# Patient Record
Sex: MALE | Race: WHITE | Employment: UNEMPLOYED | URBAN - NONMETROPOLITAN AREA
[De-identification: names, ages, dates, MRNs, and addresses within clinical notes are randomized per-mention and may not be internally consistent; named-entity substitution may affect disease eponyms.]

---

## 2017-03-22 ENCOUNTER — OFFICE VISIT (OUTPATIENT)
Dept: FAMILY MEDICINE CLINIC | Age: 57
End: 2017-03-22
Payer: COMMERCIAL

## 2017-03-22 ENCOUNTER — HOSPITAL ENCOUNTER (OUTPATIENT)
Dept: OTHER | Age: 57
Discharge: HOME | End: 2017-03-22
Attending: NURSE PRACTITIONER | Admitting: NURSE PRACTITIONER

## 2017-03-22 VITALS
SYSTOLIC BLOOD PRESSURE: 140 MMHG | BODY MASS INDEX: 35.1 KG/M2 | OXYGEN SATURATION: 98 % | WEIGHT: 231.6 LBS | HEIGHT: 68 IN | HEART RATE: 97 BPM | DIASTOLIC BLOOD PRESSURE: 82 MMHG | RESPIRATION RATE: 18 BRPM

## 2017-03-22 DIAGNOSIS — E11.42 DIABETIC POLYNEUROPATHY ASSOCIATED WITH TYPE 2 DIABETES MELLITUS (HCC): ICD-10-CM

## 2017-03-22 DIAGNOSIS — N18.9 CRI (CHRONIC RENAL INSUFFICIENCY), UNSPECIFIED STAGE: ICD-10-CM

## 2017-03-22 DIAGNOSIS — E78.5 HYPERLIPIDEMIA LDL GOAL <100: ICD-10-CM

## 2017-03-22 DIAGNOSIS — I10 HTN, GOAL BELOW 140/80: ICD-10-CM

## 2017-03-22 DIAGNOSIS — R53.83 FATIGUE, UNSPECIFIED TYPE: ICD-10-CM

## 2017-03-22 LAB
A/G RATIO: 1.8 (ref 0.8–2)
ALBUMIN SERPL-MCNC: 4.5 G/DL (ref 3.4–4.8)
ALP BLD-CCNC: 84 U/L (ref 25–100)
ALT SERPL-CCNC: 21 U/L (ref 4–36)
ANION GAP SERPL CALCULATED.3IONS-SCNC: 12 MMOL/L (ref 3–16)
AST SERPL-CCNC: 18 U/L (ref 8–33)
BILIRUB SERPL-MCNC: 0.5 MG/DL (ref 0.3–1.2)
BUN BLDV-MCNC: 17 MG/DL (ref 6–20)
CALCIUM SERPL-MCNC: 10.1 MG/DL (ref 8.5–10.5)
CHLORIDE BLD-SCNC: 103 MMOL/L (ref 98–107)
CHOLESTEROL, TOTAL: 281 MG/DL (ref 0–200)
CO2: 27 MMOL/L (ref 20–30)
CREAT SERPL-MCNC: 1.2 MG/DL (ref 0.4–1.2)
GFR AFRICAN AMERICAN: >59
GFR NON-AFRICAN AMERICAN: >59
GLOBULIN: 2.5 G/DL
GLUCOSE BLD-MCNC: 131 MG/DL (ref 74–106)
HBA1C MFR BLD: 9.2 %
HDLC SERPL-MCNC: 49 MG/DL (ref 40–60)
LDL CHOLESTEROL CALCULATED: 186 MG/DL
POTASSIUM SERPL-SCNC: 4.5 MMOL/L (ref 3.4–5.1)
SODIUM BLD-SCNC: 142 MMOL/L (ref 136–145)
TOTAL PROTEIN: 7 G/DL (ref 6.4–8.3)
TRIGL SERPL-MCNC: 228 MG/DL (ref 0–249)
TSH SERPL DL<=0.05 MIU/L-ACNC: 0.9 UIU/ML (ref 0.35–5.5)
VITAMIN B-12: 382 PG/ML (ref 211–911)
VLDLC SERPL CALC-MCNC: 46 MG/DL

## 2017-03-22 PROCEDURE — 99214 OFFICE O/P EST MOD 30 MIN: CPT | Performed by: NURSE PRACTITIONER

## 2017-03-22 RX ORDER — GABAPENTIN 300 MG/1
300 CAPSULE ORAL 3 TIMES DAILY
Qty: 90 CAPSULE | Refills: 3 | Status: SHIPPED | OUTPATIENT
Start: 2017-03-22 | End: 2017-06-05

## 2017-03-22 RX ORDER — FUROSEMIDE 20 MG/1
20 TABLET ORAL DAILY
COMMUNITY
End: 2017-03-22 | Stop reason: SDUPTHER

## 2017-03-22 RX ORDER — PEN NEEDLE, DIABETIC 30 GX5/16"
1 NEEDLE, DISPOSABLE MISCELLANEOUS 3 TIMES DAILY
Qty: 100 EACH | Refills: 3 | Status: SHIPPED | OUTPATIENT
Start: 2017-03-22 | End: 2017-03-29 | Stop reason: SDUPTHER

## 2017-03-22 RX ORDER — FUROSEMIDE 20 MG/1
20 TABLET ORAL DAILY
Qty: 60 TABLET | Refills: 3 | Status: SHIPPED | OUTPATIENT
Start: 2017-03-22 | End: 2017-08-15 | Stop reason: SDUPTHER

## 2017-03-22 RX ORDER — INSULIN GLARGINE 100 [IU]/ML
120 INJECTION, SOLUTION SUBCUTANEOUS 3 TIMES DAILY PRN
COMMUNITY
End: 2017-03-22 | Stop reason: CLARIF

## 2017-03-22 RX ORDER — IBUPROFEN 800 MG/1
800 TABLET ORAL EVERY 6 HOURS PRN
COMMUNITY
End: 2017-06-05

## 2017-03-22 RX ORDER — LISINOPRIL AND HYDROCHLOROTHIAZIDE 20; 12.5 MG/1; MG/1
1 TABLET ORAL DAILY
Qty: 30 TABLET | Refills: 3 | Status: SHIPPED | OUTPATIENT
Start: 2017-03-22 | End: 2017-05-10 | Stop reason: DRUGHIGH

## 2017-03-22 RX ORDER — GABAPENTIN 300 MG/1
300 CAPSULE ORAL 3 TIMES DAILY
COMMUNITY
End: 2017-03-22 | Stop reason: SDUPTHER

## 2017-03-22 RX ORDER — INSULIN GLARGINE 100 [IU]/ML
50 INJECTION, SOLUTION SUBCUTANEOUS EVERY MORNING
COMMUNITY
End: 2017-04-11 | Stop reason: SDUPTHER

## 2017-03-22 RX ORDER — FENOFIBRATE 200 MG/1
200 CAPSULE ORAL
Qty: 30 CAPSULE | Refills: 3 | Status: SHIPPED | OUTPATIENT
Start: 2017-03-22 | End: 2017-07-18 | Stop reason: SDUPTHER

## 2017-03-22 RX ORDER — LISINOPRIL AND HYDROCHLOROTHIAZIDE 20; 12.5 MG/1; MG/1
1 TABLET ORAL DAILY
COMMUNITY
End: 2017-03-22 | Stop reason: SDUPTHER

## 2017-03-22 RX ORDER — SIMVASTATIN 80 MG
80 TABLET ORAL NIGHTLY
COMMUNITY
End: 2017-03-24 | Stop reason: ALTCHOICE

## 2017-03-22 RX ORDER — ASPIRIN 81 MG/1
81 TABLET, CHEWABLE ORAL DAILY
COMMUNITY

## 2017-03-22 RX ORDER — FENOFIBRATE 200 MG/1
200 CAPSULE ORAL
COMMUNITY
End: 2017-03-22 | Stop reason: SDUPTHER

## 2017-03-24 RX ORDER — ATORVASTATIN CALCIUM 40 MG/1
40 TABLET, FILM COATED ORAL DAILY
Qty: 30 TABLET | Refills: 3 | Status: SHIPPED | OUTPATIENT
Start: 2017-03-24 | End: 2017-08-11 | Stop reason: SDUPTHER

## 2017-03-29 ENCOUNTER — OFFICE VISIT (OUTPATIENT)
Dept: FAMILY MEDICINE CLINIC | Age: 57
End: 2017-03-29
Payer: COMMERCIAL

## 2017-03-29 VITALS
BODY MASS INDEX: 35.45 KG/M2 | HEIGHT: 68 IN | SYSTOLIC BLOOD PRESSURE: 130 MMHG | WEIGHT: 233.9 LBS | RESPIRATION RATE: 18 BRPM | OXYGEN SATURATION: 98 % | DIASTOLIC BLOOD PRESSURE: 82 MMHG | HEART RATE: 87 BPM

## 2017-03-29 DIAGNOSIS — I10 HTN, GOAL BELOW 140/80: ICD-10-CM

## 2017-03-29 DIAGNOSIS — M54.6 ACUTE MIDLINE THORACIC BACK PAIN: ICD-10-CM

## 2017-03-29 DIAGNOSIS — E78.5 HYPERLIPIDEMIA LDL GOAL <100: ICD-10-CM

## 2017-03-29 PROCEDURE — 99213 OFFICE O/P EST LOW 20 MIN: CPT | Performed by: NURSE PRACTITIONER

## 2017-03-29 RX ORDER — PEN NEEDLE, DIABETIC 30 GX5/16"
1 NEEDLE, DISPOSABLE MISCELLANEOUS 3 TIMES DAILY
Qty: 300 EACH | Refills: 3 | Status: SHIPPED | OUTPATIENT
Start: 2017-03-29 | End: 2018-09-10

## 2017-03-29 ASSESSMENT — ENCOUNTER SYMPTOMS
BACK PAIN: 1
RESPIRATORY NEGATIVE: 1
GASTROINTESTINAL NEGATIVE: 1
EYES NEGATIVE: 1

## 2017-04-10 ENCOUNTER — TELEPHONE (OUTPATIENT)
Dept: FAMILY MEDICINE CLINIC | Age: 57
End: 2017-04-10

## 2017-04-16 ASSESSMENT — ENCOUNTER SYMPTOMS
RESPIRATORY NEGATIVE: 1
EYES NEGATIVE: 1
GASTROINTESTINAL NEGATIVE: 1

## 2017-05-10 ENCOUNTER — OFFICE VISIT (OUTPATIENT)
Dept: FAMILY MEDICINE CLINIC | Age: 57
End: 2017-05-10
Payer: COMMERCIAL

## 2017-05-10 VITALS
WEIGHT: 232.2 LBS | DIASTOLIC BLOOD PRESSURE: 62 MMHG | HEIGHT: 68 IN | SYSTOLIC BLOOD PRESSURE: 100 MMHG | RESPIRATION RATE: 18 BRPM | BODY MASS INDEX: 35.19 KG/M2 | OXYGEN SATURATION: 95 % | HEART RATE: 80 BPM

## 2017-05-10 DIAGNOSIS — I95.0 IDIOPATHIC HYPOTENSION: Primary | ICD-10-CM

## 2017-05-10 DIAGNOSIS — I10 HTN, GOAL BELOW 140/80: ICD-10-CM

## 2017-05-10 DIAGNOSIS — E11.42 DIABETIC POLYNEUROPATHY ASSOCIATED WITH TYPE 2 DIABETES MELLITUS (HCC): ICD-10-CM

## 2017-05-10 DIAGNOSIS — E78.5 HYPERLIPIDEMIA LDL GOAL <100: ICD-10-CM

## 2017-05-10 PROCEDURE — 96360 HYDRATION IV INFUSION INIT: CPT | Performed by: NURSE PRACTITIONER

## 2017-05-10 PROCEDURE — 96361 HYDRATE IV INFUSION ADD-ON: CPT | Performed by: NURSE PRACTITIONER

## 2017-05-10 RX ORDER — GABAPENTIN 400 MG/1
400 CAPSULE ORAL 3 TIMES DAILY
Qty: 90 CAPSULE | Refills: 3 | Status: SHIPPED | OUTPATIENT
Start: 2017-05-10 | End: 2017-08-15 | Stop reason: SDUPTHER

## 2017-05-10 RX ORDER — SODIUM CHLORIDE 9 MG/ML
INJECTION, SOLUTION INTRAVENOUS ONCE
Status: DISCONTINUED | OUTPATIENT
Start: 2017-05-10 | End: 2021-06-09

## 2017-05-10 ASSESSMENT — PATIENT HEALTH QUESTIONNAIRE - PHQ9
SUM OF ALL RESPONSES TO PHQ QUESTIONS 1-9: 0
SUM OF ALL RESPONSES TO PHQ9 QUESTIONS 1 & 2: 0
1. LITTLE INTEREST OR PLEASURE IN DOING THINGS: 0
2. FEELING DOWN, DEPRESSED OR HOPELESS: 0

## 2017-05-10 ASSESSMENT — ENCOUNTER SYMPTOMS
RESPIRATORY NEGATIVE: 1
GASTROINTESTINAL NEGATIVE: 1
EYES NEGATIVE: 1
BACK PAIN: 1

## 2017-05-18 ENCOUNTER — OFFICE VISIT (OUTPATIENT)
Dept: FAMILY MEDICINE CLINIC | Age: 57
End: 2017-05-18
Payer: COMMERCIAL

## 2017-05-18 ENCOUNTER — HOSPITAL ENCOUNTER (OUTPATIENT)
Dept: OTHER | Age: 57
Discharge: HOME | End: 2017-05-18
Attending: NURSE PRACTITIONER | Admitting: NURSE PRACTITIONER

## 2017-05-18 VITALS
OXYGEN SATURATION: 98 % | WEIGHT: 232.6 LBS | SYSTOLIC BLOOD PRESSURE: 120 MMHG | BODY MASS INDEX: 35.25 KG/M2 | HEIGHT: 68 IN | DIASTOLIC BLOOD PRESSURE: 86 MMHG | HEART RATE: 86 BPM | RESPIRATION RATE: 16 BRPM

## 2017-05-18 DIAGNOSIS — I10 HTN, GOAL BELOW 140/80: ICD-10-CM

## 2017-05-18 DIAGNOSIS — E11.42 DIABETIC POLYNEUROPATHY ASSOCIATED WITH TYPE 2 DIABETES MELLITUS (HCC): ICD-10-CM

## 2017-05-18 DIAGNOSIS — E78.5 HYPERLIPIDEMIA LDL GOAL <100: ICD-10-CM

## 2017-05-18 LAB
CREATININE URINE: 57.7 MG/DL (ref 1.5–300)
HBA1C MFR BLD: 8 %
MICROALBUMIN UR-MCNC: <1.2 MG/DL (ref 0–22)
MICROALBUMIN/CREAT UR-RTO: NORMAL MG/G (ref 0–30)

## 2017-05-18 PROCEDURE — 99214 OFFICE O/P EST MOD 30 MIN: CPT | Performed by: NURSE PRACTITIONER

## 2017-05-18 ASSESSMENT — ENCOUNTER SYMPTOMS
BACK PAIN: 1
EYES NEGATIVE: 1
RESPIRATORY NEGATIVE: 1
GASTROINTESTINAL NEGATIVE: 1

## 2017-05-19 DIAGNOSIS — Z12.11 SCREENING FOR COLON CANCER: Primary | ICD-10-CM

## 2017-05-19 LAB
CONTROL: ABNORMAL
HEMOCCULT STL QL: POSITIVE

## 2017-05-19 PROCEDURE — 82274 ASSAY TEST FOR BLOOD FECAL: CPT | Performed by: NURSE PRACTITIONER

## 2017-05-20 DIAGNOSIS — Z12.11 SCREENING FOR COLON CANCER: ICD-10-CM

## 2017-05-20 DIAGNOSIS — R19.5 POSITIVE FIT (FECAL IMMUNOCHEMICAL TEST): Primary | ICD-10-CM

## 2017-06-05 ENCOUNTER — SURG/PROC ORDERS (OUTPATIENT)
Dept: SURGERY | Age: 57
End: 2017-06-05

## 2017-06-05 ENCOUNTER — HOSPITAL ENCOUNTER (OUTPATIENT)
Dept: OTHER | Age: 57
Discharge: HOME | End: 2017-06-05
Attending: SURGERY | Admitting: SURGERY

## 2017-06-05 ENCOUNTER — OFFICE VISIT (OUTPATIENT)
Dept: SURGERY | Age: 57
End: 2017-06-05
Payer: COMMERCIAL

## 2017-06-05 VITALS
HEIGHT: 68 IN | WEIGHT: 232.4 LBS | DIASTOLIC BLOOD PRESSURE: 65 MMHG | TEMPERATURE: 98 F | HEART RATE: 97 BPM | BODY MASS INDEX: 35.22 KG/M2 | SYSTOLIC BLOOD PRESSURE: 106 MMHG

## 2017-06-05 DIAGNOSIS — R19.5 HEME POSITIVE STOOL: ICD-10-CM

## 2017-06-05 DIAGNOSIS — Z12.11 COLON CANCER SCREENING: Primary | ICD-10-CM

## 2017-06-05 PROCEDURE — 99244 OFF/OP CNSLTJ NEW/EST MOD 40: CPT | Performed by: SURGERY

## 2017-06-05 RX ORDER — SODIUM CHLORIDE 0.9 % (FLUSH) 0.9 %
10 SYRINGE (ML) INJECTION PRN
Status: CANCELLED | OUTPATIENT
Start: 2017-06-05

## 2017-06-05 RX ORDER — SODIUM CHLORIDE, SODIUM LACTATE, POTASSIUM CHLORIDE, CALCIUM CHLORIDE 600; 310; 30; 20 MG/100ML; MG/100ML; MG/100ML; MG/100ML
INJECTION, SOLUTION INTRAVENOUS CONTINUOUS
Status: CANCELLED | OUTPATIENT
Start: 2017-06-05

## 2017-06-05 RX ORDER — SODIUM CHLORIDE 0.9 % (FLUSH) 0.9 %
10 SYRINGE (ML) INJECTION EVERY 12 HOURS SCHEDULED
Status: CANCELLED | OUTPATIENT
Start: 2017-06-05

## 2017-06-05 ASSESSMENT — ENCOUNTER SYMPTOMS
EYES NEGATIVE: 1
RESPIRATORY NEGATIVE: 1
GASTROINTESTINAL NEGATIVE: 1

## 2017-06-12 ENCOUNTER — TELEPHONE (OUTPATIENT)
Dept: SURGERY | Age: 57
End: 2017-06-12

## 2017-06-15 ENCOUNTER — TELEPHONE (OUTPATIENT)
Dept: SURGERY | Age: 57
End: 2017-06-15

## 2017-07-07 ENCOUNTER — OFFICE VISIT (OUTPATIENT)
Dept: SURGERY | Age: 57
End: 2017-07-07
Payer: COMMERCIAL

## 2017-07-07 VITALS
HEIGHT: 68 IN | RESPIRATION RATE: 16 BRPM | DIASTOLIC BLOOD PRESSURE: 90 MMHG | SYSTOLIC BLOOD PRESSURE: 131 MMHG | TEMPERATURE: 98.7 F | HEART RATE: 100 BPM | BODY MASS INDEX: 35.13 KG/M2 | WEIGHT: 231.8 LBS

## 2017-07-07 DIAGNOSIS — K64.8 INTERNAL HEMORRHOIDS WITHOUT COMPLICATION: ICD-10-CM

## 2017-07-07 DIAGNOSIS — D12.6 ADENOMATOUS COLON POLYP: Primary | ICD-10-CM

## 2017-07-07 PROCEDURE — 99212 OFFICE O/P EST SF 10 MIN: CPT | Performed by: SURGERY

## 2017-07-07 RX ORDER — LISINOPRIL AND HYDROCHLOROTHIAZIDE 20; 12.5 MG/1; MG/1
TABLET ORAL
COMMUNITY
Start: 2017-06-17 | End: 2017-07-18 | Stop reason: SDUPTHER

## 2017-07-18 RX ORDER — FENOFIBRATE 200 MG/1
CAPSULE ORAL
Qty: 30 CAPSULE | Refills: 2 | Status: SHIPPED | OUTPATIENT
Start: 2017-07-18 | End: 2017-10-23 | Stop reason: SDUPTHER

## 2017-07-18 RX ORDER — LISINOPRIL AND HYDROCHLOROTHIAZIDE 20; 12.5 MG/1; MG/1
TABLET ORAL
Qty: 30 TABLET | Refills: 2 | Status: SHIPPED | OUTPATIENT
Start: 2017-07-18 | End: 2017-09-05

## 2017-08-01 RX ORDER — INSULIN GLULISINE 100 [IU]/ML
INJECTION, SOLUTION SUBCUTANEOUS
Qty: 30 PEN | Refills: 2 | Status: SHIPPED | OUTPATIENT
Start: 2017-08-01 | End: 2017-10-07 | Stop reason: SDUPTHER

## 2017-08-11 RX ORDER — ATORVASTATIN CALCIUM 40 MG/1
TABLET, FILM COATED ORAL
Qty: 30 TABLET | Refills: 2 | Status: SHIPPED | OUTPATIENT
Start: 2017-08-11 | End: 2017-11-15 | Stop reason: SDUPTHER

## 2017-08-11 RX ORDER — SITAGLIPTIN 100 MG/1
TABLET, FILM COATED ORAL
Qty: 30 TABLET | Refills: 2 | Status: SHIPPED | OUTPATIENT
Start: 2017-08-11 | End: 2017-11-15 | Stop reason: SDUPTHER

## 2017-08-15 ENCOUNTER — OFFICE VISIT (OUTPATIENT)
Dept: FAMILY MEDICINE CLINIC | Age: 57
End: 2017-08-15
Payer: COMMERCIAL

## 2017-08-15 ENCOUNTER — HOSPITAL ENCOUNTER (OUTPATIENT)
Dept: OTHER | Age: 57
Discharge: HOME | End: 2017-08-15
Attending: NURSE PRACTITIONER | Admitting: NURSE PRACTITIONER

## 2017-08-15 VITALS
SYSTOLIC BLOOD PRESSURE: 100 MMHG | RESPIRATION RATE: 16 BRPM | WEIGHT: 239.6 LBS | HEART RATE: 86 BPM | DIASTOLIC BLOOD PRESSURE: 68 MMHG | HEIGHT: 68 IN | BODY MASS INDEX: 36.31 KG/M2 | OXYGEN SATURATION: 98 %

## 2017-08-15 DIAGNOSIS — E78.5 HYPERLIPIDEMIA LDL GOAL <100: ICD-10-CM

## 2017-08-15 DIAGNOSIS — Z11.4 ENCOUNTER FOR SCREENING FOR HIV: ICD-10-CM

## 2017-08-15 DIAGNOSIS — I10 HTN, GOAL BELOW 140/80: ICD-10-CM

## 2017-08-15 DIAGNOSIS — Z11.59 NEED FOR HEPATITIS C SCREENING TEST: ICD-10-CM

## 2017-08-15 DIAGNOSIS — Z23 NEED FOR STREPTOCOCCUS PNEUMONIAE VACCINATION: ICD-10-CM

## 2017-08-15 DIAGNOSIS — Z23 NEED FOR DIPHTHERIA-TETANUS-PERTUSSIS (TDAP) VACCINE, ADULT/ADOLESCENT: ICD-10-CM

## 2017-08-15 DIAGNOSIS — E11.42 DIABETIC POLYNEUROPATHY ASSOCIATED WITH TYPE 2 DIABETES MELLITUS (HCC): ICD-10-CM

## 2017-08-15 LAB
A/G RATIO: 1.7 (ref 0.8–2)
ALBUMIN SERPL-MCNC: 4.4 G/DL (ref 3.4–4.8)
ALP BLD-CCNC: 51 U/L (ref 25–100)
ALT SERPL-CCNC: 29 U/L (ref 4–36)
ANION GAP SERPL CALCULATED.3IONS-SCNC: 13 MMOL/L (ref 3–16)
AST SERPL-CCNC: 24 U/L (ref 8–33)
BILIRUB SERPL-MCNC: 0.3 MG/DL (ref 0.3–1.2)
BUN BLDV-MCNC: 36 MG/DL (ref 6–20)
CALCIUM SERPL-MCNC: 10.3 MG/DL (ref 8.5–10.5)
CHLORIDE BLD-SCNC: 100 MMOL/L (ref 98–107)
CHOLESTEROL, TOTAL: 230 MG/DL (ref 0–200)
CO2: 28 MMOL/L (ref 20–30)
CREAT SERPL-MCNC: 2.1 MG/DL (ref 0.4–1.2)
GFR AFRICAN AMERICAN: 40
GFR NON-AFRICAN AMERICAN: 33
GLOBULIN: 2.6 G/DL
GLUCOSE BLD-MCNC: 128 MG/DL (ref 74–106)
HBA1C MFR BLD: 9.6 %
HDLC SERPL-MCNC: 39 MG/DL (ref 40–60)
HEPATITIS C ANTIBODY INTERPRETATION: NORMAL
LDL CHOLESTEROL CALCULATED: 160 MG/DL
POTASSIUM SERPL-SCNC: 4.7 MMOL/L (ref 3.4–5.1)
SODIUM BLD-SCNC: 141 MMOL/L (ref 136–145)
TOTAL PROTEIN: 7 G/DL (ref 6.4–8.3)
TRIGL SERPL-MCNC: 154 MG/DL (ref 0–249)
VLDLC SERPL CALC-MCNC: 31 MG/DL

## 2017-08-15 PROCEDURE — 90471 IMMUNIZATION ADMIN: CPT | Performed by: NURSE PRACTITIONER

## 2017-08-15 PROCEDURE — 90732 PPSV23 VACC 2 YRS+ SUBQ/IM: CPT | Performed by: NURSE PRACTITIONER

## 2017-08-15 PROCEDURE — 99214 OFFICE O/P EST MOD 30 MIN: CPT | Performed by: NURSE PRACTITIONER

## 2017-08-15 RX ORDER — GABAPENTIN 400 MG/1
400 CAPSULE ORAL 3 TIMES DAILY
Qty: 90 CAPSULE | Refills: 3 | Status: SHIPPED | OUTPATIENT
Start: 2017-08-15 | End: 2017-11-15 | Stop reason: SDUPTHER

## 2017-08-15 RX ORDER — FUROSEMIDE 20 MG/1
20 TABLET ORAL DAILY
Qty: 30 TABLET | Refills: 3 | Status: SHIPPED | OUTPATIENT
Start: 2017-08-15 | End: 2017-11-15 | Stop reason: SDUPTHER

## 2017-08-16 LAB — HIV-1 AND HIV-2 ANTIBODIES: NORMAL

## 2017-08-17 ENCOUNTER — TELEPHONE (OUTPATIENT)
Dept: FAMILY MEDICINE CLINIC | Age: 57
End: 2017-08-17

## 2017-08-18 RX ORDER — BENZONATATE 100 MG/1
100 CAPSULE ORAL 3 TIMES DAILY PRN
Qty: 30 CAPSULE | Refills: 0 | Status: SHIPPED | OUTPATIENT
Start: 2017-08-18 | End: 2017-08-25

## 2017-08-20 ASSESSMENT — ENCOUNTER SYMPTOMS
EYES NEGATIVE: 1
RESPIRATORY NEGATIVE: 1
BACK PAIN: 1
GASTROINTESTINAL NEGATIVE: 1

## 2017-09-05 ENCOUNTER — OFFICE VISIT (OUTPATIENT)
Dept: FAMILY MEDICINE CLINIC | Age: 57
End: 2017-09-05
Payer: COMMERCIAL

## 2017-09-05 VITALS
WEIGHT: 238 LBS | BODY MASS INDEX: 36.07 KG/M2 | SYSTOLIC BLOOD PRESSURE: 108 MMHG | HEART RATE: 85 BPM | HEIGHT: 68 IN | RESPIRATION RATE: 18 BRPM | OXYGEN SATURATION: 98 % | DIASTOLIC BLOOD PRESSURE: 66 MMHG

## 2017-09-05 DIAGNOSIS — Z87.898 HX OF CHEMICAL EXPOSURE: ICD-10-CM

## 2017-09-05 PROCEDURE — 99213 OFFICE O/P EST LOW 20 MIN: CPT | Performed by: NURSE PRACTITIONER

## 2017-09-05 RX ORDER — LEVOFLOXACIN 500 MG/1
500 TABLET, FILM COATED ORAL DAILY
Qty: 10 TABLET | Refills: 0 | Status: SHIPPED | OUTPATIENT
Start: 2017-09-05 | End: 2017-09-15

## 2017-09-05 RX ORDER — LISINOPRIL 5 MG/1
5 TABLET ORAL DAILY
Qty: 30 TABLET | Refills: 3 | Status: SHIPPED | OUTPATIENT
Start: 2017-09-05 | End: 2017-10-23 | Stop reason: SDUPTHER

## 2017-09-15 ASSESSMENT — ENCOUNTER SYMPTOMS
RESPIRATORY NEGATIVE: 1
GASTROINTESTINAL NEGATIVE: 1
EYES NEGATIVE: 1
BACK PAIN: 1

## 2017-10-09 RX ORDER — INSULIN GLULISINE 100 [IU]/ML
INJECTION, SOLUTION SUBCUTANEOUS
Qty: 30 PEN | Refills: 3 | Status: SHIPPED | OUTPATIENT
Start: 2017-10-09 | End: 2017-12-21

## 2017-10-19 RX ORDER — INSULIN GLARGINE 100 [IU]/ML
INJECTION, SOLUTION SUBCUTANEOUS
Qty: 5 PEN | Refills: 5 | Status: SHIPPED | OUTPATIENT
Start: 2017-10-19 | End: 2017-11-15 | Stop reason: DRUGHIGH

## 2017-10-23 NOTE — TELEPHONE ENCOUNTER
Refill request received from pharmacy.  Medication pending for approval.     Patient information below:      Hemoglobin A1C (%)   Date Value   08/15/2017 9.6 (H)   05/18/2017 8.0 (H)   03/22/2017 9.2 (H)     MICROALBUMIN, RANDOM URINE (mg/dL)   Date Value   05/18/2017 <1.20     LDL Calculated (mg/dL)   Date Value   08/15/2017 160 (H)     AST (U/L)   Date Value   08/15/2017 24     ALT (U/L)   Date Value   08/15/2017 29     BUN (mg/dL)   Date Value   08/15/2017 36 (H)      (goal A1C is < 7)   (goal LDL is <100) need 30-50% reduction from baseline     BP Readings from Last 3 Encounters:   09/05/17 108/66   08/15/17 100/68   07/07/17 (!) 131/90    (goal /80)      All Future Testing planned in [facility]:      Last Office Visit With PCP:  10/18/2017      Next Visit Date:  Future Appointments  Date Time Provider Lyndsey Lyons   11/15/2017 9:00 AM Waqar Alba APR[address]            Patient Active Problem List:     Uncontrolled type 2 diabetes mellitus with complication, with long-term current use of insulin ([address]     Hyperlipidemia LDL goal <100     HTN, goal below 140/80     Diabetic polyneuropathy associated with type 2 diabetes mellitus ([address]     CRI (chronic renal insufficiency)     Colon cancer screening     Colon polyp     Internal hemorrhoids without complication     Need for diphtheria-tetanus-pertussis (Tdap) vaccine, adult/adolescent     Need for Streptococcus pneumoniae vaccination

## 2017-10-24 RX ORDER — LISINOPRIL 5 MG/1
5 TABLET ORAL DAILY
Qty: 30 TABLET | Refills: 3 | Status: SHIPPED | OUTPATIENT
Start: 2017-10-24 | End: 2018-02-20 | Stop reason: SDUPTHER

## 2017-10-24 RX ORDER — FENOFIBRATE 200 MG/1
CAPSULE ORAL
Qty: 30 CAPSULE | Refills: 3 | Status: SHIPPED | OUTPATIENT
Start: 2017-10-24 | End: 2018-02-20 | Stop reason: SDUPTHER

## 2017-11-15 ENCOUNTER — OFFICE VISIT (OUTPATIENT)
Dept: FAMILY MEDICINE CLINIC | Age: 57
End: 2017-11-15
Payer: COMMERCIAL

## 2017-11-15 ENCOUNTER — HOSPITAL ENCOUNTER (OUTPATIENT)
Dept: OTHER | Age: 57
Discharge: HOME | End: 2017-11-15
Attending: NURSE PRACTITIONER | Admitting: NURSE PRACTITIONER

## 2017-11-15 VITALS
DIASTOLIC BLOOD PRESSURE: 68 MMHG | OXYGEN SATURATION: 98 % | SYSTOLIC BLOOD PRESSURE: 118 MMHG | BODY MASS INDEX: 34.22 KG/M2 | WEIGHT: 225.8 LBS | HEART RATE: 84 BPM | HEIGHT: 68 IN | RESPIRATION RATE: 16 BRPM

## 2017-11-15 DIAGNOSIS — I10 HTN, GOAL BELOW 140/80: ICD-10-CM

## 2017-11-15 DIAGNOSIS — E11.42 DIABETIC POLYNEUROPATHY ASSOCIATED WITH TYPE 2 DIABETES MELLITUS (HCC): ICD-10-CM

## 2017-11-15 DIAGNOSIS — E78.5 HYPERLIPIDEMIA LDL GOAL <100: ICD-10-CM

## 2017-11-15 LAB
A/G RATIO: 1.4 (ref 0.8–2)
ALBUMIN SERPL-MCNC: 4.1 G/DL (ref 3.4–4.8)
ALP BLD-CCNC: 57 U/L (ref 25–100)
ALT SERPL-CCNC: 40 U/L (ref 4–36)
ANION GAP SERPL CALCULATED.3IONS-SCNC: 12 MMOL/L (ref 3–16)
AST SERPL-CCNC: 34 U/L (ref 8–33)
BILIRUB SERPL-MCNC: 0.3 MG/DL (ref 0.3–1.2)
BUN BLDV-MCNC: 32 MG/DL (ref 6–20)
CALCIUM SERPL-MCNC: 9.9 MG/DL (ref 8.5–10.5)
CHLORIDE BLD-SCNC: 99 MMOL/L (ref 98–107)
CHOLESTEROL, TOTAL: 231 MG/DL (ref 0–200)
CO2: 26 MMOL/L (ref 20–30)
CREAT SERPL-MCNC: 2 MG/DL (ref 0.4–1.2)
GFR AFRICAN AMERICAN: 42
GFR NON-AFRICAN AMERICAN: 35
GLOBULIN: 3 G/DL
GLUCOSE BLD-MCNC: 134 MG/DL (ref 74–106)
HBA1C MFR BLD: 11.2 %
HDLC SERPL-MCNC: 34 MG/DL (ref 40–60)
LDL CHOLESTEROL CALCULATED: 161 MG/DL
POTASSIUM SERPL-SCNC: 5 MMOL/L (ref 3.4–5.1)
SODIUM BLD-SCNC: 137 MMOL/L (ref 136–145)
TOTAL PROTEIN: 7.1 G/DL (ref 6.4–8.3)
TRIGL SERPL-MCNC: 181 MG/DL (ref 0–249)
VLDLC SERPL CALC-MCNC: 36 MG/DL

## 2017-11-15 PROCEDURE — 99214 OFFICE O/P EST MOD 30 MIN: CPT | Performed by: NURSE PRACTITIONER

## 2017-11-15 RX ORDER — FUROSEMIDE 20 MG/1
20 TABLET ORAL DAILY
Qty: 30 TABLET | Refills: 3 | Status: SHIPPED | OUTPATIENT
Start: 2017-11-15 | End: 2018-02-20 | Stop reason: SDUPTHER

## 2017-11-15 RX ORDER — ATORVASTATIN CALCIUM 40 MG/1
TABLET, FILM COATED ORAL
Qty: 30 TABLET | Refills: 2 | Status: SHIPPED | OUTPATIENT
Start: 2017-11-15 | End: 2018-02-20 | Stop reason: SDUPTHER

## 2017-11-15 RX ORDER — FLUTICASONE PROPIONATE 50 MCG
1 SPRAY, SUSPENSION (ML) NASAL DAILY
Qty: 1 BOTTLE | Refills: 3 | Status: SHIPPED | OUTPATIENT
Start: 2017-11-15 | End: 2018-02-20 | Stop reason: SDUPTHER

## 2017-11-15 RX ORDER — GABAPENTIN 400 MG/1
400 CAPSULE ORAL 3 TIMES DAILY
Qty: 90 CAPSULE | Refills: 3 | Status: SHIPPED | OUTPATIENT
Start: 2017-11-15 | End: 2018-05-20

## 2017-11-15 ASSESSMENT — ENCOUNTER SYMPTOMS
EYES NEGATIVE: 1
RESPIRATORY NEGATIVE: 1
BACK PAIN: 1
GASTROINTESTINAL NEGATIVE: 1

## 2017-11-15 NOTE — PROGRESS NOTES
SUBJECTIVE:  Hero Livingston is a 65 y.o. male that presents with   Chief Complaint   Patient presents with    Diabetes     F/U    Hyperlipidemia    Hypertension   . HPI:    Diabetes   He presents for his follow-up diabetic visit. He has type 2 diabetes mellitus. No MedicAlert identification noted. His disease course has been worsening. Associated symptoms include fatigue. There are no hypoglycemic complications. Symptoms are worsening. Diabetic complications include peripheral neuropathy and retinopathy. Risk factors for coronary artery disease include male sex, hypertension, diabetes mellitus, stress and dyslipidemia. Hyperlipidemia   This is a chronic problem. The current episode started more than 1 year ago. The problem is uncontrolled. Exacerbating diseases include chronic renal disease and diabetes. Associated symptoms include myalgias. Current antihyperlipidemic treatment includes statins and fibric acid derivatives. Compliance problems: unknown. Risk factors for coronary artery disease include diabetes mellitus, dyslipidemia, family history, hypertension, male sex, obesity, stress and a sedentary lifestyle. Hypertension   This is a chronic problem. The current episode started more than 1 year ago. Associated symptoms include malaise/fatigue. Risk factors for coronary artery disease include diabetes mellitus, dyslipidemia and family history. Past treatments include ACE inhibitors, diuretics and lifestyle changes. The current treatment provides moderate improvement. There are no compliance problems. Hypertensive end-organ damage includes retinopathy. Identifiable causes of hypertension include chronic renal disease. Review of Systems   Constitutional: Positive for fatigue and malaise/fatigue. HENT: Negative. Eyes: Negative. Respiratory: Negative. Cardiovascular: Negative. Gastrointestinal: Negative. Endocrine: Negative. Genitourinary: Negative.     Musculoskeletal: Positive for back pain and myalgias. Skin: Negative. Neurological: Negative. Hematological: Negative. Psychiatric/Behavioral: Negative. All other systems reviewed and are negative. OBJECTIVE:  /68 (Site: Left Arm, Position: Sitting, Cuff Size: Large Adult)   Pulse 84   Resp 16   Ht 5' 8\" (1.727 m)   Wt 225 lb 12.8 oz (102.4 kg)   SpO2 98% Comment: room air  BMI 34.33 kg/m²    Physical Exam   Constitutional: He is oriented to person, place, and time. He appears well-developed and well-nourished. HENT:   Head: Normocephalic and atraumatic. Right Ear: External ear normal.   Left Ear: External ear normal.   Nose: Nose normal.   Mouth/Throat: Oropharynx is clear and moist.   Eyes: Conjunctivae and EOM are normal. Pupils are equal, round, and reactive to light. Neck: Normal range of motion. Neck supple. Cardiovascular: Normal rate, regular rhythm, normal heart sounds and intact distal pulses. Pulmonary/Chest: Effort normal and breath sounds normal.   Abdominal: Soft. Bowel sounds are normal.   Musculoskeletal: Normal range of motion. Neurological: He is alert and oriented to person, place, and time. He has normal reflexes. Skin: Skin is warm and dry. Psychiatric: He has a normal mood and affect. His behavior is normal. Judgment and thought content normal.   Nursing note and vitals reviewed. No results found for requested labs within last 30 days. Hemoglobin A1C (%)   Date Value   08/15/2017 9.6 (H)     MICROALBUMIN, RANDOM URINE (mg/dL)   Date Value   05/18/2017 <1.20     LDL Calculated (mg/dL)   Date Value   08/15/2017 160 (H)         No results found for: WBC, NEUTROABS, HGB, HCT, MCV, PLT    Lab Results   Component Value Date    TSH 0.90 03/22/2017         ASSESSMENT/PLAN:  1. Uncontrolled type 2 diabetes mellitus with complication, with long-term current use of insulin (HCC)  Hemoglobin A1C   2. HTN, goal below 140/80  Comprehensive Metabolic Panel   3.  Hyperlipidemia [DISCONTINUED] gabapentin (NEURONTIN) 400 MG capsule Take 1 capsule by mouth 3 times daily 90 capsule 3    [DISCONTINUED] furosemide (LASIX) 20 MG tablet Take 1 tablet by mouth daily 30 tablet 3    [DISCONTINUED] atorvastatin (LIPITOR) 40 MG tablet TAKE ONE TABLET BY MOUTH DAILY 30 tablet 2    [DISCONTINUED] JANUVIA 100 MG tablet TAKE ONE TABLET BY MOUTH DAILY 30 tablet 2     Facility-Administered Encounter Medications as of 11/15/2017   Medication Dose Route Frequency Provider Last Rate Last Dose    0.9 % sodium chloride infusion   Intravenous Once Berenda Galeazzi, APRN   Stopped at 05/10/17 1052    0.9 % sodium chloride infusion   Intravenous Once Berenda Galeazzi, APRN   Stopped at 05/10/17 1144       Return in about 3 months (around 2/15/2018), or if symptoms worsen or fail to improve. PATIENT COUNSELING    Counseling was provided today regarding the following topics: Healthy eating habits, Regular exercise, substance abuse and healthy sleep habits. Discussed use, benefit, and side effects of prescribed medications. Barriers to medication compliance addressed. All patient questions answered. Pt voiced understanding. Controlled Substances Monitoring:     Attestation: The Prescription Monitoring Report for this patient was reviewed today. Berenda Galeazzi, APRN)  Documentation: Possible medication side effects, risk of tolerance and/or dependence, and alternative treatments discussed., Obtaining appropriate analgesic effect of treatment., No signs of potential drug abuse or diversion identified., Medication contract signed today. Berenda Galeazzi, APRN)  All systems have been reviewed and full physical exam completed. There are no additional changes from previous visit besides what has been indicated.

## 2017-11-15 NOTE — PROGRESS NOTES
Have you seen any other physician or provider since your last visit no    Have you had any other diagnostic tests since your last visit? no    Have you changed or stopped any medications since your last visit? no     Goals      Blood Pressure < 140/90      HEMOGLOBIN A1C < 9.0      LDL CALC < 100

## 2017-12-19 ENCOUNTER — TELEPHONE (OUTPATIENT)
Dept: FAMILY MEDICINE CLINIC | Age: 57
End: 2017-12-19

## 2017-12-20 ENCOUNTER — CARE COORDINATION (OUTPATIENT)
Dept: CARE COORDINATION | Age: 57
End: 2017-12-20

## 2018-02-19 RX ORDER — PEN NEEDLE, DIABETIC 31 G X1/4"
NEEDLE, DISPOSABLE MISCELLANEOUS
Qty: 90 EACH | Refills: 2 | Status: SHIPPED | OUTPATIENT
Start: 2018-02-19 | End: 2018-07-09 | Stop reason: SDUPTHER

## 2018-02-20 ENCOUNTER — HOSPITAL ENCOUNTER (OUTPATIENT)
Dept: OTHER | Age: 58
Discharge: HOME | End: 2018-02-20
Attending: NURSE PRACTITIONER | Admitting: NURSE PRACTITIONER

## 2018-02-20 ENCOUNTER — OFFICE VISIT (OUTPATIENT)
Dept: FAMILY MEDICINE CLINIC | Age: 58
End: 2018-02-20
Payer: COMMERCIAL

## 2018-02-20 VITALS
SYSTOLIC BLOOD PRESSURE: 136 MMHG | HEIGHT: 68 IN | OXYGEN SATURATION: 97 % | HEART RATE: 90 BPM | WEIGHT: 245 LBS | DIASTOLIC BLOOD PRESSURE: 78 MMHG | RESPIRATION RATE: 18 BRPM | BODY MASS INDEX: 37.13 KG/M2

## 2018-02-20 DIAGNOSIS — E11.42 DIABETIC POLYNEUROPATHY ASSOCIATED WITH TYPE 2 DIABETES MELLITUS (HCC): ICD-10-CM

## 2018-02-20 DIAGNOSIS — I10 HTN, GOAL BELOW 140/80: ICD-10-CM

## 2018-02-20 DIAGNOSIS — E78.5 HYPERLIPIDEMIA LDL GOAL <100: ICD-10-CM

## 2018-02-20 DIAGNOSIS — N18.9 CHRONIC RENAL IMPAIRMENT, UNSPECIFIED CKD STAGE: ICD-10-CM

## 2018-02-20 LAB
A/G RATIO: 1.9 (ref 0.8–2)
ALBUMIN SERPL-MCNC: 4.5 G/DL (ref 3.4–4.8)
ALP BLD-CCNC: 72 U/L (ref 25–100)
ALT SERPL-CCNC: 39 U/L (ref 4–36)
ANION GAP SERPL CALCULATED.3IONS-SCNC: 13 MMOL/L (ref 3–16)
AST SERPL-CCNC: 30 U/L (ref 8–33)
BILIRUB SERPL-MCNC: 0.3 MG/DL (ref 0.3–1.2)
BUN BLDV-MCNC: 20 MG/DL (ref 6–20)
CALCIUM SERPL-MCNC: 9.9 MG/DL (ref 8.5–10.5)
CHLORIDE BLD-SCNC: 100 MMOL/L (ref 98–107)
CHOLESTEROL, TOTAL: 224 MG/DL (ref 0–200)
CO2: 28 MMOL/L (ref 20–30)
CREAT SERPL-MCNC: 1.5 MG/DL (ref 0.4–1.2)
GFR AFRICAN AMERICAN: 58
GFR NON-AFRICAN AMERICAN: 48
GLOBULIN: 2.4 G/DL
GLUCOSE BLD-MCNC: 99 MG/DL (ref 74–106)
HBA1C MFR BLD: 12.5 %
HDLC SERPL-MCNC: 35 MG/DL (ref 40–60)
LDL CHOLESTEROL CALCULATED: 153 MG/DL
POTASSIUM SERPL-SCNC: 4.3 MMOL/L (ref 3.4–5.1)
SODIUM BLD-SCNC: 141 MMOL/L (ref 136–145)
TOTAL PROTEIN: 6.9 G/DL (ref 6.4–8.3)
TRIGL SERPL-MCNC: 178 MG/DL (ref 0–249)
VLDLC SERPL CALC-MCNC: 36 MG/DL

## 2018-02-20 PROCEDURE — 99214 OFFICE O/P EST MOD 30 MIN: CPT | Performed by: NURSE PRACTITIONER

## 2018-02-20 RX ORDER — GABAPENTIN 400 MG/1
400 CAPSULE ORAL 3 TIMES DAILY
Qty: 90 CAPSULE | Refills: 3 | Status: CANCELLED | OUTPATIENT
Start: 2018-02-20 | End: 2018-03-22

## 2018-02-20 RX ORDER — GABAPENTIN 600 MG/1
600 TABLET ORAL 3 TIMES DAILY
Qty: 90 TABLET | Refills: 2 | Status: SHIPPED | OUTPATIENT
Start: 2018-02-20 | End: 2018-05-20 | Stop reason: SDUPTHER

## 2018-02-20 RX ORDER — ATORVASTATIN CALCIUM 40 MG/1
TABLET, FILM COATED ORAL
Qty: 30 TABLET | Refills: 5 | Status: SHIPPED | OUTPATIENT
Start: 2018-02-20 | End: 2018-07-05 | Stop reason: SDUPTHER

## 2018-02-20 RX ORDER — FLUTICASONE PROPIONATE 50 MCG
1 SPRAY, SUSPENSION (ML) NASAL DAILY
Qty: 1 BOTTLE | Refills: 5 | Status: SHIPPED | OUTPATIENT
Start: 2018-02-20 | End: 2018-12-10 | Stop reason: SDUPTHER

## 2018-02-20 RX ORDER — FENOFIBRATE 200 MG/1
CAPSULE ORAL
Qty: 30 CAPSULE | Refills: 5 | Status: SHIPPED | OUTPATIENT
Start: 2018-02-20 | End: 2018-09-10 | Stop reason: SDUPTHER

## 2018-02-20 RX ORDER — FUROSEMIDE 20 MG/1
20 TABLET ORAL DAILY
Qty: 30 TABLET | Refills: 5 | Status: SHIPPED | OUTPATIENT
Start: 2018-02-20 | End: 2018-09-10 | Stop reason: SDUPTHER

## 2018-02-20 RX ORDER — LISINOPRIL 5 MG/1
5 TABLET ORAL DAILY
Qty: 30 TABLET | Refills: 5 | Status: SHIPPED | OUTPATIENT
Start: 2018-02-20 | End: 2018-09-10 | Stop reason: SDUPTHER

## 2018-02-20 ASSESSMENT — ENCOUNTER SYMPTOMS
RESPIRATORY NEGATIVE: 1
EYES NEGATIVE: 1
BACK PAIN: 1
GASTROINTESTINAL NEGATIVE: 1

## 2018-02-20 NOTE — PROGRESS NOTES
SUBJECTIVE:  Elvira Drake is a 65 y.o. male that presents with   Chief Complaint   Patient presents with    Diabetes     follow up   . HPI:    Patient reports the only new issue is when he gets aggravated, he has stinging all over his body and sweats and has a bad day. He says even his hair hurts. Diabetes   He presents for his follow-up diabetic visit. He has type 2 diabetes mellitus. No MedicAlert identification noted. His disease course has been worsening. Hypoglycemia symptoms include nervousness/anxiousness. Associated symptoms include fatigue. There are no hypoglycemic complications. Symptoms are worsening. Diabetic complications include peripheral neuropathy and retinopathy. Risk factors for coronary artery disease include male sex, hypertension, diabetes mellitus, stress and dyslipidemia. Hyperlipidemia   This is a chronic problem. The current episode started more than 1 year ago. The problem is uncontrolled. Exacerbating diseases include chronic renal disease and diabetes. Associated symptoms include myalgias. Current antihyperlipidemic treatment includes statins and fibric acid derivatives. Compliance problems: unknown. Risk factors for coronary artery disease include diabetes mellitus, dyslipidemia, family history, hypertension, male sex, obesity, stress and a sedentary lifestyle. Hypertension   This is a chronic problem. The current episode started more than 1 year ago. Associated symptoms include malaise/fatigue. Risk factors for coronary artery disease include diabetes mellitus, dyslipidemia and family history. Past treatments include ACE inhibitors, diuretics and lifestyle changes. The current treatment provides moderate improvement. There are no compliance problems. Hypertensive end-organ damage includes retinopathy. Identifiable causes of hypertension include chronic renal disease. Review of Systems   Constitutional: Positive for fatigue and malaise/fatigue. HENT: Negative. No results found for: WBC, NEUTROABS, HGB, HCT, MCV, PLT    Lab Results   Component Value Date    TSH 0.90 03/22/2017         ASSESSMENT/PLAN:  1. Uncontrolled type 2 diabetes mellitus with complication, with long-term current use of insulin ([facility])  Hemoglobin A1C   2. Hyperlipidemia LDL goal <100  Lipid Panel   3. HTN, goal below 140/80  Comprehensive Metabolic Panel   4. Diabetic polyneuropathy associated with type 2 diabetes mellitus ([facility]     5. Chronic renal impairment, unspecified CKD stage          Orders Placed This Encounter   Procedures    Lipid Panel     Standing Status:   Future     Standing Expiration Date:   2/20/2019     Order Specific Question:   Is Patient Fasting?/# of Hours     Answer:   6    Comprehensive Metabolic Panel     Standing Status:   Future     Standing Expiration Date:   2/20/2019    Hemoglobin A1C     Standing Status:   Future     Standing Expiration Date:   2/20/2019        Outpatient Encounter Prescriptions as of 2/20/2018   Medication Sig Dispense Refill    atorvastatin (LIPITOR) 40 MG tablet TAKE ONE TABLET BY MOUTH DAILY 30 tablet 5    SITagliptin (JANUVIA) 100 MG tablet TAKE ONE TABLET BY MOUTH DAILY 30 tablet 5    furosemide (LASIX) 20 MG tablet Take 1 tablet by mouth daily 30 tablet 5    fluticasone (FLONASE) 50 MCG/ACT nasal spray 1 spray by Nasal route daily 1 Bottle 5    lisinopril (PRINIVIL;ZESTRIL) 5 MG tablet Take 1 tablet by mouth daily 30 tablet 5    fenofibrate micronized (LOFIBRA) 200 MG capsule TAKE ONE CAPSULE BY MOUTH EVERY MORNING BEFORE BREAKFAST 30 capsule 5    gabapentin (NEURONTIN) 600 MG tablet Take 1 tablet by mouth 3 times daily for 90 days.  90 tablet 2    insulin aspart (NOVOLOG FLEXPEN) 100 UNIT/ML injection pen Inject 25 Units into the skin 3 times daily (before meals) 10 pen 5    insulin glargine (BASAGLAR KWIKPEN) 100 UNIT/ML injection pen Inject 55 Units into the skin 2 times daily      gabapentin (NEURONTIN) 400 MG capsule Take 1

## 2018-04-23 LAB — HBA1C MFR BLD: 11.3 %

## 2018-04-23 PROCEDURE — 83036 HEMOGLOBIN GLYCOSYLATED A1C: CPT | Performed by: NURSE PRACTITIONER

## 2018-05-20 RX ORDER — GABAPENTIN 600 MG/1
TABLET ORAL
Qty: 90 TABLET | Refills: 2 | Status: SHIPPED | OUTPATIENT
Start: 2018-05-20 | End: 2018-08-29 | Stop reason: SDUPTHER

## 2018-05-20 RX ORDER — INSULIN GLARGINE 100 [IU]/ML
INJECTION, SOLUTION SUBCUTANEOUS
Qty: 30 PEN | Refills: 4 | Status: SHIPPED | OUTPATIENT
Start: 2018-05-20 | End: 2018-05-31 | Stop reason: DRUGHIGH

## 2018-05-31 ENCOUNTER — OFFICE VISIT (OUTPATIENT)
Dept: FAMILY MEDICINE CLINIC | Age: 58
End: 2018-05-31
Payer: COMMERCIAL

## 2018-05-31 ENCOUNTER — HOSPITAL ENCOUNTER (OUTPATIENT)
Dept: OTHER | Age: 58
Discharge: HOME | End: 2018-05-31
Attending: NURSE PRACTITIONER | Admitting: NURSE PRACTITIONER

## 2018-05-31 VITALS
BODY MASS INDEX: 37.89 KG/M2 | OXYGEN SATURATION: 98 % | WEIGHT: 250 LBS | DIASTOLIC BLOOD PRESSURE: 64 MMHG | RESPIRATION RATE: 18 BRPM | HEART RATE: 86 BPM | SYSTOLIC BLOOD PRESSURE: 130 MMHG | HEIGHT: 68 IN

## 2018-05-31 DIAGNOSIS — E78.5 HYPERLIPIDEMIA LDL GOAL <100: ICD-10-CM

## 2018-05-31 DIAGNOSIS — E11.42 DIABETIC POLYNEUROPATHY ASSOCIATED WITH TYPE 2 DIABETES MELLITUS (HCC): ICD-10-CM

## 2018-05-31 DIAGNOSIS — I10 HTN, GOAL BELOW 140/80: ICD-10-CM

## 2018-05-31 PROCEDURE — 99214 OFFICE O/P EST MOD 30 MIN: CPT | Performed by: NURSE PRACTITIONER

## 2018-05-31 ASSESSMENT — PATIENT HEALTH QUESTIONNAIRE - PHQ9
SUM OF ALL RESPONSES TO PHQ QUESTIONS 1-9: 1
SUM OF ALL RESPONSES TO PHQ9 QUESTIONS 1 & 2: 1
1. LITTLE INTEREST OR PLEASURE IN DOING THINGS: 1
2. FEELING DOWN, DEPRESSED OR HOPELESS: 0

## 2018-06-01 LAB
A/G RATIO: 1.7 (ref 0.8–2)
ALBUMIN SERPL-MCNC: 4.3 G/DL (ref 3.4–4.8)
ALP BLD-CCNC: 60 U/L (ref 25–100)
ALT SERPL-CCNC: 38 U/L (ref 4–36)
ANION GAP SERPL CALCULATED.3IONS-SCNC: 13 MMOL/L (ref 3–16)
AST SERPL-CCNC: 37 U/L (ref 8–33)
BILIRUB SERPL-MCNC: 0.4 MG/DL (ref 0.3–1.2)
BUN BLDV-MCNC: 24 MG/DL (ref 6–20)
CALCIUM SERPL-MCNC: 9.7 MG/DL (ref 8.5–10.5)
CHLORIDE BLD-SCNC: 102 MMOL/L (ref 98–107)
CHOLESTEROL, TOTAL: 201 MG/DL (ref 0–200)
CO2: 27 MMOL/L (ref 20–30)
CREAT SERPL-MCNC: 2.1 MG/DL (ref 0.4–1.2)
CREATININE URINE: 77.1 MG/DL (ref 1.5–300)
GFR AFRICAN AMERICAN: 40
GFR NON-AFRICAN AMERICAN: 33
GLOBULIN: 2.6 G/DL
GLUCOSE BLD-MCNC: 60 MG/DL (ref 74–106)
HBA1C MFR BLD: 9.8 %
HDLC SERPL-MCNC: 35 MG/DL (ref 40–60)
LDL CHOLESTEROL CALCULATED: 137 MG/DL
MICROALBUMIN UR-MCNC: 1.8 MG/DL (ref 0–22)
MICROALBUMIN/CREAT UR-RTO: 23.3 MG/G (ref 0–30)
POTASSIUM SERPL-SCNC: 4.3 MMOL/L (ref 3.4–5.1)
SODIUM BLD-SCNC: 142 MMOL/L (ref 136–145)
TOTAL PROTEIN: 6.9 G/DL (ref 6.4–8.3)
TRIGL SERPL-MCNC: 146 MG/DL (ref 0–249)
TSH SERPL DL<=0.05 MIU/L-ACNC: 1.67 UIU/ML (ref 0.35–5.5)
VLDLC SERPL CALC-MCNC: 29 MG/DL

## 2018-06-09 ASSESSMENT — ENCOUNTER SYMPTOMS
EYES NEGATIVE: 1
BACK PAIN: 1
RESPIRATORY NEGATIVE: 1
GASTROINTESTINAL NEGATIVE: 1

## 2018-06-15 ENCOUNTER — TELEPHONE (OUTPATIENT)
Dept: FAMILY MEDICINE CLINIC | Age: 58
End: 2018-06-15

## 2018-06-15 RX ORDER — COLCHICINE 0.6 MG/1
0.6 TABLET ORAL 2 TIMES DAILY
Qty: 30 TABLET | Refills: 0 | Status: SHIPPED | OUTPATIENT
Start: 2018-06-15 | End: 2018-06-27 | Stop reason: SDUPTHER

## 2018-06-27 RX ORDER — COLCHICINE 0.6 MG/1
TABLET ORAL
Qty: 30 TABLET | Refills: 0 | Status: SHIPPED | OUTPATIENT
Start: 2018-06-27 | End: 2018-07-11 | Stop reason: SDUPTHER

## 2018-07-05 RX ORDER — ATORVASTATIN CALCIUM 80 MG/1
TABLET, FILM COATED ORAL
Qty: 30 TABLET | Refills: 3 | Status: SHIPPED | OUTPATIENT
Start: 2018-07-05 | End: 2018-12-10 | Stop reason: SDUPTHER

## 2018-07-09 RX ORDER — PEN NEEDLE, DIABETIC 31 G X1/4"
NEEDLE, DISPOSABLE MISCELLANEOUS
Qty: 100 EACH | Refills: 5 | Status: SHIPPED | OUTPATIENT
Start: 2018-07-09 | End: 2018-09-10

## 2018-07-09 NOTE — TELEPHONE ENCOUNTER
Refill request received from pharmacy.  Medication pending for approval.     Patient information below:      Hemoglobin A1C (%)   Date Value   05/31/2018 9.8 (H)   04/23/2018 11.3   02/20/2018 12.5 (H)     Microalbumin, Random Urine (mg/dL)   Date Value   05/31/2018 1.80     LDL Calculated (mg/dL)   Date Value   05/31/2018 137 (H)     AST (U/L)   Date Value   05/31/2018 37 (H)     ALT (U/L)   Date Value   05/31/2018 38 (H)     BUN (mg/dL)   Date Value   05/31/2018 24 (H)      (goal A1C is < 7)   (goal LDL is <100) need 30-50% reduction from baseline     BP Readings from Last 3 Encounters:   05/31/18 130/64   02/20/18 136/78   11/15/17 118/68    (goal /80)      All Future Testing planned in [facility]:      Last Office Visit With PCP:  7/5/2018      Next Visit Date:  Future Appointments  Date Time Provider Lyndsey Lyons   8/29/2018 8:30 AM Choco Means APR[address]            Patient Active Problem List:     Uncontrolled type 2 diabetes mellitus with complication, with long-term current use of insulin ([address]     Hyperlipidemia LDL goal <100     HTN, goal below 140/80     Diabetic polyneuropathy associated with type 2 diabetes mellitus ([address]     CRI (chronic renal insufficiency)     Colon cancer screening     Colon polyp     Internal hemorrhoids without complication     Need for diphtheria-tetanus-pertussis (Tdap) vaccine, adult/adolescent     Need for Streptococcus pneumoniae vaccination

## 2018-07-11 NOTE — TELEPHONE ENCOUNTER
Refill request received from pharmacy.  Medication pending for approval.     Patient information below:      Hemoglobin A1C (%)   Date Value   05/31/2018 9.8 (H)   04/23/2018 11.3   02/20/2018 12.5 (H)     Microalbumin, Random Urine (mg/dL)   Date Value   05/31/2018 1.80     LDL Calculated (mg/dL)   Date Value   05/31/2018 137 (H)     AST (U/L)   Date Value   05/31/2018 37 (H)     ALT (U/L)   Date Value   05/31/2018 38 (H)     BUN (mg/dL)   Date Value   05/31/2018 24 (H)      (goal A1C is < 7)   (goal LDL is <100) need 30-50% reduction from baseline     BP Readings from Last 3 Encounters:   05/31/18 130/64   02/20/18 136/78   11/15/17 118/68    (goal /80)      All Future Testing planned in [facility]:      Last Office Visit With PCP:  7/9/2018      Next Visit Date:  Future Appointments  Date Time Provider Lyndsey Lyons   8/29/2018 8:30 AM ANDREIA Sloan [address] [place] [address]            Patient Active Problem List:     Uncontrolled type 2 diabetes mellitus with complication, with long-term current use of insulin ([address]     Hyperlipidemia LDL goal <100     HTN, goal below 140/80     Diabetic polyneuropathy associated with type 2 diabetes mellitus ([address]     CRI (chronic renal insufficiency)     Colon cancer screening     Colon polyp     Internal hemorrhoids without complication     Need for diphtheria-tetanus-pertussis (Tdap) vaccine, adult/adolescent     Need for Streptococcus pneumoniae vaccination

## 2018-07-12 RX ORDER — COLCHICINE 0.6 MG/1
TABLET ORAL
Qty: 60 TABLET | Refills: 0 | Status: SHIPPED | OUTPATIENT
Start: 2018-07-12 | End: 2020-03-11 | Stop reason: ALTCHOICE

## 2018-09-10 ENCOUNTER — OFFICE VISIT (OUTPATIENT)
Dept: FAMILY MEDICINE CLINIC | Age: 58
End: 2018-09-10
Payer: COMMERCIAL

## 2018-09-10 ENCOUNTER — HOSPITAL ENCOUNTER (OUTPATIENT)
Facility: HOSPITAL | Age: 58
Discharge: HOME | End: 2018-09-10
Payer: COMMERCIAL

## 2018-09-10 VITALS
OXYGEN SATURATION: 98 % | DIASTOLIC BLOOD PRESSURE: 78 MMHG | RESPIRATION RATE: 18 BRPM | WEIGHT: 253 LBS | SYSTOLIC BLOOD PRESSURE: 132 MMHG | BODY MASS INDEX: 38.34 KG/M2 | HEIGHT: 68 IN | HEART RATE: 89 BPM

## 2018-09-10 DIAGNOSIS — E78.5 HYPERLIPIDEMIA LDL GOAL <100: ICD-10-CM

## 2018-09-10 DIAGNOSIS — I10 HTN, GOAL BELOW 140/80: ICD-10-CM

## 2018-09-10 DIAGNOSIS — N18.9 CHRONIC RENAL IMPAIRMENT, UNSPECIFIED CKD STAGE: ICD-10-CM

## 2018-09-10 DIAGNOSIS — E11.42 DIABETIC POLYNEUROPATHY ASSOCIATED WITH TYPE 2 DIABETES MELLITUS (HCC): ICD-10-CM

## 2018-09-10 DIAGNOSIS — F43.23 SITUATIONAL MIXED ANXIETY AND DEPRESSIVE DISORDER: ICD-10-CM

## 2018-09-10 LAB
A/G RATIO: 1.6 (ref 0.8–2)
ALBUMIN SERPL-MCNC: 4.2 G/DL (ref 3.4–4.8)
ALP BLD-CCNC: 79 U/L (ref 25–100)
ALT SERPL-CCNC: 52 U/L (ref 4–36)
ANION GAP SERPL CALCULATED.3IONS-SCNC: 8 MMOL/L (ref 3–16)
AST SERPL-CCNC: 36 U/L (ref 8–33)
BILIRUB SERPL-MCNC: 0.3 MG/DL (ref 0.3–1.2)
BUN BLDV-MCNC: 27 MG/DL (ref 6–20)
CALCIUM SERPL-MCNC: 9.6 MG/DL (ref 8.5–10.5)
CHLORIDE BLD-SCNC: 103 MMOL/L (ref 98–107)
CHOLESTEROL, TOTAL: 186 MG/DL (ref 0–200)
CO2: 28 MMOL/L (ref 20–30)
CREAT SERPL-MCNC: 1.7 MG/DL (ref 0.4–1.2)
GFR AFRICAN AMERICAN: 51
GFR NON-AFRICAN AMERICAN: 42
GLOBULIN: 2.6 G/DL
GLUCOSE BLD-MCNC: 331 MG/DL (ref 74–106)
HBA1C MFR BLD: 11.5 %
HDLC SERPL-MCNC: 36 MG/DL (ref 40–60)
LDL CHOLESTEROL CALCULATED: 122 MG/DL
POTASSIUM SERPL-SCNC: 5 MMOL/L (ref 3.4–5.1)
SODIUM BLD-SCNC: 139 MMOL/L (ref 136–145)
TOTAL PROTEIN: 6.8 G/DL (ref 6.4–8.3)
TRIGL SERPL-MCNC: 139 MG/DL (ref 0–249)
VLDLC SERPL CALC-MCNC: 28 MG/DL

## 2018-09-10 PROCEDURE — 99214 OFFICE O/P EST MOD 30 MIN: CPT | Performed by: NURSE PRACTITIONER

## 2018-09-10 PROCEDURE — 83036 HEMOGLOBIN GLYCOSYLATED A1C: CPT

## 2018-09-10 PROCEDURE — 36415 COLL VENOUS BLD VENIPUNCTURE: CPT

## 2018-09-10 PROCEDURE — 80053 COMPREHEN METABOLIC PANEL: CPT

## 2018-09-10 PROCEDURE — 80061 LIPID PANEL: CPT

## 2018-09-10 RX ORDER — LISINOPRIL 5 MG/1
5 TABLET ORAL DAILY
Qty: 30 TABLET | Refills: 5 | Status: SHIPPED | OUTPATIENT
Start: 2018-09-10 | End: 2019-06-10 | Stop reason: SDUPTHER

## 2018-09-10 RX ORDER — FUROSEMIDE 20 MG/1
20 TABLET ORAL DAILY
Qty: 30 TABLET | Refills: 5 | Status: SHIPPED | OUTPATIENT
Start: 2018-09-10 | End: 2019-06-10 | Stop reason: SDUPTHER

## 2018-09-10 RX ORDER — FENOFIBRATE 200 MG/1
CAPSULE ORAL
Qty: 30 CAPSULE | Refills: 5 | Status: SHIPPED | OUTPATIENT
Start: 2018-09-10 | End: 2019-06-10 | Stop reason: SDUPTHER

## 2018-09-10 RX ORDER — CLONAZEPAM 0.5 MG/1
0.5 TABLET ORAL 2 TIMES DAILY PRN
Qty: 60 TABLET | Refills: 0 | Status: SHIPPED | OUTPATIENT
Start: 2018-09-10 | End: 2018-12-10 | Stop reason: SDUPTHER

## 2018-09-10 ASSESSMENT — ENCOUNTER SYMPTOMS
RESPIRATORY NEGATIVE: 1
BACK PAIN: 1
GASTROINTESTINAL NEGATIVE: 1
EYES NEGATIVE: 1

## 2018-09-10 NOTE — PROGRESS NOTES
dyslipidemia and family history. Past treatments include ACE inhibitors, diuretics and lifestyle changes. The current treatment provides moderate improvement. There are no compliance problems. Hypertensive end-organ damage includes retinopathy. Identifiable causes of hypertension include chronic renal disease. Review of Systems   Constitutional: Positive for fatigue and malaise/fatigue. HENT: Negative. Eyes: Negative. Respiratory: Negative. Cardiovascular: Positive for leg swelling. Gastrointestinal: Negative. Endocrine: Negative. Genitourinary: Negative. Musculoskeletal: Positive for back pain, gait problem and myalgias. Weight increase by 3 pounds. Skin: Negative. Hematological: Negative. Psychiatric/Behavioral: Positive for decreased concentration, dysphoric mood and sleep disturbance. The patient is nervous/anxious. All other systems reviewed and are negative. OBJECTIVE:  /78 (Site: Left Upper Arm, Position: Sitting, Cuff Size: Large Adult)   Pulse 89   Resp 18   Ht 5' 8\" (1.727 m)   Wt 253 lb (114.8 kg)   SpO2 98% Comment: room air  BMI 38.47 kg/m²    Physical Exam   Constitutional: He is oriented to person, place, and time. He appears well-developed and well-nourished. HENT:   Head: Normocephalic and atraumatic. Right Ear: External ear normal.   Left Ear: External ear normal.   Nose: Nose normal.   Mouth/Throat: Oropharynx is clear and moist.   Eyes: Pupils are equal, round, and reactive to light. Conjunctivae and EOM are normal.   Neck: Normal range of motion. Neck supple. Cardiovascular: Normal rate, regular rhythm, normal heart sounds and intact distal pulses. Pulmonary/Chest: Effort normal and breath sounds normal.   Abdominal: Soft. Bowel sounds are normal.   Musculoskeletal: Normal range of motion. Neurological: He is alert and oriented to person, place, and time. He has normal reflexes. Skin: Skin is warm and dry. (PRINIVIL;ZESTRIL) 5 MG tablet Take 1 tablet by mouth daily 30 tablet 5    fenofibrate micronized (LOFIBRA) 200 MG capsule TAKE ONE CAPSULE BY MOUTH EVERY MORNING BEFORE BREAKFAST 30 capsule 5    clonazePAM (KLONOPIN) 0.5 MG tablet Take 1 tablet by mouth 2 times daily as needed for Anxiety for up to 30 days. . 60 tablet 0    gabapentin (NEURONTIN) 600 MG tablet TAKE ONE TABLET BY MOUTH THREE TIMES A DAY 90 tablet 1    colchicine (COLCRYS) 0.6 MG tablet TAKE ONE TABLET BY MOUTH TWICE A DAY 60 tablet 0    atorvastatin (LIPITOR) 80 MG tablet TAKE ONE TABLET BY MOUTH DAILY. Dosage increase.  30 tablet 3    insulin aspart (NOVOLOG FLEXPEN) 100 UNIT/ML injection pen Inject 25 Units into the skin 3 times daily (before meals) 10 pen 5    fluticasone (FLONASE) 50 MCG/ACT nasal spray 1 spray by Nasal route daily 1 Bottle 5    aspirin 81 MG chewable tablet Take 81 mg by mouth daily      [DISCONTINUED] Insulin Pen Needle (PEN NEEDLES) 31G X 6 MM MISC Use Three Times Daily for Insulin Injection DX: E11.8 100 each 5    [DISCONTINUED] insulin glargine (BASAGLAR KWIKPEN) 100 UNIT/ML injection pen Inject 65 Units into the skin 2 times daily 5 pen 3    [DISCONTINUED] SITagliptin (JANUVIA) 100 MG tablet TAKE ONE TABLET BY MOUTH DAILY 30 tablet 5    [DISCONTINUED] furosemide (LASIX) 20 MG tablet Take 1 tablet by mouth daily 30 tablet 5    [DISCONTINUED] lisinopril (PRINIVIL;ZESTRIL) 5 MG tablet Take 1 tablet by mouth daily 30 tablet 5    [DISCONTINUED] fenofibrate micronized (LOFIBRA) 200 MG capsule TAKE ONE CAPSULE BY MOUTH EVERY MORNING BEFORE BREAKFAST 30 capsule 5    [DISCONTINUED] Insulin Pen Needle (PEN NEEDLES 3/16\") 31G X 5 MM MISC 1 each by Does not apply route 3 times daily Dx: E11.9 300 each 3     Facility-Administered Encounter Medications as of 9/10/2018   Medication Dose Route Frequency Provider Last Rate Last Dose    0.9 % sodium chloride infusion   Intravenous Once ANDREIA Noble   Stopped at 05/10/17 1052    0.9 % sodium chloride infusion   Intravenous Once ANDREIA Slater   Stopped at 05/10/17 1144       Return in about 3 months (around 12/10/2018), or if symptoms worsen or fail to improve. PATIENT COUNSELING    Counseling was provided today regarding the following topics: Healthy eating habits, Regular exercise, substance abuse and healthy sleep habits. Discussed use, benefit, and side effects of prescribed medications. Barriers to medication compliance addressed. All patient questions answered. Pt voiced understanding. Controlled Substances Monitoring:     RX Monitoring 9/10/2018   Attestation The Prescription Monitoring Report for this patient was reviewed today. Documentation Possible medication side effects, risk of tolerance/dependence & alternative treatments discussed. ;No signs of potential drug abuse or diversion identified.;Obtaining appropriate analgesic effect of treatment. Medication Contracts Existing medication contract.

## 2018-10-29 ENCOUNTER — OFFICE VISIT (OUTPATIENT)
Dept: FAMILY MEDICINE CLINIC | Age: 58
End: 2018-10-29
Payer: COMMERCIAL

## 2018-10-29 VITALS
HEART RATE: 63 BPM | WEIGHT: 254 LBS | DIASTOLIC BLOOD PRESSURE: 74 MMHG | HEIGHT: 68 IN | SYSTOLIC BLOOD PRESSURE: 130 MMHG | OXYGEN SATURATION: 95 % | RESPIRATION RATE: 18 BRPM | BODY MASS INDEX: 38.49 KG/M2

## 2018-10-29 PROCEDURE — 99213 OFFICE O/P EST LOW 20 MIN: CPT | Performed by: NURSE PRACTITIONER

## 2018-11-02 ASSESSMENT — ENCOUNTER SYMPTOMS
GASTROINTESTINAL NEGATIVE: 1
RESPIRATORY NEGATIVE: 1
EYES NEGATIVE: 1
BACK PAIN: 1

## 2018-11-11 DIAGNOSIS — E11.42 DIABETIC POLYNEUROPATHY ASSOCIATED WITH TYPE 2 DIABETES MELLITUS (HCC): ICD-10-CM

## 2018-11-12 RX ORDER — GABAPENTIN 600 MG/1
TABLET ORAL
Qty: 90 TABLET | Refills: 0 | Status: SHIPPED | OUTPATIENT
Start: 2018-11-12 | End: 2018-12-10 | Stop reason: SDUPTHER

## 2018-11-12 NOTE — TELEPHONE ENCOUNTER
Refill request received from pharmacy.  Medication pending for approval.     Patient information below:    Neva Shepherd up to date    Hemoglobin A1C (%)   Date Value   09/10/2018 11.5 (H)   05/31/2018 9.8 (H)   04/23/2018 11.3     Microalbumin, Random Urine (mg/dL)   Date Value   05/31/2018 1.80     LDL Calculated (mg/dL)   Date Value   09/10/2018 122     AST (U/L)   Date Value   09/10/2018 36 (H)     ALT (U/L)   Date Value   09/10/2018 52 (H)     BUN (mg/dL)   Date Value   09/10/2018 27 (H)      (goal A1C is < 7)   (goal LDL is <100) need 30-50% reduction from baseline     BP Readings from Last 3 Encounters:   10/29/18 130/74   09/10/18 132/78   05/31/18 130/64    (goal /80)      All Future Testing planned in [facility]:      Last Office Visit With PCP:  10/29/2018      Next Visit Date:  Future Appointments  Date Time Provider Lyndsey Lyons   12/10/2018 9:00 AM ANDREIA Flowers [address] [place] [address]            Patient Active Problem List:     Uncontrolled type 2 diabetes mellitus with complication, with long-term current use of insulin ([address]     Hyperlipidemia LDL goal <100     HTN, goal below 140/80     Diabetic polyneuropathy associated with type 2 diabetes mellitus ([address]     CRI (chronic renal insufficiency)     Colon polyp     Internal hemorrhoids without complication     Need for tetanus, diphtheria, and acellular pertussis (Tdap) vaccine in patient of adolescent age or older     Need for Streptococcus pneumoniae vaccination

## 2018-12-10 ENCOUNTER — OFFICE VISIT (OUTPATIENT)
Dept: FAMILY MEDICINE CLINIC | Age: 58
End: 2018-12-10
Payer: COMMERCIAL

## 2018-12-10 ENCOUNTER — HOSPITAL ENCOUNTER (OUTPATIENT)
Facility: HOSPITAL | Age: 58
Discharge: HOME | End: 2018-12-10
Payer: COMMERCIAL

## 2018-12-10 VITALS
HEART RATE: 72 BPM | HEIGHT: 68 IN | BODY MASS INDEX: 38.8 KG/M2 | RESPIRATION RATE: 18 BRPM | SYSTOLIC BLOOD PRESSURE: 124 MMHG | OXYGEN SATURATION: 98 % | DIASTOLIC BLOOD PRESSURE: 78 MMHG | WEIGHT: 256 LBS

## 2018-12-10 DIAGNOSIS — I10 HTN, GOAL BELOW 140/80: ICD-10-CM

## 2018-12-10 DIAGNOSIS — E11.42 DIABETIC POLYNEUROPATHY ASSOCIATED WITH TYPE 2 DIABETES MELLITUS (HCC): ICD-10-CM

## 2018-12-10 DIAGNOSIS — E78.5 HYPERLIPIDEMIA LDL GOAL <100: ICD-10-CM

## 2018-12-10 DIAGNOSIS — F43.23 SITUATIONAL MIXED ANXIETY AND DEPRESSIVE DISORDER: ICD-10-CM

## 2018-12-10 LAB
A/G RATIO: 1.6 (ref 0.8–2)
ALBUMIN SERPL-MCNC: 4.2 G/DL (ref 3.4–4.8)
ALP BLD-CCNC: 75 U/L (ref 25–100)
ALT SERPL-CCNC: 39 U/L (ref 4–36)
ANION GAP SERPL CALCULATED.3IONS-SCNC: 9 MMOL/L (ref 3–16)
AST SERPL-CCNC: 32 U/L (ref 8–33)
BILIRUB SERPL-MCNC: 0.4 MG/DL (ref 0.3–1.2)
BUN BLDV-MCNC: 25 MG/DL (ref 6–20)
CALCIUM SERPL-MCNC: 9.8 MG/DL (ref 8.5–10.5)
CHLORIDE BLD-SCNC: 101 MMOL/L (ref 98–107)
CHOLESTEROL, TOTAL: 203 MG/DL (ref 0–200)
CO2: 29 MMOL/L (ref 20–30)
CREAT SERPL-MCNC: 1.8 MG/DL (ref 0.4–1.2)
GFR AFRICAN AMERICAN: 47
GFR NON-AFRICAN AMERICAN: 39
GLOBULIN: 2.6 G/DL
GLUCOSE BLD-MCNC: 176 MG/DL (ref 74–106)
HBA1C MFR BLD: 9.3 %
HDLC SERPL-MCNC: 32 MG/DL (ref 40–60)
LDL CHOLESTEROL CALCULATED: 135 MG/DL
POTASSIUM SERPL-SCNC: 5.3 MMOL/L (ref 3.4–5.1)
SODIUM BLD-SCNC: 139 MMOL/L (ref 136–145)
TOTAL PROTEIN: 6.8 G/DL (ref 6.4–8.3)
TRIGL SERPL-MCNC: 178 MG/DL (ref 0–249)
VLDLC SERPL CALC-MCNC: 36 MG/DL

## 2018-12-10 PROCEDURE — 83036 HEMOGLOBIN GLYCOSYLATED A1C: CPT

## 2018-12-10 PROCEDURE — 36415 COLL VENOUS BLD VENIPUNCTURE: CPT

## 2018-12-10 PROCEDURE — 80053 COMPREHEN METABOLIC PANEL: CPT

## 2018-12-10 PROCEDURE — 80061 LIPID PANEL: CPT

## 2018-12-10 PROCEDURE — 99214 OFFICE O/P EST MOD 30 MIN: CPT | Performed by: NURSE PRACTITIONER

## 2018-12-10 RX ORDER — GABAPENTIN 600 MG/1
TABLET ORAL
Qty: 90 TABLET | Refills: 2 | Status: SHIPPED | OUTPATIENT
Start: 2018-12-10 | End: 2019-03-11 | Stop reason: SDUPTHER

## 2018-12-10 RX ORDER — FLUTICASONE PROPIONATE 50 MCG
1 SPRAY, SUSPENSION (ML) NASAL DAILY
Qty: 1 BOTTLE | Refills: 5 | Status: SHIPPED | OUTPATIENT
Start: 2018-12-10 | End: 2019-06-10 | Stop reason: SDUPTHER

## 2018-12-10 RX ORDER — PEN NEEDLE, DIABETIC 31 G X1/4"
1 NEEDLE, DISPOSABLE MISCELLANEOUS 3 TIMES DAILY
Qty: 90 EACH | Refills: 2 | Status: SHIPPED | OUTPATIENT
Start: 2018-12-10 | End: 2019-06-10 | Stop reason: SDUPTHER

## 2018-12-10 RX ORDER — BENZONATATE 100 MG/1
100-200 CAPSULE ORAL 3 TIMES DAILY PRN
Qty: 60 CAPSULE | Refills: 0 | Status: SHIPPED | OUTPATIENT
Start: 2018-12-10 | End: 2018-12-17

## 2018-12-10 RX ORDER — ONDANSETRON 8 MG/1
8 TABLET, ORALLY DISINTEGRATING ORAL EVERY 8 HOURS PRN
Qty: 30 TABLET | Refills: 0 | Status: SHIPPED | OUTPATIENT
Start: 2018-12-10 | End: 2019-06-10 | Stop reason: SDUPTHER

## 2018-12-10 RX ORDER — AZITHROMYCIN 500 MG/1
500 TABLET, FILM COATED ORAL DAILY
Qty: 5 TABLET | Refills: 0 | Status: SHIPPED | OUTPATIENT
Start: 2018-12-10 | End: 2018-12-15

## 2018-12-10 RX ORDER — CLONAZEPAM 0.5 MG/1
0.5 TABLET ORAL 2 TIMES DAILY PRN
Qty: 60 TABLET | Refills: 0 | Status: SHIPPED | OUTPATIENT
Start: 2018-12-10 | End: 2019-03-11 | Stop reason: SDUPTHER

## 2018-12-10 RX ORDER — ATORVASTATIN CALCIUM 80 MG/1
TABLET, FILM COATED ORAL
Qty: 30 TABLET | Refills: 3 | Status: SHIPPED | OUTPATIENT
Start: 2018-12-10 | End: 2019-06-10 | Stop reason: SDUPTHER

## 2018-12-10 ASSESSMENT — ENCOUNTER SYMPTOMS
EYES NEGATIVE: 1
COUGH: 1
SINUS PRESSURE: 1
BACK PAIN: 1
SINUS PAIN: 1
GASTROINTESTINAL NEGATIVE: 1

## 2018-12-14 DIAGNOSIS — E11.42 DIABETIC POLYNEUROPATHY ASSOCIATED WITH TYPE 2 DIABETES MELLITUS (HCC): ICD-10-CM

## 2018-12-14 RX ORDER — GABAPENTIN 600 MG/1
TABLET ORAL
Qty: 90 TABLET | Refills: 0 | OUTPATIENT
Start: 2018-12-14 | End: 2019-03-14

## 2019-02-05 ENCOUNTER — TELEPHONE (OUTPATIENT)
Dept: PRIMARY CARE CLINIC | Age: 59
End: 2019-02-05

## 2019-03-11 ENCOUNTER — HOSPITAL ENCOUNTER (OUTPATIENT)
Facility: HOSPITAL | Age: 59
Discharge: HOME | End: 2019-03-11
Payer: MEDICARE

## 2019-03-11 ENCOUNTER — OFFICE VISIT (OUTPATIENT)
Dept: FAMILY MEDICINE CLINIC | Age: 59
End: 2019-03-11
Payer: MEDICARE

## 2019-03-11 VITALS
RESPIRATION RATE: 18 BRPM | WEIGHT: 260.6 LBS | OXYGEN SATURATION: 98 % | DIASTOLIC BLOOD PRESSURE: 80 MMHG | HEART RATE: 88 BPM | SYSTOLIC BLOOD PRESSURE: 130 MMHG | BODY MASS INDEX: 39.5 KG/M2 | HEIGHT: 68 IN

## 2019-03-11 DIAGNOSIS — I10 HTN, GOAL BELOW 140/80: ICD-10-CM

## 2019-03-11 DIAGNOSIS — E11.42 DIABETIC POLYNEUROPATHY ASSOCIATED WITH TYPE 2 DIABETES MELLITUS (HCC): ICD-10-CM

## 2019-03-11 DIAGNOSIS — E78.5 HYPERLIPIDEMIA LDL GOAL <100: ICD-10-CM

## 2019-03-11 DIAGNOSIS — F43.23 SITUATIONAL MIXED ANXIETY AND DEPRESSIVE DISORDER: ICD-10-CM

## 2019-03-11 PROCEDURE — 2022F DILAT RTA XM EVC RTNOPTHY: CPT | Performed by: NURSE PRACTITIONER

## 2019-03-11 PROCEDURE — 1036F TOBACCO NON-USER: CPT | Performed by: NURSE PRACTITIONER

## 2019-03-11 PROCEDURE — 99214 OFFICE O/P EST MOD 30 MIN: CPT | Performed by: NURSE PRACTITIONER

## 2019-03-11 PROCEDURE — G8484 FLU IMMUNIZE NO ADMIN: HCPCS | Performed by: NURSE PRACTITIONER

## 2019-03-11 PROCEDURE — 3046F HEMOGLOBIN A1C LEVEL >9.0%: CPT | Performed by: NURSE PRACTITIONER

## 2019-03-11 PROCEDURE — 3017F COLORECTAL CA SCREEN DOC REV: CPT | Performed by: NURSE PRACTITIONER

## 2019-03-11 PROCEDURE — G8427 DOCREV CUR MEDS BY ELIG CLIN: HCPCS | Performed by: NURSE PRACTITIONER

## 2019-03-11 PROCEDURE — G8417 CALC BMI ABV UP PARAM F/U: HCPCS | Performed by: NURSE PRACTITIONER

## 2019-03-11 PROCEDURE — 36415 COLL VENOUS BLD VENIPUNCTURE: CPT

## 2019-03-11 RX ORDER — CLONAZEPAM 0.5 MG/1
0.5 TABLET ORAL 2 TIMES DAILY PRN
Qty: 60 TABLET | Refills: 0 | Status: SHIPPED | OUTPATIENT
Start: 2019-03-11 | End: 2020-07-30 | Stop reason: SDUPTHER

## 2019-03-11 RX ORDER — GABAPENTIN 600 MG/1
TABLET ORAL
Qty: 90 TABLET | Refills: 2 | Status: SHIPPED | OUTPATIENT
Start: 2019-03-11 | End: 2019-06-10 | Stop reason: SDUPTHER

## 2019-03-11 ASSESSMENT — PATIENT HEALTH QUESTIONNAIRE - PHQ9
1. LITTLE INTEREST OR PLEASURE IN DOING THINGS: 0
SUM OF ALL RESPONSES TO PHQ9 QUESTIONS 1 & 2: 0
SUM OF ALL RESPONSES TO PHQ QUESTIONS 1-9: 0
2. FEELING DOWN, DEPRESSED OR HOPELESS: 0
SUM OF ALL RESPONSES TO PHQ QUESTIONS 1-9: 0

## 2019-03-11 ASSESSMENT — ENCOUNTER SYMPTOMS
GASTROINTESTINAL NEGATIVE: 1
RESPIRATORY NEGATIVE: 1
BACK PAIN: 1
EYES NEGATIVE: 1

## 2019-04-01 ENCOUNTER — HOSPITAL ENCOUNTER (OUTPATIENT)
Facility: HOSPITAL | Age: 59
Discharge: HOME | End: 2019-04-01
Payer: MEDICARE

## 2019-04-01 ENCOUNTER — OFFICE VISIT (OUTPATIENT)
Dept: FAMILY MEDICINE CLINIC | Age: 59
End: 2019-04-01
Payer: MEDICARE

## 2019-04-01 ENCOUNTER — HOSPITAL ENCOUNTER (OUTPATIENT)
Dept: GENERAL RADIOLOGY | Facility: HOSPITAL | Age: 59
Discharge: HOME | End: 2019-04-01
Payer: MEDICARE

## 2019-04-01 VITALS
SYSTOLIC BLOOD PRESSURE: 124 MMHG | DIASTOLIC BLOOD PRESSURE: 80 MMHG | WEIGHT: 260.6 LBS | HEART RATE: 80 BPM | BODY MASS INDEX: 39.5 KG/M2 | HEIGHT: 68 IN | OXYGEN SATURATION: 96 % | RESPIRATION RATE: 18 BRPM

## 2019-04-01 DIAGNOSIS — E78.2 MIXED HYPERLIPIDEMIA: ICD-10-CM

## 2019-04-01 DIAGNOSIS — E11.42 DIABETIC POLYNEUROPATHY ASSOCIATED WITH TYPE 2 DIABETES MELLITUS (HCC): ICD-10-CM

## 2019-04-01 DIAGNOSIS — J06.9 ACUTE URI: ICD-10-CM

## 2019-04-01 DIAGNOSIS — S49.91XA RIGHT SHOULDER INJURY, INITIAL ENCOUNTER: Primary | ICD-10-CM

## 2019-04-01 DIAGNOSIS — I10 HTN, GOAL BELOW 140/80: ICD-10-CM

## 2019-04-01 DIAGNOSIS — S49.91XA RIGHT SHOULDER INJURY, INITIAL ENCOUNTER: ICD-10-CM

## 2019-04-01 PROCEDURE — 73030 X-RAY EXAM OF SHOULDER: CPT

## 2019-04-01 PROCEDURE — 99213 OFFICE O/P EST LOW 20 MIN: CPT | Performed by: NURSE PRACTITIONER

## 2019-04-01 PROCEDURE — G8417 CALC BMI ABV UP PARAM F/U: HCPCS | Performed by: NURSE PRACTITIONER

## 2019-04-01 PROCEDURE — 3017F COLORECTAL CA SCREEN DOC REV: CPT | Performed by: NURSE PRACTITIONER

## 2019-04-01 PROCEDURE — G8427 DOCREV CUR MEDS BY ELIG CLIN: HCPCS | Performed by: NURSE PRACTITIONER

## 2019-04-01 PROCEDURE — 2022F DILAT RTA XM EVC RTNOPTHY: CPT | Performed by: NURSE PRACTITIONER

## 2019-04-01 PROCEDURE — 1036F TOBACCO NON-USER: CPT | Performed by: NURSE PRACTITIONER

## 2019-04-01 PROCEDURE — 3046F HEMOGLOBIN A1C LEVEL >9.0%: CPT | Performed by: NURSE PRACTITIONER

## 2019-04-01 RX ORDER — LEVOFLOXACIN 500 MG/1
500 TABLET, FILM COATED ORAL DAILY
Qty: 10 TABLET | Refills: 0 | Status: SHIPPED | OUTPATIENT
Start: 2019-04-01 | End: 2019-04-11

## 2019-04-01 RX ORDER — HYDROCODONE BITARTRATE AND ACETAMINOPHEN 7.5; 325 MG/1; MG/1
1 TABLET ORAL EVERY 6 HOURS PRN
Qty: 28 TABLET | Refills: 0 | Status: SHIPPED | OUTPATIENT
Start: 2019-04-01 | End: 2019-04-08

## 2019-04-01 RX ORDER — BENZONATATE 100 MG/1
100-200 CAPSULE ORAL 3 TIMES DAILY PRN
Qty: 60 CAPSULE | Refills: 0 | Status: SHIPPED | OUTPATIENT
Start: 2019-04-01 | End: 2019-04-08

## 2019-04-01 RX ORDER — MELOXICAM 15 MG/1
15 TABLET ORAL DAILY
Qty: 30 TABLET | Refills: 0 | Status: SHIPPED | OUTPATIENT
Start: 2019-04-01 | End: 2019-06-10 | Stop reason: SDUPTHER

## 2019-04-01 NOTE — PROGRESS NOTES
Future    Other orders  -     benzonatate (TESSALON) 100 MG capsule; Take 1-2 capsules by mouth 3 times daily as needed for Cough        No follow-ups on file. Eden Macedo received counseling on the following healthy behaviors: nutrition, exercise and medication adherence  Reviewed prior labs and health maintenance. Continue current medications, diet and exercise. Discussed use, benefit, and side effects of prescribed medications. Barriers to medication compliance addressed. Patient given educational materials - see patient instructions. All patient questions answered. Patient voiced understanding.

## 2019-04-03 ENCOUNTER — TELEPHONE (OUTPATIENT)
Dept: FAMILY MEDICINE CLINIC | Age: 59
End: 2019-04-03

## 2019-04-03 NOTE — TELEPHONE ENCOUNTER
Patient's referral, along with all pertinent medical records faxed to the attention of [facility] on 04/03/19, they will contact the patient and our office with appointment date/time/locaiton.

## 2019-05-14 NOTE — TELEPHONE ENCOUNTER
Refill request received from pharmacy.  Medication pending for approval.     Patient information below:      Hemoglobin A1C (%)   Date Value   03/11/2019 12.2 (H)   12/10/2018 9.3 (H)   09/10/2018 11.5 (H)     Microalbumin, Random Urine (mg/dL)   Date Value   05/31/2018 1.80     LDL Calculated (mg/dL)   Date Value   03/11/2019 119 (H)     AST (IU/L)   Date Value   03/11/2019 32     ALT (IU/L)   Date Value   03/11/2019 42     BUN (mg/dL)   Date Value   03/11/2019 25 (H)      (goal A1C is < 7)   (goal LDL is <100) need 30-50% reduction from baseline     BP Readings from Last 3 Encounters:   04/01/19 124/80   03/11/19 130/80   12/10/18 124/78    (goal /80)      All Future Testing planned in [facility]:  Lab Frequency Next Occurrence   Lipid Panel Once 04/01/2019   Hemoglobin A1C Once 04/01/2019   Comprehensive Metabolic Panel Once 49/76/5729       Last Office Visit With PCP:  4/24/2019      Next Visit Date:  Future Appointments   Date Time Provider Lyndsey Lyons   6/10/2019  8:45 AM Portia Jackson, APR[address]            Patient Active Problem List:     Uncontrolled type 2 diabetes mellitus with complication, with long-term current use of insulin ([address]     Hyperlipidemia LDL goal <100     HTN, goal below 140/80     Diabetic polyneuropathy associated with type 2 diabetes mellitus ([address]     CRI (chronic renal insufficiency)     Colon polyp     Internal hemorrhoids without complication     Need for tetanus, diphtheria, and acellular pertussis (Tdap) vaccine in patient of adolescent age or older     Need for Streptococcus pneumoniae vaccination

## 2019-06-04 ENCOUNTER — TELEPHONE (OUTPATIENT)
Dept: PRIMARY CARE CLINIC | Age: 59
End: 2019-06-04

## 2019-06-04 RX ORDER — IPRATROPIUM BROMIDE 21 UG/1
2 SPRAY, METERED NASAL 3 TIMES DAILY
Qty: 1 BOTTLE | Refills: 3 | Status: SHIPPED | OUTPATIENT
Start: 2019-06-04 | End: 2020-01-02 | Stop reason: ALTCHOICE

## 2019-06-04 RX ORDER — LEVOFLOXACIN 500 MG/1
500 TABLET, FILM COATED ORAL DAILY
Qty: 10 TABLET | Refills: 0 | Status: SHIPPED | OUTPATIENT
Start: 2019-06-04 | End: 2019-06-14

## 2019-06-10 ENCOUNTER — OFFICE VISIT (OUTPATIENT)
Dept: FAMILY MEDICINE CLINIC | Age: 59
End: 2019-06-10
Payer: MEDICARE

## 2019-06-10 ENCOUNTER — HOSPITAL ENCOUNTER (OUTPATIENT)
Facility: HOSPITAL | Age: 59
Discharge: HOME | End: 2019-06-10
Payer: MEDICARE

## 2019-06-10 VITALS
HEART RATE: 81 BPM | HEIGHT: 68 IN | OXYGEN SATURATION: 96 % | SYSTOLIC BLOOD PRESSURE: 110 MMHG | RESPIRATION RATE: 18 BRPM | WEIGHT: 250.8 LBS | DIASTOLIC BLOOD PRESSURE: 70 MMHG | BODY MASS INDEX: 38.01 KG/M2

## 2019-06-10 DIAGNOSIS — E78.2 MIXED HYPERLIPIDEMIA: ICD-10-CM

## 2019-06-10 DIAGNOSIS — E11.42 DIABETIC POLYNEUROPATHY ASSOCIATED WITH TYPE 2 DIABETES MELLITUS (HCC): ICD-10-CM

## 2019-06-10 DIAGNOSIS — I10 HTN, GOAL BELOW 140/80: ICD-10-CM

## 2019-06-10 DIAGNOSIS — F43.23 SITUATIONAL MIXED ANXIETY AND DEPRESSIVE DISORDER: ICD-10-CM

## 2019-06-10 DIAGNOSIS — M10.9 GOUT OF MULTIPLE SITES, UNSPECIFIED CAUSE, UNSPECIFIED CHRONICITY: ICD-10-CM

## 2019-06-10 DIAGNOSIS — S49.91XA RIGHT SHOULDER INJURY, INITIAL ENCOUNTER: ICD-10-CM

## 2019-06-10 PROCEDURE — G8417 CALC BMI ABV UP PARAM F/U: HCPCS | Performed by: NURSE PRACTITIONER

## 2019-06-10 PROCEDURE — 36415 COLL VENOUS BLD VENIPUNCTURE: CPT

## 2019-06-10 PROCEDURE — 2022F DILAT RTA XM EVC RTNOPTHY: CPT | Performed by: NURSE PRACTITIONER

## 2019-06-10 PROCEDURE — 3046F HEMOGLOBIN A1C LEVEL >9.0%: CPT | Performed by: NURSE PRACTITIONER

## 2019-06-10 PROCEDURE — 99214 OFFICE O/P EST MOD 30 MIN: CPT | Performed by: NURSE PRACTITIONER

## 2019-06-10 PROCEDURE — G8427 DOCREV CUR MEDS BY ELIG CLIN: HCPCS | Performed by: NURSE PRACTITIONER

## 2019-06-10 PROCEDURE — 3017F COLORECTAL CA SCREEN DOC REV: CPT | Performed by: NURSE PRACTITIONER

## 2019-06-10 PROCEDURE — 1036F TOBACCO NON-USER: CPT | Performed by: NURSE PRACTITIONER

## 2019-06-10 RX ORDER — GABAPENTIN 600 MG/1
TABLET ORAL
Qty: 90 TABLET | Refills: 2 | Status: SHIPPED | OUTPATIENT
Start: 2019-06-10 | End: 2019-09-20 | Stop reason: SDUPTHER

## 2019-06-10 RX ORDER — PEN NEEDLE, DIABETIC 31 G X1/4"
1 NEEDLE, DISPOSABLE MISCELLANEOUS 3 TIMES DAILY
Qty: 90 EACH | Refills: 11 | Status: SHIPPED | OUTPATIENT
Start: 2019-06-10 | End: 2020-01-02

## 2019-06-10 RX ORDER — FLUTICASONE PROPIONATE 50 MCG
1 SPRAY, SUSPENSION (ML) NASAL DAILY
Qty: 1 BOTTLE | Refills: 11 | Status: SHIPPED | OUTPATIENT
Start: 2019-06-10 | End: 2020-03-11 | Stop reason: ALTCHOICE

## 2019-06-10 RX ORDER — MELOXICAM 15 MG/1
15 TABLET ORAL DAILY
Qty: 30 TABLET | Refills: 11 | Status: SHIPPED | OUTPATIENT
Start: 2019-06-10 | End: 2020-01-02 | Stop reason: ALTCHOICE

## 2019-06-10 RX ORDER — FUROSEMIDE 20 MG/1
20 TABLET ORAL DAILY
Qty: 30 TABLET | Refills: 11 | Status: SHIPPED | OUTPATIENT
Start: 2019-06-10 | End: 2020-03-11 | Stop reason: ALTCHOICE

## 2019-06-10 RX ORDER — FENOFIBRATE 200 MG/1
CAPSULE ORAL
Qty: 30 CAPSULE | Refills: 11 | Status: SHIPPED | OUTPATIENT
Start: 2019-06-10 | End: 2020-06-22

## 2019-06-10 RX ORDER — LISINOPRIL 5 MG/1
5 TABLET ORAL DAILY
Qty: 30 TABLET | Refills: 11 | Status: SHIPPED | OUTPATIENT
Start: 2019-06-10 | End: 2020-01-02 | Stop reason: ALTCHOICE

## 2019-06-10 RX ORDER — ONDANSETRON 8 MG/1
8 TABLET, ORALLY DISINTEGRATING ORAL EVERY 8 HOURS PRN
Qty: 30 TABLET | Refills: 11 | Status: SHIPPED | OUTPATIENT
Start: 2019-06-10 | End: 2020-01-02 | Stop reason: ALTCHOICE

## 2019-06-10 RX ORDER — ATORVASTATIN CALCIUM 80 MG/1
TABLET, FILM COATED ORAL
Qty: 30 TABLET | Refills: 11 | Status: SHIPPED | OUTPATIENT
Start: 2019-06-10 | End: 2019-10-17

## 2019-06-10 ASSESSMENT — ENCOUNTER SYMPTOMS
EYES NEGATIVE: 1
GASTROINTESTINAL NEGATIVE: 1
BACK PAIN: 1

## 2019-06-10 NOTE — PROGRESS NOTES
Positive for leg swelling. Gastrointestinal: Negative. Endocrine: Negative. Genitourinary: Negative. Musculoskeletal: Positive for back pain and gait problem. Skin: Negative. Hematological: Negative. Psychiatric/Behavioral: Positive for decreased concentration, dysphoric mood and sleep disturbance. The patient is nervous/anxious. All other systems reviewed and are negative. OBJECTIVE:  /70   Pulse 81   Resp 18   Ht 5' 8\" (1.727 m)   Wt 250 lb 12.8 oz (113.8 kg)   SpO2 96% Comment: room air  BMI 38.13 kg/m²    Physical Exam   Constitutional: He is oriented to person, place, and time. He appears well-developed and well-nourished. HENT:   Head: Normocephalic and atraumatic. Right Ear: External ear normal.   Left Ear: External ear normal.   Nose: Nose normal.   Mouth/Throat: Oropharynx is clear and moist.   Eyes: Pupils are equal, round, and reactive to light. Conjunctivae and EOM are normal.   Neck: Normal range of motion. Neck supple. Cardiovascular: Normal rate, regular rhythm, normal heart sounds and intact distal pulses. Pulmonary/Chest: Effort normal and breath sounds normal.   Abdominal: Soft. Bowel sounds are normal.   Musculoskeletal: Normal range of motion. Neurological: He is alert and oriented to person, place, and time. He has normal reflexes. Skin: Skin is warm and dry. Psychiatric: He has a normal mood and affect. His behavior is normal. Judgment and thought content normal.   Nursing note and vitals reviewed. No results found for requested labs within last 30 days. Hemoglobin A1C (%)   Date Value   06/10/2019 10.6 (H)     Microalbumin, Random Urine (ug/mL)   Date Value   06/10/2019 59.8     LDL Calculated (mg/dL)   Date Value   06/10/2019 107 (H)         No results found for: WBC, NEUTROABS, HGB, HCT, MCV, PLT    Lab Results   Component Value Date    TSH 1.67 05/31/2018         ASSESSMENT/PLAN:   Diagnosis Orders   1.  Uncontrolled type 2 diabetes mellitus with complication, with long-term current use of insulin ([facility])   DIABETES FOOT EXAM   2. Diabetic polyneuropathy associated with type 2 diabetes mellitus ([facility])  gabapentin (NEURONTIN) 600 MG tablet   3. Mixed hyperlipidemia     4. Right shoulder injury, initial encounter  meloxicam (MOBIC) 15 MG tablet   5. Gout of multiple sites, unspecified cause, unspecified chronicity     6. HTN, goal below 140/80     7. Situational mixed anxiety and depressive disorder          Orders Placed This Encounter   Procedures     DIABETES FOOT EXAM        Outpatient Encounter Medications as of 6/10/2019   Medication Sig Dispense Refill    gabapentin (NEURONTIN) 600 MG tablet TAKE ONE TABLET BY MOUTH THREE TIMES A DAY 90 tablet 2    meloxicam (MOBIC) 15 MG tablet Take 1 tablet by mouth daily 30 tablet 11    insulin detemir (LEVEMIR FLEXTOUCH) 100 UNIT/ML injection pen Inject 65 Units into the skin 2 times daily 45 pen 11    atorvastatin (LIPITOR) 80 MG tablet TAKE ONE TABLET BY MOUTH DAILY. Dosage increase.  30 tablet 11    insulin aspart (NOVOLOG FLEXPEN) 100 UNIT/ML injection pen Inject 25 Units into the skin 3 times daily (before meals) 10 pen 11    fluticasone (FLONASE) 50 MCG/ACT nasal spray 1 spray by Nasal route daily 1 Bottle 11    Insulin Pen Needle (PEN NEEDLES) 31G X 6 MM MISC 1 each by Other route 3 times daily 90 each 11    ondansetron (ZOFRAN-ODT) 8 MG TBDP disintegrating tablet Place 1 tablet under the tongue every 8 hours as needed for Nausea or Vomiting 30 tablet 11    furosemide (LASIX) 20 MG tablet Take 1 tablet by mouth daily 30 tablet 11    lisinopril (PRINIVIL;ZESTRIL) 5 MG tablet Take 1 tablet by mouth daily 30 tablet 11    fenofibrate micronized (LOFIBRA) 200 MG capsule TAKE ONE CAPSULE BY MOUTH EVERY MORNING BEFORE BREAKFAST 30 capsule 11    [] levofloxacin (LEVAQUIN) 500 MG tablet Take 1 tablet by mouth daily for 10 days 10 tablet 0    ipratropium (ATROVENT) 0.03 %

## 2019-06-21 RX ORDER — ALLOPURINOL 300 MG/1
300 TABLET ORAL DAILY
Qty: 30 TABLET | Refills: 5 | Status: SHIPPED | OUTPATIENT
Start: 2019-06-21 | End: 2020-05-14

## 2019-08-06 ENCOUNTER — TELEPHONE (OUTPATIENT)
Dept: FAMILY MEDICINE CLINIC | Age: 59
End: 2019-08-06

## 2019-08-06 NOTE — TELEPHONE ENCOUNTER
Sheree hastings/[employer] called, inquiring about patient's paperwork.  The forms have been left in your \"to Sign\"

## 2019-08-14 ENCOUNTER — TELEPHONE (OUTPATIENT)
Dept: FAMILY MEDICINE CLINIC | Age: 59
End: 2019-08-14

## 2019-09-20 DIAGNOSIS — E11.42 DIABETIC POLYNEUROPATHY ASSOCIATED WITH TYPE 2 DIABETES MELLITUS (HCC): ICD-10-CM

## 2019-09-20 RX ORDER — GABAPENTIN 600 MG/1
TABLET ORAL
Qty: 90 TABLET | Refills: 1 | Status: SHIPPED | OUTPATIENT
Start: 2019-09-20 | End: 2019-11-30 | Stop reason: SDUPTHER

## 2019-09-20 NOTE — TELEPHONE ENCOUNTER
Refill request received from pharmacy.  Medication pending for approval.       Last Office Visit With PCP:  9/13/2019    Next Visit Date:  Future Appointments   Date Time Provider Lyndsey Lyons   10/1/2019  8:30 AM ANDREIA Major [address] [place] [address] for review

## 2019-10-01 ENCOUNTER — OFFICE VISIT (OUTPATIENT)
Dept: FAMILY MEDICINE CLINIC | Age: 59
End: 2019-10-01
Payer: MEDICARE

## 2019-10-01 ENCOUNTER — HOSPITAL ENCOUNTER (OUTPATIENT)
Facility: HOSPITAL | Age: 59
Discharge: HOME | End: 2019-10-01
Payer: MEDICARE

## 2019-10-01 VITALS
BODY MASS INDEX: 39.16 KG/M2 | RESPIRATION RATE: 18 BRPM | SYSTOLIC BLOOD PRESSURE: 114 MMHG | WEIGHT: 258.4 LBS | OXYGEN SATURATION: 96 % | DIASTOLIC BLOOD PRESSURE: 64 MMHG | HEART RATE: 81 BPM | HEIGHT: 68 IN

## 2019-10-01 DIAGNOSIS — E78.5 HYPERLIPIDEMIA LDL GOAL <100: ICD-10-CM

## 2019-10-01 DIAGNOSIS — I10 HTN, GOAL BELOW 140/80: ICD-10-CM

## 2019-10-01 PROCEDURE — 3017F COLORECTAL CA SCREEN DOC REV: CPT | Performed by: NURSE PRACTITIONER

## 2019-10-01 PROCEDURE — G8484 FLU IMMUNIZE NO ADMIN: HCPCS | Performed by: NURSE PRACTITIONER

## 2019-10-01 PROCEDURE — 1036F TOBACCO NON-USER: CPT | Performed by: NURSE PRACTITIONER

## 2019-10-01 PROCEDURE — G8427 DOCREV CUR MEDS BY ELIG CLIN: HCPCS | Performed by: NURSE PRACTITIONER

## 2019-10-01 PROCEDURE — 36415 COLL VENOUS BLD VENIPUNCTURE: CPT

## 2019-10-01 PROCEDURE — 99214 OFFICE O/P EST MOD 30 MIN: CPT | Performed by: NURSE PRACTITIONER

## 2019-10-01 PROCEDURE — 3046F HEMOGLOBIN A1C LEVEL >9.0%: CPT | Performed by: NURSE PRACTITIONER

## 2019-10-01 PROCEDURE — 2022F DILAT RTA XM EVC RTNOPTHY: CPT | Performed by: NURSE PRACTITIONER

## 2019-10-01 PROCEDURE — G8417 CALC BMI ABV UP PARAM F/U: HCPCS | Performed by: NURSE PRACTITIONER

## 2019-10-01 ASSESSMENT — ENCOUNTER SYMPTOMS
GASTROINTESTINAL NEGATIVE: 1
BACK PAIN: 1
EYES NEGATIVE: 1

## 2019-10-17 ENCOUNTER — OFFICE VISIT (OUTPATIENT)
Dept: FAMILY MEDICINE CLINIC | Age: 59
End: 2019-10-17
Payer: MEDICARE

## 2019-10-17 VITALS
WEIGHT: 260.2 LBS | DIASTOLIC BLOOD PRESSURE: 80 MMHG | HEIGHT: 68 IN | SYSTOLIC BLOOD PRESSURE: 122 MMHG | OXYGEN SATURATION: 98 % | BODY MASS INDEX: 39.43 KG/M2 | RESPIRATION RATE: 18 BRPM

## 2019-10-17 DIAGNOSIS — I10 HTN, GOAL BELOW 140/80: ICD-10-CM

## 2019-10-17 DIAGNOSIS — Z20.828 EXPOSURE TO THE FLU: ICD-10-CM

## 2019-10-17 DIAGNOSIS — E78.2 MIXED HYPERLIPIDEMIA: ICD-10-CM

## 2019-10-17 LAB
INFLUENZA A ANTIGEN, POC: NEGATIVE
INFLUENZA B ANTIGEN, POC: NEGATIVE

## 2019-10-17 PROCEDURE — G8484 FLU IMMUNIZE NO ADMIN: HCPCS | Performed by: NURSE PRACTITIONER

## 2019-10-17 PROCEDURE — 87804 INFLUENZA ASSAY W/OPTIC: CPT | Performed by: NURSE PRACTITIONER

## 2019-10-17 PROCEDURE — 3046F HEMOGLOBIN A1C LEVEL >9.0%: CPT | Performed by: NURSE PRACTITIONER

## 2019-10-17 PROCEDURE — 1036F TOBACCO NON-USER: CPT | Performed by: NURSE PRACTITIONER

## 2019-10-17 PROCEDURE — 99213 OFFICE O/P EST LOW 20 MIN: CPT | Performed by: NURSE PRACTITIONER

## 2019-10-17 PROCEDURE — 2022F DILAT RTA XM EVC RTNOPTHY: CPT | Performed by: NURSE PRACTITIONER

## 2019-10-17 PROCEDURE — 3017F COLORECTAL CA SCREEN DOC REV: CPT | Performed by: NURSE PRACTITIONER

## 2019-10-17 PROCEDURE — G8417 CALC BMI ABV UP PARAM F/U: HCPCS | Performed by: NURSE PRACTITIONER

## 2019-10-17 PROCEDURE — G8427 DOCREV CUR MEDS BY ELIG CLIN: HCPCS | Performed by: NURSE PRACTITIONER

## 2019-10-17 RX ORDER — AZITHROMYCIN 500 MG/1
500 TABLET, FILM COATED ORAL DAILY
Qty: 5 TABLET | Refills: 0 | Status: SHIPPED | OUTPATIENT
Start: 2019-10-17 | End: 2019-10-22

## 2019-10-17 RX ORDER — PREDNISONE 10 MG/1
10 TABLET ORAL
Qty: 15 TABLET | Refills: 0 | Status: SHIPPED | OUTPATIENT
Start: 2019-10-17 | End: 2019-10-22

## 2019-10-17 RX ORDER — ROSUVASTATIN CALCIUM 40 MG/1
40 TABLET, COATED ORAL DAILY
Qty: 30 TABLET | Refills: 11 | Status: SHIPPED | OUTPATIENT
Start: 2019-10-17 | End: 2020-12-04

## 2019-10-17 RX ORDER — PIOGLITAZONEHYDROCHLORIDE 30 MG/1
30 TABLET ORAL DAILY
Qty: 30 TABLET | Refills: 3 | Status: SHIPPED | OUTPATIENT
Start: 2019-10-17 | End: 2020-03-12

## 2019-11-29 DIAGNOSIS — E11.42 DIABETIC POLYNEUROPATHY ASSOCIATED WITH TYPE 2 DIABETES MELLITUS (HCC): ICD-10-CM

## 2019-11-30 DIAGNOSIS — E11.42 DIABETIC POLYNEUROPATHY ASSOCIATED WITH TYPE 2 DIABETES MELLITUS (HCC): ICD-10-CM

## 2019-11-30 RX ORDER — GABAPENTIN 600 MG/1
TABLET ORAL
Qty: 90 TABLET | Refills: 2 | Status: SHIPPED | OUTPATIENT
Start: 2019-11-30 | End: 2019-12-01

## 2019-12-01 RX ORDER — GABAPENTIN 600 MG/1
TABLET ORAL
Qty: 90 TABLET | Refills: 5 | Status: SHIPPED | OUTPATIENT
Start: 2019-12-01 | End: 2020-07-17

## 2020-01-02 ENCOUNTER — TELEPHONE (OUTPATIENT)
Dept: FAMILY MEDICINE CLINIC | Age: 60
End: 2020-01-02

## 2020-01-02 ENCOUNTER — HOSPITAL ENCOUNTER (OUTPATIENT)
Facility: HOSPITAL | Age: 60
Discharge: HOME | End: 2020-01-02
Payer: MEDICARE

## 2020-01-02 ENCOUNTER — OFFICE VISIT (OUTPATIENT)
Dept: FAMILY MEDICINE CLINIC | Age: 60
End: 2020-01-02
Payer: MEDICARE

## 2020-01-02 VITALS
OXYGEN SATURATION: 97 % | HEART RATE: 68 BPM | DIASTOLIC BLOOD PRESSURE: 72 MMHG | RESPIRATION RATE: 18 BRPM | SYSTOLIC BLOOD PRESSURE: 118 MMHG

## 2020-01-02 PROCEDURE — 36415 COLL VENOUS BLD VENIPUNCTURE: CPT

## 2020-01-02 PROCEDURE — 99214 OFFICE O/P EST MOD 30 MIN: CPT | Performed by: NURSE PRACTITIONER

## 2020-01-02 PROCEDURE — G8484 FLU IMMUNIZE NO ADMIN: HCPCS | Performed by: NURSE PRACTITIONER

## 2020-01-02 PROCEDURE — 3046F HEMOGLOBIN A1C LEVEL >9.0%: CPT | Performed by: NURSE PRACTITIONER

## 2020-01-02 PROCEDURE — 1036F TOBACCO NON-USER: CPT | Performed by: NURSE PRACTITIONER

## 2020-01-02 PROCEDURE — G8417 CALC BMI ABV UP PARAM F/U: HCPCS | Performed by: NURSE PRACTITIONER

## 2020-01-02 PROCEDURE — G8427 DOCREV CUR MEDS BY ELIG CLIN: HCPCS | Performed by: NURSE PRACTITIONER

## 2020-01-02 PROCEDURE — 2022F DILAT RTA XM EVC RTNOPTHY: CPT | Performed by: NURSE PRACTITIONER

## 2020-01-02 PROCEDURE — 3017F COLORECTAL CA SCREEN DOC REV: CPT | Performed by: NURSE PRACTITIONER

## 2020-01-02 RX ORDER — CARVEDILOL 12.5 MG/1
1 TABLET ORAL 2 TIMES DAILY
COMMUNITY
Start: 2019-12-28 | End: 2020-01-20 | Stop reason: SDUPTHER

## 2020-01-02 RX ORDER — BUMETANIDE 2 MG/1
1 TABLET ORAL DAILY
COMMUNITY
Start: 2019-12-28 | End: 2020-01-20 | Stop reason: SDUPTHER

## 2020-01-02 ASSESSMENT — PATIENT HEALTH QUESTIONNAIRE - PHQ9
2. FEELING DOWN, DEPRESSED OR HOPELESS: 0
1. LITTLE INTEREST OR PLEASURE IN DOING THINGS: 0
SUM OF ALL RESPONSES TO PHQ QUESTIONS 1-9: 0
SUM OF ALL RESPONSES TO PHQ9 QUESTIONS 1 & 2: 0
SUM OF ALL RESPONSES TO PHQ QUESTIONS 1-9: 0

## 2020-01-02 NOTE — PROGRESS NOTES
treatment includes statins and fibric acid derivatives. Compliance problems: unknown. Risk factors for coronary artery disease include diabetes mellitus, dyslipidemia, family history, hypertension, male sex, obesity, stress and a sedentary lifestyle. Review of Systems   Constitutional: Positive for fatigue and malaise/fatigue increase. HENT: Negative. Eyes: Negative. Cardiovascular: Positive for leg swelling. Gastrointestinal: Negative. Endocrine: Negative. Genitourinary: Negative. Musculoskeletal: Positive for back pain and gait problem. Skin: Negative. Hematological: Negative. Psychiatric/Behavioral: Positive for decreased concentration, dysphoric mood and sleep disturbance. The patient is nervous/anxious. All other systems reviewed and are negative. OBJECTIVE:  /64   Pulse 81   Resp 18   Ht 5' 8\" (1.727 m)   Wt 258 lb 6.4 oz (117.2 kg)   SpO2 96% Comment: room air  BMI 39.29 kg/m²    Physical Exam   Constitutional: He is oriented to person, place, and time. He appears well-developed and well-nourished. HENT:   Head: Normocephalic and atraumatic. Right Ear: External ear normal.   Left Ear: External ear normal.   Nose: Nose normal.   Mouth/Throat: Oropharynx is clear and moist.   Eyes: Pupils are equal, round, and reactive to light. Conjunctivae and EOM are normal.   Neck: Normal range of motion. Neck supple. Cardiovascular: Normal rate, regular rhythm, normal heart sounds and intact distal pulses. Pulmonary/Chest: Effort normal and breath sounds normal grade 4/6 murmur aortic systolic. Abdominal: Soft. Bowel sounds are normal.   Musculoskeletal: Normal range of motion but severe weakness. Neurological: He is alert and oriented to person, place, and time. He has normal reflexes. Skin: Skin is warm and dry. Psychiatric: He has a normal mood and affect.  His behavior is normal. Judgment and thought content normal.   Nursing note and vitals reviewed. No results found for requested labs within last 30 days. Hemoglobin A1C (%)   Date Value   06/10/2019 10.6 (H)     Microalbumin, Random Urine (ug/mL)   Date Value   06/10/2019 59.8     LDL Calculated (mg/dL)   Date Value   06/10/2019 107 (H)         No results found for: WBC, NEUTROABS, HGB, HCT, MCV, PLT    Lab Results   Component Value Date    TSH 1.67 05/31/2018         ASSESSMENT/PLAN:   Diagnosis Orders   1.        2.    3.    4.      5.    6.    7. Uncontrolled type 2 diabetes mellitus with complication, with long-term current use of insulin (HCC)     Hyperlipidemia ldl goal <100    HTN, goal below 140/80    Acute on chronic right-sided congestive heart failure (HCC)    Vitamin D deficiency    Fatigue, unspecified type    Obstructive sleep apnea syndrome Hemoglobin A1C       Outpatient Encounter Medications as of 10/1/2019   Medication Sig Dispense Refill    gabapentin (NEURONTIN) 600 MG tablet TAKE ONE TABLET BY MOUTH THREE TIMES A DAY 90 tablet 1    SITagliptin (JANUVIA) 100 MG tablet TAKE ONE TABLET BY MOUTH DAILY 90 tablet 3    allopurinol (ZYLOPRIM) 300 MG tablet Take 1 tablet by mouth daily 30 tablet 5    meloxicam (MOBIC) 15 MG tablet Take 1 tablet by mouth daily 30 tablet 11    insulin detemir (LEVEMIR FLEXTOUCH) 100 UNIT/ML injection pen Inject 65 Units into the skin 2 times daily 45 pen 11    atorvastatin (LIPITOR) 80 MG tablet TAKE ONE TABLET BY MOUTH DAILY. Dosage increase.  30 tablet 11    insulin aspart (NOVOLOG FLEXPEN) 100 UNIT/ML injection pen Inject 25 Units into the skin 3 times daily (before meals) 10 pen 11    fluticasone (FLONASE) 50 MCG/ACT nasal spray 1 spray by Nasal route daily 1 Bottle 11    Insulin Pen Needle (PEN NEEDLES) 31G X 6 MM MISC 1 each by Other route 3 times daily 90 each 11    ondansetron (ZOFRAN-ODT) 8 MG TBDP disintegrating tablet Place 1 tablet under the tongue every 8 hours as needed for Nausea or Vomiting 30 tablet 11    furosemide Services Required     Requested Specialty:   Sleep Medicine     Number of Visits Requested:   1       Return in about 3 months (around 1/1/2020), or if symptoms worsen or fail to improve. PATIENT COUNSELING    Counseling was provided today regarding the following topics: Healthy eating habits, Regular exercise, substance abuse and healthy sleep habits. Discussed use, benefit, and side effects of prescribed medications. Barriers to medication compliance addressed. All patient questions answered. Pt voiced understanding.

## 2020-01-09 ENCOUNTER — TELEPHONE (OUTPATIENT)
Dept: FAMILY MEDICINE CLINIC | Age: 60
End: 2020-01-09

## 2020-01-09 NOTE — TELEPHONE ENCOUNTER
Patient notified and verbalized understanding. Patient is going to wait and talk to [facility]. Patient did not want to make a follow up a appointment as of now.

## 2020-01-20 RX ORDER — CARVEDILOL 12.5 MG/1
12.5 TABLET ORAL 2 TIMES DAILY
Qty: 60 TABLET | Refills: 3 | Status: SHIPPED | OUTPATIENT
Start: 2020-01-20 | End: 2020-05-04 | Stop reason: SDUPTHER

## 2020-01-20 RX ORDER — BUMETANIDE 2 MG/1
2 TABLET ORAL DAILY
Qty: 30 TABLET | Refills: 3 | Status: SHIPPED | OUTPATIENT
Start: 2020-01-20 | End: 2020-05-04 | Stop reason: SDUPTHER

## 2020-01-20 NOTE — TELEPHONE ENCOUNTER
Refill request received from pharmacy.  Medication pending for approval.       Last Office Visit With PCP:  1/2/2020    Next Visit Date:  Future Appointments   Date Time Provider Lyndsey Lyons   4/2/2020  9:00 AM ANDREIA [address]

## 2020-01-30 ENCOUNTER — TELEPHONE (OUTPATIENT)
Dept: FAMILY MEDICINE CLINIC | Age: 60
End: 2020-01-30

## 2020-02-03 ENCOUNTER — TELEPHONE (OUTPATIENT)
Dept: FAMILY MEDICINE CLINIC | Age: 60
End: 2020-02-03

## 2020-02-03 RX ORDER — GLUCOSAMINE HCL/CHONDROITIN SU 500-400 MG
CAPSULE ORAL
Qty: 100 STRIP | Refills: 5 | Status: SHIPPED | OUTPATIENT
Start: 2020-02-03 | End: 2020-02-25

## 2020-02-03 NOTE — TELEPHONE ENCOUNTER
They do not make strips for patients old meter. He is need a new one and strips sent to [facility] in [place].

## 2020-02-12 ENCOUNTER — TELEPHONE (OUTPATIENT)
Dept: FAMILY MEDICINE CLINIC | Age: 60
End: 2020-02-12

## 2020-02-25 ENCOUNTER — OFFICE VISIT (OUTPATIENT)
Dept: FAMILY MEDICINE CLINIC | Age: 60
End: 2020-02-25
Payer: MEDICARE

## 2020-02-25 VITALS
WEIGHT: 260 LBS | SYSTOLIC BLOOD PRESSURE: 122 MMHG | HEART RATE: 75 BPM | DIASTOLIC BLOOD PRESSURE: 70 MMHG | HEIGHT: 68 IN | OXYGEN SATURATION: 95 % | BODY MASS INDEX: 39.4 KG/M2 | RESPIRATION RATE: 18 BRPM

## 2020-02-25 LAB — HBA1C MFR BLD: 6.1 %

## 2020-02-25 PROCEDURE — G8484 FLU IMMUNIZE NO ADMIN: HCPCS | Performed by: NURSE PRACTITIONER

## 2020-02-25 PROCEDURE — 1036F TOBACCO NON-USER: CPT | Performed by: NURSE PRACTITIONER

## 2020-02-25 PROCEDURE — 3044F HG A1C LEVEL LT 7.0%: CPT | Performed by: NURSE PRACTITIONER

## 2020-02-25 PROCEDURE — G8427 DOCREV CUR MEDS BY ELIG CLIN: HCPCS | Performed by: NURSE PRACTITIONER

## 2020-02-25 PROCEDURE — G8417 CALC BMI ABV UP PARAM F/U: HCPCS | Performed by: NURSE PRACTITIONER

## 2020-02-25 PROCEDURE — 83036 HEMOGLOBIN GLYCOSYLATED A1C: CPT | Performed by: NURSE PRACTITIONER

## 2020-02-25 PROCEDURE — 99213 OFFICE O/P EST LOW 20 MIN: CPT | Performed by: NURSE PRACTITIONER

## 2020-02-25 PROCEDURE — 2022F DILAT RTA XM EVC RTNOPTHY: CPT | Performed by: NURSE PRACTITIONER

## 2020-02-25 PROCEDURE — 3017F COLORECTAL CA SCREEN DOC REV: CPT | Performed by: NURSE PRACTITIONER

## 2020-02-25 RX ORDER — TESTOSTERONE CYPIONATE 200 MG/ML
200 INJECTION INTRAMUSCULAR
Qty: 2 ML | Refills: 0 | Status: SHIPPED
Start: 2020-02-25 | End: 2020-03-11

## 2020-02-25 RX ORDER — ERGOCALCIFEROL 1.25 MG/1
50000 CAPSULE ORAL WEEKLY
Qty: 4 CAPSULE | Refills: 11 | Status: SHIPPED | OUTPATIENT
Start: 2020-02-25 | End: 2020-10-29 | Stop reason: ALTCHOICE

## 2020-02-25 NOTE — PROGRESS NOTES
Resp 18   Ht 5' 8\" (1.727 m)   Wt 258 lb 6.4 oz (117.2 kg)   SpO2 96% Comment: room air  BMI 39.29 kg/m²    Physical Exam   Constitutional: He is oriented to person, place, and time. He appears well-developed and well-nourished. HENT:   Head: Normocephalic and atraumatic. Right Ear: External ear normal.   Left Ear: External ear normal.   Nose: Nose normal.   Mouth/Throat: Oropharynx is clear and moist.   Eyes: Pupils are equal, round, and reactive to light. Conjunctivae and EOM are normal.   Neck: Normal range of motion. Neck supple. Cardiovascular: Normal rate, regular rhythm, normal heart sounds and intact distal pulses. Pulmonary/Chest: Effort normal and breath sounds normal grade 4/6 murmur aortic systolic. Abdominal: Soft. Bowel sounds are normal.   Musculoskeletal: Normal range of motion but severe weakness. Neurological: He is alert and oriented to person, place, and time. He has normal reflexes. Skin: Skin is warm and dry. Psychiatric: He has a normal mood and affect. His behavior is normal. Judgment and thought content normal.   Nursing note and vitals reviewed. No results found for requested labs within last 30 days. Hemoglobin A1C (%)   Date Value   06/10/2019 10.6 (H)     Microalbumin, Random Urine (ug/mL)   Date Value   06/10/2019 59.8     LDL Calculated (mg/dL)   Date Value   06/10/2019 107 (H)         No results found for: WBC, NEUTROABS, HGB, HCT, MCV, PLT    Lab Results   Component Value Date    TSH 1.67 05/31/2018         ASSESSMENT/PLAN:   Diagnosis Orders    1.  Type 2 diabetes mellitus with other circulatory complication, with long-term current use of insulin (HCC)  2.  hypotestosteronism     Hemoglobin A1C     Orders Placed This Encounter   Procedures    POCT glycosylated hemoglobin (Hb A1C)     Outpatient Encounter Medications as of 2/25/2020   Medication Sig Dispense Refill    vitamin D (ERGOCALCIFEROL) 1.25 MG (16746 UT) CAPS capsule Take 1 capsule by mouth once a week 4 capsule 11    testosterone cypionate (DEPOTESTOTERONE CYPIONATE) 200 MG/ML injection Inject 1 mL into the muscle every 14 days for 30 doses. 2 mL 0    bumetanide (BUMEX) 2 MG tablet Take 1 tablet by mouth daily 30 tablet 3    carvedilol (COREG) 12.5 MG tablet Take 1 tablet by mouth 2 times daily 60 tablet 3    gabapentin (NEURONTIN) 600 MG tablet TAKE ONE TABLET BY MOUTH THREE TIMES A DAY 90 tablet 5    rosuvastatin (CRESTOR) 40 MG tablet Take 1 tablet by mouth daily 30 tablet 11    pioglitazone (ACTOS) 30 MG tablet Take 1 tablet by mouth daily 30 tablet 3    SITagliptin (JANUVIA) 100 MG tablet TAKE ONE TABLET BY MOUTH DAILY 90 tablet 3    allopurinol (ZYLOPRIM) 300 MG tablet Take 1 tablet by mouth daily 30 tablet 5    insulin detemir (LEVEMIR FLEXTOUCH) 100 UNIT/ML injection pen Inject 65 Units into the skin 2 times daily 45 pen 11    insulin aspart (NOVOLOG FLEXPEN) 100 UNIT/ML injection pen Inject 25 Units into the skin 3 times daily (before meals) 10 pen 11    fluticasone (FLONASE) 50 MCG/ACT nasal spray 1 spray by Nasal route daily 1 Bottle 11    furosemide (LASIX) 20 MG tablet Take 1 tablet by mouth daily 30 tablet 11    fenofibrate micronized (LOFIBRA) 200 MG capsule TAKE ONE CAPSULE BY MOUTH EVERY MORNING BEFORE BREAKFAST 30 capsule 11    clonazePAM (KLONOPIN) 0.5 MG tablet Take 1 tablet by mouth 2 times daily as needed for Anxiety for up to 30 days. 60 tablet 0    colchicine (COLCRYS) 0.6 MG tablet TAKE ONE TABLET BY MOUTH TWICE A DAY 60 tablet 0    aspirin 81 MG chewable tablet Take 81 mg by mouth daily      [DISCONTINUED] blood glucose monitor kit and supplies Test TID times a day & as needed for symptoms of irregular blood glucose. (Patient not taking: Reported on 2/25/2020) 1 kit 0    [DISCONTINUED] blood glucose monitor strips Test TID times a day & as needed for symptoms of irregular blood glucose.  (Patient not taking: Reported on 2/25/2020) 100 strip 5 Facility-Administered Encounter Medications as of 2/25/2020   Medication Dose Route Frequency Provider Last Rate Last Dose    0.9 % sodium chloride infusion   Intravenous Once ANDREIA Sanchez   Stopped at 05/10/17 1052    0.9 % sodium chloride infusion   Intravenous Once ANDREIA Sanchez   Stopped at 05/10/17 1144     Return in 1 month and as needed. PATIENT COUNSELING    Counseling was provided today regarding the following topics: Healthy eating habits, Regular exercise, substance abuse and healthy sleep habits. Discussed use, benefit, and side effects of prescribed medications. Barriers to medication compliance addressed. All patient questions answered. Pt voiced understanding.

## 2020-02-28 ENCOUNTER — HOSPITAL ENCOUNTER (INPATIENT)
Facility: HOSPITAL | Age: 60
LOS: 6 days | Discharge: HOME HEALTH | End: 2020-03-05
Attending: EMERGENCY MEDICINE | Admitting: INTERNAL MEDICINE

## 2020-02-28 ENCOUNTER — APPOINTMENT (OUTPATIENT)
Dept: GENERAL RADIOLOGY | Facility: HOSPITAL | Age: 60
End: 2020-02-28

## 2020-02-28 DIAGNOSIS — G47.34 NOCTURNAL HYPOXIA: ICD-10-CM

## 2020-02-28 DIAGNOSIS — N18.30 CKD (CHRONIC KIDNEY DISEASE) STAGE 3, GFR 30-59 ML/MIN (HCC): ICD-10-CM

## 2020-02-28 DIAGNOSIS — Z78.9 IMPAIRED MOBILITY AND ADLS: ICD-10-CM

## 2020-02-28 DIAGNOSIS — I38 DIASTOLIC CHF DUE TO VALVULAR DISEASE (HCC): ICD-10-CM

## 2020-02-28 DIAGNOSIS — N18.9 CHRONIC KIDNEY DISEASE, UNSPECIFIED CKD STAGE: ICD-10-CM

## 2020-02-28 DIAGNOSIS — E78.5 HYPERLIPIDEMIA LDL GOAL <70: ICD-10-CM

## 2020-02-28 DIAGNOSIS — I24.8 DEMAND ISCHEMIA OF MYOCARDIUM (HCC): ICD-10-CM

## 2020-02-28 DIAGNOSIS — I35.0 AORTIC VALVE STENOSIS, ETIOLOGY OF CARDIAC VALVE DISEASE UNSPECIFIED: ICD-10-CM

## 2020-02-28 DIAGNOSIS — I50.30 DIASTOLIC CHF DUE TO VALVULAR DISEASE (HCC): ICD-10-CM

## 2020-02-28 DIAGNOSIS — I25.119 CORONARY ARTERY DISEASE INVOLVING NATIVE CORONARY ARTERY OF NATIVE HEART WITH ANGINA PECTORIS (HCC): ICD-10-CM

## 2020-02-28 DIAGNOSIS — J96.01 ACUTE RESPIRATORY FAILURE WITH HYPOXIA (HCC): ICD-10-CM

## 2020-02-28 DIAGNOSIS — I10 ESSENTIAL HYPERTENSION: ICD-10-CM

## 2020-02-28 DIAGNOSIS — J81.0 ACUTE PULMONARY EDEMA (HCC): ICD-10-CM

## 2020-02-28 DIAGNOSIS — E66.01 OBESITY, CLASS III, BMI 40-49.9 (MORBID OBESITY) (HCC): ICD-10-CM

## 2020-02-28 DIAGNOSIS — Z74.09 IMPAIRED MOBILITY AND ADLS: ICD-10-CM

## 2020-02-28 DIAGNOSIS — I50.23 ACUTE ON CHRONIC SYSTOLIC CHF (CONGESTIVE HEART FAILURE) (HCC): Primary | ICD-10-CM

## 2020-02-28 PROBLEM — I50.9 CHF EXACERBATION (HCC): Status: ACTIVE | Noted: 2020-02-28

## 2020-02-28 PROBLEM — R79.89 ELEVATED TROPONIN: Status: ACTIVE | Noted: 2020-02-28

## 2020-02-28 PROBLEM — E11.649 TYPE 2 DIABETES MELLITUS WITH HYPOGLYCEMIA (HCC): Status: ACTIVE | Noted: 2020-02-28

## 2020-02-28 PROBLEM — D64.9 NORMOCYTIC ANEMIA: Status: ACTIVE | Noted: 2020-02-28

## 2020-02-28 PROBLEM — R77.8 ELEVATED TROPONIN: Status: ACTIVE | Noted: 2020-02-28

## 2020-02-28 PROBLEM — E11.9 TYPE 2 DIABETES MELLITUS (HCC): Status: ACTIVE | Noted: 2020-02-28

## 2020-02-28 LAB
ALBUMIN SERPL-MCNC: 3.7 G/DL (ref 3.5–5.2)
ALBUMIN/GLOB SERPL: 1.1 G/DL
ALP SERPL-CCNC: 52 U/L (ref 39–117)
ALT SERPL W P-5'-P-CCNC: 16 U/L (ref 1–41)
ANION GAP SERPL CALCULATED.3IONS-SCNC: 9 MMOL/L (ref 5–15)
AST SERPL-CCNC: 28 U/L (ref 1–40)
BASOPHILS # BLD AUTO: 0.03 10*3/MM3 (ref 0–0.2)
BASOPHILS NFR BLD AUTO: 0.3 % (ref 0–1.5)
BILIRUB SERPL-MCNC: 0.4 MG/DL (ref 0.2–1.2)
BUN BLD-MCNC: 35 MG/DL (ref 6–20)
BUN/CREAT SERPL: 16.4 (ref 7–25)
CALCIUM SPEC-SCNC: 9.4 MG/DL (ref 8.6–10.5)
CHLORIDE SERPL-SCNC: 99 MMOL/L (ref 98–107)
CO2 SERPL-SCNC: 33 MMOL/L (ref 22–29)
CREAT BLD-MCNC: 2.14 MG/DL (ref 0.76–1.27)
DEPRECATED RDW RBC AUTO: 53.5 FL (ref 37–54)
EOSINOPHIL # BLD AUTO: 0.39 10*3/MM3 (ref 0–0.4)
EOSINOPHIL NFR BLD AUTO: 4.1 % (ref 0.3–6.2)
ERYTHROCYTE [DISTWIDTH] IN BLOOD BY AUTOMATED COUNT: 16 % (ref 12.3–15.4)
GFR SERPL CREATININE-BSD FRML MDRD: 32 ML/MIN/1.73
GLOBULIN UR ELPH-MCNC: 3.4 GM/DL
GLUCOSE BLD-MCNC: 59 MG/DL (ref 65–99)
GLUCOSE BLDC GLUCOMTR-MCNC: 172 MG/DL (ref 70–130)
GLUCOSE BLDC GLUCOMTR-MCNC: 50 MG/DL (ref 70–130)
GLUCOSE BLDC GLUCOMTR-MCNC: 55 MG/DL (ref 70–130)
GLUCOSE BLDC GLUCOMTR-MCNC: 63 MG/DL (ref 70–130)
HCT VFR BLD AUTO: 36.8 % (ref 37.5–51)
HGB BLD-MCNC: 10.9 G/DL (ref 13–17.7)
HOLD SPECIMEN: NORMAL
HOLD SPECIMEN: NORMAL
IMM GRANULOCYTES # BLD AUTO: 0.03 10*3/MM3 (ref 0–0.05)
IMM GRANULOCYTES NFR BLD AUTO: 0.3 % (ref 0–0.5)
LYMPHOCYTES # BLD AUTO: 1.32 10*3/MM3 (ref 0.7–3.1)
LYMPHOCYTES NFR BLD AUTO: 13.9 % (ref 19.6–45.3)
MCH RBC QN AUTO: 26.7 PG (ref 26.6–33)
MCHC RBC AUTO-ENTMCNC: 29.6 G/DL (ref 31.5–35.7)
MCV RBC AUTO: 90 FL (ref 79–97)
MONOCYTES # BLD AUTO: 0.64 10*3/MM3 (ref 0.1–0.9)
MONOCYTES NFR BLD AUTO: 6.8 % (ref 5–12)
NEUTROPHILS # BLD AUTO: 7.06 10*3/MM3 (ref 1.7–7)
NEUTROPHILS NFR BLD AUTO: 74.6 % (ref 42.7–76)
NRBC BLD AUTO-RTO: 0 /100 WBC (ref 0–0.2)
NT-PROBNP SERPL-MCNC: 1728 PG/ML (ref 5–900)
PLATELET # BLD AUTO: 251 10*3/MM3 (ref 140–450)
PMV BLD AUTO: 10.6 FL (ref 6–12)
POTASSIUM BLD-SCNC: 4.6 MMOL/L (ref 3.5–5.2)
PROT SERPL-MCNC: 7.1 G/DL (ref 6–8.5)
RBC # BLD AUTO: 4.09 10*6/MM3 (ref 4.14–5.8)
SODIUM BLD-SCNC: 141 MMOL/L (ref 136–145)
TROPONIN T SERPL-MCNC: 0.04 NG/ML (ref 0–0.03)
TROPONIN T SERPL-MCNC: 0.05 NG/ML (ref 0–0.03)
WBC NRBC COR # BLD: 9.47 10*3/MM3 (ref 3.4–10.8)
WHOLE BLOOD HOLD SPECIMEN: NORMAL
WHOLE BLOOD HOLD SPECIMEN: NORMAL

## 2020-02-28 PROCEDURE — 94799 UNLISTED PULMONARY SVC/PX: CPT

## 2020-02-28 PROCEDURE — 82962 GLUCOSE BLOOD TEST: CPT

## 2020-02-28 PROCEDURE — 93005 ELECTROCARDIOGRAM TRACING: CPT | Performed by: EMERGENCY MEDICINE

## 2020-02-28 PROCEDURE — 80053 COMPREHEN METABOLIC PANEL: CPT | Performed by: EMERGENCY MEDICINE

## 2020-02-28 PROCEDURE — 71045 X-RAY EXAM CHEST 1 VIEW: CPT

## 2020-02-28 PROCEDURE — 84484 ASSAY OF TROPONIN QUANT: CPT | Performed by: EMERGENCY MEDICINE

## 2020-02-28 PROCEDURE — 25010000002 HEPARIN (PORCINE) PER 1000 UNITS: Performed by: INTERNAL MEDICINE

## 2020-02-28 PROCEDURE — 0100U HC BIOFIRE FILMARRAY RESP PANEL 2: CPT | Performed by: INTERNAL MEDICINE

## 2020-02-28 PROCEDURE — 99223 1ST HOSP IP/OBS HIGH 75: CPT | Performed by: INTERNAL MEDICINE

## 2020-02-28 PROCEDURE — 85025 COMPLETE CBC W/AUTO DIFF WBC: CPT | Performed by: EMERGENCY MEDICINE

## 2020-02-28 PROCEDURE — 94640 AIRWAY INHALATION TREATMENT: CPT

## 2020-02-28 PROCEDURE — 93005 ELECTROCARDIOGRAM TRACING: CPT

## 2020-02-28 PROCEDURE — 83880 ASSAY OF NATRIURETIC PEPTIDE: CPT | Performed by: EMERGENCY MEDICINE

## 2020-02-28 PROCEDURE — 25010000002 FUROSEMIDE PER 20 MG: Performed by: EMERGENCY MEDICINE

## 2020-02-28 PROCEDURE — 99285 EMERGENCY DEPT VISIT HI MDM: CPT

## 2020-02-28 RX ORDER — DEXTROSE MONOHYDRATE 25 G/50ML
50 INJECTION, SOLUTION INTRAVENOUS ONCE
Status: COMPLETED | OUTPATIENT
Start: 2020-02-28 | End: 2020-02-28

## 2020-02-28 RX ORDER — ROSUVASTATIN CALCIUM 40 MG/1
40 TABLET, COATED ORAL NIGHTLY
COMMUNITY

## 2020-02-28 RX ORDER — BUMETANIDE 2 MG/1
2 TABLET ORAL DAILY
Status: ON HOLD | COMMUNITY
End: 2020-03-05 | Stop reason: SDUPTHER

## 2020-02-28 RX ORDER — ALLOPURINOL 300 MG/1
300 TABLET ORAL DAILY
COMMUNITY

## 2020-02-28 RX ORDER — ALBUTEROL SULFATE 2.5 MG/3ML
2.5 SOLUTION RESPIRATORY (INHALATION)
Status: DISCONTINUED | OUTPATIENT
Start: 2020-02-28 | End: 2020-03-02

## 2020-02-28 RX ORDER — SODIUM CHLORIDE 0.9 % (FLUSH) 0.9 %
10 SYRINGE (ML) INJECTION AS NEEDED
Status: DISCONTINUED | OUTPATIENT
Start: 2020-02-28 | End: 2020-03-05 | Stop reason: HOSPADM

## 2020-02-28 RX ORDER — ASPIRIN 81 MG/1
81 TABLET, CHEWABLE ORAL DAILY
Status: DISCONTINUED | OUTPATIENT
Start: 2020-02-29 | End: 2020-03-05 | Stop reason: HOSPADM

## 2020-02-28 RX ORDER — BUMETANIDE 2 MG/1
2 TABLET ORAL 2 TIMES DAILY
Status: DISCONTINUED | OUTPATIENT
Start: 2020-02-28 | End: 2020-02-29

## 2020-02-28 RX ORDER — LISINOPRIL 5 MG/1
5 TABLET ORAL DAILY
COMMUNITY
End: 2020-02-28

## 2020-02-28 RX ORDER — SODIUM CHLORIDE 0.9 % (FLUSH) 0.9 %
10 SYRINGE (ML) INJECTION EVERY 12 HOURS SCHEDULED
Status: DISCONTINUED | OUTPATIENT
Start: 2020-02-28 | End: 2020-03-05 | Stop reason: HOSPADM

## 2020-02-28 RX ORDER — HEPARIN SODIUM 5000 [USP'U]/ML
5000 INJECTION, SOLUTION INTRAVENOUS; SUBCUTANEOUS EVERY 8 HOURS SCHEDULED
Status: DISCONTINUED | OUTPATIENT
Start: 2020-02-28 | End: 2020-03-05 | Stop reason: HOSPADM

## 2020-02-28 RX ORDER — CARVEDILOL 12.5 MG/1
12.5 TABLET ORAL 2 TIMES DAILY WITH MEALS
COMMUNITY

## 2020-02-28 RX ORDER — ASPIRIN 81 MG/1
81 TABLET, CHEWABLE ORAL DAILY
COMMUNITY

## 2020-02-28 RX ORDER — FUROSEMIDE 10 MG/ML
40 INJECTION INTRAMUSCULAR; INTRAVENOUS ONCE
Status: COMPLETED | OUTPATIENT
Start: 2020-02-28 | End: 2020-02-28

## 2020-02-28 RX ORDER — PIOGLITAZONEHYDROCHLORIDE 30 MG/1
30 TABLET ORAL DAILY
COMMUNITY
End: 2020-03-09

## 2020-02-28 RX ORDER — FENOFIBRATE 200 MG/1
200 CAPSULE ORAL
COMMUNITY

## 2020-02-28 RX ORDER — GABAPENTIN 600 MG/1
600 TABLET ORAL 3 TIMES DAILY
COMMUNITY

## 2020-02-28 RX ORDER — GABAPENTIN 300 MG/1
300 CAPSULE ORAL NIGHTLY
Status: DISCONTINUED | OUTPATIENT
Start: 2020-02-28 | End: 2020-03-02

## 2020-02-28 RX ORDER — ROSUVASTATIN CALCIUM 20 MG/1
40 TABLET, COATED ORAL NIGHTLY
Status: DISCONTINUED | OUTPATIENT
Start: 2020-02-28 | End: 2020-03-05 | Stop reason: HOSPADM

## 2020-02-28 RX ORDER — ACETAMINOPHEN 325 MG/1
650 TABLET ORAL EVERY 4 HOURS PRN
Status: DISCONTINUED | OUTPATIENT
Start: 2020-02-28 | End: 2020-03-05 | Stop reason: HOSPADM

## 2020-02-28 RX ORDER — ALLOPURINOL 300 MG/1
300 TABLET ORAL DAILY
Status: DISCONTINUED | OUTPATIENT
Start: 2020-02-29 | End: 2020-03-05 | Stop reason: HOSPADM

## 2020-02-28 RX ORDER — CARVEDILOL 12.5 MG/1
12.5 TABLET ORAL 2 TIMES DAILY WITH MEALS
Status: DISCONTINUED | OUTPATIENT
Start: 2020-02-28 | End: 2020-03-05 | Stop reason: HOSPADM

## 2020-02-28 RX ADMIN — CARVEDILOL 12.5 MG: 12.5 TABLET, FILM COATED ORAL at 23:41

## 2020-02-28 RX ADMIN — GABAPENTIN 300 MG: 300 CAPSULE ORAL at 23:41

## 2020-02-28 RX ADMIN — ALBUTEROL SULFATE 2.5 MG: 2.5 SOLUTION RESPIRATORY (INHALATION) at 22:38

## 2020-02-28 RX ADMIN — FUROSEMIDE 40 MG: 10 INJECTION, SOLUTION INTRAMUSCULAR; INTRAVENOUS at 18:36

## 2020-02-28 RX ADMIN — ROSUVASTATIN CALCIUM 40 MG: 20 TABLET, COATED ORAL at 23:41

## 2020-02-28 RX ADMIN — DEXTROSE MONOHYDRATE 50 ML: 25 INJECTION, SOLUTION INTRAVENOUS at 21:13

## 2020-02-28 RX ADMIN — BUMETANIDE 2 MG: 2 TABLET ORAL at 23:41

## 2020-02-28 RX ADMIN — SODIUM CHLORIDE, PRESERVATIVE FREE 10 ML: 5 INJECTION INTRAVENOUS at 23:42

## 2020-02-28 RX ADMIN — HEPARIN SODIUM 5000 UNITS: 5000 INJECTION, SOLUTION INTRAVENOUS; SUBCUTANEOUS at 23:41

## 2020-02-29 ENCOUNTER — APPOINTMENT (OUTPATIENT)
Dept: CARDIOLOGY | Facility: HOSPITAL | Age: 60
End: 2020-02-29

## 2020-02-29 PROBLEM — I24.89 DEMAND ISCHEMIA OF MYOCARDIUM: Status: ACTIVE | Noted: 2020-02-29

## 2020-02-29 PROBLEM — I38 DIASTOLIC CHF DUE TO VALVULAR DISEASE: Status: ACTIVE | Noted: 2020-02-28

## 2020-02-29 PROBLEM — I50.30 DIASTOLIC CHF DUE TO VALVULAR DISEASE (HCC): Status: ACTIVE | Noted: 2020-02-28

## 2020-02-29 PROBLEM — R77.8 ELEVATED TROPONIN: Status: RESOLVED | Noted: 2020-02-28 | Resolved: 2020-02-29

## 2020-02-29 PROBLEM — R79.89 ELEVATED TROPONIN: Status: RESOLVED | Noted: 2020-02-28 | Resolved: 2020-02-29

## 2020-02-29 PROBLEM — I24.8 DEMAND ISCHEMIA OF MYOCARDIUM (HCC): Status: ACTIVE | Noted: 2020-02-29

## 2020-02-29 PROBLEM — I50.9 CHF EXACERBATION: Status: RESOLVED | Noted: 2020-02-28 | Resolved: 2020-02-29

## 2020-02-29 PROBLEM — I25.119 CORONARY ARTERY DISEASE INVOLVING NATIVE CORONARY ARTERY OF NATIVE HEART WITH ANGINA PECTORIS (HCC): Status: ACTIVE | Noted: 2020-02-29

## 2020-02-29 LAB
ANION GAP SERPL CALCULATED.3IONS-SCNC: 12 MMOL/L (ref 5–15)
B PARAPERT DNA SPEC QL NAA+PROBE: NOT DETECTED
B PERT DNA SPEC QL NAA+PROBE: NOT DETECTED
BH CV ECHO MEAS - AO MAX PG (FULL): 76.3 MMHG
BH CV ECHO MEAS - AO MAX PG: 83.2 MMHG
BH CV ECHO MEAS - AO MEAN PG (FULL): 47.5 MMHG
BH CV ECHO MEAS - AO MEAN PG: 51.5 MMHG
BH CV ECHO MEAS - AO ROOT AREA (BSA CORRECTED): 1.3
BH CV ECHO MEAS - AO ROOT AREA: 7.9 CM^2
BH CV ECHO MEAS - AO ROOT DIAM: 3.2 CM
BH CV ECHO MEAS - AO V2 MAX: 456.1 CM/SEC
BH CV ECHO MEAS - AO V2 MEAN: 346.8 CM/SEC
BH CV ECHO MEAS - AO V2 VTI: 105.7 CM
BH CV ECHO MEAS - AVA(I,A): 0.74 CM^2
BH CV ECHO MEAS - AVA(I,D): 0.74 CM^2
BH CV ECHO MEAS - AVA(V,A): 0.82 CM^2
BH CV ECHO MEAS - AVA(V,D): 0.82 CM^2
BH CV ECHO MEAS - BSA(HAYCOCK): 2.6 M^2
BH CV ECHO MEAS - BSA: 2.4 M^2
BH CV ECHO MEAS - BZI_BMI: 43.2 KILOGRAMS/M^2
BH CV ECHO MEAS - BZI_METRIC_HEIGHT: 172.7 CM
BH CV ECHO MEAS - BZI_METRIC_WEIGHT: 128.8 KG
BH CV ECHO MEAS - EDV(CUBED): 78.7 ML
BH CV ECHO MEAS - EDV(MOD-SP2): 45 ML
BH CV ECHO MEAS - EDV(MOD-SP4): 99 ML
BH CV ECHO MEAS - EDV(TEICH): 82.4 ML
BH CV ECHO MEAS - EF(CUBED): 50.9 %
BH CV ECHO MEAS - EF(MOD-BP): 51 %
BH CV ECHO MEAS - EF(MOD-SP2): 53.3 %
BH CV ECHO MEAS - EF(MOD-SP4): 47.5 %
BH CV ECHO MEAS - EF(TEICH): 43.2 %
BH CV ECHO MEAS - ESV(CUBED): 38.7 ML
BH CV ECHO MEAS - ESV(MOD-SP2): 21 ML
BH CV ECHO MEAS - ESV(MOD-SP4): 52 ML
BH CV ECHO MEAS - ESV(TEICH): 46.8 ML
BH CV ECHO MEAS - FS: 21.1 %
BH CV ECHO MEAS - IVS/LVPW: 1.2
BH CV ECHO MEAS - IVSD: 1.1 CM
BH CV ECHO MEAS - LA DIMENSION: 4.4 CM
BH CV ECHO MEAS - LA/AO: 1.4
BH CV ECHO MEAS - LAD MAJOR: 6.1 CM
BH CV ECHO MEAS - LAT PEAK E' VEL: 11 CM/SEC
BH CV ECHO MEAS - LATERAL E/E' RATIO: 13.3
BH CV ECHO MEAS - LV DIASTOLIC VOL/BSA (35-75): 41.7 ML/M^2
BH CV ECHO MEAS - LV MASS(C)D: 150.2 GRAMS
BH CV ECHO MEAS - LV MASS(C)DI: 63.3 GRAMS/M^2
BH CV ECHO MEAS - LV MAX PG: 6.9 MMHG
BH CV ECHO MEAS - LV MEAN PG: 4 MMHG
BH CV ECHO MEAS - LV SYSTOLIC VOL/BSA (12-30): 21.9 ML/M^2
BH CV ECHO MEAS - LV V1 MAX: 131.1 CM/SEC
BH CV ECHO MEAS - LV V1 MEAN: 93.6 CM/SEC
BH CV ECHO MEAS - LV V1 VTI: 27.7 CM
BH CV ECHO MEAS - LVIDD: 4.3 CM
BH CV ECHO MEAS - LVIDS: 3.4 CM
BH CV ECHO MEAS - LVLD AP2: 7.8 CM
BH CV ECHO MEAS - LVLD AP4: 8.2 CM
BH CV ECHO MEAS - LVLS AP2: 7.8 CM
BH CV ECHO MEAS - LVLS AP4: 7.6 CM
BH CV ECHO MEAS - LVOT AREA (M): 2.8 CM^2
BH CV ECHO MEAS - LVOT AREA: 2.8 CM^2
BH CV ECHO MEAS - LVOT DIAM: 1.9 CM
BH CV ECHO MEAS - LVPWD: 0.96 CM
BH CV ECHO MEAS - MED PEAK E' VEL: 7.1 CM/SEC
BH CV ECHO MEAS - MEDIAL E/E' RATIO: 20.4
BH CV ECHO MEAS - MV A MAX VEL: 134.9 CM/SEC
BH CV ECHO MEAS - MV DEC SLOPE: 790.6 CM/SEC^2
BH CV ECHO MEAS - MV DEC TIME: 0.23 SEC
BH CV ECHO MEAS - MV E MAX VEL: 148.1 CM/SEC
BH CV ECHO MEAS - MV E/A: 1.1
BH CV ECHO MEAS - MV MAX PG: 10.9 MMHG
BH CV ECHO MEAS - MV MEAN PG: 5.7 MMHG
BH CV ECHO MEAS - MV P1/2T MAX VEL: 157.5 CM/SEC
BH CV ECHO MEAS - MV P1/2T: 58.3 MSEC
BH CV ECHO MEAS - MV V2 MAX: 165.1 CM/SEC
BH CV ECHO MEAS - MV V2 MEAN: 114.8 CM/SEC
BH CV ECHO MEAS - MV V2 VTI: 41.8 CM
BH CV ECHO MEAS - MVA P1/2T LCG: 1.4 CM^2
BH CV ECHO MEAS - MVA(P1/2T): 3.8 CM^2
BH CV ECHO MEAS - MVA(VTI): 1.9 CM^2
BH CV ECHO MEAS - PA ACC SLOPE: 1335 CM/SEC^2
BH CV ECHO MEAS - PA ACC TIME: 0.09 SEC
BH CV ECHO MEAS - PA MAX PG: 6.3 MMHG
BH CV ECHO MEAS - PA PR(ACCEL): 39.4 MMHG
BH CV ECHO MEAS - PA V2 MAX: 125.6 CM/SEC
BH CV ECHO MEAS - SI(AO): 353.2 ML/M^2
BH CV ECHO MEAS - SI(CUBED): 16.9 ML/M^2
BH CV ECHO MEAS - SI(LVOT): 33.2 ML/M^2
BH CV ECHO MEAS - SI(MOD-SP2): 10.1 ML/M^2
BH CV ECHO MEAS - SI(MOD-SP4): 19.8 ML/M^2
BH CV ECHO MEAS - SI(TEICH): 15 ML/M^2
BH CV ECHO MEAS - SV(AO): 838 ML
BH CV ECHO MEAS - SV(CUBED): 40 ML
BH CV ECHO MEAS - SV(LVOT): 78.8 ML
BH CV ECHO MEAS - SV(MOD-SP2): 24 ML
BH CV ECHO MEAS - SV(MOD-SP4): 47 ML
BH CV ECHO MEAS - SV(TEICH): 35.6 ML
BH CV ECHO MEAS - TAPSE (>1.6): 2.4 CM2
BH CV ECHO MEASUREMENTS AVERAGE E/E' RATIO: 16.36
BH CV VAS BP RIGHT ARM: NORMAL MMHG
BH CV XLRA - RV BASE: 3.2 CM
BH CV XLRA - RV LENGTH: 8.8 CM
BH CV XLRA - RV MID: 3 CM
BH CV XLRA - TDI S': 14.3 CM/SEC
BUN BLD-MCNC: 36 MG/DL (ref 6–20)
BUN/CREAT SERPL: 17 (ref 7–25)
C PNEUM DNA NPH QL NAA+NON-PROBE: NOT DETECTED
CALCIUM SPEC-SCNC: 9.4 MG/DL (ref 8.6–10.5)
CHLORIDE SERPL-SCNC: 96 MMOL/L (ref 98–107)
CO2 SERPL-SCNC: 33 MMOL/L (ref 22–29)
CREAT BLD-MCNC: 2.12 MG/DL (ref 0.76–1.27)
DEPRECATED RDW RBC AUTO: 52.4 FL (ref 37–54)
ERYTHROCYTE [DISTWIDTH] IN BLOOD BY AUTOMATED COUNT: 15.9 % (ref 12.3–15.4)
FLUAV H1 2009 PAND RNA NPH QL NAA+PROBE: NOT DETECTED
FLUAV H1 HA GENE NPH QL NAA+PROBE: NOT DETECTED
FLUAV H3 RNA NPH QL NAA+PROBE: NOT DETECTED
FLUAV SUBTYP SPEC NAA+PROBE: NOT DETECTED
FLUBV RNA ISLT QL NAA+PROBE: NOT DETECTED
GFR SERPL CREATININE-BSD FRML MDRD: 32 ML/MIN/1.73
GLUCOSE BLD-MCNC: 129 MG/DL (ref 65–99)
GLUCOSE BLDC GLUCOMTR-MCNC: 134 MG/DL (ref 70–130)
GLUCOSE BLDC GLUCOMTR-MCNC: 169 MG/DL (ref 70–130)
GLUCOSE BLDC GLUCOMTR-MCNC: 185 MG/DL (ref 70–130)
HADV DNA SPEC NAA+PROBE: NOT DETECTED
HBA1C MFR BLD: 6.3 % (ref 4.8–5.6)
HCOV 229E RNA SPEC QL NAA+PROBE: NOT DETECTED
HCOV HKU1 RNA SPEC QL NAA+PROBE: NOT DETECTED
HCOV NL63 RNA SPEC QL NAA+PROBE: NOT DETECTED
HCOV OC43 RNA SPEC QL NAA+PROBE: NOT DETECTED
HCT VFR BLD AUTO: 39.9 % (ref 37.5–51)
HGB BLD-MCNC: 11.7 G/DL (ref 13–17.7)
HMPV RNA NPH QL NAA+NON-PROBE: NOT DETECTED
HPIV1 RNA SPEC QL NAA+PROBE: NOT DETECTED
HPIV2 RNA SPEC QL NAA+PROBE: NOT DETECTED
HPIV3 RNA NPH QL NAA+PROBE: NOT DETECTED
HPIV4 P GENE NPH QL NAA+PROBE: NOT DETECTED
LEFT ATRIUM VOLUME INDEX: 34.1 ML/M^2
LEFT ATRIUM VOLUME: 81 ML
LV EF 2D ECHO EST: 65 %
M PNEUMO IGG SER IA-ACNC: NOT DETECTED
MAXIMAL PREDICTED HEART RATE: 162 BPM
MCH RBC QN AUTO: 26.7 PG (ref 26.6–33)
MCHC RBC AUTO-ENTMCNC: 29.3 G/DL (ref 31.5–35.7)
MCV RBC AUTO: 90.9 FL (ref 79–97)
NT-PROBNP SERPL-MCNC: 1587 PG/ML (ref 5–900)
PLATELET # BLD AUTO: 247 10*3/MM3 (ref 140–450)
PMV BLD AUTO: 10.8 FL (ref 6–12)
POTASSIUM BLD-SCNC: 4.7 MMOL/L (ref 3.5–5.2)
RBC # BLD AUTO: 4.39 10*6/MM3 (ref 4.14–5.8)
RHINOVIRUS RNA SPEC NAA+PROBE: NOT DETECTED
RSV RNA NPH QL NAA+NON-PROBE: NOT DETECTED
SODIUM BLD-SCNC: 141 MMOL/L (ref 136–145)
STRESS TARGET HR: 138 BPM
TSH SERPL DL<=0.05 MIU/L-ACNC: 1.16 UIU/ML (ref 0.27–4.2)
WBC NRBC COR # BLD: 10.14 10*3/MM3 (ref 3.4–10.8)

## 2020-02-29 PROCEDURE — 99222 1ST HOSP IP/OBS MODERATE 55: CPT | Performed by: INTERNAL MEDICINE

## 2020-02-29 PROCEDURE — 82962 GLUCOSE BLOOD TEST: CPT

## 2020-02-29 PROCEDURE — 80048 BASIC METABOLIC PNL TOTAL CA: CPT | Performed by: INTERNAL MEDICINE

## 2020-02-29 PROCEDURE — 83880 ASSAY OF NATRIURETIC PEPTIDE: CPT | Performed by: INTERNAL MEDICINE

## 2020-02-29 PROCEDURE — 85027 COMPLETE CBC AUTOMATED: CPT | Performed by: INTERNAL MEDICINE

## 2020-02-29 PROCEDURE — 25010000002 SULFUR HEXAFLUORIDE MICROSPH 60.7-25 MG RECONSTITUTED SUSPENSION: Performed by: INTERNAL MEDICINE

## 2020-02-29 PROCEDURE — 94799 UNLISTED PULMONARY SVC/PX: CPT

## 2020-02-29 PROCEDURE — 25010000002 HEPARIN (PORCINE) PER 1000 UNITS: Performed by: INTERNAL MEDICINE

## 2020-02-29 PROCEDURE — 83036 HEMOGLOBIN GLYCOSYLATED A1C: CPT | Performed by: INTERNAL MEDICINE

## 2020-02-29 PROCEDURE — 93306 TTE W/DOPPLER COMPLETE: CPT

## 2020-02-29 PROCEDURE — 93306 TTE W/DOPPLER COMPLETE: CPT | Performed by: INTERNAL MEDICINE

## 2020-02-29 PROCEDURE — 84443 ASSAY THYROID STIM HORMONE: CPT | Performed by: INTERNAL MEDICINE

## 2020-02-29 PROCEDURE — 99233 SBSQ HOSP IP/OBS HIGH 50: CPT | Performed by: INTERNAL MEDICINE

## 2020-02-29 RX ORDER — BUMETANIDE 0.25 MG/ML
2 INJECTION INTRAMUSCULAR; INTRAVENOUS EVERY 12 HOURS
Status: DISCONTINUED | OUTPATIENT
Start: 2020-03-01 | End: 2020-03-05

## 2020-02-29 RX ADMIN — SULFUR HEXAFLUORIDE 2 ML: KIT at 16:00

## 2020-02-29 RX ADMIN — ROSUVASTATIN CALCIUM 40 MG: 20 TABLET, COATED ORAL at 22:43

## 2020-02-29 RX ADMIN — BUMETANIDE 2 MG: 2 TABLET ORAL at 09:12

## 2020-02-29 RX ADMIN — ALBUTEROL SULFATE 2.5 MG: 2.5 SOLUTION RESPIRATORY (INHALATION) at 15:00

## 2020-02-29 RX ADMIN — ALLOPURINOL 300 MG: 300 TABLET ORAL at 09:12

## 2020-02-29 RX ADMIN — ALBUTEROL SULFATE 2.5 MG: 2.5 SOLUTION RESPIRATORY (INHALATION) at 20:36

## 2020-02-29 RX ADMIN — SODIUM CHLORIDE, PRESERVATIVE FREE 10 ML: 5 INJECTION INTRAVENOUS at 09:12

## 2020-02-29 RX ADMIN — CARVEDILOL 12.5 MG: 12.5 TABLET, FILM COATED ORAL at 17:20

## 2020-02-29 RX ADMIN — HEPARIN SODIUM 5000 UNITS: 5000 INJECTION, SOLUTION INTRAVENOUS; SUBCUTANEOUS at 13:52

## 2020-02-29 RX ADMIN — ASPIRIN 81 MG 81 MG: 81 TABLET ORAL at 09:12

## 2020-02-29 RX ADMIN — HEPARIN SODIUM 5000 UNITS: 5000 INJECTION, SOLUTION INTRAVENOUS; SUBCUTANEOUS at 06:48

## 2020-02-29 RX ADMIN — GABAPENTIN 300 MG: 300 CAPSULE ORAL at 22:03

## 2020-02-29 RX ADMIN — HEPARIN SODIUM 5000 UNITS: 5000 INJECTION, SOLUTION INTRAVENOUS; SUBCUTANEOUS at 22:44

## 2020-02-29 RX ADMIN — ALBUTEROL SULFATE 2.5 MG: 2.5 SOLUTION RESPIRATORY (INHALATION) at 07:57

## 2020-02-29 RX ADMIN — SODIUM CHLORIDE, PRESERVATIVE FREE 10 ML: 5 INJECTION INTRAVENOUS at 22:04

## 2020-02-29 RX ADMIN — CARVEDILOL 12.5 MG: 12.5 TABLET, FILM COATED ORAL at 09:12

## 2020-03-01 LAB
ANION GAP SERPL CALCULATED.3IONS-SCNC: 11 MMOL/L (ref 5–15)
BUN BLD-MCNC: 40 MG/DL (ref 6–20)
BUN/CREAT SERPL: 19.7 (ref 7–25)
CALCIUM SPEC-SCNC: 9.1 MG/DL (ref 8.6–10.5)
CHLORIDE SERPL-SCNC: 96 MMOL/L (ref 98–107)
CO2 SERPL-SCNC: 33 MMOL/L (ref 22–29)
CREAT BLD-MCNC: 2.03 MG/DL (ref 0.76–1.27)
DEPRECATED RDW RBC AUTO: 51.8 FL (ref 37–54)
ERYTHROCYTE [DISTWIDTH] IN BLOOD BY AUTOMATED COUNT: 15.9 % (ref 12.3–15.4)
GFR SERPL CREATININE-BSD FRML MDRD: 34 ML/MIN/1.73
GLUCOSE BLD-MCNC: 130 MG/DL (ref 65–99)
GLUCOSE BLDC GLUCOMTR-MCNC: 128 MG/DL (ref 70–130)
GLUCOSE BLDC GLUCOMTR-MCNC: 144 MG/DL (ref 70–130)
GLUCOSE BLDC GLUCOMTR-MCNC: 152 MG/DL (ref 70–130)
GLUCOSE BLDC GLUCOMTR-MCNC: 182 MG/DL (ref 70–130)
HCT VFR BLD AUTO: 37.1 % (ref 37.5–51)
HGB BLD-MCNC: 11.2 G/DL (ref 13–17.7)
MCH RBC QN AUTO: 27.3 PG (ref 26.6–33)
MCHC RBC AUTO-ENTMCNC: 30.2 G/DL (ref 31.5–35.7)
MCV RBC AUTO: 90.5 FL (ref 79–97)
PLATELET # BLD AUTO: 249 10*3/MM3 (ref 140–450)
PMV BLD AUTO: 10.9 FL (ref 6–12)
POTASSIUM BLD-SCNC: 4.4 MMOL/L (ref 3.5–5.2)
RBC # BLD AUTO: 4.1 10*6/MM3 (ref 4.14–5.8)
SODIUM BLD-SCNC: 140 MMOL/L (ref 136–145)
WBC NRBC COR # BLD: 9.98 10*3/MM3 (ref 3.4–10.8)

## 2020-03-01 PROCEDURE — 94799 UNLISTED PULMONARY SVC/PX: CPT

## 2020-03-01 PROCEDURE — 85027 COMPLETE CBC AUTOMATED: CPT | Performed by: INTERNAL MEDICINE

## 2020-03-01 PROCEDURE — 82962 GLUCOSE BLOOD TEST: CPT

## 2020-03-01 PROCEDURE — 97166 OT EVAL MOD COMPLEX 45 MIN: CPT

## 2020-03-01 PROCEDURE — 97535 SELF CARE MNGMENT TRAINING: CPT

## 2020-03-01 PROCEDURE — 99233 SBSQ HOSP IP/OBS HIGH 50: CPT | Performed by: INTERNAL MEDICINE

## 2020-03-01 PROCEDURE — 99223 1ST HOSP IP/OBS HIGH 75: CPT | Performed by: THORACIC SURGERY (CARDIOTHORACIC VASCULAR SURGERY)

## 2020-03-01 PROCEDURE — 80048 BASIC METABOLIC PNL TOTAL CA: CPT | Performed by: INTERNAL MEDICINE

## 2020-03-01 PROCEDURE — 25010000002 HEPARIN (PORCINE) PER 1000 UNITS: Performed by: INTERNAL MEDICINE

## 2020-03-01 PROCEDURE — 97162 PT EVAL MOD COMPLEX 30 MIN: CPT

## 2020-03-01 PROCEDURE — 99232 SBSQ HOSP IP/OBS MODERATE 35: CPT | Performed by: INTERNAL MEDICINE

## 2020-03-01 RX ADMIN — HEPARIN SODIUM 5000 UNITS: 5000 INJECTION, SOLUTION INTRAVENOUS; SUBCUTANEOUS at 06:07

## 2020-03-01 RX ADMIN — BUMETANIDE 2 MG: 0.25 INJECTION INTRAMUSCULAR; INTRAVENOUS at 01:01

## 2020-03-01 RX ADMIN — GABAPENTIN 300 MG: 300 CAPSULE ORAL at 22:13

## 2020-03-01 RX ADMIN — CARVEDILOL 12.5 MG: 12.5 TABLET, FILM COATED ORAL at 08:29

## 2020-03-01 RX ADMIN — ROSUVASTATIN CALCIUM 40 MG: 20 TABLET, COATED ORAL at 22:13

## 2020-03-01 RX ADMIN — SODIUM CHLORIDE, PRESERVATIVE FREE 10 ML: 5 INJECTION INTRAVENOUS at 22:15

## 2020-03-01 RX ADMIN — ALBUTEROL SULFATE 2.5 MG: 2.5 SOLUTION RESPIRATORY (INHALATION) at 20:05

## 2020-03-01 RX ADMIN — ALBUTEROL SULFATE 2.5 MG: 2.5 SOLUTION RESPIRATORY (INHALATION) at 07:20

## 2020-03-01 RX ADMIN — CARVEDILOL 12.5 MG: 12.5 TABLET, FILM COATED ORAL at 17:54

## 2020-03-01 RX ADMIN — ALLOPURINOL 300 MG: 300 TABLET ORAL at 08:29

## 2020-03-01 RX ADMIN — HEPARIN SODIUM 5000 UNITS: 5000 INJECTION, SOLUTION INTRAVENOUS; SUBCUTANEOUS at 13:37

## 2020-03-01 RX ADMIN — HEPARIN SODIUM 5000 UNITS: 5000 INJECTION, SOLUTION INTRAVENOUS; SUBCUTANEOUS at 22:13

## 2020-03-01 RX ADMIN — SODIUM CHLORIDE, PRESERVATIVE FREE 10 ML: 5 INJECTION INTRAVENOUS at 08:29

## 2020-03-01 RX ADMIN — BUMETANIDE 2 MG: 0.25 INJECTION INTRAMUSCULAR; INTRAVENOUS at 11:19

## 2020-03-01 RX ADMIN — ALBUTEROL SULFATE 2.5 MG: 2.5 SOLUTION RESPIRATORY (INHALATION) at 15:54

## 2020-03-01 RX ADMIN — SODIUM CHLORIDE, PRESERVATIVE FREE 10 ML: 5 INJECTION INTRAVENOUS at 11:20

## 2020-03-01 RX ADMIN — ASPIRIN 81 MG 81 MG: 81 TABLET ORAL at 08:29

## 2020-03-01 NOTE — CONSULTS
Consult Note  Juan Alberto Tejeda  [id number]  1960    Referring Provider:  Reason for Consultation: Aortic stenosis    Patient Care Team:  Megan Martell APRN as PCP - General (Family Medicine)    Chief complaint: Shortness of breath, fatigue      History of present illness: Patient is a 59-year-old white male who is been having shortness of breath and fatigue for several months.  He was admitted to [facility] apparently in December and had a heart cath which apparently showed nonobstructive heart disease.  He is developed more symptoms of congestive heart failure was admitted here.  He wants his care transferred here.  He had an echocardiogram which have obtained and reviewed which reveals aortic valve area 0.746 m² with a V-max greater than 550 cm/s and a mean gradient greater than 50 mmHg across his aortic valve.  I been asked see the patient guards aortic valve replacement.    Review of Systems    The following systems were reviewed and negative;  constitution, eyes, ENT, gastrointestinal, genitourinary, integument, breast, hematologic / lymphatic, musculoskeletal, neurological, behavioral/psych and allergies / immunologic    History  Past Medical History:   Diagnosis Date   • Chronic kidney disease    • Diabetes mellitus (CMS/HCC)    • Hyperlipidemia    • Hypertension    • Peripheral neuropathy    , Past Surgical History:   Procedure Laterality Date   • CHOLECYSTECTOMY     • KIDNEY STONE SURGERY     , History reviewed. No pertinent family history., Social History     Tobacco Use   • Smoking status: Never Smoker   • Smokeless tobacco: Never Used   Substance Use Topics   • Alcohol use: Never     Frequency: Never   • Drug use: Never   , Medications Prior to Admission   Medication Sig Dispense Refill Last Dose   • allopurinol (ZYLOPRIM) 300 MG tablet Take 300 mg by mouth Daily.   2/28/2020 at 0900   • aspirin 81 MG chewable tablet Chew 81 mg Daily.   2/28/2020 at 0900   • bumetanide (BUMEX) 2 MG tablet  "Take 2 mg by mouth Daily.   2/28/2020 at 0900   • carvedilol (COREG) 12.5 MG tablet Take 12.5 mg by mouth 2 (Two) Times a Day With Meals.   2/28/2020 at 0900   • fenofibrate micronized (LOFIBRA) 200 MG capsule Take 200 mg by mouth Every Morning Before Breakfast.   2/28/2020 at 0900   • gabapentin (NEURONTIN) 600 MG tablet Take 600 mg by mouth 3 (Three) Times a Day.   2/28/2020 at 0900   • insulin aspart (novoLOG FLEXPEN) 100 UNIT/ML solution pen-injector sc pen Inject  under the skin into the appropriate area as directed 3 (Three) Times a Day With Meals. Sliding scale   2/28/2020 at 0900   • insulin detemir (LEVEMIR) 100 UNIT/ML injection Inject 62 Units under the skin into the appropriate area as directed 2 (Two) Times a Day.   2/28/2020 at 0900   • pioglitazone (ACTOS) 30 MG tablet Take 30 mg by mouth Daily.   2/28/2020 at 0900   • rosuvastatin (CRESTOR) 40 MG tablet Take 40 mg by mouth Every Night.   2/28/2020 at 0900   • SITagliptin (JANUVIA) 100 MG tablet Take 100 mg by mouth Daily.   2/28/2020 at 0900      Scheduled Meds:    albuterol 2.5 mg Nebulization TID - RT   allopurinol 300 mg Oral Daily   aspirin 81 mg Oral Daily   bumetanide 2 mg Intravenous Q12H   carvedilol 12.5 mg Oral BID With Meals   gabapentin 300 mg Oral Nightly   heparin (porcine) 5,000 Units Subcutaneous Q8H   rosuvastatin 40 mg Oral Nightly   sodium chloride 10 mL Intravenous Q12H      Continuous Infusions:      PRN Meds:  •  acetaminophen  •  sodium chloride  •  sodium chloride and Allergies:  Patient has no known allergies.    Objective     Vital Sign Min/Max for last 24 hours  Temp  Min: 98.1 °F (36.7 °C)  Max: 98.5 °F (36.9 °C)   BP  Min: 112/46  Max: 138/71   Pulse  Min: 72  Max: 81   Resp  Min: 18  Max: 20   SpO2  Min: 92 %  Max: 97 %   No data recorded   Weight  Min: 123 kg (271 lb 4.8 oz)  Max: 129 kg (284 lb)     Flowsheet Rows      First Filed Value   Admission Height  172.7 cm (68\") Documented at 02/28/2020 1614   Admission Weight "  118 kg (260 lb) Documented at 02/28/2020 1614          Physical Exam:     General Appearance:    Alert, cooperative, in no acute distress   Head:    Normocephalic, without obvious abnormality, atraumatic   Eyes:            Lids and lashes normal, conjunctivae and sclerae normal, no   icterus, no pallor, corneas clear, PERRLA   Ears:    Ears appear intact with no abnormalities noted   Throat:   No oral lesions, no thrush, oral mucosa moist   Neck:   No adenopathy, supple, trachea midline, no thyromegaly, no   carotid bruit, no JVD   Back:     No kyphosis present, no scoliosis present, no skin lesions,      erythema or scars, no tenderness to percussion or                   palpation,   range of motion normal   Lungs:    Crease in the bases, few scattered rales    Heart:   3/6 systolic ejection murmur   Chest Wall:    No abnormalities observed   Abdomen:     Normal bowel sounds, no masses, no organomegaly, soft        non-tender, non-distended, no guarding, no rebound                tenderness   Rectal:     Deferred   Extremities:  2+ edema   Pulses:   Pulses palpable and equal bilaterally   Skin:   No bleeding, bruising or rash   Lymph nodes:   No palpable adenopathy   Neurologic:   Cranial nerves 2 - 12 grossly intact, sensation intact, DTR       present and equal bilaterally             Assessment/Plan       Diastolic CHF due to valvular disease (CMS/HCC)    CKD (chronic kidney disease) stage 3, GFR 30-59 ml/min (CMS/HCC)    Hyperlipidemia LDL goal <70    Essential hypertension    Type 2 diabetes mellitus with hypoglycemia (CMS/HCC)    Normocytic anemia    Aortic stenosis    Coronary artery disease involving native coronary artery of native heart with angina pectoris (CMS/HCC)    Demand ischemia of myocardium (CMS/HCC)      The patient is a 59-year-old white male who presents at this time with severe aortic stenosis and symptoms consistent congestive heart failure.  I have obtained and reviewed his echocardiogram  and.  I concur with the interpretation of 0.74 cm² for aortic valve area with a mean gradient greater than 50 mmHg and a V-max greater than 550 cm/s.  We will obtain his cardiac catheterization films from  which apparently showed no critical coronary artery disease.  I discussed with him the surgical options of traditional aortic valve replacement with an incision versus TAVR.  He is aware of the pros and cons of each of these procedures and desires a TAVR.  We will begin the work-up of this.  He will need a to review his cardiac catheterization films.  We will get a CT of his abdomen and chest as well.  He does have chronic renal insufficiency.  Like thank you for this consultation.    I discussed the patients findings and my recommendations with patient and family      Please note that portions of this note were completed with a voice recognition program. Efforts were made to edit the dictations, but words may be mistranscribed    Irvin Jeffers MD  03/01/20  7:58 AM

## 2020-03-01 NOTE — PROGRESS NOTES
[facility] at [facility]  Cardiology Progress Note      Chief Complaint/Reason for service:    · Heart failure due to aortic stenosis         · No issues overnight  · Dr. Jeffers is seen the patient this morning.  Patient prefers TAVR.  · Records from [facility] requested but but are still not here    Past medical, surgical, social and family history reviewed in the patient's electronic medical record.           Vital Sign Min/Max for last 24 hours  Temp  Min: 98.1 °F (36.7 °C)  Max: 98.5 °F (36.9 °C)   BP  Min: 112/46  Max: 138/71   Pulse  Min: 72  Max: 81   Resp  Min: 18  Max: 20   SpO2  Min: 92 %  Max: 97 %   Flow (L/min)  Min: 2  Max: 2      Intake/Output Summary (Last 24 hours) at 3/1/2020 1035  Last data filed at 3/1/2020 0900  Gross per 24 hour   Intake 370 ml   Output 2650 ml   Net -2280 ml           Physical Exam   Constitutional: He is oriented to person, place, and time. He appears well-developed and well-nourished.   HENT:   Head: Normocephalic and atraumatic.   Cardiovascular: Normal rate.   Murmur heard.   Systolic murmur is present with a grade of 3/6.  Pulmonary/Chest: Effort normal.   Neurological: He is alert and oriented to person, place, and time.       Tele: Sinus    Results Review (reviewed the patient's recent labs in the electronic medical record):         Results from last 7 days   Lab Units 03/01/20  0536 02/29/20  0308 02/28/20  1627   SODIUM mmol/L 140 141 141   POTASSIUM mmol/L 4.4 4.7 4.6   CHLORIDE mmol/L 96* 96* 99   BUN mg/dL 40* 36* 35*   CREATININE mg/dL 2.03* 2.12* 2.14*     Results from last 7 days   Lab Units 02/28/20  1810 02/28/20  1627   TROPONIN T ng/mL 0.045* 0.054*     Results from last 7 days   Lab Units 03/01/20  0536 02/29/20  0308 02/28/20  1628   WBC 10*3/mm3 9.98 10.14 9.47   HEMOGLOBIN g/dL 11.2* 11.7* 10.9*   HEMATOCRIT % 37.1* 39.9 36.8*   PLATELETS 10*3/mm3 249 247 251       Lab Results   Component Value Date    HGBA1C 6.30 (H) 02/29/2020       Lab  Results   Component Value Date    TRIG 178 12/10/2018    HDL 32 (L) 12/10/2018     (H) 12/10/2018    AST 28 02/28/2020    ALT 16 02/28/2020                    Active Hospital Problems    Diagnosis POA   • **Diastolic CHF due to valvular disease (CMS/[facility]) [I38, I50.30] Yes     Priority: High     · Echo (2/29/2020): LVEF 65%.  Aortic valve is bicuspid and severely calcified.  Severe aortic stenosis (mean gradient 51 mmHg). Mild MR and MS     • Aortic stenosis [I35.0] Yes     Priority: High     · Echo (2/29/2020): LVEF 65%.  Aortic valve is bicuspid and severely calcified.  Severe aortic stenosis (mean gradient 51 mmHg). Mild MR and MS     • Coronary artery disease involving native coronary artery of native heart with angina pectoris (CMS/[facility]) [I25.119] Yes     Priority: Medium     · Cardiac catheterization at [facility] (12/2019): Moderate nonobstructive CAD.     • Demand ischemia of myocardium (CMS/[facility]) [I24.8] Yes     Priority: Medium     · Mildly elevated troponins in setting of acute heart failure from aortic stenosis 2/28/2020     • CKD (chronic kidney disease) stage 3, GFR 30-59 ml/min (CMS/[facility]) [N18.3] Yes   • Hyperlipidemia LDL goal <70 [E78.5] Yes   • Essential hypertension [I10] Yes   • Type 2 diabetes mellitus with hypoglycemia (CMS/[facility]) [E11.649] Yes   • Normocytic anemia [D64.9] Yes              · Susan Miranda to see in a.m. to again TAVR workup process  · Continue IV diuresis  · BMP in a.m.  · Obtain records, including catheterization films, from [facility]    Mark Canales IV, MD  3/1/2020

## 2020-03-01 NOTE — PROGRESS NOTES
[facility] Medicine Services  PROGRESS NOTE    Patient Name: Juan Alberto Tejeda  : 1960  MRN: [id number]    Date of Admission: 2020  Primary Care Physician: Megan Martell APRN    Subjective   Subjective     CC:f/u dyspnea    HPI: No acute events overnight, patient that he was restless, continues to have significant shortness of breath does endorse also a cough, denies any fevers or chills.      Review of Systems  Gen- No fevers, chills  CV- No chest pain, palpitations  Resp- No cough, dyspnea  GI- No N/V/D, abd pain    All systems reviewed and are negative except as mentioned in HPI above  Objective   Objective     Vital Signs:   Temp:  [98.1 °F (36.7 °C)-98.5 °F (36.9 °C)] 98.1 °F (36.7 °C)  Heart Rate:  [72-81] 72  Resp:  [18-20] 18  BP: (112-138)/(46-71) 112/46        Physical Exam:  Constitutional: Ill nontoxic-appearing male, in no acute distress, awake, alert  HENT: NCAT, mucous membranes moist  Respiratory: Moderately labored respirations, diffuse bilateral coarse breath sounds, mild expiratory wheezes on 2 L NC    Cardiovascular: RRR, 3/6 systolic murmur, no rubs, or gallops, palpable pedal pulses bilaterally  Gastrointestinal: Obese, positive bowel sounds, soft, nontender, nondistended  Musculoskeletal: bilateral ankle edema, 3+  Psychiatric: Appropriate affect, cooperative  Neurologic: Oriented x 3, no focal deficits  Skin: No rashes    Results Reviewed:  Results from last 7 days   Lab Units 20  03020  1628   WBC 10*3/mm3 9.98 10.14 9.47   HEMOGLOBIN g/dL 11.2* 11.7* 10.9*   HEMATOCRIT % 37.1* 39.9 36.8*   PLATELETS 10*3/mm3 249 247 251     Results from last 7 days   Lab Units 20  0536 20  0308 20  1810 20  1627   SODIUM mmol/L 140 141  --  141   POTASSIUM mmol/L 4.4 4.7  --  4.6   CHLORIDE mmol/L 96* 96*  --  99   CO2 mmol/L 33.0* 33.0*  --  33.0*   BUN mg/dL 40* 36*  --  35*   CREATININE mg/dL 2.03* 2.12*  --   2.14*   GLUCOSE mg/dL 130* 129*  --  59*   CALCIUM mg/dL 9.1 9.4  --  9.4   ALT (SGPT) U/L  --   --   --  16   AST (SGOT) U/L  --   --   --  28   TROPONIN T ng/mL  --   --  0.045* 0.054*   PROBNP pg/mL  --  1,587.0*  --  1,728.0*     Estimated Creatinine Clearance: 50.6 mL/min (A) (by C-G formula based on SCr of 2.03 mg/dL (H)).    Microbiology Results Abnormal     Procedure Component Value - Date/Time    Respiratory Panel, PCR - Swab, Nasopharynx [[id number]]  (Normal) Collected:  02/28/20 6974    Lab Status:  Final result Specimen:  Swab from Nasopharynx Updated:  02/29/20 0717     ADENOVIRUS, PCR Not Detected     Coronavirus 229E Not Detected     Coronavirus HKU1 Not Detected     Coronavirus NL63 Not Detected     Coronavirus OC43 Not Detected     Human Metapneumovirus Not Detected     Human Rhinovirus/Enterovirus Not Detected     Influenza B PCR Not Detected     Parainfluenza Virus 1 Not Detected     Parainfluenza Virus 2 Not Detected     Parainfluenza Virus 3 Not Detected     Parainfluenza Virus 4 Not Detected     Bordetella pertussis pcr Not Detected     Influenza A H1 2009 PCR Not Detected     Chlamydophila pneumoniae PCR Not Detected     Mycoplasma pneumo by PCR Not Detected     Influenza A PCR Not Detected     Influenza A H3 Not Detected     Influenza A H1 Not Detected     RSV, PCR Not Detected     Bordetella parapertussis PCR Not Detected          Imaging Results (Last 24 Hours)     ** No results found for the last 24 hours. **          Results for orders placed during the hospital encounter of 02/28/20   Adult Transthoracic Echo Complete W/ Cont if Necessary Per Protocol    Narrative · Left ventricular systolic function is normal. Estimated EF = 65%.  · The aortic valve is likely bicuspid and severely calcified. There is   severe aortic stenosis (mean gradient 51 mmHg).  · Mild mitral valve stenosis is present  · Mild mitral valve regurgitation is present          I have reviewed the medications:  Scheduled  Meds:    albuterol 2.5 mg Nebulization TID - RT   allopurinol 300 mg Oral Daily   aspirin 81 mg Oral Daily   bumetanide 2 mg Intravenous Q12H   carvedilol 12.5 mg Oral BID With Meals   gabapentin 300 mg Oral Nightly   heparin (porcine) 5,000 Units Subcutaneous Q8H   rosuvastatin 40 mg Oral Nightly   sodium chloride 10 mL Intravenous Q12H     Continuous Infusions:   PRN Meds:.•  acetaminophen  •  sodium chloride  •  sodium chloride    Assessment/Plan   Assessment & Plan     Active Hospital Problems    Diagnosis  POA   • **Diastolic CHF due to valvular disease (CMS/HCC) [I38, I50.30]  Yes   • Coronary artery disease involving native coronary artery of native heart with angina pectoris (CMS/[facility]) [I25.119]  Yes   • Demand ischemia of myocardium (CMS/[facility]) [I24.8]  Yes   • CKD (chronic kidney disease) stage 3, GFR 30-59 ml/min (CMS/[facility]) [N18.3]  Yes   • Hyperlipidemia LDL goal <70 [E78.5]  Yes   • Essential hypertension [I10]  Yes   • Type 2 diabetes mellitus with hypoglycemia (CMS/[facility]) [E11.649]  Yes   • Normocytic anemia [D64.9]  Yes   • Aortic stenosis [I35.0]  Yes      Resolved Hospital Problems    Diagnosis Date Resolved POA   • CHF exacerbation (CMS/HCC) [I50.9] 02/29/2020 Yes   • Elevated troponin [R79.89] 02/29/2020 Yes        Brief Hospital Course to date:  Juan Alberto Tejeda is a 59 y.o. male with past medical history of chronic systolic heart failure, type 2 diabetes, hypertension, hyperlipidemia, CKD stage III, aortic stenosis, he is s/p recent [facility] with noncritical CAD.  Patient presented with acute shortness of air, lower extremity edema, found to be in CHF exacerbation.  Patient also initially was hypoglycemic, this has since resolved s/p D50, he is currently getting diuresed and  CT surgery is currently evaluating for possible TAVR.     Plan  Acute dyspnea, ongoing  -Etiology likely multifactorial secondary to CHF exacerbation vs acute bronchitis vs severe AS  -Chest x-ray concerning for pulmonary edema,  respiratory panel is negative  -Cardiology following, echo with EF of 65%, PO Bumex has been changed to IV, he is diuresing well, -3.2 L thus far, continue strict I/o  -continue Coreg, duo nebs     Severe aortic stenosis  -Echo done here shows bicuspid and severely calcified aortic valve, severe aortic stenosis with aortic valve area of 0.74 cm², mean gradient of 51mmg.  -Follow-up cardiac cath films from [facility]  -CTS consulted for TAVR, preop w/u ongoing, follow-up CT abd/pelv and CT chest    Well-controlled type 2 diabetes with A1c 6.3% with significant hypoglycemia, resolved  -FSBG continue to be optimal, continue only SSI     Elevated troponin-suspect supply demand mismatch sec to dyspnea, he is s/p recent [facility] with noncritical CAD     CKD stage III-renal function seems to be at baseline, continue to monitor, avoid nephrotoxins.     Hypertension-BP currently controlled,     Obesity with BMI 43, complicates all aspects of care.     Follow-up medical records from [facility], these have been requested     DVT Prophylaxis: [facility]     Disposition: TBD    CODE STATUS:   Code Status and Medical Interventions:   Ordered at: 02/28/20 5010     Level Of Support Discussed With:    Patient     Code Status:    CPR     Medical Interventions (Level of Support Prior to Arrest):    Full       Electronically signed by Miguel Samuel MD, 03/01/20, 8:15 AM.

## 2020-03-01 NOTE — PLAN OF CARE
Problem: Patient Care Overview  Goal: Plan of Care Review  Outcome: Ongoing (interventions implemented as appropriate)  Flowsheets (Taken 3/1/2020 0301)  Progress: improving  Plan of Care Reviewed With: patient; spouse     Problem: Patient Care Overview  Goal: Individualization and Mutuality  Outcome: Ongoing (interventions implemented as appropriate)     Problem: Patient Care Overview  Goal: Discharge Needs Assessment  Outcome: Ongoing (interventions implemented as appropriate)     Problem: Pain, Chronic (Adult)  Goal: Identify Related Risk Factors and Signs and Symptoms  Outcome: Ongoing (interventions implemented as appropriate)

## 2020-03-01 NOTE — PLAN OF CARE
Problem: Patient Care Overview  Goal: Plan of Care Review  Outcome: Ongoing (interventions implemented as appropriate)  Flowsheets (Taken 3/1/2020 1113)  Outcome Summary: VSS; Pt presents with fxl decline from PLOF, deficits in ADL performance, fxl mobility, occupational endurance. Pt limited by weakness, decreased balance. Pt required CGA STS at [facility]; dependent for LBD/LBB. AE issued for LB ADLs and ADL retraining initiated. Pt may benefit from shower seat, grab bars. Will benefit from skilled OT services to address deficits, facilitate increased fxl I. Recommend IRF at discharge.

## 2020-03-01 NOTE — PROGRESS NOTES
Discharge Planning Assessment  [facility]     Patient Name: Juan Alberto Tejeda  MRN: [id number]  Today's Date: 3/1/2020    Admit Date: 2/28/2020    Discharge Needs Assessment     Row Name 03/01/20 0943       Living Environment    Lives With  significant other    Name(s) of Who Lives With Patient  Carmencita    Current Living Arrangements  home/apartment/condo    Primary Care Provided by  spouse/significant other    Provides Primary Care For  no one, unable/limited ability to care for self    Family Caregiver if Needed  significant other    Family Caregiver Names  Carmencita    Quality of Family Relationships  involved;supportive;helpful    Able to Return to Prior Arrangements  yes    Living Arrangement Comments  lives with girlfriend, Carmencita, in [facility]       Resource/Environmental Concerns    Transportation Concerns  car, none       Transition Planning    Patient/Family Anticipates Transition to  home with family;home with help/services    Patient/Family Anticipated Services at Transition  durable medical equipment;home health care    Transportation Anticipated  family or friend will provide       Discharge Needs Assessment    Readmission Within the Last 30 Days  no previous admission in last 30 days    Concerns to be Addressed  adjustment to diagnosis/illness;discharge planning    Equipment Currently Used at Home  wheelchair;walker, rolling    Anticipated Changes Related to Illness  inability to care for self    Equipment Needed After Discharge  oxygen    Outpatient/Agency/Support Group Needs  homecare agency    Discharge Facility/Level of Care Needs  home with home health        Discharge Plan     Row Name 03/01/20 0945       Plan    Plan  Home with girlfriend and home health    Patient/Family in Agreement with Plan  yes    Plan Comments  Spoke with patient and his girlfriend, Carmencita, at bedside.  They live together in [facility] [place].  She assists patient with all ADLs.  He is able to ambulate with a rolling  walker and pivot/transfer to chair/bed.  He also has a transport wheelchair.  Denies any other DME.  Has never used home oxygen.  He has also been instructed that he needs a sleep study but has not had one yet.  Patient has Rx drug coverage but states his co-pays are expensive.  He uses the [facility] pharmacy in [facility].  Patient's plan is to return home with his girlfriend and she will continue to assist him at home.  He has no preference for HH agency or DME company.  SO will transport patient home.  They are also interested in obtaining a lift chair for use at home.  CM will follow and assess for discharge needs after patient's surgery.          Destination      Coordination has not been started for this encounter.      Durable Medical Equipment      Coordination has not been started for this encounter.      Dialysis/Infusion      Coordination has not been started for this encounter.      Home Medical Care      Coordination has not been started for this encounter.      Therapy      Coordination has not been started for this encounter.      Community Resources      Coordination has not been started for this encounter.          Demographic Summary     Row Name 03/01/20 0917       General Information    Admission Type  inpatient    Arrived From  emergency department    Referral Source  physician    Reason for Consult  discharge planning    Preferred Language  English        Functional Status    No documentation.       Psychosocial    No documentation.       Abuse/Neglect    No documentation.       Legal    No documentation.       Substance Abuse    No documentation.       Patient Forms    No documentation.           Carolin Pollock RN

## 2020-03-01 NOTE — THERAPY EVALUATION
Patient Name: Juan Alberto Tejeda  : 1960    MRN: [id number]                              Today's Date: 3/1/2020       Admit Date: 2020    Visit Dx:     ICD-10-CM ICD-9-CM   1. Acute on chronic systolic CHF (congestive heart failure) (CMS/[facility]) I50.23 428.23     428.0   2. Aortic valve stenosis, etiology of cardiac valve disease unspecified I35.0 424.1   3. Acute pulmonary edema (CMS/[facility]) J81.0 518.4   4. Chronic kidney disease, unspecified CKD stage N18.9 585.9     Patient Active Problem List   Diagnosis   • CKD (chronic kidney disease) stage 3, GFR 30-59 ml/min (CMS/[facility])   • Hyperlipidemia LDL goal <70   • Essential hypertension   • Type 2 diabetes mellitus with hypoglycemia (CMS/[facility])   • Normocytic anemia   • Aortic stenosis   • Diastolic CHF due to valvular disease (CMS/[facility])   • Coronary artery disease involving native coronary artery of native heart with angina pectoris (CMS/[facility])   • Demand ischemia of myocardium (CMS/[facility])     Past Medical History:   Diagnosis Date   • Chronic kidney disease    • Diabetes mellitus (CMS/[facility])    • Hyperlipidemia    • Hypertension    • Peripheral neuropathy      Past Surgical History:   Procedure Laterality Date   • CHOLECYSTECTOMY     • KIDNEY STONE SURGERY       General Information     Row Name 20 1125          PT Evaluation Time/Intention    Document Type  evaluation  -SC     Mode of Treatment  physical therapy  -SC     Row Name 20 1125          General Information    Patient Profile Reviewed?  yes  -SC     Prior Level of Function  independent:;gait uses a walker at times  -SC     Existing Precautions/Restrictions  fall;oxygen therapy device and L/min  -SC     Row Name 20 1125          Relationship/Environment    Lives With  spouse  -SC     Row Name 20 1125          Resource/Environmental Concerns    Current Living Arrangements  home/apartment/condo  -SC     Row Name 20 1125          Home Main Entrance    Number of Stairs, Main Entrance   none ramp  -SC     Row Name 03/01/20 1125          Stairs Within Home, Primary    Number of Stairs, Within Home, Primary  none  -SC     Row Name 03/01/20 1125          Cognitive Assessment/Intervention- PT/OT    Orientation Status (Cognition)  oriented x 4  -SC     Cognitive Assessment/Intervention Comment  alert. following commands  -SC     Row Name 03/01/20 1125          Safety Issues, Functional Mobility    Impairments Affecting Function (Mobility)  endurance/activity tolerance;postural/trunk control;pain  -SC       User Key  (r) = Recorded By, (t) = Taken By, (c) = Cosigned By    Initials Name Provider Type    SC Christin, Shearon A, PT Physical Therapist        Mobility     Row Name 03/01/20 1126          Bed Mobility Assessment/Treatment    Comment (Bed Mobility)  uic  -SC     Row Name 03/01/20 1126          Transfer Assessment/Treatment    Comment (Transfers)  demonstrated safe transitions from recliner  -[facility] Name 03/01/20 1126          Sit-Stand Transfer    Sit-Stand [place] (Transfers)  conditional independence  -SC     Assistive Device (Sit-Stand Transfers)  walker, front-wheeled  -SC     Row Name 03/01/20 1126          Gait/Stairs Assessment/Training    Gait/Stairs Assessment/Training  gait/ambulation independence  -SC     [place] Level (Gait)  supervision  -SC     Assistive Device (Gait)  walker, front-wheeled  -SC     Distance in Feet (Gait)  100  -SC     Pattern (Gait)  step-through  -SC     Deviations/Abnormal Patterns (Gait)  left sided deviations;antalgic;stride length decreased  -SC     Bilateral Gait Deviations  forward flexed posture  -SC     Comment (Gait/Stairs)  demonstrated slow careful gait. Cue for improvement in upright postrue. Demonstrated good control of walker with turns. No wheezing or SOB noted  -SC       User Key  (r) = Recorded By, (t) = Taken By, (c) = Cosigned By    Initials Name Provider Type    SC Christin, Shearon A, PT Physical Therapist        Obj/Interventions      Row Name 03/01/20 1128          General ROM    GENERAL ROM COMMENTS  R UE: limited in flexion, abduction, external rotation (old injury) L UE WFL. B LE wfl  -[facility] Name 03/01/20 1128          MMT (Manual Muscle Testing)    General MMT Comments  B LE quads 4+/5, tib ant 4/5 R UE elevation -3/5  -[facility] Name 03/01/20 1138          Therapeutic Exercise    Lower Extremity (Therapeutic Exercise)  heel slides, bilateral;LAQ (long arc quad), bilateral;quad sets, bilateral  -SC     Sets/Reps (Therapeutic Exercise)  10  -SC     Comment (Therapeutic Exercise)  needed encouragement and cues for technique  -[facility] Name 03/01/20 1128          Dynamic Standing Balance    Level of [place], Reaches Outside Midline (Standing, Dynamic Balance)  contact guard assist  -SC     Assistive Device Utilized (Supported Standing, Dynamic Balance)  walker, rolling  -[facility] Name 03/01/20 1130          Sensory Assessment/Intervention    Sensory General Assessment  -- has neuropathy in both feet  -SC       User Key  (r) = Recorded By, (t) = Taken By, (c) = Cosigned By    Initials Name Provider Type    SC Lynnette Waller A, PT Physical Therapist        Goals/Plan     [facility] Name 03/01/20 1136          Bed Mobility Goal 1 (PT)    Activity/Assistive Device (Bed Mobility Goal 1, PT)  bed mobility activities, all  -SC     [place] Level/Cues Needed (Bed Mobility Goal 1, PT)  independent  -SC     Time Frame (Bed Mobility Goal 1, PT)  long term goal (LTG);10 days  -[facility] Name 03/01/20 1136          Transfer Goal 1 (PT)    Activity/Assistive Device (Transfer Goal 1, PT)  transfers, all;sit-to-stand/stand-to-sit;bed-to-chair/chair-to-bed  -SC     [place] Level/Cues Needed (Transfer Goal 1, PT)  independent  -SC     Time Frame (Transfer Goal 1, PT)  long term goal (LTG);10 days  -[facility] Name 03/01/20 1136          Gait Training Goal 1 (PT)    Activity/Assistive Device (Gait Training Goal 1, PT)  gait (walking  locomotion);walker, rolling  -SC     [place] Level (Gait Training Goal 1, PT)  conditional independence  -SC     Distance (Gait Goal 1, PT)  500  -SC     Time Frame (Gait Training Goal 1, PT)  long term goal (LTG);10 days  -SC       User Key  (r) = Recorded By, (t) = Taken By, (c) = Cosigned By    Initials Name Provider Type    SC Lynnette Waller, PT Physical Therapist        Clinical Impression     [facility] Name 03/01/20 1132          Pain Assessment    Additional Documentation  Pain Scale: FACES Pre/Post-Treatment (Group)  -[facility] Name 03/01/20 1132          Pain Scale: Numbers Pre/Post-Treatment    Pain Location - Side  Left  -SC     Pain Location  knee  -SC     Pain Intervention(s)  Repositioned  -[facility] Name 03/01/20 1132          Pain Scale: FACES Pre/Post-Treatment    Pain: FACES Scale, Pretreatment  0-->no hurt  -SC     Pain: FACES Scale, Post-Treatment  2-->hurts little bit  -SC     Row Name 03/01/20 1132          Plan of Care Review    Plan of Care Reviewed With  patient;spouse  -SC     Outcome Summary  Patient was able to ambulated in hallway 100 feet using walker. He is limited by c/o joint pain in legs. He has some loss on strength and ROM in his R shoulder which  I have recommended he have an Orthopetic consult at some point.  I recommend continued skilled PT for improving overall mobility and strength  -[facility] Name 03/01/20 1132          Physical Therapy Clinical Impression    Criteria for Skilled Interventions Met (PT Clinical Impression)  yes;treatment indicated  -SC     Rehab Potential (PT Clinical Summary)  good, to achieve stated therapy goals  -[facility] Name 03/01/20 1132          Vital Signs    Post Systolic BP Rehab  117  -SC     Post Treatment Diastolic BP  71  -SC     Posttreatment Heart Rate (beats/min)  69  -SC     Post SpO2 (%)  96  -SC     O2 Delivery Post Treatment  supplemental O2  -[facility] Name 03/01/20 1132          Positioning and Restraints    Pre-Treatment  Position  sitting in chair/recliner  -SC     Post Treatment Position  chair  -SC     In Chair  reclined;sitting;call light within reach;with nsg  -SC       User Key  (r) = Recorded By, (t) = Taken By, (c) = Cosigned By    Initials Name Provider Type    Lynnette Nguyen PT Physical Therapist        Outcome Measures     Row Name 03/01/20 1136          How much help from another person do you currently need...    Turning from your back to your side while in flat bed without using bedrails?  4  -SC     Moving from lying on back to sitting on the side of a flat bed without bedrails?  3  -SC     Moving to and from a bed to a chair (including a wheelchair)?  3  -SC     Standing up from a chair using your arms (e.g., wheelchair, bedside chair)?  3  -SC     Climbing 3-5 steps with a railing?  3  -SC     To walk in hospital room?  3  -SC     AM-PAC 6 Clicks Score (PT)  19  -SC     Row Name 03/01/20 1136          Functional Assessment    Outcome Measure Options  AM-PAC 6 Clicks Basic Mobility (PT)  -SC       User Key  (r) = Recorded By, (t) = Taken By, (c) = Cosigned By    Initials Name Provider Type    Lynnette Nguyen PT Physical Therapist          PT Recommendation and Plan  Planned Therapy Interventions (PT Eval): bed mobility training, gait training, home exercise program, patient/family education  Outcome Summary/Treatment Plan (PT)  Anticipated Discharge Disposition (PT): home with home health  Plan of Care Reviewed With: patient, spouse  Outcome Summary: Patient was able to ambulated in hallway 100 feet using walker. He is limited by c/o joint pain in legs. He has some loss on strength and ROM in his R shoulder which  I have recommended he have an Orthopetic consult at some point.  I recommend continued skilled PT for improving overall mobility and strength     Time Calculation:   PT Charges     Row Name 03/01/20 1040             Time Calculation    Start Time  1040  -SC      PT Received On  03/01/20  -SC      PT  Goal Re-Cert Due Date  03/11/20  -SC        User Key  (r) = Recorded By, (t) = Taken By, (c) = Cosigned By    Initials Name Provider Type    SC Lynnette Waller PT Physical Therapist        Therapy Charges for Today     Code Description Service Date Service Provider Modifiers Qty    99540937781 HC PT EVAL MOD COMPLEXITY 4 3/1/2020 Lynnette Waller PT GP 1          PT G-Codes  Outcome Measure Options: AM-PAC 6 Clicks Basic Mobility (PT)  AM-PAC 6 Clicks Score (PT): 19    Lynnette Waller, PT  3/1/2020

## 2020-03-01 NOTE — PLAN OF CARE
Problem: Patient Care Overview  Goal: Plan of Care Review  Outcome: Ongoing (interventions implemented as appropriate)  Flowsheets (Taken 3/1/2020 4093)  Progress: improving  Plan of Care Reviewed With: patient  Outcome Summary: VSS. Pt denies pain today, however, stated that he had bilateral foot pain overnight r/t neuropathy. Worked well with PT and OT today. Will attempt to obtain medical records tmrw, Monday. Diuresing with IV bumex, tolerating well. Pt is pleasant and compliant with care.

## 2020-03-01 NOTE — THERAPY EVALUATION
Acute Care - Occupational Therapy Initial Evaluation  [facility]     Patient Name: Juan Alberto Tejeda  : 1960  MRN: [id number]  Today's Date: 3/1/2020  Onset of Illness/Injury or Date of Surgery: 20  Date of Referral to OT: 20       Admit Date: 2020       ICD-10-CM ICD-9-CM   1. Acute on chronic systolic CHF (congestive heart failure) (CMS/HCC) I50.23 428.23     428.0   2. Aortic valve stenosis, etiology of cardiac valve disease unspecified I35.0 424.1   3. Acute pulmonary edema (CMS/[facility]) J81.0 518.4   4. Chronic kidney disease, unspecified CKD stage N18.9 585.9     Patient Active Problem List   Diagnosis   • CKD (chronic kidney disease) stage 3, GFR 30-59 ml/min (CMS/[facility])   • Hyperlipidemia LDL goal <70   • Essential hypertension   • Type 2 diabetes mellitus with hypoglycemia (CMS/[facility])   • Normocytic anemia   • Aortic stenosis   • Diastolic CHF due to valvular disease (CMS/[facility])   • Coronary artery disease involving native coronary artery of native heart with angina pectoris (CMS/[facility])   • Demand ischemia of myocardium (CMS/[facility])     Past Medical History:   Diagnosis Date   • Chronic kidney disease    • Diabetes mellitus (CMS/[facility])    • Hyperlipidemia    • Hypertension    • Peripheral neuropathy      Past Surgical History:   Procedure Laterality Date   • CHOLECYSTECTOMY     • KIDNEY STONE SURGERY            OT ASSESSMENT FLOWSHEET (last 12 hours)      Occupational Therapy Evaluation     Row Name 20 1113                   OT Evaluation Time/Intention    Subjective Information  no complaints  -TA        Document Type  evaluation  -TA        Mode of Treatment  occupational therapy  -TA        Patient Effort  good  -TA        Symptoms Noted During/After Treatment  none  -TA           General Information    Patient Profile Reviewed?  yes  -TA        Onset of Illness/Injury or Date of Surgery  20  -TA        Patient Observations  alert;cooperative;agree to therapy  -TA         Patient/Family Observations  Spouae present in room  -TA        General Observations of Patient  Pt UIC, tele, O2 per NC  -TA        Prior Level of Function  independent:;all household mobility;gait;transfer;bed mobility;ADL's  -TA        Equipment Currently Used at Home  walker, rolling;wheelchair;ramp  -TA        Pertinent History of Current Functional Problem  Pt to ED with c/o SOA, peripheral edema, abdominal swelling, cough; CXR (+) cardiomegaly and c/w pulmonary edema. Diagnosed aortic stenosis, A/C DHF  -TA        Existing Precautions/Restrictions  fall;oxygen therapy device and L/min  -TA        Equipment Issued to Patient  bathing equipment;dressing equipment  -TA        Risks Reviewed  patient:;spouse/S.O.:;LOB;dizziness;increased discomfort;change in vital signs  -TA        Benefits Reviewed  patient:;spouse/S.O.:;improve function;increase independence;increase strength;increase balance;increase knowledge  -TA        Barriers to Rehab  medically complex;previous functional deficit  -TA           Relationship/Environment    Primary Source of Support/Comfort  spouse  -TA        Lives With  spouse  -TA           Resource/Environmental Concerns    Current Living Arrangements  home/apartment/condo  -TA           Cognitive Assessment/Intervention- PT/OT    Orientation Status (Cognition)  oriented x 4  -TA           Safety Issues, Functional Mobility    Impairments Affecting Function (Mobility)  endurance/activity tolerance;pain;strength  -TA           Bed Mobility Assessment/Treatment    Comment (Bed Mobility)  Pt UIC  -TA           Functional Mobility    Functional Mobility- Comment  Defer to PT  -TA           Transfer Assessment/Treatment    Transfer Assessment/Treatment  sit-stand transfer;stand-sit transfer  -TA           Sit-Stand Transfer    Sit-Stand [place] (Transfers)  conditional independence  -TA        Assistive Device (Sit-Stand Transfers)  walker, front-wheeled  -TA           ADL  Assessment/Intervention    13629 - OT Self Care/Mgmt Minutes  8  -TA        BADL Assessment/Intervention  lower body dressing  -TA           Lower Body Dressing Assessment/Training    Lower Body Dressing [place] Level  don;doff;socks;dependent (less than 25% patient effort)  -TA        Lower Body Dressing Position  supported sitting  -TA           BADL Safety/Performance    Impairments, BADL Safety/Performance  endurance/activity tolerance;pain;shortness of breath;strength  -TA        Skilled BADL Treatment/Intervention  adaptive equipment training;BADL process/adaptation training  -TA           General ROM    GENERAL ROM COMMENTS  Right shld fl/ab 45% from old injury, Left shld fl/ab WFL; Sergio joints distally WFL  -TA           MMT (Manual Muscle Testing)    General MMT Comments  R shld fl/ab 4-/5, Left shld fl/ab 4+/5; sergio joints distally 4+/5  -TA           Motor Assessment/Interventions    Additional Documentation  Balance (Group);Balance Interventions (Group)  -TA           Balance    Balance  dynamic standing balance  -TA           Dynamic Standing Balance    Level of [place], Reaches Outside Midline (Standing, Dynamic Balance)  contact guard assist  -TA        Assistive Device Utilized (Supported Standing, Dynamic Balance)  walker, rolling  -TA           Sensory Assessment/Intervention    Sensory General Assessment  no sensation deficits identified;other (see comments) BUE intact  -TA           Positioning and Restraints    Pre-Treatment Position  sitting in chair/recliner  -TA        Post Treatment Position  chair  -TA        In Chair  reclined;call light within reach;encouraged to call for assist;with family/caregiver;waffle cushion;legs elevated  -TA           Pain Assessment    Additional Documentation  Pain Scale: Numbers Pre/Post-Treatment (Group)  -TA           Pain Scale: Numbers Pre/Post-Treatment    Pain Scale: Numbers, Pretreatment  0/10 - no pain  -TA        Pain Scale: Numbers,  Post-Treatment  0/10 - no pain  -TA        Pre/Post Treatment Pain Comment  Pt denied pain, tolerated  -TA        Pain Intervention(s)  Ambulation/increased activity;Repositioned  -TA           Coping    Observed Emotional State  calm;cooperative  -TA        Verbalized Emotional State  acceptance  -TA           Plan of Care Review    Plan of Care Reviewed With  patient;spouse  -TA        Progress  improving  -TA           Clinical Impression (OT)    Date of Referral to OT  02/29/20  -TA        OT Diagnosis  Impaired mobility and ADLs  -TA        Functional Level at Time of Evaluation (OT Eval)  fxl decline from PLOF  -TA        Patient/Family Goals Statement (OT Eval)  Get stronger  -TA        Criteria for Skilled Therapeutic Interventions Met (OT Eval)  yes;treatment indicated  -TA        Rehab Potential (OT Eval)  good, to achieve stated therapy goals  -TA        Therapy Frequency (OT Eval)  daily  -TA        Care Plan Review (OT)  evaluation/treatment results reviewed;care plan/treatment goals reviewed;risks/benefits reviewed;patient/other agree to care plan  -TA        Care Plan Review, Other Participant (OT Eval)  spouse  -TA        Anticipated Discharge Disposition (OT)  inpatient rehabilitation facility  -TA           Vital Signs    Pre Systolic BP Rehab  -- VSS; RN cleared pt for tx  -TA        Pre Patient Position  Sitting  -TA        Intra Patient Position  Standing  -TA        Post Patient Position  Sitting  -TA           Planned OT Interventions    Planned Therapy Interventions (OT Eval)  activity tolerance training;adaptive equipment training;BADL retraining;functional balance retraining;occupation/activity based interventions;ROM/therapeutic exercise;strengthening exercise;transfer/mobility retraining  -TA           OT Goals    Transfer Goal Selection (OT)  transfer, OT goal 1  -TA        Dressing Goal Selection (OT)  dressing, OT goal 1  -TA        Toileting Goal Selection (OT)  toileting, OT goal 1   -TA           Transfer Goal 1 (OT)    Activity/Assistive Device (Transfer Goal 1, OT)  sit-to-stand/stand-to-sit;bed-to-chair/chair-to-bed;toilet;walker, rolling  -TA        [place] Level/Cues Needed (Transfer Goal 1, OT)  conditional independence  -TA        Time Frame (Transfer Goal 1, OT)  by discharge  -TA        Progress/Outcome (Transfer Goal 1, OT)  goal ongoing  -TA           Dressing Goal 1 (OT)    Activity/Assistive Device (Dressing Goal 1, OT)  lower body dressing;reacher;long handled shoe horn;sock-aid  -TA        [place]/Cues Needed (Dressing Goal 1, OT)  conditional independence  -TA        Time Frame (Dressing Goal 1, OT)  by discharge  -TA        Progress/Outcome (Dressing Goal 1, OT)  goal ongoing  -TA           Toileting Goal 1 (OT)    Activity/Device (Toileting Goal 1, OT)  toileting skills, all  -TA        [place] Level/Cues Needed (Toileting Goal 1, OT)  conditional independence  -TA        Time Frame (Toileting Goal 1, OT)  by discharge  -TA        Progress/Outcome (Toileting Goal 1, OT)  goal ongoing  -TA          User Key  (r) = Recorded By, (t) = Taken By, (c) = Cosigned By    Initials Name Effective Dates    TA Fortino Olvera, OT 03/14/16 -                OT Recommendation and Plan  Outcome Summary/Treatment Plan (OT)  Anticipated Discharge Disposition (OT): inpatient rehabilitation facility  Planned Therapy Interventions (OT Eval): activity tolerance training, adaptive equipment training, BADL retraining, functional balance retraining, occupation/activity based interventions, ROM/therapeutic exercise, strengthening exercise, transfer/mobility retraining  Therapy Frequency (OT Eval): daily  Plan of Care Review  Plan of Care Reviewed With: patient, spouse  Plan of Care Reviewed With: patient, spouse  Outcome Summary: VSS; Pt presents with fxl decline from PLOF, deficits in ADL performance, fxl mobility, occupational endurance. Pt limited by weakness, decreased balance. Pt  required CGA STS at RW; dependent for LBD/LBB. AE issued for LB ADLs and ADL retraining initiated. Pt may benefit from shower seat, grab bars. Will benefit from skilled OT services to address deficits, facilitate increased fxl I. Recommend IRF at discharge.    Outcome Measures     Row Name 03/01/20 1113             How much help from another is currently needed...    Putting on and taking off regular lower body clothing?  2  -TA      Bathing (including washing, rinsing, and drying)  2  -TA      Toileting (which includes using toilet bed pan or urinal)  3  -TA      Putting on and taking off regular upper body clothing  3  -TA      Taking care of personal grooming (such as brushing teeth)  4  -TA      Eating meals  4  -TA      AM-PAC 6 Clicks Score (OT)  18  -TA         Functional Assessment    Outcome Measure Options  AM-PAC 6 Clicks Daily Activity (OT)  -TA        User Key  (r) = Recorded By, (t) = Taken By, (c) = Cosigned By    Initials Name Provider Type    Fortino Rivera OT Occupational Therapist          Time Calculation:   Time Calculation- OT     Row Name 03/01/20 1200 03/01/20 1113          Time Calculation- OT    OT Start Time  1113 ttc 8 minutes  -TA  --     Total Timed Code Minutes- OT  8 minute(s)  -TA  --     OT Received On  03/01/20  -TA  --     OT Goal Re-Cert Due Date  03/11/20  -TA  --        Timed Charges    32536 - OT Self Care/Mgmt Minutes  --  8  -TA       User Key  (r) = Recorded By, (t) = Taken By, (c) = Cosigned By    Initials Name Provider Type    Fortino Rivera OT Occupational Therapist        Therapy Charges for Today     Code Description Service Date Service Provider Modifiers Qty    12988747571 HC OT EVAL MOD COMPLEXITY 4 3/1/2020 Fortino Olvera OT GO 1    74971309785 HC OT SELF CARE/MGMT/TRAIN EA 15 MIN 3/1/2020 Fortino Olvera OT GO 1               Fortino Olvera OT  3/1/2020

## 2020-03-02 ENCOUNTER — APPOINTMENT (OUTPATIENT)
Dept: CARDIOLOGY | Facility: HOSPITAL | Age: 60
End: 2020-03-02

## 2020-03-02 LAB
ANION GAP SERPL CALCULATED.3IONS-SCNC: 12 MMOL/L (ref 5–15)
BH CV ECHO MEAS - BSA(HAYCOCK): 2.5 M^2
BH CV ECHO MEAS - BSA: 2.3 M^2
BH CV ECHO MEAS - BZI_BMI: 44.2 KILOGRAMS/M^2
BH CV ECHO MEAS - BZI_METRIC_HEIGHT: 170.2 CM
BH CV ECHO MEAS - BZI_METRIC_WEIGHT: 127.9 KG
BH CV XLRA MEAS LEFT CCA RATIO VEL: 97.8 CM/SEC
BH CV XLRA MEAS LEFT DIST CCA EDV: 14.7 CM/SEC
BH CV XLRA MEAS LEFT DIST CCA PSV: 68.4 CM/SEC
BH CV XLRA MEAS LEFT ICA RATIO VEL: 76.1 CM/SEC
BH CV XLRA MEAS LEFT ICA/CCA RATIO: 0.8
BH CV XLRA MEAS LEFT MID CCA EDV: 24.4 CM/SEC
BH CV XLRA MEAS LEFT MID CCA PSV: 98.7 CM/SEC
BH CV XLRA MEAS LEFT MID ICA EDV: 32.8 CM/SEC
BH CV XLRA MEAS LEFT MID ICA PSV: 76.8 CM/SEC
BH CV XLRA MEAS LEFT PROX CCA EDV: 24.4 CM/SEC
BH CV XLRA MEAS LEFT PROX CCA PSV: 93.4 CM/SEC
BH CV XLRA MEAS LEFT PROX ECA PSV: 116.6 CM/SEC
BH CV XLRA MEAS LEFT PROX ICA EDV: 22.3 CM/SEC
BH CV XLRA MEAS LEFT PROX ICA PSV: 67.7 CM/SEC
BH CV XLRA MEAS LEFT PROX SCLA PSV: 158 CM/SEC
BH CV XLRA MEAS LEFT VERTEBRAL A PSV: 34.2 CM/SEC
BH CV XLRA MEAS RIGHT CCA RATIO VEL: 68.1 CM/SEC
BH CV XLRA MEAS RIGHT DIST CCA EDV: 21 CM/SEC
BH CV XLRA MEAS RIGHT DIST CCA PSV: 80.3 CM/SEC
BH CV XLRA MEAS RIGHT DIST ICA EDV: 32.7 CM/SEC
BH CV XLRA MEAS RIGHT DIST ICA PSV: 89.3 CM/SEC
BH CV XLRA MEAS RIGHT ICA RATIO VEL: 88.6 CM/SEC
BH CV XLRA MEAS RIGHT ICA/CCA RATIO: 1.3
BH CV XLRA MEAS RIGHT MID CCA EDV: 14 CM/SEC
BH CV XLRA MEAS RIGHT MID CCA PSV: 69 CM/SEC
BH CV XLRA MEAS RIGHT MID ICA EDV: 33.9 CM/SEC
BH CV XLRA MEAS RIGHT MID ICA PSV: 89.3 CM/SEC
BH CV XLRA MEAS RIGHT PROX CCA EDV: 16.6 CM/SEC
BH CV XLRA MEAS RIGHT PROX CCA PSV: 71.6 CM/SEC
BH CV XLRA MEAS RIGHT PROX ECA PSV: 113.5 CM/SEC
BH CV XLRA MEAS RIGHT PROX ICA EDV: 27.7 CM/SEC
BH CV XLRA MEAS RIGHT PROX ICA PSV: 84.2 CM/SEC
BH CV XLRA MEAS RIGHT PROX SCLA PSV: 120.5 CM/SEC
BH CV XLRA MEAS RIGHT VERTEBRAL A PSV: 75.1 CM/SEC
BUN BLD-MCNC: 44 MG/DL (ref 6–20)
BUN/CREAT SERPL: 23.8 (ref 7–25)
CALCIUM SPEC-SCNC: 9.4 MG/DL (ref 8.6–10.5)
CHLORIDE SERPL-SCNC: 92 MMOL/L (ref 98–107)
CO2 SERPL-SCNC: 32 MMOL/L (ref 22–29)
CREAT BLD-MCNC: 1.85 MG/DL (ref 0.76–1.27)
GFR SERPL CREATININE-BSD FRML MDRD: 38 ML/MIN/1.73
GLUCOSE BLD-MCNC: 157 MG/DL (ref 65–99)
GLUCOSE BLDC GLUCOMTR-MCNC: 173 MG/DL (ref 70–130)
GLUCOSE BLDC GLUCOMTR-MCNC: 212 MG/DL (ref 70–130)
NT-PROBNP SERPL-MCNC: 1338 PG/ML (ref 5–900)
POTASSIUM BLD-SCNC: 4 MMOL/L (ref 3.5–5.2)
RIGHT ARM BP: NORMAL MMHG
SODIUM BLD-SCNC: 136 MMOL/L (ref 136–145)

## 2020-03-02 PROCEDURE — 25010000002 HEPARIN (PORCINE) PER 1000 UNITS: Performed by: INTERNAL MEDICINE

## 2020-03-02 PROCEDURE — 99231 SBSQ HOSP IP/OBS SF/LOW 25: CPT | Performed by: NURSE PRACTITIONER

## 2020-03-02 PROCEDURE — 97110 THERAPEUTIC EXERCISES: CPT

## 2020-03-02 PROCEDURE — 99233 SBSQ HOSP IP/OBS HIGH 50: CPT | Performed by: INTERNAL MEDICINE

## 2020-03-02 PROCEDURE — 93880 EXTRACRANIAL BILAT STUDY: CPT

## 2020-03-02 PROCEDURE — 93880 EXTRACRANIAL BILAT STUDY: CPT | Performed by: INTERNAL MEDICINE

## 2020-03-02 PROCEDURE — 99231 SBSQ HOSP IP/OBS SF/LOW 25: CPT | Performed by: THORACIC SURGERY (CARDIOTHORACIC VASCULAR SURGERY)

## 2020-03-02 PROCEDURE — 97530 THERAPEUTIC ACTIVITIES: CPT

## 2020-03-02 PROCEDURE — 94799 UNLISTED PULMONARY SVC/PX: CPT

## 2020-03-02 PROCEDURE — 99232 SBSQ HOSP IP/OBS MODERATE 35: CPT | Performed by: INTERNAL MEDICINE

## 2020-03-02 PROCEDURE — 80048 BASIC METABOLIC PNL TOTAL CA: CPT | Performed by: INTERNAL MEDICINE

## 2020-03-02 PROCEDURE — 83880 ASSAY OF NATRIURETIC PEPTIDE: CPT | Performed by: INTERNAL MEDICINE

## 2020-03-02 PROCEDURE — 82962 GLUCOSE BLOOD TEST: CPT

## 2020-03-02 RX ORDER — ALBUTEROL SULFATE 2.5 MG/3ML
2.5 SOLUTION RESPIRATORY (INHALATION) EVERY 8 HOURS PRN
Status: DISCONTINUED | OUTPATIENT
Start: 2020-03-02 | End: 2020-03-05 | Stop reason: HOSPADM

## 2020-03-02 RX ORDER — GABAPENTIN 300 MG/1
600 CAPSULE ORAL EVERY 8 HOURS SCHEDULED
Status: DISCONTINUED | OUTPATIENT
Start: 2020-03-02 | End: 2020-03-05 | Stop reason: HOSPADM

## 2020-03-02 RX ADMIN — SODIUM CHLORIDE, PRESERVATIVE FREE 10 ML: 5 INJECTION INTRAVENOUS at 21:38

## 2020-03-02 RX ADMIN — CARVEDILOL 12.5 MG: 12.5 TABLET, FILM COATED ORAL at 17:50

## 2020-03-02 RX ADMIN — ROSUVASTATIN CALCIUM 40 MG: 20 TABLET, COATED ORAL at 21:37

## 2020-03-02 RX ADMIN — GABAPENTIN 600 MG: 300 CAPSULE ORAL at 21:37

## 2020-03-02 RX ADMIN — GABAPENTIN 600 MG: 300 CAPSULE ORAL at 13:06

## 2020-03-02 RX ADMIN — BUMETANIDE 2 MG: 0.25 INJECTION INTRAMUSCULAR; INTRAVENOUS at 23:18

## 2020-03-02 RX ADMIN — ASPIRIN 81 MG 81 MG: 81 TABLET ORAL at 08:29

## 2020-03-02 RX ADMIN — CARVEDILOL 12.5 MG: 12.5 TABLET, FILM COATED ORAL at 08:29

## 2020-03-02 RX ADMIN — ACETAMINOPHEN 650 MG: 325 TABLET, FILM COATED ORAL at 08:29

## 2020-03-02 RX ADMIN — HEPARIN SODIUM 5000 UNITS: 5000 INJECTION, SOLUTION INTRAVENOUS; SUBCUTANEOUS at 21:37

## 2020-03-02 RX ADMIN — ALLOPURINOL 300 MG: 300 TABLET ORAL at 08:29

## 2020-03-02 RX ADMIN — HEPARIN SODIUM 5000 UNITS: 5000 INJECTION, SOLUTION INTRAVENOUS; SUBCUTANEOUS at 13:07

## 2020-03-02 RX ADMIN — BUMETANIDE 2 MG: 0.25 INJECTION INTRAMUSCULAR; INTRAVENOUS at 00:17

## 2020-03-02 RX ADMIN — SODIUM CHLORIDE, PRESERVATIVE FREE 10 ML: 5 INJECTION INTRAVENOUS at 08:29

## 2020-03-02 RX ADMIN — BUMETANIDE 2 MG: 0.25 INJECTION INTRAMUSCULAR; INTRAVENOUS at 11:55

## 2020-03-02 RX ADMIN — HEPARIN SODIUM 5000 UNITS: 5000 INJECTION, SOLUTION INTRAVENOUS; SUBCUTANEOUS at 05:26

## 2020-03-02 RX ADMIN — ACETAMINOPHEN 650 MG: 325 TABLET, FILM COATED ORAL at 23:18

## 2020-03-02 NOTE — PLAN OF CARE
Problem: Fluid Volume Excess (Adult)  Goal: Identify Related Risk Factors and Signs and Symptoms  Outcome: Ongoing (interventions implemented as appropriate)     Problem: Patient Care Overview  Goal: Plan of Care Review  3/2/2020 0453 by Kimberlyn Roman RN  Outcome: Ongoing (interventions implemented as appropriate)     Problem: Patient Care Overview  Goal: Individualization and Mutuality  Outcome: Ongoing (interventions implemented as appropriate)     Problem: Patient Care Overview  Goal: Interprofessional Rounds/Family Conf  Outcome: Ongoing (interventions implemented as appropriate)

## 2020-03-02 NOTE — PLAN OF CARE
Problem: Patient Care Overview  Goal: Plan of Care Review  Flowsheets (Taken 3/2/2020 7382)  Progress: no change  Plan of Care Reviewed With: patient; family  Outcome Summary: Pt A&O x4. VSS. NSR. RA & 2L PRN when sleeping. C/O exertional dyspnea. C/O BLE pain intermittently. Tylenol PRN administered. Gabapentin home dose added. Up with 2 assist. 2-3+ edema BLE; continuing to diurese. Plan for [facility] & CT angio chest/abd/pelvis 02/03 pending am renal panel. Ex-wife at bedside. Will continue to monitor.

## 2020-03-02 NOTE — PROGRESS NOTES
[facility] Medicine Services  PROGRESS NOTE    Patient Name: Juan Alberto Tejeda  : 1960  MRN: [id number]    Date of Admission: 2020  Primary Care Physician: Megan Martell APRN    Subjective   Subjective     CC:f/u SOA      HPI:No acute events overnight, patient states that he is have some LE pain, he is still SOA    Review of Systems  Gen- No fevers, chills  CV- No chest pain, palpitations  Resp- +cough, +dyspnea  GI- No N/V/D, abd pain    All systems reviewed and negative except as mentioned in HPI  Objective   Objective     Vital Signs:   Temp:  [97.8 °F (36.6 °C)-99 °F (37.2 °C)] 98 °F (36.7 °C)  Heart Rate:  [67-89] 89  Resp:  [16-20] 20  BP: (117-150)/(71-90) 144/90        Physical Exam:  Constitutional: chronically ill appearing male, uncomfortable, awake, alert  HENT: NCAT, mucous membranes moist  Respiratory: Moderately labored respirations, diffuse coarse breath sounds mild expiratory wheezes on 2 L NC   Cardiovascular: RRR, 3/6 systolic murmurs, rubs, or gallops, palpable pedal pulses bilaterally  Gastrointestinal: Positive bowel sounds, soft, nontender, nondistended  Musculoskeletal: Bilateral lower extremity edema 2+  Psychiatric: Appropriate affect, cooperative  Neurologic: Oriented x 3, no focal deficits  Skin: No rashes    Results Reviewed:  Results from last 7 days   Lab Units 20  0536 20  0308 20  1628   WBC 10*3/mm3 9.98 10.14 9.47   HEMOGLOBIN g/dL 11.2* 11.7* 10.9*   HEMATOCRIT % 37.1* 39.9 36.8*   PLATELETS 10*3/mm3 249 247 251     Results from last 7 days   Lab Units 20  0435 20  0536 20  0308 20  1810 20  1627   SODIUM mmol/L 136 140 141  --  141   POTASSIUM mmol/L 4.0 4.4 4.7  --  4.6   CHLORIDE mmol/L 92* 96* 96*  --  99   CO2 mmol/L 32.0* 33.0* 33.0*  --  33.0*   BUN mg/dL 44* 40* 36*  --  35*   CREATININE mg/dL 1.85* 2.03* 2.12*  --  2.14*   GLUCOSE mg/dL 157* 130* 129*  --  59*   CALCIUM mg/dL 9.4  9.1 9.4  --  9.4   ALT (SGPT) U/L  --   --   --   --  16   AST (SGOT) U/L  --   --   --   --  28   TROPONIN T ng/mL  --   --   --  0.045* 0.054*   PROBNP pg/mL 1,338.0*  --  1,587.0*  --  1,728.0*     Estimated Creatinine Clearance: 56.8 mL/min (A) (by C-G formula based on SCr of 1.85 mg/dL (H)).    Microbiology Results Abnormal     Procedure Component Value - Date/Time    Respiratory Panel, PCR - Swab, Nasopharynx [[id number]]  (Normal) Collected:  02/28/20 0040    Lab Status:  Final result Specimen:  Swab from Nasopharynx Updated:  02/29/20 0717     ADENOVIRUS, PCR Not Detected     Coronavirus 229E Not Detected     Coronavirus HKU1 Not Detected     Coronavirus NL63 Not Detected     Coronavirus OC43 Not Detected     Human Metapneumovirus Not Detected     Human Rhinovirus/Enterovirus Not Detected     Influenza B PCR Not Detected     Parainfluenza Virus 1 Not Detected     Parainfluenza Virus 2 Not Detected     Parainfluenza Virus 3 Not Detected     Parainfluenza Virus 4 Not Detected     Bordetella pertussis pcr Not Detected     Influenza A H1 2009 PCR Not Detected     Chlamydophila pneumoniae PCR Not Detected     Mycoplasma pneumo by PCR Not Detected     Influenza A PCR Not Detected     Influenza A H3 Not Detected     Influenza A H1 Not Detected     RSV, PCR Not Detected     Bordetella parapertussis PCR Not Detected          Imaging Results (Last 24 Hours)     ** No results found for the last 24 hours. **          Results for orders placed during the hospital encounter of 02/28/20   Adult Transthoracic Echo Complete W/ Cont if Necessary Per Protocol    Narrative · Left ventricular systolic function is normal. Estimated EF = 65%.  · The aortic valve is likely bicuspid and severely calcified. There is   severe aortic stenosis (mean gradient 51 mmHg).  · Mild mitral valve stenosis is present  · Mild mitral valve regurgitation is present          I have reviewed the medications:  Scheduled Meds:    allopurinol 300 mg Oral  Daily   aspirin 81 mg Oral Daily   bumetanide 2 mg Intravenous Q12H   carvedilol 12.5 mg Oral BID With Meals   gabapentin 300 mg Oral Nightly   heparin (porcine) 5,000 Units Subcutaneous Q8H   rosuvastatin 40 mg Oral Nightly   sodium chloride 10 mL Intravenous Q12H     Continuous Infusions:   PRN Meds:.•  acetaminophen  •  albuterol  •  sodium chloride  •  sodium chloride    Assessment/Plan   Assessment & Plan     Active Hospital Problems    Diagnosis  POA   • **Diastolic CHF due to valvular disease (CMS/[facility]) [I38, I50.30]  Yes   • Coronary artery disease involving native coronary artery of native heart with angina pectoris (CMS/[facility]) [I25.119]  Yes   • Demand ischemia of myocardium (CMS/[facility]) [I24.8]  Yes   • CKD (chronic kidney disease) stage 3, GFR 30-59 ml/min (CMS/[facility]) [N18.3]  Yes   • Hyperlipidemia LDL goal <70 [E78.5]  Yes   • Essential hypertension [I10]  Yes   • Type 2 diabetes mellitus with hypoglycemia (CMS/[facility]) [E11.649]  Yes   • Normocytic anemia [D64.9]  Yes   • Aortic stenosis [I35.0]  Yes      Resolved Hospital Problems    Diagnosis Date Resolved POA   • CHF exacerbation (CMS/[facility]) [I50.9] 02/29/2020 Yes   • Elevated troponin [R79.89] 02/29/2020 Yes        Brief Hospital Course to date:  Juan Alberto Tejeda is a 59 y.o. male with past medical history of chronic systolic heart failure, type 2 diabetes, hypertension, hyperlipidemia, CKD stage III, aortic stenosis, he is s/p recent [facility] with noncritical CAD.  Patient presented with acute shortness of air, lower extremity edema, found to be in CHF exacerbation.  Patient also initially was hypoglycemic, this has since resolved s/p D50, he is currently getting diuresed and  CT surgery is currently evaluating for possible TAVR.     Plan  Acute dyspnea, ongoing  -Etiology likely multifactorial secondary to CHF exacerbation vs acute bronchitis vs severe AS  -Chest x-ray concerning for pulmonary edema, respiratory panel is negative  -Cardiology following, echo with  EF of 65%, continue IV bumex, he is diuresing well, -6.2 L thus far, continue strict I/o  -continue Coreg, duo aleta     Severe aortic stenosis  -Echo done here shows bicuspid and severely calcified aortic valve, severe aortic stenosis with aortic valve area of 0.74 cm², mean gradient of 51mmg.  -Follow-up cardiac cath films from [facility]  -CTS consulted, TAVR, w/u ongoing, follow-up CT abd/pelv and CT chest     Well-controlled type 2 diabetes with A1c 6.3% with significant hypoglycemia, resolved  -FSBG continue to be optimal, continue only SSI     Elevated troponin-suspect supply demand mismatch sec to dyspnea, he is s/p recent C with noncritical CAD     CKD stage III-renal function seems to be at baseline, continue to monitor as we diurese, avoid nephrotoxins.     Hypertension-BP currently controlled,     Obesity with BMI 43, complicates all aspects of care.     Follow-up medical records/films from [facility], these have been requested     DVT Prophylaxis: [facility]    Disposition: TBD    CODE STATUS:   Code Status and Medical Interventions:   Ordered at: 02/28/20 9734     Level Of Support Discussed With:    Patient     Code Status:    CPR     Medical Interventions (Level of Support Prior to Arrest):    Full       Electronically signed by Miguel Samuel MD, 03/02/20, 8:55 AM.

## 2020-03-02 NOTE — THERAPY TREATMENT NOTE
Patient Name: Juan Alberto Tejeda  : 1960    MRN: [id number]                              Today's Date: 3/2/2020       Admit Date: 2020    Visit Dx:     ICD-10-CM ICD-9-CM   1. Acute on chronic systolic CHF (congestive heart failure) (CMS/[facility]) I50.23 428.23     428.0   2. Aortic valve stenosis, etiology of cardiac valve disease unspecified I35.0 424.1   3. Acute pulmonary edema (CMS/[facility]) J81.0 518.4   4. Chronic kidney disease, unspecified CKD stage N18.9 585.9     Patient Active Problem List   Diagnosis   • CKD (chronic kidney disease) stage 3, GFR 30-59 ml/min (CMS/[facility])   • Hyperlipidemia LDL goal <70   • Essential hypertension   • Type 2 diabetes mellitus with hypoglycemia (CMS/[facility])   • Normocytic anemia   • Aortic stenosis   • Diastolic CHF due to valvular disease (CMS/[facility])   • Coronary artery disease involving native coronary artery of native heart with angina pectoris (CMS/[facility])   • Demand ischemia of myocardium (CMS/[facility])     Past Medical History:   Diagnosis Date   • Chronic kidney disease    • Diabetes mellitus (CMS/[facility])    • Hyperlipidemia    • Hypertension    • Peripheral neuropathy      Past Surgical History:   Procedure Laterality Date   • CHOLECYSTECTOMY     • KIDNEY STONE SURGERY       General Information     Row Name 20 1135          PT Evaluation Time/Intention    Document Type  therapy note (daily note)  -NS     Mode of Treatment  individual therapy;physical therapy  -NS     Row Name 20 1135          General Information    Patient Profile Reviewed?  yes  -NS     Existing Precautions/Restrictions  fall;oxygen therapy device and L/min  -NS     Row Name 20 1135          Cognitive Assessment/Intervention- PT/OT    Orientation Status (Cognition)  oriented x 4  -NS     Row Name 20 1135          Safety Issues, Functional Mobility    Safety Issues Affecting Function (Mobility)  insight into deficits/self awareness;safety precaution awareness;safety precautions  follow-through/compliance;sequencing abilities  -NS     Impairments Affecting Function (Mobility)  endurance/activity tolerance;postural/trunk control;pain;balance;shortness of breath;strength;range of motion (ROM)  -NS       User Key  (r) = Recorded By, (t) = Taken By, (c) = Cosigned By    Initials Name Provider Type    Leta Ocasio, PT Physical Therapist        Mobility     Row Name 03/02/20 1135          Bed Mobility Assessment/Treatment    Bed Mobility Assessment/Treatment  supine-sit;sit-supine;scooting/bridging  -NS     Scooting/Bridging [place] (Bed Mobility)  maximum assist (25% patient effort);2 person assist;verbal cues  -NS     Supine-Sit [place] (Bed Mobility)  moderate assist (50% patient effort);1 person assist;verbal cues  -NS     Sit-Supine [place] (Bed Mobility)  maximum assist (25% patient effort);2 person assist;verbal cues  -NS     Assistive Device (Bed Mobility)  bed rails;head of bed elevated;draw sheet  -NS     Comment (Bed Mobility)  VCing for sequencing. Patient was Mod A to transfer to sitting, with assist required at BLEs and trunk. Pt was Max A x2 for sit>supine and scooting up in bed. Patient with increased effort throughout session with complaints of BLE edema.   -NS     Row Name 03/02/20 1137          Transfer Assessment/Treatment    Comment (Transfers)  Pt completed STS from EOB with Max A x2, demonstrating decreased ability to bring B feet underneath him due to swelling at B knees. Pt unable to come to full upright standing and complained of being SOA and declined further activity.   -NS     Row Name 03/02/20 1132          Sit-Stand Transfer    Sit-Stand [place] (Transfers)  maximum assist (25% patient effort);2 person assist;verbal cues  -NS     Assistive Device (Sit-Stand Transfers)  walker, front-wheeled  -NS     Row Name 03/02/20 113          Gait/Stairs Assessment/Training    [place] Level (Gait)  unable to assess  -NS     Comment (Gait/Stairs)   Unable to assess this session due to swelling, weakness, and decreased activity tolerance. Wife notes that pt has days in which his mobility fluctuates greatly.   -NS       User Key  (r) = Recorded By, (t) = Taken By, (c) = Cosigned By    Initials Name Provider Type    Leta Ocasio PT Physical Therapist        Obj/Interventions     Row Name 03/02/20 1135          Therapeutic Exercise    Lower Extremity (Therapeutic Exercise)  quad sets, bilateral  -NS     Lower Extremity Range of Motion (Therapeutic Exercise)  hip internal/external rotation, bilateral;ankle dorsiflexion/plantar flexion, bilateral  -NS     Exercise Type (Therapeutic Exercise)  AROM (active range of motion)  -NS     Position (Therapeutic Exercise)  supine  -NS     Sets/Reps (Therapeutic Exercise)  1x10  -NS     Expected Outcome (Therapeutic Exercise)  facilitate normal movement patterns;improve functional stability  -NS     Comment (Therapeutic Exercise)  Pt completed at slow pace with cues to complete throughout the day to maintain strength.  -NS     Row Name 03/02/20 1135          Static Sitting Balance    Level of [place] (Unsupported Sitting, Static Balance)  contact guard assist  -NS     Sitting Position (Unsupported Sitting, Static Balance)  sitting on edge of bed  -NS     Time Able to Maintain Position (Unsupported Sitting, Static Balance)  45 to 60 seconds  -NS     Row Name 03/02/20 1135          Static Standing Balance    Level of [place] (Supported Standing, Static Balance)  maximal assist, 25 to 49% patient effort;2 person assist  -NS     Time Able to Maintain Position (Supported Standing, Static Balance)  less than 15 seconds  -NS     Assistive Device Utilized (Supported Standing, Static Balance)  walker, rolling  -NS       User Key  (r) = Recorded By, (t) = Taken By, (c) = Cosigned By    Initials Name Provider Type    Leta Ocasio PT Physical Therapist        Goals/Plan    No documentation.       Clinical Impression      Row Name 03/02/20 1135          Pain Assessment    Additional Documentation  Pain Scale: Numbers Pre/Post-Treatment (Group)  -NS     Row Name 03/02/20 1135          Pain Scale: Numbers Pre/Post-Treatment    Pain Scale: Numbers, Pretreatment  4/10  -NS     Pain Scale: Numbers, Post-Treatment  4/10  -NS     Pain Location - Side  Left  -NS     Pain Location  hip  -NS     Pain Intervention(s)  Repositioned;Ambulation/increased activity  -NS     Row Name 03/02/20 1135          Plan of Care Review    Plan of Care Reviewed With  patient  -NS     Progress  declining  -NS     Row Name 03/02/20 1135          Vital Signs    Pre Systolic BP Rehab  -- VSS- RN cleared for PT treatment  -NS     Pre Patient Position  Supine  -NS     Intra Patient Position  Standing  -NS     Post Patient Position  Supine  -NS     Row Name 03/02/20 1135          Positioning and Restraints    Pre-Treatment Position  in bed  -NS     Post Treatment Position  bed  -NS     In Bed  notified nsg;supine;call light within reach;encouraged to call for assist;exit alarm on;patient within staff view;with family/caregiver;with nsg;heels elevated  -NS       User Key  (r) = Recorded By, (t) = Taken By, (c) = Cosigned By    Initials Name Provider Type    Leta Ocasio, PT Physical Therapist        Outcome Measures     Row Name 03/02/20 1135          How much help from another person do you currently need...    Turning from your back to your side while in flat bed without using bedrails?  3  -NS     Moving from lying on back to sitting on the side of a flat bed without bedrails?  2  -NS     Moving to and from a bed to a chair (including a wheelchair)?  2  -NS     Standing up from a chair using your arms (e.g., wheelchair, bedside chair)?  2  -NS     Climbing 3-5 steps with a railing?  2  -NS     To walk in hospital room?  2  -NS     AM-PAC 6 Clicks Score (PT)  13  -NS       User Key  (r) = Recorded By, (t) = Taken By, (c) = Cosigned By    Initials Name Provider  Type    NS Leta Ropre, PT Physical Therapist          PT Recommendation and Plan     Outcome Summary/Treatment Plan (PT)  Anticipated Discharge Disposition (PT): inpatient rehabilitation facility  Plan of Care Reviewed With: patient  Progress: declining  Outcome Summary: Patient demonstrated decreased activity tolerance and generalized weakness with complaints of BLE edema this session. He transferred supine>sit with Mod A x1, STS with Max A x2 with inability to come to full upright standing, and sit>supine with Max A x2. Pt declined transfer this date due to fatigue. Continue to progress as appropriate. Recommend IRF at discharge.     Time Calculation:   PT Charges     Row Name 03/02/20 1135             Time Calculation    Start Time  1135  -NS      PT Received On  03/02/20  -NS      PT Goal Re-Cert Due Date  03/11/20  -NS         Timed Charges    82694 - PT Therapeutic Exercise Minutes  8  -NS      26989 - PT Therapeutic Activity Minutes  15  -NS        User Key  (r) = Recorded By, (t) = Taken By, (c) = Cosigned By    Initials Name Provider Type    NS Leta Roper, HIRAM Physical Therapist        Therapy Charges for Today     Code Description Service Date Service Provider Modifiers Qty    88213655697 HC PT THER PROC EA 15 MIN 3/2/2020 Leta Roper, PT GP 1    15005002103 HC PT THERAPEUTIC ACT EA 15 MIN 3/2/2020 Leta Roper, PT GP 1          PT G-Codes  Outcome Measure Options: AM-PAC 6 Clicks Basic Mobility (PT)  AM-PAC 6 Clicks Score (PT): 13  AM-PAC 6 Clicks Score (OT): 18    Leta Roper PT  3/2/2020

## 2020-03-02 NOTE — PROGRESS NOTES
TAVR APRN Evaluation    Juan Alberto Tejeda, 1960, [id number]     03/02/20    PCP: Megan Martell APRN  Primary Cardiologist: Vasyl Canales MD    TAVR Team:  1.  Irvin Jeffers MD  2.  Alfred Prince MD  3.  Cayla Bella MD  4.  Trupti Paredes MD  5.  Clinton Molina MD  6.  Darwin Hinds MD    Chief Complaint: Edema and Shortness of Breath      Identification: This is a 59 y.o. year old male from [address] [place] [address].    History of Present Illness: Mr. Tejeda was referred for consideration of TAVR due to acute diastolic heart failure necessitating hospital admission.  He has known bicuspid aortic valve and has been evaluated @ [facility] for TAVR vs SAVR.  He presented to [facility] ER on 2/28/2020 with acute HF symptoms of edema and dyspnea.  His aortic valve indices are as follows:  BERNA 0.74 cm2, Aortic valve Vmax 4.56 m/sec, and AV mean gradient 52 mm Hg.      He continues IV diuresis and his cath film from [facility] arrived this afternoon.      Problem List:   1.  Acute diastolic heart disease due to valvular dysfunction   A.  Echo 2/29/2020:  Severe AS/ bicuspid aortic valve mean gradient 52 mm Hg, LVEF 65% B.  Demand ischemia with elevated troponin and elevated proBNP 2/29/2020   2.  Aortic stenosis    A.  Echo 2/29/2020:  Bicuspid aortic valve  3.  CAD   A.  Cath 12/2019 (films received from [facility]): moderate non-obstructive CAD.  Mid LAD                      40-50% stenosis, Cx 30% stenosis, RCA mid 50% stenosis   B.  NSTEMI Type 2 (12/23/2019 @ [facility])  4.  CKD stage 3   A.  Current GFR 34 ml/min  5.  Type2 DM   A.  Current HgA1C 6.3%   B.  With diabetic neuropathy treated with gabapentin  6.  Hyperlipidemia   A.  LDL goal <70 due to non-obstructive CAD and DM  7.  Essential hypertension  8.  Morbid obesity   A.  BMI 43.2  9.  Gout      Patient Active Problem List   Diagnosis   • CKD (chronic kidney disease) stage 3, GFR 30-59 ml/min (CMS/[facility])   • Hyperlipidemia LDL goal <70   • Essential  hypertension   • Type 2 diabetes mellitus with hypoglycemia (CMS/HCC)   • Normocytic anemia   • Aortic stenosis   • Diastolic CHF due to valvular disease (CMS/HCC)   • Coronary artery disease involving native coronary artery of native heart with angina pectoris (CMS/HCC)   • Demand ischemia of myocardium (CMS/HCC)       Past Surgical History:  Past Surgical History:   Procedure Laterality Date   • CHOLECYSTECTOMY     • KIDNEY STONE SURGERY         Allergies:  Patient has no known allergies.    Social History:  Social History     Socioeconomic History   • Marital status: Single     Spouse name: Not on file   • Number of children: Not on file   • Years of education: Not on file   • Highest education level: Not on file   Tobacco Use   • Smoking status: Never Smoker   • Smokeless tobacco: Never Used   Substance and Sexual Activity   • Alcohol use: Never     Frequency: Never   • Drug use: Never       Current Medications:    Current Facility-Administered Medications:   •  acetaminophen (TYLENOL) tablet 650 mg, 650 mg, Oral, Q4H PRN, Antonia Childress MD, 650 mg at 03/02/20 0829  •  albuterol (PROVENTIL) nebulizer solution 0.083% 2.5 mg/3mL, 2.5 mg, Nebulization, Q8H PRN, Miguel Samuel MD  •  allopurinol (ZYLOPRIM) tablet 300 mg, 300 mg, Oral, Daily, Antonia Childress MD, 300 mg at 03/02/20 0829  •  aspirin chewable tablet 81 mg, 81 mg, Oral, Daily, Antonia Childress MD, 81 mg at 03/02/20 0829  •  bumetanide (BUMEX) injection 2 mg, 2 mg, Intravenous, Q12H, Mark Canales IV, MD, 2 mg at 03/02/20 1155  •  carvedilol (COREG) tablet 12.5 mg, 12.5 mg, Oral, BID With Meals, Antonia Childress MD, 12.5 mg at 03/02/20 0829  •  gabapentin (NEURONTIN) capsule 600 mg, 600 mg, Oral, Q8H, Miguel Samuel MD, 600 mg at 03/02/20 1306  •  heparin (porcine) 5000 UNIT/ML injection 5,000 Units, 5,000 Units, Subcutaneous, Q8H, Antonia Childress MD, 5,000 Units at 03/02/20 1307  •  rosuvastatin (CRESTOR) tablet 40 mg, 40 mg, Oral, Nightly, Mini,  MD Antonia, 40 mg at 03/01/20 2213  •  sodium chloride 0.9 % flush 10 mL, 10 mL, Intravenous, PRN, Arden Burgess MD  •  sodium chloride 0.9 % flush 10 mL, 10 mL, Intravenous, Q12H, Antonia Childress MD, 10 mL at 03/02/20 0880  •  sodium chloride 0.9 % flush 10 mL, 10 mL, Intravenous, PRN, Antonia Childress MD    Review of Systems:  Review of Systems   Constitution: Positive for malaise/fatigue and weight gain.   HENT: Negative.    Eyes: Positive for visual disturbance.        Chronic vision loss bilaterally   Cardiovascular: Positive for dyspnea on exertion, leg swelling, orthopnea and paroxysmal nocturnal dyspnea. Negative for chest pain.   Respiratory: Positive for cough, shortness of breath and sleep disturbances due to breathing.    Endocrine: Negative.    Hematologic/Lymphatic: Negative.    Musculoskeletal: Positive for muscle weakness and myalgias.   Gastrointestinal: Positive for bloating.   Genitourinary: Negative.    Neurological: Positive for dizziness, numbness, paresthesias and weakness.   Psychiatric/Behavioral: Negative.    Allergic/Immunologic: Negative.         Physical Exam:  Physical Exam   Constitutional: He is oriented to person, place, and time. He appears well-developed. No distress.   Obese young adult gentleman resting in bed   HENT:   Head: Normocephalic and atraumatic.   Eyes: Pupils are equal, round, and reactive to light. Conjunctivae are normal. No scleral icterus.   Relates chronic visual deficit    Neck: Normal range of motion. Neck supple. No JVD present. No tracheal deviation present. No thyromegaly present.   Cardiovascular: Normal rate, regular rhythm and intact distal pulses. Exam reveals no gallop and no friction rub.   Murmur heard.  Harsh systolic murmur III/VI which radiates to both carotids   Pulmonary/Chest: Effort normal and breath sounds normal. No respiratory distress. He has no wheezes. He has no rales.   Diminished breath sounds in bilateral bases.  No rales    Abdominal: Soft. Bowel sounds are normal. There is no tenderness.   Musculoskeletal: Normal range of motion. He exhibits edema. He exhibits no tenderness or deformity.   Generalized +1 edema    Neurological: He is alert and oriented to person, place, and time. No cranial nerve deficit.   Skin: Skin is warm and dry.   Psychiatric: He has a normal mood and affect. His behavior is normal. Judgment and thought content normal.       Vitals:    03/02/20 0616 03/02/20 0704 03/02/20 1042 03/02/20 1119   BP:  144/90  137/92   BP Location:  Right arm  Right arm   Patient Position:  Lying  Lying   Pulse: 87 89 83 82   Resp:  20  18   Temp:  98 °F (36.7 °C)  98.5 °F (36.9 °C)   TempSrc:  Oral  Oral   SpO2: 93% 94% 93% 94%   Weight: 128 kg (282 lb 8 oz)      Height:           Diagnostic Data:  Transthoracic echo:2/28/2020    · Left ventricular systolic function is normal. Estimated EF = 65%.  · The aortic valve is likely bicuspid and severely calcified. There is severe aortic stenosis (mean gradient 51 mmHg).  · Mild mitral valve stenosis is present  · Mild mitral valve regurgitation is present     Transesophageal echo: requested    Cardiac Cath: @ [facility]    CTA Chest, Abdomen, and Pelvis: requested for AM (dependent renal status)    Carotid Doppler: requested        Functional Assessment Data:    KCCQ12 Questionnaire Score: (see scanned copy) 19/70 (severely symptomatic)      [place] Basic Activities of Daily Living (ADL) Scale    Bathing (sponge bath, tub bath, or shower).  Receives either no assistance,   or assistance with bathing only on body part.   No    Dressing Gets clothes and dresses without any assistance, except for  tying shoes.        No    Toileting Goes to toilet room, uses toilet, arranges clothes, and returns  without any assistance (may use cane or walker for support and may use  bedpan / urinal at night.      Yes    Transferring Moves into and out of bed and chair without assistance  (may use cane or  walker)      Yes    Continence Controls bowel and bladder completely without occasional  accidents        Yes    Feeding Feeds self without assistance (except for help with cutting meat  or buttering bread)       Yes        Total (Number of Yesses of 6) 4/6      [place] Instrumental Activities of Daily Living Scale (IADL)    Ability to Use Telephone   · Operates telephone on own initiative.  Looks up and dials numbers, etc.         1  Shopping  Takes care of all shopping needs indepently  1     Food Preparation  Prepares adequate meals if supplied with ingredients 1     Housekeeping  · Performs light daily tasks such as dish washing, bed making          0  Laundry  Does personal laundry completely   1     Mode of Transportation  · Travels independently on public transportation or drives own car  ·         1     Responsibility for Own Medications  · Takes responsibility if medication is prepared in advance  in separate dosage      0     Ability to Handle Finances  · Manages financial matters independently (budgets, writes checks,   pays rent, bills, goes to bank), collects and keeps track of income  ·         1       Total (Number of Instrumental Activities of 8) 6/8    Unable to perform 5 meter walk at this time due to leg pain       STS risk of mortality with open AVR:  2.97%  http://riskcalc.sts.org/stswebriskcalc/calculate       Creatinine Clearance: 72.7  https://reference.Particle.Lexity/calculator/creatinine-clearance-cockcroft-gault    Assessment/ Plan:     ICD-10-CM ICD-9-CM   1. Acute on chronic systolic CHF (congestive heart failure) (CMS/[facility]) I50.23 428.23     428.0   2. Aortic valve stenosis, bicuspid valve I35.0 424.1   3. Moderate non-obstructive CAD J81.0 518.4   4. Chronic kidney disease, stage 3 N18.9 585.9     Planning for SHANKAR and CTA TAVR protocol tomorrow.      TAVR education materials reviewed with patient/ spouse today.  This included printed brochure detailing pathophysiology of aortic  stenosis, patient specific aortic stenosis symptoms, and treatment options (medical therapy, surgical aortic valve replacement, and transcatheter aortic valve replacement).  A model of the valve and procedural animation were also used to explain TAVR.  We discussed patient's goals of care: keep living and spending time with family/ grandchildren.  We also utilized the [facility] Shared Decision Making Guide for SAVR vs TAVR.  Left patient/ spouse question worksheet to write down questions for surgeon' interventional cardiologist.       Our talk also included procedural details, procedure risks, anticipated pre-op and post-op expectations, as well as follow up visit schedule @ one month and one year.  Further discussion and final decision making will occur with Multi- Disciplinary Heart Team prior to scheduling a procedure date, but his procedure could be on 3/16 or 3/26, depending on CTA,SHANKAR findings, and renal function.      Susan Miranda, RADHA, 03/02/20, 2:59 PM

## 2020-03-02 NOTE — PROGRESS NOTES
[facility] at [facility]  Cardiology Progress Note      Chief Complaint/Reason for service:    · Severe aortic stenosis  · Diastolic heart failure due to aortic stenosis         · Patient is feeling better with IV diuretics  · TAVR team has seen patient  · TAVR CTA and SHANKAR scheduled for tomorrow    Past medical, surgical, social and family history reviewed in the patient's electronic medical record.       Vital Sign Min/Max for last 24 hours  Temp  Min: 98 °F (36.7 °C)  Max: 99 °F (37.2 °C)   BP  Min: 124/78  Max: 150/85   Pulse  Min: 76  Max: 89   Resp  Min: 16  Max: 20   SpO2  Min: 93 %  Max: 96 %   Flow (L/min)  Min: 2  Max: 3      Intake/Output Summary (Last 24 hours) at 3/2/2020 1757  Last data filed at 3/2/2020 1749  Gross per 24 hour   Intake 360 ml   Output 2400 ml   Net -2040 ml           Physical Exam   Constitutional: He is oriented to person, place, and time. He appears well-developed and well-nourished.   HENT:   Head: Atraumatic.   Neck: No thyromegaly present.   Cardiovascular: Normal rate and regular rhythm.   No murmur heard.  Pulmonary/Chest: Effort normal.   Abdominal: Soft.   Neurological: He is alert and oriented to person, place, and time.   Skin: Skin is warm and dry.   Psychiatric: He has a normal mood and affect. His behavior is normal.       Tele: Sinus    Results Review (reviewed the patient's recent labs in the electronic medical record):     Cardiac catheterization films from [facility] from 12/2019 reviewed.  Moderate nonobstructive CAD noted.    Results from last 7 days   Lab Units 03/02/20  0435 03/01/20  0536 02/29/20  0308 02/28/20  1627   SODIUM mmol/L 136 140 141 141   POTASSIUM mmol/L 4.0 4.4 4.7 4.6   CHLORIDE mmol/L 92* 96* 96* 99   BUN mg/dL 44* 40* 36* 35*   CREATININE mg/dL 1.85* 2.03* 2.12* 2.14*     Results from last 7 days   Lab Units 02/28/20  1810 02/28/20  1627   TROPONIN T ng/mL 0.045* 0.054*     Results from last 7 days   Lab Units 03/01/20  0536  02/29/20  0308 02/28/20  1628   WBC 10*3/mm3 9.98 10.14 9.47   HEMOGLOBIN g/dL 11.2* 11.7* 10.9*   HEMATOCRIT % 37.1* 39.9 36.8*   PLATELETS 10*3/mm3 249 247 251       Lab Results   Component Value Date    HGBA1C 6.30 (H) 02/29/2020       Lab Results   Component Value Date    TRIG 178 12/10/2018    HDL 32 (L) 12/10/2018     (H) 12/10/2018    AST 28 02/28/2020    ALT 16 02/28/2020                    Active Hospital Problems    Diagnosis POA   • **Diastolic CHF due to valvular disease (CMS/[facility]) [I38, I50.30] Yes     Priority: High     · Echo (2/29/2020): LVEF 65%.  Aortic valve is bicuspid and severely calcified.  Severe aortic stenosis (mean gradient 51 mmHg). Mild MR and MS     • Aortic stenosis [I35.0] Yes     Priority: High     · Echo (2/29/2020): LVEF 65%.  Aortic valve is bicuspid and severely calcified.  Severe aortic stenosis (mean gradient 51 mmHg). Mild MR and MS     • Coronary artery disease involving native coronary artery of native heart with angina pectoris (CMS/[facility]) [I25.119] Yes     Priority: Medium     · Cardiac catheterization at [facility] (12/2019): Moderate nonobstructive CAD.     • Demand ischemia of myocardium (CMS/[facility]) [I24.8] Yes     Priority: Medium     · Mildly elevated troponins in setting of acute heart failure from aortic stenosis 2/28/2020     • CKD (chronic kidney disease) stage 3, GFR 30-59 ml/min (CMS/[facility]) [N18.3] Yes   • Hyperlipidemia LDL goal <70 [E78.5] Yes     • High intensity statin therapy indicated given the presence of CAD     • Essential hypertension [I10] Yes   • Type 2 diabetes mellitus with hypoglycemia (CMS/[facility]) [E11.649] Yes   • Normocytic anemia [D64.9] Yes              · N.p.o. after midnight  · CTA for TAVR work-up tomorrow  · SHANKAR for TAVR work-up tomorrow    Mark Canales IV, MD  3/2/2020

## 2020-03-02 NOTE — PLAN OF CARE
Problem: Patient Care Overview  Goal: Plan of Care Review  Outcome: Ongoing (interventions implemented as appropriate)  Flowsheets (Taken 3/2/2020 0887)  Outcome Summary: Patient demonstrated decreased activity tolerance and generalized weakness with complaints of BLE edema this session. He transferred supine>sit with Mod A x1, STS with Max A x2 with inability to come to full upright standing, and sit>supine with Max A x2. Pt declined transfer this date due to fatigue. Continue to progress as appropriate. Recommend IRF at discharge.

## 2020-03-02 NOTE — PROGRESS NOTES
"Juan Alberto Tejeda  [id number]  1960     LOS: 3 days   Patient Care Team:  Megan Martell, APRN as PCP - General (Family Medicine)    Chief Complaint: Aortic stenosis      Subjective: Slight shortness of breath.    Objective:     Vital Sign Min/Max for last 24 hours  Temp  Min: 97.8 °F (36.6 °C)  Max: 99 °F (37.2 °C)   BP  Min: 112/46  Max: 150/85   Pulse  Min: 67  Max: 87   Resp  Min: 16  Max: 18   SpO2  Min: 93 %  Max: 96 %   No data recorded   Weight  Min: 128 kg (282 lb 8 oz)  Max: 128 kg (282 lb 8 oz)     Flowsheet Rows      First Filed Value   Admission Height  172.7 cm (68\") Documented at 02/28/2020 1614   Admission Weight  118 kg (260 lb) Documented at 02/28/2020 1614          Physical Exam: Vital signs stable T-max 99, she is slightly short of breath no diaphoresis    Wound:    Pulses:     Mediastinal and Chest Tube Drainage:       Results Review:   Results from last 7 days   Lab Units 03/01/20  0536   WBC 10*3/mm3 9.98   HEMOGLOBIN g/dL 11.2*   HEMATOCRIT % 37.1*   PLATELETS 10*3/mm3 249     Results from last 7 days   Lab Units 03/02/20  0435   SODIUM mmol/L 136   POTASSIUM mmol/L 4.0   CHLORIDE mmol/L 92*   CO2 mmol/L 32.0*   BUN mg/dL 44*   CREATININE mg/dL 1.85*   GLUCOSE mg/dL 157*   CALCIUM mg/dL 9.4             Assessment      Diastolic CHF due to valvular disease (CMS/[facility])    CKD (chronic kidney disease) stage 3, GFR 30-59 ml/min (CMS/[facility])    Hyperlipidemia LDL goal <70    Essential hypertension    Type 2 diabetes mellitus with hypoglycemia (CMS/HCC)    Normocytic anemia    Aortic stenosis    Coronary artery disease involving native coronary artery of native heart with angina pectoris (CMS/HCC)    Demand ischemia of myocardium (CMS/HCC)      Aortic stenosis.  Will start work-up for a TAVR.  We will need to get the cardiac films from [facility].  We will discussed with Susan TAVR coordinator.        Irvin Jeffers MD  03/02/20  6:57 AM      Please note that portions of this note " were completed with a voice recognition program. Efforts were made to edit the dictations, but words may be mistranscribed

## 2020-03-02 NOTE — PROGRESS NOTES
Continued Stay Note  [facility]     Patient Name: Juan Alberto Tejeda  MRN: [id number]  Today's Date: 3/2/2020    Admit Date: 2/28/2020    Discharge Plan     Row Name 03/02/20 1201       Plan    Plan  Home with ex wife, home health vs rehab placement    Provided Post Acute Provider List?  Yes    Post Acute Provider List  Home Health;Inpatient Rehab;Nursing Home    Provided Post Acute Provider Quality & Resource List?  Yes    Post Acute Provider Quality and Resource List  Home Health;Nursing Home;Inpatient Rehab    Delivered To  Patient    Method of Delivery  In person    Patient/Family in Agreement with Plan  yes    Plan Comments  I have spoken with patient this am. We discussed discharge planning. He tells me his ex wife, Carmencita Tejeda has been staying with him to help him out. He normally lives alone.    He tells me he would consider rehab placement, if needed but prefers home with home health. Agency choices to be given to him.  will continue to follow and assist with discharge planning. We will continue to see how he does once physical rehab sees him after his possible surgery.         Final Discharge Disposition Code  30 - still a patient        Discharge Codes    No documentation.       Expected Discharge Date and Time     Expected Discharge Date Expected Discharge Time    Mar 6, 2020             Rohini Nelson RN

## 2020-03-03 ENCOUNTER — APPOINTMENT (OUTPATIENT)
Dept: CARDIOLOGY | Facility: HOSPITAL | Age: 60
End: 2020-03-03

## 2020-03-03 ENCOUNTER — APPOINTMENT (OUTPATIENT)
Dept: CT IMAGING | Facility: HOSPITAL | Age: 60
End: 2020-03-03

## 2020-03-03 LAB
ALBUMIN SERPL-MCNC: 3.4 G/DL (ref 3.5–5.2)
ANION GAP SERPL CALCULATED.3IONS-SCNC: 13 MMOL/L (ref 5–15)
BUN BLD-MCNC: 52 MG/DL (ref 6–20)
BUN/CREAT SERPL: 25.5 (ref 7–25)
CALCIUM SPEC-SCNC: 9.3 MG/DL (ref 8.6–10.5)
CHLORIDE SERPL-SCNC: 92 MMOL/L (ref 98–107)
CO2 SERPL-SCNC: 32 MMOL/L (ref 22–29)
CREAT BLD-MCNC: 2.04 MG/DL (ref 0.76–1.27)
DEPRECATED RDW RBC AUTO: 50.4 FL (ref 37–54)
ERYTHROCYTE [DISTWIDTH] IN BLOOD BY AUTOMATED COUNT: 15.8 % (ref 12.3–15.4)
GFR SERPL CREATININE-BSD FRML MDRD: 34 ML/MIN/1.73
GLUCOSE BLD-MCNC: 145 MG/DL (ref 65–99)
GLUCOSE BLDC GLUCOMTR-MCNC: 162 MG/DL (ref 70–130)
GLUCOSE BLDC GLUCOMTR-MCNC: 207 MG/DL (ref 70–130)
HCT VFR BLD AUTO: 34.5 % (ref 37.5–51)
HGB BLD-MCNC: 10.7 G/DL (ref 13–17.7)
MCH RBC QN AUTO: 27.3 PG (ref 26.6–33)
MCHC RBC AUTO-ENTMCNC: 31 G/DL (ref 31.5–35.7)
MCV RBC AUTO: 88 FL (ref 79–97)
PHOSPHATE SERPL-MCNC: 3.7 MG/DL (ref 2.5–4.5)
PLATELET # BLD AUTO: 280 10*3/MM3 (ref 140–450)
PMV BLD AUTO: 10.9 FL (ref 6–12)
POTASSIUM BLD-SCNC: 4 MMOL/L (ref 3.5–5.2)
RBC # BLD AUTO: 3.92 10*6/MM3 (ref 4.14–5.8)
SODIUM BLD-SCNC: 137 MMOL/L (ref 136–145)
WBC NRBC COR # BLD: 8.1 10*3/MM3 (ref 3.4–10.8)

## 2020-03-03 PROCEDURE — 93312 ECHO TRANSESOPHAGEAL: CPT | Performed by: INTERNAL MEDICINE

## 2020-03-03 PROCEDURE — 93325 DOPPLER ECHO COLOR FLOW MAPG: CPT

## 2020-03-03 PROCEDURE — 74174 CTA ABD&PLVS W/CONTRAST: CPT

## 2020-03-03 PROCEDURE — 25010000002 FENTANYL CITRATE (PF) 100 MCG/2ML SOLUTION: Performed by: INTERNAL MEDICINE

## 2020-03-03 PROCEDURE — 93325 DOPPLER ECHO COLOR FLOW MAPG: CPT | Performed by: INTERNAL MEDICINE

## 2020-03-03 PROCEDURE — 71275 CT ANGIOGRAPHY CHEST: CPT

## 2020-03-03 PROCEDURE — 99152 MOD SED SAME PHYS/QHP 5/>YRS: CPT

## 2020-03-03 PROCEDURE — 93312 ECHO TRANSESOPHAGEAL: CPT

## 2020-03-03 PROCEDURE — 0 IOPAMIDOL PER 1 ML: Performed by: INTERNAL MEDICINE

## 2020-03-03 PROCEDURE — 93321 DOPPLER ECHO F-UP/LMTD STD: CPT

## 2020-03-03 PROCEDURE — 25010000002 HEPARIN (PORCINE) PER 1000 UNITS: Performed by: INTERNAL MEDICINE

## 2020-03-03 PROCEDURE — 94660 CPAP INITIATION&MGMT: CPT

## 2020-03-03 PROCEDURE — 99231 SBSQ HOSP IP/OBS SF/LOW 25: CPT | Performed by: NURSE PRACTITIONER

## 2020-03-03 PROCEDURE — 82962 GLUCOSE BLOOD TEST: CPT

## 2020-03-03 PROCEDURE — 85027 COMPLETE CBC AUTOMATED: CPT | Performed by: INTERNAL MEDICINE

## 2020-03-03 PROCEDURE — B24BZZ4 ULTRASONOGRAPHY OF HEART WITH AORTA, TRANSESOPHAGEAL: ICD-10-PCS | Performed by: INTERNAL MEDICINE

## 2020-03-03 PROCEDURE — 94799 UNLISTED PULMONARY SVC/PX: CPT

## 2020-03-03 PROCEDURE — 25010000002 MIDAZOLAM PER 1 MG: Performed by: INTERNAL MEDICINE

## 2020-03-03 PROCEDURE — 99233 SBSQ HOSP IP/OBS HIGH 50: CPT | Performed by: INTERNAL MEDICINE

## 2020-03-03 PROCEDURE — 93321 DOPPLER ECHO F-UP/LMTD STD: CPT | Performed by: INTERNAL MEDICINE

## 2020-03-03 PROCEDURE — 80069 RENAL FUNCTION PANEL: CPT | Performed by: INTERNAL MEDICINE

## 2020-03-03 PROCEDURE — 99152 MOD SED SAME PHYS/QHP 5/>YRS: CPT | Performed by: INTERNAL MEDICINE

## 2020-03-03 RX ORDER — SODIUM CHLORIDE 9 MG/ML
100 INJECTION, SOLUTION INTRAVENOUS CONTINUOUS
Status: ACTIVE | OUTPATIENT
Start: 2020-03-03 | End: 2020-03-03

## 2020-03-03 RX ORDER — FLUMAZENIL 0.1 MG/ML
INJECTION INTRAVENOUS
Status: DISCONTINUED
Start: 2020-03-03 | End: 2020-03-03 | Stop reason: WASHOUT

## 2020-03-03 RX ORDER — MIDAZOLAM HYDROCHLORIDE 1 MG/ML
INJECTION INTRAMUSCULAR; INTRAVENOUS
Status: DISCONTINUED
Start: 2020-03-03 | End: 2020-03-05 | Stop reason: HOSPADM

## 2020-03-03 RX ORDER — FENTANYL CITRATE 50 UG/ML
INJECTION, SOLUTION INTRAMUSCULAR; INTRAVENOUS
Status: DISCONTINUED
Start: 2020-03-03 | End: 2020-03-05 | Stop reason: HOSPADM

## 2020-03-03 RX ORDER — NALOXONE HYDROCHLORIDE 0.4 MG/ML
INJECTION, SOLUTION INTRAMUSCULAR; INTRAVENOUS; SUBCUTANEOUS
Status: DISCONTINUED
Start: 2020-03-03 | End: 2020-03-03 | Stop reason: WASHOUT

## 2020-03-03 RX ORDER — MIDAZOLAM HYDROCHLORIDE 1 MG/ML
INJECTION INTRAMUSCULAR; INTRAVENOUS
Status: COMPLETED | OUTPATIENT
Start: 2020-03-03 | End: 2020-03-03

## 2020-03-03 RX ORDER — FENTANYL CITRATE 50 UG/ML
INJECTION, SOLUTION INTRAMUSCULAR; INTRAVENOUS
Status: COMPLETED | OUTPATIENT
Start: 2020-03-03 | End: 2020-03-03

## 2020-03-03 RX ADMIN — ACETAMINOPHEN 650 MG: 325 TABLET, FILM COATED ORAL at 13:14

## 2020-03-03 RX ADMIN — GABAPENTIN 600 MG: 300 CAPSULE ORAL at 08:24

## 2020-03-03 RX ADMIN — SODIUM CHLORIDE 100 ML/HR: 9 INJECTION, SOLUTION INTRAVENOUS at 12:48

## 2020-03-03 RX ADMIN — GABAPENTIN 600 MG: 300 CAPSULE ORAL at 21:50

## 2020-03-03 RX ADMIN — ASPIRIN 81 MG 81 MG: 81 TABLET ORAL at 08:24

## 2020-03-03 RX ADMIN — CARVEDILOL 12.5 MG: 12.5 TABLET, FILM COATED ORAL at 17:39

## 2020-03-03 RX ADMIN — GABAPENTIN 600 MG: 300 CAPSULE ORAL at 13:14

## 2020-03-03 RX ADMIN — MIDAZOLAM 2 MG: 1 INJECTION INTRAMUSCULAR; INTRAVENOUS at 15:08

## 2020-03-03 RX ADMIN — BUMETANIDE 2 MG: 0.25 INJECTION INTRAMUSCULAR; INTRAVENOUS at 12:48

## 2020-03-03 RX ADMIN — MIDAZOLAM 2 MG: 1 INJECTION INTRAMUSCULAR; INTRAVENOUS at 15:16

## 2020-03-03 RX ADMIN — ALLOPURINOL 300 MG: 300 TABLET ORAL at 08:24

## 2020-03-03 RX ADMIN — HEPARIN SODIUM 5000 UNITS: 5000 INJECTION, SOLUTION INTRAVENOUS; SUBCUTANEOUS at 21:50

## 2020-03-03 RX ADMIN — CARVEDILOL 12.5 MG: 12.5 TABLET, FILM COATED ORAL at 08:24

## 2020-03-03 RX ADMIN — METHOHEXITAL SODIUM 20 MG: 500 INJECTION, POWDER, LYOPHILIZED, FOR SOLUTION INTRAMUSCULAR; INTRAVENOUS; RECTAL at 15:15

## 2020-03-03 RX ADMIN — HEPARIN SODIUM 5000 UNITS: 5000 INJECTION, SOLUTION INTRAVENOUS; SUBCUTANEOUS at 13:14

## 2020-03-03 RX ADMIN — METHOHEXITAL SODIUM 20 MG: 500 INJECTION, POWDER, LYOPHILIZED, FOR SOLUTION INTRAMUSCULAR; INTRAVENOUS; RECTAL at 15:11

## 2020-03-03 RX ADMIN — METHOHEXITAL SODIUM 10 MG: 500 INJECTION, POWDER, LYOPHILIZED, FOR SOLUTION INTRAMUSCULAR; INTRAVENOUS; RECTAL at 15:29

## 2020-03-03 RX ADMIN — HEPARIN SODIUM 5000 UNITS: 5000 INJECTION, SOLUTION INTRAVENOUS; SUBCUTANEOUS at 06:39

## 2020-03-03 RX ADMIN — METHOHEXITAL SODIUM 10 MG: 500 INJECTION, POWDER, LYOPHILIZED, FOR SOLUTION INTRAMUSCULAR; INTRAVENOUS; RECTAL at 10:29

## 2020-03-03 RX ADMIN — FENTANYL CITRATE 50 MCG: 50 INJECTION, SOLUTION INTRAMUSCULAR; INTRAVENOUS at 15:08

## 2020-03-03 RX ADMIN — ROSUVASTATIN CALCIUM 40 MG: 20 TABLET, COATED ORAL at 21:50

## 2020-03-03 RX ADMIN — SODIUM CHLORIDE, PRESERVATIVE FREE 10 ML: 5 INJECTION INTRAVENOUS at 08:25

## 2020-03-03 RX ADMIN — IOPAMIDOL 85 ML: 755 INJECTION, SOLUTION INTRAVENOUS at 12:30

## 2020-03-03 RX ADMIN — SODIUM CHLORIDE, PRESERVATIVE FREE 10 ML: 5 INJECTION INTRAVENOUS at 21:50

## 2020-03-03 NOTE — PLAN OF CARE
Problem: Fluid Volume Excess (Adult)  Goal: Identify Related Risk Factors and Signs and Symptoms  Outcome: Ongoing (interventions implemented as appropriate)  Goal: Optimal Fluid Balance  Outcome: Ongoing (interventions implemented as appropriate)     Problem: Patient Care Overview  Goal: Plan of Care Review  Outcome: Ongoing (interventions implemented as appropriate)  Flowsheets (Taken 3/3/2020 6840)  Progress: no change  Plan of Care Reviewed With: patient  Outcome Summary: patient alert and oriented, SOB with exertion, Pt. Oxygen dropping into the 60's when asleep with 3LNC, patient educated about sleep apnea and the importance of wearing the cpap/bipap. Patient agreed to wear mask overnight and compliant. VSS, NPO after midnight for SHANKAR and CTA in am, Urine output adequate for shift, continue to monitor  Goal: Individualization and Mutuality  Outcome: Ongoing (interventions implemented as appropriate)  Goal: Discharge Needs Assessment  Outcome: Ongoing (interventions implemented as appropriate)  Goal: Interprofessional Rounds/Family Conf  Outcome: Ongoing (interventions implemented as appropriate)     Problem: Fall Risk (Adult)  Goal: Absence of Fall  Outcome: Ongoing (interventions implemented as appropriate)     Problem: Skin Injury Risk (Adult)  Goal: Identify Related Risk Factors and Signs and Symptoms  Outcome: Ongoing (interventions implemented as appropriate)  Goal: Skin Health and Integrity  Outcome: Ongoing (interventions implemented as appropriate)     Problem: Pain, Chronic (Adult)  Goal: Identify Related Risk Factors and Signs and Symptoms  Outcome: Ongoing (interventions implemented as appropriate)  Goal: Acceptable Pain/Comfort Level and Functional Ability  Outcome: Ongoing (interventions implemented as appropriate)     Problem: Diabetes, Type 2 (Adult)  Goal: Signs and Symptoms of Listed Potential Problems Will be Absent, Minimized or Managed (Diabetes, Type 2)  Outcome: Ongoing (interventions  implemented as appropriate)

## 2020-03-03 NOTE — PLAN OF CARE
Problem: Patient Care Overview  Goal: Plan of Care Review  Flowsheets  Taken 3/3/2020 1849  Progress: no change  Outcome Summary: Pt A&O x4. NSR. O2 dropping to mid 40s on 2L NC. CPAP applied while napping. Hospitalist notified. CTA & SHANKAR completed for TAVR work up. C/O pain 3-5 BLE; improved from yesterday. Continuing to diurese. IS at bedside & pt using appropriately. Ex-wife at bedside. Will continue to monitor.  Taken 3/3/2020 0800  Plan of Care Reviewed With: patient;family

## 2020-03-03 NOTE — PROGRESS NOTES
"[facility]  In-Patient TAVR APRN Progress Note   LOS: 4 days   Patient Care Team:  Megan Martell APRN as PCP - General (Family Medicine)  MATT Samuel MD - Hospital Medicine  Mark Adama Canales MD - Cardiology  Chief Complaint:  Severe AS/ TAVR evaluation. Diastolic heart failure    Subjective :  C/O pain \"had a bad night\".  Apneic episodes overnight/ Bipap trial but patient tolerated poorly.        Objective     Patient Active Problem List   Diagnosis   • CKD (chronic kidney disease) stage 3, GFR 30-59 ml/min (CMS/[facility])   • Hyperlipidemia LDL goal <70   • Essential hypertension   • Type 2 diabetes mellitus with hypoglycemia (CMS/[facility])   • Normocytic anemia   • Aortic stenosis   • Diastolic CHF due to valvular disease (CMS/[facility])   • Coronary artery disease involving native coronary artery of native heart with angina pectoris (CMS/[facility])   • Demand ischemia of myocardium (CMS/[facility])         Tele: Sinus Rythym    Vitals:  Blood pressure 138/74, pulse 75, temperature 98.3 °F (36.8 °C), temperature source Oral, resp. rate 18, height 172.7 cm (67.99\"), weight 125 kg (275 lb 4.8 oz), SpO2 95 %.     Intake/Output Summary (Last 24 hours) at 3/3/2020 1024  Last data filed at 3/3/2020 0320  Gross per 24 hour   Intake 360 ml   Output 1700 ml   Net -1340 ml       Physical Exam:      General: resting in bed/ eyes closed but converses appropriately Chest: Clear auscultation bilaterally   CV: Heart sounds regular with systolic harsh murmur III/VI   Abdomen: Soft, non-tender, normal bowel sounds   Extremities: 1+ edema generalized   Neuro: Alert and oriented x 3.  Gait not observed.  No focal deficits    Results Review:     I reviewed the patient's new clinical results.    Results from last 7 days   Lab Units 03/03/20  0452   WBC 10*3/mm3 8.10   HEMOGLOBIN g/dL 10.7*   HEMATOCRIT % 34.5*   PLATELETS 10*3/mm3 280   ·   Results from last 7 days   Lab Units 03/03/20  0452  02/28/20  1627   SODIUM mmol/L 137   < > " 141   POTASSIUM mmol/L 4.0   < > 4.6   CHLORIDE mmol/L 92*   < > 99   CO2 mmol/L 32.0*   < > 33.0*   BUN mg/dL 52*   < > 35*   CREATININE mg/dL 2.04*   < > 2.14*   CALCIUM mg/dL 9.3   < > 9.4   BILIRUBIN mg/dL  --   --  0.4   ALK PHOS U/L  --   --  52   ALT (SGPT) U/L  --   --  16   AST (SGOT) U/L  --   --  28   GLUCOSE mg/dL 145*   < > 59*    < > = values in this interval not displayed.   ·   ·     Results from last 7 days   Lab Units 02/29/20  0308   TSH uIU/mL 1.160   ·   ·     Results from last 7 days   Lab Units 03/02/20  0435   PROBNP pg/mL 1,338.0*   ·     Scheduled Meds:  allopurinol 300 mg Oral Daily   aspirin 81 mg Oral Daily   bumetanide 2 mg Intravenous Q12H   carvedilol 12.5 mg Oral BID With Meals   gabapentin 600 mg Oral Q8H   heparin (porcine) 5,000 Units Subcutaneous Q8H   rosuvastatin 40 mg Oral Nightly   sodium chloride 10 mL Intravenous Q12H       ASSESSMENT/PLAN:  1. Acute on chronic systolic CHF (congestive heart failure) (CMS/HCC) .  NYHA class III- IV symptoms   2. Aortic valve stenosis, bicuspid valve.  TAVR evaluation ongoing.      3. CAD, non-obstructive.  ASA, statin, BB   4. Chronic kidney disease, unspecified CKD stage 3.  Likely Cardio-renal syndrome   5. JASWINDER.  Highly suspected.  Bipap trial overnight   6.  Type 2 DM with peripheral neuropathy.  Poor mobility.     7. Morbid obesity.  BMI 43     CTA TAVR protocol with IV hydration protocol due to renal dysfunction.  SHANKAR later today W/ Dr. Bella.  Continue to optimize DHF symptoms as inpatient.  Anticipate TAVR 3/16 provided his clinical status allows for DC home then return for the procedure.  Will continue to follow with Hospital Medicine and Cardiology.      RADHA Watson - 3/3/2020, 10:24 AM

## 2020-03-03 NOTE — PROGRESS NOTES
[facility] Medicine Services  PROGRESS NOTE    Patient Name: Juan Alberto Tejeda  : 1960  MRN: [id number]    Date of Admission: 2020  Primary Care Physician: Megan Martell APRN    Subjective   Subjective     CC:f/u SOA    HPI: Patient states that he had a rough night, did not sleep much, continues to have shortness of air, denies any chest pain      Review of Systems  Gen- No fevers, chills  CV- No chest pain, palpitations  Resp- No cough, dyspnea  GI- No N/V/D, abd pain    All systems reviewed and negative except as mentioned in HPI above  Objective   Objective     Vital Signs:   Temp:  [97.5 °F (36.4 °C)-98.5 °F (36.9 °C)] 98.3 °F (36.8 °C)  Heart Rate:  [72-84] 75  Resp:  [16-18] 18  BP: (124-142)/(66-92) 138/74        Physical Exam:  Constitutional: Chronically ill-appearing male, sleepy.  HENT: NCAT, mucous membranes moist  Respiratory: Moderate labored respirations, diffuse coarse breath sounds on 3 L NC  Cardiovascular: RRR, 3/6 systolic murmurs, rubs, or gallops, palpable pedal pulses bilaterally  Gastrointestinal: Obese positive bowel sounds, soft, nontender, nondistended  Musculoskeletal: No bilateral ankle edema  Psychiatric: Appropriate affect, cooperative  Neurologic: Oriented x 3, no focal deficits  Skin: No rashes    Results Reviewed:  Results from last 7 days   Lab Units 20  04520  0308   WBC 10*3/mm3 8.10 9.98 10.14   HEMOGLOBIN g/dL 10.7* 11.2* 11.7*   HEMATOCRIT % 34.5* 37.1* 39.9   PLATELETS 10*3/mm3 280 249 247     Results from last 7 days   Lab Units 20  0452 20  0435 20  0536 20  0308 20  1810 20  1627   SODIUM mmol/L 137 136 140 141  --  141   POTASSIUM mmol/L 4.0 4.0 4.4 4.7  --  4.6   CHLORIDE mmol/L 92* 92* 96* 96*  --  99   CO2 mmol/L 32.0* 32.0* 33.0* 33.0*  --  33.0*   BUN mg/dL 52* 44* 40* 36*  --  35*   CREATININE mg/dL 2.04* 1.85* 2.03* 2.12*  --  2.14*   GLUCOSE mg/dL 145* 157*  130* 129*  --  59*   CALCIUM mg/dL 9.3 9.4 9.1 9.4  --  9.4   ALT (SGPT) U/L  --   --   --   --   --  16   AST (SGOT) U/L  --   --   --   --   --  28   TROPONIN T ng/mL  --   --   --   --  0.045* 0.054*   PROBNP pg/mL  --  1,338.0*  --  1,587.0*  --  1,728.0*     Estimated Creatinine Clearance: 50.8 mL/min (A) (by C-G formula based on SCr of 2.04 mg/dL (H)).    Microbiology Results Abnormal     Procedure Component Value - Date/Time    Respiratory Panel, PCR - Swab, Nasopharynx [[id number]]  (Normal) Collected:  02/28/20 6349    Lab Status:  Final result Specimen:  Swab from Nasopharynx Updated:  02/29/20 0722     ADENOVIRUS, PCR Not Detected     Coronavirus 229E Not Detected     Coronavirus HKU1 Not Detected     Coronavirus NL63 Not Detected     Coronavirus OC43 Not Detected     Human Metapneumovirus Not Detected     Human Rhinovirus/Enterovirus Not Detected     Influenza B PCR Not Detected     Parainfluenza Virus 1 Not Detected     Parainfluenza Virus 2 Not Detected     Parainfluenza Virus 3 Not Detected     Parainfluenza Virus 4 Not Detected     Bordetella pertussis pcr Not Detected     Influenza A H1 2009 PCR Not Detected     Chlamydophila pneumoniae PCR Not Detected     Mycoplasma pneumo by PCR Not Detected     Influenza A PCR Not Detected     Influenza A H3 Not Detected     Influenza A H1 Not Detected     RSV, PCR Not Detected     Bordetella parapertussis PCR Not Detected          Imaging Results (Last 24 Hours)     ** No results found for the last 24 hours. **          Results for orders placed during the hospital encounter of 02/28/20   Adult Transthoracic Echo Complete W/ Cont if Necessary Per Protocol    Narrative · Left ventricular systolic function is normal. Estimated EF = 65%.  · The aortic valve is likely bicuspid and severely calcified. There is   severe aortic stenosis (mean gradient 51 mmHg).  · Mild mitral valve stenosis is present  · Mild mitral valve regurgitation is present          I have  reviewed the medications:  Scheduled Meds:    allopurinol 300 mg Oral Daily   aspirin 81 mg Oral Daily   bumetanide 2 mg Intravenous Q12H   carvedilol 12.5 mg Oral BID With Meals   gabapentin 600 mg Oral Q8H   heparin (porcine) 5,000 Units Subcutaneous Q8H   rosuvastatin 40 mg Oral Nightly   sodium chloride 10 mL Intravenous Q12H     Continuous Infusions:   PRN Meds:.•  acetaminophen  •  albuterol  •  sodium chloride  •  sodium chloride    Assessment/Plan   Assessment & Plan     Active Hospital Problems    Diagnosis  POA   • **Diastolic CHF due to valvular disease (CMS/HCC) [I38, I50.30]  Yes   • Coronary artery disease involving native coronary artery of native heart with angina pectoris (CMS/[facility]) [I25.119]  Yes   • Demand ischemia of myocardium (CMS/[facility]) [I24.8]  Yes   • CKD (chronic kidney disease) stage 3, GFR 30-59 ml/min (CMS/[facility]) [N18.3]  Yes   • Hyperlipidemia LDL goal <70 [E78.5]  Yes   • Essential hypertension [I10]  Yes   • Type 2 diabetes mellitus with hypoglycemia (CMS/[facility]) [E11.649]  Yes   • Normocytic anemia [D64.9]  Yes   • Aortic stenosis [I35.0]  Yes      Resolved Hospital Problems    Diagnosis Date Resolved POA   • CHF exacerbation (CMS/[facility]) [I50.9] 02/29/2020 Yes   • Elevated troponin [R79.89] 02/29/2020 Yes        Brief Hospital Course to date:  Juan Alberto Tejeda is a 59 y.o. male with past medical history of chronic systolic heart failure, type 2 diabetes, hypertension, hyperlipidemia, CKD stage III, aortic stenosis, he is s/p recent [facility] with noncritical CAD.  Patient presented with acute shortness of air, lower extremity edema, found to be in CHF exacerbation.  Patient also initially was hypoglycemic, this has since resolved s/p D50, he is currently getting diuresed and  CT surgery is following and currently TAVR work-up ongoing.     Plan  Acute dyspnea, ongoing  -Etiology likely multifactorial secondary to diastolic heart failure and severe AS  -Chest x-ray concerning for pulmonary  edema, respiratory panel is negative  -Cardiology following, echo with EF of 65%, continue IV bumex, he is diuresing well, -7.62 L thus far, continue strict I/o  -continue Coreg, duo nebs     Severe aortic stenosis  -Echo done here shows bicuspid and severely calcified aortic valve, severe aortic stenosis with aortic valve area of 0.74 cm², mean gradient of 51mmg.  -CTS following, TAVR, w/u ongoing, plan for CTA, SHANKAR today     Well-controlled type 2 diabetes with A1c 6.3% with significant hypoglycemia, resolved  -FSBG continue to be optimal, continue only SSI     Elevated troponin-suspect supply demand mismatch sec to dyspnea, he is s/p recent [facility] at  that showed noncritical CAD     CKD stage III-renal function seems to be at baseline, continue to monitor as we diurese, avoid nephrotoxins.     Hypertension-BP currently controlled,     Obesity with BMI 43, complicates all aspects of care.     DVT Prophylaxis: SQH     Disposition: TBD    CODE STATUS:   Code Status and Medical Interventions:   Ordered at: 02/28/20 3081     Level Of Support Discussed With:    Patient     Code Status:    CPR     Medical Interventions (Level of Support Prior to Arrest):    Full       Electronically signed by Miguel Samuel MD, 03/03/20, 9:24 AM.

## 2020-03-04 LAB
ANION GAP SERPL CALCULATED.3IONS-SCNC: 11 MMOL/L (ref 5–15)
BH CV ECHO MEAS - AO MAX PG (FULL): 79.6 MMHG
BH CV ECHO MEAS - AO MAX PG: 84 MMHG
BH CV ECHO MEAS - AO MEAN PG (FULL): 50.9 MMHG
BH CV ECHO MEAS - AO MEAN PG: 53.7 MMHG
BH CV ECHO MEAS - AO V2 MAX: 460 CM/SEC
BH CV ECHO MEAS - AO V2 MEAN: 337.4 CM/SEC
BH CV ECHO MEAS - AO V2 VTI: 96.2 CM
BH CV ECHO MEAS - AVA(I,A): 0.86 CM^2
BH CV ECHO MEAS - AVA(I,D): 0.86 CM^2
BH CV ECHO MEAS - AVA(V,A): 0.76 CM^2
BH CV ECHO MEAS - AVA(V,D): 0.76 CM^2
BH CV ECHO MEAS - BSA(HAYCOCK): 2.5 M^2
BH CV ECHO MEAS - BSA: 2.3 M^2
BH CV ECHO MEAS - BZI_BMI: 41.8 KILOGRAMS/M^2
BH CV ECHO MEAS - BZI_METRIC_HEIGHT: 172.7 CM
BH CV ECHO MEAS - BZI_METRIC_WEIGHT: 124.7 KG
BH CV ECHO MEAS - LV MAX PG: 4.4 MMHG
BH CV ECHO MEAS - LV MEAN PG: 2.8 MMHG
BH CV ECHO MEAS - LV V1 MAX: 105.3 CM/SEC
BH CV ECHO MEAS - LV V1 MEAN: 76.6 CM/SEC
BH CV ECHO MEAS - LV V1 VTI: 25.1 CM
BH CV ECHO MEAS - LVOT AREA (M): 3.5 CM^2
BH CV ECHO MEAS - LVOT AREA: 3.3 CM^2
BH CV ECHO MEAS - LVOT DIAM: 2.1 CM
BH CV ECHO MEAS - SI(LVOT): 35.4 ML/M^2
BH CV ECHO MEAS - SV(LVOT): 82.8 ML
BH CV VAS BP LEFT ARM: NORMAL MMHG
BUN BLD-MCNC: 58 MG/DL (ref 6–20)
BUN/CREAT SERPL: 27.6 (ref 7–25)
CALCIUM SPEC-SCNC: 8.6 MG/DL (ref 8.6–10.5)
CHLORIDE SERPL-SCNC: 92 MMOL/L (ref 98–107)
CO2 SERPL-SCNC: 33 MMOL/L (ref 22–29)
CREAT BLD-MCNC: 2.1 MG/DL (ref 0.76–1.27)
GFR SERPL CREATININE-BSD FRML MDRD: 33 ML/MIN/1.73
GLUCOSE BLD-MCNC: 161 MG/DL (ref 65–99)
GLUCOSE BLDC GLUCOMTR-MCNC: 182 MG/DL (ref 70–130)
GLUCOSE BLDC GLUCOMTR-MCNC: 233 MG/DL (ref 70–130)
GLUCOSE BLDC GLUCOMTR-MCNC: 233 MG/DL (ref 70–130)
GLUCOSE BLDC GLUCOMTR-MCNC: 253 MG/DL (ref 70–130)
GLUCOSE BLDC GLUCOMTR-MCNC: 270 MG/DL (ref 70–130)
LV EF 2D ECHO EST: 60 %
POTASSIUM BLD-SCNC: 4.1 MMOL/L (ref 3.5–5.2)
SODIUM BLD-SCNC: 136 MMOL/L (ref 136–145)

## 2020-03-04 PROCEDURE — 63710000001 INSULIN LISPRO (HUMAN) PER 5 UNITS: Performed by: INTERNAL MEDICINE

## 2020-03-04 PROCEDURE — 25010000002 HEPARIN (PORCINE) PER 1000 UNITS: Performed by: INTERNAL MEDICINE

## 2020-03-04 PROCEDURE — 80048 BASIC METABOLIC PNL TOTAL CA: CPT | Performed by: INTERNAL MEDICINE

## 2020-03-04 PROCEDURE — 97116 GAIT TRAINING THERAPY: CPT

## 2020-03-04 PROCEDURE — 97110 THERAPEUTIC EXERCISES: CPT

## 2020-03-04 PROCEDURE — 82962 GLUCOSE BLOOD TEST: CPT

## 2020-03-04 PROCEDURE — 99152 MOD SED SAME PHYS/QHP 5/>YRS: CPT | Performed by: INTERNAL MEDICINE

## 2020-03-04 PROCEDURE — 99232 SBSQ HOSP IP/OBS MODERATE 35: CPT | Performed by: INTERNAL MEDICINE

## 2020-03-04 PROCEDURE — 94799 UNLISTED PULMONARY SVC/PX: CPT

## 2020-03-04 PROCEDURE — 94660 CPAP INITIATION&MGMT: CPT

## 2020-03-04 PROCEDURE — 99231 SBSQ HOSP IP/OBS SF/LOW 25: CPT | Performed by: NURSE PRACTITIONER

## 2020-03-04 RX ORDER — NICOTINE POLACRILEX 4 MG
15 LOZENGE BUCCAL
Status: DISCONTINUED | OUTPATIENT
Start: 2020-03-04 | End: 2020-03-05 | Stop reason: HOSPADM

## 2020-03-04 RX ORDER — DEXTROSE MONOHYDRATE 25 G/50ML
25 INJECTION, SOLUTION INTRAVENOUS
Status: DISCONTINUED | OUTPATIENT
Start: 2020-03-04 | End: 2020-03-05 | Stop reason: HOSPADM

## 2020-03-04 RX ORDER — BUMETANIDE 2 MG/1
2 TABLET ORAL 2 TIMES DAILY
Qty: 60 TABLET | Refills: 2 | Status: CANCELLED | OUTPATIENT
Start: 2020-03-04

## 2020-03-04 RX ADMIN — CARVEDILOL 12.5 MG: 12.5 TABLET, FILM COATED ORAL at 17:07

## 2020-03-04 RX ADMIN — GABAPENTIN 600 MG: 300 CAPSULE ORAL at 08:20

## 2020-03-04 RX ADMIN — INSULIN LISPRO 4 UNITS: 100 INJECTION, SOLUTION INTRAVENOUS; SUBCUTANEOUS at 12:23

## 2020-03-04 RX ADMIN — HEPARIN SODIUM 5000 UNITS: 5000 INJECTION, SOLUTION INTRAVENOUS; SUBCUTANEOUS at 05:57

## 2020-03-04 RX ADMIN — CARVEDILOL 12.5 MG: 12.5 TABLET, FILM COATED ORAL at 08:20

## 2020-03-04 RX ADMIN — BUMETANIDE 2 MG: 0.25 INJECTION INTRAMUSCULAR; INTRAVENOUS at 23:56

## 2020-03-04 RX ADMIN — BUMETANIDE 2 MG: 0.25 INJECTION INTRAMUSCULAR; INTRAVENOUS at 12:24

## 2020-03-04 RX ADMIN — ROSUVASTATIN CALCIUM 40 MG: 20 TABLET, COATED ORAL at 20:02

## 2020-03-04 RX ADMIN — INSULIN LISPRO 4 UNITS: 100 INJECTION, SOLUTION INTRAVENOUS; SUBCUTANEOUS at 20:02

## 2020-03-04 RX ADMIN — INSULIN LISPRO 6 UNITS: 100 INJECTION, SOLUTION INTRAVENOUS; SUBCUTANEOUS at 17:07

## 2020-03-04 RX ADMIN — SODIUM CHLORIDE, PRESERVATIVE FREE 10 ML: 5 INJECTION INTRAVENOUS at 08:21

## 2020-03-04 RX ADMIN — HEPARIN SODIUM 5000 UNITS: 5000 INJECTION, SOLUTION INTRAVENOUS; SUBCUTANEOUS at 20:01

## 2020-03-04 RX ADMIN — HEPARIN SODIUM 5000 UNITS: 5000 INJECTION, SOLUTION INTRAVENOUS; SUBCUTANEOUS at 13:41

## 2020-03-04 RX ADMIN — BUMETANIDE 2 MG: 0.25 INJECTION INTRAMUSCULAR; INTRAVENOUS at 01:28

## 2020-03-04 RX ADMIN — GABAPENTIN 600 MG: 300 CAPSULE ORAL at 20:01

## 2020-03-04 RX ADMIN — ALLOPURINOL 300 MG: 300 TABLET ORAL at 08:20

## 2020-03-04 RX ADMIN — ASPIRIN 81 MG 81 MG: 81 TABLET ORAL at 08:20

## 2020-03-04 RX ADMIN — ACETAMINOPHEN 650 MG: 325 TABLET, FILM COATED ORAL at 17:07

## 2020-03-04 RX ADMIN — SODIUM CHLORIDE, PRESERVATIVE FREE 10 ML: 5 INJECTION INTRAVENOUS at 20:02

## 2020-03-04 RX ADMIN — GABAPENTIN 600 MG: 300 CAPSULE ORAL at 13:41

## 2020-03-04 NOTE — PLAN OF CARE
Problem: Fluid Volume Excess (Adult)  Goal: Identify Related Risk Factors and Signs and Symptoms  Outcome: Ongoing (interventions implemented as appropriate)  Note:   Patient has had no complaints throughout shift. Spouse at bedside this afternoon. Patient has worked with PT/OT today. Patient's O2 drops when he is sleeping. Continuing to monitor.      Problem: Fluid Volume Excess (Adult)  Goal: Optimal Fluid Balance  Outcome: Ongoing (interventions implemented as appropriate)

## 2020-03-04 NOTE — THERAPY TREATMENT NOTE
Patient Name: Juan Alberto Tejeda  : 1960    MRN: [id number]                              Today's Date: 3/4/2020       Admit Date: 2020    Visit Dx:     ICD-10-CM ICD-9-CM   1. Acute on chronic systolic CHF (congestive heart failure) (CMS/[facility]) I50.23 428.23     428.0   2. Aortic valve stenosis, etiology of cardiac valve disease unspecified I35.0 424.1   3. Acute pulmonary edema (CMS/[facility]) J81.0 518.4   4. Chronic kidney disease, unspecified CKD stage N18.9 585.9   5. Impaired mobility and ADLs Z74.09 799.89     Patient Active Problem List   Diagnosis   • CKD (chronic kidney disease) stage 3, GFR 30-59 ml/min (CMS/[facility])   • Hyperlipidemia LDL goal <70   • Essential hypertension   • Type 2 diabetes mellitus with hypoglycemia (CMS/[facility])   • Normocytic anemia   • Aortic stenosis   • Diastolic CHF due to valvular disease (CMS/[facility])   • Coronary artery disease involving native coronary artery of native heart with angina pectoris (CMS/[facility])   • Demand ischemia of myocardium (CMS/[facility])     Past Medical History:   Diagnosis Date   • Chronic kidney disease    • Diabetes mellitus (CMS/[facility])    • Hyperlipidemia    • Hypertension    • Peripheral neuropathy      Past Surgical History:   Procedure Laterality Date   • CHOLECYSTECTOMY     • KIDNEY STONE SURGERY       General Information     [facility] Name [address] [facility]          PT Evaluation Time/Intention    Document Type  therapy note (daily note)  -SC     Mode of Treatment  physical therapy  -SC     Row Name 20 1523          General Information    Patient Profile Reviewed?  yes  -SC     Existing Precautions/Restrictions  fall;oxygen therapy device and L/min  -SC     Row Name 20 1523          Cognitive Assessment/Intervention- PT/OT    Orientation Status (Cognition)  oriented x 3  -SC     Cognitive Assessment/Intervention Comment  alert, following command,   -SC     Row Name 20 1523          Safety Issues, Functional Mobility    Impairments Affecting Function  (Mobility)  endurance/activity tolerance;postural/trunk control;pain;balance;shortness of breath;strength;range of motion (ROM)  -SC       User Key  (r) = Recorded By, (t) = Taken By, (c) = Cosigned By    Initials Name Provider Type    SC Lynnette Waller PT Physical Therapist        Mobility     Row Name 03/04/20 1523          Bed Mobility Assessment/Treatment    Bed Mobility Assessment/Treatment  scooting/bridging;supine-sit  -SC     Scooting/Bridging [place] (Bed Mobility)  verbal cues;contact guard  -SC     Supine-Sit [place] (Bed Mobility)  moderate assist (50% patient effort);1 person assist;verbal cues  -SC     Assistive Device (Bed Mobility)  bed rails;draw sheet;head of bed elevated  -SC     Comment (Bed Mobility)  up to edge of bed with cues for sequencing. Able to get his legs over edge and required assist to sit up  -SC     Row Name 03/04/20 1523          Transfer Assessment/Treatment    Comment (Transfers)  STS from edge of bed . Spent time first stretching out his knees. Cues for rocking and using momentum to get up placing his nose over his toes with bed elevated. Demonstrated slow transfers into standing  -SC     Row Name 03/04/20 1523          Sit-Stand Transfer    Sit-Stand [place] (Transfers)  2 person assist;minimum assist (75% patient effort)  -SC     Assistive Device (Sit-Stand Transfers)  walker, front-wheeled  -SC     Row Name 03/04/20 1531 03/04/20 1523       Gait/Stairs Assessment/Training    Gait/Stairs Assessment/Training  --  gait/ambulation independence  -SC    [place] Level (Gait)  --  verbal cues;2 person assist;1 person to manage equipment;minimum assist (75% patient effort)  -SC    Assistive Device (Gait)  --  walker, front-wheeled  -SC    Distance in Feet (Gait)  also worked on side stepping along side edge of bed to prepare for walking x15 feet  -SC  20  -SC    Pattern (Gait)  --  step-through  -SC    Deviations/Abnormal Patterns (Gait)  --  left sided  deviations;antalgic;stride length decreased;gait speed decreased  -SC    Bilateral Gait Deviations  --  forward flexed posture  -SC    Comment (Gait/Stairs)  --  gt training focused on sustaining upright posture and controling walker. followed by chair  -SC      User Key  (r) = Recorded By, (t) = Taken By, (c) = Cosigned By    Initials Name Provider Type    SC Lynnette Waller, PT Physical Therapist        Obj/Interventions     [facility] Name 03/04/20 1528          Therapeutic Exercise    Lower Extremity (Therapeutic Exercise)  LAQ (long arc quad), bilateral;gastroc stretch, bilateral  -SC     Sets/Reps (Therapeutic Exercise)  2/10  -SC     Comment (Therapeutic Exercise)  focused on increasing knee flexion  -[facility] Name 03/04/20 1528          Dynamic Standing Balance    Level of [place], Reaches Outside Midline (Standing, Dynamic Balance)  2 person assist;minimal assist, 75% patient effort  -SC     Assistive Device Utilized (Supported Standing, Dynamic Balance)  walker, rolling  -SC     Comment, Reaches Outside Midline (Standing, Dynamic Balance)  cues for upright posture  -SC       User Key  (r) = Recorded By, (t) = Taken By, (c) = Cosigned By    Initials Name Provider Type    SC Lynnette Waller, PT Physical Therapist        Goals/Plan    No documentation.       Clinical Impression     [facility] Name 03/04/20 1529          Pain Assessment    Additional Documentation  Pain Scale: FACES Pre/Post-Treatment (Group)  -SC     Row Name 03/04/20 1529          Pain Scale: Numbers Pre/Post-Treatment    Pain Location - Side  Left  -SC     Pain Location  knee  -SC     Pain Intervention(s)  Repositioned  -SC     Row Name 03/04/20 1529          Pain Scale: FACES Pre/Post-Treatment    Pain: FACES Scale, Pretreatment  4-->hurts little more  -SC     Pain: FACES Scale, Post-Treatment  4-->hurts little more  -[facility] Name 03/04/20 1529          Plan of Care Review    Plan of Care Reviewed With  patient;spouse  -SC     Progress   improving  -SC     Outcome Summary  Today patient ws able to return to ambulating in the hallway with walker for 20 feet. She was limited by c/o painful knee.  -SC       User Key  (r) = Recorded By, (t) = Taken By, (c) = Cosigned By    Initials Name Provider Type    Lynnette Nguyen PT Physical Therapist        Outcome Measures     Row Name 03/04/20 1531          How much help from another person do you currently need...    Turning from your back to your side while in flat bed without using bedrails?  3  -SC     Moving from lying on back to sitting on the side of a flat bed without bedrails?  2  -SC     Moving to and from a bed to a chair (including a wheelchair)?  2  -SC     Standing up from a chair using your arms (e.g., wheelchair, bedside chair)?  3  -SC     Climbing 3-5 steps with a railing?  1  -SC     To walk in hospital room?  3  -SC     AM-PAC 6 Clicks Score (PT)  14  -SC     Row Name 03/04/20 1531          Functional Assessment    Outcome Measure Options  AM-PAC 6 Clicks Basic Mobility (PT)  -SC       User Key  (r) = Recorded By, (t) = Taken By, (c) = Cosigned By    Initials Name Provider Type    Lynnette Nguyen PT Physical Therapist          PT Recommendation and Plan  Planned Therapy Interventions (PT Eval): bed mobility training, gait training, home exercise program, patient/family education  Outcome Summary/Treatment Plan (PT)  Anticipated Discharge Disposition (PT): home with home health  Plan of Care Reviewed With: patient, spouse  Progress: improving  Outcome Summary: Today patient ws able to return to ambulating in the hallway with walker for 20 feet. She was limited by c/o painful knee.     Time Calculation:   PT Charges     Row Name 03/04/20 1404             Time Calculation    Start Time  1408  -SC      PT Received On  03/04/20  -SC      PT Goal Re-Cert Due Date  03/11/20  -SC         Time Calculation- PT    Total Timed Code Minutes- PT  25 minute(s)  -SC         Timed Charges    15185 -  PT Therapeutic Exercise Minutes  5  -SC      22734 - Gait Training Minutes   15  -SC      13005 - PT Therapeutic Activity Minutes  5  -SC        User Key  (r) = Recorded By, (t) = Taken By, (c) = Cosigned By    Initials Name Provider Type    Lynnette Nguyen PT Physical Therapist        Therapy Charges for Today     Code Description Service Date Service Provider Modifiers Qty    [id number]  PT THER PROC EA 15 MIN 3/4/2020 Lynnette Waller, PT GP 1    [id number] HC GAIT TRAINING EA 15 MIN 3/4/2020 Lynnette Waller PT GP 1          PT G-Codes  Outcome Measure Options: AM-PAC 6 Clicks Basic Mobility (PT)  AM-PAC 6 Clicks Score (PT): 14  AM-PAC 6 Clicks Score (OT): 12    Lynnette Waller PT  3/4/2020

## 2020-03-04 NOTE — THERAPY TREATMENT NOTE
Acute Care - Occupational Therapy Treatment Note  [facility]     Patient Name: Juan Alberto Tejeda  : 1960  MRN: [id number]  Today's Date: 3/4/2020  Onset of Illness/Injury or Date of Surgery: 20  Date of Referral to OT: 20       Admit Date: 2020       ICD-10-CM ICD-9-CM   1. Acute on chronic systolic CHF (congestive heart failure) (CMS/[facility]) I50.23 428.23     428.0   2. Aortic valve stenosis, etiology of cardiac valve disease unspecified I35.0 424.1   3. Acute pulmonary edema (CMS/[facility]) J81.0 518.4   4. Chronic kidney disease, unspecified CKD stage N18.9 585.9   5. Impaired mobility and ADLs Z74.09 799.89     Patient Active Problem List   Diagnosis   • CKD (chronic kidney disease) stage 3, GFR 30-59 ml/min (CMS/[facility])   • Hyperlipidemia LDL goal <70   • Essential hypertension   • Type 2 diabetes mellitus with hypoglycemia (CMS/[facility])   • Normocytic anemia   • Aortic stenosis   • Diastolic CHF due to valvular disease (CMS/[facility])   • Coronary artery disease involving native coronary artery of native heart with angina pectoris (CMS/[facility])   • Demand ischemia of myocardium (CMS/[facility])     Past Medical History:   Diagnosis Date   • Chronic kidney disease    • Diabetes mellitus (CMS/[facility])    • Hyperlipidemia    • Hypertension    • Peripheral neuropathy      Past Surgical History:   Procedure Laterality Date   • CHOLECYSTECTOMY     • KIDNEY STONE SURGERY         Therapy Treatment    Rehabilitation Treatment Summary     Row Name 20 0938             Treatment Time/Intention    Discipline  occupational therapist  (Pended)   -MA      Document Type  therapy note (daily note)  (Pended)   -MA      Subjective Information  complains of;pain;fatigue  (Pended)   -MA      Mode of Treatment  individual therapy;occupational therapy  (Pended)   -MA      Patient/Family Observations  no family present, pt supine in bed on entry  (Pended)   -MA      Care Plan Review  care plan/treatment goals reviewed  (Pended)   -MA       Therapy Frequency (OT Eval)  daily  (Pended)   -MA      Patient Effort  good  (Pended)   -MA      Existing Precautions/Restrictions  fall;oxygen therapy device and L/min  (Pended)   -MA      Recorded by [MA] Carolin London OT Student 03/04/20 1019      Row Name 03/04/20 0938             Vital Signs    Pre Systolic BP Rehab  128  (Pended)   -MA      Pre Treatment Diastolic BP  72  (Pended)   -MA      Post Systolic BP Rehab  130  (Pended)   -MA      Post Treatment Diastolic BP  70  (Pended)   -MA      Pretreatment Heart Rate (beats/min)  78  (Pended)   -MA      Posttreatment Heart Rate (beats/min)  80  (Pended)   -MA      Pre SpO2 (%)  94  (Pended)   -MA      O2 Delivery Pre Treatment  nasal cannula  (Pended)   -MA      Intra SpO2 (%)  96  (Pended)   -MA      O2 Delivery Intra Treatment  nasal cannula  (Pended)   -MA      Post SpO2 (%)  97  (Pended)   -MA      O2 Delivery Post Treatment  nasal cannula  (Pended)   -MA      Pre Patient Position  Supine  (Pended)   -MA      Intra Patient Position  Supine  (Pended)   -MA      Post Patient Position  Supine  (Pended)   -MA      Recorded by [MA] Carolin London OT Student 03/04/20 1019      Row Name 03/04/20 0938             Cognitive Assessment/Intervention- PT/OT    Orientation Status (Cognition)  oriented x 3  (Pended)   -MA      Recorded by [MA] Carolin London OT Student 03/04/20 1019      Row Name 03/04/20 0938             Safety Issues, Functional Mobility    Comment, Safety Issues/Impairments (Mobility)  deferred to PT; pt declined getting OOB  (Pended)   -MA      Recorded by [MA] Carolin London OT Student 03/04/20 1019      Row Name 03/04/20 0938             Bed Mobility Assessment/Treatment    Comment (Bed Mobility)  pt declined sitting EOB  (Pended)   -MA      Recorded by [MA] Carolin London OT Student 03/04/20 1019      Row Name 03/04/20 0938             Functional Mobility    Functional Mobility- Comment  deferred to PT  (Pended)   -MA      Recorded by  [MA] Carolin London OT Student 03/04/20 1019      Row Name 03/04/20 0938             Motor Skills Assessment/Interventions    Additional Documentation  Therapeutic Exercise (Group)  (Pended)   -MA      Recorded by [MA] Carolin London OT Student 03/04/20 1019      Row Name 03/04/20 0938             Therapeutic Exercise    56236 - OT Therapeutic Exercise Minutes  15  (Pended)   -MA      Recorded by [MA] Carolin London OT Student 03/04/20 1032      Row Name 03/04/20 0938             Therapeutic Exercise    Upper Extremity Range of Motion (Therapeutic Exercise)  shoulder flexion/extension, bilateral;shoulder abduction/adduction, bilateral;elbow flexion/extension, bilateral  (Pended)  forward extension (punches)  -MA      Exercise Type (Therapeutic Exercise)  AROM (active range of motion)  (Pended)   -MA      Position (Therapeutic Exercise)  supine  (Pended)   -MA      Sets/Reps (Therapeutic Exercise)  1/10  (Pended)   -MA      Comment (Therapeutic Exercise)  pt needed encouragement, VC's for technique, and rest breaks d/t SOA  (Pended)   -MA      Recorded by [MA] Carolin London OT Student 03/04/20 1021      Row Name 03/04/20 0938             Positioning and Restraints    Pre-Treatment Position  in bed  (Pended)   -MA      Post Treatment Position  bed  (Pended)   -MA      In Bed  supine;call light within reach;encouraged to call for assist;exit alarm on  (Pended)   -MA      Recorded by [MA] Carolin London OT Student 03/04/20 1021      Row Name 03/04/20 0938             Pain Assessment    Additional Documentation  Pain Scale: FACES Pre/Post-Treatment (Group)  (Pended)   -MA      Recorded by [MA] Carolin London OT Student 03/04/20 1021      Row Name 03/04/20 0938             Pain Scale: Numbers Pre/Post-Treatment    Pain Location - Side  Bilateral  (Pended)   -MA      Pain Location - Orientation  lower  (Pended)   -MA      Pain Location  extremity  (Pended)   -MA      Pain Intervention(s)   Repositioned;Ambulation/increased activity  (Pended)   -MA      Recorded by [MA] ArCarolin garcia, OT Student 03/04/20 1023      Row Name 03/04/20 0938             Pain Scale: FACES Pre/Post-Treatment    Pain: FACES Scale, Pretreatment  4-->hurts little more  (Pended)   -MA      Pain: FACES Scale, Post-Treatment  4-->hurts little more  (Pended)   -MA      Recorded by [MA] Surya Carolin OT Student 03/04/20 1023      Row Name 03/04/20 0938             Outcome Summary/Treatment Plan (OT)    Daily Summary of Progress (OT)  progress towards functional goals is fair  (Pended)   -MA      Barriers to Overall Progress (OT)  endurance, SOA, pain  (Pended)   -MA      Plan for Continued Treatment (OT)  cont OT POC  (Pended)   -MA      Anticipated Discharge Disposition (OT)  inpatient rehabilitation facility  (Pended)   -MA      Recorded by [MA] Carolin London OT Student 03/04/20 1023        User Key  (r) = Recorded By, (t) = Taken By, (c) = Cosigned By    Initials Name Effective Dates Discipline    MA Carolin London, OT Student 12/27/19 -  OT           Rehab Goal Summary     Row Name 03/04/20 0938             Occupational Therapy Goals    Activity Tolerance Goal Selection (OT)  activity tolerance, OT goal 1  (Pended)   -MA      Additional Documentation  Activity Tolerance Goal Selection (OT) (Row)  (Pended)   -MA         Transfer Goal 1 (OT)    Activity/Assistive Device (Transfer Goal 1, OT)  sit-to-stand/stand-to-sit;bed-to-chair/chair-to-bed;commode, bedside without drop arms;walker, rolling  (Pended)   -MA      [place] Level/Cues Needed (Transfer Goal 1, OT)  2 person assist;moderate assist (50-74% patient effort);tactile cues required;verbal cues required  (Pended)   -MA      Time Frame (Transfer Goal 1, OT)  10 days;long term goal (LTG)  (Pended)   -MA      Progress/Outcome (Transfer Goal 1, OT)  goal revised this date;goal ongoing  (Pended)   -MA         Dressing Goal 1 (OT)    Activity/Assistive Device  (Dressing Goal 1, OT)  lower body dressing;reacher;sock-aid;long handled shoe horn  (Pended)   -MA      [place]/Cues Needed (Dressing Goal 1, OT)  moderate assist (50-74% patient effort);1 person assist;verbal cues required;tactile cues required;set-up required  (Pended)   -MA      Time Frame (Dressing Goal 1, OT)  10 days;long term goal (LTG)  (Pended)   -MA      Progress/Outcome (Dressing Goal 1, OT)  goal revised this date;goal ongoing  (Pended)   -MA         Toileting Goal 1 (OT)    Activity/Device (Toileting Goal 1, OT)  adjust/manage clothing;perform perineal hygiene;commode, bedside without drop arms  (Pended)   -MA      [place] Level/Cues Needed (Toileting Goal 1, OT)  moderate assist (50-74% patient effort);2 person assist;verbal cues required;tactile cues required  (Pended)   -MA      Time Frame (Toileting Goal 1, OT)  10 days;long term goal (LTG)  (Pended)   -MA      Progress/Outcome (Toileting Goal 1, OT)  goal ongoing  (Pended)   -MA          Activity Tolerance Goal 1 (OT)    Activity Tolerance Goal 1 (OT)  UE TE, ADLs  (Pended)   -MA      Activity Level (Endurance Goal 1, OT)  10 min activity;O2 sat >/ equal to 88%  (Pended)  2 rest breaks   -MA      Time Frame (Activity Tolerance Goal 1, OT)  10 days;long term goal (LTG)  (Pended)   -MA      Progress/Outcome (Activity Tolerance Goal 1, OT)  goal revised this date;goal ongoing  (Pended)   -MA        User Key  (r) = Recorded By, (t) = Taken By, (c) = Cosigned By    Initials Name Provider Type Discipline    Carolin Landeros OT Student OT Student OT            OT Recommendation and Plan  Outcome Summary/Treatment Plan (OT)  Daily Summary of Progress (OT): (P) progress towards functional goals is fair  Barriers to Overall Progress (OT): (P) endurance, SOA, pain  Plan for Continued Treatment (OT): (P) cont OT POC  Anticipated Discharge Disposition (OT): (P) inpatient rehabilitation facility  Therapy Frequency (OT Eval): (P) daily  Daily Summary  of Progress (OT): (P) progress towards functional goals is fair  Plan of Care Review  Plan of Care Reviewed With: (P) patient  Plan of Care Reviewed With: (P) patient  Outcome Summary: (P) Pt presents with increased pain & decreased endurance impacting ADL independence. Pt tolerated UE TE, required rest breaks d/t SOA. Will continue to progress as able per OT POC, pt goals revised on this date. Recommend IRF at discharge.  Outcome Measures     Row Name 03/04/20 0938 03/01/20 1113          How much help from another is currently needed...    Putting on and taking off regular lower body clothing?  1  (Pended)   -MA  2  -TA     Bathing (including washing, rinsing, and drying)  2  (Pended)   -MA  2  -TA     Toileting (which includes using toilet bed pan or urinal)  2  (Pended)   -MA  3  -TA     Putting on and taking off regular upper body clothing  2  (Pended)   -MA  3  -TA     Taking care of personal grooming (such as brushing teeth)  2  (Pended)   -MA  4  -TA     Eating meals  3  (Pended)   -MA  4  -TA     AM-PAC 6 Clicks Score (OT)  12  (Pended)   -MA  18  -TA        Functional Assessment    Outcome Measure Options  AM-PAC 6 Clicks Daily Activity (OT)  (Pended)   -MA  AM-PAC 6 Clicks Daily Activity (OT)  -TA       User Key  (r) = Recorded By, (t) = Taken By, (c) = Cosigned By    Initials Name Provider Type    TA Fortino Olvera, OT Occupational Therapist    Carolin Landeros OT Student OT Student           Time Calculation:   Time Calculation- OT     Row Name 03/04/20 1032 03/04/20 0938          Time Calculation- OT    OT Start Time  0938  (Pended)   -MA  --     Total Timed Code Minutes- OT  15 minute(s)  (Pended)   -MA  --     OT Received On  03/04/20  (Pended)   -MA  --     OT Goal Re-Cert Due Date  03/11/20  (Pended)   -MA  --        Timed Charges    01742 - OT Therapeutic Exercise Minutes  --  15  (Pended)   -MA       User Key  (r) = Recorded By, (t) = Taken By, (c) = Cosigned By    Initials Name Provider  Type    MA Carolin London, OT Student OT Student        Therapy Charges for Today     Code Description Service Date Service Provider Modifiers Qty    [id number] HC OT THER PROC EA 15 MIN 3/4/2020 Carolin London OT Student GO 1               REGI Edge  3/4/2020

## 2020-03-04 NOTE — PROGRESS NOTES
Continued Stay Note  [facility]     Patient Name: Juan Alberto Tejeda  MRN: [id number]  Today's Date: 3/4/2020    Admit Date: 2/28/2020    Discharge Plan     Row Name 03/04/20 1419       Plan    Plan  Home with significant other    Provided Post Acute Provider List?  Yes    Post Acute Provider List  Nursing Home;Home Health;Inpatient Rehab    Provided Post Acute Provider Quality & Resource List?  Yes    Post Acute Provider Quality and Resource List  Home Health;Nursing Home    Delivered To  Patient    Method of Delivery  In person    Patient/Family in Agreement with Plan  yes    Plan Comments  I spoke with patient this am. If rehab needed after surgery, he prefers home with home health but would be willing to go to inpatient rehab. Facilities were discussed with [facility] referral given (Aria), [facility] referral (Nesha Falcon RN), [facility] (only if no from [facility]).      Based on vitals will need orders for outpatient sleep study to qualify for the CPAP.     Final Discharge Disposition Code  30 - still a patient        Discharge Codes    No documentation.       Expected Discharge Date and Time     Expected Discharge Date Expected Discharge Time    Mar 10, 2020             Rohini Nelson RN

## 2020-03-04 NOTE — PROGRESS NOTES
[facility] Medicine Services  PROGRESS NOTE    Patient Name: Juan Alberto Tejeda  : 1960  MRN: [id number]    Date of Admission: 2020  Primary Care Physician: Megan Martell APRN    Subjective   Subjective     CC:f/u SOA    HPI: The patient says he has been urinating frequently and feels like his swelling is improved.  Still complains of some generalized weakness    Review of Systems  Gen- No fevers, chills  CV- No chest pain, palpitations  Resp- No cough, dyspnea  GI- No N/V/D, abd pain        All systems reviewed and negative except as mentioned in HPI above  Objective   Objective     Vital Signs:   Temp:  [98.5 °F (36.9 °C)-99.9 °F (37.7 °C)] 98.5 °F (36.9 °C)  Heart Rate:  [70-92] 71  Resp:  [16-18] 16  BP: ()/(58-92) 128/72        Physical Exam:  Constitutional -no acute distress, non toxic, in bed, lying flat  HEENT-NCAT, mucous membranes moist  CV-RRR, 4/6 holosystolic murmur  Resp-grossly clear bilaterally  Abd-soft, non-tender, non-distended, normo active bowel sounds, morbidly obese  Ext-trace to 1+ lower extremity edema bilaterally  Neuro-alert and oriented, speech clear, moves all extremities   Psych-normal affect   Skin- No rash on exposed UE or LE bilaterally      Results Reviewed:  Results from last 7 days   Lab Units 20  0452 20  0536 20  0308   WBC 10*3/mm3 8.10 9.98 10.14   HEMOGLOBIN g/dL 10.7* 11.2* 11.7*   HEMATOCRIT % 34.5* 37.1* 39.9   PLATELETS 10*3/mm3 280 249 247     Results from last 7 days   Lab Units 20  0535 20  0452 20  0435  20  0308 20  1810 20  1627   SODIUM mmol/L 136 137 136   < > 141  --  141   POTASSIUM mmol/L 4.1 4.0 4.0   < > 4.7  --  4.6   CHLORIDE mmol/L 92* 92* 92*   < > 96*  --  99   CO2 mmol/L 33.0* 32.0* 32.0*   < > 33.0*  --  33.0*   BUN mg/dL 58* 52* 44*   < > 36*  --  35*   CREATININE mg/dL 2.10* 2.04* 1.85*   < > 2.12*  --  2.14*   GLUCOSE mg/dL 161* 145* 157*   < >  129*  --  59*   CALCIUM mg/dL 8.6 9.3 9.4   < > 9.4  --  9.4   ALT (SGPT) U/L  --   --   --   --   --   --  16   AST (SGOT) U/L  --   --   --   --   --   --  28   TROPONIN T ng/mL  --   --   --   --   --  0.045* 0.054*   PROBNP pg/mL  --   --  1,338.0*  --  1,587.0*  --  1,728.0*    < > = values in this interval not displayed.     Estimated Creatinine Clearance: 49.4 mL/min (A) (by C-G formula based on SCr of 2.1 mg/dL (H)).    Microbiology Results Abnormal     Procedure Component Value - Date/Time    Respiratory Panel, PCR - Swab, Nasopharynx [[id number]]  (Normal) Collected:  02/28/20 8672    Lab Status:  Final result Specimen:  Swab from Nasopharynx Updated:  02/29/20 0717     ADENOVIRUS, PCR Not Detected     Coronavirus 229E Not Detected     Coronavirus HKU1 Not Detected     Coronavirus NL63 Not Detected     Coronavirus OC43 Not Detected     Human Metapneumovirus Not Detected     Human Rhinovirus/Enterovirus Not Detected     Influenza B PCR Not Detected     Parainfluenza Virus 1 Not Detected     Parainfluenza Virus 2 Not Detected     Parainfluenza Virus 3 Not Detected     Parainfluenza Virus 4 Not Detected     Bordetella pertussis pcr Not Detected     Influenza A H1 2009 PCR Not Detected     Chlamydophila pneumoniae PCR Not Detected     Mycoplasma pneumo by PCR Not Detected     Influenza A PCR Not Detected     Influenza A H3 Not Detected     Influenza A H1 Not Detected     RSV, PCR Not Detected     Bordetella parapertussis PCR Not Detected          Imaging Results (Last 24 Hours)     Procedure Component Value Units Date/Time    CT Angio TAVR Chest Abdomen Pelvis [[id number]] Collected:  03/03/20 1338     Updated:  03/04/20 0824    Narrative:       EXAMINATION: CT ANGIOGRAM OF THE CHEST-03/03/2020:     HISTORY: Atrial arrhythmia.     TECHNIQUE: CT angiogram of the chest was performed following bolus  infusion of contrast and images displayed in the axial, sagittal and  coronal projections (3-D).      The radiation  "dose reduction device was turned on as low as reasonably  achievable for each scan per ALARA protocol.     FINDINGS:  There is dense calcification of the aortic valve. The origin  of the coronary arteries is from the typical location. The left  ventricle is not enlarged. There is no left atrial or left atrial  appendage thrombus. There are bilaterally paired pulmonary veins with no  \"stricture\" or stenosis. There is a small area of consolidative airspace  disease in the medial segment of the left lower lobe.       Impression:       There are bilaterally paired pulmonary veins with no  evidence of \"stricture\" or stenosis. The left atrium is not  significantly enlarged and there is no left atrial or left atrial  appendage thrombus. Lastly, there is a small area of consolidation with  air bronchograms in the medial segment of the left lower lobe.     D:  03/03/2020  E:  03/03/2020     This report was finalized on 3/4/2020 8:21 AM by Dr. Fortino Weldon MD.             Results for orders placed during the hospital encounter of 02/28/20   Adult Transthoracic Echo Complete W/ Cont if Necessary Per Protocol    Narrative · Left ventricular systolic function is normal. Estimated EF = 65%.  · The aortic valve is likely bicuspid and severely calcified. There is   severe aortic stenosis (mean gradient 51 mmHg).  · Mild mitral valve stenosis is present  · Mild mitral valve regurgitation is present          I have reviewed the medications:  Scheduled Meds:    allopurinol 300 mg Oral Daily   aspirin 81 mg Oral Daily   bumetanide 2 mg Intravenous Q12H   carvedilol 12.5 mg Oral BID With Meals   fentaNYL citrate (PF)      gabapentin 600 mg Oral Q8H   heparin (porcine) 5,000 Units Subcutaneous Q8H   insulin lispro 0-9 Units Subcutaneous 4x Daily With Meals & Nightly   methohexital      midazolam      rosuvastatin 40 mg Oral Nightly   sodium chloride 10 mL Intravenous Q12H     Continuous Infusions:   PRN Meds:.•  acetaminophen  •  " albuterol  •  dextrose  •  dextrose  •  glucagon (human recombinant)  •  sodium chloride  •  sodium chloride    Assessment/Plan   Assessment & Plan     Active Hospital Problems    Diagnosis  POA   • **Diastolic CHF due to valvular disease (CMS/[facility]) [I38, I50.30]  Yes   • Coronary artery disease involving native coronary artery of native heart with angina pectoris (CMS/[facility]) [I25.119]  Yes   • Demand ischemia of myocardium (CMS/[facility]) [I24.8]  Yes   • CKD (chronic kidney disease) stage 3, GFR 30-59 ml/min (CMS/[facility]) [N18.3]  Yes   • Hyperlipidemia LDL goal <70 [E78.5]  Yes   • Essential hypertension [I10]  Yes   • Type 2 diabetes mellitus with hypoglycemia (CMS/[facility]) [E11.649]  Yes   • Normocytic anemia [D64.9]  Yes   • Aortic stenosis [I35.0]  Yes      Resolved Hospital Problems    Diagnosis Date Resolved POA   • CHF exacerbation (CMS/[facility]) [I50.9] 02/29/2020 Yes   • Elevated troponin [R79.89] 02/29/2020 Yes        Brief Hospital Course to date:  Juan Alberto Tejeda is a 59 y.o. male with past medical history of chronic systolic heart failure, type 2 diabetes, hypertension, hyperlipidemia, CKD stage III, aortic stenosis, he is s/p recent [facility] with noncritical CAD.  Patient presented with acute shortness of air, lower extremity edema, found to be in CHF exacerbation.  Patient also initially was hypoglycemic, this has since resolved s/p D50, he is currently getting diuresed and  CT surgery is following and currently TAVR work-up ongoing.     Plan  Acute dyspnea, ongoing  -Etiology likely multifactorial secondary to diastolic heart failure and severe AS  -Chest x-ray concerning for pulmonary edema, respiratory panel is negative  -Duo nebs scheduled  -Continue IV diuresis, on 2 mg of Bumex twice daily  -I/O last 3 completed shifts:  In: -   Out: 1050 [Urine:1050]  I/O this shift:  In: -   Out: 900 [Urine:900]     Severe aortic stenosis  -SHANKAR yesterday as part of TAVR work-up     Well-controlled type 2 diabetes with A1c 6.3% with  significant hypoglycemia, resolved  -24-hour glucose reviewed, ranges from 207-161  -No further hypoglycemic events noted  -Added SSI today     Elevated troponin-suspect supply demand mismatch sec to dyspnea, he is s/p recent [facility] at  that showed noncritical CAD     CKD stage III-renal function seems to be at baseline, continue to monitor as we diurese, avoid nephrotoxins.     Hypertension-BP currently controlled,     Obesity with BMI 43, complicates all aspects of care.    Generalized weakness  -Continue PT/OT  -Discussed discharge planning with  today     DVT Prophylaxis: SQH     Disposition: TBD    CODE STATUS:   Code Status and Medical Interventions:   Ordered at: 02/28/20 2145     Level Of Support Discussed With:    Patient     Code Status:    CPR     Medical Interventions (Level of Support Prior to Arrest):    Full       Electronically signed by Rubin Nash MD, 03/04/20, 10:57 AM.

## 2020-03-04 NOTE — PLAN OF CARE
Problem: Patient Care Overview  Goal: Discharge Needs Assessment  Outcome: Ongoing (interventions implemented as appropriate)  Flowsheets  Taken 3/4/2020 0543 by Radha Villegas, RN  Discharge Facility/Level of Care Needs: rehabilitation facility  Equipment Needed After Discharge: oxygen;respiratory supplies  Patient/Family Anticipated Services at Transition: respiratory services;rehabilitation services  Taken 3/1/2020 0943 by Carolin Pollock RN  Concerns to be Addressed: adjustment to diagnosis/illness;discharge planning

## 2020-03-04 NOTE — NURSING NOTE
TAVR APRN    Met with patient and spouse re: TAVR plans.  Will plan for procedure 3/16/2020.  Patient will be seen in HF clinic after DC on Monday 3/9 to assess fluid status/ renal function.  Discussed care plan with TAMARA GARCIA as well.    Susan GARCIA

## 2020-03-04 NOTE — PLAN OF CARE
Problem: Patient Care Overview  Goal: Plan of Care Review  Flowsheets  Taken 3/4/2020 1536  Progress: improving  Plan of Care Reviewed With: patient  Taken 3/4/2020 1529  Outcome Summary: Today patient ws able to return to ambulating in the hallway with walker for 20 feet. She was limited by c/o painful knee.

## 2020-03-04 NOTE — PROGRESS NOTES
[facility] at [facility]  Cardiology Progress Note      Chief Complaint/Reason for service:    · Severe aortic stenosis  · Diastolic heart failure from aortic stenosis         Last evening the patient's O2 saturations decreased on O2 2 L and BiPAP was applied.  Patient underwent CTA and SHANKAR yesterday as part of TAVR work-up.  Patient being diuresed with IV Bumex twice daily.  Over 9000 mL's diuresed since admission.  The patient is lying flat in bed without complaints and breathing easy on O2 at 4 L via nasal cannula.     Past medical, surgical, social and family history reviewed in the patient's electronic medical record.          Review of Systems:   All systems reviewed were negative except pertinent positives listed in subjective       Vital Sign Min/Max for last 24 hours  Temp  Min: 98.5 °F (36.9 °C)  Max: 99.9 °F (37.7 °C)   BP  Min: 88/58  Max: 137/69   Pulse  Min: 70  Max: 92   Resp  Min: 16  Max: 18   SpO2  Min: 18 %  Max: 99 %   Flow (L/min)  Min: 1  Max: 3      Intake/Output Summary (Last 24 hours) at 3/4/2020 1022  Last data filed at 3/4/2020 0710  Gross per 24 hour   Intake --   Output 1450 ml   Net -1450 ml           Physical Exam   Constitutional: He is oriented to person, place, and time. He appears well-developed and well-nourished.   HENT:   Head: Normocephalic.   Neck: No JVD present. Carotid bruit is not present.   Cardiovascular: Normal rate, regular rhythm and normal heart sounds. Exam reveals no gallop and no friction rub.   No murmur heard.  Pulmonary/Chest: Effort normal and breath sounds normal.   Abdominal: Soft. Bowel sounds are normal. He exhibits no distension.   Neurological: He is alert and oriented to person, place, and time.   Skin: Skin is warm and dry.   Psychiatric: He has a normal mood and affect.       Tele: Normal sinus rhythm    Results Review (reviewed the patient's recent labs in the electronic medical record):           Results from last 7 days    Lab Units 03/04/20  0535 03/03/20  0452 03/02/20  0435 03/01/20  0536 02/29/20  0308 02/28/20  1627   SODIUM mmol/L 136 137 136 140 141 141   POTASSIUM mmol/L 4.1 4.0 4.0 4.4 4.7 4.6   CHLORIDE mmol/L 92* 92* 92* 96* 96* 99   BUN mg/dL 58* 52* 44* 40* 36* 35*   CREATININE mg/dL 2.10* 2.04* 1.85* 2.03* 2.12* 2.14*     Results from last 7 days   Lab Units 02/28/20  1810 02/28/20  1627   TROPONIN T ng/mL 0.045* 0.054*     Results from last 7 days   Lab Units 03/03/20  0452 03/01/20  0536 02/29/20  0308   WBC 10*3/mm3 8.10 9.98 10.14   HEMOGLOBIN g/dL 10.7* 11.2* 11.7*   HEMATOCRIT % 34.5* 37.1* 39.9   PLATELETS 10*3/mm3 280 249 247       Lab Results   Component Value Date    HGBA1C 6.30 (H) 02/29/2020       Lab Results   Component Value Date    CHLPL 203 (H) 12/10/2018    TRIG 178 12/10/2018    HDL 32 (L) 12/10/2018     (H) 12/10/2018              Active Hospital Problems    Diagnosis POA   • **Diastolic CHF due to valvular disease (CMS/[facility]) [I38, I50.30] Yes     Priority: High     · Echo (2/29/2020): LVEF 65%.  Aortic valve is bicuspid and severely calcified.  Severe aortic stenosis (mean gradient 51 mmHg). Mild MR and MS     • Demand ischemia of myocardium (CMS/[facility]) [I24.8] Yes     Priority: High     · Mildly elevated troponins in setting of acute heart failure from aortic stenosis 2/28/2020     • CKD (chronic kidney disease) stage 3, GFR 30-59 ml/min (CMS/[facility]) [N18.3] Yes     Priority: High   • Type 2 diabetes mellitus with hypoglycemia (CMS/[facility]) [E11.649] Yes     Priority: High   • Aortic stenosis [I35.0] Yes     Priority: High     · Echo (2/29/2020): LVEF 65%.  Aortic valve is bicuspid and severely calcified.  Severe aortic stenosis (mean gradient 51 mmHg). Mild MR and MS     • Coronary artery disease involving native coronary artery of native heart with angina pectoris (CMS/HCC) [I25.119] Yes     Priority: Medium     · Cardiac catheterization at [facility] (12/2019): Moderate nonobstructive CAD.     •  Hyperlipidemia LDL goal <70 [E78.5] Yes     Priority: Medium     • High intensity statin therapy indicated given the presence of CAD     • Essential hypertension [I10] Yes     Priority: Medium   • Normocytic anemia [D64.9] Yes     Priority: Medium              Diastolic heart failure due to severe aortic stenosis  · Continue to diurese with IV Bumex  · Low-sodium diet  · Daily weights and strict I's and O's    Severe aortic stenosis  · CTA and SHANKAR completed yesterday as part of TAVR work-up.  Results of SHANKAR pending  · TAVR scheduled for March 16th    Demand ischemia  · Mildly elevated troponins in the setting of acute heart failure from aortic stenosis  · Continue to diurese    Coronary artery disease  · Mild nonobstructive CAD on cath 12/2019  · Aspirin and statin therapy    Hypertension  · Currently controlled on Coreg    Hyperlipidemia  · On Crestor    Type 2 diabetes mellitus  · Sliding-scale insulin    Stage III chronic kidney disease  · Creatinine elevated but staying stable currently 2.10        · We will plan to tentatively discharge home in the a.m. but will need home oxygen and BiPAP  · Consult case management to assist with arranging home oxygen and BiPAP  · TAVR procedure scheduled for the 16th  · Home on p.o. Bumex twice daily  · We will follow-up in the heart failure clinic on Monday  · Educated patient on importance of low-sodium diet and daily weights  RADHA Castaneda  3/4/2020

## 2020-03-04 NOTE — PLAN OF CARE
Problem: Patient Care Overview  Goal: Plan of Care Review  Outcome: Ongoing (interventions implemented as appropriate)  Flowsheets (Taken 3/4/2020 1280)  Plan of Care Reviewed With: patient  Outcome Summary: Pt presents with increased pain & decreased endurance impacting ADL independence. Pt tolerated UE TE in supine, required rest breaks d/t SOA. Will continue to progress as able per OT POC, goals revised on this date. Recommend IRF at discharge.

## 2020-03-05 ENCOUNTER — TELEPHONE (OUTPATIENT)
Dept: FAMILY MEDICINE CLINIC | Age: 60
End: 2020-03-05

## 2020-03-05 VITALS
TEMPERATURE: 98.5 F | RESPIRATION RATE: 20 BRPM | SYSTOLIC BLOOD PRESSURE: 132 MMHG | OXYGEN SATURATION: 98 % | BODY MASS INDEX: 41.37 KG/M2 | HEIGHT: 68 IN | DIASTOLIC BLOOD PRESSURE: 64 MMHG | WEIGHT: 273 LBS | HEART RATE: 71 BPM

## 2020-03-05 PROBLEM — G47.34 NOCTURNAL HYPOXIA: Status: ACTIVE | Noted: 2020-03-05

## 2020-03-05 PROBLEM — J96.01 ACUTE RESPIRATORY FAILURE WITH HYPOXIA: Status: ACTIVE | Noted: 2020-03-05

## 2020-03-05 LAB
ANION GAP SERPL CALCULATED.3IONS-SCNC: 15 MMOL/L (ref 5–15)
ARTERIAL PATENCY WRIST A: ABNORMAL
ATMOSPHERIC PRESS: ABNORMAL MM[HG]
BASE EXCESS BLDA CALC-SCNC: 11.3 MMOL/L (ref 0–2)
BDY SITE: ABNORMAL
BODY TEMPERATURE: 37 C
BUN BLD-MCNC: 65 MG/DL (ref 6–20)
BUN/CREAT SERPL: 28.6 (ref 7–25)
CALCIUM SPEC-SCNC: 9.7 MG/DL (ref 8.6–10.5)
CHLORIDE SERPL-SCNC: 87 MMOL/L (ref 98–107)
CO2 BLDA-SCNC: 38.4 MMOL/L (ref 22–33)
CO2 SERPL-SCNC: 29 MMOL/L (ref 22–29)
COHGB MFR BLD: 0.9 % (ref 0–2)
CREAT BLD-MCNC: 2.27 MG/DL (ref 0.76–1.27)
GFR SERPL CREATININE-BSD FRML MDRD: 30 ML/MIN/1.73
GLUCOSE BLD-MCNC: 169 MG/DL (ref 65–99)
GLUCOSE BLDC GLUCOMTR-MCNC: 208 MG/DL (ref 70–130)
GLUCOSE BLDC GLUCOMTR-MCNC: 267 MG/DL (ref 70–130)
HCO3 BLDA-SCNC: 36.8 MMOL/L (ref 20–26)
HCT VFR BLD CALC: 31 %
HGB BLDA-MCNC: 10.1 G/DL (ref 13.5–17.5)
HOROWITZ INDEX BLD+IHG-RTO: 36 %
METHGB BLD QL: 0.9 % (ref 0–1.5)
MODALITY: ABNORMAL
NOTE: ABNORMAL
OXYHGB MFR BLDV: 95.5 % (ref 94–99)
PCO2 BLDA: 52.9 MM HG (ref 35–45)
PCO2 TEMP ADJ BLD: 52.9 MM HG (ref 35–48)
PH BLDA: 7.45 PH UNITS (ref 7.35–7.45)
PH, TEMP CORRECTED: 7.45 PH UNITS
PO2 BLDA: 87.1 MM HG (ref 83–108)
PO2 TEMP ADJ BLD: 87.1 MM HG (ref 83–108)
POTASSIUM BLD-SCNC: 4.5 MMOL/L (ref 3.5–5.2)
SODIUM BLD-SCNC: 131 MMOL/L (ref 136–145)
VENTILATOR MODE: ABNORMAL

## 2020-03-05 PROCEDURE — 99231 SBSQ HOSP IP/OBS SF/LOW 25: CPT | Performed by: NURSE PRACTITIONER

## 2020-03-05 PROCEDURE — 94799 UNLISTED PULMONARY SVC/PX: CPT

## 2020-03-05 PROCEDURE — 80048 BASIC METABOLIC PNL TOTAL CA: CPT | Performed by: INTERNAL MEDICINE

## 2020-03-05 PROCEDURE — 82962 GLUCOSE BLOOD TEST: CPT

## 2020-03-05 PROCEDURE — 99239 HOSP IP/OBS DSCHRG MGMT >30: CPT | Performed by: PHYSICIAN ASSISTANT

## 2020-03-05 PROCEDURE — 36600 WITHDRAWAL OF ARTERIAL BLOOD: CPT

## 2020-03-05 PROCEDURE — 94660 CPAP INITIATION&MGMT: CPT

## 2020-03-05 PROCEDURE — 25010000002 HEPARIN (PORCINE) PER 1000 UNITS: Performed by: INTERNAL MEDICINE

## 2020-03-05 PROCEDURE — 82805 BLOOD GASES W/O2 SATURATION: CPT

## 2020-03-05 RX ORDER — BUMETANIDE 2 MG/1
2 TABLET ORAL 2 TIMES DAILY
Status: DISCONTINUED | OUTPATIENT
Start: 2020-03-05 | End: 2020-03-05 | Stop reason: HOSPADM

## 2020-03-05 RX ORDER — POLYETHYLENE GLYCOL 3350 17 G/17G
17 POWDER, FOR SOLUTION ORAL 2 TIMES DAILY PRN
Qty: 30 EACH | Refills: 0 | Status: SHIPPED | OUTPATIENT
Start: 2020-03-05

## 2020-03-05 RX ORDER — BUMETANIDE 2 MG/1
2 TABLET ORAL 2 TIMES DAILY
Qty: 60 TABLET | Refills: 0 | Status: SHIPPED | OUTPATIENT
Start: 2020-03-05 | End: 2020-03-09 | Stop reason: DRUGHIGH

## 2020-03-05 RX ADMIN — INSULIN LISPRO 4 UNITS: 100 INJECTION, SOLUTION INTRAVENOUS; SUBCUTANEOUS at 09:16

## 2020-03-05 RX ADMIN — ALLOPURINOL 300 MG: 300 TABLET ORAL at 09:12

## 2020-03-05 RX ADMIN — ASPIRIN 81 MG 81 MG: 81 TABLET ORAL at 09:13

## 2020-03-05 RX ADMIN — HEPARIN SODIUM 5000 UNITS: 5000 INJECTION, SOLUTION INTRAVENOUS; SUBCUTANEOUS at 06:05

## 2020-03-05 RX ADMIN — BUMETANIDE 2 MG: 2 TABLET ORAL at 09:16

## 2020-03-05 RX ADMIN — GABAPENTIN 600 MG: 300 CAPSULE ORAL at 14:46

## 2020-03-05 RX ADMIN — INSULIN LISPRO 6 UNITS: 100 INJECTION, SOLUTION INTRAVENOUS; SUBCUTANEOUS at 12:44

## 2020-03-05 RX ADMIN — CARVEDILOL 12.5 MG: 12.5 TABLET, FILM COATED ORAL at 09:12

## 2020-03-05 NOTE — PROGRESS NOTES
Case Management Discharge Note      Final Note: Patient anticipating home today with his ex spouses assistance, Anna Tejeda [phone]. Arrangements made with [facility], [facility] per patient's preferance. He has opted for [facility] for his home oxygen and home Bipap arrangements. [facility] will deliver portable oxygen tank and arrange bipap in the home as well as the home oxygen concentrator.     Provided Post Acute Provider List?: Yes  Post Acute Provider List: Nursing Home, Home Health, Inpatient Rehab  Provided Post Acute Provider Quality & Resource List?: Yes  Post Acute Provider Quality and Resource List: Home Health, Nursing Home  Delivered To: Patient  Method of Delivery: In person    Destination      No service has been selected for the patient.      Durable Medical Equipment - Selection Complete      Service Provider Request Status Selected Services Address Phone Number Fax Number    [facility] [place] Selected Durable Medical Equipment [address] [facility] [address] [phone]       Rohini Nelson RN 3/5/2020 1312    Home oxygen, home bipap    Nursing tells me room air sat yesterday at rest is 86%                 Dialysis/Infusion      No service has been selected for the patient.      Home Medical Care - Selection Complete      Service Provider Request Status Selected Services Address Phone Number Fax Number    [facility] Selected Home Health Services [address] [facility] [address] [phone]      Therapy      No service has been selected for the patient.      Community Resources      No service has been selected for the patient.             Final Discharge Disposition Code: 06 - home with home health care

## 2020-03-05 NOTE — PLAN OF CARE
Problem: Patient Care Overview  Goal: Plan of Care Review  Outcome: Ongoing (interventions implemented as appropriate)  Flowsheets  Taken 3/5/2020 0312  Progress: improving  Outcome Summary: pt rested well throughout night, did not tolerate cpap all night, took it off in sleep without knowing, desats to 40's on room air at night, minimal c/o pain, no c/o n/v/d, other than o2 sats, vss, will continue to monitor.  Taken 3/5/2020 0025  Plan of Care Reviewed With: patient;spouse

## 2020-03-05 NOTE — DISCHARGE SUMMARY
[facility] Medicine Services  DISCHARGE SUMMARY    Patient Name: Juan Alberto Tejeda  : 1960  MRN: [id number]    Date of Admission: 2020  4:32 PM  Date of Discharge: 3/5/2020  Primary Care Physician: Megan Martell APRN    Consults     Date and Time Order Name Status Description    2020 1933 Inpatient Cardiothoracic Surgery Consult Completed     20207 Inpatient Cardiology Consult Completed         Hospital Course     Presenting Problem:   Acute on chronic systolic CHF (congestive heart failure) (CMS/[facility]) [I50.23]    Active Hospital Problems    Diagnosis  POA   • **Diastolic CHF due to valvular disease (CMS/[facility]) [I38, I50.30]  Yes   • Acute respiratory failure with hypoxia (CMS/[facility]) [J96.01]  Yes   • Nocturnal hypoxia [G47.34]  Yes   • Coronary artery disease involving native coronary artery of native heart with angina pectoris (CMS/[facility]) [I25.119]  Yes   • Demand ischemia of myocardium (CMS/[facility]) [I24.8]  Yes   • CKD (chronic kidney disease) stage 3, GFR 30-59 ml/min (CMS/[facility]) [N18.3]  Yes   • Hyperlipidemia LDL goal <70 [E78.5]  Yes   • Essential hypertension [I10]  Yes   • Type 2 diabetes mellitus with hypoglycemia (CMS/[facility]) [E11.649]  Yes   • Normocytic anemia [D64.9]  Yes   • Aortic stenosis [I35.0]  Yes      Resolved Hospital Problems    Diagnosis Date Resolved POA   • CHF exacerbation (CMS/[facility]) [I50.9] 2020 Yes   • Elevated troponin [R79.89] 2020 Yes      Hospital Course:  Mr. Juan Alberto Tejeda is a 59yoM with PMH significant for HTN, HLD, diastolic CHF secondary to severe aortic stenosis, CKD III (baseline creatinine 1.5-2.1), gout, insulin-dependent DMII and obesity (BMI 41). He was recently hospitalized at [facility] in 2019 for CHF exacerbation. He was found to have severe aortic stenosis at that time and underwent cardiac catheterization in preparation for surgical aortic valve replacement. LHC reportedly showed non-obstructive CAD. He was  ultimately discharged from the hospital and told he would be contacted regarding follow up, which he stated never happened.     He presented to [facility] on 2/28/20 with complaints of progressive shortness of breath, peripheral edema, abdominal swelling and cough. He had been compliant with all prescribed medications. He reported that he wished to transfer his care to [facility]. CXR in the ED showed cardiomegaly with pulmonary edema. Pro-BNP 1,728. Creatinine 2.14. Serial troponins 0.054 and 0.045, respectively. Hgb 10.9. He was hypoxic and required supplemental O2.     He was admitted to the hospital medicine service for acute respiratory failure and CHF exacerbation. Cardiology was consulted and Dr. Canales followed the patient. He was started on IV Bumex. The TAVR team was consulted to assist with workup/scheduling.     2/29 TTE showed EF 65% with severely calcified bicuspid aortic valve. Peak velocity of the flow distal to the aortic valve 456.1 cm/s. Aortic valve mean pressure gradient 51.5 mmHg. Aortic valve area 0.74 cm2.    SHANKAR and CTA also performed as part of TAVR workup. He has been scheduled for TAVR on 3/16/20. He will discharge home in stable condition on 3/5/20.     Patient was hypoglycemic on admission. Home insulin regimen has been adjusted. He reports that he has had significant issues with hypoglycemia and blood sugar control since his admission.     Follow up with PCP 1 week  Follow up with [facility] on Monday 3/9 @ 10:45am  Ambulatory referral for sleep medicine was placed in [facility]. He will discharge home on supplemental O2, pending sleep study.     Day of Discharge     HPI:   Laying in bed. Feeling a lot better and wants to go home today. No new questions or concerns regarding discharge or upcoming surgery. Long discussion regarding his diabetes medications     Review of Systems  Gen- No fevers, chills  CV- No chest pain, palpitations  Resp- (+) cough, stable  dyspnea  GI- No N/V/D, abd pain    Vital Signs:   Temp:  [98 °F (36.7 °C)-98.5 °F (36.9 °C)] 98.5 °F (36.9 °C)  Heart Rate:  [63-81] 70  Resp:  [16-20] 20  BP: (120-144)/(62-78) 132/64     Physical Exam:  Constitutional: No acute distress, awake, alert  HENT: NCAT, mucous membranes moist  Respiratory: Clear to auscultation bilaterally, respiratory effort normal   Cardiovascular: RRR, III/IV systolic ejection murmur. No rubs, or gallops. Palpable pedal pulses bilaterally  Gastrointestinal: Positive bowel sounds, soft, nontender, nondistended  Musculoskeletal: 2+ pitting bilateral ankle edema  Psychiatric: Appropriate affect, cooperative  Neurologic: Oriented x 3, strength symmetric in all extremities, Cranial Nerves grossly intact to confrontation, speech clear  Skin: No rashes    Pertinent  and/or Most Recent Results     Results from last 7 days   Lab Units 03/05/20  0439 03/04/20  0535 03/03/20  0452 03/02/20  0435 03/01/20  0536 02/29/20  0308 02/28/20  1628 02/28/20  1627   WBC 10*3/mm3  --   --  8.10  --  9.98 10.14 9.47  --    HEMOGLOBIN g/dL  --   --  10.7*  --  11.2* 11.7* 10.9*  --    HEMATOCRIT %  --   --  34.5*  --  37.1* 39.9 36.8*  --    PLATELETS 10*3/mm3  --   --  280  --  249 247 251  --    SODIUM mmol/L 131* 136 137 136 140 141  --  141   POTASSIUM mmol/L 4.5 4.1 4.0 4.0 4.4 4.7  --  4.6   CHLORIDE mmol/L 87* 92* 92* 92* 96* 96*  --  99   CO2 mmol/L 29.0 33.0* 32.0* 32.0* 33.0* 33.0*  --  33.0*   BUN mg/dL 65* 58* 52* 44* 40* 36*  --  35*   CREATININE mg/dL 2.27* 2.10* 2.04* 1.85* 2.03* 2.12*  --  2.14*   GLUCOSE mg/dL 169* 161* 145* 157* 130* 129*  --  59*   CALCIUM mg/dL 9.7 8.6 9.3 9.4 9.1 9.4  --  9.4     Results from last 7 days   Lab Units 02/28/20  1627   BILIRUBIN mg/dL 0.4   ALK PHOS U/L 52   ALT (SGPT) U/L 16   AST (SGOT) U/L 28     Results from last 7 days   Lab Units 03/02/20  0435 02/29/20  0308 02/28/20  1810 02/28/20  1627   TSH uIU/mL  --  1.160  --   --    HEMOGLOBIN A1C %  --  6.30*   --   --    PROBNP pg/mL 1,338.0* 1,587.0*  --  1,728.0*   TROPONIN T ng/mL  --   --  0.045* 0.054*     Brief Urine Lab Results     None        Microbiology Results Abnormal     Procedure Component Value - Date/Time    Respiratory Panel, PCR - Swab, Nasopharynx [[id number]]  (Normal) Collected:  02/28/20 2392    Lab Status:  Final result Specimen:  Swab from Nasopharynx Updated:  02/29/20 0717     ADENOVIRUS, PCR Not Detected     Coronavirus 229E Not Detected     Coronavirus HKU1 Not Detected     Coronavirus NL63 Not Detected     Coronavirus OC43 Not Detected     Human Metapneumovirus Not Detected     Human Rhinovirus/Enterovirus Not Detected     Influenza B PCR Not Detected     Parainfluenza Virus 1 Not Detected     Parainfluenza Virus 2 Not Detected     Parainfluenza Virus 3 Not Detected     Parainfluenza Virus 4 Not Detected     Bordetella pertussis pcr Not Detected     Influenza A H1 2009 PCR Not Detected     Chlamydophila pneumoniae PCR Not Detected     Mycoplasma pneumo by PCR Not Detected     Influenza A PCR Not Detected     Influenza A H3 Not Detected     Influenza A H1 Not Detected     RSV, PCR Not Detected     Bordetella parapertussis PCR Not Detected        Imaging Results (All)     Procedure Component Value Units Date/Time    CT Angio TAVR Chest Abdomen Pelvis [[id number]] Collected:  03/03/20 1338     Updated:  03/04/20 0824    Narrative:       EXAMINATION: CT ANGIOGRAM OF THE CHEST-03/03/2020:     HISTORY: Atrial arrhythmia.     TECHNIQUE: CT angiogram of the chest was performed following bolus  infusion of contrast and images displayed in the axial, sagittal and  coronal projections (3-D).      The radiation dose reduction device was turned on as low as reasonably  achievable for each scan per ALARA protocol.     FINDINGS:  There is dense calcification of the aortic valve. The origin  of the coronary arteries is from the typical location. The left  ventricle is not enlarged. There is no left atrial or  "left atrial  appendage thrombus. There are bilaterally paired pulmonary veins with no  \"stricture\" or stenosis. There is a small area of consolidative airspace  disease in the medial segment of the left lower lobe.       Impression:       There are bilaterally paired pulmonary veins with no  evidence of \"stricture\" or stenosis. The left atrium is not  significantly enlarged and there is no left atrial or left atrial  appendage thrombus. Lastly, there is a small area of consolidation with  air bronchograms in the medial segment of the left lower lobe.     D:  03/03/2020  E:  03/03/2020     This report was finalized on 3/4/2020 8:21 AM by Dr. Fortino Weldon MD.       XR Chest 1 View [[id number]] Collected:  02/28/20 1742     Updated:  02/28/20 1911    Narrative:          EXAMINATION: XR CHEST 1 VW - 02/28/2020     INDICATION: Shortness of air.     COMPARISON: None.     FINDINGS: Cardiac size enlarged with increased pulmonary vascularity and  scattered patchy opacifications of pulmonary edema pattern as well as  interstitial prominence without pleural effusion. No pneumothorax.           Impression:       Cardiomegaly with patchy bilateral perihilar predominant  pulmonary opacifications consistent with pulmonary edema pattern,  however no evidence of decompensation as there is no significant pleural  effusion.     DICTATED:   02/28/2020  EDITED/ls :   02/28/2020      This report was finalized on 2/28/2020 7:08 PM by Dr. Rusty Agudelo.           Results for orders placed during the hospital encounter of 02/28/20   Duplex Carotid Ultrasound CAR    Narrative · Proximal right internal carotid artery plaque without significant   stenosis.  · Right internal carotid artery stenosis of 0-49%.  · Proximal left internal carotid artery plaque without significant   stenosis.  · Left internal carotid artery stenosis of 0-49%.  · Nominal antegrade bilateral vertebral artery flow demonstrated.        Results for orders placed during the " hospital encounter of 02/28/20   Adult Transesophageal Echo (SHANKAR) W/ Cont if Necessary Per Protocol    Narrative · Estimated EF = 60%.  · Left ventricular systolic function is normal.  · Left ventricular wall thickness is consistent with mild-to-moderate   concentric hypertrophy.  · Severe aortic valve stenosis is present.  · Mild aortic valve regurgitation is present.  · Aortic valve mean pressure gradient is 53.7 mmHg.  · Trace-to-mild mitral valve regurgitation is present     Anesthesia: Cath lab/Echo lab moderate sedation    I was present with the patient for the duration of moderate sedation and   supervised staff who had no other duties and monitored the patient for the   entire procedure.    Name of independent trained observer: Stefania Quiñonez RN  Intra-service start time: 1505  Intra-service end time: 1527         Discharge Details        Discharge Medications      New Medications      Instructions Start Date   polyethylene glycol packet  Commonly known as:  MIRALAX   17 g, Oral, 2 Times Daily PRN         Changes to Medications      Instructions Start Date   bumetanide 2 MG tablet  Commonly known as:  BUMEX  What changed:  when to take this   2 mg, Oral, 2 Times Daily      insulin aspart 100 UNIT/ML solution pen-injector sc pen  Commonly known as:  novoLOG FLEXPEN  What changed:    · how to take this  · when to take this  · additional instructions   Sliding scale TID with meals. 150-200: 2 units 201-249: 4 units 250-300: 6 units 301-350: 8 units      insulin detemir 100 UNIT/ML injection  Commonly known as:  LEVEMIR  What changed:    · how much to take  · how to take this  · when to take this  · additional instructions   If blood sugar consistently above 200, start using 10 units nightly         Continue These Medications      Instructions Start Date   allopurinol 300 MG tablet  Commonly known as:  ZYLOPRIM   300 mg, Oral, Daily      aspirin 81 MG chewable tablet   81 mg, Oral, Daily      carvedilol 12.5 MG  tablet  Commonly known as:  COREG   12.5 mg, Oral, 2 Times Daily With Meals      fenofibrate micronized 200 MG capsule  Commonly known as:  LOFIBRA   200 mg, Oral, Every Morning Before Breakfast      gabapentin 600 MG tablet  Commonly known as:  NEURONTIN   600 mg, Oral, 3 Times Daily      pioglitazone 30 MG tablet  Commonly known as:  ACTOS   30 mg, Oral, Daily      rosuvastatin 40 MG tablet  Commonly known as:  CRESTOR   40 mg, Oral, Nightly      SITagliptin 100 MG tablet  Commonly known as:  JANUVIA   100 mg, Oral, Daily           No Known Allergies    Discharge Disposition:  Home or Self Care    Diet:  Hospital:  Diet Order   Procedures   • Diet Regular; Consistent Carbohydrate     Activity:  Activity Instructions     Activity as Tolerated          CODE STATUS:    Code Status and Medical Interventions:   Ordered at: 02/28/20 2149     Level Of Support Discussed With:    Patient     Code Status:    CPR     Medical Interventions (Level of Support Prior to Arrest):    Full     Future Appointments   Date Time Provider Department Center   3/9/2020 10:45 AM Susan Miranda APRN [facility]     Additional Instructions for the Follow-ups that You Need to Schedule     Discharge Follow-up with PCP   As directed       Currently Documented PCP:    Megan Martell APRN    PCP Phone Number:    [phone]     Follow Up Details:  PCP in 1 week             Time Spent on Discharge: 45 minutes    Electronically signed by Paty Winters PA-C, 03/05/20, 10:50 AM.

## 2020-03-05 NOTE — TELEPHONE ENCOUNTER
[facility] called to scheduled a HFU for patient;: admitted for CHF; d/c 03/05/20

## 2020-03-05 NOTE — PROGRESS NOTES
[facility] at [facility]  Cardiology Progress Note      Chief Complaint/Reason for service:    · Severe aortic stenosis  · Diastolic heart failure from aortic stenosis         Patient worked with PT yesterday but was limited due to knee pain.  While ambulating the patient's O2 saturations decreased to 84%.  Patient's O2 saturations drop while sleeping. No events noted overnight.    Past medical, surgical, social and family history reviewed in the patient's electronic medical record.          Review of Systems:   All systems reviewed were negative except pertinent positives listed in subjective       Vital Sign Min/Max for last 24 hours  Temp  Min: 98 °F (36.7 °C)  Max: 98.5 °F (36.9 °C)   BP  Min: 120/62  Max: 144/73   Pulse  Min: 63  Max: 81   Resp  Min: 16  Max: 20   SpO2  Min: 84 %  Max: 99 %   Flow (L/min)  Min: 1  Max: 4      Intake/Output Summary (Last 24 hours) at 3/5/2020 0836  Last data filed at 3/5/2020 0448  Gross per 24 hour   Intake --   Output 1925 ml   Net -1925 ml           Physical Exam   Constitutional: He is oriented to person, place, and time. He appears well-developed and well-nourished.   HENT:   Head: Normocephalic.   Neck: No JVD present. Carotid bruit is not present.   Cardiovascular: Normal rate, regular rhythm and normal heart sounds. Exam reveals no gallop and no friction rub.   No murmur heard.  Pulmonary/Chest: Effort normal and breath sounds normal.   Abdominal: Soft. Bowel sounds are normal. He exhibits no distension.   Neurological: He is alert and oriented to person, place, and time.   Skin: Skin is warm and dry.   Psychiatric: He has a normal mood and affect.       Tele: Normal sinus rhythm    Results Review (reviewed the patient's recent labs in the electronic medical record):         Results from last 7 days   Lab Units 03/05/20  0439 03/04/20  0535 03/03/20  0452 03/02/20  0435 03/01/20  0536 02/29/20  0308 02/28/20  1627   SODIUM mmol/L 131* 136 137 136  140 141 141   POTASSIUM mmol/L 4.5 4.1 4.0 4.0 4.4 4.7 4.6   CHLORIDE mmol/L 87* 92* 92* 92* 96* 96* 99   BUN mg/dL 65* 58* 52* 44* 40* 36* 35*   CREATININE mg/dL 2.27* 2.10* 2.04* 1.85* 2.03* 2.12* 2.14*     Results from last 7 days   Lab Units 02/28/20  1810 02/28/20  1627   TROPONIN T ng/mL 0.045* 0.054*     Results from last 7 days   Lab Units 03/03/20  0452 03/01/20  0536 02/29/20  0308   WBC 10*3/mm3 8.10 9.98 10.14   HEMOGLOBIN g/dL 10.7* 11.2* 11.7*   HEMATOCRIT % 34.5* 37.1* 39.9   PLATELETS 10*3/mm3 280 249 247       Lab Results   Component Value Date    HGBA1C 6.30 (H) 02/29/2020       Lab Results   Component Value Date    CHLPL 203 (H) 12/10/2018    TRIG 178 12/10/2018    HDL 32 (L) 12/10/2018     (H) 12/10/2018                    Active Hospital Problems    Diagnosis POA   • **Diastolic CHF due to valvular disease (CMS/[facility]) [I38, I50.30] Yes     Priority: High     · Echo (2/29/2020): LVEF 65%.  Aortic valve is bicuspid and severely calcified.  Severe aortic stenosis (mean gradient 51 mmHg). Mild MR and MS     • Demand ischemia of myocardium (CMS/[facility]) [I24.8] Yes     Priority: High     · Mildly elevated troponins in setting of acute heart failure from aortic stenosis 2/28/2020     • CKD (chronic kidney disease) stage 3, GFR 30-59 ml/min (CMS/[facility]) [N18.3] Yes     Priority: High   • Type 2 diabetes mellitus with hypoglycemia (CMS/[facility]) [E11.649] Yes     Priority: High   • Aortic stenosis [I35.0] Yes     Priority: High     · Echo (2/29/2020): LVEF 65%.  Aortic valve is bicuspid and severely calcified.  Severe aortic stenosis (mean gradient 51 mmHg). Mild MR and MS     • Coronary artery disease involving native coronary artery of native heart with angina pectoris (CMS/[facility]) [I25.119] Yes     Priority: Medium     · Cardiac catheterization at [facility] (12/2019): Moderate nonobstructive CAD.     • Hyperlipidemia LDL goal <70 [E78.5] Yes     Priority: Medium     • High intensity statin therapy indicated given  the presence of CAD     • Essential hypertension [I10] Yes     Priority: Medium   • Normocytic anemia [D64.9] Yes     Priority: Medium              Diastolic heart failure due to severe aortic stenosis  · Transition IV Bumex to p.o.  · Low-sodium diet       Severe aortic stenosis  · CTA and SHANKAR completed yesterday as part of TAVR work-up.  Results of SHANKAR pending  · TAVR scheduled for March 16th     Demand ischemia  · Mildly elevated troponins in the setting of acute heart failure from aortic stenosis  · No ischemic evaluation needed as recent cath December showed mild nonobstructive CAD     Coronary artery disease  · Mild nonobstructive CAD on cath 12/2019  · Aspirin and statin therapy     Hypertension  · Currently controlled on Coreg     Hyperlipidemia  · On Crestor     Type 2 diabetes mellitus  · Sliding-scale insulin     Stage III chronic kidney disease  · Creatinine elevated at 2.27      Transition IV Bumex to p.o. 2 mg twice daily.  Okay for discharge home with follow-up in the heart failure clinic with Susan Miranda on Monday.  Will need a repeat BMP at that time and can arrange a sleep study for likely JASWINDER.  Will need to go home on oxygen and BiPAP.  Case management to arrange home overnight BIPAP with oxygen on at 4 liters and O2 via nasal cannula at 4 liters during the day.  TAVR scheduled for March 16th.     Layla Olmos, APRN  3/5/2020

## 2020-03-05 NOTE — NURSING NOTE
Patient had no other complaints for the remainder of the shift. Oxygen delivered to patients room. Discharge education completed with emphasis on insulin changes. Education completed with patient and spouse. Awaiting patient to get dressed and transport.

## 2020-03-05 NOTE — PLAN OF CARE
Problem: Fluid Volume Excess (Adult)  Goal: Identify Related Risk Factors and Signs and Symptoms  Outcome: Ongoing (interventions implemented as appropriate)  Note:   Patient has had no complaints throughout shift. Plans to discharge this afternoon once oxygen has been delivered. Patient going home with plans for TAVR on the 16th of this month. Continuing to monitor.      Problem: Fluid Volume Excess (Adult)  Goal: Optimal Fluid Balance  Outcome: Ongoing (interventions implemented as appropriate)

## 2020-03-05 NOTE — PROGRESS NOTES
Continued Stay Note  [facility]     Patient Name: Juan Alberto Tejeda  MRN: [id number]  Today's Date: 3/5/2020    Admit Date: 2/28/2020    Discharge Plan     Row Name 03/05/20 1431       Plan    Plan Comments  Nursing tells me room air sat at rest yesterday of 86%. I will fax this to [facility]    Row Name 03/05/20 1313                              Discharge Codes    No documentation.       Expected Discharge Date and Time     Expected Discharge Date Expected Discharge Time    Mar 5, 2020             Rohini Nelson RN

## 2020-03-05 NOTE — DISCHARGE PLACEMENT REQUEST
"Juan Alberto Tejeda (59 y.o. Male)       Going home today.    Rohini MARTINEZ, RN, [facility]    Date of Birth Social Security Number Address Home Phone MRN    1960  [address] [address] [phone] [id number]    Mandaen Marital Status          [employer] Single       Admission Date Admission Type Admitting Provider Attending Provider Department, Room/Bed    2/28/20 Emergency Rubin Nash MD Sloan, Walker E, MD [facility] 3E, S331/1    Discharge Date Discharge Disposition Discharge Destination         Home or Self Care              Attending Provider:  Rubin Nash MD    Allergies:  No Known Allergies    Isolation:  None   Infection:  None   Code Status:  CPR    Ht:  172.7 cm (67.99\")   Wt:  124 kg (273 lb)    Admission Cmt:  None   Principal Problem:  Diastolic CHF due to valvular disease (CMS/[facility]) [I38,I50.30] More...                 Active Insurance as of 2/28/2020     Primary Coverage     Payor Plan Insurance Group Employer/Plan Group    HUMANA MEDICARE REPLACEMENT HUMANA MEDICARE REPLACEMENT [id number]     Payor Plan Address Payor Plan Phone Number Payor Plan Fax Number Effective Dates    [address] [address] [phone]  2/1/2019 - None Entered    Formerly [facility] [address]       Subscriber Name Subscriber Birth Date Member ID       JUAN ALBERTO TEJEDA 1960 [id number]                 Emergency Contacts      (Rel.) Home Phone Work Phone Mobile Phone    YUMIKO TEJEDA (Significant Other) -- -- [phone]    GEORGE TEJEDA (Daughter) -- -- [phone]            Insurance Information                HUMANA MEDICARE REPLACEMENT/HUMANA MEDICARE REPLACEMENT Phone: [phone]    Subscriber: Juan Alberto Tejeda Subscriber#: [id number]    Group#: [id number] Precert#: Reference Number:[id number]        [address] [address]  Phone:  [phone]  Fax:  [phone]        Patient:     Juan Alberto Tejeda MRN:  [id number]   [address] [address] :  1960  SSN:    Phone: [phone] Sex:  M      INSURANCE PAYOR PLAN GROUP # SUBSCRIBER ID   Primary:    HUMANA MEDICARE REPLACEMENT [id number]   Admitting Diagnosis: Acute on chronic systolic CHF (congestive heart failure) (CMS/HCC) [I50.23]  Order Date:  Mar 5, 2020         Case Management  Consult       (Order ID: [id number])     Diagnosis:         Priority:  Routine Expected Date:   Expiration Date:        Interval:  Once Count:    Comments: Please arrange home overnight study for Bipap with patient on his oxygen at 4 liters/nc.  Reason for Consult? arrangements for home bipap unit     Specimen Type:   Specimen Source:   Specimen Taken Date:   Specimen Taken Time:                   Verbal Order Mode: Verbal with readback   Authorizing Provider: Rubin Nash MD  Authorizing Provider's NPI: [id number]     Order Entered By: Rohini Nelson RN 3/5/2020  1:06 PM     Electronically signed by: Rubin Nash MD 3/5/2020  2:01 PM          [address] [address]  Dept. Phone:  [phone]  Dept. Fax:  [phone] Date Ordered: Mar 5, 2020         Patient:  Juan Alberto Tejeda MRN:  [id number]   [address] [facility] [address] :  1960  SSN:    Phone: [phone] Sex:  M     Weight: 124 kg (273 lb)         Ht Readings from Last 1 Encounters:   20 172.7 cm (67.99\")         Oxygen Therapy         (Order ID: [id number])    Diagnosis:  Acute on chronic systolic CHF (congestive heart failure) (CMS/HCC) (I50.23 [ICD-10-CM] 428.23,428.0 [ICD-9-CM])  Nocturnal hypoxia (G47.34 [ICD-10-CM] 327.24 [ICD-9-CM])  Obesity, Class III, BMI 40-49.9 (morbid obesity) (CMS/HCC) (E66.01 [ICD-10-CM] 278.01 [ICD-9-CM])  Acute pulmonary edema (CMS/HCC) (J81.0 [ICD-10-CM] 518.4 [ICD-9-CM])  Aortic valve stenosis, etiology of cardiac valve disease unspecified (I35.0 [ICD-10-CM] 424.1 " [ICD-9-CM])   Quantity:  1  Comments: Room air sat 86% at rest on 3/4/2020     Delivery Modality: Nasal Cannula  Liters Per Minute: 4  Duration: Continuous  Equipment:  Oxygen Concentrator &  &  Portable Gaseous Oxygen System & Portable Oxygen Contents Gaseous &  Conserving Regulator  Length of Need (99 Months = Lifetime): 99 Months = Lifetime        Authorizing Provider's Phone: [phone]   Verbal Order Mode: Verbal with readback   Authorizing Provider: Rubin Nash MD  Authorizing Provider's NPI: [id number]     Order Entered By: Rohini Nelson RN 3/5/2020  1:02 PM     Electronically signed by: Rubin Nash MD 3/5/2020  2:01 PM                      pH, Arterial 7.451High            pCO2, Arterial 52.9High            pO2, Arterial 87.1           HCO3, Arterial 36.8High            Base Excess 11.3High               O2/Vent Data                      History & Physical      Mini, MD Antonia at 20 2110          [facility] Medicine Services  HISTORY AND PHYSICAL    Patient Name: Juan Alberto Tejeda  : 1960  MRN: [id number]  Primary Care Physician: Megan Martell APRN    Subjective   Subjective     Chief Complaint:  Shortness of air     HPI:  Juan Alberto Tejeda is a 59 y.o. male with a past medical history significant for diabetes mellitus type 2, CHF, hyperlipidemia, essential hypertension CKD stage III and aortic stenosis presents to the ED with complaints of shortness of air.  At the time of my evaluation patient is hypoglycemic with glucose of 50, (orders for D50)  Therefore ex-wife is at bedside whom provides most of history.  She reports patient was hospitalized in 2019 and has been recommended for a valve replacement.  Over the past 4 days ex-wife notes worsening shortness of air, peripheral edema, abdominal swelling and cough.  She states he has been compliant with all medications. Family notes no noted fever, chills, nausea, vomiting,  diarrhea, abdominal pain, palpitations or chest pain.  While at beside patient began coughing and coughed up a large amount of bright yellow thick sputum.  CXR obtained in the ED reveals cardiomegaly with patchy bilateral perihilar predominant pulmonary opacifications consistent with pulmonary edema pattern.  Patient will be admitted to [facility] under the care of the Hospitalist for further evaluation and treatment.       -Dr. Burgess was able to obtain records from .  Patient had AMI with LHC showing 40/50% LAD and RCA stenosis.  No stents deployed. Found to have EF of 40-50% and severe AS with recs for valve replacement.      Review of Systems   Constitutional: Positive for activity change. Negative for appetite change, chills, diaphoresis, fatigue and fever.   HENT: Negative.    Eyes: Negative for photophobia and visual disturbance.   Respiratory: Positive for cough and shortness of breath.    Cardiovascular: Positive for leg swelling. Negative for chest pain and palpitations.   Gastrointestinal: Positive for abdominal distention. Negative for abdominal pain, blood in stool, constipation, diarrhea, nausea and vomiting.   Genitourinary: Negative.    Musculoskeletal: Negative.    Skin: Negative.    Neurological: Negative for dizziness, speech difficulty, weakness, light-headedness, numbness and headaches.   Psychiatric/Behavioral: Negative.         Otherwise 10-system ROS reviewed and is negative except as mentioned in the HPI.    Personal History     Past Medical History:   Diagnosis Date   • Chronic kidney disease    • Diabetes mellitus (CMS/HCC)    • Hyperlipidemia    • Hypertension    • Peripheral neuropathy        Past Surgical History:   Procedure Laterality Date   • CHOLECYSTECTOMY     • KIDNEY STONE SURGERY         Family History: family history is not on file.     Social History:  reports that he has never smoked. He has never used smokeless tobacco. He reports that he does not drink alcohol or use  drugs.    Medications:  Medications Prior to Admission   Medication Sig Dispense Refill Last Dose   • allopurinol (ZYLOPRIM) 300 MG tablet Take 300 mg by mouth Daily.      • aspirin 81 MG chewable tablet Chew 81 mg Daily.      • bumetanide (BUMEX) 2 MG tablet Take 2 mg by mouth Daily.      • carvedilol (COREG) 12.5 MG tablet Take 12.5 mg by mouth 2 (Two) Times a Day With Meals.      • fenofibrate micronized (LOFIBRA) 200 MG capsule Take 200 mg by mouth Every Morning Before Breakfast.      • gabapentin (NEURONTIN) 600 MG tablet Take 600 mg by mouth 3 (Three) Times a Day.      • insulin aspart (novoLOG FLEXPEN) 100 UNIT/ML solution pen-injector sc pen Inject  under the skin into the appropriate area as directed 3 (Three) Times a Day With Meals. Sliding scale      • insulin detemir (LEVEMIR) 100 UNIT/ML injection Inject 62 Units under the skin into the appropriate area as directed 2 (Two) Times a Day.      • pioglitazone (ACTOS) 30 MG tablet Take 30 mg by mouth Daily.      • rosuvastatin (CRESTOR) 40 MG tablet Take 40 mg by mouth Every Night.      • SITagliptin (JANUVIA) 100 MG tablet Take 100 mg by mouth Daily.          No Known Allergies    Objective   Objective     Vital Signs:   Temp:  [97.8 °F (36.6 °C)] 97.8 °F (36.6 °C)  Heart Rate:  [63-73] 73  Resp:  [18] 18  BP: (108-155)/(57-82) 140/82        Physical Exam   Constitutional: He is oriented to person, place, and time. He appears well-developed and well-nourished. No distress.   Will arouse to verbal stimuli but quickly falls back asleep.  Oriented x4, ex-wife at bedside.    HENT:   Head: Normocephalic and atraumatic.   Eyes: Pupils are equal, round, and reactive to light. Conjunctivae and EOM are normal. Right eye exhibits no discharge. Left eye exhibits no discharge. No scleral icterus.   Neck: Normal range of motion. Neck supple. No JVD present. No tracheal deviation present. No thyromegaly present.   Cardiovascular: Normal rate, regular rhythm and intact  distal pulses. Exam reveals no gallop and no friction rub.   Murmur heard.  Systolic murmur    Pulmonary/Chest: He has rales.   Using abdominal muscles   Scattered rales throughout bilateral upper and lower lung fields.    -oxygen saturation 93-94% on 2.5L NC.    Abdominal: Soft. Bowel sounds are normal. He exhibits no mass. There is no tenderness. There is no rebound and no guarding. No hernia.   Musculoskeletal: Normal range of motion. He exhibits edema. He exhibits no tenderness or deformity.   2+ pitting edema to bilateral lower extremities extending up to bilateral thighs.    Lymphadenopathy:     He has no cervical adenopathy.   Neurological: He is oriented to person, place, and time.   Sleepy but will arouse to verbal stimuli, answers questions appropriately.    Skin: Skin is warm and dry. No rash noted. He is not diaphoretic. No erythema. No pallor.   Psychiatric: He has a normal mood and affect. His behavior is normal.   Nursing note and vitals reviewed.       Results Reviewed:  I have personally reviewed current lab, radiology, and data and agree.    Results from last 7 days   Lab Units 02/28/20  1628   WBC 10*3/mm3 9.47   HEMOGLOBIN g/dL 10.9*   HEMATOCRIT % 36.8*   PLATELETS 10*3/mm3 251     Results from last 7 days   Lab Units 02/28/20  1627   SODIUM mmol/L 141   POTASSIUM mmol/L 4.6   CHLORIDE mmol/L 99   CO2 mmol/L 33.0*   BUN mg/dL 35*   CREATININE mg/dL 2.14*   GLUCOSE mg/dL 59*   CALCIUM mg/dL 9.4   ALT (SGPT) U/L 16   AST (SGOT) U/L 28     No results found for: BNP  No results found for: PHART, PCO2  Imaging Results (Last 24 Hours)     Procedure Component Value Units Date/Time    XR Chest 1 View [[id number]] Collected:  02/28/20 1742     Updated:  02/28/20 1911    Narrative:          EXAMINATION: XR CHEST 1 VW - 02/28/2020     INDICATION: Shortness of air.     COMPARISON: None.     FINDINGS: Cardiac size enlarged with increased pulmonary vascularity and  scattered patchy opacifications of pulmonary  edema pattern as well as  interstitial prominence without pleural effusion. No pneumothorax.           Impression:       Cardiomegaly with patchy bilateral perihilar predominant  pulmonary opacifications consistent with pulmonary edema pattern,  however no evidence of decompensation as there is no significant pleural  effusion.     DICTATED:   02/28/2020  EDITED/ls :   02/28/2020      This report was finalized on 2/28/2020 7:08 PM by Dr. Rusty Agudelo.                Assessment/Plan   Assessment / Plan     Active Hospital Problems    Diagnosis   • **CHF exacerbation (CMS/[facility])   • CKD (chronic kidney disease) stage 3, GFR 30-59 ml/min (CMS/[facility])   • Hyperlipidemia   • Essential hypertension   • Type 2 diabetes mellitus with hypoglycemia (CMS/[facility])   • Elevated troponin   • Normocytic anemia   • Aortic stenosis         Assessment & Plan:  59 year old male presents to the ED with worsening shortness of air over the past 3-4 days.   CXR shows patchy infiltrates.  At [facility] in December, he had a LHC showing noncritical CAD but was told he has severe aortic stenosis.      Acute dyspnea  In setting of morbid obesity, prob JASWINDER, reported severe AS, mild cardiomyopathy per [facility] cath in December 2019  - DDX:  could be CHF exacerbation vs viral bronchitis/pna or both.  - didn't improve with IV lasix in ER  - prominent rhonchorous cough.   - plan to treat for CHF but check resp PCR and influenza; follow temp curve and WBC.   - coreg, bumex  - cards consult in AM  - get  records  - daily weight, I/o  - try nebs tid - stop if unhelpful  - repeat probnp and consider repeat CXR soon.     Reported severe aortic stenosis per LHC at  12/19   - get  records  - patient wants to initiate care with [facility] cardiology    elevated troponin  -likely demand ischemia in setting of #1  -continue ASA, coreg, crestor   - recent LHC:  noncritical stenoses    CKD III  -reviewed prior records, creatinine has ranged from 1.5-2.1  -hold nephrotoxic  agents  - asked pharm to reviewe meds as they don't look renal appropriate. Adjusted here.      Diabetes mellitus type 2 but presented with hypoglycemia  -on arrival to the ED was 59, was given D50  -repeat glucose once to the floor was 50  -will give amp of D50, check blood glucose more frequently until stable   -order diet   -recent hgb A1c at [facility] 7.8  -start ldssi insulin once stable   - he takes very high doses of insulin at home and has occasional morning lows.      DVT prophylaxis:scds, heparin    CODE STATUS:  Full code   There are no questions and answers to display.       Admission Status:  I believe this patient INPATIENT status due to acute cardiac decompensation in setting of severe valve disease.  I feel patient’s risk for adverse outcomes and need for care warrant INPATIENT evaluation and I predict the patient’s care encounter to likely last beyond 2 midnights.  tsTRADHA Sharp   02/28/20   9:32 PM      Attending   Admission Attestation       I have seen and examined the patient, performing an independent face-to-face diagnostic evaluation with plan of care reviewed and developed with the advanced practice clinician (APC).      Brief Summary Statement:   Juan Alberto Tejeda is a 59 y.o. male with DM2 and neuropathy, CHF, noncritical CAD, and severe aortic stenosis.  Reportedly he was admitted at [facility] in December 2019 for MI, had heart cath showing EF 40-50 and severe aortic stenosis.  He notes lower extremity swelling x 2 months.  He denies having chest pain recently.  He denies syncope or presyncope.  He does not have home oxygen, has never smoked, and does not use nebulizers.      He comes to our ED tonight due to four days of worsening dyspnea at home.  Yesterday he developed a cough productive of clear to white sputum.  He has had no chest pain, no hemoptysis, no fevers, no ill contacts. He says his foot and leg swelling is currently no worse than usual.  In the ED he got Lasix 40 mg IV and  voided quite a bit but his breathing feels no better.      Also, he has had trouble getting followup at , is not clear on the plan for cardiac management, and would like to establish care with [facility] cardiologists.      Remainder of detailed HPI is as noted by APC and has been reviewed and/or edited by me for completeness.    Attending Physical Exam:    Gen: morbidly obese man in NAD asleep when I enter, rattling secretions in upper airways, very hard to rouse but eventually came awake with a start.   Neuro: alert and oriented, clear speech, follows commands, grossly nonfocal  HEENT:  NC/AT PERRL, OP benign  Neck:  Supple, no LAD  Heart RRR .  syst murmur 2/6.  No rub, or gallop  Lungs upper airway secretions that partially clear with coughing.  No wheeze.  Mild crackles in lower fields.  Freq cough, prod of whitet-yellow sputum.    Abd:  Soft, nontender, no rebound or guarding, pos BS  Extrem:  No c/c.  3_ edmea of feet and ankles.        Brief Assessment/Plan :  See detailed assessment and plan developed with APC which I have reviewed and/or edited for completeness.    Electronically signed by Antonia Childress MD, 02/28/20, 10:00 PM.                Electronically signed by Antonia Childress MD at 02/28/20 9162          Physician Progress Notes (most recent note)      Neela Olmos APRN at 03/05/20 0836          [facility]  Cardiology Progress Note      Chief Complaint/Reason for service:    · Severe aortic stenosis  · Diastolic heart failure from aortic stenosis    Subjective     Patient worked with PT yesterday but was limited due to knee pain.  While ambulating the patient's O2 saturations decreased to 84%.  Patient's O2 saturations drop while sleeping. No events noted overnight.    Past medical, surgical, social and family history reviewed in the patient's electronic medical record.          Review of Systems:   All systems reviewed were negative except pertinent  positives listed in subjective    Objective   Vital Sign Min/Max for last 24 hours  Temp  Min: 98 °F (36.7 °C)  Max: 98.5 °F (36.9 °C)   BP  Min: 120/62  Max: 144/73   Pulse  Min: 63  Max: 81   Resp  Min: 16  Max: 20   SpO2  Min: 84 %  Max: 99 %   Flow (L/min)  Min: 1  Max: 4      Intake/Output Summary (Last 24 hours) at 3/5/2020 0836  Last data filed at 3/5/2020 0448  Gross per 24 hour   Intake --   Output 1925 ml   Net -1925 ml           Physical Exam   Constitutional: He is oriented to person, place, and time. He appears well-developed and well-nourished.   HENT:   Head: Normocephalic.   Neck: No JVD present. Carotid bruit is not present.   Cardiovascular: Normal rate, regular rhythm and normal heart sounds. Exam reveals no gallop and no friction rub.   No murmur heard.  Pulmonary/Chest: Effort normal and breath sounds normal.   Abdominal: Soft. Bowel sounds are normal. He exhibits no distension.   Neurological: He is alert and oriented to person, place, and time.   Skin: Skin is warm and dry.   Psychiatric: He has a normal mood and affect.       Tele: Normal sinus rhythm    Results Review (reviewed the patient's recent labs in the electronic medical record):         Results from last 7 days   Lab Units 03/05/20  0439 03/04/20  0535 03/03/20  0452 03/02/20  0435 03/01/20  0536 02/29/20  0308 02/28/20  1627   SODIUM mmol/L 131* 136 137 136 140 141 141   POTASSIUM mmol/L 4.5 4.1 4.0 4.0 4.4 4.7 4.6   CHLORIDE mmol/L 87* 92* 92* 92* 96* 96* 99   BUN mg/dL 65* 58* 52* 44* 40* 36* 35*   CREATININE mg/dL 2.27* 2.10* 2.04* 1.85* 2.03* 2.12* 2.14*     Results from last 7 days   Lab Units 02/28/20  1810 02/28/20  1627   TROPONIN T ng/mL 0.045* 0.054*     Results from last 7 days   Lab Units 03/03/20  0452 03/01/20  0536 02/29/20  0308   WBC 10*3/mm3 8.10 9.98 10.14   HEMOGLOBIN g/dL 10.7* 11.2* 11.7*   HEMATOCRIT % 34.5* 37.1* 39.9   PLATELETS 10*3/mm3 280 249 247       Lab Results   Component Value Date    HGBA1C 6.30  (H) 02/29/2020       Lab Results   Component Value Date    CHLPL 203 (H) 12/10/2018    TRIG 178 12/10/2018    HDL 32 (L) 12/10/2018     (H) 12/10/2018               Assessment     Active Hospital Problems    Diagnosis POA   • **Diastolic CHF due to valvular disease (CMS/[facility]) [I38, I50.30] Yes     Priority: High     · Echo (2/29/2020): LVEF 65%.  Aortic valve is bicuspid and severely calcified.  Severe aortic stenosis (mean gradient 51 mmHg). Mild MR and MS     • Demand ischemia of myocardium (CMS/[facility]) [I24.8] Yes     Priority: High     · Mildly elevated troponins in setting of acute heart failure from aortic stenosis 2/28/2020     • CKD (chronic kidney disease) stage 3, GFR 30-59 ml/min (CMS/[facility]) [N18.3] Yes     Priority: High   • Type 2 diabetes mellitus with hypoglycemia (CMS/[facility]) [E11.649] Yes     Priority: High   • Aortic stenosis [I35.0] Yes     Priority: High     · Echo (2/29/2020): LVEF 65%.  Aortic valve is bicuspid and severely calcified.  Severe aortic stenosis (mean gradient 51 mmHg). Mild MR and MS     • Coronary artery disease involving native coronary artery of native heart with angina pectoris (CMS/[facility]) [I25.119] Yes     Priority: Medium     · Cardiac catheterization at [facility] (12/2019): Moderate nonobstructive CAD.     • Hyperlipidemia LDL goal <70 [E78.5] Yes     Priority: Medium     • High intensity statin therapy indicated given the presence of CAD     • Essential hypertension [I10] Yes     Priority: Medium   • Normocytic anemia [D64.9] Yes     Priority: Medium         Plan     Diastolic heart failure due to severe aortic stenosis  · Transition IV Bumex to p.o.  · Low-sodium diet       Severe aortic stenosis  · CTA and SHANKAR completed yesterday as part of TAVR work-up.  Results of SHANKAR pending  · TAVR scheduled for March 16th     Demand ischemia  · Mildly elevated troponins in the setting of acute heart failure from aortic stenosis  · No ischemic evaluation needed as recent cath December showed  mild nonobstructive CAD     Coronary artery disease  · Mild nonobstructive CAD on cath 12/2019  · Aspirin and statin therapy     Hypertension  · Currently controlled on Coreg     Hyperlipidemia  · On Crestor     Type 2 diabetes mellitus  · Sliding-scale insulin     Stage III chronic kidney disease  · Creatinine elevated at 2.27      Transition IV Bumex to p.o. 2 mg twice daily.  Okay for discharge home with follow-up in the heart failure clinic with Susan Miranda on Monday.  Will need a repeat BMP at that time and can arrange a sleep study for likely JASWINDER.  Will need to go home on oxygen and BiPAP.  Case management to arrange home overnight BIPAP with oxygen on at 4 liters and O2 via nasal cannula at 4 liters during the day.  TAVR scheduled for March 16th.     RADHA Castaneda  3/5/2020    Electronically signed by Neela Olmos APRN at 03/05/20 0953          Consult Notes (most recent note)      Irvin Jeffers MD at 03/01/20 0755      Consult Orders    1. Inpatient Cardiothoracic Surgery Consult [[id number]] ordered by Mark Canales IV, MD at 02/29/20 1933                Consult Note  Juan Alberto KAREN Tejeda  [id number]  1960    Referring Provider:  Reason for Consultation: Aortic stenosis    Patient Care Team:  Megan Martell APRN as PCP - General (Family Medicine)    Chief complaint: Shortness of breath, fatigue      History of present illness: Patient is a 59-year-old white male who is been having shortness of breath and fatigue for several months.  He was admitted to [facility] apparently in December and had a heart cath which apparently showed nonobstructive heart disease.  He is developed more symptoms of congestive heart failure was admitted here.  He wants his care transferred here.  He had an echocardiogram which have obtained and reviewed which reveals aortic valve area 0.746 m² with a V-max greater than 550 cm/s and a mean gradient greater than 50 mmHg across his aortic  valve.  I been asked see the patient guards aortic valve replacement.    Review of Systems    The following systems were reviewed and negative;  constitution, eyes, ENT, gastrointestinal, genitourinary, integument, breast, hematologic / lymphatic, musculoskeletal, neurological, behavioral/psych and allergies / immunologic    History  Past Medical History:   Diagnosis Date   • Chronic kidney disease    • Diabetes mellitus (CMS/HCC)    • Hyperlipidemia    • Hypertension    • Peripheral neuropathy    , Past Surgical History:   Procedure Laterality Date   • CHOLECYSTECTOMY     • KIDNEY STONE SURGERY     , History reviewed. No pertinent family history., Social History     Tobacco Use   • Smoking status: Never Smoker   • Smokeless tobacco: Never Used   Substance Use Topics   • Alcohol use: Never     Frequency: Never   • Drug use: Never   , Medications Prior to Admission   Medication Sig Dispense Refill Last Dose   • allopurinol (ZYLOPRIM) 300 MG tablet Take 300 mg by mouth Daily.   2/28/2020 at 0900   • aspirin 81 MG chewable tablet Chew 81 mg Daily.   2/28/2020 at 0900   • bumetanide (BUMEX) 2 MG tablet Take 2 mg by mouth Daily.   2/28/2020 at 0900   • carvedilol (COREG) 12.5 MG tablet Take 12.5 mg by mouth 2 (Two) Times a Day With Meals.   2/28/2020 at 0900   • fenofibrate micronized (LOFIBRA) 200 MG capsule Take 200 mg by mouth Every Morning Before Breakfast.   2/28/2020 at 0900   • gabapentin (NEURONTIN) 600 MG tablet Take 600 mg by mouth 3 (Three) Times a Day.   2/28/2020 at 0900   • insulin aspart (novoLOG FLEXPEN) 100 UNIT/ML solution pen-injector sc pen Inject  under the skin into the appropriate area as directed 3 (Three) Times a Day With Meals. Sliding scale   2/28/2020 at 0900   • insulin detemir (LEVEMIR) 100 UNIT/ML injection Inject 62 Units under the skin into the appropriate area as directed 2 (Two) Times a Day.   2/28/2020 at 0900   • pioglitazone (ACTOS) 30 MG tablet Take 30 mg by mouth Daily.   2/28/2020  "at 0900   • rosuvastatin (CRESTOR) 40 MG tablet Take 40 mg by mouth Every Night.   2/28/2020 at 0900   • SITagliptin (JANUVIA) 100 MG tablet Take 100 mg by mouth Daily.   2/28/2020 at 0900      Scheduled Meds:    albuterol 2.5 mg Nebulization TID - RT   allopurinol 300 mg Oral Daily   aspirin 81 mg Oral Daily   bumetanide 2 mg Intravenous Q12H   carvedilol 12.5 mg Oral BID With Meals   gabapentin 300 mg Oral Nightly   heparin (porcine) 5,000 Units Subcutaneous Q8H   rosuvastatin 40 mg Oral Nightly   sodium chloride 10 mL Intravenous Q12H      Continuous Infusions:      PRN Meds:  •  acetaminophen  •  sodium chloride  •  sodium chloride and Allergies:  Patient has no known allergies.    Objective     Vital Sign Min/Max for last 24 hours  Temp  Min: 98.1 °F (36.7 °C)  Max: 98.5 °F (36.9 °C)   BP  Min: 112/46  Max: 138/71   Pulse  Min: 72  Max: 81   Resp  Min: 18  Max: 20   SpO2  Min: 92 %  Max: 97 %   No data recorded   Weight  Min: 123 kg (271 lb 4.8 oz)  Max: 129 kg (284 lb)     Flowsheet Rows      First Filed Value   Admission Height  172.7 cm (68\") Documented at 02/28/2020 1614   Admission Weight  118 kg (260 lb) Documented at 02/28/2020 1614          Physical Exam:     General Appearance:    Alert, cooperative, in no acute distress   Head:    Normocephalic, without obvious abnormality, atraumatic   Eyes:            Lids and lashes normal, conjunctivae and sclerae normal, no   icterus, no pallor, corneas clear, PERRLA   Ears:    Ears appear intact with no abnormalities noted   Throat:   No oral lesions, no thrush, oral mucosa moist   Neck:   No adenopathy, supple, trachea midline, no thyromegaly, no   carotid bruit, no JVD   Back:     No kyphosis present, no scoliosis present, no skin lesions,      erythema or scars, no tenderness to percussion or                   palpation,   range of motion normal   Lungs:    Crease in the bases, few scattered rales    Heart:   3/6 systolic ejection murmur   Chest Wall:    No " abnormalities observed   Abdomen:     Normal bowel sounds, no masses, no organomegaly, soft        non-tender, non-distended, no guarding, no rebound                tenderness   Rectal:     Deferred   Extremities:  2+ edema   Pulses:   Pulses palpable and equal bilaterally   Skin:   No bleeding, bruising or rash   Lymph nodes:   No palpable adenopathy   Neurologic:   Cranial nerves 2 - 12 grossly intact, sensation intact, DTR       present and equal bilaterally             Assessment/Plan       Diastolic CHF due to valvular disease (CMS/HCC)    CKD (chronic kidney disease) stage 3, GFR 30-59 ml/min (CMS/[facility])    Hyperlipidemia LDL goal <70    Essential hypertension    Type 2 diabetes mellitus with hypoglycemia (CMS/[facility])    Normocytic anemia    Aortic stenosis    Coronary artery disease involving native coronary artery of native heart with angina pectoris (CMS/[facility])    Demand ischemia of myocardium (CMS/[facility])      The patient is a 59-year-old white male who presents at this time with severe aortic stenosis and symptoms consistent congestive heart failure.  I have obtained and reviewed his echocardiogram and.  I concur with the interpretation of 0.74 cm² for aortic valve area with a mean gradient greater than 50 mmHg and a V-max greater than 550 cm/s.  We will obtain his cardiac catheterization films from  which apparently showed no critical coronary artery disease.  I discussed with him the surgical options of traditional aortic valve replacement with an incision versus TAVR.  He is aware of the pros and cons of each of these procedures and desires a TAVR.  We will begin the work-up of this.  He will need a to review his cardiac catheterization films.  We will get a CT of his abdomen and chest as well.  He does have chronic renal insufficiency.  Like thank you for this consultation.    I discussed the patients findings and my recommendations with patient and family      Please note that portions of this note were  completed with a voice recognition program. Efforts were made to edit the dictations, but words may be mistranscribed    Irvin Jeffers MD  20  7:58 AM                Electronically signed by Irvin Jeffers MD at 20 0802          Discharge Summary      Paty Winters PA-C at 20 1005     Attestation signed by Rubin Nash MD at 20 1402      Attending Cosignjeferson     I supervised care of the patient on day of service with direct care provided by the advanced care provider (APC).    Electronically signed by Rubin Nash MD, 20, 2:02 PM.                          [facility] Medicine Services  DISCHARGE SUMMARY    Patient Name: Juan Alberto Tejeda  : 1960  MRN: [id number]    Date of Admission: 2020  4:32 PM  Date of Discharge: 3/5/2020  Primary Care Physician: Megan Martell APRN    Consults     Date and Time Order Name Status Description    2020 1933 Inpatient Cardiothoracic Surgery Consult Completed     2020 2147 Inpatient Cardiology Consult Completed         Hospital Course     Presenting Problem:   Acute on chronic systolic CHF (congestive heart failure) (CMS/HCC) [I50.23]    Active Hospital Problems    Diagnosis  POA   • **Diastolic CHF due to valvular disease (CMS/HCC) [I38, I50.30]  Yes   • Acute respiratory failure with hypoxia (CMS/HCC) [J96.01]  Yes   • Nocturnal hypoxia [G47.34]  Yes   • Coronary artery disease involving native coronary artery of native heart with angina pectoris (CMS/HCC) [I25.119]  Yes   • Demand ischemia of myocardium (CMS/HCC) [I24.8]  Yes   • CKD (chronic kidney disease) stage 3, GFR 30-59 ml/min (CMS/HCC) [N18.3]  Yes   • Hyperlipidemia LDL goal <70 [E78.5]  Yes   • Essential hypertension [I10]  Yes   • Type 2 diabetes mellitus with hypoglycemia (CMS/HCC) [E11.649]  Yes   • Normocytic anemia [D64.9]  Yes   • Aortic stenosis [I35.0]  Yes      Resolved Hospital Problems    Diagnosis Date  Resolved POA   • CHF exacerbation (CMS/[facility]) [I50.9] 02/29/2020 Yes   • Elevated troponin [R79.89] 02/29/2020 Yes      Hospital Course:  Mr. Juan Alberto Tejeda is a 59yoM with PMH significant for HTN, HLD, diastolic CHF secondary to severe aortic stenosis, CKD III (baseline creatinine 1.5-2.1), gout, insulin-dependent DMII and obesity (BMI 41). He was recently hospitalized at [facility] in December 2019 for CHF exacerbation. He was found to have severe aortic stenosis at that time and underwent cardiac catheterization in preparation for surgical aortic valve replacement. LHC reportedly showed non-obstructive CAD. He was ultimately discharged from the hospital and told he would be contacted regarding follow up, which he stated never happened.     He presented to [facility] on 2/28/20 with complaints of progressive shortness of breath, peripheral edema, abdominal swelling and cough. He had been compliant with all prescribed medications. He reported that he wished to transfer his care to [facility]. CXR in the ED showed cardiomegaly with pulmonary edema. Pro-BNP 1,728. Creatinine 2.14. Serial troponins 0.054 and 0.045, respectively. Hgb 10.9. He was hypoxic and required supplemental O2.     He was admitted to the hospital medicine service for acute respiratory failure and CHF exacerbation. Cardiology was consulted and Dr. Canales followed the patient. He was started on IV Bumex. The TAVR team was consulted to assist with workup/scheduling.     2/29 TTE showed EF 65% with severely calcified bicuspid aortic valve. Peak velocity of the flow distal to the aortic valve 456.1 cm/s. Aortic valve mean pressure gradient 51.5 mmHg. Aortic valve area 0.74 cm2.    SHANKAR and CTA also performed as part of TAVR workup. He has been scheduled for TAVR on 3/16/20. He will discharge home in stable condition on 3/5/20.     Patient was hypoglycemic on admission. Home insulin regimen has been adjusted. He reports that he has had significant  issues with hypoglycemia and blood sugar control since his admission.     Follow up with PCP 1 week  Follow up with [facility] on Monday 3/9 @ 10:45am  Ambulatory referral for sleep medicine was placed in [facility]. He will discharge home on supplemental O2, pending sleep study.     Day of Discharge     HPI:   Laying in bed. Feeling a lot better and wants to go home today. No new questions or concerns regarding discharge or upcoming surgery. Long discussion regarding his diabetes medications     Review of Systems  Gen- No fevers, chills  CV- No chest pain, palpitations  Resp- (+) cough, stable dyspnea  GI- No N/V/D, abd pain    Vital Signs:   Temp:  [98 °F (36.7 °C)-98.5 °F (36.9 °C)] 98.5 °F (36.9 °C)  Heart Rate:  [63-81] 70  Resp:  [16-20] 20  BP: (120-144)/(62-78) 132/64     Physical Exam:  Constitutional: No acute distress, awake, alert  HENT: NCAT, mucous membranes moist  Respiratory: Clear to auscultation bilaterally, respiratory effort normal   Cardiovascular: RRR, III/IV systolic ejection murmur. No rubs, or gallops. Palpable pedal pulses bilaterally  Gastrointestinal: Positive bowel sounds, soft, nontender, nondistended  Musculoskeletal: 2+ pitting bilateral ankle edema  Psychiatric: Appropriate affect, cooperative  Neurologic: Oriented x 3, strength symmetric in all extremities, Cranial Nerves grossly intact to confrontation, speech clear  Skin: No rashes    Pertinent  and/or Most Recent Results     Results from last 7 days   Lab Units 03/05/20  0439 03/04/20  0535 03/03/20  0452 03/02/20  0435 03/01/20  0536 02/29/20  0308 02/28/20  1628 02/28/20  1627   WBC 10*3/mm3  --   --  8.10  --  9.98 10.14 9.47  --    HEMOGLOBIN g/dL  --   --  10.7*  --  11.2* 11.7* 10.9*  --    HEMATOCRIT %  --   --  34.5*  --  37.1* 39.9 36.8*  --    PLATELETS 10*3/mm3  --   --  280  --  249 247 251  --    SODIUM mmol/L 131* 136 137 136 140 141  --  141   POTASSIUM mmol/L 4.5 4.1 4.0 4.0 4.4 4.7  --  4.6   CHLORIDE mmol/L  87* 92* 92* 92* 96* 96*  --  99   CO2 mmol/L 29.0 33.0* 32.0* 32.0* 33.0* 33.0*  --  33.0*   BUN mg/dL 65* 58* 52* 44* 40* 36*  --  35*   CREATININE mg/dL 2.27* 2.10* 2.04* 1.85* 2.03* 2.12*  --  2.14*   GLUCOSE mg/dL 169* 161* 145* 157* 130* 129*  --  59*   CALCIUM mg/dL 9.7 8.6 9.3 9.4 9.1 9.4  --  9.4     Results from last 7 days   Lab Units 02/28/20  1627   BILIRUBIN mg/dL 0.4   ALK PHOS U/L 52   ALT (SGPT) U/L 16   AST (SGOT) U/L 28     Results from last 7 days   Lab Units 03/02/20  0435 02/29/20  0308 02/28/20  1810 02/28/20  1627   TSH uIU/mL  --  1.160  --   --    HEMOGLOBIN A1C %  --  6.30*  --   --    PROBNP pg/mL 1,338.0* 1,587.0*  --  1,728.0*   TROPONIN T ng/mL  --   --  0.045* 0.054*     Brief Urine Lab Results     None        Microbiology Results Abnormal     Procedure Component Value - Date/Time    Respiratory Panel, PCR - Swab, Nasopharynx [[id number]]  (Normal) Collected:  02/28/20 2766    Lab Status:  Final result Specimen:  Swab from Nasopharynx Updated:  02/29/20 0717     ADENOVIRUS, PCR Not Detected     Coronavirus 229E Not Detected     Coronavirus HKU1 Not Detected     Coronavirus NL63 Not Detected     Coronavirus OC43 Not Detected     Human Metapneumovirus Not Detected     Human Rhinovirus/Enterovirus Not Detected     Influenza B PCR Not Detected     Parainfluenza Virus 1 Not Detected     Parainfluenza Virus 2 Not Detected     Parainfluenza Virus 3 Not Detected     Parainfluenza Virus 4 Not Detected     Bordetella pertussis pcr Not Detected     Influenza A H1 2009 PCR Not Detected     Chlamydophila pneumoniae PCR Not Detected     Mycoplasma pneumo by PCR Not Detected     Influenza A PCR Not Detected     Influenza A H3 Not Detected     Influenza A H1 Not Detected     RSV, PCR Not Detected     Bordetella parapertussis PCR Not Detected        Imaging Results (All)     Procedure Component Value Units Date/Time    CT Angio TAVR Chest Abdomen Pelvis [[id number]] Collected:  03/03/20 133     Updated:  " 03/04/20 0824    Narrative:       EXAMINATION: CT ANGIOGRAM OF THE CHEST-03/03/2020:     HISTORY: Atrial arrhythmia.     TECHNIQUE: CT angiogram of the chest was performed following bolus  infusion of contrast and images displayed in the axial, sagittal and  coronal projections (3-D).      The radiation dose reduction device was turned on as low as reasonably  achievable for each scan per ALARA protocol.     FINDINGS:  There is dense calcification of the aortic valve. The origin  of the coronary arteries is from the typical location. The left  ventricle is not enlarged. There is no left atrial or left atrial  appendage thrombus. There are bilaterally paired pulmonary veins with no  \"stricture\" or stenosis. There is a small area of consolidative airspace  disease in the medial segment of the left lower lobe.       Impression:       There are bilaterally paired pulmonary veins with no  evidence of \"stricture\" or stenosis. The left atrium is not  significantly enlarged and there is no left atrial or left atrial  appendage thrombus. Lastly, there is a small area of consolidation with  air bronchograms in the medial segment of the left lower lobe.     D:  03/03/2020  E:  03/03/2020     This report was finalized on 3/4/2020 8:21 AM by Dr. Fortino Weldon MD.       XR Chest 1 View [[id number]] Collected:  02/28/20 1742     Updated:  02/28/20 1911    Narrative:          EXAMINATION: XR CHEST 1 VW - 02/28/2020     INDICATION: Shortness of air.     COMPARISON: None.     FINDINGS: Cardiac size enlarged with increased pulmonary vascularity and  scattered patchy opacifications of pulmonary edema pattern as well as  interstitial prominence without pleural effusion. No pneumothorax.           Impression:       Cardiomegaly with patchy bilateral perihilar predominant  pulmonary opacifications consistent with pulmonary edema pattern,  however no evidence of decompensation as there is no significant pleural  effusion.     DICTATED:   " 02/28/2020  EDITED/ls :   02/28/2020      This report was finalized on 2/28/2020 7:08 PM by Dr. Rusty Agudelo.           Results for orders placed during the hospital encounter of 02/28/20   Duplex Carotid Ultrasound CAR    Narrative · Proximal right internal carotid artery plaque without significant   stenosis.  · Right internal carotid artery stenosis of 0-49%.  · Proximal left internal carotid artery plaque without significant   stenosis.  · Left internal carotid artery stenosis of 0-49%.  · Nominal antegrade bilateral vertebral artery flow demonstrated.        Results for orders placed during the hospital encounter of 02/28/20   Adult Transesophageal Echo (SHANKAR) W/ Cont if Necessary Per Protocol    Narrative · Estimated EF = 60%.  · Left ventricular systolic function is normal.  · Left ventricular wall thickness is consistent with mild-to-moderate   concentric hypertrophy.  · Severe aortic valve stenosis is present.  · Mild aortic valve regurgitation is present.  · Aortic valve mean pressure gradient is 53.7 mmHg.  · Trace-to-mild mitral valve regurgitation is present     Anesthesia: Cath lab/Echo lab moderate sedation    I was present with the patient for the duration of moderate sedation and   supervised staff who had no other duties and monitored the patient for the   entire procedure.    Name of independent trained observer: Stefania Quiñonez RN  Intra-service start time: 1505  Intra-service end time: 1527         Discharge Details        Discharge Medications      New Medications      Instructions Start Date   polyethylene glycol packet  Commonly known as:  MIRALAX   17 g, Oral, 2 Times Daily PRN         Changes to Medications      Instructions Start Date   bumetanide 2 MG tablet  Commonly known as:  BUMEX  What changed:  when to take this   2 mg, Oral, 2 Times Daily      insulin aspart 100 UNIT/ML solution pen-injector sc pen  Commonly known as:  novoLOG FLEXPEN  What changed:    · how to take this  · when to  take this  · additional instructions   Sliding scale TID with meals. 150-200: 2 units 201-249: 4 units 250-300: 6 units 301-350: 8 units      insulin detemir 100 UNIT/ML injection  Commonly known as:  LEVEMIR  What changed:    · how much to take  · how to take this  · when to take this  · additional instructions   If blood sugar consistently above 200, start using 10 units nightly         Continue These Medications      Instructions Start Date   allopurinol 300 MG tablet  Commonly known as:  ZYLOPRIM   300 mg, Oral, Daily      aspirin 81 MG chewable tablet   81 mg, Oral, Daily      carvedilol 12.5 MG tablet  Commonly known as:  COREG   12.5 mg, Oral, 2 Times Daily With Meals      fenofibrate micronized 200 MG capsule  Commonly known as:  LOFIBRA   200 mg, Oral, Every Morning Before Breakfast      gabapentin 600 MG tablet  Commonly known as:  NEURONTIN   600 mg, Oral, 3 Times Daily      pioglitazone 30 MG tablet  Commonly known as:  ACTOS   30 mg, Oral, Daily      rosuvastatin 40 MG tablet  Commonly known as:  CRESTOR   40 mg, Oral, Nightly      SITagliptin 100 MG tablet  Commonly known as:  JANUVIA   100 mg, Oral, Daily           No Known Allergies    Discharge Disposition:  Home or Self Care    Diet:  Hospital:  Diet Order   Procedures   • Diet Regular; Consistent Carbohydrate     Activity:  Activity Instructions     Activity as Tolerated          CODE STATUS:    Code Status and Medical Interventions:   Ordered at: 02/28/20 2147     Level Of Support Discussed With:    Patient     Code Status:    CPR     Medical Interventions (Level of Support Prior to Arrest):    Full     Future Appointments   Date Time Provider Department Center   3/9/2020 10:45 AM Susan Miranda APRN [facility]     Additional Instructions for the Follow-ups that You Need to Schedule     Discharge Follow-up with PCP   As directed       Currently Documented PCP:    Megan aMrtell APRN    PCP Phone Number:    [phone]     Follow Up  Details:  PCP in 1 week             Time Spent on Discharge: 45 minutes    Electronically signed by Paty Winters PA-C, 03/05/20, 10:50 AM.            Electronically signed by Rubin Nash MD at 03/05/20 0173

## 2020-03-06 ENCOUNTER — CARE COORDINATION (OUTPATIENT)
Dept: CARE COORDINATION | Age: 60
End: 2020-03-06

## 2020-03-06 ENCOUNTER — READMISSION MANAGEMENT (OUTPATIENT)
Dept: CALL CENTER | Facility: HOSPITAL | Age: 60
End: 2020-03-06

## 2020-03-06 NOTE — OUTREACH NOTE
Prep Survey      Responses   [facility] facility patient discharged from?  [facility]   Is LACE score < 7 ?  No   Eligibility  Readm Mgmt   Discharge diagnosis  CHF   Does the patient have one of the following disease processes/diagnoses(primary or secondary)?  CHF   Does the patient have Home health ordered?  Yes   What is the Home health agency?   [facility]   Is there a DME ordered?  Yes   What DME was ordered?  [facility]   Comments regarding appointments  see AVS   Medication alerts for this patient  multiple change   Prep survey completed?  Yes          Lesly Roper RN

## 2020-03-06 NOTE — CARE COORDINATION
[facility] Transitions Initial Follow Up Call    Call within 2 business days of discharge: Yes     Patient: Eddie Goff Patient : 1960 MRN: <[id number]>    Last Discharge [facility]       Complaint Diagnosis Description Type Department Provider    17   Admission (Discharged) from [address] [place] [address], [address] [place] [address]  3/5/20  CHF       RARS: No data recorded     Spoke with: Attempted HFU call, unsuccessful. Message left with contact information.      Discharge department/facility: [facility]    Non-face-to-face services provided:  Obtained and reviewed discharge summary and/or continuity of care documents    Follow Up  Future Appointments   Date Time Provider Lyndsey Lyons   3/11/2020  3:00 PM ANDREIA Luther [facility] Willma Bellow   3/25/2020  9:45 AM ANDREIA Luther [address]       Den Healy RN

## 2020-03-09 ENCOUNTER — OFFICE VISIT (OUTPATIENT)
Dept: CARDIOLOGY | Facility: HOSPITAL | Age: 60
End: 2020-03-09

## 2020-03-09 ENCOUNTER — READMISSION MANAGEMENT (OUTPATIENT)
Dept: CALL CENTER | Facility: HOSPITAL | Age: 60
End: 2020-03-09

## 2020-03-09 ENCOUNTER — PREP FOR SURGERY (OUTPATIENT)
Dept: OTHER | Facility: HOSPITAL | Age: 60
End: 2020-03-09

## 2020-03-09 ENCOUNTER — LAB REQUISITION (OUTPATIENT)
Dept: LAB | Facility: HOSPITAL | Age: 60
End: 2020-03-09

## 2020-03-09 VITALS
DIASTOLIC BLOOD PRESSURE: 69 MMHG | WEIGHT: 268 LBS | HEART RATE: 75 BPM | RESPIRATION RATE: 18 BRPM | TEMPERATURE: 98 F | BODY MASS INDEX: 40.62 KG/M2 | SYSTOLIC BLOOD PRESSURE: 120 MMHG | OXYGEN SATURATION: 98 % | HEIGHT: 68 IN

## 2020-03-09 DIAGNOSIS — Z79.4 TYPE 2 DIABETES MELLITUS WITH STAGE 3 CHRONIC KIDNEY DISEASE, WITH LONG-TERM CURRENT USE OF INSULIN (HCC): ICD-10-CM

## 2020-03-09 DIAGNOSIS — I50.30 DIASTOLIC CHF DUE TO VALVULAR DISEASE (HCC): Primary | ICD-10-CM

## 2020-03-09 DIAGNOSIS — I35.0 AORTIC VALVE STENOSIS, ETIOLOGY OF CARDIAC VALVE DISEASE UNSPECIFIED: Primary | ICD-10-CM

## 2020-03-09 DIAGNOSIS — I25.119 CORONARY ARTERY DISEASE INVOLVING NATIVE CORONARY ARTERY OF NATIVE HEART WITH ANGINA PECTORIS (HCC): ICD-10-CM

## 2020-03-09 DIAGNOSIS — Z00.00 ROUTINE GENERAL MEDICAL EXAMINATION AT A HEALTH CARE FACILITY: ICD-10-CM

## 2020-03-09 DIAGNOSIS — N18.30 CKD (CHRONIC KIDNEY DISEASE) STAGE 3, GFR 30-59 ML/MIN (HCC): ICD-10-CM

## 2020-03-09 DIAGNOSIS — I35.9 AORTIC VALVE DISORDER: Primary | ICD-10-CM

## 2020-03-09 DIAGNOSIS — E11.22 TYPE 2 DIABETES MELLITUS WITH STAGE 3 CHRONIC KIDNEY DISEASE, WITH LONG-TERM CURRENT USE OF INSULIN (HCC): ICD-10-CM

## 2020-03-09 DIAGNOSIS — I38 DIASTOLIC CHF DUE TO VALVULAR DISEASE (HCC): Primary | ICD-10-CM

## 2020-03-09 DIAGNOSIS — I10 ESSENTIAL HYPERTENSION: ICD-10-CM

## 2020-03-09 DIAGNOSIS — N18.30 TYPE 2 DIABETES MELLITUS WITH STAGE 3 CHRONIC KIDNEY DISEASE, WITH LONG-TERM CURRENT USE OF INSULIN (HCC): ICD-10-CM

## 2020-03-09 DIAGNOSIS — G63 POLYNEUROPATHY ASSOCIATED WITH UNDERLYING DISEASE (HCC): ICD-10-CM

## 2020-03-09 PROBLEM — E11.29 TYPE 2 DIABETES MELLITUS WITH KIDNEY COMPLICATION, WITH LONG-TERM CURRENT USE OF INSULIN (HCC): Status: ACTIVE | Noted: 2020-02-28

## 2020-03-09 PROBLEM — G62.9 PERIPHERAL NEUROPATHY: Status: ACTIVE | Noted: 2020-03-09

## 2020-03-09 LAB
ANION GAP SERPL CALCULATED.3IONS-SCNC: 13 MMOL/L (ref 5–15)
BUN BLD-MCNC: 71 MG/DL (ref 6–20)
BUN/CREAT SERPL: 26.9 (ref 7–25)
CALCIUM SPEC-SCNC: 9.7 MG/DL (ref 8.6–10.5)
CHLORIDE SERPL-SCNC: 89 MMOL/L (ref 98–107)
CO2 SERPL-SCNC: 34 MMOL/L (ref 22–29)
CREAT BLD-MCNC: 2.64 MG/DL (ref 0.76–1.27)
GFR SERPL CREATININE-BSD FRML MDRD: 25 ML/MIN/1.73
GLUCOSE BLD-MCNC: 350 MG/DL (ref 65–99)
NT-PROBNP SERPL-MCNC: 1457 PG/ML (ref 5–900)
POTASSIUM BLD-SCNC: 4.3 MMOL/L (ref 3.5–5.2)
SODIUM BLD-SCNC: 136 MMOL/L (ref 136–145)

## 2020-03-09 PROCEDURE — 80048 BASIC METABOLIC PNL TOTAL CA: CPT | Performed by: NURSE PRACTITIONER

## 2020-03-09 PROCEDURE — 83880 ASSAY OF NATRIURETIC PEPTIDE: CPT | Performed by: NURSE PRACTITIONER

## 2020-03-09 RX ORDER — CHLORHEXIDINE GLUCONATE 500 MG/1
1 CLOTH TOPICAL EVERY 12 HOURS PRN
Status: CANCELLED | OUTPATIENT
Start: 2020-03-09

## 2020-03-09 RX ORDER — NITROGLYCERIN 0.4 MG/1
0.4 TABLET SUBLINGUAL
Status: CANCELLED | OUTPATIENT
Start: 2020-03-09

## 2020-03-09 RX ORDER — CHLORHEXIDINE GLUCONATE 0.12 MG/ML
15 RINSE ORAL ONCE
Status: CANCELLED | OUTPATIENT
Start: 2020-03-09 | End: 2020-03-09

## 2020-03-09 RX ORDER — BUMETANIDE 1 MG/1
1 TABLET ORAL 2 TIMES DAILY
Qty: 60 TABLET | Refills: 0
Start: 2020-03-09

## 2020-03-09 RX ORDER — ACETAMINOPHEN 325 MG/1
650 TABLET ORAL EVERY 4 HOURS PRN
Status: CANCELLED | OUTPATIENT
Start: 2020-03-09

## 2020-03-09 RX ORDER — ASPIRIN 325 MG
325 TABLET ORAL NIGHTLY
Status: CANCELLED | OUTPATIENT
Start: 2020-03-09 | End: 2020-03-10

## 2020-03-09 NOTE — OUTREACH NOTE
CHF Week 1 Survey      Responses   [facility] patient discharged from?  [facility]   Does the patient have one of the following disease processes/diagnoses(primary or secondary)?  CHF   Is there a successful TCM telephone encounter documented?  No   CHF Week 1 attempt successful?  Yes   Call start time  1117   Call end time  1124   Discharge diagnosis  CHF   Person spoke with today (if not patient) and relationship  Crystal-daughter, Carmencita-SO, and patient    Meds reviewed with patient/caregiver?  Yes   Is the patient having any side effects they believe may be caused by any medication additions or changes?  No   Does the patient have all medications ordered at discharge?  Yes   Is the patient taking all medications as directed (includes completed medication regime)?  Yes   Comments regarding appointments  Appt with [facility] 3/9/20 at 10:45am,  pt will call to schedule appt with Pulm   Does the patient have a primary care provider?   Yes   Does the patient have an appointment with their PCP within 7 days of discharge?  Yes   Comments regarding PCP  PCP appt 3/11/20 at 3:00pm   Has the patient kept scheduled appointments due by today?  Yes   What is the Home health agency?   [facility]   Has home health visited the patient within 72 hours of discharge?  Yes   [facility] was [facility]   Has all DME been delivered?  Yes   DME comments  Pt is wearing O2 at night and PRN during the day   Psychosocial issues?  No   Did the patient receive a copy of their discharge instructions?  Yes   Nursing interventions  Reviewed instructions with patient   What is the patient's perception of their health status since discharge?  Improving   Nursing interventions  Nurse provided patient education   Is the patient weighing daily?  No   Does the patient have scales?  -- [Scales are not working ]   Daily weight interventions  Education provided on importance of daily weight   Is the patient able to teach back Heart  Failure diet management?  Yes   Is the patient able to teach back Heart Failure Zones?  Yes [Green zone]   Is the patient able to teach back signs and symptoms of worsening condition? (i.e. weight gain, shortness of air, etc.)  Yes   If the patient is a current smoker, are they able to teach back resources for cessation?  -- [Nonsmoker]   Is the patient/caregiver able to teach back the hierarchy of who to call/visit for symptoms/problems? PCP, Specialist, Home health nurse, Urgent Care, ED, 911  Yes    CHF Week 1 call completed?  Yes          Jeni Schafer RN

## 2020-03-09 NOTE — PROGRESS NOTES
"[facility]  [facility]  HF Clinic    Encounter Date:03/09/2020     Juan Alberto Tejeda  [address] [address]    1960    Megan Martell APRN    Juan Alberto Tejeda is a 59 y.o. male.      Subjective:     Chief Complaint:  Congestive Heart Failure and Establish Care       HPI :  Mr. Tejeda comes in to the [facility] today for short interval follow up after hospital stay.  He is anticipating TAVR on 3/16.  Since DC home, he and spouse state he is \"doing better\".  Edema continues to resolve.  Breathing and pain levels somewhat better.  No palpitations or syncope.    Past Medical History:   Diagnosis Date   • Chronic kidney disease    • Diabetes mellitus (CMS/HCC)    • Hyperlipidemia    • Hypertension    • Peripheral neuropathy      Past Surgical History:   Procedure Laterality Date   • CHOLECYSTECTOMY     • KIDNEY STONE SURGERY       No Known Allergies      Current Outpatient Medications:   •  allopurinol (ZYLOPRIM) 300 MG tablet, Take 300 mg by mouth Daily., Disp: , Rfl:   •  aspirin 81 MG chewable tablet, Chew 81 mg Daily., Disp: , Rfl:   •  bumetanide (BUMEX) 2 MG tablet, Take 1 tablet by mouth 2 (Two) Times a Day., Disp: 60 tablet, Rfl: 0  •  carvedilol (COREG) 12.5 MG tablet, Take 12.5 mg by mouth 2 (Two) Times a Day With Meals., Disp: , Rfl:   •  fenofibrate micronized (LOFIBRA) 200 MG capsule, Take 200 mg by mouth Every Morning Before Breakfast., Disp: , Rfl:   •  gabapentin (NEURONTIN) 600 MG tablet, Take 600 mg by mouth 3 (Three) Times a Day., Disp: , Rfl:   •  insulin aspart (novoLOG FLEXPEN) 100 UNIT/ML solution pen-injector sc pen, Sliding scale TID with meals. 150-200: 2 units 201-249: 4 units 250-300: 6 units 301-350: 8 units, Disp: , Rfl:   •  insulin detemir (LEVEMIR) 100 UNIT/ML injection, If blood sugar consistently above 200, start using 10 units nightly, Disp: , Rfl: 12  •  pioglitazone (ACTOS) 30 MG tablet, Take 30 mg by mouth Daily., Disp: , Rfl:   •  polyethylene " "glycol (MIRALAX) packet, Take 17 g by mouth 2 (Two) Times a Day As Needed (constipation)., Disp: 30 each, Rfl: 0  •  rosuvastatin (CRESTOR) 40 MG tablet, Take 40 mg by mouth Every Night., Disp: , Rfl:   •  SITagliptin (JANUVIA) 100 MG tablet, Take 100 mg by mouth Daily., Disp: , Rfl:     The following portions of the patient's history were reviewed and updated as appropriate in Epic:  Problem list, allergies, current medications, past medical and surgical history, past social and family history.     Review of Systems   Constitution: Positive for malaise/fatigue and weight loss.   HENT: Positive for nosebleeds.    Eyes: Positive for visual disturbance.   Cardiovascular: Positive for claudication, dyspnea on exertion, irregular heartbeat, leg swelling, near-syncope, orthopnea, palpitations and paroxysmal nocturnal dyspnea.   Respiratory: Positive for cough, hemoptysis, shortness of breath, snoring, sputum production and wheezing.    Endocrine: Positive for polydipsia.   Skin: Positive for itching.   Musculoskeletal: Positive for gout, joint pain, muscle cramps and muscle weakness.   Gastrointestinal: Positive for bloating and constipation.   Genitourinary: Positive for frequency.   Neurological: Positive for weakness.   Psychiatric/Behavioral: Positive for memory loss. The patient has insomnia.    Allergic/Immunologic: Negative.        Objective:     Vitals:    03/09/20 1111 03/09/20 1113 03/09/20 1114   BP: 118/69 107/60 120/69   BP Location: Right arm Left arm Left arm   Patient Position: Sitting Standing Sitting   Cuff Size: Adult Adult Adult   Pulse: 77 77 75   Resp:   18   Temp:   98 °F (36.7 °C)   TempSrc:   Temporal   SpO2: 98% 99% 98%   Weight:   122 kg (268 lb)   Height:   172.7 cm (67.99\")         Physical Exam   Constitutional: He is oriented to person, place, and time.   Morbidly obese, chronically ill appearing middle age gentleman.  NAD accompanied by spouse. Supplemental oxygen @ 2 liters   HENT: "   Head: Normocephalic and atraumatic.   Eyes: Pupils are equal, round, and reactive to light. Conjunctivae are normal.   Neck: Normal range of motion. Neck supple. No JVD present. No thyromegaly present.   Cardiovascular: Normal rate and intact distal pulses.   Murmur heard.  Harsh systolic murmur III/VI   Pulmonary/Chest: Effort normal. No respiratory distress. He has wheezes. He has no rales. He exhibits no tenderness.   Supplemental oxygen 2 liters with expiratory wheezing in bases   Musculoskeletal: He exhibits edema.   Generally diminished ROM.  Generalized edema trace to +1   Neurological: He is alert and oriented to person, place, and time.   Skin: Skin is warm and dry.   Psychiatric: He has a normal mood and affect. His behavior is normal. Judgment and thought content normal.   Vitals reviewed.      Lab and Diagnostic Review:      Basic Metabolic Panel         Component  Ref Range & Units 12:21  (3/9/20) 4d ago  (3/5/20) 4d ago  (3/5/20) 4d ago  (3/5/20) 5d ago  (3/4/20) 5d ago  (3/4/20) 5d ago  (3/4/20)   Glucose  65 - 99 mg/dL 350High   267High  R 208High  R 169High   233High  R 270High  R 253High  R   BUN  6 - 20 mg/dL 71High     65High        Creatinine  0.76 - 1.27 mg/dL 2.64High     2.27High        Sodium  136 - 145 mmol/L 136    131Low        Potassium  3.5 - 5.2 mmol/L 4.3    4.5       Chloride  98 - 107 mmol/L 89Low     87Low        CO2  22.0 - 29.0 mmol/L 34.0High     29.0       Calcium  8.6 - 10.5 mg/dL 9.7    9.7       eGFR Non African Amer  >60 mL/min/1.73 25Low     30Low                proBNP   Specimen Information: Blood        Component  Ref Range & Units 12:21 7d ago 9d ago 10d ago   proBNP  5.0 - 900.0 pg/mL 1,457.0High   1,338.0High   1,587.0High   1,728.0High                Assessment and Plan:     1. Diastolic CHF due to valvular disease (CMS/HCC)  - Severe Aortic stenosis  - TAVR scheduled for 3/16/2020  - Patient to CT Surgery after clinic visit today for PAT and Check in  instructions  - NYHA class III-IV  - Reduce Bumex to 1 mg BID due to renal dysfuction  - continue coreg    2. Coronary artery disease  - Non-obstructive Dz  - ASA, statin, BB    3. Essential hypertension  - Controlled    4. CKD (chronic kidney disease) stage 3, GFR 30-59 ml/min (CMS/HCC)  - reduce diuretics as noted above    5. Polyneuropathy associated with underlying disease (CMS/HCC)  - Neurontin    6. Type 2 diabetes mellitus with stage 3 chronic kidney disease, with long-term current use of insulin (CMS/HCC)  - Poorly controlled  - Insulin, Januvia  - Stop Actos  - Consider Endocrinology follow up after TAVR

## 2020-03-10 ENCOUNTER — TELEPHONE (OUTPATIENT)
Dept: CARDIOLOGY | Facility: HOSPITAL | Age: 60
End: 2020-03-10

## 2020-03-10 NOTE — TELEPHONE ENCOUNTER
Patient's wife called and said that his feet are swollen after taking 1/2 diuretic in the morning and evening and wants to know if he can take his regular dosage of a whole diuretic medication.    Returned call to Chana.  The scale at their home will not weigh him.  Yesterday he took Bumex 1 mg BID and today feet/ legs swollen.  Advised to try alternating 2 mg BID (starting today) with 1 mg BID until TAVR next week. She verbalized understanding.        Susan GARCIA

## 2020-03-11 ENCOUNTER — OFFICE VISIT (OUTPATIENT)
Dept: FAMILY MEDICINE CLINIC | Age: 60
End: 2020-03-11
Payer: MEDICARE

## 2020-03-11 VITALS
BODY MASS INDEX: 40.51 KG/M2 | HEIGHT: 68 IN | OXYGEN SATURATION: 96 % | HEART RATE: 69 BPM | DIASTOLIC BLOOD PRESSURE: 78 MMHG | SYSTOLIC BLOOD PRESSURE: 120 MMHG | WEIGHT: 267.3 LBS | RESPIRATION RATE: 18 BRPM

## 2020-03-11 PROCEDURE — 1036F TOBACCO NON-USER: CPT | Performed by: NURSE PRACTITIONER

## 2020-03-11 PROCEDURE — G8417 CALC BMI ABV UP PARAM F/U: HCPCS | Performed by: NURSE PRACTITIONER

## 2020-03-11 PROCEDURE — 3017F COLORECTAL CA SCREEN DOC REV: CPT | Performed by: NURSE PRACTITIONER

## 2020-03-11 PROCEDURE — G8427 DOCREV CUR MEDS BY ELIG CLIN: HCPCS | Performed by: NURSE PRACTITIONER

## 2020-03-11 PROCEDURE — 99213 OFFICE O/P EST LOW 20 MIN: CPT | Performed by: NURSE PRACTITIONER

## 2020-03-11 PROCEDURE — G8484 FLU IMMUNIZE NO ADMIN: HCPCS | Performed by: NURSE PRACTITIONER

## 2020-03-11 PROCEDURE — 2022F DILAT RTA XM EVC RTNOPTHY: CPT | Performed by: NURSE PRACTITIONER

## 2020-03-11 PROCEDURE — 3044F HG A1C LEVEL LT 7.0%: CPT | Performed by: NURSE PRACTITIONER

## 2020-03-11 NOTE — PROGRESS NOTES
Chief Complaint   Patient presents with    Edema     hospital f/u       Have you seen any other physician or provider since your last visit yes - [facility]    Have you had any other diagnostic tests since your last visit? yes    Have you changed or stopped any medications since your last visit?  Chart updated

## 2020-03-11 NOTE — PROGRESS NOTES
[facility] and he is scheduled for valve surgery on Monday with Jennifer Dey md. currently his Bumex is 2 mg he is taking half of a tablet twice daily and then alternating that with every other day with 1 whole tablet twice daily and they have put his Actos on hold until after he has surgery on Monday. They did check his neck and his leg arteries and they have prescribed oxygen he is sleeping now at nighttime he uses nocturnal O2 and will be sent for sleep study following his recovery from the valve surgery. He came home from the hospital with a different sliding scale was not as aggressive and as his sugar continued to raise once he got 368 they switched over to the sliding scale that I gave them which is as follows:   Novolog sliding scale is 150-199     8 units, 200-249     12 units, 250-299    14 units, 300-349    16 units, and 350-399     18 units,  Greater than 400 call pcp. He continues to walk with a walker. Diabetes   He presents for his follow-up diabetic visit. He has type 2 diabetes mellitus. No MedicAlert identification noted. His disease course has been worsening. Hypoglycemia symptoms include nervousness/anxiousness. Associated symptoms include fatigue. There are no hypoglycemic complications. Symptoms are worsening. Diabetic complications include peripheral neuropathy and retinopathy. Risk factors for coronary artery disease include male sex, hypertension, diabetes mellitus, stress and dyslipidemia. Review of Systems   Constitutional: Positive for fatigue and malaise/fatigue increase. HENT: Negative. Eyes: Negative. Cardiovascular: Positive for leg swelling. Gastrointestinal: Negative. Endocrine: Negative. Genitourinary: Negative. Musculoskeletal: Positive for back pain and gait problem. Skin: Negative. Hematological: Negative.     Psychiatric/Behavioral: Positive for decreased concentration, dysphoric mood and sleep disturbance. The patient is nervous/anxious. All other systems reviewed and are negative. OBJECTIVE:  /64   Pulse 81   Resp 18   Ht 5' 8\" (1.727 m)   Wt 258 lb 6.4 oz (117.2 kg)   SpO2 96% Comment: room air  BMI 39.29 kg/m²    Physical Exam   Constitutional: He is oriented to person, place, and time. He appears well-developed and well-nourished. HENT:   Head: Normocephalic and atraumatic. Right Ear: External ear normal.   Left Ear: External ear normal.   Nose: Nose normal.   Mouth/Throat: Oropharynx is clear and moist.   Eyes: Pupils are equal, round, and reactive to light. Conjunctivae and EOM are normal.   Neck: Normal range of motion. Neck supple. Cardiovascular: Normal rate, regular rhythm, normal heart sounds and intact distal pulses. Pulmonary/Chest: Effort normal and breath sounds normal grade 4/6 murmur aortic systolic. Abdominal: Soft. Bowel sounds are normal.   Musculoskeletal: Normal range of motion but severe weakness. Neurological: He is alert and oriented to person, place, and time. He has normal reflexes. Skin: Skin is warm and dry. Psychiatric: He has a normal mood and affect. His behavior is normal. Judgment and thought content normal.   Nursing note and vitals reviewed. No results found for requested labs within last 30 days. Hemoglobin A1C (%)   Date Value   06/10/2019 10.6 (H)     Microalbumin, Random Urine (ug/mL)   Date Value   06/10/2019 59.8     LDL Calculated (mg/dL)   Date Value   06/10/2019 107 (H)         No results found for: WBC, NEUTROABS, HGB, HCT, MCV, PLT    Lab Results   Component Value Date    TSH 1.67 05/31/2018         ASSESSMENT/PLAN:   Diagnosis Orders    1. Type 2 diabetes mellitus with other circulatory complication, with long-term current use of insulin (HCC)  2.  hypotestosteronism     Hemoglobin A1C     No orders of the defined types were placed in this encounter.     Outpatient Encounter Medications as of 3/11/2020

## 2020-03-12 RX ORDER — PIOGLITAZONEHYDROCHLORIDE 30 MG/1
TABLET ORAL
Qty: 30 TABLET | Refills: 2 | Status: SHIPPED
Start: 2020-03-12 | End: 2020-07-30 | Stop reason: ALTCHOICE

## 2020-03-12 NOTE — TELEPHONE ENCOUNTER
Refill request received from pharmacy.  Medication pending for approval.       Last Office Visit With PCP:  3/11/2020    Next Visit Date:  Future Appointments   Date Time Provider Lyndsey Lyons   3/25/2020  9:45 AM Gerhardt Agent, APRN [address] Up to date

## 2020-03-15 ENCOUNTER — HOSPITAL ENCOUNTER (OUTPATIENT)
Dept: GENERAL RADIOLOGY | Facility: HOSPITAL | Age: 60
Discharge: HOME | End: 2020-03-15
Admitting: PHYSICIAN ASSISTANT

## 2020-03-15 ENCOUNTER — HOSPITAL ENCOUNTER (OUTPATIENT)
Dept: PULMONOLOGY | Facility: HOSPITAL | Age: 60
Discharge: HOME | End: 2020-03-15

## 2020-03-15 ENCOUNTER — APPOINTMENT (OUTPATIENT)
Dept: PREADMISSION TESTING | Facility: HOSPITAL | Age: 60
End: 2020-03-15

## 2020-03-15 ENCOUNTER — ANESTHESIA EVENT (OUTPATIENT)
Dept: PERIOP | Facility: HOSPITAL | Age: 60
End: 2020-03-15

## 2020-03-15 VITALS — HEIGHT: 68 IN | WEIGHT: 270.5 LBS | HEART RATE: 72 BPM | BODY MASS INDEX: 41 KG/M2 | OXYGEN SATURATION: 97 %

## 2020-03-15 DIAGNOSIS — I35.9 AORTIC VALVE DISORDER: ICD-10-CM

## 2020-03-15 LAB
ABO GROUP BLD: NORMAL
ALBUMIN SERPL-MCNC: 4.3 G/DL (ref 3.5–5.2)
ALBUMIN/GLOB SERPL: 1.3 G/DL
ALP SERPL-CCNC: 65 U/L (ref 39–117)
ALT SERPL W P-5'-P-CCNC: 23 U/L (ref 1–41)
AMPHET+METHAMPHET UR QL: NEGATIVE
AMPHETAMINES UR QL: NEGATIVE
ANION GAP SERPL CALCULATED.3IONS-SCNC: 12 MMOL/L (ref 5–15)
APTT PPP: 31.2 SECONDS (ref 24–37)
AST SERPL-CCNC: 34 U/L (ref 1–40)
BARBITURATES UR QL SCN: NEGATIVE
BASOPHILS # BLD AUTO: 0.07 10*3/MM3 (ref 0–0.2)
BASOPHILS NFR BLD AUTO: 0.7 % (ref 0–1.5)
BENZODIAZ UR QL SCN: NEGATIVE
BILIRUB SERPL-MCNC: 0.5 MG/DL (ref 0.2–1.2)
BLD GP AB SCN SERPL QL: NEGATIVE
BUN BLD-MCNC: 46 MG/DL (ref 6–20)
BUN/CREAT SERPL: 19.4 (ref 7–25)
BUPRENORPHINE SERPL-MCNC: NEGATIVE NG/ML
CALCIUM SPEC-SCNC: 9.8 MG/DL (ref 8.6–10.5)
CANNABINOIDS SERPL QL: NEGATIVE
CHLORIDE SERPL-SCNC: 97 MMOL/L (ref 98–107)
CO2 SERPL-SCNC: 32 MMOL/L (ref 22–29)
COCAINE UR QL: NEGATIVE
CREAT BLD-MCNC: 2.37 MG/DL (ref 0.76–1.27)
DEPRECATED RDW RBC AUTO: 50.4 FL (ref 37–54)
EOSINOPHIL # BLD AUTO: 0.31 10*3/MM3 (ref 0–0.4)
EOSINOPHIL NFR BLD AUTO: 3.3 % (ref 0.3–6.2)
ERYTHROCYTE [DISTWIDTH] IN BLOOD BY AUTOMATED COUNT: 15.6 % (ref 12.3–15.4)
GFR SERPL CREATININE-BSD FRML MDRD: 28 ML/MIN/1.73
GLOBULIN UR ELPH-MCNC: 3.3 GM/DL
GLUCOSE BLD-MCNC: 186 MG/DL (ref 65–99)
HBA1C MFR BLD: 6.5 % (ref 4.8–5.6)
HCT VFR BLD AUTO: 38.7 % (ref 37.5–51)
HGB BLD-MCNC: 11.8 G/DL (ref 13–17.7)
IMM GRANULOCYTES # BLD AUTO: 0.07 10*3/MM3 (ref 0–0.05)
IMM GRANULOCYTES NFR BLD AUTO: 0.7 % (ref 0–0.5)
INR PPP: 1.04 (ref 0.85–1.16)
LYMPHOCYTES # BLD AUTO: 1.58 10*3/MM3 (ref 0.7–3.1)
LYMPHOCYTES NFR BLD AUTO: 16.9 % (ref 19.6–45.3)
MAGNESIUM SERPL-MCNC: 2.2 MG/DL (ref 1.6–2.6)
MCH RBC QN AUTO: 27.5 PG (ref 26.6–33)
MCHC RBC AUTO-ENTMCNC: 30.5 G/DL (ref 31.5–35.7)
MCV RBC AUTO: 90.2 FL (ref 79–97)
METHADONE UR QL SCN: NEGATIVE
MONOCYTES # BLD AUTO: 0.49 10*3/MM3 (ref 0.1–0.9)
MONOCYTES NFR BLD AUTO: 5.2 % (ref 5–12)
NEUTROPHILS # BLD AUTO: 6.84 10*3/MM3 (ref 1.7–7)
NEUTROPHILS NFR BLD AUTO: 73.2 % (ref 42.7–76)
NRBC BLD AUTO-RTO: 0 /100 WBC (ref 0–0.2)
OPIATES UR QL: NEGATIVE
OXYCODONE UR QL SCN: NEGATIVE
PA ADP PRP-ACNC: 261 PRU
PCP UR QL SCN: NEGATIVE
PLATELET # BLD AUTO: 427 10*3/MM3 (ref 140–450)
PMV BLD AUTO: 10.6 FL (ref 6–12)
POTASSIUM BLD-SCNC: 4.9 MMOL/L (ref 3.5–5.2)
PROPOXYPH UR QL: NEGATIVE
PROT SERPL-MCNC: 7.6 G/DL (ref 6–8.5)
PROTHROMBIN TIME: 13.3 SECONDS (ref 11.5–14)
RBC # BLD AUTO: 4.29 10*6/MM3 (ref 4.14–5.8)
RH BLD: POSITIVE
SODIUM BLD-SCNC: 141 MMOL/L (ref 136–145)
T&S EXPIRATION DATE: NORMAL
TRICYCLICS UR QL SCN: NEGATIVE
WBC NRBC COR # BLD: 9.36 10*3/MM3 (ref 3.4–10.8)

## 2020-03-15 PROCEDURE — 94010 BREATHING CAPACITY TEST: CPT | Performed by: INTERNAL MEDICINE

## 2020-03-15 PROCEDURE — 94010 BREATHING CAPACITY TEST: CPT

## 2020-03-15 PROCEDURE — 86850 RBC ANTIBODY SCREEN: CPT | Performed by: PHYSICIAN ASSISTANT

## 2020-03-15 PROCEDURE — 80053 COMPREHEN METABOLIC PANEL: CPT | Performed by: PHYSICIAN ASSISTANT

## 2020-03-15 PROCEDURE — 83735 ASSAY OF MAGNESIUM: CPT | Performed by: PHYSICIAN ASSISTANT

## 2020-03-15 PROCEDURE — 86900 BLOOD TYPING SEROLOGIC ABO: CPT | Performed by: PHYSICIAN ASSISTANT

## 2020-03-15 PROCEDURE — 86901 BLOOD TYPING SEROLOGIC RH(D): CPT | Performed by: PHYSICIAN ASSISTANT

## 2020-03-15 PROCEDURE — 85610 PROTHROMBIN TIME: CPT | Performed by: PHYSICIAN ASSISTANT

## 2020-03-15 PROCEDURE — 85730 THROMBOPLASTIN TIME PARTIAL: CPT | Performed by: PHYSICIAN ASSISTANT

## 2020-03-15 PROCEDURE — 80306 DRUG TEST PRSMV INSTRMNT: CPT | Performed by: PHYSICIAN ASSISTANT

## 2020-03-15 PROCEDURE — 71046 X-RAY EXAM CHEST 2 VIEWS: CPT

## 2020-03-15 PROCEDURE — 85576 BLOOD PLATELET AGGREGATION: CPT | Performed by: PHYSICIAN ASSISTANT

## 2020-03-15 PROCEDURE — 83036 HEMOGLOBIN GLYCOSYLATED A1C: CPT | Performed by: PHYSICIAN ASSISTANT

## 2020-03-15 PROCEDURE — 36415 COLL VENOUS BLD VENIPUNCTURE: CPT

## 2020-03-15 PROCEDURE — 86923 COMPATIBILITY TEST ELECTRIC: CPT

## 2020-03-15 PROCEDURE — 85025 COMPLETE CBC W/AUTO DIFF WBC: CPT | Performed by: PHYSICIAN ASSISTANT

## 2020-03-15 RX ORDER — CHLORHEXIDINE GLUCONATE 500 MG/1
1 CLOTH TOPICAL EVERY 12 HOURS PRN
Status: DISCONTINUED | OUTPATIENT
Start: 2020-03-15 | End: 2020-03-16

## 2020-03-15 RX ORDER — SODIUM CHLORIDE 0.9 % (FLUSH) 0.9 %
10 SYRINGE (ML) INJECTION AS NEEDED
Status: CANCELLED | OUTPATIENT
Start: 2020-03-15

## 2020-03-15 RX ORDER — ASPIRIN 325 MG
325 TABLET ORAL NIGHTLY
Status: DISCONTINUED | OUTPATIENT
Start: 2020-03-15 | End: 2020-03-16

## 2020-03-15 RX ORDER — FAMOTIDINE 10 MG/ML
20 INJECTION, SOLUTION INTRAVENOUS ONCE
Status: CANCELLED | OUTPATIENT
Start: 2020-03-15 | End: 2020-03-15

## 2020-03-15 RX ORDER — SODIUM CHLORIDE 0.9 % (FLUSH) 0.9 %
10 SYRINGE (ML) INJECTION EVERY 12 HOURS SCHEDULED
Status: CANCELLED | OUTPATIENT
Start: 2020-03-15

## 2020-03-15 RX ORDER — FUROSEMIDE 40 MG/1
40 TABLET ORAL 2 TIMES DAILY
COMMUNITY
End: 2020-03-17 | Stop reason: HOSPADM

## 2020-03-15 RX ORDER — SODIUM CHLORIDE, SODIUM LACTATE, POTASSIUM CHLORIDE, CALCIUM CHLORIDE 600; 310; 30; 20 MG/100ML; MG/100ML; MG/100ML; MG/100ML
9 INJECTION, SOLUTION INTRAVENOUS CONTINUOUS
Status: CANCELLED | OUTPATIENT
Start: 2020-03-15

## 2020-03-16 ENCOUNTER — READMISSION MANAGEMENT (OUTPATIENT)
Dept: CALL CENTER | Facility: HOSPITAL | Age: 60
End: 2020-03-16

## 2020-03-16 ENCOUNTER — ANCILLARY PROCEDURE (OUTPATIENT)
Dept: PERIOP | Facility: HOSPITAL | Age: 60
End: 2020-03-16

## 2020-03-16 ENCOUNTER — HOSPITAL ENCOUNTER (INPATIENT)
Facility: HOSPITAL | Age: 60
LOS: 1 days | Discharge: HOME HEALTH | End: 2020-03-17
Attending: THORACIC SURGERY (CARDIOTHORACIC VASCULAR SURGERY) | Admitting: INTERNAL MEDICINE

## 2020-03-16 ENCOUNTER — ANESTHESIA (OUTPATIENT)
Dept: PERIOP | Facility: HOSPITAL | Age: 60
End: 2020-03-16

## 2020-03-16 ENCOUNTER — APPOINTMENT (OUTPATIENT)
Dept: GENERAL RADIOLOGY | Facility: HOSPITAL | Age: 60
End: 2020-03-16

## 2020-03-16 DIAGNOSIS — I35.9 AORTIC VALVE DISORDER: ICD-10-CM

## 2020-03-16 DIAGNOSIS — I38 DIASTOLIC CHF DUE TO VALVULAR DISEASE (HCC): Primary | ICD-10-CM

## 2020-03-16 DIAGNOSIS — I50.30 DIASTOLIC CHF DUE TO VALVULAR DISEASE (HCC): Primary | ICD-10-CM

## 2020-03-16 DIAGNOSIS — I35.0 AORTIC VALVE STENOSIS, ETIOLOGY OF CARDIAC VALVE DISEASE UNSPECIFIED: ICD-10-CM

## 2020-03-16 PROBLEM — J96.01 ACUTE RESPIRATORY FAILURE WITH HYPOXIA (HCC): Status: RESOLVED | Noted: 2020-03-05 | Resolved: 2020-03-16

## 2020-03-16 LAB
ABO GROUP BLD: NORMAL
ACT BLD: 114 SECONDS (ref 82–152)
ACT BLD: 147 SECONDS (ref 82–152)
ACT BLD: 180 SECONDS (ref 82–152)
ACT BLD: 230 SECONDS (ref 82–152)
ACT BLD: 252 SECONDS (ref 82–152)
ANION GAP SERPL CALCULATED.3IONS-SCNC: 10 MMOL/L (ref 5–15)
APTT PPP: 98 SECONDS (ref 24–37)
BASE EXCESS BLDA CALC-SCNC: 4 MMOL/L (ref -5–5)
BASE EXCESS BLDA CALC-SCNC: 5 MMOL/L (ref -5–5)
BASE EXCESS BLDA CALC-SCNC: 8 MMOL/L (ref -5–5)
BUN BLD-MCNC: 43 MG/DL (ref 6–20)
BUN/CREAT SERPL: 20 (ref 7–25)
CA-I BLDA-SCNC: 1.13 MMOL/L (ref 1.2–1.32)
CA-I BLDA-SCNC: 1.16 MMOL/L (ref 1.2–1.32)
CA-I BLDA-SCNC: 1.22 MMOL/L (ref 1.2–1.32)
CALCIUM SPEC-SCNC: 9.1 MG/DL (ref 8.6–10.5)
CHLORIDE SERPL-SCNC: 102 MMOL/L (ref 98–107)
CO2 BLDA-SCNC: 30 MMOL/L (ref 24–29)
CO2 BLDA-SCNC: 32 MMOL/L (ref 24–29)
CO2 BLDA-SCNC: 32 MMOL/L (ref 24–29)
CO2 SERPL-SCNC: 28 MMOL/L (ref 22–29)
CREAT BLD-MCNC: 2.15 MG/DL (ref 0.76–1.27)
DEPRECATED RDW RBC AUTO: 48.8 FL (ref 37–54)
ERYTHROCYTE [DISTWIDTH] IN BLOOD BY AUTOMATED COUNT: 15.4 % (ref 12.3–15.4)
GFR SERPL CREATININE-BSD FRML MDRD: 32 ML/MIN/1.73
GLUCOSE BLD-MCNC: 108 MG/DL (ref 65–99)
GLUCOSE BLDC GLUCOMTR-MCNC: 108 MG/DL (ref 70–130)
GLUCOSE BLDC GLUCOMTR-MCNC: 139 MG/DL (ref 70–130)
GLUCOSE BLDC GLUCOMTR-MCNC: 214 MG/DL (ref 70–130)
HCO3 BLDA-SCNC: 28.3 MMOL/L (ref 22–26)
HCO3 BLDA-SCNC: 30.1 MMOL/L (ref 22–26)
HCO3 BLDA-SCNC: 31.1 MMOL/L (ref 22–26)
HCT VFR BLD AUTO: 33.2 % (ref 37.5–51)
HCT VFR BLDA CALC: 29 % (ref 38–51)
HCT VFR BLDA CALC: 30 % (ref 38–51)
HCT VFR BLDA CALC: 33 % (ref 38–51)
HGB BLD-MCNC: 10.2 G/DL (ref 13–17.7)
HGB BLDA-MCNC: 10.2 G/DL (ref 12–17)
HGB BLDA-MCNC: 11.2 G/DL (ref 12–17)
HGB BLDA-MCNC: 9.9 G/DL (ref 12–17)
INR PPP: 1.2 (ref 0.85–1.16)
LV EF 2D ECHO EST: 60 %
MAGNESIUM SERPL-MCNC: 2.1 MG/DL (ref 1.6–2.6)
MCH RBC QN AUTO: 27.2 PG (ref 26.6–33)
MCHC RBC AUTO-ENTMCNC: 30.7 G/DL (ref 31.5–35.7)
MCV RBC AUTO: 88.5 FL (ref 79–97)
PCO2 BLDA: 41 MM HG (ref 35–45)
PCO2 BLDA: 42.5 MM HG (ref 35–45)
PCO2 BLDA: 48.4 MM HG (ref 35–45)
PH BLDA: 7.4 PH UNITS (ref 7.35–7.6)
PH BLDA: 7.43 PH UNITS (ref 7.35–7.6)
PH BLDA: 7.49 PH UNITS (ref 7.35–7.6)
PLATELET # BLD AUTO: 336 10*3/MM3 (ref 140–450)
PMV BLD AUTO: 10.5 FL (ref 6–12)
PO2 BLDA: 221 MMHG (ref 80–105)
PO2 BLDA: 390 MMHG (ref 80–105)
PO2 BLDA: 90 MMHG (ref 80–105)
POTASSIUM BLD-SCNC: 4.1 MMOL/L (ref 3.5–5.2)
POTASSIUM BLDA-SCNC: 3.8 MMOL/L (ref 3.5–4.9)
POTASSIUM BLDA-SCNC: 4 MMOL/L (ref 3.5–4.9)
POTASSIUM BLDA-SCNC: 4 MMOL/L (ref 3.5–4.9)
PROTHROMBIN TIME: 14.9 SECONDS (ref 11.5–14)
RBC # BLD AUTO: 3.75 10*6/MM3 (ref 4.14–5.8)
RH BLD: POSITIVE
SAO2 % BLDA: 100 % (ref 95–98)
SAO2 % BLDA: 100 % (ref 95–98)
SAO2 % BLDA: 97 % (ref 95–98)
SODIUM BLD-SCNC: 140 MMOL/L (ref 136–145)
SODIUM BLDA-SCNC: 136 MMOL/L (ref 138–146)
SODIUM BLDA-SCNC: 137 MMOL/L (ref 138–146)
SODIUM BLDA-SCNC: 140 MMOL/L (ref 138–146)
WBC NRBC COR # BLD: 10.44 10*3/MM3 (ref 3.4–10.8)

## 2020-03-16 PROCEDURE — 33361 REPLACE AORTIC VALVE PERQ: CPT | Performed by: THORACIC SURGERY (CARDIOTHORACIC VASCULAR SURGERY)

## 2020-03-16 PROCEDURE — 80048 BASIC METABOLIC PNL TOTAL CA: CPT | Performed by: PHYSICIAN ASSISTANT

## 2020-03-16 PROCEDURE — C1894 INTRO/SHEATH, NON-LASER: HCPCS | Performed by: THORACIC SURGERY (CARDIOTHORACIC VASCULAR SURGERY)

## 2020-03-16 PROCEDURE — 02RF38Z REPLACEMENT OF AORTIC VALVE WITH ZOOPLASTIC TISSUE, PERCUTANEOUS APPROACH: ICD-10-PCS | Performed by: INTERNAL MEDICINE

## 2020-03-16 PROCEDURE — 93355 ECHO TRANSESOPHAGEAL (TEE): CPT

## 2020-03-16 PROCEDURE — 25010000002 PROTAMINE SULFATE PER 10 MG: Performed by: NURSE ANESTHETIST, CERTIFIED REGISTERED

## 2020-03-16 PROCEDURE — 85610 PROTHROMBIN TIME: CPT | Performed by: PHYSICIAN ASSISTANT

## 2020-03-16 PROCEDURE — C1769 GUIDE WIRE: HCPCS | Performed by: THORACIC SURGERY (CARDIOTHORACIC VASCULAR SURGERY)

## 2020-03-16 PROCEDURE — 71045 X-RAY EXAM CHEST 1 VIEW: CPT

## 2020-03-16 PROCEDURE — 25010000002 DEXAMETHASONE PER 1 MG: Performed by: NURSE ANESTHETIST, CERTIFIED REGISTERED

## 2020-03-16 PROCEDURE — 94660 CPAP INITIATION&MGMT: CPT

## 2020-03-16 PROCEDURE — 83735 ASSAY OF MAGNESIUM: CPT | Performed by: PHYSICIAN ASSISTANT

## 2020-03-16 PROCEDURE — 25010000002 MORPHINE PER 10 MG: Performed by: THORACIC SURGERY (CARDIOTHORACIC VASCULAR SURGERY)

## 2020-03-16 PROCEDURE — 25010000002 PHENYLEPHRINE PER 1 ML: Performed by: NURSE ANESTHETIST, CERTIFIED REGISTERED

## 2020-03-16 PROCEDURE — 25010000002 FENTANYL CITRATE (PF) 100 MCG/2ML SOLUTION: Performed by: NURSE ANESTHETIST, CERTIFIED REGISTERED

## 2020-03-16 PROCEDURE — 82803 BLOOD GASES ANY COMBINATION: CPT

## 2020-03-16 PROCEDURE — 82330 ASSAY OF CALCIUM: CPT

## 2020-03-16 PROCEDURE — 93005 ELECTROCARDIOGRAM TRACING: CPT | Performed by: PHYSICIAN ASSISTANT

## 2020-03-16 PROCEDURE — 86900 BLOOD TYPING SEROLOGIC ABO: CPT

## 2020-03-16 PROCEDURE — C1760 CLOSURE DEV, VASC: HCPCS | Performed by: THORACIC SURGERY (CARDIOTHORACIC VASCULAR SURGERY)

## 2020-03-16 PROCEDURE — 25010000002 SUCCINYLCHOLINE PER 20 MG: Performed by: NURSE ANESTHETIST, CERTIFIED REGISTERED

## 2020-03-16 PROCEDURE — 25010000002 MIDAZOLAM PER 1 MG: Performed by: NURSE ANESTHETIST, CERTIFIED REGISTERED

## 2020-03-16 PROCEDURE — 94799 UNLISTED PULMONARY SVC/PX: CPT

## 2020-03-16 PROCEDURE — 85730 THROMBOPLASTIN TIME PARTIAL: CPT | Performed by: PHYSICIAN ASSISTANT

## 2020-03-16 PROCEDURE — 84295 ASSAY OF SERUM SODIUM: CPT

## 2020-03-16 PROCEDURE — 85347 COAGULATION TIME ACTIVATED: CPT

## 2020-03-16 PROCEDURE — 85014 HEMATOCRIT: CPT

## 2020-03-16 PROCEDURE — 25010000002 HEPARIN (PORCINE) PER 1000 UNITS: Performed by: NURSE ANESTHETIST, CERTIFIED REGISTERED

## 2020-03-16 PROCEDURE — 25010000002 CEFUROXIME: Performed by: PHYSICIAN ASSISTANT

## 2020-03-16 PROCEDURE — 99233 SBSQ HOSP IP/OBS HIGH 50: CPT | Performed by: INTERNAL MEDICINE

## 2020-03-16 PROCEDURE — 25010000002 ONDANSETRON PER 1 MG: Performed by: NURSE ANESTHETIST, CERTIFIED REGISTERED

## 2020-03-16 PROCEDURE — 82947 ASSAY GLUCOSE BLOOD QUANT: CPT

## 2020-03-16 PROCEDURE — 63710000001 INSULIN LISPRO (HUMAN) PER 5 UNITS: Performed by: THORACIC SURGERY (CARDIOTHORACIC VASCULAR SURGERY)

## 2020-03-16 PROCEDURE — 86901 BLOOD TYPING SEROLOGIC RH(D): CPT

## 2020-03-16 PROCEDURE — C1887 CATHETER, GUIDING: HCPCS | Performed by: THORACIC SURGERY (CARDIOTHORACIC VASCULAR SURGERY)

## 2020-03-16 PROCEDURE — 85027 COMPLETE CBC AUTOMATED: CPT | Performed by: PHYSICIAN ASSISTANT

## 2020-03-16 PROCEDURE — 0 IOPAMIDOL PER 1 ML: Performed by: THORACIC SURGERY (CARDIOTHORACIC VASCULAR SURGERY)

## 2020-03-16 PROCEDURE — 25010000002 HEPARIN (PORCINE) PER 1000 UNITS: Performed by: THORACIC SURGERY (CARDIOTHORACIC VASCULAR SURGERY)

## 2020-03-16 PROCEDURE — 84132 ASSAY OF SERUM POTASSIUM: CPT

## 2020-03-16 DEVICE — VLV HEART TRNSCATH SAPIEN3 26MM: Type: IMPLANTABLE DEVICE | Site: AORTA | Status: FUNCTIONAL

## 2020-03-16 RX ORDER — ASPIRIN 81 MG/1
81 TABLET, CHEWABLE ORAL DAILY
Status: DISCONTINUED | OUTPATIENT
Start: 2020-03-16 | End: 2020-03-17 | Stop reason: HOSPADM

## 2020-03-16 RX ORDER — GABAPENTIN 300 MG/1
600 CAPSULE ORAL EVERY 8 HOURS SCHEDULED
Status: DISCONTINUED | OUTPATIENT
Start: 2020-03-16 | End: 2020-03-17 | Stop reason: HOSPADM

## 2020-03-16 RX ORDER — LABETALOL HYDROCHLORIDE 5 MG/ML
5 INJECTION, SOLUTION INTRAVENOUS
Status: DISCONTINUED | OUTPATIENT
Start: 2020-03-16 | End: 2020-03-16

## 2020-03-16 RX ORDER — MIDAZOLAM HYDROCHLORIDE 1 MG/ML
INJECTION INTRAMUSCULAR; INTRAVENOUS AS NEEDED
Status: DISCONTINUED | OUTPATIENT
Start: 2020-03-16 | End: 2020-03-16 | Stop reason: SURG

## 2020-03-16 RX ORDER — PROMETHAZINE HYDROCHLORIDE 25 MG/ML
6.25 INJECTION, SOLUTION INTRAMUSCULAR; INTRAVENOUS ONCE AS NEEDED
Status: DISCONTINUED | OUTPATIENT
Start: 2020-03-16 | End: 2020-03-16

## 2020-03-16 RX ORDER — FENTANYL CITRATE 50 UG/ML
INJECTION, SOLUTION INTRAMUSCULAR; INTRAVENOUS AS NEEDED
Status: DISCONTINUED | OUTPATIENT
Start: 2020-03-16 | End: 2020-03-16 | Stop reason: SURG

## 2020-03-16 RX ORDER — FUROSEMIDE 40 MG/1
40 TABLET ORAL
Status: DISCONTINUED | OUTPATIENT
Start: 2020-03-16 | End: 2020-03-16 | Stop reason: SDUPTHER

## 2020-03-16 RX ORDER — SODIUM CHLORIDE 9 MG/ML
INJECTION, SOLUTION INTRAVENOUS AS NEEDED
Status: DISCONTINUED | OUTPATIENT
Start: 2020-03-16 | End: 2020-03-16 | Stop reason: HOSPADM

## 2020-03-16 RX ORDER — CARVEDILOL 12.5 MG/1
12.5 TABLET ORAL 2 TIMES DAILY WITH MEALS
Status: DISCONTINUED | OUTPATIENT
Start: 2020-03-16 | End: 2020-03-17 | Stop reason: HOSPADM

## 2020-03-16 RX ORDER — ONDANSETRON 2 MG/ML
4 INJECTION INTRAMUSCULAR; INTRAVENOUS EVERY 6 HOURS PRN
Status: DISCONTINUED | OUTPATIENT
Start: 2020-03-16 | End: 2020-03-17 | Stop reason: HOSPADM

## 2020-03-16 RX ORDER — POLYETHYLENE GLYCOL 3350 17 G/17G
17 POWDER, FOR SOLUTION ORAL 2 TIMES DAILY PRN
Status: DISCONTINUED | OUTPATIENT
Start: 2020-03-16 | End: 2020-03-17 | Stop reason: HOSPADM

## 2020-03-16 RX ORDER — PROMETHAZINE HYDROCHLORIDE 25 MG/1
25 SUPPOSITORY RECTAL ONCE AS NEEDED
Status: DISCONTINUED | OUTPATIENT
Start: 2020-03-16 | End: 2020-03-16

## 2020-03-16 RX ORDER — MEPERIDINE HYDROCHLORIDE 25 MG/ML
12.5 INJECTION INTRAMUSCULAR; INTRAVENOUS; SUBCUTANEOUS
Status: DISCONTINUED | OUTPATIENT
Start: 2020-03-16 | End: 2020-03-17 | Stop reason: HOSPADM

## 2020-03-16 RX ORDER — HYDRALAZINE HYDROCHLORIDE 20 MG/ML
5 INJECTION INTRAMUSCULAR; INTRAVENOUS
Status: DISCONTINUED | OUTPATIENT
Start: 2020-03-16 | End: 2020-03-16

## 2020-03-16 RX ORDER — HYDROCODONE BITARTRATE AND ACETAMINOPHEN 5; 325 MG/1; MG/1
1 TABLET ORAL ONCE AS NEEDED
Status: DISCONTINUED | OUTPATIENT
Start: 2020-03-16 | End: 2020-03-16

## 2020-03-16 RX ORDER — BUMETANIDE 1 MG/1
1 TABLET ORAL 2 TIMES DAILY
Status: DISCONTINUED | OUTPATIENT
Start: 2020-03-17 | End: 2020-03-17 | Stop reason: HOSPADM

## 2020-03-16 RX ORDER — ONDANSETRON 2 MG/ML
INJECTION INTRAMUSCULAR; INTRAVENOUS AS NEEDED
Status: DISCONTINUED | OUTPATIENT
Start: 2020-03-16 | End: 2020-03-16 | Stop reason: SURG

## 2020-03-16 RX ORDER — SODIUM CHLORIDE 9 MG/ML
50 INJECTION, SOLUTION INTRAVENOUS CONTINUOUS
Status: ACTIVE | OUTPATIENT
Start: 2020-03-16 | End: 2020-03-16

## 2020-03-16 RX ORDER — AMOXICILLIN 250 MG
2 CAPSULE ORAL NIGHTLY
Status: DISCONTINUED | OUTPATIENT
Start: 2020-03-16 | End: 2020-03-17 | Stop reason: HOSPADM

## 2020-03-16 RX ORDER — BISACODYL 5 MG/1
10 TABLET, DELAYED RELEASE ORAL DAILY PRN
Status: DISCONTINUED | OUTPATIENT
Start: 2020-03-16 | End: 2020-03-17 | Stop reason: HOSPADM

## 2020-03-16 RX ORDER — MORPHINE SULFATE 2 MG/ML
2 INJECTION, SOLUTION INTRAMUSCULAR; INTRAVENOUS EVERY 4 HOURS PRN
Status: DISCONTINUED | OUTPATIENT
Start: 2020-03-16 | End: 2020-03-17 | Stop reason: HOSPADM

## 2020-03-16 RX ORDER — SODIUM CHLORIDE 0.9 % (FLUSH) 0.9 %
3-10 SYRINGE (ML) INJECTION AS NEEDED
Status: DISCONTINUED | OUTPATIENT
Start: 2020-03-16 | End: 2020-03-16

## 2020-03-16 RX ORDER — FAMOTIDINE 20 MG/1
20 TABLET, FILM COATED ORAL ONCE
Status: COMPLETED | OUTPATIENT
Start: 2020-03-16 | End: 2020-03-16

## 2020-03-16 RX ORDER — HYDROMORPHONE HYDROCHLORIDE 1 MG/ML
0.5 INJECTION, SOLUTION INTRAMUSCULAR; INTRAVENOUS; SUBCUTANEOUS
Status: DISCONTINUED | OUTPATIENT
Start: 2020-03-16 | End: 2020-03-16

## 2020-03-16 RX ORDER — BUMETANIDE 1 MG/1
1 TABLET ORAL 2 TIMES DAILY
Status: DISCONTINUED | OUTPATIENT
Start: 2020-03-16 | End: 2020-03-16

## 2020-03-16 RX ORDER — SODIUM CHLORIDE 9 MG/ML
1 INJECTION, SOLUTION INTRAVENOUS CONTINUOUS
Status: DISCONTINUED | OUTPATIENT
Start: 2020-03-16 | End: 2020-03-16

## 2020-03-16 RX ORDER — ROCURONIUM BROMIDE 10 MG/ML
INJECTION, SOLUTION INTRAVENOUS AS NEEDED
Status: DISCONTINUED | OUTPATIENT
Start: 2020-03-16 | End: 2020-03-16 | Stop reason: SURG

## 2020-03-16 RX ORDER — PROMETHAZINE HYDROCHLORIDE 25 MG/1
25 TABLET ORAL ONCE AS NEEDED
Status: DISCONTINUED | OUTPATIENT
Start: 2020-03-16 | End: 2020-03-16

## 2020-03-16 RX ORDER — SODIUM CHLORIDE 9 MG/ML
9 INJECTION, SOLUTION INTRAVENOUS CONTINUOUS
Status: DISCONTINUED | OUTPATIENT
Start: 2020-03-16 | End: 2020-03-16

## 2020-03-16 RX ORDER — NITROGLYCERIN 20 MG/100ML
10-50 INJECTION INTRAVENOUS CONTINUOUS PRN
Status: DISCONTINUED | OUTPATIENT
Start: 2020-03-16 | End: 2020-03-17 | Stop reason: HOSPADM

## 2020-03-16 RX ORDER — HEPARIN SODIUM 1000 [USP'U]/ML
INJECTION, SOLUTION INTRAVENOUS; SUBCUTANEOUS AS NEEDED
Status: DISCONTINUED | OUTPATIENT
Start: 2020-03-16 | End: 2020-03-16 | Stop reason: SURG

## 2020-03-16 RX ORDER — ETOMIDATE 2 MG/ML
INJECTION INTRAVENOUS AS NEEDED
Status: DISCONTINUED | OUTPATIENT
Start: 2020-03-16 | End: 2020-03-16 | Stop reason: SURG

## 2020-03-16 RX ORDER — CHLORHEXIDINE GLUCONATE 500 MG/1
1 CLOTH TOPICAL EVERY 12 HOURS PRN
Status: DISCONTINUED | OUTPATIENT
Start: 2020-03-16 | End: 2020-03-16 | Stop reason: HOSPADM

## 2020-03-16 RX ORDER — ONDANSETRON 2 MG/ML
4 INJECTION INTRAMUSCULAR; INTRAVENOUS ONCE AS NEEDED
Status: COMPLETED | OUTPATIENT
Start: 2020-03-16 | End: 2020-03-16

## 2020-03-16 RX ORDER — PHENYLEPHRINE HCL IN 0.9% NACL 0.5 MG/5ML
.5-3 SYRINGE (ML) INTRAVENOUS ONCE
Status: DISCONTINUED | OUTPATIENT
Start: 2020-03-16 | End: 2020-03-16

## 2020-03-16 RX ORDER — NITROGLYCERIN 0.4 MG/1
0.4 TABLET SUBLINGUAL
Status: DISCONTINUED | OUTPATIENT
Start: 2020-03-16 | End: 2020-03-16 | Stop reason: HOSPADM

## 2020-03-16 RX ORDER — ROSUVASTATIN CALCIUM 20 MG/1
40 TABLET, COATED ORAL NIGHTLY
Status: DISCONTINUED | OUTPATIENT
Start: 2020-03-16 | End: 2020-03-17 | Stop reason: HOSPADM

## 2020-03-16 RX ORDER — CHLORHEXIDINE GLUCONATE 0.12 MG/ML
15 RINSE ORAL ONCE
Status: COMPLETED | OUTPATIENT
Start: 2020-03-16 | End: 2020-03-16

## 2020-03-16 RX ORDER — PHENYLEPHRINE HCL IN 0.9% NACL 0.5 MG/5ML
.5-3 SYRINGE (ML) INTRAVENOUS CONTINUOUS PRN
Status: DISCONTINUED | OUTPATIENT
Start: 2020-03-16 | End: 2020-03-17 | Stop reason: HOSPADM

## 2020-03-16 RX ORDER — LIDOCAINE HYDROCHLORIDE 10 MG/ML
INJECTION, SOLUTION EPIDURAL; INFILTRATION; INTRACAUDAL; PERINEURAL AS NEEDED
Status: DISCONTINUED | OUTPATIENT
Start: 2020-03-16 | End: 2020-03-16 | Stop reason: SURG

## 2020-03-16 RX ORDER — BISACODYL 10 MG
10 SUPPOSITORY, RECTAL RECTAL DAILY PRN
Status: DISCONTINUED | OUTPATIENT
Start: 2020-03-17 | End: 2020-03-17 | Stop reason: HOSPADM

## 2020-03-16 RX ORDER — DOPAMINE HYDROCHLORIDE 160 MG/100ML
2-20 INJECTION, SOLUTION INTRAVENOUS CONTINUOUS PRN
Status: DISCONTINUED | OUTPATIENT
Start: 2020-03-16 | End: 2020-03-16

## 2020-03-16 RX ORDER — NALOXONE HCL 0.4 MG/ML
0.4 VIAL (ML) INJECTION AS NEEDED
Status: DISCONTINUED | OUTPATIENT
Start: 2020-03-16 | End: 2020-03-17 | Stop reason: HOSPADM

## 2020-03-16 RX ORDER — LIDOCAINE HYDROCHLORIDE 10 MG/ML
0.5 INJECTION, SOLUTION EPIDURAL; INFILTRATION; INTRACAUDAL; PERINEURAL ONCE AS NEEDED
Status: COMPLETED | OUTPATIENT
Start: 2020-03-16 | End: 2020-03-16

## 2020-03-16 RX ORDER — ACETAMINOPHEN 325 MG/1
650 TABLET ORAL EVERY 4 HOURS PRN
Status: DISCONTINUED | OUTPATIENT
Start: 2020-03-16 | End: 2020-03-16 | Stop reason: HOSPADM

## 2020-03-16 RX ORDER — CHLORHEXIDINE GLUCONATE 0.12 MG/ML
15 RINSE ORAL EVERY 12 HOURS SCHEDULED
Status: DISCONTINUED | OUTPATIENT
Start: 2020-03-16 | End: 2020-03-16

## 2020-03-16 RX ORDER — ESMOLOL HYDROCHLORIDE 10 MG/ML
INJECTION INTRAVENOUS AS NEEDED
Status: DISCONTINUED | OUTPATIENT
Start: 2020-03-16 | End: 2020-03-16 | Stop reason: SURG

## 2020-03-16 RX ORDER — DEXAMETHASONE SODIUM PHOSPHATE 4 MG/ML
INJECTION, SOLUTION INTRA-ARTICULAR; INTRALESIONAL; INTRAMUSCULAR; INTRAVENOUS; SOFT TISSUE AS NEEDED
Status: DISCONTINUED | OUTPATIENT
Start: 2020-03-16 | End: 2020-03-16 | Stop reason: SURG

## 2020-03-16 RX ORDER — HYDROCODONE BITARTRATE AND ACETAMINOPHEN 7.5; 325 MG/1; MG/1
1 TABLET ORAL EVERY 6 HOURS PRN
Status: DISCONTINUED | OUTPATIENT
Start: 2020-03-16 | End: 2020-03-17 | Stop reason: HOSPADM

## 2020-03-16 RX ORDER — SODIUM CHLORIDE 0.9 % (FLUSH) 0.9 %
3 SYRINGE (ML) INJECTION EVERY 12 HOURS SCHEDULED
Status: DISCONTINUED | OUTPATIENT
Start: 2020-03-16 | End: 2020-03-17 | Stop reason: HOSPADM

## 2020-03-16 RX ORDER — ALLOPURINOL 300 MG/1
300 TABLET ORAL DAILY
Status: DISCONTINUED | OUTPATIENT
Start: 2020-03-16 | End: 2020-03-17 | Stop reason: HOSPADM

## 2020-03-16 RX ORDER — SUCCINYLCHOLINE CHLORIDE 20 MG/ML
INJECTION INTRAMUSCULAR; INTRAVENOUS AS NEEDED
Status: DISCONTINUED | OUTPATIENT
Start: 2020-03-16 | End: 2020-03-16 | Stop reason: SURG

## 2020-03-16 RX ORDER — ALBUTEROL SULFATE 2.5 MG/3ML
2.5 SOLUTION RESPIRATORY (INHALATION) EVERY 4 HOURS PRN
Status: ACTIVE | OUTPATIENT
Start: 2020-03-16 | End: 2020-03-17

## 2020-03-16 RX ORDER — FENTANYL CITRATE 50 UG/ML
50 INJECTION, SOLUTION INTRAMUSCULAR; INTRAVENOUS
Status: DISCONTINUED | OUTPATIENT
Start: 2020-03-16 | End: 2020-03-16

## 2020-03-16 RX ORDER — PROTAMINE SULFATE 10 MG/ML
INJECTION, SOLUTION INTRAVENOUS AS NEEDED
Status: DISCONTINUED | OUTPATIENT
Start: 2020-03-16 | End: 2020-03-16 | Stop reason: SURG

## 2020-03-16 RX ORDER — IPRATROPIUM BROMIDE AND ALBUTEROL SULFATE 2.5; .5 MG/3ML; MG/3ML
3 SOLUTION RESPIRATORY (INHALATION) ONCE AS NEEDED
Status: DISCONTINUED | OUTPATIENT
Start: 2020-03-16 | End: 2020-03-17 | Stop reason: HOSPADM

## 2020-03-16 RX ADMIN — FENTANYL CITRATE 50 MCG: 50 INJECTION, SOLUTION INTRAMUSCULAR; INTRAVENOUS at 07:13

## 2020-03-16 RX ADMIN — HYDROCODONE BITARTRATE AND ACETAMINOPHEN 1 TABLET: 7.5; 325 TABLET ORAL at 09:34

## 2020-03-16 RX ADMIN — FAMOTIDINE 20 MG: 20 TABLET, FILM COATED ORAL at 06:33

## 2020-03-16 RX ADMIN — CEFUROXIME 1.5 G: 1.5 INJECTION, POWDER, FOR SOLUTION INTRAVENOUS at 07:13

## 2020-03-16 RX ADMIN — ASPIRIN 81 MG 81 MG: 81 TABLET ORAL at 09:34

## 2020-03-16 RX ADMIN — PROTAMINE SULFATE 50 MG: 10 INJECTION, SOLUTION INTRAVENOUS at 08:17

## 2020-03-16 RX ADMIN — MIDAZOLAM 2 MG: 1 INJECTION INTRAMUSCULAR; INTRAVENOUS at 06:45

## 2020-03-16 RX ADMIN — ROSUVASTATIN CALCIUM 40 MG: 20 TABLET, COATED ORAL at 20:59

## 2020-03-16 RX ADMIN — CARVEDILOL 12.5 MG: 12.5 TABLET, FILM COATED ORAL at 09:34

## 2020-03-16 RX ADMIN — ONDANSETRON 4 MG: 2 INJECTION INTRAMUSCULAR; INTRAVENOUS at 07:24

## 2020-03-16 RX ADMIN — INSULIN LISPRO 4 UNITS: 100 INJECTION, SOLUTION INTRAVENOUS; SUBCUTANEOUS at 17:32

## 2020-03-16 RX ADMIN — FENTANYL CITRATE 50 MCG: 0.05 INJECTION, SOLUTION INTRAMUSCULAR; INTRAVENOUS at 09:34

## 2020-03-16 RX ADMIN — ALLOPURINOL 300 MG: 300 TABLET ORAL at 09:34

## 2020-03-16 RX ADMIN — Medication 5 MG/HR: at 08:10

## 2020-03-16 RX ADMIN — SODIUM CHLORIDE, PRESERVATIVE FREE 3 ML: 5 INJECTION INTRAVENOUS at 21:00

## 2020-03-16 RX ADMIN — SODIUM CHLORIDE 10 MG/HR: 9 INJECTION, SOLUTION INTRAVENOUS at 09:12

## 2020-03-16 RX ADMIN — GABAPENTIN 600 MG: 300 CAPSULE ORAL at 13:28

## 2020-03-16 RX ADMIN — SUCCINYLCHOLINE CHLORIDE 200 MG: 20 INJECTION, SOLUTION INTRAMUSCULAR; INTRAVENOUS; PARENTERAL at 07:13

## 2020-03-16 RX ADMIN — LIDOCAINE HYDROCHLORIDE 50 MG: 10 INJECTION, SOLUTION EPIDURAL; INFILTRATION; INTRACAUDAL; PERINEURAL at 07:13

## 2020-03-16 RX ADMIN — SENNOSIDES AND DOCUSATE SODIUM 2 TABLET: 8.6; 5 TABLET ORAL at 20:59

## 2020-03-16 RX ADMIN — ROCURONIUM BROMIDE 50 MG: 10 INJECTION INTRAVENOUS at 07:22

## 2020-03-16 RX ADMIN — HEPARIN SODIUM 21000 UNITS: 1000 INJECTION, SOLUTION INTRAVENOUS; SUBCUTANEOUS at 07:54

## 2020-03-16 RX ADMIN — CARVEDILOL 12.5 MG: 12.5 TABLET, FILM COATED ORAL at 17:32

## 2020-03-16 RX ADMIN — SUGAMMADEX 250 MG: 100 INJECTION, SOLUTION INTRAVENOUS at 08:23

## 2020-03-16 RX ADMIN — INSULIN LISPRO 4 UNITS: 100 INJECTION, SOLUTION INTRAVENOUS; SUBCUTANEOUS at 20:59

## 2020-03-16 RX ADMIN — SODIUM CHLORIDE 9 ML/HR: 9 INJECTION, SOLUTION INTRAVENOUS at 06:34

## 2020-03-16 RX ADMIN — ESMOLOL HYDROCHLORIDE 10 MG: 10 INJECTION, SOLUTION INTRAVENOUS at 07:13

## 2020-03-16 RX ADMIN — PROTAMINE SULFATE 25 MG: 10 INJECTION, SOLUTION INTRAVENOUS at 08:26

## 2020-03-16 RX ADMIN — DEXAMETHASONE SODIUM PHOSPHATE 8 MG: 4 INJECTION, SOLUTION INTRAMUSCULAR; INTRAVENOUS at 07:24

## 2020-03-16 RX ADMIN — MORPHINE SULFATE 2 MG: 2 INJECTION, SOLUTION INTRAMUSCULAR; INTRAVENOUS at 10:28

## 2020-03-16 RX ADMIN — PHENYLEPHRINE HYDROCHLORIDE 0.2 MCG/KG/MIN: 10 INJECTION INTRAVENOUS at 07:12

## 2020-03-16 RX ADMIN — SODIUM CHLORIDE 50 ML/HR: 9 INJECTION, SOLUTION INTRAVENOUS at 09:11

## 2020-03-16 RX ADMIN — CHLORHEXIDINE GLUCONATE 0.12% ORAL RINSE 15 ML: 1.2 LIQUID ORAL at 06:33

## 2020-03-16 RX ADMIN — HYDROCODONE BITARTRATE AND ACETAMINOPHEN 1 TABLET: 7.5; 325 TABLET ORAL at 17:41

## 2020-03-16 RX ADMIN — EPHEDRINE SULFATE 10 MG: 50 INJECTION INTRAMUSCULAR; INTRAVENOUS; SUBCUTANEOUS at 08:01

## 2020-03-16 RX ADMIN — LIDOCAINE HYDROCHLORIDE 0.5 ML: 10 INJECTION, SOLUTION EPIDURAL; INFILTRATION; INTRACAUDAL; PERINEURAL at 06:33

## 2020-03-16 RX ADMIN — MUPIROCIN 1 APPLICATION: 20 OINTMENT TOPICAL at 06:33

## 2020-03-16 RX ADMIN — ONDANSETRON 4 MG: 2 INJECTION INTRAMUSCULAR; INTRAVENOUS at 09:33

## 2020-03-16 RX ADMIN — HEPARIN SODIUM 5000 UNITS: 1000 INJECTION, SOLUTION INTRAVENOUS; SUBCUTANEOUS at 08:04

## 2020-03-16 RX ADMIN — ROCURONIUM BROMIDE 5 MG: 10 INJECTION INTRAVENOUS at 07:13

## 2020-03-16 RX ADMIN — GABAPENTIN 600 MG: 300 CAPSULE ORAL at 21:00

## 2020-03-16 RX ADMIN — ETOMIDATE 30 MG: 2 INJECTION, SOLUTION INTRAVENOUS at 07:13

## 2020-03-16 NOTE — ANESTHESIA PROCEDURE NOTES
Airway  Urgency: elective    Date/Time: 3/16/2020 7:14 AM  Airway not difficult    General Information and Staff    Patient location during procedure: OR  CRNA: Pradip Allen III, CRNA    Indications and Patient Condition  Indications for airway management: airway protection    Preoxygenated: yes  MILS not maintained throughout  Mask difficulty assessment: 0 - not attempted    Final Airway Details  Final airway type: endotracheal airway      Successful airway: ETT  Cuffed: yes   Successful intubation technique: direct laryngoscopy, video laryngoscopy and RSI  Facilitating devices/methods: intubating stylet  Blade: Gerardo  Blade size: 3  ETT size (mm): 7.5  Cormack-Lehane Classification: grade I - full view of glottis  Placement verified by: chest auscultation and capnometry   Measured from: lips  ETT/EBT  to lips (cm): 22  Number of attempts at approach: 1  Assessment: lips, teeth, and gum same as pre-op and atraumatic intubation    Additional Comments  Negative epigastric sounds, Breath sound equal bilaterally with symmetric chest rise and fall.

## 2020-03-16 NOTE — OUTREACH NOTE
CHF Week 2 Survey      Responses   [facility] patient discharged from?  [place]   Does the patient have one of the following disease processes/diagnoses(primary or secondary)?  CHF   Week 2 attempt successful?  No   Revoke  Readmitted          Anna Nelson RN

## 2020-03-16 NOTE — ANESTHESIA PREPROCEDURE EVALUATION
Anesthesia Evaluation     Patient summary reviewed and Nursing notes reviewed   NPO Solid Status: > 8 hours  NPO Liquid Status: > 4 hours           Airway   Mallampati: IV  TM distance: >3 FB  Neck ROM: full  Difficult intubation highly probable  Dental          Pulmonary     breath sounds clear to auscultation  Cardiovascular     ECG reviewed  Rhythm: regular  Rate: normal    (+) hypertension, valvular problems/murmurs AS and AI, CAD, angina, CHF , hyperlipidemia,       Neuro/Psych  GI/Hepatic/Renal/Endo    (+) morbid obesity,  renal disease CRI, diabetes mellitus type 2 using insulin,     Musculoskeletal     Abdominal   (+) obese,    Substance History      OB/GYN          Other   arthritis,      ROS/Med Hx Other: LAD 50%, Cx 50%; denies dysphagia      Phys Exam Other: Many missing and broken teeth              Anesthesia Plan    ASA 4     general   (Carey, SHANKAR)  intravenous induction     Anesthetic plan, all risks, benefits, and alternatives have been provided, discussed and informed consent has been obtained with: patient.    Plan discussed with CRNA.

## 2020-03-16 NOTE — ANESTHESIA PROCEDURE NOTES
Procedure Performed: TAVR SHANKAR      Start Time:  3/16/2020 7:30 AM       End Time:      Preanesthesia Checklist:  Patient identified, IV assessed, risks and benefits discussed, monitors and equipment assessed, procedure being performed at surgeon's request and anesthesia consent obtained.    General Procedure Information  Diagnostic Indications for Echo:  assessment of surgical repair  Location performed:  OR  Intubated  Bite block placed  Heart visualized  Probe Insertion:  Easy  Probe Type:  Multiplane  Modalities:  2D only, color flow mapping, continuous wave Doppler and pulse wave Doppler    Echocardiographic and Doppler Measurements    Ventricles    Right Ventricle:  Cavity size dilated.  Hypertrophy not present.  Thrombus not present.  Global function normal.    Left Ventricle:  Cavity size normal.  Hypertrophy present.  Thrombus not present.  Global Function normal.  Ejection Fraction 60%.          Valves    Aortic Valve:  Annulus calcified.  Stenosis severe.  Area: 0.52 cm².  Mean Gradient: pk/mn 55/34 mmHg.  Regurgitation mild.  Leaflets calcified.  Leaflet motions restricted.      Mitral Valve:  Annulus normal.  Stenosis not present.  Regurgitation mild.  Leaflets normal.  Leaflet motions normal.      Tricuspid Valve:  Annulus normal.  Stenosis not present.  Regurgitation trace.  Leaflets normal.  Leaflet motions normal.    Pulmonic Valve:  Annulus normal.  Stenosis not present.  Regurgitation absent.          Aorta    Ascending Aorta:  Size dilated.  Diameter 2.87 cm.  Dissection not present.  Plaque thickness less than 3 mm.  Mobile plaque not present.    Aortic Arch:  Size dilated.  Diameter 3.24 cm.  Dissection not present.  Plaque thickness less than 3 mm.  Mobile plaque not present.    Descending Aorta:  Size normal.  Dissection not present.  Plaque thickness less than 3 mm.  Mobile plaque not present.          Atria    Right Atrium:  Size normal.  Spontaneous echo contrast not present.  Thrombus not  present.  Tumor not present.  Device not present.      Left Atrium:  Size normal.  Spontaneous echo contrast not present.  Thrombus not present.  Tumor not present.  Device not present.    Left atrial appendage normal.      Septa    Atrial Septum:  Intra-atrial septal morphology normal.      Ventricular Septum:  Intra-ventricular septum morphology normal.          Other Findings  Pericardium:  normal  Pleural Effusion:  none  Pulmonary Venous Flow:  blunted (decreased) systolic flow    Anesthesia Information  Performed Personally  Anesthesiologist:  Mona Ronquillo MD      Echocardiogram Comments:       Findings discussed with TAVR team;    Normal RV and LV systolic fxn.  EF 60%, mild MR, trace TR, mild AI, severe AS, PK/MN gradient 55/34mmHg.  BERNA by continuity equation 0.52cm2.      S/P TAVR 26mm valve:  RV and LV systolic fxn preserved, MR slightly increased mild-moderate;   Trivial paravalvular leak noted after valve deployment, AV area by continuity post procedure 2.1cm2; pk/mn gradient through valve 14/7mmHg.  Otherwise no changes, Aorta intact.

## 2020-03-16 NOTE — ANESTHESIA PROCEDURE NOTES
Arterial Line      Patient reassessed immediately prior to procedure    Patient location during procedure: pre-op  Start time: 3/16/2020 6:45 AM   Line placed for hemodynamic monitoring, ABGs/Labs/ISTAT and MD/Surgeon request.  Performed By   Anesthesiologist: Jarred Bazan MD  Preanesthetic Checklist  Completed: patient identified, site marked, surgical consent, pre-op evaluation, timeout performed, IV checked, risks and benefits discussed and monitors and equipment checked  Arterial Line Prep   Sterile Tech: cap, gloves, mask and sterile barriers  Prep: ChloraPrep  Patient monitoring: blood pressure monitoring, continuous pulse oximetry and EKG  Arterial Line Procedure   Laterality:right  Location:  radial artery  Catheter size: 20 G   Guidance: Doppler guided and ultrasound guided  Number of attempts: 1  Successful placement: no  Post Assessment   Dressing Type: biopatch applied, line sutured, occlusive dressing applied, secured with tape and wrist guard applied.   Complications no  Circ/Move/Sens Assessment: normal.   Patient Tolerance: patient tolerated the procedure well with no apparent complications

## 2020-03-17 VITALS
OXYGEN SATURATION: 96 % | TEMPERATURE: 97.9 F | DIASTOLIC BLOOD PRESSURE: 79 MMHG | BODY MASS INDEX: 41 KG/M2 | HEIGHT: 68 IN | WEIGHT: 270.5 LBS | RESPIRATION RATE: 18 BRPM | HEART RATE: 73 BPM | SYSTOLIC BLOOD PRESSURE: 116 MMHG

## 2020-03-17 DIAGNOSIS — Z79.4 TYPE 2 DIABETES MELLITUS WITH STAGE 3 CHRONIC KIDNEY DISEASE, WITH LONG-TERM CURRENT USE OF INSULIN (HCC): ICD-10-CM

## 2020-03-17 DIAGNOSIS — N18.30 TYPE 2 DIABETES MELLITUS WITH STAGE 3 CHRONIC KIDNEY DISEASE, WITH LONG-TERM CURRENT USE OF INSULIN (HCC): ICD-10-CM

## 2020-03-17 DIAGNOSIS — I38 DIASTOLIC CHF DUE TO VALVULAR DISEASE (HCC): Primary | ICD-10-CM

## 2020-03-17 DIAGNOSIS — N18.30 CKD (CHRONIC KIDNEY DISEASE) STAGE 3, GFR 30-59 ML/MIN (HCC): ICD-10-CM

## 2020-03-17 DIAGNOSIS — I50.30 DIASTOLIC CHF DUE TO VALVULAR DISEASE (HCC): Primary | ICD-10-CM

## 2020-03-17 DIAGNOSIS — E11.22 TYPE 2 DIABETES MELLITUS WITH STAGE 3 CHRONIC KIDNEY DISEASE, WITH LONG-TERM CURRENT USE OF INSULIN (HCC): ICD-10-CM

## 2020-03-17 PROBLEM — G47.33 OSA ON CPAP: Status: ACTIVE | Noted: 2020-03-17

## 2020-03-17 PROBLEM — I24.89 DEMAND ISCHEMIA OF MYOCARDIUM: Status: RESOLVED | Noted: 2020-02-29 | Resolved: 2020-03-17

## 2020-03-17 PROBLEM — Z99.89 OSA ON CPAP: Status: ACTIVE | Noted: 2020-03-17

## 2020-03-17 PROBLEM — I24.8 DEMAND ISCHEMIA OF MYOCARDIUM: Status: RESOLVED | Noted: 2020-02-29 | Resolved: 2020-03-17

## 2020-03-17 LAB
ANION GAP SERPL CALCULATED.3IONS-SCNC: 12 MMOL/L (ref 5–15)
BUN BLD-MCNC: 45 MG/DL (ref 6–20)
BUN/CREAT SERPL: 24.2 (ref 7–25)
CALCIUM SPEC-SCNC: 9.5 MG/DL (ref 8.6–10.5)
CHLORIDE SERPL-SCNC: 99 MMOL/L (ref 98–107)
CO2 SERPL-SCNC: 24 MMOL/L (ref 22–29)
CREAT BLD-MCNC: 1.86 MG/DL (ref 0.76–1.27)
DEPRECATED RDW RBC AUTO: 48.7 FL (ref 37–54)
ERYTHROCYTE [DISTWIDTH] IN BLOOD BY AUTOMATED COUNT: 15.5 % (ref 12.3–15.4)
GFR SERPL CREATININE-BSD FRML MDRD: 38 ML/MIN/1.73
GLUCOSE BLD-MCNC: 209 MG/DL (ref 65–99)
GLUCOSE BLDC GLUCOMTR-MCNC: 231 MG/DL (ref 70–130)
GLUCOSE BLDC GLUCOMTR-MCNC: 236 MG/DL (ref 70–130)
GLUCOSE BLDC GLUCOMTR-MCNC: 243 MG/DL (ref 70–130)
HCT VFR BLD AUTO: 33.8 % (ref 37.5–51)
HGB BLD-MCNC: 10.5 G/DL (ref 13–17.7)
MCH RBC QN AUTO: 27.4 PG (ref 26.6–33)
MCHC RBC AUTO-ENTMCNC: 31.1 G/DL (ref 31.5–35.7)
MCV RBC AUTO: 88.3 FL (ref 79–97)
PLATELET # BLD AUTO: 370 10*3/MM3 (ref 140–450)
PMV BLD AUTO: 10.4 FL (ref 6–12)
POTASSIUM BLD-SCNC: 4.8 MMOL/L (ref 3.5–5.2)
RBC # BLD AUTO: 3.83 10*6/MM3 (ref 4.14–5.8)
SODIUM BLD-SCNC: 135 MMOL/L (ref 136–145)
WBC NRBC COR # BLD: 14.52 10*3/MM3 (ref 3.4–10.8)

## 2020-03-17 PROCEDURE — 82962 GLUCOSE BLOOD TEST: CPT

## 2020-03-17 PROCEDURE — 85027 COMPLETE CBC AUTOMATED: CPT | Performed by: PHYSICIAN ASSISTANT

## 2020-03-17 PROCEDURE — 99231 SBSQ HOSP IP/OBS SF/LOW 25: CPT | Performed by: THORACIC SURGERY (CARDIOTHORACIC VASCULAR SURGERY)

## 2020-03-17 PROCEDURE — 99238 HOSP IP/OBS DSCHRG MGMT 30/<: CPT | Performed by: NURSE PRACTITIONER

## 2020-03-17 PROCEDURE — 93010 ELECTROCARDIOGRAM REPORT: CPT | Performed by: INTERNAL MEDICINE

## 2020-03-17 PROCEDURE — 80048 BASIC METABOLIC PNL TOTAL CA: CPT | Performed by: PHYSICIAN ASSISTANT

## 2020-03-17 PROCEDURE — 94799 UNLISTED PULMONARY SVC/PX: CPT

## 2020-03-17 PROCEDURE — 99232 SBSQ HOSP IP/OBS MODERATE 35: CPT | Performed by: INTERNAL MEDICINE

## 2020-03-17 PROCEDURE — 93005 ELECTROCARDIOGRAM TRACING: CPT | Performed by: PHYSICIAN ASSISTANT

## 2020-03-17 RX ADMIN — SODIUM CHLORIDE, PRESERVATIVE FREE 3 ML: 5 INJECTION INTRAVENOUS at 10:16

## 2020-03-17 RX ADMIN — INSULIN LISPRO 4 UNITS: 100 INJECTION, SOLUTION INTRAVENOUS; SUBCUTANEOUS at 10:16

## 2020-03-17 RX ADMIN — CARVEDILOL 12.5 MG: 12.5 TABLET, FILM COATED ORAL at 10:15

## 2020-03-17 RX ADMIN — GABAPENTIN 600 MG: 300 CAPSULE ORAL at 05:21

## 2020-03-17 RX ADMIN — ALLOPURINOL 300 MG: 300 TABLET ORAL at 10:15

## 2020-03-17 RX ADMIN — ASPIRIN 81 MG 81 MG: 81 TABLET ORAL at 10:14

## 2020-03-17 NOTE — PROGRESS NOTES
BALDOMERO GARCIA    Have discussed CKD with patient/ spouse numerous times during recent visits.  He requests to establish with nephrologist who comes to [facility].  See ambulatory referral.

## 2020-03-18 ENCOUNTER — READMISSION MANAGEMENT (OUTPATIENT)
Dept: CALL CENTER | Facility: HOSPITAL | Age: 60
End: 2020-03-18

## 2020-03-19 ENCOUNTER — TELEPHONE (OUTPATIENT)
Dept: CARDIAC SURGERY | Facility: CLINIC | Age: 60
End: 2020-03-19

## 2020-03-19 LAB
ABO + RH BLD: NORMAL
ABO + RH BLD: NORMAL
BH BB BLOOD EXPIRATION DATE: NORMAL
BH BB BLOOD EXPIRATION DATE: NORMAL
BH BB BLOOD TYPE BARCODE: 5100
BH BB BLOOD TYPE BARCODE: 5100
BH BB DISPENSE STATUS: NORMAL
BH BB DISPENSE STATUS: NORMAL
BH BB PRODUCT CODE: NORMAL
BH BB PRODUCT CODE: NORMAL
BH BB UNIT NUMBER: NORMAL
BH BB UNIT NUMBER: NORMAL
UNIT  ABO: NORMAL
UNIT  ABO: NORMAL
UNIT  RH: NORMAL
UNIT  RH: NORMAL

## 2020-03-19 NOTE — OUTREACH NOTE
Prep Survey      Responses   [facility] patient discharged from?  [place]   Is LACE score < 7 ?  No   Eligibility  Readm Mgmt   Discharge diagnosis  Aortic stenosis-TAVR this visit   Does the patient have one of the following disease processes/diagnoses(primary or secondary)?  Other   Comments regarding appointments  Pt is current with [facility] and an order has been sent to resume   Prep survey completed?  Yes          Lisbeth Cantu RN

## 2020-03-19 NOTE — TELEPHONE ENCOUNTER
Sherley with [facility] home health called stating that she was to resume home health services, but when she called him today he said he did not see the need to.     Eldon

## 2020-03-25 ENCOUNTER — READMISSION MANAGEMENT (OUTPATIENT)
Dept: CALL CENTER | Facility: HOSPITAL | Age: 60
End: 2020-03-25

## 2020-03-25 NOTE — OUTREACH NOTE
Medical Week 1 Survey      Responses   [facility] patient discharged from?  [place]   Does the patient have one of the following disease processes/diagnoses(primary or secondary)?  Other   Is there a successful TCM telephone encounter documented?  No   Week 1 attempt successful?  Yes   Call start time  1003   Call end time  1015   Discharge diagnosis  Aortic stenosis-TAVR this visit   Is patient permission given to speak with other caregiver?  Yes   List who call center can speak with  dtr or SO   Meds reviewed with patient/caregiver?  Yes   Is the patient having any side effects they believe may be caused by any medication additions or changes?  No   Does the patient have all medications ordered at discharge?  N/A   Is the patient taking all medications as directed (includes completed medication regime)?  Yes   Comments regarding appointments  .   Does the patient have a primary care provider?   Yes   Does the patient have an appointment with their PCP within 7 days of discharge?  Yes   Has the patient kept scheduled appointments due by today?  N/A   What is the Home health agency?   formerly [facility] Pt declined r/t being self distancing. He did not want  staff in home.    Has home health visited the patient within 72 hours of discharge?  No   What DME was ordered?  [employer] - O2   Has all DME been delivered?  Yes   DME comments  Pt is wearing O2 at night    Psychosocial issues?  No   Comments  Pt denies pain or discomfort. Monitoring glucose at home, normally runs 100-150 but currently ranging 180-250 but MD is aware and they are working with sliding scale to lower. Bilat groin inci sites healing well, no s/sx of infection. No issue with appetite, pain or voiding.    Did the patient receive a copy of their discharge instructions?  Yes   Nursing interventions  Reviewed instructions with patient   What is the patient's perception of their health status since discharge?  Improving   Is the patient/caregiver able  to teach back signs and symptoms related to disease process for when to call PCP?  Yes   Is the patient/caregiver able to teach back signs and symptoms related to disease process for when to call 911?  Yes   Is the patient/caregiver able to teach back the hierarchy of who to call/visit for symptoms/problems? PCP, Specialist, Home health nurse, Urgent Care, ED, 911  Yes   Week 1 call completed?  Yes          Lenore Ross RN

## 2020-04-01 ENCOUNTER — READMISSION MANAGEMENT (OUTPATIENT)
Dept: CALL CENTER | Facility: HOSPITAL | Age: 60
End: 2020-04-01

## 2020-04-01 NOTE — OUTREACH NOTE
Medical Week 2 Survey      Responses   [facility] patient discharged from?  [place]   Does the patient have one of the following disease processes/diagnoses(primary or secondary)?  Other   Week 2 attempt successful?  Yes   Call start time  0958   Discharge diagnosis  Aortic stenosis-TAVR this visit   Call end time  1005   Person spoke with today (if not patient) and relationship  Hag[place]-wife   Is the patient having any side effects they believe may be caused by any medication additions or changes?  Yes   Side effects comments   Edema of feet   Does the patient have all medications ordered at discharge?  N/A   Is the patient taking all medications as directed (includes completed medication regime)?  Yes   Comments  All appts cancelled by providers at this point   DME comments  Pt is wearing O2 at night    Psychosocial issues?  No   Comments  healing well but BS's up to near 400 for a couple of days, enc wife to call PCP if not coming down in day or two, VU.   Did the patient receive a copy of their discharge instructions?  Yes   Nursing interventions  Reviewed instructions with patient   What is the patient's perception of their health status since discharge?  New symptoms unrelated to diagnosis [Blood sugar issues]   Week 2 Call Completed?  Yes          Alcira Morrow RN

## 2020-04-06 ENCOUNTER — READMISSION MANAGEMENT (OUTPATIENT)
Dept: CALL CENTER | Facility: HOSPITAL | Age: 60
End: 2020-04-06

## 2020-04-06 LAB
BUN SERPL-MCNC: 71 MG/DL (ref 6–20)
BUN/CREAT SERPL: 26.9 (ref 7–25)
CALCIUM SERPL-MCNC: 9.7 MG/DL (ref 8.6–10.5)
CHLORIDE SERPL-SCNC: 89 MMOL/L (ref 98–107)
CO2 SERPL-SCNC: 34 MMOL/L (ref 22–29)
CREAT SERPL-MCNC: 2.64 MG/DL (ref 0.76–1.27)
GLUCOSE SERPL-MCNC: 350 MG/DL (ref 65–99)
NT-PROBNP SERPL-MCNC: 1457 PG/ML (ref 5–900)
POTASSIUM SERPL-SCNC: 4.3 MMOL/L (ref 3.5–5.2)
SODIUM SERPL-SCNC: 136 MMOL/L (ref 136–145)

## 2020-04-06 NOTE — OUTREACH NOTE
Medical Week 3 Survey      Responses   [facility] ([facility] patient discharged from?  [place]   Does the patient have one of the following disease processes/diagnoses(primary or secondary)?  Other   Week 3 attempt successful?  Yes   Call start time  1605   Call end time  1610   Discharge diagnosis  Aortic stenosis-TAVR this visit   Meds reviewed with patient/caregiver?  Yes   Is the patient taking all medications as directed (includes completed medication regime)?  Yes   Has the patient kept scheduled appointments due by today?  N/A   Comments  All appts cancelled by providers at this point   What is the Home health agency?   [facility] Pt declined r/t being self distancing. He did not want  staff in home.    What DME was ordered?  [facility] - O2   DME comments  Pt is wearing O2 at night    Psychosocial issues?  No   Comments  Pt reports his gluc levels are better in morning mid 150's, higher in evening mid 250. SOA is better but still intermittent. Appetite is WNL. Pt reports sleeping well. Pt does monitor his dialy wt.    What is the patient's perception of their health status since discharge?  Improving   Is the patient/caregiver able to teach back the hierarchy of who to call/visit for symptoms/problems? PCP, Specialist, Home health nurse, Urgent Care, ED, 911  Yes   Week 3 Call Completed?  Yes          Lenore Ross RN

## 2020-04-13 ENCOUNTER — READMISSION MANAGEMENT (OUTPATIENT)
Dept: CALL CENTER | Facility: HOSPITAL | Age: 60
End: 2020-04-13

## 2020-04-13 NOTE — OUTREACH NOTE
Medical Week 4 Survey      Responses   [facility] patient discharged from?  [place]   COVID-19 Test Status  Not tested   Does the patient have one of the following disease processes/diagnoses(primary or secondary)?  Other   Week 4 attempt successful?  No          Roselia Gonzalez RN

## 2020-04-20 ENCOUNTER — OFFICE VISIT (OUTPATIENT)
Dept: CARDIAC SURGERY | Facility: CLINIC | Age: 60
End: 2020-04-20

## 2020-04-20 VITALS
DIASTOLIC BLOOD PRESSURE: 80 MMHG | HEART RATE: 81 BPM | SYSTOLIC BLOOD PRESSURE: 115 MMHG | TEMPERATURE: 98.8 F | BODY MASS INDEX: 41.98 KG/M2 | OXYGEN SATURATION: 99 % | WEIGHT: 277 LBS | HEIGHT: 68 IN

## 2020-04-20 DIAGNOSIS — Z95.2 S/P TAVR (TRANSCATHETER AORTIC VALVE REPLACEMENT): ICD-10-CM

## 2020-04-20 DIAGNOSIS — I35.0 AORTIC VALVE STENOSIS, ETIOLOGY OF CARDIAC VALVE DISEASE UNSPECIFIED: Primary | ICD-10-CM

## 2020-04-20 PROCEDURE — 99212 OFFICE O/P EST SF 10 MIN: CPT | Performed by: NURSE PRACTITIONER

## 2020-04-20 PROCEDURE — 71046 X-RAY EXAM CHEST 2 VIEWS: CPT | Performed by: NURSE PRACTITIONER

## 2020-04-20 NOTE — PROGRESS NOTES
[facility] Cardiothoracic Surgery Follow-Up Note    Name:  Juan Alberto Tejeda  MRN Number:  [id number]  Date of Encounter:  04/20/2020    Referred By:  No ref. provider found  PCP:  Megan Martell APRN    Chief Complaint:    Chief Complaint   Patient presents with   • Follow-up     Hospital follow up s/p TAVR 3/16/2020.   • Aortic Stenosis       History of Present Illness:    Mr. Juan Alberto Tejeda is a pleasant 59 y.o. male with a history of hypertension, hyperlipidemia, diabetes mellitus, chronic kidney disease, congestive heart failure, and severe aortic stenosis/bicuspid valve status post TAVR 3/16/2020 per Dr. Jeffers who returns the office today for postoperative exam.  He denies incisional pain, worsening shortness of breath or difficulty with ambulation.  He does have some swelling in his bilateral extremities that improves with leg elevation.  The patient is set to follow-up with his cardiologist, Dr. Canales, 5/1/2020.  His blood pressure was noted to be normal in the office today.    Review of Systems:  Review of Systems   Constitution: Positive for malaise/fatigue. Negative for chills, fever, night sweats and weight loss.   HENT: Negative.  Negative for hearing loss, odynophagia and sore throat.    Eyes: Negative.    Cardiovascular: Positive for dyspnea on exertion and leg swelling. Negative for chest pain, orthopnea and palpitations.   Respiratory: Positive for cough. Negative for hemoptysis.    Endocrine: Negative.  Negative for cold intolerance, heat intolerance, polydipsia, polyphagia and polyuria.   Hematologic/Lymphatic: Negative.  Does not bruise/bleed easily.   Skin: Negative.  Negative for itching and rash.   Musculoskeletal: Positive for arthritis and joint pain. Negative for joint swelling and myalgias.   Gastrointestinal: Negative.  Negative for abdominal pain, constipation, diarrhea, hematemesis, hematochezia, melena, nausea and vomiting.   Genitourinary: Negative.  Negative for  dysuria, frequency and hematuria.   Neurological: Positive for loss of balance and numbness. Negative for focal weakness, headaches and seizures.   Psychiatric/Behavioral: Negative.  Negative for suicidal ideas.   Allergic/Immunologic: Negative.    All other systems reviewed and are negative.      Past Medical History:    Past Medical History:   Diagnosis Date   • Aortic valve disorder    • Arthritis    • CHF (congestive heart failure) (CMS/[facility])    • Chronic kidney disease    • Diabetes mellitus (CMS/[facility])    • Gout    • Hiatal hernia    • Hyperlipidemia    • Hypertension    • Kidney stones     history   • MI, old    • Peripheral neuropathy    • Wears glasses        Past Surgical History:    Past Surgical History:   Procedure Laterality Date   • AORTIC VALVE REPAIR/REPLACEMENT N/A 3/16/2020    Procedure: TRANSCATHETER AORTIC VALVE REPLACEMENT, SHANKAR;  Surgeon: Irvin Jeffers MD;  Location:  [facility] OR ;  Service: Cardiothoracic;  Laterality: N/A;  fluoro 10 min 30 sec  dose 1136 mGY   contrast 65 ml   • AORTIC VALVE REPAIR/REPLACEMENT N/A 3/16/2020    Procedure: Transfemoral Transcatheter Aortic Valve Replacement;  Surgeon: Cayla Bella MD;  Location:  [facility] OR ;  Service: Cardiovascular;  Laterality: N/A;   • CHOLECYSTECTOMY     • CIRCUMCISION     • COLONOSCOPY  2 years ago   • KIDNEY STONE SURGERY     • OTHER SURGICAL HISTORY      Gallstone removal surgery       Patient Active Problem List   Diagnosis   • CKD (chronic kidney disease) stage 3, GFR 30-59 ml/min (CMS/[facility])   • Hyperlipidemia LDL goal <70   • Essential hypertension   • Type 2 diabetes mellitus with kidney complication, with long-term current use of insulin (CMS/[facility])   • Normocytic anemia   • Aortic stenosis   • Diastolic CHF due to valvular disease (CMS/[facility])   • Coronary artery disease involving native coronary artery of native heart with angina pectoris (CMS/[facility])   • Nocturnal hypoxia   • Peripheral neuropathy   • JASWINDER on  CPAP     Social History     Tobacco Use   • Smoking status: Never Smoker   • Smokeless tobacco: Never Used   Substance Use Topics   • Alcohol use: Never     Frequency: Never   • Drug use: Never     Family History   Problem Relation Age of Onset   • Stroke Mother    • Arrhythmia Mother    • Hypertension Mother    • Diabetes Father    • Hypertension Father    • Heart attack Father    • Hypertension Brother    • No Known Problems Maternal Grandmother    • No Known Problems Maternal Grandfather    • No Known Problems Paternal Grandmother    • No Known Problems Paternal Grandfather        Medications:      Current Outpatient Medications:   •  allopurinol (ZYLOPRIM) 300 MG tablet, Take 300 mg by mouth Daily., Disp: , Rfl:   •  aspirin 81 MG chewable tablet, Chew 81 mg Daily., Disp: , Rfl:   •  bumetanide (BUMEX) 1 MG tablet, Take 1 tablet by mouth 2 (Two) Times a Day., Disp: 60 tablet, Rfl: 0  •  carvedilol (COREG) 12.5 MG tablet, Take 12.5 mg by mouth 2 (Two) Times a Day With Meals., Disp: , Rfl:   •  fenofibrate micronized (LOFIBRA) 200 MG capsule, Take 200 mg by mouth Every Morning Before Breakfast., Disp: , Rfl:   •  gabapentin (NEURONTIN) 600 MG tablet, Take 600 mg by mouth 3 (Three) Times a Day., Disp: , Rfl:   •  insulin aspart (novoLOG FLEXPEN) 100 UNIT/ML solution pen-injector sc pen, Sliding scale TID with meals. 150-200: 2 units 201-249: 4 units 250-300: 6 units 301-350: 8 units, Disp: , Rfl:   •  insulin detemir (LEVEMIR) 100 UNIT/ML injection, If blood sugar consistently above 200, start using 10 units nightly, Disp: , Rfl: 12  •  polyethylene glycol (MIRALAX) packet, Take 17 g by mouth 2 (Two) Times a Day As Needed (constipation)., Disp: 30 each, Rfl: 0  •  rosuvastatin (CRESTOR) 40 MG tablet, Take 40 mg by mouth Every Night., Disp: , Rfl:   •  SITagliptin (JANUVIA) 100 MG tablet, Take 100 mg by mouth Daily., Disp: , Rfl:   •  VITAMIN D, CHOLECALCIFEROL, PO, Take 1 tablet by mouth Daily., Disp: , Rfl:  "    Allergies:  No Known Allergies    Physical Exam:  Vital Signs:    Vitals:    04/20/20 1309   BP: 115/80   BP Location: Right arm   Patient Position: Sitting   Pulse: 81   Temp: 98.8 °F (37.1 °C)   SpO2: 99%   Weight: 126 kg (277 lb)   Height: 172.7 cm (68\")       Physical Exam   Gen- NAD, pleasant, cooperative  CV- Regular rate and rhythm, no murmur, gallop or rub  Pulm- Clear to auscultation bilateral without wheeze or rhonchi  GI- Soft, normoactive bowel sounds, non-tender  Ext- 1-2+ non pitting edema noted bilaterally  Incision- Groin site incision healing well with no erythema, drainage or pseudoaneurysm noted.  Neuro- CN II- XII grossly intact, tongue midline, voice normal.    Labs/Imaging:  Chest x-ray, [facility], 4/20/2020  Impression:  Stable chronic changes seen within the lung fields with no evidence of acute parenchymal disease.  I personally reviewed these images in the office today.    Assessment / Plan:  Mr. Juan Alberto Tejeda is a pleasant 59 y.o. male with a history of hypertension, hyperlipidemia, diabetes mellitus, chronic kidney disease, congestive heart failure, and severe aortic stenosis/bicuspid valve status post TAVR 3/16/2020 per Dr. Jeffers who returns the office today for postoperative exam.  The patient has had a stable postoperative course.  His groin sites are healing well and his chest x-ray obtained in the office today is satisfactory.  He is set to follow-up with his cardiologist, Dr. Canales, 5/1/2020 and will repeat a postop echocardiogram at that time.  At this time, we will plan to see the patient back on an as-needed basis.  Should he have any questions or concerns, he may contact the office.    Please note, this document was produced using voice recognition software.    RADHA Newton  [facility]  "

## 2020-05-04 RX ORDER — CARVEDILOL 12.5 MG/1
TABLET ORAL
Qty: 180 TABLET | Refills: 2 | OUTPATIENT
Start: 2020-05-04

## 2020-05-04 RX ORDER — CARVEDILOL 12.5 MG/1
12.5 TABLET ORAL 2 TIMES DAILY
Qty: 60 TABLET | Refills: 3 | Status: SHIPPED | OUTPATIENT
Start: 2020-05-04 | End: 2020-07-30 | Stop reason: SDUPTHER

## 2020-05-04 RX ORDER — BUMETANIDE 2 MG/1
2 TABLET ORAL 2 TIMES DAILY
Qty: 60 TABLET | Refills: 2 | Status: SHIPPED | OUTPATIENT
Start: 2020-05-04 | End: 2020-07-30 | Stop reason: SDUPTHER

## 2020-05-05 DIAGNOSIS — I35.0 NONRHEUMATIC AORTIC VALVE STENOSIS: ICD-10-CM

## 2020-05-05 DIAGNOSIS — I38 DIASTOLIC CHF DUE TO VALVULAR DISEASE (HCC): Primary | ICD-10-CM

## 2020-05-05 DIAGNOSIS — I50.30 DIASTOLIC CHF DUE TO VALVULAR DISEASE (HCC): Primary | ICD-10-CM

## 2020-05-05 NOTE — PROGRESS NOTES
TAVR RADHA    One month clinic follow up completed on 4/20 with CT Surgery visit.  KCCQ12 and walk test completed then.  However, patient was no show for Cardiology in [facility].  Contacted patient as well as [facility]  Scheduling in order to re-schedule patient for follow up.  Advised patient he still has cardiac issues and needs to continue following here with Dr. Canales.      Susan GARCIA

## 2020-05-14 RX ORDER — ALLOPURINOL 300 MG/1
TABLET ORAL
Qty: 90 TABLET | Refills: 1 | Status: SHIPPED | OUTPATIENT
Start: 2020-05-14 | End: 2020-12-04

## 2020-05-14 NOTE — TELEPHONE ENCOUNTER
Refill request received from pharmacy. Medication pending for approval.       Last Office Visit With PCP:  03/11/2020    Next Visit Date:  No future appointments.     JOSEPHINE TRUONG

## 2020-05-19 ENCOUNTER — HOSPITAL ENCOUNTER (OUTPATIENT)
Dept: CARDIOLOGY | Facility: HOSPITAL | Age: 60
Discharge: HOME | End: 2020-05-19
Admitting: NURSE PRACTITIONER

## 2020-05-19 VITALS — WEIGHT: 277 LBS | BODY MASS INDEX: 41.98 KG/M2 | HEIGHT: 68 IN

## 2020-05-19 DIAGNOSIS — I50.30 DIASTOLIC CHF DUE TO VALVULAR DISEASE (HCC): ICD-10-CM

## 2020-05-19 DIAGNOSIS — I35.0 NONRHEUMATIC AORTIC VALVE STENOSIS: ICD-10-CM

## 2020-05-19 DIAGNOSIS — I38 DIASTOLIC CHF DUE TO VALVULAR DISEASE (HCC): ICD-10-CM

## 2020-05-19 PROCEDURE — 93306 TTE W/DOPPLER COMPLETE: CPT | Performed by: INTERNAL MEDICINE

## 2020-05-19 PROCEDURE — 93306 TTE W/DOPPLER COMPLETE: CPT

## 2020-05-20 LAB
BH CV ECHO MEAS - AO MAX PG (FULL): 25 MMHG
BH CV ECHO MEAS - AO MAX PG: 30.7 MMHG
BH CV ECHO MEAS - AO MEAN PG (FULL): 13.3 MMHG
BH CV ECHO MEAS - AO MEAN PG: 13 MMHG
BH CV ECHO MEAS - AO ROOT AREA (BSA CORRECTED): 1.3
BH CV ECHO MEAS - AO ROOT AREA: 7.5 CM^2
BH CV ECHO MEAS - AO ROOT DIAM: 3.1 CM
BH CV ECHO MEAS - AO V2 MAX: 276.9 CM/SEC
BH CV ECHO MEAS - AO V2 MEAN: 186.1 CM/SEC
BH CV ECHO MEAS - AO V2 VTI: 53.1 CM
BH CV ECHO MEAS - AVA(I,A): 1.4 CM^2
BH CV ECHO MEAS - AVA(I,D): 1.4 CM^2
BH CV ECHO MEAS - AVA(V,A): 1.3 CM^2
BH CV ECHO MEAS - AVA(V,D): 1.3 CM^2
BH CV ECHO MEAS - BSA(HAYCOCK): 2.5 M^2
BH CV ECHO MEAS - BSA: 2.3 M^2
BH CV ECHO MEAS - BZI_BMI: 42.1 KILOGRAMS/M^2
BH CV ECHO MEAS - BZI_METRIC_HEIGHT: 172.7 CM
BH CV ECHO MEAS - BZI_METRIC_WEIGHT: 125.6 KG
BH CV ECHO MEAS - EDV(CUBED): 136.2 ML
BH CV ECHO MEAS - EDV(MOD-SP2): 88 ML
BH CV ECHO MEAS - EDV(MOD-SP4): 96 ML
BH CV ECHO MEAS - EDV(TEICH): 126.4 ML
BH CV ECHO MEAS - EF(CUBED): 69.2 %
BH CV ECHO MEAS - EF(MOD-BP): 63 %
BH CV ECHO MEAS - EF(MOD-SP2): 63.6 %
BH CV ECHO MEAS - EF(MOD-SP4): 63.5 %
BH CV ECHO MEAS - EF(TEICH): 60.5 %
BH CV ECHO MEAS - ESV(CUBED): 41.9 ML
BH CV ECHO MEAS - ESV(MOD-SP2): 32 ML
BH CV ECHO MEAS - ESV(MOD-SP4): 35 ML
BH CV ECHO MEAS - ESV(TEICH): 50 ML
BH CV ECHO MEAS - FS: 32.5 %
BH CV ECHO MEAS - IVS/LVPW: 1
BH CV ECHO MEAS - IVSD: 1.4 CM
BH CV ECHO MEAS - LA DIMENSION: 4.4 CM
BH CV ECHO MEAS - LA/AO: 1.4
BH CV ECHO MEAS - LAD MAJOR: 6.3 CM
BH CV ECHO MEAS - LAT PEAK E' VEL: 9.5 CM/SEC
BH CV ECHO MEAS - LATERAL E/E' RATIO: 9.8
BH CV ECHO MEAS - LV DIASTOLIC VOL/BSA (35-75): 40.9 ML/M^2
BH CV ECHO MEAS - LV MASS(C)D: 291.7 GRAMS
BH CV ECHO MEAS - LV MASS(C)DI: 124.3 GRAMS/M^2
BH CV ECHO MEAS - LV MAX PG: 5.6 MMHG
BH CV ECHO MEAS - LV MEAN PG: 2.8 MMHG
BH CV ECHO MEAS - LV SYSTOLIC VOL/BSA (12-30): 14.9 ML/M^2
BH CV ECHO MEAS - LV V1 MAX: 118.5 CM/SEC
BH CV ECHO MEAS - LV V1 MEAN: 77 CM/SEC
BH CV ECHO MEAS - LV V1 VTI: 23.1 CM
BH CV ECHO MEAS - LVIDD: 5.1 CM
BH CV ECHO MEAS - LVIDS: 3.5 CM
BH CV ECHO MEAS - LVLD AP2: 8.9 CM
BH CV ECHO MEAS - LVLD AP4: 8.5 CM
BH CV ECHO MEAS - LVLS AP2: 7.6 CM
BH CV ECHO MEAS - LVLS AP4: 7.9 CM
BH CV ECHO MEAS - LVOT AREA (M): 3.1 CM^2
BH CV ECHO MEAS - LVOT AREA: 3.2 CM^2
BH CV ECHO MEAS - LVOT DIAM: 2 CM
BH CV ECHO MEAS - LVPWD: 1.3 CM
BH CV ECHO MEAS - MED PEAK E' VEL: 6.7 CM/SEC
BH CV ECHO MEAS - MEDIAL E/E' RATIO: 14
BH CV ECHO MEAS - MV A MAX VEL: 114.1 CM/SEC
BH CV ECHO MEAS - MV DEC SLOPE: 320 CM/SEC^2
BH CV ECHO MEAS - MV DEC TIME: 0.4 SEC
BH CV ECHO MEAS - MV E MAX VEL: 95.6 CM/SEC
BH CV ECHO MEAS - MV E/A: 0.84
BH CV ECHO MEAS - MV MAX PG: 8.1 MMHG
BH CV ECHO MEAS - MV MEAN PG: 2.3 MMHG
BH CV ECHO MEAS - MV P1/2T MAX VEL: 98.1 CM/SEC
BH CV ECHO MEAS - MV P1/2T: 89.8 MSEC
BH CV ECHO MEAS - MV V2 MAX: 142.5 CM/SEC
BH CV ECHO MEAS - MV V2 MEAN: 69.9 CM/SEC
BH CV ECHO MEAS - MV V2 VTI: 38.6 CM
BH CV ECHO MEAS - MVA P1/2T LCG: 2.2 CM^2
BH CV ECHO MEAS - MVA(P1/2T): 2.5 CM^2
BH CV ECHO MEAS - MVA(VTI): 1.9 CM^2
BH CV ECHO MEAS - PA ACC SLOPE: 1045 CM/SEC^2
BH CV ECHO MEAS - PA ACC TIME: 0.11 SEC
BH CV ECHO MEAS - PA PR(ACCEL): 31.5 MMHG
BH CV ECHO MEAS - SI(AO): 169.4 ML/M^2
BH CV ECHO MEAS - SI(CUBED): 40.2 ML/M^2
BH CV ECHO MEAS - SI(LVOT): 31.1 ML/M^2
BH CV ECHO MEAS - SI(MOD-SP2): 23.9 ML/M^2
BH CV ECHO MEAS - SI(MOD-SP4): 26 ML/M^2
BH CV ECHO MEAS - SI(TEICH): 32.6 ML/M^2
BH CV ECHO MEAS - SV(AO): 397.7 ML
BH CV ECHO MEAS - SV(CUBED): 94.3 ML
BH CV ECHO MEAS - SV(LVOT): 73 ML
BH CV ECHO MEAS - SV(MOD-SP2): 56 ML
BH CV ECHO MEAS - SV(MOD-SP4): 61 ML
BH CV ECHO MEAS - SV(TEICH): 76.4 ML
BH CV ECHO MEAS - TAPSE (>1.6): 1.6 CM2
BH CV ECHO MEASUREMENTS AVERAGE E/E' RATIO: 11.8
BH CV VAS BP LEFT ARM: NORMAL MMHG
BH CV XLRA - RV BASE: 4.3 CM
BH CV XLRA - RV LENGTH: 9.9 CM
BH CV XLRA - RV MID: 3.6 CM
BH CV XLRA - TDI S': 11.4 CM/SEC
LEFT ATRIUM VOLUME INDEX: 43 ML/M^2
LEFT ATRIUM VOLUME: 101 ML
LV EF 2D ECHO EST: 65 %

## 2020-05-26 ENCOUNTER — LAB (OUTPATIENT)
Dept: LAB | Facility: HOSPITAL | Age: 60
End: 2020-05-26

## 2020-05-26 ENCOUNTER — OFFICE VISIT (OUTPATIENT)
Dept: CARDIOLOGY | Facility: CLINIC | Age: 60
End: 2020-05-26

## 2020-05-26 VITALS
SYSTOLIC BLOOD PRESSURE: 114 MMHG | DIASTOLIC BLOOD PRESSURE: 60 MMHG | HEART RATE: 72 BPM | WEIGHT: 275 LBS | BODY MASS INDEX: 41.68 KG/M2 | OXYGEN SATURATION: 97 % | HEIGHT: 68 IN

## 2020-05-26 DIAGNOSIS — E78.5 HYPERLIPIDEMIA LDL GOAL <70: ICD-10-CM

## 2020-05-26 DIAGNOSIS — I10 ESSENTIAL HYPERTENSION: ICD-10-CM

## 2020-05-26 DIAGNOSIS — I38 DIASTOLIC CHF DUE TO VALVULAR DISEASE (HCC): ICD-10-CM

## 2020-05-26 DIAGNOSIS — I50.30 DIASTOLIC CHF DUE TO VALVULAR DISEASE (HCC): ICD-10-CM

## 2020-05-26 DIAGNOSIS — I25.119 CORONARY ARTERY DISEASE INVOLVING NATIVE CORONARY ARTERY OF NATIVE HEART WITH ANGINA PECTORIS (HCC): ICD-10-CM

## 2020-05-26 DIAGNOSIS — I35.0 NONRHEUMATIC AORTIC VALVE STENOSIS: Primary | ICD-10-CM

## 2020-05-26 LAB
ANION GAP SERPL CALCULATED.3IONS-SCNC: 11 MMOL/L (ref 5–15)
BUN BLD-MCNC: 27 MG/DL (ref 6–20)
BUN/CREAT SERPL: 12.3 (ref 7–25)
CALCIUM SPEC-SCNC: 9.9 MG/DL (ref 8.6–10.5)
CHLORIDE SERPL-SCNC: 95 MMOL/L (ref 98–107)
CO2 SERPL-SCNC: 31 MMOL/L (ref 22–29)
CREAT BLD-MCNC: 2.2 MG/DL (ref 0.76–1.27)
GFR SERPL CREATININE-BSD FRML MDRD: 31 ML/MIN/1.73
GLUCOSE BLD-MCNC: 215 MG/DL (ref 65–99)
POTASSIUM BLD-SCNC: 4.3 MMOL/L (ref 3.5–5.2)
SODIUM BLD-SCNC: 137 MMOL/L (ref 136–145)

## 2020-05-26 PROCEDURE — 99214 OFFICE O/P EST MOD 30 MIN: CPT | Performed by: NURSE PRACTITIONER

## 2020-05-26 PROCEDURE — 80048 BASIC METABOLIC PNL TOTAL CA: CPT

## 2020-05-26 NOTE — PROGRESS NOTES
[facility]  Office Visit Note    Encounter Date:05/26/2020    Patient ID: Juan Alberto Tejeda is a 59 y.o. male who resides in [place], [place]    CC/Reason for visit:    • Coronary artery disease  • Valvular heart disease  • Heart failure         Problem List Items Addressed This Visit        Cardiovascular and Mediastinum    Aortic stenosis - Primary    Overview     · Echo (2/29/2020): LVEF 65%.  Aortic valve is bicuspid and severely calcified.  Severe aortic stenosis (mean gradient 51 mmHg). Mild MR and MS  · Status post TAVR 3/16/2020  · Echo (5/20/2020): LVEF = 65%.  Mild LVH. A 26 mm Fried Lin 3 TAVR valve is well-seated and exhibits a mean gradient of 13 mmHg. There is no paravalvular leak present.         Current Assessment & Plan     · Stable NYHA class II symptoms  · Continue Bumex 1 mg twice daily         Coronary artery disease involving native coronary artery of native heart with angina pectoris (CMS/HCC)    Overview     · Cardiac catheterization at [facility] (12/2019): Moderate nonobstructive CAD.         Current Assessment & Plan     · No signs or symptoms of angina  · Continue aspirin 81 mg daily         Diastolic CHF due to valvular disease (CMS/HCC)    Overview     · Echo (2/29/2020): LVEF 65%.  Aortic valve is bicuspid and severely calcified.  Severe aortic stenosis (mean gradient 51 mmHg). Mild MR and MS  · Echo (5/20/2020): LVEF = 65%.  Mild LVH. A 26 mm Fried Lin 3 TAVR valve is well-seated and exhibits a mean gradient of 13 mmHg. There is no paravalvular leak present.         Current Assessment & Plan     · Stable NYHA class II symptoms  · Continue Bumex 1 mg twice daily         Relevant Orders    Basic Metabolic Panel    Essential hypertension    Current Assessment & Plan     · Hypertension is controlled  · Continue Coreg 12.5 mg twice daily         Relevant Orders    Basic Metabolic Panel    Hyperlipidemia LDL goal <70    Overview     • High  intensity statin therapy indicated given the presence of CAD         Current Assessment & Plan     · Continue fenofibrate 200 mg daily  · Continue Crestor 40 mg daily             Patient is doing well from a cardiovascular standpoint.  Echo shows normal functioning TAVR valve.  We will continue his current medications.  He will follow-up 6 months or sooner if needed.  I will send him for a BMP today to recheck renal function.       · Continue current medications  · Obtain BMP today to check renal function  Return in about 6 months (around 11/26/2020), or if symptoms worsen or fail to improve.              Juan Alberto Tejeda returns today for follow-up after recent hospitalization where patient underwent a transaortic valvular repair as patient was considered to be too high risk for valvular surgery.  Since discharge he underwent a echocardiogram last week that showed a well-seated TAVR valve with a mean gradient of 13 mmHg and no perivalvular leak present.  The patient overall is feeling well.  He denies chest pain/pressure or tightness and thinks his breathing is stable.  He has been taking Bumex twice daily.  He does have known chronic kidney disease but has not had recent blood work to assess renal function.  His blood pressures stay well controlled.  He is on fenofibrate in addition to Crestor.  He denies any signs or symptoms of myalgias.    Review of Systems   Constitution: Negative for malaise/fatigue.   Eyes: Negative for vision loss in left eye and vision loss in right eye.   Cardiovascular: Negative for chest pain, dyspnea on exertion, near-syncope, orthopnea, palpitations, paroxysmal nocturnal dyspnea and syncope.   Musculoskeletal: Negative for myalgias.   Neurological: Negative for brief paralysis, excessive daytime sleepiness, focal weakness, numbness, paresthesias and weakness.   All other systems reviewed and are negative.      The patient's past medical, social, family history and ROS reviewed in the  patient's electronic medical record.    No Known Allergies      Current Outpatient Medications:   •  allopurinol (ZYLOPRIM) 300 MG tablet, Take 300 mg by mouth Daily., Disp: , Rfl:   •  aspirin 81 MG chewable tablet, Chew 81 mg Daily., Disp: , Rfl:   •  bumetanide (BUMEX) 1 MG tablet, Take 1 tablet by mouth 2 (Two) Times a Day., Disp: 60 tablet, Rfl: 0  •  carvedilol (COREG) 12.5 MG tablet, Take 12.5 mg by mouth 2 (Two) Times a Day With Meals., Disp: , Rfl:   •  fenofibrate micronized (LOFIBRA) 200 MG capsule, Take 200 mg by mouth Every Morning Before Breakfast., Disp: , Rfl:   •  gabapentin (NEURONTIN) 600 MG tablet, Take 600 mg by mouth 3 (Three) Times a Day., Disp: , Rfl:   •  insulin aspart (novoLOG FLEXPEN) 100 UNIT/ML solution pen-injector sc pen, Sliding scale TID with meals. 150-200: 2 units 201-249: 4 units 250-300: 6 units 301-350: 8 units, Disp: , Rfl:   •  insulin detemir (LEVEMIR) 100 UNIT/ML injection, If blood sugar consistently above 200, start using 10 units nightly, Disp: , Rfl: 12  •  polyethylene glycol (MIRALAX) packet, Take 17 g by mouth 2 (Two) Times a Day As Needed (constipation)., Disp: 30 each, Rfl: 0  •  rosuvastatin (CRESTOR) 40 MG tablet, Take 40 mg by mouth Every Night., Disp: , Rfl:   •  SITagliptin (JANUVIA) 100 MG tablet, Take 100 mg by mouth Daily., Disp: , Rfl:     Past Medical History:   Diagnosis Date   • Aortic valve disorder    • Arthritis    • CHF (congestive heart failure) (CMS/HCC)    • Chronic kidney disease    • Diabetes mellitus (CMS/HCC)    • Gout    • Hiatal hernia    • Hyperlipidemia    • Hypertension    • Kidney stones     history   • MI, old    • Peripheral neuropathy    • Wears glasses        Past Surgical History:   Procedure Laterality Date   • AORTIC VALVE REPAIR/REPLACEMENT N/A 3/16/2020    Procedure: TRANSCATHETER AORTIC VALVE REPLACEMENT, SHANKAR;  Surgeon: Irvin Jeffers MD;  Location: [place] [address];  Service: Cardiothoracic;  Laterality: N/A;  fluoro  "10 min 30 sec  dose 1136 mGY   contrast 65 ml   • AORTIC VALVE REPAIR/REPLACEMENT N/A 3/16/2020    Procedure: Transfemoral Transcatheter Aortic Valve Replacement;  Surgeon: Cayla Bella MD;  Location: [place] [address];  Service: Cardiovascular;  Laterality: N/A;   • CHOLECYSTECTOMY     • CIRCUMCISION     • COLONOSCOPY  2 years ago   • KIDNEY STONE SURGERY     • OTHER SURGICAL HISTORY      Gallstone removal surgery       Family History   Problem Relation Age of Onset   • Stroke Mother    • Arrhythmia Mother    • Hypertension Mother    • Diabetes Father    • Hypertension Father    • Heart attack Father    • Hypertension Brother    • No Known Problems Maternal Grandmother    • No Known Problems Maternal Grandfather    • No Known Problems Paternal Grandmother    • No Known Problems Paternal Grandfather        Social History     Tobacco Use   • Smoking status: Never Smoker   • Smokeless tobacco: Never Used   Substance Use Topics   • Alcohol use: Never     Frequency: Never           Blood pressure 114/60, pulse 72, height 172.7 cm (68\"), weight 125 kg (275 lb), SpO2 97 %.  Body mass index is 41.81 kg/m².  Vitals:    05/26/20 1450   Patient Position: Sitting       Physical Exam   Constitutional: He is oriented to person, place, and time. He appears well-developed and well-nourished.   HENT:   Head: Normocephalic and atraumatic.   Eyes: Conjunctivae are normal. No scleral icterus.   Neck: Normal range of motion. No JVD present. Carotid bruit is not present. No thyromegaly present.   Cardiovascular: Normal rate and regular rhythm. Exam reveals no gallop.   No murmur heard.  Pulmonary/Chest: Effort normal and breath sounds normal.   Abdominal: Soft. He exhibits no distension and no mass. There is no hepatosplenomegaly.   Musculoskeletal: He exhibits no edema.   Neurological: He is alert and oriented to person, place, and time.   Skin: Skin is warm and dry. No rash noted.   Psychiatric: He has a normal mood and " affect. His behavior is normal.       Data Review (reviewed with patient):     Procedures    Lab Results   Component Value Date    TRIG 178 12/10/2018    HDL 32 (L) 12/10/2018     (H) 12/10/2018    AST 34 03/15/2020    ALT 23 03/15/2020       Lab Results   Component Value Date    HGBA1C 6.50 (H) 03/15/2020         RADHA Pena, CCRN    5/26/2020

## 2020-05-27 ENCOUNTER — DOCUMENTATION (OUTPATIENT)
Dept: CARDIOLOGY | Facility: CLINIC | Age: 60
End: 2020-05-27

## 2020-05-27 NOTE — PROGRESS NOTES
Patient's BMP shows elevated BUN and creatinine of 27 and 2.20 which is this patient's baseline.  We will continue his current medications.  I have sent him a letter to share these results.      RADHA Pena, CCRN

## 2020-06-22 RX ORDER — FENOFIBRATE 200 MG/1
CAPSULE ORAL
Qty: 30 CAPSULE | Refills: 10 | Status: SHIPPED | OUTPATIENT
Start: 2020-06-22 | End: 2020-06-23 | Stop reason: SDUPTHER

## 2020-06-23 RX ORDER — PEN NEEDLE, DIABETIC 31 GX5/16"
NEEDLE, DISPOSABLE MISCELLANEOUS
Qty: 100 EACH | Refills: 7 | Status: SHIPPED | OUTPATIENT
Start: 2020-06-23

## 2020-06-23 RX ORDER — FENOFIBRATE 200 MG/1
200 CAPSULE ORAL
Qty: 30 CAPSULE | Refills: 11 | Status: SHIPPED | OUTPATIENT
Start: 2020-06-23 | End: 2021-08-30

## 2020-06-25 RX ORDER — PEN NEEDLE, DIABETIC 29 GAUGE
NEEDLE, DISPOSABLE MISCELLANEOUS
Qty: 150 EACH | Refills: 5 | Status: SHIPPED | OUTPATIENT
Start: 2020-06-25

## 2020-06-25 RX ORDER — LANCETS 30 GAUGE
EACH MISCELLANEOUS
Qty: 150 EACH | Refills: 5 | Status: SHIPPED | OUTPATIENT
Start: 2020-06-25

## 2020-06-30 ENCOUNTER — VIRTUAL VISIT (OUTPATIENT)
Dept: FAMILY MEDICINE CLINIC | Age: 60
End: 2020-06-30
Payer: MEDICARE

## 2020-06-30 PROCEDURE — G2025 DIS SITE TELE SVCS RHC/FQHC: HCPCS | Performed by: NURSE PRACTITIONER

## 2020-06-30 RX ORDER — CLOTRIMAZOLE 1 %
CREAM (GRAM) TOPICAL
Qty: 120 G | Refills: 1 | Status: SHIPPED | OUTPATIENT
Start: 2020-06-30 | End: 2020-07-07

## 2020-07-02 ASSESSMENT — ENCOUNTER SYMPTOMS
GASTROINTESTINAL NEGATIVE: 1
ROS SKIN COMMENTS: GENITAL AREA
COUGH: 1
EYES NEGATIVE: 1

## 2020-07-02 NOTE — PROGRESS NOTES
NEUTROABS 3.7 01/02/2020    HGB 12.2 01/02/2020    HCT 37.6 01/02/2020    MCV 86 01/02/2020     01/02/2020       Lab Results   Component Value Date    TSH 1.710 01/02/2020         ASSESSMENT/PLAN:   Diagnosis Orders   1. Pneumonia due to infectious organism, unspecified laterality, unspecified part of lung     2. Yeast infection of the skin           No orders of the defined types were placed in this encounter. Outpatient Encounter Medications as of 6/30/2020   Medication Sig Dispense Refill    clotrimazole (CLOTRIMAZOLE ANTI-FUNGAL) 1 % cream Apply topically 2 times daily. 120 g 1    Insulin Pen Needle ([employer] PEN NEEDLES 29G) 29G X 12MM MISC Check sugar and use insulin up to 5 times daily 150 each 5    Lancets MISC Check sugar and use insulin up to 5 times daily.  150 each 5    B-D UF III MINI PEN NEEDLES 31G X 5 MM MISC USE THREE TIMES A  each 7    fenofibrate micronized (LOFIBRA) 200 MG capsule Take 1 capsule by mouth every morning (before breakfast) 30 capsule 11    allopurinol (ZYLOPRIM) 300 MG tablet TAKE ONE TABLET BY MOUTH DAILY 90 tablet 1    carvedilol (COREG) 12.5 MG tablet Take 1 tablet by mouth 2 times daily 60 tablet 3    bumetanide (BUMEX) 2 MG tablet Take 1 tablet by mouth 2 times daily 60 tablet 2    pioglitazone (ACTOS) 30 MG tablet TAKE ONE TABLET BY MOUTH DAILY 30 tablet 2    vitamin D (ERGOCALCIFEROL) 1.25 MG (99331 UT) CAPS capsule Take 1 capsule by mouth once a week 4 capsule 11    gabapentin (NEURONTIN) 600 MG tablet TAKE ONE TABLET BY MOUTH THREE TIMES A DAY 90 tablet 5    rosuvastatin (CRESTOR) 40 MG tablet Take 1 tablet by mouth daily 30 tablet 11    SITagliptin (JANUVIA) 100 MG tablet TAKE ONE TABLET BY MOUTH DAILY 90 tablet 3    insulin detemir (LEVEMIR FLEXTOUCH) 100 UNIT/ML injection pen Inject 65 Units into the skin 2 times daily (Patient taking differently: Inject 65 Units into the skin 2 times daily 10 units if consistently over 200) 45 pen 11  insulin aspart (NOVOLOG FLEXPEN) 100 UNIT/ML injection pen Inject 25 Units into the skin 3 times daily (before meals) (Patient taking differently: Inject 25 Units into the skin 3 times daily (before meals) Indications: sliding scale ) 10 pen 11    clonazePAM (KLONOPIN) 0.5 MG tablet Take 1 tablet by mouth 2 times daily as needed for Anxiety for up to 30 days. 60 tablet 0    aspirin 81 MG chewable tablet Take 81 mg by mouth daily       Facility-Administered Encounter Medications as of 6/30/2020   Medication Dose Route Frequency Provider Last Rate Last Dose    0.9 % sodium chloride infusion   Intravenous Once Mardee Boots, APRN   Stopped at 05/10/17 1052    0.9 % sodium chloride infusion   Intravenous Once Mardee Boots, APRN   Stopped at 05/10/17 [address] [facility] SUSAN Faulkner is a 63 y.o. male evaluated via telephone on 6/30/2020. Consent:  He and/or health care decision maker is aware that that he may receive a bill for this telephone service, depending on his insurance coverage, and has provided verbal consent to proceed: Yes      Documentation:  I communicated with the patient and/or health care decision maker about pneumonia follow up and genital rash. Details of this discussion including any medical advice provided: see visit note      I affirm this is a Patient Initiated Episode with a Patient who has not had a related appointment within my department in the past 7 days or scheduled within the next 24 hours. Patient identification was verified at the start of the visit: Yes    Total Time: minutes: 11-20 minutes    Note: not billable if this call serves to triage the patient into an appointment for the relevant concern      Mardee Boots     Return in about 4 weeks (around 7/28/2020), or if symptoms worsen or fail to improve.       PATIENT COUNSELING    Counseling was provided today regardingthe following topics: Healthy eating habits, Regular exercise, substance abuse and healthy sleep habits. Discussed use, benefit, and side effectsof prescribed medications. Barriers to medication compliance addressed. All patient questions answered. Pt voiced understanding. Controlled Substance Monitoring:    Acute and Chronic Pain Monitoring:   RX Monitoring 7/2/2020   Attestation The Prescription Monitoring Report for this patient was reviewed today. Periodic Controlled Substance Monitoring Possible medication side effects, risk of tolerance/dependence & alternative treatments discussed. ;No signs of potential drug abuse or diversion identified.;Obtaining appropriate analgesic effect of treatment. Chronic Pain > 50 MEDD Obtained or confirmed \"Consent for Opioid Use\" on file.    Chronic Pain > 80 MEDD -

## 2020-07-09 NOTE — TELEPHONE ENCOUNTER
Refill request received from pharmacy.  Medication pending for approval.       Last Office Visit With PCP:  6/30/2020    Next Visit Date:  Future Appointments   Date Time Provider Lyndsey Lyons   7/30/2020  8:30 AM ANDREIA Mujica [address]

## 2020-07-10 RX ORDER — INSULIN DETEMIR 100 [IU]/ML
INJECTION, SOLUTION SUBCUTANEOUS
Qty: 5 PEN | Refills: 10 | Status: SHIPPED | OUTPATIENT
Start: 2020-07-10 | End: 2021-02-09 | Stop reason: SDUPTHER

## 2020-07-17 RX ORDER — GABAPENTIN 600 MG/1
TABLET ORAL
Qty: 90 TABLET | Refills: 5 | Status: SHIPPED | OUTPATIENT
Start: 2020-07-17 | End: 2021-03-02

## 2020-07-17 NOTE — TELEPHONE ENCOUNTER
Patient called, requested refill.      Last Office Visit Date:  6/30/2020    Next Office Visit Date:  Future Appointments   Date Time Provider Lyndsey Lyons   7/30/2020  8:30 AM Zannie Batter, APRN [facility] up to date

## 2020-07-30 ENCOUNTER — HOSPITAL ENCOUNTER (OUTPATIENT)
Facility: HOSPITAL | Age: 60
Discharge: HOME | End: 2020-07-30
Payer: MEDICARE

## 2020-07-30 ENCOUNTER — OFFICE VISIT (OUTPATIENT)
Dept: FAMILY MEDICINE CLINIC | Age: 60
End: 2020-07-30
Payer: MEDICARE

## 2020-07-30 VITALS
OXYGEN SATURATION: 96 % | HEART RATE: 65 BPM | HEIGHT: 68 IN | BODY MASS INDEX: 41.82 KG/M2 | RESPIRATION RATE: 18 BRPM | SYSTOLIC BLOOD PRESSURE: 120 MMHG | DIASTOLIC BLOOD PRESSURE: 72 MMHG | WEIGHT: 275.9 LBS

## 2020-07-30 PROCEDURE — G8417 CALC BMI ABV UP PARAM F/U: HCPCS | Performed by: NURSE PRACTITIONER

## 2020-07-30 PROCEDURE — 1036F TOBACCO NON-USER: CPT | Performed by: NURSE PRACTITIONER

## 2020-07-30 PROCEDURE — G8427 DOCREV CUR MEDS BY ELIG CLIN: HCPCS | Performed by: NURSE PRACTITIONER

## 2020-07-30 PROCEDURE — 99214 OFFICE O/P EST MOD 30 MIN: CPT | Performed by: NURSE PRACTITIONER

## 2020-07-30 PROCEDURE — 2022F DILAT RTA XM EVC RTNOPTHY: CPT | Performed by: NURSE PRACTITIONER

## 2020-07-30 PROCEDURE — 3044F HG A1C LEVEL LT 7.0%: CPT | Performed by: NURSE PRACTITIONER

## 2020-07-30 PROCEDURE — 36415 COLL VENOUS BLD VENIPUNCTURE: CPT

## 2020-07-30 PROCEDURE — 3017F COLORECTAL CA SCREEN DOC REV: CPT | Performed by: NURSE PRACTITIONER

## 2020-07-30 RX ORDER — PANTOPRAZOLE SODIUM 40 MG/1
TABLET, DELAYED RELEASE ORAL
COMMUNITY
Start: 2020-06-19

## 2020-07-30 RX ORDER — CLONAZEPAM 0.5 MG/1
0.5 TABLET ORAL 2 TIMES DAILY PRN
Qty: 60 TABLET | Refills: 0 | Status: SHIPPED | OUTPATIENT
Start: 2020-07-30 | End: 2020-10-13

## 2020-07-30 RX ORDER — CARVEDILOL 12.5 MG/1
12.5 TABLET ORAL 2 TIMES DAILY
Qty: 60 TABLET | Refills: 2 | Status: SHIPPED | OUTPATIENT
Start: 2020-07-30 | End: 2020-11-03

## 2020-07-30 RX ORDER — ESCITALOPRAM OXALATE 20 MG/1
20 TABLET ORAL DAILY
Qty: 30 TABLET | Refills: 2 | Status: SHIPPED | OUTPATIENT
Start: 2020-07-30 | End: 2020-10-27

## 2020-07-30 RX ORDER — BUMETANIDE 2 MG/1
2 TABLET ORAL 2 TIMES DAILY
Qty: 60 TABLET | Refills: 2 | Status: SHIPPED | OUTPATIENT
Start: 2020-07-30 | End: 2020-12-04

## 2020-07-30 ASSESSMENT — ENCOUNTER SYMPTOMS
EYES NEGATIVE: 1
RESPIRATORY NEGATIVE: 1
GASTROINTESTINAL NEGATIVE: 1

## 2020-07-30 NOTE — PROGRESS NOTES
Chief Complaint   Patient presents with    Diabetes     f/u       Have you seen any other physician or provider since your last visit yes [facility] Urologist    Have you had any other diagnostic tests since your last visit? no    Have you changed or stopped any medications since your last visit? no

## 2020-07-30 NOTE — PROGRESS NOTES
SUBJECTIVE:  Olivia Ji is a 63 y.o. male that presents with   Chief Complaint   Patient presents with    Diabetes     f/u   . HPI:    This is a 60-year-old white male who presents today for follow-up with diabetes hypertension hyperlipidemia taking all of his medication says that the medication is well-tolerated he is having no problems with that he did bring in the list of his blood sugars which shows that his sugar is running greater than 200 most of the time. He is using his insulin and Januvia. Tells me that he is trying to follow his diet and maintain his sugar. He also tells me that he is having problems with depression or anxiety that he is feeling very irritable is very easy for him to get aggravated and he has had several confrontations with family members because he is so irritable. He has been on Klonopin in the past for anxiety so I think I will start him back on that we will see how he does also start him on Lexapro 20 mg p.o. daily to see if that helps get rid of some of the irritability. Diabetes   He presents for his follow-up diabetic visit. He has type 2 diabetes mellitus. No MedicAlert identification noted. His disease course has been worsening. Hypoglycemia symptoms include nervousness/anxiousness. Associated symptoms include fatigue. There are no hypoglycemic complications. Symptoms are worsening. Diabetic complications include peripheral neuropathy and retinopathy. Risk factors for coronary artery disease include male sex, hypertension, diabetes mellitus, stress and dyslipidemia. Current diabetic treatment includes insulin injections and oral agent (monotherapy). He is compliant with treatment most of the time. He has not had a previous visit with a dietitian. His home blood glucose trend is fluctuating minimally. An ACE inhibitor/angiotensin II receptor blocker is being taken. Hyperlipidemia   This is a chronic problem. The current episode started more than 1 year ago.  The problem is uncontrolled. Exacerbating diseases include chronic renal disease and diabetes. Current antihyperlipidemic treatment includes statins and fibric acid derivatives. Compliance problems: unknown. Risk factors for coronary artery disease include diabetes mellitus, dyslipidemia, family history, hypertension, male sex, obesity, stress and a sedentary lifestyle. Hypertension   This is a chronic problem. The current episode started more than 1 year ago. Associated symptoms include malaise/fatigue. Risk factors for coronary artery disease include diabetes mellitus, dyslipidemia and family history. Past treatments include ACE inhibitors, diuretics and lifestyle changes. The current treatment provides moderate improvement. There are no compliance problems. Hypertensive end-organ damage includes retinopathy. Identifiable causes of hypertension include chronic renal disease. Review of Systems   Constitutional: Positive for fatigue and malaise/fatigue. HENT: Negative. Eyes: Negative. Respiratory: Negative. Cardiovascular: Negative. Gastrointestinal: Negative. Endocrine: Negative. Genitourinary: Negative. Musculoskeletal: Positive for arthralgias. Neurological: Negative. Hematological: Negative. Psychiatric/Behavioral: The patient is nervous/anxious. OBJECTIVE:  /72   Pulse 65   Resp 18   Ht 5' 8\" (1.727 m)   Wt 275 lb 14.4 oz (125.1 kg)   SpO2 96% Comment: room air  BMI 41.95 kg/m²   Physical Exam  Vitals signs and nursing note reviewed. Constitutional:       Appearance: Normal appearance. HENT:      Head: Normocephalic and atraumatic. Eyes:      Conjunctiva/sclera: Conjunctivae normal.   Neck:      Musculoskeletal: Normal range of motion and neck supple. Cardiovascular:      Rate and Rhythm: Normal rate and regular rhythm. Pulses: Normal pulses. Heart sounds: Normal heart sounds.    Pulmonary:      Effort: Pulmonary effort is normal. Breath sounds: Normal breath sounds. Musculoskeletal:         General: Tenderness present. Skin:     Capillary Refill: Capillary refill takes less than 2 seconds. Neurological:      Mental Status: He is alert and oriented to person, place, and time. Psychiatric:         Attention and Perception: Attention and perception normal.         Mood and Affect: Mood is anxious. Speech: Speech normal.         Behavior: Behavior normal.         Thought Content: Thought content normal.         Judgment: Judgment normal.       No results found for requested labs within last 30 days. Hemoglobin A1C (%)   Date Value   02/25/2020 6.1     Microalbumin, Random Urine (ug/mL)   Date Value   06/10/2019 59.8     LDL Calculated (mg/dL)   Date Value   10/01/2019 140 (H)         Lab Results   Component Value Date    WBC 5.8 01/02/2020    NEUTROABS 3.7 01/02/2020    HGB 12.2 01/02/2020    HCT 37.6 01/02/2020    MCV 86 01/02/2020     01/02/2020       Lab Results   Component Value Date    TSH 1.710 01/02/2020         ASSESSMENT/PLAN:   Diagnosis Orders   1. Type 2 diabetes mellitus with other circulatory complication, with long-term current use of insulin (HCC)   DIABETES FOOT EXAM   2. HTN, goal below 140/80     3. Hyperlipidemia LDL goal <100     4. Diabetic polyneuropathy associated with type 2 diabetes mellitus ([facility] [address]     5.  Situational mixed anxiety and depressive disorder  clonazePAM (KLONOPIN) 0.5 MG tablet           Orders Placed This Encounter   Procedures     DIABETES FOOT EXAM        Outpatient Encounter Medications as of 7/30/2020   Medication Sig Dispense Refill    escitalopram (LEXAPRO) 20 MG tablet Take 1 tablet by mouth daily 30 tablet 2    bumetanide (BUMEX) 2 MG tablet Take 1 tablet by mouth 2 times daily 60 tablet 2    carvedilol (COREG) 12.5 MG tablet Take 1 tablet by mouth 2 times daily 60 tablet 2    clonazePAM (KLONOPIN) 0.5 MG tablet Take 1 tablet by mouth 2 times daily as needed for Anxiety for up to 30 days. 60 tablet 0    gabapentin (NEURONTIN) 600 MG tablet TAKE ONE TABLET BY MOUTH THREE TIMES A DAY 90 tablet 5    LEVEMIR FLEXTOUCH 100 UNIT/ML injection pen INJECT 65 UNITS INTO THE SKIN TWO TIMES A DAY 5 pen 10    Insulin Pen Needle ([employer] PEN NEEDLES 29G) 29G X 12MM MISC Check sugar and use insulin up to 5 times daily 150 each 5    Lancets MISC Check sugar and use insulin up to 5 times daily. 150 each 5    B-D UF III MINI PEN NEEDLES 31G X 5 MM MISC USE THREE TIMES A  each 7    fenofibrate micronized (LOFIBRA) 200 MG capsule Take 1 capsule by mouth every morning (before breakfast) 30 capsule 11    allopurinol (ZYLOPRIM) 300 MG tablet TAKE ONE TABLET BY MOUTH DAILY 90 tablet 1    rosuvastatin (CRESTOR) 40 MG tablet Take 1 tablet by mouth daily 30 tablet 11    SITagliptin (JANUVIA) 100 MG tablet TAKE ONE TABLET BY MOUTH DAILY 90 tablet 3    insulin aspart (NOVOLOG FLEXPEN) 100 UNIT/ML injection pen Inject 25 Units into the skin 3 times daily (before meals) (Patient taking differently: Inject 25 Units into the skin 3 times daily (before meals) Indications: sliding scale ) 10 pen 11    aspirin 81 MG chewable tablet Take 81 mg by mouth daily      pantoprazole (PROTONIX) 40 MG tablet       [DISCONTINUED] carvedilol (COREG) 12.5 MG tablet Take 1 tablet by mouth 2 times daily 60 tablet 3    [DISCONTINUED] bumetanide (BUMEX) 2 MG tablet Take 1 tablet by mouth 2 times daily 60 tablet 2    [DISCONTINUED] pioglitazone (ACTOS) 30 MG tablet TAKE ONE TABLET BY MOUTH DAILY (Patient not taking: Reported on 7/30/2020) 30 tablet 2    vitamin D (ERGOCALCIFEROL) 1.25 MG (18111 UT) CAPS capsule Take 1 capsule by mouth once a week (Patient not taking: Reported on 7/30/2020) 4 capsule 11    [DISCONTINUED] clonazePAM (KLONOPIN) 0.5 MG tablet Take 1 tablet by mouth 2 times daily as needed for Anxiety for up to 30 days.  (Patient not taking: Reported on 7/30/2020) 60 tablet 0 Facility-Administered Encounter Medications as of 7/30/2020   Medication Dose Route Frequency Provider Last Rate Last Dose    0.9 % sodium chloride infusion   Intravenous Once Sandie Ghulam, APRN   Stopped at 05/10/17 1052    0.9 % sodium chloride infusion   Intravenous Once Sandie Ghulam, APRN   Stopped at 05/10/17 1144     Visual inspection:  Deformity/amputation: absent  Skin lesions/pre-ulcerative calluses: present - bilateral calluses   Edema: right- 1+, left- 1+    Sensory exam:  Monofilament sensation: normal  (minimum of 5 random plantar locations tested, avoiding callused areas - > 1 area with absence of sensation is + for neuropathy)    Plus at least one of the following:  Pulses: normal,   Pinprick: Intact  Proprioception: N/A  Vibration (128 Hz): Intact      Return in about 3 months (around 10/30/2020), or if symptoms worsen or fail to improve. PATIENT COUNSELING    Counseling was provided today regardingthe following topics: Healthy eating habits, Regular exercise, substance abuse and healthy sleep habits. Discussed use, benefit, and side effectsof prescribed medications. Barriers to medication compliance addressed. All patient questions answered. Pt voiced understanding.

## 2020-10-13 PROCEDURE — G2012 BRIEF CHECK IN BY MD/QHP: HCPCS | Performed by: NURSE PRACTITIONER

## 2020-10-13 RX ORDER — CLONAZEPAM 0.5 MG/1
TABLET ORAL
Qty: 60 TABLET | Refills: 0 | Status: SHIPPED | OUTPATIENT
Start: 2020-10-13 | End: 2020-12-22

## 2020-10-13 NOTE — TELEPHONE ENCOUNTER
Refill request received from pharmacy. Medication pending for approval.       Last Office Visit With PCP:  7/30/2020    Next Visit Date:  Future Appointments   Date Time Provider Lyndsey Lyons   10/29/2020  8:45 AM ANDREIA [address] Scanned for review    [facility] code : Brief communication technology-based service, e.g. virtual check-in, by a physician or other qualified health care professional who can report evaluation and management services, provided to an established patient, not originating from a related e/m service provided within the previous 7 days nor leading to an e/m service or procedure within the next 24 hours or soonest available appointment; 5-10 minutes of medical discussion. Stevie Narayan was seen today for medication refill.     Diagnoses and all orders for this visit:    Situational mixed anxiety and depressive disorder  -     clonazePAM (KLONOPIN) 0.5 MG tablet; TAKE ONE TABLET BY MOUTH TWICE A DAY AS NEEDED FOR ANXIETY

## 2020-10-27 RX ORDER — INSULIN ASPART 100 [IU]/ML
INJECTION, SOLUTION INTRAVENOUS; SUBCUTANEOUS
Qty: 10 PEN | Refills: 11 | Status: SHIPPED | OUTPATIENT
Start: 2020-10-27 | End: 2021-03-11 | Stop reason: SDUPTHER

## 2020-10-27 RX ORDER — ESCITALOPRAM OXALATE 20 MG/1
TABLET ORAL
Qty: 90 TABLET | Refills: 3 | Status: SHIPPED | OUTPATIENT
Start: 2020-10-27 | End: 2021-10-25

## 2020-10-27 NOTE — TELEPHONE ENCOUNTER
Refill request received from pharmacy.  Medication pending for approval.       Last Office Visit With PCP:  07/30/2020    Next Visit Date:  Future Appointments   Date Time Provider Lyndsey Lyons   10/29/2020  8:45 AM ANDREIA Reed

## 2020-10-28 NOTE — PROGRESS NOTES
Pulse Ox at Rest Room Air:  95   Pulse Ox w/Exertion Room Air: 88  Pulse Ox w/Exertion on 2L O2: 96  Pulse Ox at Rest on 2L O2   98      Health Maintenance Due This Visit   Colonoscopy Yes   Mammogram No   Annual Wellness Visit Yes   Microalbumin No   HgbA1C Yes   Diabetic Eye Exam Yes    House Bill One Due This Visit   JOSEPHINE Yes   UDS No   Contract No      Have you seen any other physician or provider since your last visit no    Have you had any other diagnostic tests since your last visit? no    Have you changed or stopped any medications since your last visit? no         Diabetic retinal exam completed this year?  No                       * If yes please have patient sign a records release to obtain record to update Health Maintenance                       * If no, please order referral for patient to be scheduled

## 2020-10-29 ENCOUNTER — OFFICE VISIT (OUTPATIENT)
Dept: FAMILY MEDICINE CLINIC | Age: 60
End: 2020-10-29
Payer: MEDICARE

## 2020-10-29 ENCOUNTER — HOSPITAL ENCOUNTER (OUTPATIENT)
Facility: HOSPITAL | Age: 60
Discharge: HOME | End: 2020-10-29
Payer: MEDICARE

## 2020-10-29 VITALS
WEIGHT: 267 LBS | OXYGEN SATURATION: 95 % | DIASTOLIC BLOOD PRESSURE: 78 MMHG | HEART RATE: 60 BPM | BODY MASS INDEX: 40.47 KG/M2 | RESPIRATION RATE: 18 BRPM | SYSTOLIC BLOOD PRESSURE: 130 MMHG | HEIGHT: 68 IN | TEMPERATURE: 97.7 F

## 2020-10-29 PROCEDURE — 1036F TOBACCO NON-USER: CPT | Performed by: NURSE PRACTITIONER

## 2020-10-29 PROCEDURE — 36415 COLL VENOUS BLD VENIPUNCTURE: CPT

## 2020-10-29 PROCEDURE — 3046F HEMOGLOBIN A1C LEVEL >9.0%: CPT | Performed by: NURSE PRACTITIONER

## 2020-10-29 PROCEDURE — 99214 OFFICE O/P EST MOD 30 MIN: CPT | Performed by: NURSE PRACTITIONER

## 2020-10-29 PROCEDURE — G8484 FLU IMMUNIZE NO ADMIN: HCPCS | Performed by: NURSE PRACTITIONER

## 2020-10-29 PROCEDURE — 3017F COLORECTAL CA SCREEN DOC REV: CPT | Performed by: NURSE PRACTITIONER

## 2020-10-29 PROCEDURE — G8427 DOCREV CUR MEDS BY ELIG CLIN: HCPCS | Performed by: NURSE PRACTITIONER

## 2020-10-29 PROCEDURE — 2022F DILAT RTA XM EVC RTNOPTHY: CPT | Performed by: NURSE PRACTITIONER

## 2020-10-29 PROCEDURE — G8417 CALC BMI ABV UP PARAM F/U: HCPCS | Performed by: NURSE PRACTITIONER

## 2020-10-29 RX ORDER — BLOOD SUGAR DIAGNOSTIC
STRIP MISCELLANEOUS
COMMUNITY
Start: 2020-09-03

## 2020-10-29 ASSESSMENT — ENCOUNTER SYMPTOMS
RESPIRATORY NEGATIVE: 1
EYES NEGATIVE: 1
GASTROINTESTINAL NEGATIVE: 1

## 2020-10-29 NOTE — PROGRESS NOTES
SUBJECTIVE:  René Quinn is a 62 y.o. male that presents with   Chief Complaint   Patient presents with    Diabetes     f/u    Shortness of Breath   . HPI:    This is a 62year old white male who presents today for follow up with diabetes, htn, hyperlipidemia and he is here for re cert on home O2. Reports sugar was 139 this morning and in the evenings has been around 200 mostly. He says that it has spiked on occasion but the vast majority time and stand 100s in the morning and 200 for the evening. He is taking all his medications and tolerating everything well. Tells me that when he eats that he is trying to get a little bit of extra exercise to help his body process the sugar. His blood pressure has been stable he denies problems with his blood pressure. He denies chest pain or unusual shortness of breath. He does get short of breath if he for some reason is up moving around without his oxygen. Tells me that his CHF is doing good they did get him up and walking here and without oxygen 4 minutes into the walk he had dropped to 88% at which time they applied O2 at 2L NC and his O2 sat returned to 95%. Patient was evaluated today for the diagnosis of CHF. I entered a DME order for home oxygen because the diagnosis and testing requires the patient to have supplemental oxygen. Condition will improve or be benefited by oxygen use. The patient is  able to perform good mobility in a home setting and therefore does require the use of a portable oxygen system. The need for this equipment was discussed with the patient and he understands and is in agreement. Diabetes   He presents for his follow-up diabetic visit. He has type 2 diabetes mellitus. No MedicAlert identification noted. His disease course has been worsening. Hypoglycemia symptoms include nervousness/anxiousness. Associated symptoms include fatigue. There are no hypoglycemic complications. Symptoms are worsening.  Diabetic complications include peripheral neuropathy and retinopathy. Risk factors for coronary artery disease include male sex, hypertension, diabetes mellitus, stress and dyslipidemia. Current diabetic treatment includes insulin injections and oral agent (monotherapy). He is compliant with treatment most of the time. His weight is stable. He is following a diabetic diet. Meal planning includes avoidance of concentrated sweets. He has not had a previous visit with a dietitian. His home blood glucose trend is fluctuating minimally. An ACE inhibitor/angiotensin II receptor blocker is being taken. Hyperlipidemia   This is a chronic problem. The current episode started more than 1 year ago. The problem is uncontrolled. Exacerbating diseases include chronic renal disease and diabetes. Current antihyperlipidemic treatment includes statins and fibric acid derivatives. Compliance problems: unknown. Risk factors for coronary artery disease include diabetes mellitus, dyslipidemia, family history, hypertension, male sex, obesity, stress and a sedentary lifestyle. Hypertension   This is a chronic problem. The current episode started more than 1 year ago. Associated symptoms include malaise/fatigue. Risk factors for coronary artery disease include diabetes mellitus, dyslipidemia and family history. Past treatments include ACE inhibitors, diuretics and lifestyle changes. The current treatment provides moderate improvement. There are no compliance problems. Hypertensive end-organ damage includes retinopathy. Identifiable causes of hypertension include chronic renal disease. Review of Systems   Constitutional: Positive for fatigue and malaise/fatigue. HENT: Negative. Eyes: Negative. Respiratory: Negative. Cardiovascular: Negative. Gastrointestinal: Negative. Endocrine: Negative. Genitourinary: Negative. Musculoskeletal: Positive for arthralgias. Neurological: Negative. Hematological: Negative. Psychiatric/Behavioral: The patient is nervous/anxious. All other systems reviewed and are negative. OBJECTIVE:  /78   Pulse 60   Temp 97.7 °F (36.5 °C) (Temporal)   Resp 18   Ht 5' 8\" (1.727 m)   Wt 267 lb (121.1 kg)   SpO2 95% Comment: room air  BMI 40.60 kg/m²   Physical Exam  Vitals signs and nursing note reviewed. Constitutional:       Appearance: Normal appearance. HENT:      Head: Normocephalic and atraumatic. Eyes:      Conjunctiva/sclera: Conjunctivae normal.   Neck:      Musculoskeletal: Normal range of motion and neck supple. Cardiovascular:      Rate and Rhythm: Normal rate and regular rhythm. Pulses: Normal pulses. Heart sounds: Normal heart sounds. Pulmonary:      Effort: Pulmonary effort is normal.      Breath sounds: Normal breath sounds. Musculoskeletal:         General: Tenderness present. Skin:     Capillary Refill: Capillary refill takes less than 2 seconds. Neurological:      Mental Status: He is alert and oriented to person, place, and time. Psychiatric:         Attention and Perception: Attention and perception normal.         Mood and Affect: Mood is anxious. Speech: Speech normal.         Behavior: Behavior normal.         Thought Content: Thought content normal.         Judgment: Judgment normal.       No results found for requested labs within last 30 days. Hemoglobin A1C (%)   Date Value   07/30/2020 9.8 (H)     Microalbumin, Random Urine (ug/mL)   Date Value   07/30/2020 245.6     LDL Calculated (mg/dL)   Date Value   07/30/2020 81         Lab Results   Component Value Date    WBC 5.8 01/02/2020    NEUTROABS 3.7 01/02/2020    HGB 12.2 01/02/2020    HCT 37.6 01/02/2020    MCV 86 01/02/2020     01/02/2020       Lab Results   Component Value Date    TSH 1.710 01/02/2020         ASSESSMENT/PLAN:   Diagnosis Orders   1.  Type 2 diabetes mellitus with other circulatory complication, with long-term current use of insulin ([facility]  Hemoglobin A1C   2. HTN, goal below 140/80  Comprehensive Metabolic Panel   3. Hyperlipidemia LDL goal <100  Lipid Panel   4. Congestive heart failure, unspecified HF chronicity, unspecified heart failure type ([facility]  DME Order for Home Oxygen as OP   5. Colon cancer screening  External Referral To General Surgery           Orders Placed This Encounter   Procedures    Hemoglobin A1C     Standing Status:   Future     Number of Occurrences:   1     Standing Expiration Date:   10/29/2021    Comprehensive Metabolic Panel     Standing Status:   Future     Number of Occurrences:   1     Standing Expiration Date:   10/29/2021    Lipid Panel     Standing Status:   Future     Number of Occurrences:   1     Standing Expiration Date:   10/29/2021     Order Specific Question:   Is Patient Fasting?/# of Hours     Answer:   6    External Referral To General Surgery     Referral Priority:   Routine     Referral Type:   Eval and Treat     Referral Reason:   Specialty Services Required     Requested Specialty:   General Surgery     Number of Visits Requested:   1    DME Order for Home Oxygen as OP     You must complete the order parameters below and add the medical necessity documentation for this DME in a separate note.     Stationary Oxygen Concentrator at 2 lpm via Nasal Cannula    Stationary Prescribed at:  Continuous    Portable Gaseous O2 System and contents at 2 lpm via Nasal Cannula    Non Applicable    Diagnosis: I50.9 - Chronic Congestive Heart Failure  Length of need: Lifetime        Outpatient Encounter Medications as of 10/29/2020   Medication Sig Dispense Refill    NOVOLOG FLEXPEN 100 UNIT/ML injection pen INJECT 25 UNITS INTO THE SKIN THREE TIMES A DAY BEFORE MEALS 10 pen 11    escitalopram (LEXAPRO) 20 MG tablet TAKE ONE TABLET BY MOUTH DAILY 90 tablet 3    clonazePAM (KLONOPIN) 0.5 MG tablet TAKE ONE TABLET BY MOUTH TWICE A DAY AS NEEDED FOR ANXIETY 60 tablet 0    pantoprazole (PROTONIX) 40 MG tablet  bumetanide (BUMEX) 2 MG tablet Take 1 tablet by mouth 2 times daily 60 tablet 2    carvedilol (COREG) 12.5 MG tablet Take 1 tablet by mouth 2 times daily 60 tablet 2    gabapentin (NEURONTIN) 600 MG tablet TAKE ONE TABLET BY MOUTH THREE TIMES A DAY 90 tablet 5    LEVEMIR FLEXTOUCH 100 UNIT/ML injection pen INJECT 65 UNITS INTO THE SKIN TWO TIMES A DAY 5 pen 10    fenofibrate micronized (LOFIBRA) 200 MG capsule Take 1 capsule by mouth every morning (before breakfast) 30 capsule 11    allopurinol (ZYLOPRIM) 300 MG tablet TAKE ONE TABLET BY MOUTH DAILY 90 tablet 1    rosuvastatin (CRESTOR) 40 MG tablet Take 1 tablet by mouth daily 30 tablet 11    SITagliptin (JANUVIA) 100 MG tablet TAKE ONE TABLET BY MOUTH DAILY 90 tablet 3    aspirin 81 MG chewable tablet Take 81 mg by mouth daily      ACCU-CHEK COSTA PLUS strip       Insulin Pen Needle ([employer] PEN NEEDLES 29G) 29G X 12MM MISC Check sugar and use insulin up to 5 times daily (Patient not taking: Reported on 10/29/2020) 150 each 5    Lancets MISC Check sugar and use insulin up to 5 times daily. (Patient not taking: Reported on 10/29/2020) 150 each 5    B-D UF III MINI PEN NEEDLES 31G X 5 MM MISC USE THREE TIMES A DAY (Patient not taking: Reported on 10/29/2020) 100 each 7    [DISCONTINUED] vitamin D (ERGOCALCIFEROL) 1.25 MG (84152 UT) CAPS capsule Take 1 capsule by mouth once a week (Patient not taking: Reported on 10/29/2020) 4 capsule 11     Facility-Administered Encounter Medications as of 10/29/2020   Medication Dose Route Frequency Provider Last Rate Last Dose    0.9 % sodium chloride infusion   Intravenous Once ANDREIA Valderrama   Stopped at 05/10/17 1052    0.9 % sodium chloride infusion   Intravenous Once ANDREIA Valderrama   Stopped at 05/10/17 1144       Return in about 3 months (around 1/29/2021), or if symptoms worsen or fail to improve.       PATIENT COUNSELING    Counseling was provided today regardingthe following topics: Healthy

## 2020-11-03 RX ORDER — CARVEDILOL 12.5 MG/1
TABLET ORAL
Qty: 180 TABLET | Refills: 1 | Status: SHIPPED | OUTPATIENT
Start: 2020-11-03 | End: 2021-05-27

## 2020-11-03 NOTE — TELEPHONE ENCOUNTER
Refill request received from pharmacy.  Medication pending for approval.       Last Office Visit With PCP:  10/29/2020    Next Visit Date:  Future Appointments   Date Time Provider Lyndsey Lyons   2/1/2021  9:45 AM ANDREIA [address] up to date

## 2020-11-10 ENCOUNTER — TELEPHONE (OUTPATIENT)
Dept: FAMILY MEDICINE CLINIC | Age: 60
End: 2020-11-10

## 2020-11-10 NOTE — TELEPHONE ENCOUNTER
Patient's referral, along with all pertinent medical records faxed to the attention of [place] [facility] (Dr. Juan Rodriguez) on 11/10/20, they will contact the patient and our office with appointment date/time/location.

## 2020-12-04 RX ORDER — BUMETANIDE 2 MG/1
TABLET ORAL
Qty: 180 TABLET | Refills: 1 | Status: SHIPPED | OUTPATIENT
Start: 2020-12-04 | End: 2021-06-25 | Stop reason: SDUPTHER

## 2020-12-04 RX ORDER — ALLOPURINOL 300 MG/1
TABLET ORAL
Qty: 90 TABLET | Refills: 1 | Status: SHIPPED | OUTPATIENT
Start: 2020-12-04 | End: 2021-06-01

## 2020-12-04 RX ORDER — ROSUVASTATIN CALCIUM 40 MG/1
TABLET, COATED ORAL
Qty: 90 TABLET | Refills: 1 | Status: SHIPPED | OUTPATIENT
Start: 2020-12-04 | End: 2021-06-01

## 2020-12-04 NOTE — TELEPHONE ENCOUNTER
Refill request received from pharmacy.  Medication pending for approval.       Last Office Visit With PCP:  10/29/2020    Next Visit Date:  Future Appointments   Date Time Provider Lyndsey Lyons   2/1/2021  9:45 AM ANDREIA [address] Up to date

## 2020-12-15 ENCOUNTER — TELEPHONE (OUTPATIENT)
Dept: FAMILY MEDICINE CLINIC | Age: 60
End: 2020-12-15

## 2020-12-15 NOTE — TELEPHONE ENCOUNTER
Called pt to let him know that Levemir and Novolog will no longer be on the 340B program as of January 1st.  Encouraged the pt to fill a 90DS before January 1st of both meds. Pt said it was fine for me to call Antoine Schwab on his behalf and request refills. Requested refills. Of note, the novolog only had enough refills for an 80DS. Notified pt.     Srini Kahn, AdeliaD

## 2020-12-22 RX ORDER — CLONAZEPAM 0.5 MG/1
TABLET ORAL
Qty: 60 TABLET | Refills: 0 | Status: SHIPPED | OUTPATIENT
Start: 2020-12-22 | End: 2021-02-24

## 2020-12-22 NOTE — TELEPHONE ENCOUNTER
Refill request received from pharmacy.  Medication pending for approval.       Last Office Visit With PCP:  10/29/20    Next Visit Date:  Future Appointments   Date Time Provider Lyndsey Lyons   2/1/2021  9:45 AM ANDREIA Reed 81 up to date

## 2021-02-08 ENCOUNTER — OFFICE VISIT (OUTPATIENT)
Dept: FAMILY MEDICINE CLINIC | Age: 61
End: 2021-02-08
Payer: MEDICARE

## 2021-02-08 ENCOUNTER — HOSPITAL ENCOUNTER (OUTPATIENT)
Facility: HOSPITAL | Age: 61
Discharge: HOME | End: 2021-02-08
Payer: MEDICARE

## 2021-02-08 VITALS
OXYGEN SATURATION: 97 % | BODY MASS INDEX: 40.02 KG/M2 | SYSTOLIC BLOOD PRESSURE: 118 MMHG | TEMPERATURE: 97.5 F | DIASTOLIC BLOOD PRESSURE: 80 MMHG | HEART RATE: 60 BPM | HEIGHT: 68 IN | RESPIRATION RATE: 18 BRPM | WEIGHT: 264.1 LBS

## 2021-02-08 DIAGNOSIS — E78.5 HYPERLIPIDEMIA LDL GOAL <100: ICD-10-CM

## 2021-02-08 DIAGNOSIS — E11.42 DIABETIC POLYNEUROPATHY ASSOCIATED WITH TYPE 2 DIABETES MELLITUS (HCC): ICD-10-CM

## 2021-02-08 DIAGNOSIS — I10 HTN, GOAL BELOW 140/80: ICD-10-CM

## 2021-02-08 DIAGNOSIS — Z79.4 TYPE 2 DIABETES MELLITUS WITH OTHER CIRCULATORY COMPLICATION, WITH LONG-TERM CURRENT USE OF INSULIN (HCC): ICD-10-CM

## 2021-02-08 DIAGNOSIS — Z12.11 COLON CANCER SCREENING: ICD-10-CM

## 2021-02-08 DIAGNOSIS — E11.59 TYPE 2 DIABETES MELLITUS WITH OTHER CIRCULATORY COMPLICATION, WITH LONG-TERM CURRENT USE OF INSULIN (HCC): ICD-10-CM

## 2021-02-08 DIAGNOSIS — E11.59 TYPE 2 DIABETES MELLITUS WITH OTHER CIRCULATORY COMPLICATION, WITH LONG-TERM CURRENT USE OF INSULIN (HCC): Primary | ICD-10-CM

## 2021-02-08 DIAGNOSIS — Z79.4 TYPE 2 DIABETES MELLITUS WITH OTHER CIRCULATORY COMPLICATION, WITH LONG-TERM CURRENT USE OF INSULIN (HCC): Primary | ICD-10-CM

## 2021-02-08 PROCEDURE — 36415 COLL VENOUS BLD VENIPUNCTURE: CPT

## 2021-02-08 PROCEDURE — 3017F COLORECTAL CA SCREEN DOC REV: CPT | Performed by: NURSE PRACTITIONER

## 2021-02-08 PROCEDURE — 2022F DILAT RTA XM EVC RTNOPTHY: CPT | Performed by: NURSE PRACTITIONER

## 2021-02-08 PROCEDURE — 99214 OFFICE O/P EST MOD 30 MIN: CPT | Performed by: NURSE PRACTITIONER

## 2021-02-08 PROCEDURE — 3046F HEMOGLOBIN A1C LEVEL >9.0%: CPT | Performed by: NURSE PRACTITIONER

## 2021-02-08 PROCEDURE — G8484 FLU IMMUNIZE NO ADMIN: HCPCS | Performed by: NURSE PRACTITIONER

## 2021-02-08 PROCEDURE — G8417 CALC BMI ABV UP PARAM F/U: HCPCS | Performed by: NURSE PRACTITIONER

## 2021-02-08 PROCEDURE — G8427 DOCREV CUR MEDS BY ELIG CLIN: HCPCS | Performed by: NURSE PRACTITIONER

## 2021-02-08 PROCEDURE — 1036F TOBACCO NON-USER: CPT | Performed by: NURSE PRACTITIONER

## 2021-02-08 RX ORDER — POLYETHYLENE GLYCOL 3350 17 G/17G
17 POWDER, FOR SOLUTION ORAL 2 TIMES DAILY PRN
COMMUNITY
Start: 2020-03-05

## 2021-02-08 ASSESSMENT — PATIENT HEALTH QUESTIONNAIRE - PHQ9
SUM OF ALL RESPONSES TO PHQ QUESTIONS 1-9: 0
1. LITTLE INTEREST OR PLEASURE IN DOING THINGS: 0
SUM OF ALL RESPONSES TO PHQ QUESTIONS 1-9: 0
1. LITTLE INTEREST OR PLEASURE IN DOING THINGS: 0
SUM OF ALL RESPONSES TO PHQ QUESTIONS 1-9: 0
2. FEELING DOWN, DEPRESSED OR HOPELESS: 0

## 2021-02-08 ASSESSMENT — ENCOUNTER SYMPTOMS
GASTROINTESTINAL NEGATIVE: 1
RESPIRATORY NEGATIVE: 1
EYES NEGATIVE: 1

## 2021-02-08 NOTE — PROGRESS NOTES
SUBJECTIVE:  Grupo Mena is a 62 y.o. male that presents with   Chief Complaint   Patient presents with    Diabetes     f/u   . HPI:    This is a pleasant 62year old white male who presents for follow up with diabetes, htn, hyperlipidemia. Does me that he is doing okay sugars been running little high which he says is around 150 a lot of the times that it does go up and down though. [place] has [place] is been some stress with that move but says that everything is settled down and is happy with where hes at. Tells me that his blood pressure has been good as far as he is concerned that that is what he knows it is been good. Tolerated all of the medications    Diabetes  He presents for his follow-up diabetic visit. He has type 2 diabetes mellitus. No MedicAlert identification noted. His disease course has been worsening. Hypoglycemia symptoms include nervousness/anxiousness. Associated symptoms include fatigue. There are no hypoglycemic complications. Symptoms are worsening. Diabetic complications include peripheral neuropathy and retinopathy. Risk factors for coronary artery disease include male sex, hypertension, diabetes mellitus, stress and dyslipidemia. Current diabetic treatment includes insulin injections and oral agent (monotherapy). He is compliant with treatment most of the time. His weight is stable. He is following a diabetic diet. Meal planning includes avoidance of concentrated sweets. He has not had a previous visit with a dietitian. He never participates in exercise. There is no change in his home blood glucose trend. An ACE inhibitor/angiotensin II receptor blocker is being taken. Hyperlipidemia  This is a chronic problem. The current episode started more than 1 year ago. The problem is uncontrolled. Exacerbating diseases include chronic renal disease and diabetes. Factors aggravating his hyperlipidemia include beta blockers.  Current antihyperlipidemic treatment includes statins and fibric acid derivatives. Compliance problems: unknown. Risk factors for coronary artery disease include diabetes mellitus, dyslipidemia, family history, hypertension, male sex, obesity, stress and a sedentary lifestyle. Hypertension  This is a chronic problem. The current episode started more than 1 year ago. The problem is unchanged. Associated symptoms include malaise/fatigue. There are no associated agents to hypertension. Risk factors for coronary artery disease include diabetes mellitus, dyslipidemia and family history. Past treatments include ACE inhibitors, diuretics and lifestyle changes. The current treatment provides moderate improvement. There are no compliance problems. Hypertensive end-organ damage includes retinopathy. Identifiable causes of hypertension include chronic renal disease. Review of Systems   Constitutional: Positive for fatigue and malaise/fatigue. HENT: Negative. Eyes: Negative. Respiratory: Negative. Cardiovascular: Negative. Gastrointestinal: Negative. Endocrine: Negative. Genitourinary: Negative. Musculoskeletal: Positive for arthralgias. Skin: Negative. Neurological: Negative. Hematological: Negative. Psychiatric/Behavioral: The patient is nervous/anxious. All other systems reviewed and are negative. OBJECTIVE:  /80   Pulse 60   Temp 97.5 °F (36.4 °C) (Temporal)   Resp 18   Ht 5' 8\" (1.727 m)   Wt 264 lb 1.6 oz (119.8 kg)   SpO2 97% Comment: room air  BMI 40.16 kg/m²   Physical Exam  Vitals signs and nursing note reviewed. Constitutional:       Appearance: Normal appearance. HENT:      Head: Normocephalic and atraumatic. Right Ear: Tympanic membrane, ear canal and external ear normal.      Left Ear: Tympanic membrane, ear canal and external ear normal.      Nose: Nose normal.      Mouth/Throat:      Mouth: Mucous membranes are moist.      Pharynx: Oropharynx is clear.    Eyes: Conjunctiva/sclera: Conjunctivae normal.   Neck:      Musculoskeletal: Normal range of motion and neck supple. Cardiovascular:      Rate and Rhythm: Normal rate. Pulses: Normal pulses. Heart sounds: Normal heart sounds. Pulmonary:      Effort: Pulmonary effort is normal.      Breath sounds: Normal breath sounds. Musculoskeletal: Normal range of motion. Skin:     General: Skin is warm and dry. Capillary Refill: Capillary refill takes less than 2 seconds. Neurological:      Mental Status: He is alert and oriented to person, place, and time. Psychiatric:         Mood and Affect: Mood normal.         Behavior: Behavior normal.         Thought Content: Thought content normal.         Judgment: Judgment normal.       No results found for requested labs within last 30 days. Hemoglobin A1C (%)   Date Value   10/28/2020 8.9 (H)     Microalbumin, Random Urine (ug/mL)   Date Value   07/30/2020 245.6     LDL Calculated (mg/dL)   Date Value   10/28/2020 100 (H)         Lab Results   Component Value Date    WBC 5.8 01/02/2020    NEUTROABS 3.7 01/02/2020    HGB 12.2 01/02/2020    HCT 37.6 01/02/2020    MCV 86 01/02/2020     01/02/2020       Lab Results   Component Value Date    TSH 1.710 01/02/2020         ASSESSMENT/PLAN:   Diagnosis Orders   1. Type 2 diabetes mellitus with other circulatory complication, with long-term current use of insulin (HCC)  Hemoglobin A1C   2. Hyperlipidemia LDL goal <100  Lipid Panel   3. HTN, goal below 140/80  Comprehensive Metabolic Panel   4. Diabetic polyneuropathy associated with type 2 diabetes mellitus ([facility] [address]     5.  Colon cancer screening  External Referral To General Surgery           Orders Placed This Encounter   Procedures    Hemoglobin A1C     Standing Status:   Future     Number of Occurrences:   1     Standing Expiration Date:   2/8/2022    Lipid Panel     Standing Status:   Future     Number of Occurrences:   1     Standing Expiration Date: 2/8/2022     Order Specific Question:   Is Patient Fasting?/# of Hours     Answer:   6    Comprehensive Metabolic Panel     Standing Status:   Future     Number of Occurrences:   1     Standing Expiration Date:   2/8/2022    External Referral To General Surgery     Referral Priority:   Routine     Referral Type:   Eval and Treat     Referral Reason:   Specialty Services Required     Requested Specialty:   General Surgery     Number of Visits Requested:   1        Outpatient Encounter Medications as of 2/8/2021   Medication Sig Dispense Refill    polyethylene glycol (GLYCOLAX) 17 GM/SCOOP powder Take 17 g by mouth 2 times daily as needed      clonazePAM (KLONOPIN) 0.5 MG tablet TAKE ONE TABLET BY MOUTH TWICE A DAY AS NEEDED FOR ANXIETY 60 tablet 0    SITagliptin (JANUVIA) 100 MG tablet TAKE ONE TABLET BY MOUTH DAILY 90 tablet 1    rosuvastatin (CRESTOR) 40 MG tablet TAKE ONE TABLET BY MOUTH DAILY 90 tablet 1    bumetanide (BUMEX) 2 MG tablet TAKE ONE TABLET BY MOUTH TWICE A  tablet 1    allopurinol (ZYLOPRIM) 300 MG tablet TAKE ONE TABLET BY MOUTH DAILY 90 tablet 1    carvedilol (COREG) 12.5 MG tablet TAKE ONE TABLET BY MOUTH TWICE A  tablet 1    NOVOLOG FLEXPEN 100 UNIT/ML injection pen INJECT 25 UNITS INTO THE SKIN THREE TIMES A DAY BEFORE MEALS 10 pen 11    escitalopram (LEXAPRO) 20 MG tablet TAKE ONE TABLET BY MOUTH DAILY 90 tablet 3    pantoprazole (PROTONIX) 40 MG tablet       gabapentin (NEURONTIN) 600 MG tablet TAKE ONE TABLET BY MOUTH THREE TIMES A DAY 90 tablet 5    LEVEMIR FLEXTOUCH 100 UNIT/ML injection pen INJECT 65 UNITS INTO THE SKIN TWO TIMES A DAY 5 pen 10    fenofibrate micronized (LOFIBRA) 200 MG capsule Take 1 capsule by mouth every morning (before breakfast) 30 capsule 11    aspirin 81 MG chewable tablet Take 81 mg by mouth daily      ACCU-CHEK COSTA PLUS strip       Insulin Pen Needle ([employer] PEN NEEDLES 29G) 29G X 12MM MISC Check sugar and use insulin up to 5 times daily (Patient not taking: Reported on 10/29/2020) 150 each 5    Lancets MISC Check sugar and use insulin up to 5 times daily. (Patient not taking: Reported on 10/29/2020) 150 each 5    B-D UF III MINI PEN NEEDLES 31G X 5 MM MISC USE THREE TIMES A DAY (Patient not taking: Reported on 10/29/2020) 100 each 7     Facility-Administered Encounter Medications as of 2/8/2021   Medication Dose Route Frequency Provider Last Rate Last Admin    0.9 % sodium chloride infusion   Intravenous Once ANDREIA Novak   Stopped at 05/10/17 1052    0.9 % sodium chloride infusion   Intravenous Once ANDREIA Novak   Stopped at 05/10/17 1144       Return in about 3 months (around 5/8/2021), or if symptoms worsen or fail to improve. PATIENT COUNSELING    Counseling was provided today regardingthe following topics: Healthy eating habits, Regular exercise, substance abuse and healthy sleep habits. Discussed use, benefit, and side effectsof prescribed medications. Barriers to medication compliance addressed. All patient questions answered. Pt voiced understanding.

## 2021-02-08 NOTE — PATIENT INSTRUCTIONS
· Keep a list of your medicines with you. List all of the prescription medicines, nonprescription medicines, supplements, natural remedies, and vitamins that you take. Tell your healthcare providers who treat you about all of the products you are taking. Your provider can provide you with a form to keep track of them. Just ask. · Follow the directions that come with your medicine, including information about food or alcohol. Make sure you know how and when to take your medicine. Do not take more or less than you are supposed to take. · Keep all medicines out of the reach of children. · Store medicines according to the directions on the label. · Monitor yourself. Learn to know how your body reacts to your new medicine and keep track of how it makes you feel before attempting (If your provider has allowed you to do so) to drive or go to work. · Seek emergency medical attention if you think you have used too much of this medicine. An overdose of any prescription medicine can be fatal. Overdose symptoms may include extreme drowsiness, muscle weakness, confusion, cold and clammy skin, pinpoint pupils, shallow breathing, slow heart rate, fainting, or coma. · Don't share prescription medicines with others, even when they seem to have the same symptoms. What may be good for you may be harmful to others. · If you are no longer taking a prescribed medication and you have pills left please take your pills out of their original containers. Mix crushed pills with an undesirable substance, such as cat litter or used coffee grounds. Put the mixture into a disposable container with a lid, such as an empty margarine tub, or into a sealable bag. Cover up or remove any of your personal information on the empty containers by covering it with black permanent marker or duct tape. Place the sealed container with the mixture, and the empty drug containers, in the trash.

## 2021-02-09 DIAGNOSIS — E11.59 TYPE 2 DIABETES MELLITUS WITH OTHER CIRCULATORY COMPLICATION, WITH LONG-TERM CURRENT USE OF INSULIN (HCC): Primary | ICD-10-CM

## 2021-02-09 DIAGNOSIS — Z79.4 TYPE 2 DIABETES MELLITUS WITH OTHER CIRCULATORY COMPLICATION, WITH LONG-TERM CURRENT USE OF INSULIN (HCC): Primary | ICD-10-CM

## 2021-02-09 RX ORDER — INSULIN DETEMIR 100 [IU]/ML
INJECTION, SOLUTION SUBCUTANEOUS
Qty: 15 PEN | Refills: 10 | Status: SHIPPED | OUTPATIENT
Start: 2021-02-09 | End: 2021-03-11 | Stop reason: SDUPTHER

## 2021-02-18 ENCOUNTER — TELEPHONE (OUTPATIENT)
Dept: FAMILY MEDICINE CLINIC | Age: 61
End: 2021-02-18

## 2021-02-18 NOTE — TELEPHONE ENCOUNTER
Patient's referral, along with all pertinent medical records faxed to the attention of [facility] (Dr. Aakash Dewey) on 02/18/21, they will contact the patient and our office with appointment date/time/location.

## 2021-02-24 DIAGNOSIS — F43.23 SITUATIONAL MIXED ANXIETY AND DEPRESSIVE DISORDER: ICD-10-CM

## 2021-02-24 RX ORDER — CLONAZEPAM 0.5 MG/1
TABLET ORAL
Qty: 60 TABLET | Refills: 0 | Status: SHIPPED | OUTPATIENT
Start: 2021-02-24 | End: 2021-04-23

## 2021-02-24 NOTE — TELEPHONE ENCOUNTER
Refill request received from pharmacy.  Medication pending for approval.       Last Office Visit With PCP:  2/8/2021    Next Visit Date:  Future Appointments   Date Time Provider Lyndsey Lyons   5/6/2021 10:30 AM ANDREIA Diana [address] [place] [address] for review

## 2021-03-02 DIAGNOSIS — E11.42 DIABETIC POLYNEUROPATHY ASSOCIATED WITH TYPE 2 DIABETES MELLITUS (HCC): ICD-10-CM

## 2021-03-02 RX ORDER — GABAPENTIN 600 MG/1
TABLET ORAL
Qty: 90 TABLET | Refills: 0 | Status: SHIPPED | OUTPATIENT
Start: 2021-03-02 | End: 2021-04-22

## 2021-03-02 NOTE — TELEPHONE ENCOUNTER
Refill request received from pharmacy.  Medication pending for approval.       Last Office Visit With PCP:  2/8/2021    Next Visit Date:  Future Appointments   Date Time Provider Lyndsey Lyons   5/6/2021 10:30 AM ANDREIA Arias 82 up to date

## 2021-03-11 ENCOUNTER — TELEPHONE (OUTPATIENT)
Dept: FAMILY MEDICINE CLINIC | Age: 61
End: 2021-03-11

## 2021-03-11 DIAGNOSIS — E11.59 TYPE 2 DIABETES MELLITUS WITH OTHER CIRCULATORY COMPLICATION, WITH LONG-TERM CURRENT USE OF INSULIN (HCC): Primary | ICD-10-CM

## 2021-03-11 DIAGNOSIS — Z79.4 TYPE 2 DIABETES MELLITUS WITH OTHER CIRCULATORY COMPLICATION, WITH LONG-TERM CURRENT USE OF INSULIN (HCC): Primary | ICD-10-CM

## 2021-03-11 RX ORDER — INSULIN ASPART 100 [IU]/ML
INJECTION, SOLUTION INTRAVENOUS; SUBCUTANEOUS
Qty: 75 ML | Refills: 2 | Status: SHIPPED | OUTPATIENT
Start: 2021-03-11 | End: 2021-11-07 | Stop reason: SDUPTHER

## 2021-03-11 RX ORDER — INSULIN DETEMIR 100 [IU]/ML
INJECTION, SOLUTION SUBCUTANEOUS
Qty: 135 ML | Refills: 2 | Status: SHIPPED | OUTPATIENT
Start: 2021-03-11 | End: 2021-11-07 | Stop reason: SDUPTHER

## 2021-03-11 NOTE — TELEPHONE ENCOUNTER
Called pt regarding his insulin refills--they have been sent to [address] [place] [address] per provider Anna Farr and will be available for pick-up at [facility] and [facility] [address].  If he still wants to use his insurance he can do that instead with local pharmacy.     Will try calling pt back at later time as he did not answer today.     Sandro Ferrara.

## 2021-03-23 ENCOUNTER — TELEPHONE (OUTPATIENT)
Dept: CARDIOLOGY | Facility: HOSPITAL | Age: 61
End: 2021-03-23

## 2021-03-23 NOTE — TELEPHONE ENCOUNTER
TAVR APRN    Attempted to contact patient for one year post TAVR questionnaire.  No answer.  Left message.  Patient has been NO SHOW last two Cardiology visits with Dr. Canales or Layla GARCIA.    Susan GARCIA

## 2021-04-20 ENCOUNTER — TELEPHONE (OUTPATIENT)
Dept: FAMILY MEDICINE CLINIC | Age: 61
End: 2021-04-20

## 2021-04-21 ENCOUNTER — TELEPHONE (OUTPATIENT)
Dept: PHARMACY | Facility: HOSPITAL | Age: 61
End: 2021-04-21

## 2021-04-21 NOTE — TELEPHONE ENCOUNTER
insulin before each use to make sure he gets the full dose.  Pt was also provided step-by-step diagram on how to properly inject his insulin.  Pt stated he knows how to inject insulin already via insulin pen.  -Went over storage of his insulins and how to dispose of the pen needles after each use (dispose in tight lid container and keep away from children).  Pens should be stored in the fridge until use and then can be at room temp for 28 days (novolog) or 42 days (levemir) before needng to be discarded.  Pt verbalized understanding.  -Of note, pt stated that his glucose usually runs around 150-160 in the AM before breakfast, around 180 before lunch, and around 200 when he checks it after dinner.   Pt did state that occasionally his morning level is at 90.     Pt verbalized understanding to all above educational information.      Annemarie Raya, PharmD

## 2021-04-22 DIAGNOSIS — F43.23 SITUATIONAL MIXED ANXIETY AND DEPRESSIVE DISORDER: ICD-10-CM

## 2021-04-22 DIAGNOSIS — E11.42 DIABETIC POLYNEUROPATHY ASSOCIATED WITH TYPE 2 DIABETES MELLITUS (HCC): ICD-10-CM

## 2021-04-22 NOTE — TELEPHONE ENCOUNTER
Refill request received from pharmacy.  Medication pending for approval.       Last Office Visit With PCP:  2/8/2021    Next Visit Date:  Future Appointments   Date Time Provider Lyndsey Lyons   5/6/2021 10:30 AM ANDREIA Hodge 82 up to date

## 2021-04-23 RX ORDER — CLONAZEPAM 0.5 MG/1
TABLET ORAL
Qty: 60 TABLET | Refills: 0 | Status: SHIPPED | OUTPATIENT
Start: 2021-04-23 | End: 2021-06-08 | Stop reason: SDUPTHER

## 2021-04-23 RX ORDER — GABAPENTIN 600 MG/1
TABLET ORAL
Qty: 90 TABLET | Refills: 0 | Status: SHIPPED | OUTPATIENT
Start: 2021-04-23 | End: 2021-06-08 | Stop reason: SDUPTHER

## 2021-05-27 RX ORDER — CARVEDILOL 12.5 MG/1
TABLET ORAL
Qty: 180 TABLET | Refills: 0 | Status: SHIPPED | OUTPATIENT
Start: 2021-05-27 | End: 2021-08-30

## 2021-05-27 NOTE — TELEPHONE ENCOUNTER
Refill request received from pharmacy. Medication pending for approval.       Last Office Visit With PCP:  2/8/2021    Next Visit Date:  No future appointments.     JOSEPHINE bush

## 2021-06-08 ENCOUNTER — TELEPHONE (OUTPATIENT)
Dept: FAMILY MEDICINE CLINIC | Age: 61
End: 2021-06-08

## 2021-06-08 DIAGNOSIS — F43.23 SITUATIONAL MIXED ANXIETY AND DEPRESSIVE DISORDER: ICD-10-CM

## 2021-06-08 DIAGNOSIS — E11.42 DIABETIC POLYNEUROPATHY ASSOCIATED WITH TYPE 2 DIABETES MELLITUS (HCC): ICD-10-CM

## 2021-06-08 RX ORDER — GABAPENTIN 600 MG/1
TABLET ORAL
Qty: 90 TABLET | Refills: 0 | Status: CANCELLED | OUTPATIENT
Start: 2021-06-08 | End: 2021-07-07

## 2021-06-08 RX ORDER — GABAPENTIN 600 MG/1
600 TABLET ORAL 3 TIMES DAILY
Qty: 90 TABLET | Refills: 0 | Status: SHIPPED | OUTPATIENT
Start: 2021-06-08 | End: 2021-07-28 | Stop reason: SDUPTHER

## 2021-06-08 RX ORDER — ALLOPURINOL 300 MG/1
TABLET ORAL
Qty: 90 TABLET | Refills: 0 | Status: SHIPPED | OUTPATIENT
Start: 2021-06-08 | End: 2021-12-03

## 2021-06-08 RX ORDER — CLONAZEPAM 0.5 MG/1
TABLET ORAL
Qty: 60 TABLET | Refills: 0 | Status: SHIPPED | OUTPATIENT
Start: 2021-06-08 | End: 2021-07-28 | Stop reason: SDUPTHER

## 2021-06-08 RX ORDER — CLONAZEPAM 0.5 MG/1
TABLET ORAL
Qty: 60 TABLET | Refills: 0 | Status: CANCELLED | OUTPATIENT
Start: 2021-06-08 | End: 2021-07-08

## 2021-06-09 ENCOUNTER — OFFICE VISIT (OUTPATIENT)
Dept: FAMILY MEDICINE CLINIC | Age: 61
End: 2021-06-09
Payer: MEDICARE

## 2021-06-09 ENCOUNTER — HOSPITAL ENCOUNTER (OUTPATIENT)
Facility: HOSPITAL | Age: 61
Discharge: HOME | End: 2021-06-09
Payer: MEDICARE

## 2021-06-09 VITALS
HEART RATE: 69 BPM | RESPIRATION RATE: 20 BRPM | TEMPERATURE: 97.5 F | SYSTOLIC BLOOD PRESSURE: 122 MMHG | DIASTOLIC BLOOD PRESSURE: 72 MMHG | WEIGHT: 277.9 LBS | BODY MASS INDEX: 42.12 KG/M2 | OXYGEN SATURATION: 96 % | HEIGHT: 68 IN

## 2021-06-09 DIAGNOSIS — E78.5 HYPERLIPIDEMIA LDL GOAL <100: ICD-10-CM

## 2021-06-09 DIAGNOSIS — I10 HTN, GOAL BELOW 140/80: ICD-10-CM

## 2021-06-09 DIAGNOSIS — E11.42 DIABETIC POLYNEUROPATHY ASSOCIATED WITH TYPE 2 DIABETES MELLITUS (HCC): ICD-10-CM

## 2021-06-09 DIAGNOSIS — E11.59 TYPE 2 DIABETES MELLITUS WITH OTHER CIRCULATORY COMPLICATION, WITH LONG-TERM CURRENT USE OF INSULIN (HCC): ICD-10-CM

## 2021-06-09 DIAGNOSIS — Z12.11 COLON CANCER SCREENING: ICD-10-CM

## 2021-06-09 DIAGNOSIS — M25.512 ACUTE PAIN OF LEFT SHOULDER: ICD-10-CM

## 2021-06-09 DIAGNOSIS — Z79.4 TYPE 2 DIABETES MELLITUS WITH OTHER CIRCULATORY COMPLICATION, WITH LONG-TERM CURRENT USE OF INSULIN (HCC): Primary | ICD-10-CM

## 2021-06-09 DIAGNOSIS — Z79.4 TYPE 2 DIABETES MELLITUS WITH OTHER CIRCULATORY COMPLICATION, WITH LONG-TERM CURRENT USE OF INSULIN (HCC): ICD-10-CM

## 2021-06-09 DIAGNOSIS — E11.59 TYPE 2 DIABETES MELLITUS WITH OTHER CIRCULATORY COMPLICATION, WITH LONG-TERM CURRENT USE OF INSULIN (HCC): Primary | ICD-10-CM

## 2021-06-09 DIAGNOSIS — M25.551 RIGHT HIP PAIN: ICD-10-CM

## 2021-06-09 DIAGNOSIS — M25.561 ACUTE PAIN OF RIGHT KNEE: ICD-10-CM

## 2021-06-09 PROCEDURE — 36415 COLL VENOUS BLD VENIPUNCTURE: CPT

## 2021-06-09 PROCEDURE — 99214 OFFICE O/P EST MOD 30 MIN: CPT | Performed by: NURSE PRACTITIONER

## 2021-06-09 PROCEDURE — 2022F DILAT RTA XM EVC RTNOPTHY: CPT | Performed by: NURSE PRACTITIONER

## 2021-06-09 PROCEDURE — 3046F HEMOGLOBIN A1C LEVEL >9.0%: CPT | Performed by: NURSE PRACTITIONER

## 2021-06-09 PROCEDURE — G8427 DOCREV CUR MEDS BY ELIG CLIN: HCPCS | Performed by: NURSE PRACTITIONER

## 2021-06-09 PROCEDURE — 1036F TOBACCO NON-USER: CPT | Performed by: NURSE PRACTITIONER

## 2021-06-09 PROCEDURE — G8417 CALC BMI ABV UP PARAM F/U: HCPCS | Performed by: NURSE PRACTITIONER

## 2021-06-09 PROCEDURE — 3017F COLORECTAL CA SCREEN DOC REV: CPT | Performed by: NURSE PRACTITIONER

## 2021-06-09 ASSESSMENT — ENCOUNTER SYMPTOMS
EYES NEGATIVE: 1
GASTROINTESTINAL NEGATIVE: 1
RESPIRATORY NEGATIVE: 1

## 2021-06-09 NOTE — PROGRESS NOTES
Chief Complaint   Patient presents with    Diabetes    Shoulder Pain     fell yesterday     Hip Pain     right       Have you seen any other physician or provider since your last visit no    Have you had any other diagnostic tests since your last visit? no    Have you changed or stopped any medications since your last visit? no

## 2021-06-09 NOTE — PROGRESS NOTES
SUBJECTIVE:  Leon Null is a 62 y.o. male that presents with   Chief Complaint   Patient presents with    Diabetes    Shoulder Pain     fell yesterday     Hip Pain     right   . HPI:    This is a pleasant 62year old white male who presents for follow up with diabetes, htn, hyperlipidemia. He has had some dizziness says it comes from his eyes and yesterday he overstepped and fell and hurt his left shoulder and elbow. Right hip pain and right knee pain from the fall as well. Did not lose consciousness and reports he is doing better. He tells me that he has been to the eye doctor and they checked the size and they have not found anything significantly wrong. Now he says his shoulder really hurting yesterday it sore today he does have some bruising there and some limited range of motion secondary to the tenderness but he says it has loosened up quite a bit. He also tells me that his hip is tender he has no bruising there he does have limited range of motion however. The right knee has troubles getting to his feet standing but once he is up he said that is not bad. He denies need for pain medication at this time and says that it will continue to get better if not he will come in for evaluation. Diabetes  He presents for his follow-up diabetic visit. He has type 2 diabetes mellitus. No MedicAlert identification noted. His disease course has been worsening. Hypoglycemia symptoms include nervousness/anxiousness. Associated symptoms include fatigue. There are no hypoglycemic complications. Symptoms are worsening. Diabetic complications include peripheral neuropathy and retinopathy. Risk factors for coronary artery disease include male sex, hypertension, diabetes mellitus, stress and dyslipidemia. Current diabetic treatment includes insulin injections and oral agent (monotherapy). He is compliant with treatment most of the time. His weight is stable. He is following a diabetic diet.  Meal planning includes avoidance of concentrated sweets. He has not had a previous visit with a dietitian. He never (no scheduled exercise but works in the yard) participates in exercise. There is no change () in his home blood glucose trend. An ACE inhibitor/angiotensin II receptor blocker is being taken. Eye exam is current. Hyperlipidemia  This is a chronic problem. The current episode started more than 1 year ago. The problem is uncontrolled. Exacerbating diseases include chronic renal disease. Factors aggravating his hyperlipidemia include beta blockers. Associated symptoms include myalgias. Current antihyperlipidemic treatment includes statins and fibric acid derivatives. Compliance problems: unknown. Risk factors for coronary artery disease include diabetes mellitus, dyslipidemia, family history, hypertension, male sex, obesity, stress and a sedentary lifestyle. Hypertension  This is a chronic problem. The current episode started more than 1 year ago. The problem is unchanged. Associated symptoms include malaise/fatigue. There are no associated agents to hypertension. Risk factors for coronary artery disease include diabetes mellitus, dyslipidemia and family history. Past treatments include ACE inhibitors, diuretics and lifestyle changes. The current treatment provides moderate improvement. There are no compliance problems. Hypertensive end-organ damage includes retinopathy. Identifiable causes of hypertension include chronic renal disease. Review of Systems   Constitutional: Positive for fatigue and malaise/fatigue. HENT: Negative. Eyes: Negative. Respiratory: Negative. Cardiovascular: Negative. Gastrointestinal: Negative. Endocrine: Negative. Genitourinary: Negative. Musculoskeletal: Positive for arthralgias, gait problem and myalgias. Right hip and knee pain secondary to fall yesterday  Left shoulder and elbow pain secondary to fall yesterday   Skin: Negative. Hematological: Negative. Psychiatric/Behavioral: The patient is nervous/anxious. All other systems reviewed and are negative. OBJECTIVE:  /72   Pulse 69   Temp 97.5 °F (36.4 °C) (Infrared)   Resp 20   Ht 5' 8\" (1.727 m)   Wt 277 lb 14.4 oz (126.1 kg)   SpO2 96%   BMI 42.25 kg/m²   Physical Exam  Vitals and nursing note reviewed. Constitutional:       Appearance: Normal appearance. HENT:      Head: Normocephalic and atraumatic. Right Ear: Tympanic membrane, ear canal and external ear normal.      Left Ear: Tympanic membrane, ear canal and external ear normal.      Nose: Nose normal.      Mouth/Throat:      Mouth: Mucous membranes are moist.      Pharynx: Oropharynx is clear. Eyes:      Conjunctiva/sclera: Conjunctivae normal.      Pupils: Pupils are equal, round, and reactive to light. Cardiovascular:      Rate and Rhythm: Normal rate and regular rhythm. Pulses: Normal pulses. Heart sounds: Murmur heard. Pulmonary:      Effort: Pulmonary effort is normal.      Breath sounds: Normal breath sounds. Musculoskeletal:      Left shoulder: Tenderness present. Decreased range of motion. Cervical back: Normal range of motion and neck supple. Right hip: Tenderness present. Decreased range of motion. Right knee: Decreased range of motion. Tenderness present. Skin:     General: Skin is warm and dry. Capillary Refill: Capillary refill takes less than 2 seconds. Neurological:      Mental Status: He is alert and oriented to person, place, and time. Psychiatric:         Mood and Affect: Mood normal.         Behavior: Behavior normal.         Thought Content: Thought content normal.         Judgment: Judgment normal.       No results found for requested labs within last 30 days.        Hemoglobin A1C (%)   Date Value   02/08/2021 11.3 (H)     Microalbumin, Random Urine (ug/mL)   Date Value   07/30/2020 245.6     LDL Calculated (mg/dL)   Date Value 02/08/2021 73         Lab Results   Component Value Date    WBC 5.8 01/02/2020    NEUTROABS 3.7 01/02/2020    HGB 12.2 01/02/2020    HCT 37.6 01/02/2020    MCV 86 01/02/2020     01/02/2020       Lab Results   Component Value Date    TSH 1.710 01/02/2020         ASSESSMENT/PLAN:   Diagnosis Orders   1. Type 2 diabetes mellitus with other circulatory complication, with long-term current use of insulin (HCC)  Hemoglobin A1C   2. HTN, goal below 140/80  Comprehensive Metabolic Panel   3. Hyperlipidemia LDL goal <100  Lipid Panel   4. Diabetic polyneuropathy associated with type 2 diabetes mellitus ([address]     5. Acute pain of left shoulder     6. Right hip pain     7. Acute pain of right knee     8. Colon cancer screening  External Referral To General Surgery           Orders Placed This Encounter   Procedures    Hemoglobin A1C     Standing Status:   Future     Standing Expiration Date:   6/9/2022    Comprehensive Metabolic Panel     Standing Status:   Future     Standing Expiration Date:   6/9/2022    Lipid Panel     Standing Status:   Future     Standing Expiration Date:   6/9/2022     Order Specific Question:   Is Patient Fasting?/# of Hours     Answer:   6    External Referral To General Surgery     Referral Priority:   Routine     Referral Type:   Eval and Treat     Referral Reason:   Specialty Services Required     Requested Specialty:   General Surgery     Number of Visits Requested:   1        Outpatient Encounter Medications as of 6/9/2021   Medication Sig Dispense Refill    allopurinol (ZYLOPRIM) 300 MG tablet TAKE ONE TABLET BY MOUTH DAILY 90 tablet 0    gabapentin (NEURONTIN) 600 MG tablet Take 1 tablet by mouth 3 times daily for 30 days.  90 tablet 0    clonazePAM (KLONOPIN) 0.5 MG tablet TAKE ONE TABLET BY MOUTH TWICE A DAY AS NEEDED FOR ANXIETY 60 tablet 0    SITagliptin (JANUVIA) 100 MG tablet TAKE ONE TABLET BY MOUTH DAILY 90 tablet 0    rosuvastatin (CRESTOR) 40 MG tablet TAKE ONE TABLET

## 2021-06-14 ENCOUNTER — TELEPHONE (OUTPATIENT)
Dept: FAMILY MEDICINE CLINIC | Age: 61
End: 2021-06-14

## 2021-06-14 NOTE — TELEPHONE ENCOUNTER
Pt called [facility] and left message saying he was trying to get ahold of me. Called pt and left general voicemail to call me back.     Curtis Avilez, PharmD

## 2021-06-14 NOTE — TELEPHONE ENCOUNTER
Pt called me back. He stated that his insulin cost with [facility] is high so he will meet me at the clinic on Wednesday at 3pm to  assistance applications and he will bring his financials for applications.     Kadeem Martinez, AdeliaD

## 2021-06-16 ENCOUNTER — TELEPHONE (OUTPATIENT)
Dept: FAMILY MEDICINE CLINIC | Age: 61
End: 2021-06-16

## 2021-06-16 NOTE — TELEPHONE ENCOUNTER
Pt did not show for 3pm appointment today. Called pt and he did not answer. Left general message to call me back at [phone] or [phone]. Left [employer] paperwork at  at [facility] for pt to sign.     Major Healy, AdeliaD

## 2021-06-16 NOTE — TELEPHONE ENCOUNTER
Patient's referral, along with all pertinent medical records faxed to the attention of Dr. Tete Jorge (General Surgery- ) on 06/16/21, they will contact the patient and our office with appointment date/time/location.

## 2021-06-25 RX ORDER — BUMETANIDE 2 MG/1
TABLET ORAL
Qty: 180 TABLET | Refills: 1 | Status: SHIPPED | OUTPATIENT
Start: 2021-06-25 | End: 2022-01-05 | Stop reason: SDUPTHER

## 2021-07-02 ENCOUNTER — CARE COORDINATION (OUTPATIENT)
Dept: CARE COORDINATION | Age: 61
End: 2021-07-02

## 2021-07-02 NOTE — CARE COORDINATION
Primo 45 Transitions Initial Follow Up Call    Call within 2 business days of discharge: Yes     Patient: Chadwick Michel Patient : 1960 MRN: <[id number]>    Last Discharge [facility]       Complaint Diagnosis Description Type Department Provider    17   Admission (Discharged) from [address] [place] [address] Street, MD          RARS: No data recorded     Spoke with: Beryl Hines and his wife state that he is doing well. He confirms that no medication changes were made. His blood sugar has been stable since DC with this mornings fasting at 119. We discussed his insulin and he states directions for his levemir and novolog as mixed up with levemir 3 times daily. I reviewed long and short acting insulin and how they work. We reviewed directions. I have asked that he keep record of blood sugar, insulin usage and time of day to bring on Tuesday for his  appointment.   V/O    Discharge department/facility: [facility]    Non-face-to-face services provided:  Scheduled appointment with PCP-Renzo  Obtained and reviewed discharge summary and/or continuity of care documents    Follow Up  Future Appointments   Date Time Provider Lyndsey Lyons   2021 10:45 AM Nirali Cohen [address] [place] [address] [place]   2021 10:45 AM ANDREIA Yeager [address] [place] [address]       Carina Alves RN

## 2021-07-03 NOTE — CARE COORDINATION
Spoke with Ewa Anderson RN about pt Lyndsay William. Appears he is having hypoglycemic episodes and per [facility] - sounds as though he is taking his insulin incorrectly. He has confused directions for long and short acting insulin. She states she counseled him are correct medication doses and advised him to f/u in a few days. If he gets worse, he needs to go to ER for eval and tx of his hypoglycemia.

## 2021-07-06 ENCOUNTER — OFFICE VISIT (OUTPATIENT)
Dept: FAMILY MEDICINE CLINIC | Age: 61
End: 2021-07-06
Payer: MEDICARE

## 2021-07-06 VITALS
WEIGHT: 269.2 LBS | DIASTOLIC BLOOD PRESSURE: 84 MMHG | OXYGEN SATURATION: 91 % | TEMPERATURE: 98.2 F | HEART RATE: 62 BPM | SYSTOLIC BLOOD PRESSURE: 142 MMHG | RESPIRATION RATE: 18 BRPM | HEIGHT: 68 IN | BODY MASS INDEX: 40.8 KG/M2

## 2021-07-06 DIAGNOSIS — Z79.4 TYPE 2 DIABETES MELLITUS WITH OTHER CIRCULATORY COMPLICATION, WITH LONG-TERM CURRENT USE OF INSULIN (HCC): ICD-10-CM

## 2021-07-06 DIAGNOSIS — Z09 HOSPITAL DISCHARGE FOLLOW-UP: Primary | ICD-10-CM

## 2021-07-06 DIAGNOSIS — N18.32 STAGE 3B CHRONIC KIDNEY DISEASE (HCC): ICD-10-CM

## 2021-07-06 DIAGNOSIS — G47.33 OSA (OBSTRUCTIVE SLEEP APNEA): ICD-10-CM

## 2021-07-06 DIAGNOSIS — K43.9 VENTRAL HERNIA WITHOUT OBSTRUCTION OR GANGRENE: ICD-10-CM

## 2021-07-06 DIAGNOSIS — E11.59 TYPE 2 DIABETES MELLITUS WITH OTHER CIRCULATORY COMPLICATION, WITH LONG-TERM CURRENT USE OF INSULIN (HCC): ICD-10-CM

## 2021-07-06 PROCEDURE — 99214 OFFICE O/P EST MOD 30 MIN: CPT | Performed by: PHYSICIAN ASSISTANT

## 2021-07-06 PROCEDURE — 1111F DSCHRG MED/CURRENT MED MERGE: CPT | Performed by: PHYSICIAN ASSISTANT

## 2021-07-06 SDOH — ECONOMIC STABILITY: FOOD INSECURITY: WITHIN THE PAST 12 MONTHS, THE FOOD YOU BOUGHT JUST DIDN'T LAST AND YOU DIDN'T HAVE MONEY TO GET MORE.: NEVER TRUE

## 2021-07-06 SDOH — ECONOMIC STABILITY: FOOD INSECURITY: WITHIN THE PAST 12 MONTHS, YOU WORRIED THAT YOUR FOOD WOULD RUN OUT BEFORE YOU GOT MONEY TO BUY MORE.: NEVER TRUE

## 2021-07-06 ASSESSMENT — ENCOUNTER SYMPTOMS
GASTROINTESTINAL NEGATIVE: 1
RESPIRATORY NEGATIVE: 1

## 2021-07-06 ASSESSMENT — SOCIAL DETERMINANTS OF HEALTH (SDOH): HOW HARD IS IT FOR YOU TO PAY FOR THE VERY BASICS LIKE FOOD, HOUSING, MEDICAL CARE, AND HEATING?: NOT HARD AT ALL

## 2021-07-06 NOTE — PROGRESS NOTES
SUBJECTIVE:    Patient ID: Jono Todd is a 62 y.o.male. Chief Complaint   Patient presents with    Follow-Up from [facility]    Respiratory Distress    Acute Renal Failure       HPI:    Pt here for hospital f/u. He was admitted to [facility] on 6/28 and discharged on 7/1. He was admitted for acute on chronic renal failure, acute respiratory failure and hypoglycemia. Pt states his BG dropped to 41 and that is the reason he went to the hospital. His gf monitors his BG regularly and when it became too low she called EMS. Pt gf states he did LOC and became SOB. Pt called in after this discharge, confused about how to take his insulin. He was educated on proper way to take insulin and which insulin is BID and which is TID/with meals. Pt was told at hospital not to take long acting or short acting insulin if BG was under 150. Pt has continued to take [place] and [place]. Pt has taking 5 units of novolog since being out of the hospital. Pt was restarted on Bumex in the hospital. Pt needs f/u with nephrology - Dr. Deven Kohler in [facility]? Pt states today he is feeling great. Pt denies any trouble breathing at this time. Pt was discharged from hospital with what sounds like a BIPAP. Pt states he sleeps with it every night. Pt requires a sleep study to keep BIPAP. He does have a h/o JANINE. Will order that today. Patient's medications, allergies, past medical, surgical, social and family histories were reviewed and updated as appropriate in electronic medical record. Current Outpatient Medications on File Prior to Visit   Medication Sig Dispense Refill    bumetanide (BUMEX) 2 MG tablet TAKE ONE TABLET BY MOUTH TWICE A  tablet 1    allopurinol (ZYLOPRIM) 300 MG tablet TAKE ONE TABLET BY MOUTH DAILY 90 tablet 0    gabapentin (NEURONTIN) 600 MG tablet Take 1 tablet by mouth 3 times daily for 30 days.  90 tablet 0    clonazePAM (KLONOPIN) 0.5 MG tablet TAKE ONE TABLET BY MOUTH TWICE A DAY AS NEEDED FOR ANXIETY 60 tablet 0    SITagliptin (JANUVIA) 100 MG tablet TAKE ONE TABLET BY MOUTH DAILY 90 tablet 0    rosuvastatin (CRESTOR) 40 MG tablet TAKE ONE TABLET BY MOUTH DAILY 90 tablet 0    carvedilol (COREG) 12.5 MG tablet TAKE ONE TABLET BY MOUTH TWICE A  tablet 0    insulin detemir (LEVEMIR FLEXTOUCH) 100 UNIT/ML injection pen INJECT 75 UNITS INTO THE SKIN TWO TIMES A DAY M&W 340B Patient 135 mL 2    insulin aspart (NOVOLOG FLEXPEN) 100 UNIT/ML injection pen INJECT 25 UNITS INTO THE SKIN THREE TIMES A DAY BEFORE MEALS M&W 340B Patient 75 mL 2    polyethylene glycol (GLYCOLAX) 17 GM/SCOOP powder Take 17 g by mouth 2 times daily as needed      ACCU-CHEK COSTA PLUS strip       escitalopram (LEXAPRO) 20 MG tablet TAKE ONE TABLET BY MOUTH DAILY 90 tablet 3    pantoprazole (PROTONIX) 40 MG tablet       Insulin Pen Needle ([employer] PEN NEEDLES 29G) 29G X 12MM MISC Check sugar and use insulin up to 5 times daily 150 each 5    Lancets MISC Check sugar and use insulin up to 5 times daily. 150 each 5    B-D UF III MINI PEN NEEDLES 31G X 5 MM MISC USE THREE TIMES A  each 7    fenofibrate micronized (LOFIBRA) 200 MG capsule Take 1 capsule by mouth every morning (before breakfast) 30 capsule 11    aspirin 81 MG chewable tablet Take 81 mg by mouth daily       No current facility-administered medications on file prior to visit. Review of Systems   Constitutional: Negative. HENT: Negative. Respiratory: Negative. Cardiovascular: Negative. Gastrointestinal: Negative. Skin: Negative. Neurological: Negative. Psychiatric/Behavioral: Negative.         Past Medical History:   Diagnosis Date    Diabetes ([facility] [address] [facility]     Epilepsy ([facility] [facility]     Fatigue     Gout     Hx of circumcision     Hyperlipidemia 2002    Dr. Mick Saunders Hypertension     Kidney stones     Neuropathy     Pancreatitis     Staph infection 01/31/2017     Past Surgical History:   Procedure Laterality Date    CHOLECYSTECTOMY  2013    COLONOSCOPY  06/21/2017    [place]    FOOT SURGERY Left 1977    KIDNEY STONE SURGERY       Family History   Problem Relation Age of Onset    Stroke Mother     High Blood Pressure Mother     Heart Disease Mother     Diabetes Father     High Blood Pressure Father       Social History     Tobacco Use   Smoking Status Never Smoker   Smokeless Tobacco Never Used       OBJECTIVE:   Wt Readings from Last 3 Encounters:   07/06/21 269 lb 3.2 oz (122.1 kg)   06/09/21 277 lb 14.4 oz (126.1 kg)   02/08/21 264 lb 1.6 oz (119.8 kg)     BP Readings from Last 3 Encounters:   07/06/21 (!) 142/84   06/09/21 122/72   02/08/21 118/80       BP (!) 142/84   Pulse 62   Temp 98.2 °F (36.8 °C)   Resp 18   Ht 5' 8\" (1.727 m)   Wt 269 lb 3.2 oz (122.1 kg)   SpO2 91%   BMI 40.93 kg/m²    Physical Exam  Vitals reviewed. Constitutional:       General: He is not in acute distress. Appearance: Normal appearance. He is obese. He is not ill-appearing or toxic-appearing. HENT:      Head: Normocephalic and atraumatic. Mouth/Throat:      Mouth: Mucous membranes are moist.      Pharynx: Oropharynx is clear. Eyes:      Conjunctiva/sclera: Conjunctivae normal.      Pupils: Pupils are equal, round, and reactive to light. Cardiovascular:      Rate and Rhythm: Normal rate and regular rhythm. Heart sounds: Murmur heard. No gallop. Pulmonary:      Effort: Pulmonary effort is normal.      Breath sounds: Normal breath sounds. No wheezing, rhonchi or rales. Abdominal:      Palpations: Abdomen is soft. Musculoskeletal:      Right lower leg: Edema present. Left lower leg: Edema present. Skin:     General: Skin is warm and dry. Neurological:      Mental Status: He is alert and oriented to person, place, and time. Psychiatric:         Mood and Affect: Mood normal.         Behavior: Behavior normal.         Thought Content:  Thought content normal.         Judgment: Judgment normal.         Lab Results Component Value Date     06/09/2021    K 4.6 06/09/2021     06/09/2021    CO2 30 06/09/2021    GLUCOSE 71 06/09/2021    BUN 39 06/09/2021    CREATININE 2.83 06/09/2021    CALCIUM 9.3 06/09/2021    PROT 6.8 06/09/2021    LABALBU 4.0 06/09/2021    BILITOT 0.5 06/09/2021    ALT 25 06/09/2021    AST 20 06/09/2021     Hemoglobin A1C (%)   Date Value   06/09/2021 8.2 (H)     Microalbumin, Random Urine (ug/mL)   Date Value   07/30/2020 245.6     LDL Calculated (mg/dL)   Date Value   06/09/2021 66       Lab Results   Component Value Date    WBC 5.8 01/02/2020    NEUTROABS 3.7 01/02/2020    HGB 12.2 01/02/2020    HCT 37.6 01/02/2020    MCV 86 01/02/2020     01/02/2020     Lab Results   Component Value Date    TSH 1.710 01/02/2020       ASSESSMENT/PLAN:     1. Hospital discharge follow-up    2. Type 2 diabetes mellitus with other circulatory complication, with long-term current use of insulin (HCC)  - give SS for short acting insulin  - dc levemir    2U - 201-150  4U - 251-300  6U - 301 - 350  8U - 351-400  >400 to ER  - rtc 1 week for re-eval of BG    3. Stage 3b chronic kidney disease [facility])  - External Referral To Nephrology    4. JANINE (obstructive sleep apnea)  - Baseline Diagnostic Sleep Study; Future    5. Ventral hernia without obstruction or gangrene  - Cruz Foote MD, General Surgery, [place]      No orders of the defined types were placed in this encounter. Electronically signed by Nirali Deleon on 7/6/2021 at 11:11 AM    [facility] or Hospital Follow Up      Stephanie Landon   YOB: 1960    Date of Office Visit:  7/6/2021  Date of Hospital Admission: 6/21/17  Date of Hospital Discharge: 6/21/17  Risk of hospital readmission (high >=14%.  Medium >=10%) :No data recorded    Care management risk score Rising risk (score 2-5) and Complex Care (Scores >=6): 6     Non face to face  following discharge, date last encounter closed (first attempt may have been earlier): 7/2/2021 10:16 AM    Call initiated 2 business days of discharge: Yes    Patient Active Problem List   Diagnosis    Uncontrolled type 2 diabetes mellitus with complication, with long-term current use of insulin ([address]    Hyperlipidemia LDL goal <100    HTN, goal below 140/80    Diabetic polyneuropathy associated with type 2 diabetes mellitus ([address]    CRI (chronic renal insufficiency)    Colon polyp    Internal hemorrhoids without complication    Need for tetanus, diphtheria, and acellular pertussis (Tdap) vaccine in patient of adolescent age or older    Need for Streptococcus pneumoniae vaccination       Allergies   Allergen Reactions    Influenza Vaccines Hives       Medications listed as [facility] at the [facility] Medication Instructions VAUGHN:    Printed on:07/06/21 1257   Medication Information                      ACCU-CHEK COSTA PLUS strip               allopurinol (ZYLOPRIM) 300 MG tablet  TAKE ONE TABLET BY MOUTH DAILY             aspirin 81 MG chewable tablet  Take 81 mg by mouth daily             B-D UF III MINI PEN NEEDLES 31G X 5 MM MISC  USE THREE TIMES A DAY             bumetanide (BUMEX) 2 MG tablet  TAKE ONE TABLET BY MOUTH TWICE A DAY             carvedilol (COREG) 12.5 MG tablet  TAKE ONE TABLET BY MOUTH TWICE A DAY             clonazePAM (KLONOPIN) 0.5 MG tablet  TAKE ONE TABLET BY MOUTH TWICE A DAY AS NEEDED FOR ANXIETY             escitalopram (LEXAPRO) 20 MG tablet  TAKE ONE TABLET BY MOUTH DAILY             fenofibrate micronized (LOFIBRA) 200 MG capsule  Take 1 capsule by mouth every morning (before breakfast)             gabapentin (NEURONTIN) 600 MG tablet  Take 1 tablet by mouth 3 times daily for 30 days.              insulin aspart (NOVOLOG FLEXPEN) 100 UNIT/ML injection pen  INJECT 25 UNITS INTO THE SKIN THREE TIMES A DAY BEFORE MEALS M&W 340B Patient             insulin detemir (LEVEMIR  pantoprazole (PROTONIX) 40 MG tablet       Insulin Pen Needle ([employer] PEN NEEDLES 29G) 29G X 12MM MISC Check sugar and use insulin up to 5 times daily 150 each 5    Lancets MISC Check sugar and use insulin up to 5 times daily. 150 each 5    B-D UF III MINI PEN NEEDLES 31G X 5 MM MISC USE THREE TIMES A  each 7    fenofibrate micronized (LOFIBRA) 200 MG capsule Take 1 capsule by mouth every morning (before breakfast) 30 capsule 11    aspirin 81 MG chewable tablet Take 81 mg by mouth daily          Medications patient taking as of now reconciled against medications ordered at time of hospital discharge: Yes    Chief Complaint   Patient presents with    Follow-Up from Hospital    Respiratory Distress    Acute Renal Failure       History of Present illness - Follow up of Hospital diagnosis(es): respiratory failure, acute on chronic renal failure, hypoglycemia     Inpatient course: Discharge summary reviewed- see chart. Interval history/Current status: stable    A comprehensive review of systems was negative except for what was noted in the HPI. Vitals:    07/06/21 1044   BP: (!) 142/84   Pulse: 62   Resp: 18   Temp: 98.2 °F (36.8 °C)   SpO2: 91%   Weight: 269 lb 3.2 oz (122.1 kg)   Height: 5' 8\" (1.727 m)     Body mass index is 40.93 kg/m². Wt Readings from Last 3 Encounters:   07/06/21 269 lb 3.2 oz (122.1 kg)   06/09/21 277 lb 14.4 oz (126.1 kg)   02/08/21 264 lb 1.6 oz (119.8 kg)     BP Readings from Last 3 Encounters:   07/06/21 (!) 142/84   06/09/21 122/72   02/08/21 118/80          Assessment/Plan:  1. Hospital discharge follow-up  - [place] DISCHARGE MEDS RECONCILED W/ CURRENT OUTPATIENT MED LIST    2. Type 2 diabetes mellitus with other circulatory complication, with long-term current use of insulin (HCC)    3. Stage 3b chronic kidney disease ([facility] [address]  - External Referral To Nephrology    4. JANINE (obstructive sleep apnea)  - Baseline Diagnostic Sleep Study; Future    5.  Ventral hernia without TAKE ONE TABLET BY MOUTH DAILY 90 tablet 0    rosuvastatin (CRESTOR) 40 MG tablet TAKE ONE TABLET BY MOUTH DAILY 90 tablet 0    carvedilol (COREG) 12.5 MG tablet TAKE ONE TABLET BY MOUTH TWICE A  tablet 0    insulin detemir (LEVEMIR FLEXTOUCH) 100 UNIT/ML injection pen INJECT 75 UNITS INTO THE SKIN TWO TIMES A DAY M&W 340B Patient 135 mL 2    insulin aspart (NOVOLOG FLEXPEN) 100 UNIT/ML injection pen INJECT 25 UNITS INTO THE SKIN THREE TIMES A DAY BEFORE MEALS M&W 340B Patient 75 mL 2    polyethylene glycol (GLYCOLAX) 17 GM/SCOOP powder Take 17 g by mouth 2 times daily as needed      ACCU-CHEK [place] PLUS strip       escitalopram (LEXAPRO) 20 MG tablet TAKE ONE TABLET BY MOUTH DAILY 90 tablet 3    pantoprazole (PROTONIX) 40 MG tablet       Insulin Pen Needle ([employer]ER PEN NEEDLES 29G) 29G X 12MM MISC Check sugar and use insulin up to 5 times daily 150 each 5    Lancets MISC Check sugar and use insulin up to 5 times daily. 150 each 5    B-D UF III MINI PEN NEEDLES 31G X 5 MM MISC USE THREE TIMES A  each 7    fenofibrate micronized (LOFIBRA) 200 MG capsule Take 1 capsule by mouth every morning (before breakfast) 30 capsule 11    aspirin 81 MG chewable tablet Take 81 mg by mouth daily          Medications patient taking as of now reconciled against medications ordered at time of hospital discharge: Yes    Chief Complaint   Patient presents with    Follow-Up from Hospital    Respiratory Distress    Acute Renal Failure       History of Present illness - Follow up of Hospital diagnosis(es):     Inpatient course: Discharge summary reviewed- see chart. Interval history/Current status:     Vitals:    07/06/21 1044   BP: (!) 142/84   Pulse: 62   Resp: 18   Temp: 98.2 °F (36.8 °C)   SpO2: 91%   Weight: 269 lb 3.2 oz (122.1 kg)   Height: 5' 8\" (1.727 m)     Body mass index is 40.93 kg/m².    Wt Readings from Last 3 Encounters:   07/06/21 269 lb 3.2 oz (122.1 kg)   06/09/21 277 lb 14.4 oz (126.1 kg) 02/08/21 264 lb 1.6 oz (119.8 kg)     BP Readings from Last 3 Encounters:   07/06/21 (!) 142/84   06/09/21 122/72   02/08/21 118/80       A comprehensive review of systems was negative except for what was noted in the HPI. Assessment/Plan:  1. Hospital discharge follow-up  - [place] DISCHARGE MEDS RECONCILED W/ CURRENT OUTPATIENT MED LIST    2. Type 2 diabetes mellitus with other circulatory complication, with long-term current use of insulin (HCC)    3. Stage 3b chronic kidney disease ([facility] [address]  - External Referral To Nephrology    4. JANINE (obstructive sleep apnea)  - Baseline Diagnostic Sleep Study; Future    5.  Ventral hernia without obstruction or gangrene  - Leticia Gomez MD, General Surgery, [facility] Decision Making: moderate complexity

## 2021-07-06 NOTE — PROGRESS NOTES
Chief Complaint   Patient presents with    Follow-Up from [facility]    Respiratory Distress    Acute Renal Failure       Have you seen any other physician or provider since your last visit no    Have you had any other diagnostic tests since your last visit? no    Have you changed or stopped any medications since your last visit? No     Patient is here for a hospital follow up - 176 was a sugar check on 7/4. Patient also brought in his sugar log.

## 2021-07-13 ENCOUNTER — HOSPITAL ENCOUNTER (OUTPATIENT)
Facility: HOSPITAL | Age: 61
Discharge: HOME | End: 2021-07-13
Payer: MEDICARE

## 2021-07-13 ENCOUNTER — OFFICE VISIT (OUTPATIENT)
Dept: FAMILY MEDICINE CLINIC | Age: 61
End: 2021-07-13
Payer: MEDICARE

## 2021-07-13 VITALS
HEIGHT: 68 IN | WEIGHT: 268.8 LBS | SYSTOLIC BLOOD PRESSURE: 118 MMHG | OXYGEN SATURATION: 97 % | HEART RATE: 60 BPM | DIASTOLIC BLOOD PRESSURE: 68 MMHG | BODY MASS INDEX: 40.74 KG/M2 | RESPIRATION RATE: 18 BRPM | TEMPERATURE: 97.7 F

## 2021-07-13 DIAGNOSIS — N18.32 STAGE 3B CHRONIC KIDNEY DISEASE (HCC): ICD-10-CM

## 2021-07-13 DIAGNOSIS — G47.33 CHRONIC RESPIRATORY FAILURE DUE TO OBSTRUCTIVE SLEEP APNEA (HCC): ICD-10-CM

## 2021-07-13 DIAGNOSIS — E11.59 TYPE 2 DIABETES MELLITUS WITH OTHER CIRCULATORY COMPLICATION, WITH LONG-TERM CURRENT USE OF INSULIN (HCC): ICD-10-CM

## 2021-07-13 DIAGNOSIS — Z79.4 TYPE 2 DIABETES MELLITUS WITH OTHER CIRCULATORY COMPLICATION, WITH LONG-TERM CURRENT USE OF INSULIN (HCC): ICD-10-CM

## 2021-07-13 DIAGNOSIS — G47.33 OSA (OBSTRUCTIVE SLEEP APNEA): Primary | ICD-10-CM

## 2021-07-13 DIAGNOSIS — J96.10 CHRONIC RESPIRATORY FAILURE DUE TO OBSTRUCTIVE SLEEP APNEA (HCC): ICD-10-CM

## 2021-07-13 PROCEDURE — 2022F DILAT RTA XM EVC RTNOPTHY: CPT | Performed by: NURSE PRACTITIONER

## 2021-07-13 PROCEDURE — 99214 OFFICE O/P EST MOD 30 MIN: CPT | Performed by: NURSE PRACTITIONER

## 2021-07-13 PROCEDURE — 36415 COLL VENOUS BLD VENIPUNCTURE: CPT

## 2021-07-13 PROCEDURE — G8417 CALC BMI ABV UP PARAM F/U: HCPCS | Performed by: NURSE PRACTITIONER

## 2021-07-13 PROCEDURE — 1036F TOBACCO NON-USER: CPT | Performed by: NURSE PRACTITIONER

## 2021-07-13 PROCEDURE — G8427 DOCREV CUR MEDS BY ELIG CLIN: HCPCS | Performed by: NURSE PRACTITIONER

## 2021-07-13 PROCEDURE — 3052F HG A1C>EQUAL 8.0%<EQUAL 9.0%: CPT | Performed by: NURSE PRACTITIONER

## 2021-07-13 PROCEDURE — 3017F COLORECTAL CA SCREEN DOC REV: CPT | Performed by: NURSE PRACTITIONER

## 2021-07-13 ASSESSMENT — ENCOUNTER SYMPTOMS
EYES NEGATIVE: 1
RESPIRATORY NEGATIVE: 1
GASTROINTESTINAL NEGATIVE: 1

## 2021-07-13 NOTE — PROGRESS NOTES
Chief Complaint   Patient presents with    Diabetes     f/u       Have you seen any other physician or provider since your last visit no    Have you had any other diagnostic tests since your last visit? no    Have you changed or stopped any medications since your last visit? no     .

## 2021-07-13 NOTE — PROGRESS NOTES
SUBJECTIVE:  Evan Vernon is a 62 y.o. male that presents with   Chief Complaint   Patient presents with    Diabetes     f/u   . HPI:    This is a very pleasant 58-year-old white male who presents today for follow-up after having been in the hospital for chronic respiratory failure and followed up last week here in the office for the hospitalization and was asked to come back in today. He brings in a list of his sugar readings and they are running primarily 100-150 most of the time now and they have not been using any insulin. Him and his wife both state that he really has not changed his eating habits to that ever what happened and caused his sugar bottom but says that it was noticed in the hospital.  He now has a trilogy machine at home but he says that he needs sleep apnea test for them to be able to keep the trilogy machine. He says he sleeps very well with that he loves the machine and he is feeling much much better. I will make a referral for him to pulmonology and try make sure they just keep the machine. He also had some acute on chronic renal failure while he was there and he needs a referral to nephrology. He was seeing nephrology in [place] is not happy with that facility show he is interested in seeing Dr. Ricardo Mcmanus. His wife I think used to work with Dr. Ricardo Mcmanus so she feels pretty comfortable with him. We will make a referral for that as well. We will continue to check his sugar and use sliding scale insulin. He has my personal cell phone number so that if he has questions or concerns he can give me a call. Him and his wife both verbalized understanding of that. Also told him that I would like for him to break the Klonopin in half and take half a tablet instead of a whole tablet whenever he uses it just to decrease that central nervous system depression. Diabetes  He presents for his follow-up diabetic visit. He has type 2 diabetes mellitus. No MedicAlert identification noted. His disease course has been worsening. Associated symptoms include fatigue. There are no hypoglycemic complications. Symptoms are worsening. Diabetic complications include peripheral neuropathy. Risk factors for coronary artery disease include male sex, hypertension, diabetes mellitus, stress and dyslipidemia. Current diabetic treatment includes insulin injections and oral agent (monotherapy). He is compliant with treatment most of the time. His weight is stable. He is following a diabetic diet. Meal planning includes avoidance of concentrated sweets. He has not had a previous visit with a dietitian. He never (no scheduled exercise but works in the yard) participates in exercise. There is no change () in his home blood glucose trend. An ACE inhibitor/angiotensin II receptor blocker is being taken. Eye exam is current. Review of Systems   Constitutional: Positive for fatigue. HENT: Negative. Eyes: Negative. Respiratory: Negative. Cardiovascular: Negative. Gastrointestinal: Negative. Endocrine: Negative. Genitourinary: Negative. Musculoskeletal: Positive for arthralgias and gait problem. Skin: Negative. Hematological: Negative. Psychiatric/Behavioral: Negative. All other systems reviewed and are negative. OBJECTIVE:  /68   Pulse 60   Temp 97.7 °F (36.5 °C) (Temporal)   Resp 18   Ht 5' 8\" (1.727 m)   Wt 268 lb 12.8 oz (121.9 kg)   SpO2 97% Comment: room air  BMI 40.87 kg/m²   Physical Exam  Vitals and nursing note reviewed. Constitutional:       Appearance: Normal appearance. He is obese. HENT:      Head: Normocephalic and atraumatic. Right Ear: Tympanic membrane, ear canal and external ear normal.      Left Ear: Tympanic membrane, ear canal and external ear normal.      Nose: Nose normal.      Mouth/Throat:      Mouth: Mucous membranes are moist.      Pharynx: Oropharynx is clear.    Eyes:      Conjunctiva/sclera: Conjunctivae normal. Pupils: Pupils are equal, round, and reactive to light. Cardiovascular:      Rate and Rhythm: Normal rate and regular rhythm. Pulses: Normal pulses. Heart sounds: Murmur heard. Pulmonary:      Effort: Pulmonary effort is normal.      Breath sounds: Normal breath sounds. Musculoskeletal:      Left shoulder: Tenderness present. Decreased range of motion. Cervical back: Normal range of motion and neck supple. Right hip: Tenderness present. Decreased range of motion. Right knee: Decreased range of motion. Tenderness present. Skin:     General: Skin is warm and dry. Capillary Refill: Capillary refill takes less than 2 seconds. Neurological:      Mental Status: He is alert and oriented to person, place, and time. Psychiatric:         Mood and Affect: Mood normal.         Behavior: Behavior normal.         Thought Content:  Thought content normal.         Judgment: Judgment normal.       Results in Past 30 Days  Result Component Current Result Ref Range Previous Result Ref Range   Albumin/Globulin Ratio 1.5 (7/13/2021) 1.2 - 2.2 Not in Time Range    Albumin 4.0 (7/13/2021) 3.8 - 4.9 g/dL Not in Time Range    Alkaline Phosphatase 74 (7/13/2021) 48 - 121 IU/L Not in Time Range    ALT 27 (7/13/2021) 0 - 44 IU/L Not in Time Range    AST 30 (7/13/2021) 0 - 40 IU/L Not in Time Range    BUN 43 (H) (7/13/2021) 6 - 24 mg/dL Not in Time Range    Calcium 9.0 (7/13/2021) 8.7 - 10.2 mg/dL Not in Time Range    Chloride 100 (7/13/2021) 96 - 106 mmol/L Not in Time Range    CO2 26 (7/13/2021) 20 - 29 mmol/L Not in Time Range    CREATININE 2.70 (H) (7/13/2021) 0.76 - 1.27 mg/dL Not in Time Range    GFR  29 (L) (7/13/2021) >59 mL/min/1.73 Not in Time Range    GFR Non- 25 (L) (7/13/2021) >59 mL/min/1.73 Not in Time Range    Globulin 2.6 (7/13/2021) 1.5 - 4.5 g/dL Not in Time Range    Glucose 224 (H) (7/13/2021) 65 - 99 mg/dL Not in Time Range    Potassium 4.9 (7/13/2021) 3.5 - 5.2 mmol/L Not in Time Range    Sodium 140 (7/13/2021) 134 - 144 mmol/L Not in Time Range    Total Bilirubin 0.4 (7/13/2021) 0.0 - 1.2 mg/dL Not in Time Range    Total Protein 6.6 (7/13/2021) 6.0 - 8.5 g/dL Not in Time Range        Hemoglobin A1C (%)   Date Value   06/09/2021 8.2 (H)     Microalbumin, Random Urine (ug/mL)   Date Value   07/30/2020 245.6     LDL Calculated (mg/dL)   Date Value   06/09/2021 66         Lab Results   Component Value Date    WBC 5.8 01/02/2020    NEUTROABS 3.7 01/02/2020    HGB 12.2 01/02/2020    HCT 37.6 01/02/2020    MCV 86 01/02/2020     01/02/2020       Lab Results   Component Value Date    TSH 1.710 01/02/2020         ASSESSMENT/PLAN:   Diagnosis Orders   1. JANINE (obstructive sleep apnea)  External Referral To Pulmonology   2. Chronic respiratory failure due to obstructive sleep apnea [facility])  External Referral To Pulmonology   3. Stage 3b chronic kidney disease [facility])  External Referral To Nephrology   4.  Type 2 diabetes mellitus with other circulatory complication, with long-term current use of insulin ([facility] [address]             Orders Placed This Encounter   Procedures    External Referral To Pulmonology     Referral Priority:   Routine     Referral Type:   Eval and Treat     Referral Reason:   Specialty Services Required     Requested Specialty:   Pulmonology     Number of Visits Requested:   1    External Referral To Nephrology     Referral Priority:   Routine     Referral Type:   Eval and Treat     Referral Reason:   Specialty Services Required     Requested Specialty:   Nephrology     Number of Visits Requested:   1        Outpatient Encounter Medications as of 7/13/2021   Medication Sig Dispense Refill    bumetanide (BUMEX) 2 MG tablet TAKE ONE TABLET BY MOUTH TWICE A  tablet 1    allopurinol (ZYLOPRIM) 300 MG tablet TAKE ONE TABLET BY MOUTH DAILY 90 tablet 0    SITagliptin (JANUVIA) 100 MG tablet TAKE ONE TABLET BY MOUTH DAILY 90 tablet 0    rosuvastatin (CRESTOR) 40 MG tablet TAKE ONE TABLET BY MOUTH DAILY 90 tablet 0    carvedilol (COREG) 12.5 MG tablet TAKE ONE TABLET BY MOUTH TWICE A  tablet 0    insulin detemir (LEVEMIR FLEXTOUCH) 100 UNIT/ML injection pen INJECT 75 UNITS INTO THE SKIN TWO TIMES A DAY M&W 340B Patient 135 mL 2    insulin aspart (NOVOLOG FLEXPEN) 100 UNIT/ML injection pen INJECT 25 UNITS INTO THE SKIN THREE TIMES A DAY BEFORE MEALS M&W 340B Patient 75 mL 2    polyethylene glycol (GLYCOLAX) 17 GM/SCOOP powder Take 17 g by mouth 2 times daily as needed      escitalopram (LEXAPRO) 20 MG tablet TAKE ONE TABLET BY MOUTH DAILY 90 tablet 3    pantoprazole (PROTONIX) 40 MG tablet       fenofibrate micronized (LOFIBRA) 200 MG capsule Take 1 capsule by mouth every morning (before breakfast) 30 capsule 11    aspirin 81 MG chewable tablet Take 81 mg by mouth daily      gabapentin (NEURONTIN) 600 MG tablet Take 1 tablet by mouth 3 times daily for 30 days. 90 tablet 0    clonazePAM (KLONOPIN) 0.5 MG tablet TAKE ONE TABLET BY MOUTH TWICE A DAY AS NEEDED FOR ANXIETY 60 tablet 0    ACCU-CHEK COSTA PLUS strip  (Patient not taking: Reported on 7/13/2021)      Insulin Pen Needle ([employer] PEN NEEDLES 29G) 29G X 12MM MISC Check sugar and use insulin up to 5 times daily (Patient not taking: Reported on 7/13/2021) 150 each 5    Lancets MISC Check sugar and use insulin up to 5 times daily. (Patient not taking: Reported on 7/13/2021) 150 each 5    B-D UF III MINI PEN NEEDLES 31G X 5 MM MISC USE THREE TIMES A DAY (Patient not taking: Reported on 7/13/2021) 100 each 7     No facility-administered encounter medications on file as of 7/13/2021. Return in about 2 months (around 9/13/2021), or if symptoms worsen or fail to improve. PATIENT COUNSELING    Counseling was provided today regardingthe following topics: Healthy eating habits, Regular exercise, substance abuse and healthy sleep habits.     Discussed use, benefit, and side effectsof prescribed medications. Barriers to medication compliance addressed. All patient questions answered. Pt voiced understanding. Controlled Substance Monitoring:    Acute and Chronic Pain Monitoring:   RX Monitoring 7/15/2021   Attestation The Prescription Monitoring Report for this patient was reviewed today. Periodic Controlled Substance Monitoring Possible medication side effects, risk of tolerance/dependence & alternative treatments discussed. ;No signs of potential drug abuse or diversion identified.;Obtaining appropriate analgesic effect of treatment.    Chronic Pain > 50 MEDD -   Chronic Pain > 80 MEDD -

## 2021-07-20 ENCOUNTER — TELEPHONE (OUTPATIENT)
Dept: FAMILY MEDICINE CLINIC | Age: 61
End: 2021-07-20

## 2021-07-20 NOTE — TELEPHONE ENCOUNTER
Patient's referral, along with all pertinent medical records faxed to the attention of Dr. Cleopatra Lucio (Sleep) on 07/20/21, they will contact the patient and our office with appointment date/time/location.

## 2021-07-26 ENCOUNTER — TELEPHONE (OUTPATIENT)
Dept: FAMILY MEDICINE CLINIC | Age: 61
End: 2021-07-26

## 2021-07-26 NOTE — TELEPHONE ENCOUNTER
Patient's referral, along with all pertinent medical records faxed to the attention of [facility] on 7/26/21, they will contact the patient and our office with appointment date/time/location.

## 2021-07-28 DIAGNOSIS — F43.23 SITUATIONAL MIXED ANXIETY AND DEPRESSIVE DISORDER: ICD-10-CM

## 2021-07-28 DIAGNOSIS — E11.42 DIABETIC POLYNEUROPATHY ASSOCIATED WITH TYPE 2 DIABETES MELLITUS (HCC): ICD-10-CM

## 2021-07-28 RX ORDER — CLONAZEPAM 0.5 MG/1
TABLET ORAL
Qty: 60 TABLET | Refills: 0 | Status: SHIPPED | OUTPATIENT
Start: 2021-07-28 | End: 2021-08-30

## 2021-07-28 RX ORDER — GABAPENTIN 600 MG/1
600 TABLET ORAL 3 TIMES DAILY
Qty: 90 TABLET | Refills: 0 | Status: SHIPPED | OUTPATIENT
Start: 2021-07-28 | End: 2021-08-30

## 2021-07-29 NOTE — TELEPHONE ENCOUNTER
Patient scheduled to see Dr. Shukri Chung (Sleep) on 08/25/21 at 2:30. Patient's referral, along with all pertinent medical records faxed to the attention of scheduling on 07/26/21. Patient contacted advised of appointment date/time/location. Patient verbalized understanding of all instructions.  (Per Dr. Shukri Chung office)

## 2021-08-02 ENCOUNTER — TELEPHONE (OUTPATIENT)
Dept: SURGERY | Facility: CLINIC | Age: 61
End: 2021-08-02

## 2021-08-02 NOTE — TELEPHONE ENCOUNTER
Patient scheduled to see Dr. Rob Noriega (Sleep) on 8/25/21 at 2:30. Patient's referral, along with all pertinent medical records faxed to the attention of scheduling on 7/20/21. Patient contacted advised of appointment date/time/location. Patient verbalized understanding of all instructions.  (Per Dr. Rob Noriega office)

## 2021-08-04 ENCOUNTER — TELEPHONE (OUTPATIENT)
Dept: FAMILY MEDICINE CLINIC | Age: 61
End: 2021-08-04

## 2021-08-04 NOTE — TELEPHONE ENCOUNTER
Patient scheduled to see Dr. Bari Andrew ([address]) on 8/10/21 at 1:45. Patient's referral, along with all pertinent medical records faxed to the attention of Dr. Bari Andrew on 7/13/21. Patient contacted advised of appointment date/time/location. Patient verbalized understanding of all instructions.

## 2021-08-29 DIAGNOSIS — E11.42 DIABETIC POLYNEUROPATHY ASSOCIATED WITH TYPE 2 DIABETES MELLITUS (HCC): ICD-10-CM

## 2021-08-29 DIAGNOSIS — F43.23 SITUATIONAL MIXED ANXIETY AND DEPRESSIVE DISORDER: ICD-10-CM

## 2021-08-30 RX ORDER — CARVEDILOL 12.5 MG/1
TABLET ORAL
Qty: 180 TABLET | Refills: 0 | Status: SHIPPED | OUTPATIENT
Start: 2021-08-30 | End: 2021-12-03

## 2021-08-30 RX ORDER — ROSUVASTATIN CALCIUM 40 MG/1
TABLET, COATED ORAL
Qty: 90 TABLET | Refills: 0 | Status: SHIPPED | OUTPATIENT
Start: 2021-08-30 | End: 2021-12-03

## 2021-08-30 RX ORDER — FENOFIBRATE 200 MG/1
CAPSULE ORAL
Qty: 90 CAPSULE | Refills: 0 | Status: SHIPPED | OUTPATIENT
Start: 2021-08-30 | End: 2021-10-25

## 2021-08-30 RX ORDER — GABAPENTIN 600 MG/1
TABLET ORAL
Qty: 90 TABLET | Refills: 0 | Status: SHIPPED | OUTPATIENT
Start: 2021-08-30 | End: 2021-10-25

## 2021-08-30 RX ORDER — CLONAZEPAM 0.5 MG/1
TABLET ORAL
Qty: 60 TABLET | Refills: 0 | Status: SHIPPED | OUTPATIENT
Start: 2021-08-30 | End: 2021-12-14 | Stop reason: SDUPTHER

## 2021-08-30 NOTE — TELEPHONE ENCOUNTER
Requested Prescriptions     Pending Prescriptions Disp Refills    clonazePAM (KLONOPIN) 0.5 MG tablet [Pharmacy Med Name: clonazePAM 0.5 MG TABLET] 60 tablet      Sig: TAKE ONE TABLET BY MOUTH TWICE A DAY AS NEEDED FOR ANXIETY    SITagliptin (Sandria Nata) 100 MG tablet [Pharmacy Med Name: Sandria McDavid 100 MG TABLET] 90 tablet 0     Sig: TAKE ONE TABLET BY MOUTH DAILY    gabapentin (NEURONTIN) 600 MG tablet [Pharmacy Med Name: GABAPENTIN 600 MG TABLET] 90 tablet      Sig: TAKE ONE TABLET BY MOUTH THREE TIMES A DAY    rosuvastatin (CRESTOR) 40 MG tablet [Pharmacy Med Name: ROSUVASTATIN CALCIUM 40 MG TAB] 90 tablet 0     Sig: TAKE ONE TABLET BY MOUTH DAILY    fenofibrate micronized (LOFIBRA) 200 MG capsule [Pharmacy Med Name: FENOFIBRATE 200 MG CAPSULE] 90 capsule 0     Sig: TAKE ONE CAPSULE BY MOUTH EVERY MORNING BEFORE BREAKFAST    carvedilol (COREG) 12.5 MG tablet [Pharmacy Med Name: CARVEDILOL 12.5 MG TABLET] 180 tablet 0     Sig: TAKE ONE TABLET BY MOUTH TWICE A DAY

## 2021-09-10 ENCOUNTER — TELEPHONE (OUTPATIENT)
Dept: FAMILY MEDICINE CLINIC | Age: 61
End: 2021-09-10

## 2021-10-22 DIAGNOSIS — E11.42 DIABETIC POLYNEUROPATHY ASSOCIATED WITH TYPE 2 DIABETES MELLITUS (HCC): ICD-10-CM

## 2021-10-25 RX ORDER — FENOFIBRATE 200 MG/1
CAPSULE ORAL
Qty: 90 CAPSULE | Refills: 0 | Status: SHIPPED | OUTPATIENT
Start: 2021-10-25 | End: 2022-01-05 | Stop reason: SDUPTHER

## 2021-10-25 RX ORDER — ESCITALOPRAM OXALATE 20 MG/1
TABLET ORAL
Qty: 90 TABLET | Refills: 3 | Status: SHIPPED | OUTPATIENT
Start: 2021-10-25 | End: 2022-01-05 | Stop reason: SDUPTHER

## 2021-10-25 RX ORDER — GABAPENTIN 600 MG/1
TABLET ORAL
Qty: 90 TABLET | Refills: 0 | Status: SHIPPED | OUTPATIENT
Start: 2021-10-25 | End: 2021-12-14 | Stop reason: SDUPTHER

## 2021-10-25 NOTE — TELEPHONE ENCOUNTER
Patient called, requested refill. Next Office Visit Date:  No future appointments.     JOSEPHINE please review via [address]

## 2021-11-07 ENCOUNTER — TELEPHONE (OUTPATIENT)
Dept: FAMILY MEDICINE CLINIC | Age: 61
End: 2021-11-07

## 2021-11-07 DIAGNOSIS — E11.59 TYPE 2 DIABETES MELLITUS WITH OTHER CIRCULATORY COMPLICATION, WITH LONG-TERM CURRENT USE OF INSULIN (HCC): ICD-10-CM

## 2021-11-07 DIAGNOSIS — Z79.4 TYPE 2 DIABETES MELLITUS WITH OTHER CIRCULATORY COMPLICATION, WITH LONG-TERM CURRENT USE OF INSULIN (HCC): ICD-10-CM

## 2021-11-07 RX ORDER — INSULIN ASPART 100 [IU]/ML
INJECTION, SOLUTION INTRAVENOUS; SUBCUTANEOUS
Qty: 45 ML | Refills: 0 | Status: SHIPPED | OUTPATIENT
Start: 2021-11-07 | End: 2022-01-05 | Stop reason: SDUPTHER

## 2021-11-07 RX ORDER — INSULIN DETEMIR 100 [IU]/ML
INJECTION, SOLUTION SUBCUTANEOUS
Qty: 60 ML | Refills: 0 | Status: SHIPPED | OUTPATIENT
Start: 2021-11-07 | End: 2022-01-05 | Stop reason: SDUPTHER

## 2021-11-07 NOTE — TELEPHONE ENCOUNTER
Sending in pt's Levemir and Novolog on [address] while he is in the process of applying for  assistance due to cost of insulin being too expensive with his insurance.     Nando Chris, AdeliaD

## 2021-11-07 NOTE — TELEPHONE ENCOUNTER
Called and spoke with the pt over telephone. He state that he has been without his Januvia for 1-2 months as it is over $400 at pharmacy and he cannot afford that copay. Due to being out of Januvia, patient stated that his glucose has been running between 200-500 before meals when he checks it. Patient stated that he has increased the amount of insulin he takes at home and is now taking Novolog 15 units TID before meals and Levemir 35 units BID. Patient states that he also needs cost assistance with his Levemir and Novolog as they are expensive. Patient would like assistance applying for  assistance programs. Will send in pt's Januvia, Levemir, and Novolog on [facility] while he is in the process of applying for  assistance programs for all three medications. Patient is going to come to the clinic on Tuesday to sign applications and bring financial information for applications. Instructed pt to watch glucose closely once he restarts the Januvia this week. Pt verbalized understanding and stated that he checks it three times daily before meals right now. Will notify provider Radha BOSWELL for further guidance on how pt should adjust insulin once he restarts his Januvia.     Brittany Garcia, PharmD

## 2021-11-22 ENCOUNTER — TELEPHONE (OUTPATIENT)
Dept: FAMILY MEDICINE CLINIC | Age: 61
End: 2021-11-22

## 2021-12-01 ENCOUNTER — TELEPHONE (OUTPATIENT)
Dept: FAMILY MEDICINE CLINIC | Age: 61
End: 2021-12-01

## 2021-12-01 NOTE — TELEPHONE ENCOUNTER
----- Message from Brendon Perez sent at 12/1/2021 12:13 PM EST -----  Subject: Message to Provider    QUESTIONS  Information for Provider? Patient has questions about there meds that   there taking. Please give him a call back. ---------------------------------------------------------------------------  --------------  [employer] INFO  What is the best way for the office to contact you? OK to leave message on   voicemail  Preferred Call Back Phone Number?  [phone]  ---------------------------------------------------------------------------  --------------  SCRIPT ANSWERS  undefined

## 2021-12-03 DIAGNOSIS — F43.23 SITUATIONAL MIXED ANXIETY AND DEPRESSIVE DISORDER: ICD-10-CM

## 2021-12-03 DIAGNOSIS — E11.42 DIABETIC POLYNEUROPATHY ASSOCIATED WITH TYPE 2 DIABETES MELLITUS (HCC): ICD-10-CM

## 2021-12-03 RX ORDER — GABAPENTIN 600 MG/1
TABLET ORAL
Qty: 90 TABLET | Refills: 0 | OUTPATIENT
Start: 2021-12-03 | End: 2022-01-02

## 2021-12-03 RX ORDER — ALLOPURINOL 300 MG/1
TABLET ORAL
Qty: 90 TABLET | Refills: 0 | Status: SHIPPED | OUTPATIENT
Start: 2021-12-03 | End: 2022-01-05 | Stop reason: SDUPTHER

## 2021-12-03 RX ORDER — CLONAZEPAM 0.5 MG/1
TABLET ORAL
Qty: 60 TABLET | Refills: 0 | OUTPATIENT
Start: 2021-12-03 | End: 2022-01-03

## 2021-12-03 RX ORDER — CARVEDILOL 12.5 MG/1
TABLET ORAL
Qty: 180 TABLET | Refills: 0 | Status: SHIPPED | OUTPATIENT
Start: 2021-12-03 | End: 2022-01-05 | Stop reason: SDUPTHER

## 2021-12-03 RX ORDER — ROSUVASTATIN CALCIUM 40 MG/1
TABLET, COATED ORAL
Qty: 90 TABLET | Refills: 0 | Status: SHIPPED | OUTPATIENT
Start: 2021-12-03 | End: 2022-01-05 | Stop reason: SDUPTHER

## 2021-12-14 DIAGNOSIS — F43.23 SITUATIONAL MIXED ANXIETY AND DEPRESSIVE DISORDER: ICD-10-CM

## 2021-12-14 DIAGNOSIS — E11.42 DIABETIC POLYNEUROPATHY ASSOCIATED WITH TYPE 2 DIABETES MELLITUS (HCC): ICD-10-CM

## 2021-12-14 RX ORDER — GABAPENTIN 600 MG/1
TABLET ORAL
Qty: 90 TABLET | Refills: 0 | Status: SHIPPED | OUTPATIENT
Start: 2021-12-14 | End: 2022-01-05 | Stop reason: SDUPTHER

## 2021-12-14 RX ORDER — CLONAZEPAM 0.5 MG/1
0.5 TABLET ORAL 2 TIMES DAILY PRN
Qty: 60 TABLET | Refills: 0 | Status: SHIPPED | OUTPATIENT
Start: 2021-12-14 | End: 2022-01-05 | Stop reason: SDUPTHER

## 2022-01-05 ENCOUNTER — HOSPITAL ENCOUNTER (OUTPATIENT)
Facility: HOSPITAL | Age: 62
Discharge: HOME | End: 2022-01-05
Payer: MEDICARE

## 2022-01-05 ENCOUNTER — OFFICE VISIT (OUTPATIENT)
Dept: FAMILY MEDICINE CLINIC | Age: 62
End: 2022-01-05
Payer: MEDICARE

## 2022-01-05 VITALS
DIASTOLIC BLOOD PRESSURE: 78 MMHG | OXYGEN SATURATION: 98 % | WEIGHT: 262 LBS | SYSTOLIC BLOOD PRESSURE: 132 MMHG | HEIGHT: 68 IN | BODY MASS INDEX: 39.71 KG/M2 | TEMPERATURE: 98 F | HEART RATE: 62 BPM | RESPIRATION RATE: 16 BRPM

## 2022-01-05 DIAGNOSIS — I10 HTN, GOAL BELOW 140/80: ICD-10-CM

## 2022-01-05 DIAGNOSIS — E78.5 HYPERLIPIDEMIA LDL GOAL <100: ICD-10-CM

## 2022-01-05 DIAGNOSIS — F43.23 SITUATIONAL MIXED ANXIETY AND DEPRESSIVE DISORDER: ICD-10-CM

## 2022-01-05 DIAGNOSIS — E11.59 TYPE 2 DIABETES MELLITUS WITH OTHER CIRCULATORY COMPLICATION, WITH LONG-TERM CURRENT USE OF INSULIN (HCC): Primary | ICD-10-CM

## 2022-01-05 DIAGNOSIS — Z12.11 COLON CANCER SCREENING: ICD-10-CM

## 2022-01-05 DIAGNOSIS — Z79.4 TYPE 2 DIABETES MELLITUS WITH OTHER CIRCULATORY COMPLICATION, WITH LONG-TERM CURRENT USE OF INSULIN (HCC): ICD-10-CM

## 2022-01-05 DIAGNOSIS — E11.59 TYPE 2 DIABETES MELLITUS WITH OTHER CIRCULATORY COMPLICATION, WITH LONG-TERM CURRENT USE OF INSULIN (HCC): ICD-10-CM

## 2022-01-05 DIAGNOSIS — N18.4 CHRONIC RENAL IMPAIRMENT, STAGE 4 (SEVERE) (HCC): ICD-10-CM

## 2022-01-05 DIAGNOSIS — Z79.4 TYPE 2 DIABETES MELLITUS WITH OTHER CIRCULATORY COMPLICATION, WITH LONG-TERM CURRENT USE OF INSULIN (HCC): Primary | ICD-10-CM

## 2022-01-05 DIAGNOSIS — E11.42 DIABETIC POLYNEUROPATHY ASSOCIATED WITH TYPE 2 DIABETES MELLITUS (HCC): ICD-10-CM

## 2022-01-05 PROCEDURE — 80053 COMPREHEN METABOLIC PANEL: CPT

## 2022-01-05 PROCEDURE — 80061 LIPID PANEL: CPT

## 2022-01-05 PROCEDURE — 99214 OFFICE O/P EST MOD 30 MIN: CPT | Performed by: NURSE PRACTITIONER

## 2022-01-05 PROCEDURE — G8417 CALC BMI ABV UP PARAM F/U: HCPCS | Performed by: NURSE PRACTITIONER

## 2022-01-05 PROCEDURE — G8427 DOCREV CUR MEDS BY ELIG CLIN: HCPCS | Performed by: NURSE PRACTITIONER

## 2022-01-05 PROCEDURE — 3046F HEMOGLOBIN A1C LEVEL >9.0%: CPT | Performed by: NURSE PRACTITIONER

## 2022-01-05 PROCEDURE — 1036F TOBACCO NON-USER: CPT | Performed by: NURSE PRACTITIONER

## 2022-01-05 PROCEDURE — 83036 HEMOGLOBIN GLYCOSYLATED A1C: CPT

## 2022-01-05 PROCEDURE — 3017F COLORECTAL CA SCREEN DOC REV: CPT | Performed by: NURSE PRACTITIONER

## 2022-01-05 PROCEDURE — 2022F DILAT RTA XM EVC RTNOPTHY: CPT | Performed by: NURSE PRACTITIONER

## 2022-01-05 PROCEDURE — G8484 FLU IMMUNIZE NO ADMIN: HCPCS | Performed by: NURSE PRACTITIONER

## 2022-01-05 PROCEDURE — 82043 UR ALBUMIN QUANTITATIVE: CPT

## 2022-01-05 PROCEDURE — 82570 ASSAY OF URINE CREATININE: CPT

## 2022-01-05 PROCEDURE — 36415 COLL VENOUS BLD VENIPUNCTURE: CPT

## 2022-01-05 RX ORDER — GABAPENTIN 600 MG/1
TABLET ORAL
Qty: 90 TABLET | Refills: 2 | Status: SHIPPED | OUTPATIENT
Start: 2022-01-05 | End: 2022-04-06 | Stop reason: SDUPTHER

## 2022-01-05 RX ORDER — INSULIN DETEMIR 100 [IU]/ML
INJECTION, SOLUTION SUBCUTANEOUS
Qty: 60 ML | Refills: 3 | Status: SHIPPED | OUTPATIENT
Start: 2022-01-05 | End: 2022-01-27 | Stop reason: SDUPTHER

## 2022-01-05 RX ORDER — ALLOPURINOL 300 MG/1
300 TABLET ORAL DAILY
Qty: 90 TABLET | Refills: 3 | Status: SHIPPED | OUTPATIENT
Start: 2022-01-05

## 2022-01-05 RX ORDER — ROSUVASTATIN CALCIUM 40 MG/1
TABLET, COATED ORAL
Qty: 90 TABLET | Refills: 3 | Status: SHIPPED | OUTPATIENT
Start: 2022-01-05

## 2022-01-05 RX ORDER — ESCITALOPRAM OXALATE 20 MG/1
TABLET ORAL
Qty: 90 TABLET | Refills: 3 | Status: SHIPPED | OUTPATIENT
Start: 2022-01-05

## 2022-01-05 RX ORDER — CLONAZEPAM 0.5 MG/1
0.5 TABLET ORAL 2 TIMES DAILY PRN
Qty: 60 TABLET | Refills: 0 | Status: SHIPPED | OUTPATIENT
Start: 2022-01-05 | End: 2022-02-18 | Stop reason: SDUPTHER

## 2022-01-05 RX ORDER — INSULIN ASPART 100 [IU]/ML
INJECTION, SOLUTION INTRAVENOUS; SUBCUTANEOUS
Qty: 45 ML | Refills: 3 | Status: SHIPPED | OUTPATIENT
Start: 2022-01-05 | End: 2022-03-21 | Stop reason: SDUPTHER

## 2022-01-05 RX ORDER — BUMETANIDE 2 MG/1
TABLET ORAL
Qty: 180 TABLET | Refills: 1 | Status: SHIPPED | OUTPATIENT
Start: 2022-01-05 | End: 2022-08-04

## 2022-01-05 RX ORDER — FENOFIBRATE 200 MG/1
CAPSULE ORAL
Qty: 90 CAPSULE | Refills: 3 | Status: SHIPPED | OUTPATIENT
Start: 2022-01-05

## 2022-01-05 RX ORDER — CARVEDILOL 12.5 MG/1
TABLET ORAL
Qty: 180 TABLET | Refills: 3 | Status: SHIPPED | OUTPATIENT
Start: 2022-01-05

## 2022-01-05 NOTE — PROGRESS NOTES
Chief Complaint   Patient presents with    Diabetes     reg f/u        Have you seen any other physician or provider since your last visit no    Have you had any other diagnostic tests since your last visit? no    Have you changed or stopped any medications since your last visit? no         Diabetic retinal exam completed this year?  No

## 2022-01-05 NOTE — PROGRESS NOTES
SUBJECTIVE:  Taniya Jean is a 62 y.o. male that presents with   Chief Complaint   Patient presents with    Diabetes     reg f/u    . HPI:    This is a pleasant 62year old white male who presents for follow up with diabetes, htn, hyperlipidemia. Reports he has a fall in august or September 2021 and hurt his left shoulder and cracked some ribs. He did go to the emergency room. He tells me he is taking all his medications tolerating everything well. The gabapentin does help him significantly he denies misuse or abuse of his medication. He denies chest pain shortness of breath fever chills tells me that his sugar has been running good. Diabetes  He presents for his follow-up diabetic visit. He has type 2 diabetes mellitus. No MedicAlert identification noted. His disease course has been worsening. Hypoglycemia symptoms include nervousness/anxiousness. Associated symptoms include fatigue. There are no hypoglycemic complications. Symptoms are worsening. Diabetic complications include peripheral neuropathy and retinopathy. Risk factors for coronary artery disease include male sex, hypertension, diabetes mellitus, stress and dyslipidemia. Current diabetic treatment includes insulin injections and oral agent (monotherapy). He is compliant with treatment most of the time. His weight is stable. He is following a diabetic diet. Meal planning includes avoidance of concentrated sweets. He has not had a previous visit with a dietitian. He never (no scheduled exercise but works in the yard) participates in exercise. There is no change () in his home blood glucose trend. An ACE inhibitor/angiotensin II receptor blocker is being taken. Eye exam is current. Hyperlipidemia  This is a chronic problem. The current episode started more than 1 year ago. The problem is uncontrolled. Exacerbating diseases include chronic renal disease. Factors aggravating his hyperlipidemia include beta blockers.  Associated symptoms include myalgias. Current antihyperlipidemic treatment includes statins and fibric acid derivatives. Compliance problems: unknown. Risk factors for coronary artery disease include diabetes mellitus, dyslipidemia, family history, hypertension, male sex, obesity, stress and a sedentary lifestyle. Hypertension  This is a chronic problem. The current episode started more than 1 year ago. The problem is unchanged. Associated symptoms include malaise/fatigue. There are no associated agents to hypertension. Risk factors for coronary artery disease include diabetes mellitus, dyslipidemia and family history. Past treatments include ACE inhibitors, diuretics and lifestyle changes. The current treatment provides moderate improvement. There are no compliance problems. Hypertensive end-organ damage includes retinopathy. Identifiable causes of hypertension include chronic renal disease. Review of Systems   Constitutional: Positive for fatigue and malaise/fatigue. HENT: Negative. Eyes: Negative. Respiratory: Negative. Cardiovascular: Negative. Gastrointestinal: Negative. Endocrine: Negative. Genitourinary: Negative. Musculoskeletal: Positive for arthralgias, gait problem and myalgias. Skin: Negative. Hematological: Negative. Psychiatric/Behavioral: The patient is nervous/anxious. All other systems reviewed and are negative. OBJECTIVE:  /78   Pulse 62   Temp 98 °F (36.7 °C) (Infrared)   Resp 16   Ht 5' 8\" (1.727 m)   Wt 262 lb (118.8 kg)   SpO2 98% Comment: ra  BMI 39.84 kg/m²   Physical Exam  Vitals and nursing note reviewed. Constitutional:       Appearance: Normal appearance. He is obese. HENT:      Head: Normocephalic and atraumatic.       Right Ear: Tympanic membrane, ear canal and external ear normal.      Left Ear: Tympanic membrane, ear canal and external ear normal.      Nose: Nose normal.      Mouth/Throat:      Mouth: Mucous membranes are moist. Pharynx: Oropharynx is clear. Eyes:      Conjunctiva/sclera: Conjunctivae normal.      Pupils: Pupils are equal, round, and reactive to light. Cardiovascular:      Rate and Rhythm: Normal rate and regular rhythm. Pulses: Normal pulses. Heart sounds: Murmur heard. Pulmonary:      Effort: Pulmonary effort is normal.      Breath sounds: Normal breath sounds. Musculoskeletal:      Left shoulder: Tenderness present. Decreased range of motion. Cervical back: Normal range of motion and neck supple. Right hip: Tenderness present. Decreased range of motion. Right knee: Decreased range of motion. Tenderness present. Skin:     General: Skin is warm and dry. Capillary Refill: Capillary refill takes less than 2 seconds. Neurological:      Mental Status: He is alert and oriented to person, place, and time. Psychiatric:         Mood and Affect: Mood normal.         Behavior: Behavior normal.         Thought Content: Thought content normal.         Judgment: Judgment normal.       No results found for requested labs within last 30 days. Hemoglobin A1C (%)   Date Value   06/09/2021 8.2 (H)     Microalbumin, Random Urine (ug/mL)   Date Value   07/30/2020 245.6     LDL Calculated (mg/dL)   Date Value   06/09/2021 66         Lab Results   Component Value Date    WBC 5.8 01/02/2020    NEUTROABS 3.7 01/02/2020    HGB 12.2 01/02/2020    HCT 37.6 01/02/2020    MCV 86 01/02/2020     01/02/2020       Lab Results   Component Value Date    TSH 1.710 01/02/2020         ASSESSMENT/PLAN:   Diagnosis Orders   1. Type 2 diabetes mellitus with other circulatory complication, with long-term current use of insulin ([facility])   DIABETES FOOT EXAM    MICROALBUMIN / CREATININE URINE RATIO    External Referral To Ophthalmology    insulin aspart (NOVOLOG FLEXPEN) 100 UNIT/ML injection pen    insulin detemir (LEVEMIR FLEXTOUCH) 100 UNIT/ML injection pen    Hemoglobin A1C   2.  Diabetic polyneuropathy associated with type 2 diabetes mellitus (HCC)  gabapentin (NEURONTIN) 600 MG tablet   3. HTN, goal below 140/80  Comprehensive Metabolic Panel   4. Hyperlipidemia LDL goal <100  Lipid Panel   5. Chronic renal impairment, stage 4 (severe) (HCC)     6. Colon cancer screening  External Referral To General Surgery   7. Situational mixed anxiety and depressive disorder  clonazePAM (KLONOPIN) 0.5 MG tablet           Orders Placed This Encounter   Procedures    MICROALBUMIN / CREATININE URINE RATIO     Standing Status:   Future     Number of Occurrences:   1     Standing Expiration Date:   1/5/2023    Hemoglobin A1C     Standing Status:   Future     Number of Occurrences:   1     Standing Expiration Date:   1/5/2023    Comprehensive Metabolic Panel     Standing Status:   Future     Number of Occurrences:   1     Standing Expiration Date:   1/5/2023    Lipid Panel     Standing Status:   Future     Number of Occurrences:   1     Standing Expiration Date:   1/5/2023     Order Specific Question:   Is Patient Fasting?/# of Hours     Answer:   6    External Referral To General Surgery     Referral Priority:   Routine     Referral Type:   Eval and Treat     Referral Reason:   Specialty Services Required     Requested Specialty:   General Surgery     Number of Visits Requested:   1    External Referral To Ophthalmology     Referral Priority:   Routine     Referral Type:   Eval and Treat     Referral Reason:   Specialty Services Required     Requested Specialty:   Ophthalmology     Number of Visits Requested:   1     DIABETES FOOT EXAM        Outpatient Encounter Medications as of 1/5/2022   Medication Sig Dispense Refill    gabapentin (NEURONTIN) 600 MG tablet TAKE ONE TABLET BY MOUTH THREE TIMES A DAY 90 tablet 2    clonazePAM (KLONOPIN) 0.5 MG tablet Take 1 tablet by mouth 2 times daily as needed for Anxiety for up to 30 days.  60 tablet 0    rosuvastatin (CRESTOR) 40 MG tablet TAKE ONE TABLET BY MOUTH DAILY 90 tablet 3    carvedilol (COREG) 12.5 MG tablet TAKE ONE TABLET BY MOUTH TWICE A  tablet 3    allopurinol (ZYLOPRIM) 300 MG tablet Take 1 tablet by mouth daily 90 tablet 3    SITagliptin (JANUVIA) 100 MG tablet TAKE ONE TABLET BY MOUTH DAILY 90 tablet 3    insulin aspart (NOVOLOG FLEXPEN) 100 UNIT/ML injection pen INJECT 15 UNITS INTO THE SKIN THREE TIMES A DAY BEFORE MEALS 45 mL 3    insulin detemir (LEVEMIR FLEXTOUCH) 100 UNIT/ML injection pen INJECT 35 UNITS INTO THE SKIN TWO TIMES A DAY 60 mL 3    fenofibrate micronized (LOFIBRA) 200 MG capsule TAKE ONE CAPSULE BY MOUTH EVERY MORNING BEFORE BREAKFAST 90 capsule 3    escitalopram (LEXAPRO) 20 MG tablet TAKE ONE TABLET BY MOUTH DAILY 90 tablet 3    bumetanide (BUMEX) 2 MG tablet TAKE ONE TABLET BY MOUTH TWICE A  tablet 1    polyethylene glycol (GLYCOLAX) 17 GM/SCOOP powder Take 17 g by mouth 2 times daily as needed      ACCU-CHEK COSTA PLUS strip       pantoprazole (PROTONIX) 40 MG tablet       Insulin Pen Needle ([employer] PEN NEEDLES 29G) 29G X 12MM MISC Check sugar and use insulin up to 5 times daily 150 each 5    Lancets MISC Check sugar and use insulin up to 5 times daily. 150 each 5    B-D UF III MINI PEN NEEDLES 31G X 5 MM MISC USE THREE TIMES A  each 7    aspirin 81 MG chewable tablet Take 81 mg by mouth daily      [DISCONTINUED] gabapentin (NEURONTIN) 600 MG tablet TAKE ONE TABLET BY MOUTH THREE TIMES A DAY 90 tablet 0    [DISCONTINUED] clonazePAM (KLONOPIN) 0.5 MG tablet Take 1 tablet by mouth 2 times daily as needed for Anxiety for up to 30 days.  60 tablet 0    [DISCONTINUED] rosuvastatin (CRESTOR) 40 MG tablet TAKE ONE TABLET BY MOUTH DAILY 90 tablet 0    [DISCONTINUED] carvedilol (COREG) 12.5 MG tablet TAKE ONE TABLET BY MOUTH TWICE A  tablet 0    [DISCONTINUED] allopurinol (ZYLOPRIM) 300 MG tablet TAKE ONE TABLET BY MOUTH DAILY 90 tablet 0    [DISCONTINUED] SITagliptin (JANUVIA) 100 MG tablet TAKE ONE TABLET BY MOUTH DAILY 90 tablet 0    [DISCONTINUED] insulin aspart (NOVOLOG FLEXPEN) 100 UNIT/ML injection pen INJECT 15 UNITS INTO THE SKIN THREE TIMES A DAY BEFORE MEALS 45 mL 0    [DISCONTINUED] insulin detemir (LEVEMIR FLEXTOUCH) 100 UNIT/ML injection pen INJECT 35 UNITS INTO THE SKIN TWO TIMES A DAY (Patient taking differently: INJECT 30 UNITS INTO THE SKIN TWO TIMES A DAY) 60 mL 0    [DISCONTINUED] fenofibrate micronized (LOFIBRA) 200 MG capsule TAKE ONE CAPSULE BY MOUTH EVERY MORNING BEFORE BREAKFAST 90 capsule 0    [DISCONTINUED] escitalopram (LEXAPRO) 20 MG tablet TAKE ONE TABLET BY MOUTH DAILY 90 tablet 3    [DISCONTINUED] bumetanide (BUMEX) 2 MG tablet TAKE ONE TABLET BY MOUTH TWICE A  tablet 1     No facility-administered encounter medications on file as of 1/5/2022. Return in about 3 months (around 4/5/2022), or if symptoms worsen or fail to improve. PATIENT COUNSELING    Counseling was provided today regardingthe following topics: Healthy eating habits, Regular exercise, substance abuse and healthy sleep habits. Discussed use, benefit, and side effectsof prescribed medications. Barriers to medication compliance addressed. All patient questions answered. Pt voiced understanding. Controlled Substance Monitoring:    Acute and Chronic Pain Monitoring:   RX Monitoring 1/5/2022   Attestation -   Periodic Controlled Substance Monitoring Possible medication side effects, risk of tolerance/dependence & alternative treatments discussed. ;No signs of potential drug abuse or diversion identified.;Obtaining appropriate analgesic effect of treatment.    Chronic Pain > 50 MEDD -   Chronic Pain > 80 MEDD -

## 2022-01-06 LAB
A/G RATIO: 1.4 (ref 0.8–2)
ALBUMIN SERPL-MCNC: 3.7 G/DL (ref 3.4–4.8)
ALP BLD-CCNC: 93 U/L (ref 25–100)
ALT SERPL-CCNC: 31 U/L (ref 4–36)
ANION GAP SERPL CALCULATED.3IONS-SCNC: 11 MMOL/L (ref 3–16)
AST SERPL-CCNC: 31 U/L (ref 8–33)
BILIRUB SERPL-MCNC: 0.3 MG/DL (ref 0.3–1.2)
BUN BLDV-MCNC: 26 MG/DL (ref 6–20)
CALCIUM SERPL-MCNC: 9.1 MG/DL (ref 8.5–10.5)
CHLORIDE BLD-SCNC: 101 MMOL/L (ref 98–107)
CHOLESTEROL, TOTAL: 141 MG/DL (ref 0–200)
CO2: 24 MMOL/L (ref 20–30)
CREAT SERPL-MCNC: 2 MG/DL (ref 0.4–1.2)
CREATININE URINE: 77.5 MG/DL (ref 1.5–300)
GFR AFRICAN AMERICAN: 41
GFR NON-AFRICAN AMERICAN: 34
GLOBULIN: 2.6 G/DL
GLUCOSE BLD-MCNC: 303 MG/DL (ref 74–106)
HBA1C MFR BLD: 11.7 %
HDLC SERPL-MCNC: 32 MG/DL (ref 40–60)
LDL CHOLESTEROL CALCULATED: 71 MG/DL
MICROALBUMIN UR-MCNC: 35 MG/DL (ref 0–22)
MICROALBUMIN/CREAT UR-RTO: 451.6 MG/G (ref 0–30)
POTASSIUM SERPL-SCNC: 4.5 MMOL/L (ref 3.4–5.1)
SODIUM BLD-SCNC: 136 MMOL/L (ref 136–145)
TOTAL PROTEIN: 6.3 G/DL (ref 6.4–8.3)
TRIGL SERPL-MCNC: 188 MG/DL (ref 0–249)
VLDLC SERPL CALC-MCNC: 38 MG/DL

## 2022-01-06 ASSESSMENT — ENCOUNTER SYMPTOMS
GASTROINTESTINAL NEGATIVE: 1
RESPIRATORY NEGATIVE: 1
EYES NEGATIVE: 1

## 2022-01-20 ENCOUNTER — TELEPHONE (OUTPATIENT)
Dept: SURGERY | Facility: CLINIC | Age: 62
End: 2022-01-20

## 2022-01-27 ENCOUNTER — VIRTUAL VISIT (OUTPATIENT)
Dept: FAMILY MEDICINE CLINIC | Age: 62
End: 2022-01-27
Payer: MEDICARE

## 2022-01-27 DIAGNOSIS — E11.59 TYPE 2 DIABETES MELLITUS WITH OTHER CIRCULATORY COMPLICATION, WITH LONG-TERM CURRENT USE OF INSULIN (HCC): Primary | ICD-10-CM

## 2022-01-27 DIAGNOSIS — N18.4 CHRONIC RENAL IMPAIRMENT, STAGE 4 (SEVERE) (HCC): ICD-10-CM

## 2022-01-27 DIAGNOSIS — I10 HTN, GOAL BELOW 140/80: ICD-10-CM

## 2022-01-27 DIAGNOSIS — Z79.4 TYPE 2 DIABETES MELLITUS WITH OTHER CIRCULATORY COMPLICATION, WITH LONG-TERM CURRENT USE OF INSULIN (HCC): Primary | ICD-10-CM

## 2022-01-27 PROCEDURE — G2025 DIS SITE TELE SVCS RHC/FQHC: HCPCS | Performed by: NURSE PRACTITIONER

## 2022-01-27 RX ORDER — INSULIN DETEMIR 100 [IU]/ML
INJECTION, SOLUTION SUBCUTANEOUS
Qty: 60 ML | Refills: 3 | Status: SHIPPED | OUTPATIENT
Start: 2022-01-27 | End: 2022-02-24 | Stop reason: SDUPTHER

## 2022-01-27 ASSESSMENT — ENCOUNTER SYMPTOMS
EYES NEGATIVE: 1
GASTROINTESTINAL NEGATIVE: 1
RESPIRATORY NEGATIVE: 1

## 2022-01-27 NOTE — PROGRESS NOTES
SUBJECTIVE:  Quinn Mendoza is a 62 y.o. male that presents with   Chief Complaint   Patient presents with    Diabetes    Hypertension    Hyperlipidemia    Eye Problem   . HPI:    This is a pleasant 62year old white male who presents via telephone for follow up with diabetes, htn, hyperlipidemia. Right eye is bleeding and he has been referred to eye specialist and they do say the diabetes is a lot of the problem. He is using all of his medications tolerating everything well says his sugar has been too high so I am increasing his insulin by 5 units twice daily. Have also asked him to monitor what he is eating he has been a diabetic for quite some time and knows what to eat and what not to eat. There is has a lot to do with the choices that he makes and he verbalizes understanding of that. He denies further complaint. Diabetes  He presents for his follow-up diabetic visit. He has type 2 diabetes mellitus. No MedicAlert identification noted. His disease course has been worsening. Hypoglycemia symptoms include nervousness/anxiousness. Associated symptoms include fatigue. There are no hypoglycemic complications. Symptoms are worsening. Diabetic complications include peripheral neuropathy and retinopathy. Risk factors for coronary artery disease include male sex, hypertension, diabetes mellitus, stress and dyslipidemia. Current diabetic treatment includes insulin injections and oral agent (monotherapy). He is compliant with treatment most of the time. His weight is stable. He is following a diabetic diet. Meal planning includes avoidance of concentrated sweets. He has not had a previous visit with a dietitian. He never participates in exercise. His home blood glucose trend is fluctuating minimally. An ACE inhibitor/angiotensin II receptor blocker is being taken. Eye exam is current. Hyperlipidemia  This is a chronic problem. The current episode started more than 1 year ago. The problem is uncontrolled. Exacerbating diseases include chronic renal disease. Factors aggravating his hyperlipidemia include beta blockers. Associated symptoms include myalgias. Current antihyperlipidemic treatment includes statins and fibric acid derivatives. Compliance problems: unknown. Risk factors for coronary artery disease include diabetes mellitus, dyslipidemia, family history, hypertension, male sex, obesity, stress and a sedentary lifestyle. Hypertension  This is a chronic problem. The current episode started more than 1 year ago. The problem is unchanged. Associated symptoms include malaise/fatigue. There are no associated agents to hypertension. Risk factors for coronary artery disease include diabetes mellitus, dyslipidemia and family history. Past treatments include ACE inhibitors, diuretics and lifestyle changes. The current treatment provides moderate improvement. There are no compliance problems. Hypertensive end-organ damage includes retinopathy. Identifiable causes of hypertension include chronic renal disease. Review of Systems   Constitutional: Positive for fatigue and malaise/fatigue. HENT: Negative. Eyes: Negative. Respiratory: Negative. Cardiovascular: Negative. Gastrointestinal: Negative. Endocrine: Negative. Genitourinary: Negative. Musculoskeletal: Positive for arthralgias, gait problem and myalgias. Skin: Negative. Hematological: Negative. Psychiatric/Behavioral: The patient is nervous/anxious. All other systems reviewed and are negative. OBJECTIVE:  There were no vitals taken for this visit. Physical Exam  Neurological:      Mental Status: He is alert and oriented to person, place, and time. Psychiatric:         Attention and Perception: Attention and perception normal.         Mood and Affect: Affect normal. Mood is anxious. Speech: Speech normal.         Behavior: Behavior normal. Behavior is cooperative. Thought Content:  Thought content normal.         Cognition and Memory: Cognition and memory normal.         Judgment: Judgment normal.       Results in Past 30 Days  Result Component Current Result Ref Range Previous Result Ref Range   Albumin/Globulin Ratio 1.4 (1/5/2022) 0.8 - 2.0 Not in Time Range    Albumin 3.7 (1/5/2022) 3.4 - 4.8 g/dL Not in Time Range    Alkaline Phosphatase 93 (1/5/2022) 25 - 100 U/L Not in Time Range    ALT 31 (1/5/2022) 4 - 36 U/L Not in Time Range    AST 31 (1/5/2022) 8 - 33 U/L Not in Time Range    BUN 26 (H) (1/5/2022) 6 - 20 mg/dL Not in Time Range    Calcium 9.1 (1/5/2022) 8.5 - 10.5 mg/dL Not in Time Range    Chloride 101 (1/5/2022) 98 - 107 mmol/L Not in Time Range    CO2 24 (1/5/2022) 20 - 30 mmol/L Not in Time Range    CREATININE 2.0 (H) (1/5/2022) 0.4 - 1.2 mg/dL Not in Time Range    GFR  41 (L) (1/5/2022) >59 Not in Time Range    GFR Non- 34 (L) (1/5/2022) >59 Not in Time Range    Globulin 2.6 (1/5/2022) Not Established g/dL Not in Time Range    Glucose 303 (H) (1/5/2022) 74 - 106 mg/dL Not in Time Range    Potassium 4.5 (1/5/2022) 3.4 - 5.1 mmol/L Not in Time Range    Sodium 136 (1/5/2022) 136 - 145 mmol/L Not in Time Range    Total Bilirubin 0.3 (1/5/2022) 0.3 - 1.2 mg/dL Not in Time Range    Total Protein 6.3 (L) (1/5/2022) 6.4 - 8.3 g/dL Not in Time Range        Hemoglobin A1C (%)   Date Value   01/05/2022 11.7 (H)     Microalbumin, Random Urine (mg/dL)   Date Value   01/05/2022 35.00 (H)     LDL Calculated (mg/dL)   Date Value   01/05/2022 71         Lab Results   Component Value Date    WBC 5.8 01/02/2020    NEUTROABS 3.7 01/02/2020    HGB 12.2 01/02/2020    HCT 37.6 01/02/2020    MCV 86 01/02/2020     01/02/2020       Lab Results   Component Value Date    TSH 1.710 01/02/2020         ASSESSMENT/PLAN:   Diagnosis Orders   1.  Type 2 diabetes mellitus with other circulatory complication, with long-term current use of insulin (HCC)  insulin detemir (LEVEMIR FLEXTOUCH) 100 UNIT/ML injection pen   2. HTN, goal below 140/80     3. Chronic renal impairment, stage 4 (severe) ([facility])           No orders of the defined types were placed in this encounter. Outpatient Encounter Medications as of 1/27/2022   Medication Sig Dispense Refill    insulin detemir (LEVEMIR FLEXTOUCH) 100 UNIT/ML injection pen INJECT 45 UNITS INTO THE SKIN TWO TIMES A DAY 60 mL 3    gabapentin (NEURONTIN) 600 MG tablet TAKE ONE TABLET BY MOUTH THREE TIMES A DAY 90 tablet 2    clonazePAM (KLONOPIN) 0.5 MG tablet Take 1 tablet by mouth 2 times daily as needed for Anxiety for up to 30 days. 60 tablet 0    rosuvastatin (CRESTOR) 40 MG tablet TAKE ONE TABLET BY MOUTH DAILY 90 tablet 3    carvedilol (COREG) 12.5 MG tablet TAKE ONE TABLET BY MOUTH TWICE A  tablet 3    allopurinol (ZYLOPRIM) 300 MG tablet Take 1 tablet by mouth daily 90 tablet 3    SITagliptin (JANUVIA) 100 MG tablet TAKE ONE TABLET BY MOUTH DAILY 90 tablet 3    insulin aspart (NOVOLOG FLEXPEN) 100 UNIT/ML injection pen INJECT 15 UNITS INTO THE SKIN THREE TIMES A DAY BEFORE MEALS 45 mL 3    fenofibrate micronized (LOFIBRA) 200 MG capsule TAKE ONE CAPSULE BY MOUTH EVERY MORNING BEFORE BREAKFAST 90 capsule 3    escitalopram (LEXAPRO) 20 MG tablet TAKE ONE TABLET BY MOUTH DAILY 90 tablet 3    bumetanide (BUMEX) 2 MG tablet TAKE ONE TABLET BY MOUTH TWICE A  tablet 1    [DISCONTINUED] insulin detemir (LEVEMIR FLEXTOUCH) 100 UNIT/ML injection pen INJECT 35 UNITS INTO THE SKIN TWO TIMES A DAY 60 mL 3    polyethylene glycol (GLYCOLAX) 17 GM/SCOOP powder Take 17 g by mouth 2 times daily as needed      ACCU-CHEK COSTA PLUS strip       pantoprazole (PROTONIX) 40 MG tablet       Insulin Pen Needle ([employer] PEN NEEDLES 29G) 29G X 12MM MISC Check sugar and use insulin up to 5 times daily 150 each 5    Lancets MISC Check sugar and use insulin up to 5 times daily.  150 each 5    B-D UF III MINI PEN NEEDLES 31G X 5 MM MISC USE THREE TIMES

## 2022-01-28 ENCOUNTER — PREP FOR SURGERY (OUTPATIENT)
Dept: OTHER | Facility: HOSPITAL | Age: 62
End: 2022-01-28

## 2022-01-28 DIAGNOSIS — Z12.11 COLON CANCER SCREENING: Primary | ICD-10-CM

## 2022-01-28 RX ORDER — POLYETHYLENE GLYCOL 3350 17 G/17G
POWDER, FOR SOLUTION ORAL
Qty: 238 G | Refills: 0 | Status: SHIPPED | OUTPATIENT
Start: 2022-01-28

## 2022-01-28 RX ORDER — BISACODYL 5 MG
TABLET, DELAYED RELEASE (ENTERIC COATED) ORAL
Qty: 4 TABLET | Refills: 0 | Status: SHIPPED | OUTPATIENT
Start: 2022-01-28

## 2022-01-28 NOTE — TELEPHONE ENCOUNTER
PRESCREENING FOR OPEN ACCESS SCHEDULING    Juan Alberto Tejeda, 1960  [id number]    01/28/22    If, the patient answers yes to any of the following questions the provider will be informed prior to scheduling open access for approval and documented in the chart.    [x]  Yes  [] No    1. Have you ever had a colonoscopy in the past?      When:  3-4      Where:       Polyps or other:     []  Yes  [x] No    2. Family history of colon cancer?      Relation:       Age of onset:       Do you currently have any of the following?    []  Yes  [x] No  Rectal bleeding, if so, how long?     []  Yes  [x] No  Abdominal pain, if so, how long?    []  Yes  [x] No  Constipation, if so, how long?    []  Yes  [x] No  Diarrhea, if so, how long?    []  Yes  [x] No  Weight loss, is so, how much?    [] Yes  [x] No  Small caliber stool, if so, how long?      Have you ever had any of the following conditions?    [] Yes  [x] No  Heart attack?      When?       Last cardiac workup?     Blood thinners?    [] Yes  [x] No   Lung problems, asthma or COPD?  [] Yes  [x] No  Oxygen required?       [] Yes  [x] No  Stroke?     [] Yes  [x] No  Have you ever had a reaction to anesthesia?

## 2022-01-28 NOTE — TELEPHONE ENCOUNTER
Pt scheduled at [facility] for colonoscopy on 3/10/22, instructions mailed, prep sent in.

## 2022-02-18 DIAGNOSIS — F43.23 SITUATIONAL MIXED ANXIETY AND DEPRESSIVE DISORDER: ICD-10-CM

## 2022-02-18 RX ORDER — CLONAZEPAM 0.5 MG/1
0.5 TABLET ORAL 2 TIMES DAILY PRN
Qty: 60 TABLET | Refills: 0 | Status: SHIPPED | OUTPATIENT
Start: 2022-02-18 | End: 2022-04-06 | Stop reason: SDUPTHER

## 2022-02-24 ENCOUNTER — OFFICE VISIT (OUTPATIENT)
Dept: FAMILY MEDICINE CLINIC | Age: 62
End: 2022-02-24
Payer: MEDICARE

## 2022-02-24 VITALS
WEIGHT: 257.8 LBS | OXYGEN SATURATION: 98 % | RESPIRATION RATE: 18 BRPM | HEIGHT: 68 IN | TEMPERATURE: 98 F | SYSTOLIC BLOOD PRESSURE: 130 MMHG | BODY MASS INDEX: 39.07 KG/M2 | HEART RATE: 74 BPM | DIASTOLIC BLOOD PRESSURE: 80 MMHG

## 2022-02-24 DIAGNOSIS — N18.4 CHRONIC RENAL IMPAIRMENT, STAGE 4 (SEVERE) (HCC): ICD-10-CM

## 2022-02-24 DIAGNOSIS — E78.5 HYPERLIPIDEMIA LDL GOAL <100: ICD-10-CM

## 2022-02-24 DIAGNOSIS — F43.23 SITUATIONAL MIXED ANXIETY AND DEPRESSIVE DISORDER: ICD-10-CM

## 2022-02-24 DIAGNOSIS — I10 HTN, GOAL BELOW 140/80: ICD-10-CM

## 2022-02-24 DIAGNOSIS — E11.42 DIABETIC POLYNEUROPATHY ASSOCIATED WITH TYPE 2 DIABETES MELLITUS (HCC): ICD-10-CM

## 2022-02-24 DIAGNOSIS — Z79.4 TYPE 2 DIABETES MELLITUS WITH OTHER CIRCULATORY COMPLICATION, WITH LONG-TERM CURRENT USE OF INSULIN (HCC): Primary | ICD-10-CM

## 2022-02-24 DIAGNOSIS — E11.59 TYPE 2 DIABETES MELLITUS WITH OTHER CIRCULATORY COMPLICATION, WITH LONG-TERM CURRENT USE OF INSULIN (HCC): Primary | ICD-10-CM

## 2022-02-24 PROCEDURE — G8427 DOCREV CUR MEDS BY ELIG CLIN: HCPCS | Performed by: NURSE PRACTITIONER

## 2022-02-24 PROCEDURE — 1036F TOBACCO NON-USER: CPT | Performed by: NURSE PRACTITIONER

## 2022-02-24 PROCEDURE — 99214 OFFICE O/P EST MOD 30 MIN: CPT | Performed by: NURSE PRACTITIONER

## 2022-02-24 PROCEDURE — 3046F HEMOGLOBIN A1C LEVEL >9.0%: CPT | Performed by: NURSE PRACTITIONER

## 2022-02-24 PROCEDURE — G8484 FLU IMMUNIZE NO ADMIN: HCPCS | Performed by: NURSE PRACTITIONER

## 2022-02-24 PROCEDURE — G8417 CALC BMI ABV UP PARAM F/U: HCPCS | Performed by: NURSE PRACTITIONER

## 2022-02-24 PROCEDURE — 2022F DILAT RTA XM EVC RTNOPTHY: CPT | Performed by: NURSE PRACTITIONER

## 2022-02-24 PROCEDURE — 3017F COLORECTAL CA SCREEN DOC REV: CPT | Performed by: NURSE PRACTITIONER

## 2022-02-24 RX ORDER — INSULIN DETEMIR 100 [IU]/ML
75 INJECTION, SOLUTION SUBCUTANEOUS 2 TIMES DAILY
Qty: 60 ML | Refills: 3 | Status: SHIPPED | OUTPATIENT
Start: 2022-02-24 | End: 2022-03-21 | Stop reason: SDUPTHER

## 2022-02-24 ASSESSMENT — PATIENT HEALTH QUESTIONNAIRE - PHQ9
SUM OF ALL RESPONSES TO PHQ QUESTIONS 1-9: 1
7. TROUBLE CONCENTRATING ON THINGS, SUCH AS READING THE NEWSPAPER OR WATCHING TELEVISION: 1
4. FEELING TIRED OR HAVING LITTLE ENERGY: 0
SUM OF ALL RESPONSES TO PHQ QUESTIONS 1-9: 3
8. MOVING OR SPEAKING SO SLOWLY THAT OTHER PEOPLE COULD HAVE NOTICED. OR THE OPPOSITE, BEING SO FIGETY OR RESTLESS THAT YOU HAVE BEEN MOVING AROUND A LOT MORE THAN USUAL: 0
SUM OF ALL RESPONSES TO PHQ QUESTIONS 1-9: 1
1. LITTLE INTEREST OR PLEASURE IN DOING THINGS: 1
SUM OF ALL RESPONSES TO PHQ9 QUESTIONS 1 & 2: 1
3. TROUBLE FALLING OR STAYING ASLEEP: 0
SUM OF ALL RESPONSES TO PHQ QUESTIONS 1-9: 1
9. THOUGHTS THAT YOU WOULD BE BETTER OFF DEAD, OR OF HURTING YOURSELF: 0
2. FEELING DOWN, DEPRESSED OR HOPELESS: 0
SUM OF ALL RESPONSES TO PHQ QUESTIONS 1-9: 3
SUM OF ALL RESPONSES TO PHQ9 QUESTIONS 1 & 2: 1
5. POOR APPETITE OR OVEREATING: 1
6. FEELING BAD ABOUT YOURSELF - OR THAT YOU ARE A FAILURE OR HAVE LET YOURSELF OR YOUR FAMILY DOWN: 0
2. FEELING DOWN, DEPRESSED OR HOPELESS: 1
SUM OF ALL RESPONSES TO PHQ QUESTIONS 1-9: 1
1. LITTLE INTEREST OR PLEASURE IN DOING THINGS: 0
10. IF YOU CHECKED OFF ANY PROBLEMS, HOW DIFFICULT HAVE THESE PROBLEMS MADE IT FOR YOU TO DO YOUR WORK, TAKE CARE OF THINGS AT HOME, OR GET ALONG WITH OTHER PEOPLE: 0

## 2022-02-24 ASSESSMENT — ENCOUNTER SYMPTOMS
GASTROINTESTINAL NEGATIVE: 1
EYES NEGATIVE: 1
RESPIRATORY NEGATIVE: 1

## 2022-02-24 NOTE — PROGRESS NOTES
Chief Complaint   Patient presents with    Diabetes     reg f/'u        Have you seen any other physician or provider since your last visit yes - eye doctor    Have you had any other diagnostic tests since your last visit? no    Have you changed or stopped any medications since your last visit? no         Diabetic retinal exam completed this year?  Yes         Latasha Robles in [place]

## 2022-02-24 NOTE — PROGRESS NOTES
SUBJECTIVE:  Candelaria Jeffrey is a 62 y.o. male that presents with   Chief Complaint   Patient presents with    Diabetes     reg f/'u    . HPI:    This is a pleasant 62year old white male who presents for follow up with diabetes, htn, hyperlipidemia. Colonoscopy to be done 3/10/2022. Reports he is doing everything he knows to do for his sugar but it is not helping   I have increased levimir to 75 units bid and see me back in April. Diabetes  He presents for his follow-up diabetic visit. He has type 2 diabetes mellitus. No MedicAlert identification noted. His disease course has been worsening. Hypoglycemia symptoms include nervousness/anxiousness. Associated symptoms include fatigue. There are no hypoglycemic complications. Symptoms are worsening. Diabetic complications include peripheral neuropathy and retinopathy. Risk factors for coronary artery disease include male sex, hypertension, diabetes mellitus, stress and dyslipidemia. Current diabetic treatment includes insulin injections and oral agent (monotherapy). He is compliant with treatment most of the time. His weight is stable. He is following a diabetic diet. Meal planning includes avoidance of concentrated sweets. He has not had a previous visit with a dietitian. He never (no scheduled exercise but works in the yard) participates in exercise. There is no change () in his home blood glucose trend. An ACE inhibitor/angiotensin II receptor blocker is being taken. Eye exam is current. Hyperlipidemia  This is a chronic problem. The current episode started more than 1 year ago. The problem is uncontrolled. Exacerbating diseases include chronic renal disease. Factors aggravating his hyperlipidemia include beta blockers. Associated symptoms include myalgias. Current antihyperlipidemic treatment includes statins and fibric acid derivatives. Compliance problems: unknown.   Risk factors for coronary artery disease include diabetes mellitus, dyslipidemia, family history, hypertension, male sex, obesity, stress and a sedentary lifestyle. Hypertension  This is a chronic problem. The current episode started more than 1 year ago. The problem is unchanged. Associated symptoms include malaise/fatigue. There are no associated agents to hypertension. Risk factors for coronary artery disease include diabetes mellitus, dyslipidemia and family history. Past treatments include ACE inhibitors, diuretics and lifestyle changes. The current treatment provides moderate improvement. There are no compliance problems. Hypertensive end-organ damage includes retinopathy. Identifiable causes of hypertension include chronic renal disease. Review of Systems   Constitutional: Positive for fatigue and malaise/fatigue. HENT: Negative. Eyes: Negative. Respiratory: Negative. Cardiovascular: Negative. Gastrointestinal: Negative. Endocrine: Negative. Genitourinary: Negative. Musculoskeletal: Positive for arthralgias, gait problem and myalgias. Skin: Negative. Hematological: Negative. Psychiatric/Behavioral: The patient is nervous/anxious. All other systems reviewed and are negative. OBJECTIVE:  /80   Pulse 74   Temp 98 °F (36.7 °C) (Infrared)   Resp 18   Ht 5' 8\" (1.727 m)   Wt 257 lb 12.8 oz (116.9 kg)   SpO2 98% Comment: ra  BMI 39.20 kg/m²   Physical Exam  Vitals and nursing note reviewed. Constitutional:       Appearance: Normal appearance. He is obese. HENT:      Head: Normocephalic and atraumatic. Right Ear: Tympanic membrane, ear canal and external ear normal.      Left Ear: Tympanic membrane, ear canal and external ear normal.      Nose: Nose normal.      Mouth/Throat:      Mouth: Mucous membranes are moist.      Pharynx: Oropharynx is clear. Eyes:      Conjunctiva/sclera: Conjunctivae normal.      Pupils: Pupils are equal, round, and reactive to light.    Cardiovascular:      Rate and Rhythm: Normal rate and regular rhythm. Pulses: Normal pulses. Heart sounds: Murmur heard. Pulmonary:      Effort: Pulmonary effort is normal.      Breath sounds: Normal breath sounds. Musculoskeletal:      Left shoulder: Tenderness present. Decreased range of motion. Cervical back: Normal range of motion and neck supple. Right hip: Tenderness present. Decreased range of motion. Right knee: Decreased range of motion. Tenderness present. Skin:     General: Skin is warm and dry. Capillary Refill: Capillary refill takes less than 2 seconds. Neurological:      Mental Status: He is alert and oriented to person, place, and time. Psychiatric:         Mood and Affect: Mood normal.         Behavior: Behavior normal.         Thought Content: Thought content normal.         Judgment: Judgment normal.       No results found for requested labs within last 30 days. Hemoglobin A1C (%)   Date Value   01/05/2022 11.7 (H)     Microalbumin, Random Urine (mg/dL)   Date Value   01/05/2022 35.00 (H)     LDL Calculated (mg/dL)   Date Value   01/05/2022 71         Lab Results   Component Value Date    WBC 5.8 01/02/2020    NEUTROABS 3.7 01/02/2020    HGB 12.2 01/02/2020    HCT 37.6 01/02/2020    MCV 86 01/02/2020     01/02/2020       Lab Results   Component Value Date    TSH 1.710 01/02/2020         ASSESSMENT/PLAN:   Diagnosis Orders   1. Type 2 diabetes mellitus with other circulatory complication, with long-term current use of insulin ([facility])  insulin detemir (LEVEMIR FLEXTOUCH) 100 UNIT/ML injection pen   2. Diabetic polyneuropathy associated with type 2 diabetes mellitus ([facility] [address]     3. HTN, goal below 140/80     4. Chronic renal impairment, stage 4 (severe) ([facility])     5. Hyperlipidemia LDL goal <100     6. Situational mixed anxiety and depressive disorder             No orders of the defined types were placed in this encounter.        Outpatient Encounter Medications as of 2/24/2022   Medication Sig Dispense Refill    insulin detemir (LEVEMIR FLEXTOUCH) 100 UNIT/ML injection pen Inject 75 Units into the skin 2 times daily 60 mL 3    clonazePAM (KLONOPIN) 0.5 MG tablet Take 1 tablet by mouth 2 times daily as needed for Anxiety for up to 30 days. 60 tablet 0    rosuvastatin (CRESTOR) 40 MG tablet TAKE ONE TABLET BY MOUTH DAILY 90 tablet 3    carvedilol (COREG) 12.5 MG tablet TAKE ONE TABLET BY MOUTH TWICE A  tablet 3    allopurinol (ZYLOPRIM) 300 MG tablet Take 1 tablet by mouth daily 90 tablet 3    SITagliptin (JANUVIA) 100 MG tablet TAKE ONE TABLET BY MOUTH DAILY 90 tablet 3    insulin aspart (NOVOLOG FLEXPEN) 100 UNIT/ML injection pen INJECT 15 UNITS INTO THE SKIN THREE TIMES A DAY BEFORE MEALS 45 mL 3    fenofibrate micronized (LOFIBRA) 200 MG capsule TAKE ONE CAPSULE BY MOUTH EVERY MORNING BEFORE BREAKFAST 90 capsule 3    escitalopram (LEXAPRO) 20 MG tablet TAKE ONE TABLET BY MOUTH DAILY 90 tablet 3    bumetanide (BUMEX) 2 MG tablet TAKE ONE TABLET BY MOUTH TWICE A  tablet 1    polyethylene glycol (GLYCOLAX) 17 GM/SCOOP powder Take 17 g by mouth 2 times daily as needed      ACCU-CHEK COSTA PLUS strip       pantoprazole (PROTONIX) 40 MG tablet       Insulin Pen Needle ([employer] PEN NEEDLES 29G) 29G X 12MM MISC Check sugar and use insulin up to 5 times daily 150 each 5    Lancets MISC Check sugar and use insulin up to 5 times daily. 150 each 5    B-D UF III MINI PEN NEEDLES 31G X 5 MM MISC USE THREE TIMES A  each 7    aspirin 81 MG chewable tablet Take 81 mg by mouth daily      [DISCONTINUED] insulin detemir (LEVEMIR FLEXTOUCH) 100 UNIT/ML injection pen INJECT 45 UNITS INTO THE SKIN TWO TIMES A DAY (Patient taking differently: INJECT 65 UNITS INTO THE SKIN TWO TIMES A DAY) 60 mL 3    gabapentin (NEURONTIN) 600 MG tablet TAKE ONE TABLET BY MOUTH THREE TIMES A DAY 90 tablet 2     No facility-administered encounter medications on file as of 2/24/2022.        Return in about 6 weeks (around 4/6/2022), or if symptoms worsen or fail to improve. PATIENT COUNSELING    Counseling was provided today regardingthe following topics: Healthy eating habits, Regular exercise, substance abuse and healthy sleep habits. Discussed use, benefit, and side effectsof prescribed medications. Barriers to medication compliance addressed. All patient questions answered. Pt voiced understanding. Controlled Substance Monitoring:    Acute and Chronic Pain Monitoring:   RX Monitoring 2/24/2022   Attestation -   Periodic Controlled Substance Monitoring Possible medication side effects, risk of tolerance/dependence & alternative treatments discussed. ;No signs of potential drug abuse or diversion identified.    Chronic Pain > 50 MEDD -   Chronic Pain > 80 MEDD -

## 2022-02-28 ENCOUNTER — TELEPHONE (OUTPATIENT)
Dept: FAMILY MEDICINE CLINIC | Age: 62
End: 2022-02-28

## 2022-02-28 NOTE — TELEPHONE ENCOUNTER
Called pt regarding his levemir, novolog, and [place] [employer] [place]. Pt is already working with Mary Jo at [facility] on applying for [employer]. Pt provided verbal permission for me to send applications and financial papers to [employer] at [facility]. Instructed pt to call [facility] for any samples if he runs low on his insulin until he receives his  assistance supply. Pt verbalized understanding.     Carlos Alcaraz, AdeliaD

## 2022-03-07 ENCOUNTER — TELEPHONE (OUTPATIENT)
Dept: SURGERY | Facility: CLINIC | Age: 62
End: 2022-03-07

## 2022-03-10 ENCOUNTER — OUTSIDE FACILITY SERVICE (OUTPATIENT)
Dept: SURGERY | Facility: CLINIC | Age: 62
End: 2022-03-10

## 2022-03-10 ENCOUNTER — HOSPITAL ENCOUNTER (OUTPATIENT)
Facility: HOSPITAL | Age: 62
Setting detail: OUTPATIENT SURGERY
Discharge: HOME | End: 2022-03-10
Attending: SURGERY | Admitting: SURGERY
Payer: MEDICARE

## 2022-03-10 ENCOUNTER — ANESTHESIA EVENT (OUTPATIENT)
Dept: ENDOSCOPY | Facility: HOSPITAL | Age: 62
End: 2022-03-10
Payer: MEDICARE

## 2022-03-10 ENCOUNTER — ANESTHESIA (OUTPATIENT)
Dept: ENDOSCOPY | Facility: HOSPITAL | Age: 62
End: 2022-03-10
Payer: MEDICARE

## 2022-03-10 VITALS
DIASTOLIC BLOOD PRESSURE: 79 MMHG | BODY MASS INDEX: 38.49 KG/M2 | TEMPERATURE: 98 F | HEART RATE: 60 BPM | SYSTOLIC BLOOD PRESSURE: 141 MMHG | HEIGHT: 68 IN | RESPIRATION RATE: 18 BRPM | WEIGHT: 254 LBS | OXYGEN SATURATION: 93 %

## 2022-03-10 VITALS
DIASTOLIC BLOOD PRESSURE: 98 MMHG | SYSTOLIC BLOOD PRESSURE: 120 MMHG | OXYGEN SATURATION: 96 % | RESPIRATION RATE: 22 BRPM

## 2022-03-10 LAB
GLUCOSE BLD-MCNC: 155 MG/DL (ref 74–106)
PERFORMED ON: ABNORMAL

## 2022-03-10 PROCEDURE — 2580000003 HC RX 258: Performed by: SURGERY

## 2022-03-10 PROCEDURE — 3700000001 HC ADD 15 MINUTES (ANESTHESIA): Performed by: SURGERY

## 2022-03-10 PROCEDURE — 45385 COLONOSCOPY W/LESION REMOVAL: CPT | Performed by: SURGERY

## 2022-03-10 PROCEDURE — 7100000010 HC PHASE II RECOVERY - FIRST 15 MIN: Performed by: SURGERY

## 2022-03-10 PROCEDURE — 3700000000 HC ANESTHESIA ATTENDED CARE: Performed by: SURGERY

## 2022-03-10 PROCEDURE — 2500000003 HC RX 250 WO HCPCS: Performed by: NURSE ANESTHETIST, CERTIFIED REGISTERED

## 2022-03-10 PROCEDURE — 7100000011 HC PHASE II RECOVERY - ADDTL 15 MIN: Performed by: SURGERY

## 2022-03-10 PROCEDURE — 3609010600 HC COLONOSCOPY POLYPECTOMY SNARE/COLD BIOPSY: Performed by: SURGERY

## 2022-03-10 PROCEDURE — 2709999900 HC NON-CHARGEABLE SUPPLY: Performed by: SURGERY

## 2022-03-10 PROCEDURE — 6360000002 HC RX W HCPCS: Performed by: NURSE ANESTHETIST, CERTIFIED REGISTERED

## 2022-03-10 RX ORDER — SODIUM CHLORIDE, SODIUM LACTATE, POTASSIUM CHLORIDE, CALCIUM CHLORIDE 600; 310; 30; 20 MG/100ML; MG/100ML; MG/100ML; MG/100ML
INJECTION, SOLUTION INTRAVENOUS CONTINUOUS
Status: DISCONTINUED | OUTPATIENT
Start: 2022-03-10 | End: 2022-03-10 | Stop reason: HOSPADM

## 2022-03-10 RX ORDER — PROPOFOL 10 MG/ML
INJECTION, EMULSION INTRAVENOUS PRN
Status: DISCONTINUED | OUTPATIENT
Start: 2022-03-10 | End: 2022-03-10 | Stop reason: SDUPTHER

## 2022-03-10 RX ADMIN — PROPOFOL 50 MG: 10 INJECTION, EMULSION INTRAVENOUS at 08:33

## 2022-03-10 RX ADMIN — LIDOCAINE HYDROCHLORIDE 30 MG: 10 INJECTION, SOLUTION INFILTRATION; PERINEURAL at 08:33

## 2022-03-10 RX ADMIN — SODIUM CHLORIDE, POTASSIUM CHLORIDE, SODIUM LACTATE AND CALCIUM CHLORIDE: 600; 310; 30; 20 INJECTION, SOLUTION INTRAVENOUS at 08:03

## 2022-03-10 RX ADMIN — PROPOFOL 40 MG: 10 INJECTION, EMULSION INTRAVENOUS at 08:40

## 2022-03-10 RX ADMIN — PROPOFOL 30 MG: 10 INJECTION, EMULSION INTRAVENOUS at 08:38

## 2022-03-10 RX ADMIN — PROPOFOL 30 MG: 10 INJECTION, EMULSION INTRAVENOUS at 08:44

## 2022-03-10 ASSESSMENT — PAIN SCALES - GENERAL: PAINLEVEL_OUTOF10: 0

## 2022-03-10 ASSESSMENT — ENCOUNTER SYMPTOMS: DYSPNEA ACTIVITY LEVEL: AFTER AMBULATING 1 FLIGHT OF STAIRS

## 2022-03-10 NOTE — OP NOTE
PATIENT:    Bari Wyman    DATE OF SURGERY:  03/10/22    PHYSICIAN:    Mena Santana MD, MD, FACS    REFERRING PHYSICIAN:  ANDREIA Nguyen      YOB: 1960    PREOPERATIVE DIAGNOSIS:   History of colon polyps    POSTOPERATIVE DIAGNOSIS: Descending colon polyp      Estimated blood loss: None    PROCEDURE:  Colonoscopy with polypectomy via hot snare    HISTORY:   The patient was sent to me as a consultation via ANDREIA Nguyen for evaluation and treatment of the above-mentioned symptomatology. The patient is here now today for elective colonoscopy. I did meet with the patient preoperatively who understands the full risks and benefits of the above-mentioned procedure. We will proceed with this today on an elective basis. ANESTHESIA:  The patient was monitored both preoperatively and postoperatively in the normal fashion from a cardiovascular standpoint. Oxygen was delivered at 2 liters per nasal cannula, and oxygen saturations were monitored during the procedure. The patient remained stable from a cardiovascular standpoint throughout the entire procedure. OPERATIVE PROCEDURE:  The patient was taken to the endoscopy unit and placed in the supine position and given anesthesia as mentioned above. The patient was then placed in the left lateral decubitus position and the scope was introduced into the patients anus and then into the rectum without difficulty. The scope was carefully advanced throughout the colon to the ileocecal valve. All mucosal surfaces were visualized. Upon careful withdrawal of the scope, there was noted to be a 7 mm polyp in the descending colon, removed with a hot snare and retrieved. Rest of colon unremarkable. No other findings. .      A retroflexed view was obtained of the patients rectum and this showed no significant hemorrhoids. Digital rectal examination was performed and revealed good sphincter tone and no obvious masses.      The patient was stable at this point in time and subsequently transferred back to the recovery room in stable condition.     QUALITY OF PREP: Adequate    PLAN: Repeat colonoscopy in 5 years    Electronically signed by Suma Dennis MD, FACS on 3/10/2022 at 8:53 AM

## 2022-03-10 NOTE — ANESTHESIA POSTPROCEDURE EVALUATION
Department of Anesthesiology  Postprocedure Note    Patient: Fuentes Pisano  MRN: [id number]  YOB: 1960  Date of evaluation: 3/10/2022  Time:  11:10 AM     Procedure Summary     Date: 03/10/22 Room / Location: [facility] 01 / [address]    Anesthesia Start: 0830 Anesthesia Stop: 9252    Procedure: COLORECTAL CANCER SCREENING, NOT HIGH RISK (N/A ) Diagnosis: (Z12.11)    Surgeons: Chalo Shetty MD Responsible Provider: ANDREIA Blas CRNA    Anesthesia Type: MAC ASA Status: 3          Anesthesia Type: MAC    Geovanni Phase I: Geovanni Score: 10    Geovanni Phase II: Geovanni Score: 10    Last vitals: Reviewed and per EMR flowsheets.        Anesthesia Post Evaluation    Patient location during evaluation: bedside  Patient participation: complete - patient participated  Level of consciousness: awake and alert  Airway patency: patent  Nausea & Vomiting: no nausea and no vomiting  Complications: no  Cardiovascular status: blood pressure returned to baseline  Respiratory status: acceptable  Hydration status: stable

## 2022-03-10 NOTE — PROGRESS NOTES
Pt to room via stretcher, NAD, No c/o noted. HOB elevated 30 degrees, Left side lying position. BS active in all 4 quads. Lung sounds CTA and no distress noted. Rails up x2, stretcher locked. Assessment completed. Awakens easily. Abd soft /NT/ND:+flatus, +BS, Denies N/V/abd pain. Report received from [address] [place] [address].

## 2022-03-10 NOTE — ANESTHESIA PRE PROCEDURE
Department of Anesthesiology  Preprocedure Note       Name:  Pamela Friend   Age:  62 y.o.  :  1960                                          MRN:  [id number]         Date:  3/10/2022      Surgeon: Goran Topete):  Michelle Lainez MD    Procedure: Procedure(s):  COLORECTAL CANCER SCREENING, NOT HIGH RISK    Medications prior to admission:   Prior to Admission medications    Medication Sig Start Date End Date Taking? Authorizing Provider   insulin detemir (LEVEMIR FLEXTOUCH) 100 UNIT/ML injection pen Inject 75 Units into the skin 2 times daily 22   ANDREIA Hawk   clonazePAM (KLONOPIN) 0.5 MG tablet Take 1 tablet by mouth 2 times daily as needed for Anxiety for up to 30 days.  2/18/22 3/20/22  ANDREIA Hawk   gabapentin (NEURONTIN) 600 MG tablet TAKE ONE TABLET BY MOUTH THREE TIMES A DAY 22  ANDREIA Hawk   rosuvastatin (CRESTOR) 40 MG tablet TAKE ONE TABLET BY MOUTH DAILY 22   ANDREIA Hawk   carvedilol (COREG) 12.5 MG tablet TAKE ONE TABLET BY MOUTH TWICE A DAY 22   ANDREIA Hawk   allopurinol (ZYLOPRIM) 300 MG tablet Take 1 tablet by mouth daily 22   ANDREIA Hawk   SITagliptin (JANUVIA) 100 MG tablet TAKE ONE TABLET BY MOUTH DAILY 22   ANDREIA Hawk   insulin aspart (NOVOLOG FLEXPEN) 100 UNIT/ML injection pen INJECT 15 UNITS INTO THE SKIN THREE TIMES A DAY BEFORE MEALS 22   ANDREIA Hawk   fenofibrate micronized (LOFIBRA) 200 MG capsule TAKE ONE CAPSULE BY MOUTH EVERY MORNING BEFORE BREAKFAST 22   ANDREIA Hawk   escitalopram (LEXAPRO) 20 MG tablet TAKE ONE TABLET BY MOUTH DAILY 22   ANDREIA Hawk   bumetanide (BUMEX) 2 MG tablet TAKE ONE TABLET BY MOUTH TWICE A DAY 22   ANDREIA Hawk   polyethylene glycol (GLYCOLAX) 17 GM/SCOOP powder Take 17 g by mouth 2 times daily as needed 3/5/20   Historical Provider, MD   [address] [place] [address]  9/3/20   Historical Provider, MD   pantoprazole (PROTONIX) 40 MG tablet 6/19/20   Historical Provider, MD   Insulin Pen Needle ([employer]ER PEN NEEDLES 29G) 29G X 12MM MISC Check sugar and use insulin up to 5 times daily 6/25/20   ANDREIA Sanchez   Lancets MISC Check sugar and use insulin up to 5 times daily. 6/25/20   ANDREIA Sanchez   B-D UF III MINI PEN NEEDLES 31G X 5 MM MISC USE THREE TIMES A DAY 6/23/20   ANDREIA Sanchez   aspirin 81 MG chewable tablet Take 81 mg by mouth daily    Historical Provider, MD       Current medications:    Current Facility-Administered Medications   Medication Dose Route Frequency Provider Last Rate Last Admin    lactated ringers infusion   IntraVENous Continuous Seamus Leon MD 80 mL/hr at 03/10/22 0803 New Bag at 03/10/22 0803       Allergies:     Allergies   Allergen Reactions    Influenza Vaccines Hives       Problem List:    Patient Active Problem List   Diagnosis Code    Uncontrolled type 2 diabetes mellitus with complication, with long-term current use of insulin (HCC) E11.8, E11.65, Z79.4    Hyperlipidemia LDL goal <100 E78.5    HTN, goal below 140/80 I10    Diabetic polyneuropathy associated with type 2 diabetes mellitus ([facility] [address] E11.42    CRI (chronic renal insufficiency) N18.9    Colon polyp K63.5    Internal hemorrhoids without complication I84.3    Need for tetanus, diphtheria, and acellular pertussis (Tdap) vaccine in patient of adolescent age or older Z22    Need for Streptococcus pneumoniae vaccination Z23       Past Medical History:        Diagnosis Date    Diabetes ([facility] [address]     Epilepsy ([facility] [address]     Fatigue     Gout     Hx of circumcision     Hyperlipidemia 2002    Dr. Dillon Carlos Hypertension     Kidney stones     Neuropathy     Pancreatitis     Staph infection 01/31/2017       Past Surgical History:        Procedure Laterality Date    CHOLECYSTECTOMY  2013    COLONOSCOPY  06/21/2017        FOOT SURGERY Left 1977    KIDNEY STONE SURGERY         Social History:    Social History     Tobacco Use    Smoking status: Never Smoker    Smokeless tobacco: Never Used   Substance Use Topics    Alcohol use: No                                Counseling given: Not Answered      Vital Signs (Current):   Vitals:    03/10/22 0757   BP: 138/77   Pulse: 60   Resp: 20   Temp: 98 °F (36.7 °C)   TempSrc: Oral   SpO2: 96%   Weight: 254 lb (115.2 kg)   Height: 5' 8\" (1.727 m)                                              BP Readings from Last 3 Encounters:   03/10/22 138/77   02/24/22 130/80   01/05/22 132/78       NPO Status: Time of last liquid consumption: 2230                        Time of last solid consumption: 0804                        Date of last liquid consumption: 03/09/22                        Date of last solid food consumption: 03/09/22    BMI:   Wt Readings from Last 3 Encounters:   03/10/22 254 lb (115.2 kg)   02/24/22 257 lb 12.8 oz (116.9 kg)   01/05/22 262 lb (118.8 kg)     Body mass index is 38.62 kg/m². CBC:   Lab Results   Component Value Date    WBC 5.8 01/02/2020    RBC 4.38 01/02/2020    HGB 12.2 01/02/2020    HCT 37.6 01/02/2020    MCV 86 01/02/2020    RDW 14.6 01/02/2020     01/02/2020       CMP:   Lab Results   Component Value Date     01/05/2022    K 4.5 01/05/2022     01/05/2022    CO2 24 01/05/2022    BUN 26 01/05/2022    CREATININE 2.0 01/05/2022    GFRAA 41 01/05/2022    AGRATIO 1.4 01/05/2022    LABGLOM 34 01/05/2022    GLUCOSE 303 01/05/2022    PROT 6.3 01/05/2022    CALCIUM 9.1 01/05/2022    BILITOT 0.3 01/05/2022    ALKPHOS 93 01/05/2022    AST 31 01/05/2022    ALT 31 01/05/2022       POC Tests: No results for input(s): POCGLU, POCNA, POCK, POCCL, POCBUN, POCHEMO, POCHCT in the last 72 hours.     Coags: No results found for: PROTIME, INR, APTT    HCG (If Applicable): No results found for: PREGTESTUR, PREGSERUM, HCG, HCGQUANT     ABGs: No results found for: PHART, PO2ART, GRP0YFT, HFC4JFR, BEART, T0CGLYOF     Type & Screen (If Applicable):  No results found for: LABABO, [facility]    Drug/Infectious Status (If Applicable):  No results found for: HIV, HEPCAB    COVID-19 Screening (If Applicable): No results found for: COVID19        Anesthesia Evaluation  Patient summary reviewed and Nursing notes reviewed  Airway: Mallampati: II        Dental:          Pulmonary:normal exam  breath sounds clear to auscultation  (+) sleep apnea: on CPAP,                             Cardiovascular:  Exercise tolerance: poor (<4 METS),   (+) hypertension:, valvular problems/murmurs:, ADLER: after ambulating 1 flight of stairs, hyperlipidemia        Rhythm: regular  Rate: normal                 ROS comment: Hx aortic valve surgery      Neuro/Psych:   (+) neuromuscular disease:,              ROS comment: Diabetic neuropathy  GI/Hepatic/Renal:   (+) renal disease: CRI, morbid obesity         ROS comment: BMI 38. Endo/Other:    (+) DiabetesType II DM, poorly controlled, , : arthritis:., .          Pt had PAT visit. ROS comment: Hx gout  Abdominal:   (+) obese,           Vascular: negative vascular ROS. Other Findings:             Anesthesia Plan      MAC     ASA 3       Induction: intravenous. Anesthetic plan and risks discussed with patient. Use of blood products discussed with patient whom.                    ANDREIA Moss - CRNA   3/10/2022

## 2022-03-10 NOTE — PROGRESS NOTES
Pt tolerating PO well without N/V. IV DC'ed with tip intact and pressure applied. No complications at site. DC instructions reviewed prior to procedure and given to pt. Written DC instructions with follow up given without questions. Condition stable. Belongings with pt. Pt left endoscopy with Bruno, Ex brother in law. Pt ambulatory. DC instructions in hand.

## 2022-03-15 ENCOUNTER — TELEPHONE (OUTPATIENT)
Dept: FAMILY MEDICINE CLINIC | Age: 62
End: 2022-03-15

## 2022-03-15 NOTE — TELEPHONE ENCOUNTER
Called pt regarding his levemir and novolog. Pt has been denied  assistance for both medications and thus qualifies for the [employer] since his income is below 400% FPL [facility]. Pt's Januvia application is still pending. Informed pt that the hospital abram will be paying for one month of his Levemir and Novolog at [facility] due to inability to pay copays for these scripts, and then he qualifies to get both levemir and novolog through [employer] for a year. Pt verbalized understanding.     Elvis Vargas, AdeliaD

## 2022-03-21 ENCOUNTER — TELEPHONE (OUTPATIENT)
Dept: FAMILY MEDICINE CLINIC | Age: 62
End: 2022-03-21

## 2022-03-21 DIAGNOSIS — Z79.4 TYPE 2 DIABETES MELLITUS WITH OTHER CIRCULATORY COMPLICATION, WITH LONG-TERM CURRENT USE OF INSULIN (HCC): ICD-10-CM

## 2022-03-21 DIAGNOSIS — E11.59 TYPE 2 DIABETES MELLITUS WITH OTHER CIRCULATORY COMPLICATION, WITH LONG-TERM CURRENT USE OF INSULIN (HCC): ICD-10-CM

## 2022-03-21 PROCEDURE — 3046F HEMOGLOBIN A1C LEVEL >9.0%: CPT | Performed by: NURSE PRACTITIONER

## 2022-03-21 RX ORDER — INSULIN ASPART 100 [IU]/ML
INJECTION, SOLUTION INTRAVENOUS; SUBCUTANEOUS
Qty: 45 ML | Refills: 0 | Status: SHIPPED | OUTPATIENT
Start: 2022-03-21 | End: 2022-04-06 | Stop reason: SDUPTHER

## 2022-03-21 RX ORDER — INSULIN DETEMIR 100 [IU]/ML
75 INJECTION, SOLUTION SUBCUTANEOUS 2 TIMES DAILY
Qty: 60 ML | Refills: 0 | Status: SHIPPED | OUTPATIENT
Start: 2022-03-21 | End: 2022-04-06 | Stop reason: SDUPTHER

## 2022-04-06 ENCOUNTER — HOSPITAL ENCOUNTER (OUTPATIENT)
Facility: HOSPITAL | Age: 62
Discharge: HOME | End: 2022-04-06
Payer: MEDICARE

## 2022-04-06 ENCOUNTER — OFFICE VISIT (OUTPATIENT)
Dept: FAMILY MEDICINE CLINIC | Age: 62
End: 2022-04-06
Payer: MEDICARE

## 2022-04-06 VITALS
HEART RATE: 64 BPM | TEMPERATURE: 98 F | WEIGHT: 267.8 LBS | RESPIRATION RATE: 18 BRPM | BODY MASS INDEX: 40.72 KG/M2 | DIASTOLIC BLOOD PRESSURE: 70 MMHG | SYSTOLIC BLOOD PRESSURE: 136 MMHG | OXYGEN SATURATION: 97 %

## 2022-04-06 DIAGNOSIS — F43.23 SITUATIONAL MIXED ANXIETY AND DEPRESSIVE DISORDER: ICD-10-CM

## 2022-04-06 DIAGNOSIS — E78.5 HYPERLIPIDEMIA LDL GOAL <100: ICD-10-CM

## 2022-04-06 DIAGNOSIS — I10 HTN, GOAL BELOW 140/80: ICD-10-CM

## 2022-04-06 DIAGNOSIS — Z79.4 TYPE 2 DIABETES MELLITUS WITH OTHER CIRCULATORY COMPLICATION, WITH LONG-TERM CURRENT USE OF INSULIN (HCC): ICD-10-CM

## 2022-04-06 DIAGNOSIS — H81.13 BENIGN PAROXYSMAL POSITIONAL VERTIGO DUE TO BILATERAL VESTIBULAR DISORDER: ICD-10-CM

## 2022-04-06 DIAGNOSIS — E11.42 DIABETIC POLYNEUROPATHY ASSOCIATED WITH TYPE 2 DIABETES MELLITUS (HCC): ICD-10-CM

## 2022-04-06 DIAGNOSIS — Z79.4 TYPE 2 DIABETES MELLITUS WITH OTHER CIRCULATORY COMPLICATION, WITH LONG-TERM CURRENT USE OF INSULIN (HCC): Primary | ICD-10-CM

## 2022-04-06 DIAGNOSIS — E11.59 TYPE 2 DIABETES MELLITUS WITH OTHER CIRCULATORY COMPLICATION, WITH LONG-TERM CURRENT USE OF INSULIN (HCC): ICD-10-CM

## 2022-04-06 DIAGNOSIS — E11.59 TYPE 2 DIABETES MELLITUS WITH OTHER CIRCULATORY COMPLICATION, WITH LONG-TERM CURRENT USE OF INSULIN (HCC): Primary | ICD-10-CM

## 2022-04-06 PROCEDURE — G8427 DOCREV CUR MEDS BY ELIG CLIN: HCPCS | Performed by: NURSE PRACTITIONER

## 2022-04-06 PROCEDURE — 83036 HEMOGLOBIN GLYCOSYLATED A1C: CPT

## 2022-04-06 PROCEDURE — 80053 COMPREHEN METABOLIC PANEL: CPT

## 2022-04-06 PROCEDURE — 3046F HEMOGLOBIN A1C LEVEL >9.0%: CPT | Performed by: NURSE PRACTITIONER

## 2022-04-06 PROCEDURE — 99214 OFFICE O/P EST MOD 30 MIN: CPT | Performed by: NURSE PRACTITIONER

## 2022-04-06 PROCEDURE — G8417 CALC BMI ABV UP PARAM F/U: HCPCS | Performed by: NURSE PRACTITIONER

## 2022-04-06 PROCEDURE — 36415 COLL VENOUS BLD VENIPUNCTURE: CPT

## 2022-04-06 PROCEDURE — 2022F DILAT RTA XM EVC RTNOPTHY: CPT | Performed by: NURSE PRACTITIONER

## 2022-04-06 PROCEDURE — 80061 LIPID PANEL: CPT

## 2022-04-06 PROCEDURE — 3017F COLORECTAL CA SCREEN DOC REV: CPT | Performed by: NURSE PRACTITIONER

## 2022-04-06 PROCEDURE — 1036F TOBACCO NON-USER: CPT | Performed by: NURSE PRACTITIONER

## 2022-04-06 RX ORDER — GABAPENTIN 600 MG/1
TABLET ORAL
Qty: 90 TABLET | Refills: 2 | Status: SHIPPED | OUTPATIENT
Start: 2022-04-06 | End: 2022-07-06 | Stop reason: SDUPTHER

## 2022-04-06 RX ORDER — CLONAZEPAM 0.5 MG/1
0.5 TABLET ORAL 2 TIMES DAILY PRN
Qty: 60 TABLET | Refills: 0 | Status: SHIPPED | OUTPATIENT
Start: 2022-04-06 | End: 2022-05-18 | Stop reason: SDUPTHER

## 2022-04-06 RX ORDER — INSULIN ASPART 100 [IU]/ML
INJECTION, SOLUTION INTRAVENOUS; SUBCUTANEOUS
Qty: 45 ML | Refills: 5 | Status: SHIPPED | OUTPATIENT
Start: 2022-04-06 | End: 2022-10-06 | Stop reason: SDUPTHER

## 2022-04-06 RX ORDER — INSULIN DETEMIR 100 [IU]/ML
75 INJECTION, SOLUTION SUBCUTANEOUS 2 TIMES DAILY
Qty: 60 ML | Refills: 5 | Status: SHIPPED | OUTPATIENT
Start: 2022-04-06 | End: 2022-04-12 | Stop reason: SDUPTHER

## 2022-04-06 ASSESSMENT — ENCOUNTER SYMPTOMS
GASTROINTESTINAL NEGATIVE: 1
RESPIRATORY NEGATIVE: 1
EYES NEGATIVE: 1

## 2022-04-06 NOTE — PROGRESS NOTES
SUBJECTIVE:  Emmanuel Yañez is a 62 y.o. male that presents with   Chief Complaint   Patient presents with    Diabetes     f/u   . HPI:    This is a pleasant 62year old white male who presents for follow up with diabetes, htn, hyperlipidemia. He has some issues with insomnia. Today he tells me anxiety is controlled at this time he is feeling pretty good about where he is in his life he tells me that his sugar has been a little bit high but he is working hard at getting it down. Denies chest pain shortness of breath fever chills he says that he is trying really hard to eat right and get some exercise but he does have some chronic pain that is osteoarthritis and that does get in the way especially when the weather is cold and rainy. Tells me that he takes all of his medications as prescribed only and denies misuse or abuse of his medication. And some issues as well with vertigo when he holds his head certain ways or looks in certain directions. I did talk to him about the Epley maneuver and he is going to try some of these exercises at home to see if it helps it is not a constant problem it is an intermittent issue so I am not sure if it will do any good his physical exam was normal.    Diabetes  He presents for his follow-up diabetic visit. He has type 2 diabetes mellitus. No MedicAlert identification noted. His disease course has been worsening. Hypoglycemia symptoms include nervousness/anxiousness. Associated symptoms include fatigue. There are no hypoglycemic complications. Symptoms are worsening. Diabetic complications include peripheral neuropathy and retinopathy. Risk factors for coronary artery disease include male sex, hypertension, diabetes mellitus, stress and dyslipidemia. Current diabetic treatment includes insulin injections and oral agent (monotherapy). He is compliant with treatment most of the time. His weight is stable. He is following a diabetic diet.  Meal planning includes avoidance of concentrated sweets. He has not had a previous visit with a dietitian. He never (no scheduled exercise but works in the yard) participates in exercise. There is no change () in his home blood glucose trend. An ACE inhibitor/angiotensin II receptor blocker is being taken. Eye exam is current. Hyperlipidemia  This is a chronic problem. The current episode started more than 1 year ago. The problem is uncontrolled. Exacerbating diseases include chronic renal disease. Factors aggravating his hyperlipidemia include beta blockers. Associated symptoms include myalgias. Current antihyperlipidemic treatment includes statins and fibric acid derivatives. Compliance problems: unknown. Risk factors for coronary artery disease include diabetes mellitus, dyslipidemia, family history, hypertension, male sex, obesity, stress and a sedentary lifestyle. Hypertension  This is a chronic problem. The current episode started more than 1 year ago. The problem is unchanged. Associated symptoms include malaise/fatigue. There are no associated agents to hypertension. Risk factors for coronary artery disease include diabetes mellitus, dyslipidemia and family history. Past treatments include ACE inhibitors, diuretics and lifestyle changes. The current treatment provides moderate improvement. There are no compliance problems. Hypertensive end-organ damage includes retinopathy. Identifiable causes of hypertension include chronic renal disease. Review of Systems   Constitutional: Positive for fatigue and malaise/fatigue. HENT: Negative. Eyes: Negative. Respiratory: Negative. Cardiovascular: Negative. Gastrointestinal: Negative. Endocrine: Negative. Genitourinary: Negative. Musculoskeletal: Positive for arthralgias, gait problem and myalgias. Skin: Negative. Hematological: Negative. Psychiatric/Behavioral: The patient is nervous/anxious. All other systems reviewed and are negative. OBJECTIVE:  /70   Pulse 64   Temp 98 °F (36.7 °C) (Infrared)   Resp 18   Wt 267 lb 12.8 oz (121.5 kg)   SpO2 97% Comment: RA  BMI 40.72 kg/m²   Physical Exam  Vitals and nursing note reviewed. Constitutional:       Appearance: Normal appearance. He is obese. HENT:      Head: Normocephalic and atraumatic. Right Ear: Tympanic membrane, ear canal and external ear normal.      Left Ear: Tympanic membrane, ear canal and external ear normal.      Nose: Nose normal.      Mouth/Throat:      Mouth: Mucous membranes are moist.      Pharynx: Oropharynx is clear. Eyes:      Conjunctiva/sclera: Conjunctivae normal.      Pupils: Pupils are equal, round, and reactive to light. Cardiovascular:      Rate and Rhythm: Normal rate and regular rhythm. Pulses: Normal pulses. Heart sounds: Murmur heard. Pulmonary:      Effort: Pulmonary effort is normal.      Breath sounds: Normal breath sounds. Musculoskeletal:      Left shoulder: Tenderness present. Decreased range of motion. Cervical back: Normal range of motion and neck supple. Right hip: Tenderness present. Decreased range of motion. Right knee: Decreased range of motion. Tenderness present. Skin:     General: Skin is warm and dry. Capillary Refill: Capillary refill takes less than 2 seconds. Neurological:      Mental Status: He is alert and oriented to person, place, and time. Psychiatric:         Mood and Affect: Mood normal.         Behavior: Behavior normal.         Thought Content: Thought content normal.         Judgment: Judgment normal.       No results found for requested labs within last 30 days.        Hemoglobin A1C (%)   Date Value   01/05/2022 11.7 (H)     Microalbumin, Random Urine (mg/dL)   Date Value   01/05/2022 35.00 (H)     LDL Calculated (mg/dL)   Date Value   01/05/2022 71         Lab Results   Component Value Date    WBC 5.8 01/02/2020    NEUTROABS 3.7 01/02/2020    HGB 12.2 01/02/2020 HCT 37.6 01/02/2020    MCV 86 01/02/2020     01/02/2020       Lab Results   Component Value Date    TSH 1.710 01/02/2020         ASSESSMENT/PLAN:   Diagnosis Orders   1. Type 2 diabetes mellitus with other circulatory complication, with long-term current use of insulin (HCC)  Hemoglobin A1C    insulin detemir (LEVEMIR FLEXTOUCH) 100 UNIT/ML injection pen    insulin aspart (NOVOLOG FLEXPEN) 100 UNIT/ML injection pen   2. Diabetic polyneuropathy associated with type 2 diabetes mellitus (HCC)  gabapentin (NEURONTIN) 600 MG tablet   3. HTN, goal below 140/80  Comprehensive Metabolic Panel   4. Hyperlipidemia LDL goal <100  Lipid Panel   5. Benign paroxysmal positional vertigo due to bilateral vestibular disorder     6. Situational mixed anxiety and depressive disorder  clonazePAM (KLONOPIN) 0.5 MG tablet           Orders Placed This Encounter   Procedures    Hemoglobin A1C     Standing Status:   Future     Standing Expiration Date:   4/6/2023    Comprehensive Metabolic Panel     Standing Status:   Future     Standing Expiration Date:   4/6/2023    Lipid Panel     Standing Status:   Future     Standing Expiration Date:   4/6/2023     Order Specific Question:   Is Patient Fasting?/# of Hours     Answer:   6        Outpatient Encounter Medications as of 4/6/2022   Medication Sig Dispense Refill    insulin detemir (LEVEMIR FLEXTOUCH) 100 UNIT/ML injection pen Inject 75 Units into the skin 2 times daily 60 mL 5    insulin aspart (NOVOLOG FLEXPEN) 100 UNIT/ML injection pen INJECT 15 UNITS INTO THE SKIN THREE TIMES A DAY BEFORE MEALS 45 mL 5    clonazePAM (KLONOPIN) 0.5 MG tablet Take 1 tablet by mouth 2 times daily as needed for Anxiety for up to 30 days.  60 tablet 0    gabapentin (NEURONTIN) 600 MG tablet TAKE ONE TABLET BY MOUTH THREE TIMES A DAY 90 tablet 2    rosuvastatin (CRESTOR) 40 MG tablet TAKE ONE TABLET BY MOUTH DAILY 90 tablet 3    carvedilol (COREG) 12.5 MG tablet TAKE ONE TABLET BY MOUTH TWICE A  tablet 3    allopurinol (ZYLOPRIM) 300 MG tablet Take 1 tablet by mouth daily 90 tablet 3    SITagliptin (JANUVIA) 100 MG tablet TAKE ONE TABLET BY MOUTH DAILY 90 tablet 3    fenofibrate micronized (LOFIBRA) 200 MG capsule TAKE ONE CAPSULE BY MOUTH EVERY MORNING BEFORE BREAKFAST 90 capsule 3    escitalopram (LEXAPRO) 20 MG tablet TAKE ONE TABLET BY MOUTH DAILY 90 tablet 3    bumetanide (BUMEX) 2 MG tablet TAKE ONE TABLET BY MOUTH TWICE A  tablet 1    polyethylene glycol (GLYCOLAX) 17 GM/SCOOP powder Take 17 g by mouth 2 times daily as needed      ACCU-CHEK COSTA PLUS strip       pantoprazole (PROTONIX) 40 MG tablet       Insulin Pen Needle ([employer] PEN NEEDLES 29G) 29G X 12MM MISC Check sugar and use insulin up to 5 times daily 150 each 5    Lancets MISC Check sugar and use insulin up to 5 times daily. 150 each 5    B-D UF III MINI PEN NEEDLES 31G X 5 MM MISC USE THREE TIMES A  each 7    aspirin 81 MG chewable tablet Take 81 mg by mouth daily      [DISCONTINUED] insulin detemir (LEVEMIR FLEXTOUCH) 100 UNIT/ML injection pen Inject 75 Units into the skin 2 times daily 60 mL 0    [DISCONTINUED] insulin aspart (NOVOLOG FLEXPEN) 100 UNIT/ML injection pen INJECT 15 UNITS INTO THE SKIN THREE TIMES A DAY BEFORE MEALS 45 mL 0    [DISCONTINUED] clonazePAM (KLONOPIN) 0.5 MG tablet Take 1 tablet by mouth 2 times daily as needed for Anxiety for up to 30 days. 60 tablet 0    [DISCONTINUED] gabapentin (NEURONTIN) 600 MG tablet TAKE ONE TABLET BY MOUTH THREE TIMES A DAY 90 tablet 2     No facility-administered encounter medications on file as of 4/6/2022. Return in about 3 months (around 7/6/2022), or if symptoms worsen or fail to improve. PATIENT COUNSELING    Counseling was provided today regardingthe following topics: Healthy eating habits, Regular exercise, substance abuse and healthy sleep habits. Discussed use, benefit, and side effectsof prescribed medications.   Barriers to medication compliance addressed. All patient questions answered. Pt voiced understanding. Controlled Substance Monitoring:    Acute and Chronic Pain Monitoring:   RX Monitoring 4/6/2022   Attestation -   Periodic Controlled Substance Monitoring Possible medication side effects, risk of tolerance/dependence & alternative treatments discussed. ;No signs of potential drug abuse or diversion identified.    Chronic Pain > 50 MEDD -   Chronic Pain > 80 MEDD -

## 2022-04-06 NOTE — PROGRESS NOTES
Chief Complaint   Patient presents with    Diabetes     f/u       Have you seen any other physician or provider since your last visit no    Have you had any other diagnostic tests since your last visit? Yes-colonoscopy - Beto and Genette Shoulder     Have you changed or stopped any medications since your last visit? no         Diabetic retinal exam completed this year?  Yes-Tobar Prescription

## 2022-04-07 LAB
A/G RATIO: 1.3 (ref 0.8–2)
ALBUMIN SERPL-MCNC: 3.7 G/DL (ref 3.4–4.8)
ALP BLD-CCNC: 78 U/L (ref 25–100)
ALT SERPL-CCNC: 23 U/L (ref 4–36)
ANION GAP SERPL CALCULATED.3IONS-SCNC: 11 MMOL/L (ref 3–16)
AST SERPL-CCNC: 31 U/L (ref 8–33)
BILIRUB SERPL-MCNC: 0.3 MG/DL (ref 0.3–1.2)
BUN BLDV-MCNC: 40 MG/DL (ref 6–20)
CALCIUM SERPL-MCNC: 9.6 MG/DL (ref 8.5–10.5)
CHLORIDE BLD-SCNC: 101 MMOL/L (ref 98–107)
CHOLESTEROL, TOTAL: 142 MG/DL (ref 0–200)
CO2: 29 MMOL/L (ref 20–30)
CREAT SERPL-MCNC: 2.5 MG/DL (ref 0.4–1.2)
GFR AFRICAN AMERICAN: 32
GFR NON-AFRICAN AMERICAN: 26
GLOBULIN: 2.8 G/DL
GLUCOSE BLD-MCNC: 202 MG/DL (ref 74–106)
HBA1C MFR BLD: 9 %
HDLC SERPL-MCNC: 33 MG/DL (ref 40–60)
LDL CHOLESTEROL CALCULATED: 64 MG/DL
POTASSIUM SERPL-SCNC: 4.8 MMOL/L (ref 3.4–5.1)
SODIUM BLD-SCNC: 141 MMOL/L (ref 136–145)
TOTAL PROTEIN: 6.5 G/DL (ref 6.4–8.3)
TRIGL SERPL-MCNC: 225 MG/DL (ref 0–249)
VLDLC SERPL CALC-MCNC: 45 MG/DL

## 2022-04-12 DIAGNOSIS — Z79.4 TYPE 2 DIABETES MELLITUS WITH OTHER CIRCULATORY COMPLICATION, WITH LONG-TERM CURRENT USE OF INSULIN (HCC): ICD-10-CM

## 2022-04-12 DIAGNOSIS — E11.59 TYPE 2 DIABETES MELLITUS WITH OTHER CIRCULATORY COMPLICATION, WITH LONG-TERM CURRENT USE OF INSULIN (HCC): ICD-10-CM

## 2022-04-12 RX ORDER — INSULIN DETEMIR 100 [IU]/ML
80 INJECTION, SOLUTION SUBCUTANEOUS 2 TIMES DAILY
Qty: 60 ML | Refills: 5 | Status: SHIPPED | OUTPATIENT
Start: 2022-04-12 | End: 2022-10-06 | Stop reason: SDUPTHER

## 2022-04-16 NOTE — ANESTHESIA POSTPROCEDURE EVALUATION
Patient: Juan Alberto Tejeda    Procedure Summary     Date:  03/16/20 Room / Location:   [facility] /  [facility]    Anesthesia Start:  0705 Anesthesia Stop:  0846    Procedures:       TRANSCATHETER AORTIC VALVE REPLACEMENT, SHANKAR (N/A Chest)      Transfemoral Transcatheter Aortic Valve Replacement (N/A ) Diagnosis:       Aortic valve stenosis, etiology of cardiac valve disease unspecified      (Aortic valve stenosis, etiology of cardiac valve disease unspecified [I35.0])    Surgeon:  Irvin Jeffers MD; Cayla Bella MD Provider:  Mona Ronquillo MD    Anesthesia Type:  general ASA Status:  4          Anesthesia Type: general    Vitals  Vitals Value Taken Time   BP     Temp     Pulse     Resp     SpO2 96 % 3/16/2020  8:45 AM   Vitals shown include unvalidated device data.        Post Anesthesia Care and Evaluation    Patient location during evaluation: ICU  Patient participation: complete - patient participated  Level of consciousness: awake and alert  Pain score: 0  Pain management: adequate  Airway patency: patent  Anesthetic complications: No anesthetic complications  PONV Status: none  Cardiovascular status: hemodynamically stable and acceptable  Respiratory status: nonlabored ventilation, acceptable and nasal cannula  Hydration status: acceptable    Comments: Handoff given to ICU RN at bedside       reports smoked 3 blunts yesterday

## 2022-05-18 ENCOUNTER — OFFICE VISIT (OUTPATIENT)
Dept: FAMILY MEDICINE CLINIC | Age: 62
End: 2022-05-18
Payer: MEDICARE

## 2022-05-18 VITALS
HEART RATE: 55 BPM | DIASTOLIC BLOOD PRESSURE: 84 MMHG | SYSTOLIC BLOOD PRESSURE: 120 MMHG | TEMPERATURE: 97.7 F | OXYGEN SATURATION: 98 % | HEIGHT: 68 IN | BODY MASS INDEX: 39.34 KG/M2 | WEIGHT: 259.6 LBS | RESPIRATION RATE: 16 BRPM

## 2022-05-18 DIAGNOSIS — Z00.00 ENCOUNTER FOR MEDICARE ANNUAL WELLNESS EXAM: ICD-10-CM

## 2022-05-18 DIAGNOSIS — Z00.00 INITIAL MEDICARE ANNUAL WELLNESS VISIT: Primary | ICD-10-CM

## 2022-05-18 DIAGNOSIS — F43.23 SITUATIONAL MIXED ANXIETY AND DEPRESSIVE DISORDER: ICD-10-CM

## 2022-05-18 PROCEDURE — 3017F COLORECTAL CA SCREEN DOC REV: CPT | Performed by: NURSE PRACTITIONER

## 2022-05-18 PROCEDURE — G0439 PPPS, SUBSEQ VISIT: HCPCS | Performed by: NURSE PRACTITIONER

## 2022-05-18 RX ORDER — TRIAMTERENE AND HYDROCHLOROTHIAZIDE 37.5; 25 MG/1; MG/1
1 TABLET ORAL DAILY
Qty: 90 TABLET | Refills: 1 | Status: SHIPPED | OUTPATIENT
Start: 2022-05-18

## 2022-05-18 RX ORDER — CLONAZEPAM 0.5 MG/1
0.5 TABLET ORAL 2 TIMES DAILY PRN
Qty: 60 TABLET | Refills: 0 | Status: SHIPPED | OUTPATIENT
Start: 2022-05-18 | End: 2022-07-06 | Stop reason: SDUPTHER

## 2022-05-18 ASSESSMENT — PATIENT HEALTH QUESTIONNAIRE - PHQ9
SUM OF ALL RESPONSES TO PHQ QUESTIONS 1-9: 0
3. TROUBLE FALLING OR STAYING ASLEEP: 0
SUM OF ALL RESPONSES TO PHQ QUESTIONS 1-9: 0
8. MOVING OR SPEAKING SO SLOWLY THAT OTHER PEOPLE COULD HAVE NOTICED. OR THE OPPOSITE, BEING SO FIGETY OR RESTLESS THAT YOU HAVE BEEN MOVING AROUND A LOT MORE THAN USUAL: 0
7. TROUBLE CONCENTRATING ON THINGS, SUCH AS READING THE NEWSPAPER OR WATCHING TELEVISION: 0
5. POOR APPETITE OR OVEREATING: 0
1. LITTLE INTEREST OR PLEASURE IN DOING THINGS: 0
2. FEELING DOWN, DEPRESSED OR HOPELESS: 0
6. FEELING BAD ABOUT YOURSELF - OR THAT YOU ARE A FAILURE OR HAVE LET YOURSELF OR YOUR FAMILY DOWN: 0
SUM OF ALL RESPONSES TO PHQ9 QUESTIONS 1 & 2: 0
9. THOUGHTS THAT YOU WOULD BE BETTER OFF DEAD, OR OF HURTING YOURSELF: 0
10. IF YOU CHECKED OFF ANY PROBLEMS, HOW DIFFICULT HAVE THESE PROBLEMS MADE IT FOR YOU TO DO YOUR WORK, TAKE CARE OF THINGS AT HOME, OR GET ALONG WITH OTHER PEOPLE: 0
4. FEELING TIRED OR HAVING LITTLE ENERGY: 0
SUM OF ALL RESPONSES TO PHQ QUESTIONS 1-9: 0
SUM OF ALL RESPONSES TO PHQ QUESTIONS 1-9: 0

## 2022-05-18 ASSESSMENT — ANXIETY QUESTIONNAIRES
GAD7 TOTAL SCORE: 0
1. FEELING NERVOUS, ANXIOUS, OR ON EDGE: 0
5. BEING SO RESTLESS THAT IT IS HARD TO SIT STILL: 0
4. TROUBLE RELAXING: 0
7. FEELING AFRAID AS IF SOMETHING AWFUL MIGHT HAPPEN: 0
6. BECOMING EASILY ANNOYED OR IRRITABLE: 0
3. WORRYING TOO MUCH ABOUT DIFFERENT THINGS: 0
IF YOU CHECKED OFF ANY PROBLEMS ON THIS QUESTIONNAIRE, HOW DIFFICULT HAVE THESE PROBLEMS MADE IT FOR YOU TO DO YOUR WORK, TAKE CARE OF THINGS AT HOME, OR GET ALONG WITH OTHER PEOPLE: NOT DIFFICULT AT ALL
2. NOT BEING ABLE TO STOP OR CONTROL WORRYING: 0

## 2022-05-18 ASSESSMENT — LIFESTYLE VARIABLES: HOW OFTEN DO YOU HAVE A DRINK CONTAINING ALCOHOL: NEVER

## 2022-05-18 NOTE — PATIENT INSTRUCTIONS
· Keep a list of your medicines with you. List all of the prescription medicines, nonprescription medicines, supplements, natural remedies, and vitamins that you take. Tell your healthcare providers who treat you about all of the products you are taking. Your provider can provide you with a form to keep track of them. Just ask. · Follow the directions that come with your medicine, including information about food or alcohol. Make sure you know how and when to take your medicine. Do not take more or less than you are supposed to take. · Keep all medicines out of the reach of children. · Store medicines according to the directions on the label. · Monitor yourself. Learn to know how your body reacts to your new medicine and keep track of how it makes you feel before attempting (If your provider has allowed you to do so) to drive or go to work. · Seek emergency medical attention if you think you have used too much of this medicine. An overdose of any prescription medicine can be fatal. Overdose symptoms may include extreme drowsiness, muscle weakness, confusion, cold and clammy skin, pinpoint pupils, shallow breathing, slow heart rate, fainting, or coma. · Don't share prescription medicines with others, even when they seem to have the same symptoms. What may be good for you may be harmful to others. · If you are no longer taking a prescribed medication and you have pills left please take your pills out of their original containers. Mix crushed pills with an undesirable substance, such as cat litter or used coffee grounds. Put the mixture into a disposable container with a lid, such as an empty margarine tub, or into a sealable bag. Cover up or remove any of your personal information on the empty containers by covering it with black permanent marker or duct tape. Place the sealed container with the mixture, and the empty drug containers, in the trash.    · If you use a medication that is in the form of a patch, dispose of used patches by folding them in half so that the sticky sides meet, and then flushing them down a toilet. They should not be placed in the household trash where children or pets can find them. · If you have any questions, ask your provider or pharmacist for more information. · Be sure to keep all appointments for provider visits or tests. We are committed to providing you with the best care possible. In order to help us achieve these goals please remember to bring all medications, herbal products, and over the counter supplements with you to each visit. If your provider has ordered testing for you, please be sure to follow up with our office if you have not received results within 7 days after the testing took place. *If you receive a survey after visiting one of our offices, please take time to share your experience concerning your physician office visit. These surveys are confidential and no health information about you is shared. We are eager to improve for you and we are counting on your feedback to help make that happen. Personalized Preventive Plan for Trenton Berg - 5/18/2022  Medicare offers a range of preventive health benefits. Some of the tests and screenings are paid in full while other may be subject to a deductible, co-insurance, and/or copay. Some of these benefits include a comprehensive review of your medical history including lifestyle, illnesses that may run in your family, and various assessments and screenings as appropriate. After reviewing your medical record and screening and assessments performed today your provider may have ordered immunizations, labs, imaging, and/or referrals for you. A list of these orders (if applicable) as well as your Preventive Care list are included within your After Visit Summary for your review.     Other Preventive Recommendations:    A preventive eye exam performed by an eye specialist is recommended every 1-2 years to screen for glaucoma; cataracts, macular degeneration, and other eye disorders. A preventive dental visit is recommended every 6 months. Try to get at least 150 minutes of exercise per week or 10,000 steps per day on a pedometer . Order or download the FREE \"Exercise & Physical Activity: Your Everyday Guide\" from [employer]. Call 9-272.658.5185 or search [employer] online. You need 1795-4718 mg of calcium and 2626-9332 IU of vitamin D per day. It is possible to meet your calcium requirement with diet alone, but a vitamin D supplement is usually necessary to meet this goal.  When exposed to the sun, use a sunscreen that protects against both UVA and UVB radiation with an SPF of 30 or greater. Reapply every 2 to 3 hours or after sweating, drying off with a towel, or swimming. Always wear a seat belt when traveling in a car. Always wear a helmet when riding a bicycle or motorcycle. Patient Education        Heart-Healthy Diet: Care Instructions  Your Care Instructions     A heart-healthy diet has lots of vegetables, fruits, nuts, beans, and whole grains, and is low in salt. It limits foods that are high in saturated fat, such as meats, cheeses, and fried foods. It may be hard to change your diet,but even small changes can lower your risk of heart attack and heart disease. Follow-up care is a key part of your treatment and safety. Be sure to make and go to all appointments, and call your doctor if you are having problems. It's also a good idea to know your test results and keep alist of the medicines you take. How can you care for yourself at home? Watch your portions   Use food labels to learn what the recommended servings are for the foods you eat.  Eat only the number of calories you need to stay at a healthy weight. If you need to lose weight, eat fewer calories than your body burns (through exercise and other physical activity).   Eat more fruits and vegetables   Eat a variety of fruit and vegetables every day. Dark green, deep orange, red, or yellow fruits and vegetables are especially good for you. Examples include spinach, carrots, peaches, and berries.  Keep carrots, celery, and other veggies handy for snacks. Buy fruit that is in season and store it where you can see it so that you will be tempted to eat it.  Cook dishes that have a lot of veggies in them, such as stir-fries and soups. Limit saturated fat   Read food labels, and try to avoid saturated fats. They increase your risk of heart disease.  Use olive or canola oil when you cook.  Bake, broil, grill, or steam foods instead of frying them.  Choose lean meats instead of high-fat meats such as hot dogs and sausages. Cut off all visible fat when you prepare meat.  Eat fish, skinless poultry, and meat alternatives such as soy products instead of high-fat meats. Soy products, such as tofu, may be especially good for your heart.  Choose low-fat or fat-free milk and dairy products. Eat foods high in fiber   Eat a variety of grain products every day. Include whole-grain foods that have lots of fiber and nutrients. Examples of whole-grain foods include oats, whole wheat bread, and brown rice.  Buy whole-grain breads and cereals, instead of white bread or pastries. Limit salt and sodium   Limit how much salt and sodium you eat to help lower your blood pressure.  Taste food before you salt it. Add only a little salt when you think you need it. With time, your taste buds will adjust to less salt.  Eat fewer snack items, fast foods, and other high-salt, processed foods. Check food labels for the amount of sodium in packaged foods.  Choose low-sodium versions of canned goods (such as soups, vegetables, and beans). Limit sugar   Limit drinks and foods with added sugar. These include candy, desserts, and soda pop.   Limit alcohol   Limit alcohol to no more than 2 drinks a day for men and 1 drink a day for women. Too much alcohol can cause health problems. When should you call for help? Watch closely for changes in your health, and be sure to contact your doctor if:     You would like help planning heart-healthy meals. Where can you learn more? Go to https://guerda.[email or web address] org and sign in to your [employer] account. Enter V137 in the [place] box to learn more about \"Heart-Healthy Diet: Care Instructions. \"     If you do not have an account, please click on the \"Sign Up Now\" link. Current as of: September 8, 2021               Content Version: 13.2  © 2006-2022 [employer]. Care instructions adapted under license by [facility] ([facility]). If you have questions about a medical condition or this instruction, always ask your healthcare professional. [address] any warranty or liability for your use of this information. Patient Education        Learning About Diabetes and Exercise  Can you exercise if you have diabetes? When you have diabetes, it's important to get regular exercise. It can help you manage your blood sugar level. You can still play sports, run, ride a bike,swim, and do other activities when you have diabetes. How does exercise help when you have diabetes? Getting regular exercise can help control your blood sugar. Your body turns the food you eat into glucose, a type of sugar. You need this sugar for energy. When you have diabetes, the sugar builds up in your blood. But when you exercise, your body uses sugar. This helps keep it from building up in your blood and results in lower blood sugar and better control ofdiabetes. Exercise may help you in other ways too. It can help you reach and stay at a healthy weight. It also helps improve blood pressure and cholesterol, which canreduce the risk of heart disease. Exercise can make you feel stronger and happier. It can help you relax andsleep better.  And it can give you confidence in other things you do. Exercising safely when you have diabetes  Before you start a new exercise program, talk to your doctor about how and when to exercise. Some types of exercise can be harmful if your diabetes is causing other problems, such as problems with your feet. Your doctor can tell you whattypes of exercise are good choices for you. Here are some general safety tips.  Take steps to avoid blood sugar problems. ? Check your blood sugar before and after you exercise. ? Ask your doctor what blood sugar range is safe for you when you exercise. ? If you take medicine or insulin that lowers blood sugar, check your blood sugar before you exercise. ? If your blood sugar is less than 90 mg/dL, you may need to eat a carbohydrate snack first.  ? Be careful when you exercise if your blood sugar is too high. Make sure to drink plenty of water.  Try to exercise at about the same time each day. This may help keep your blood sugar steady. If you want to exercise more,slowly increase how hard or long you exercise.  Have someone with you when you exercise. Or exercise at a gym. You may need help if your blood sugar drops too low.  Keep some quick-sugar food with you. You may get symptoms of low blood sugar during exercise or up to 24 hours later.  Use proper footwear and the right equipment.  Pay attention to your body. If you are used to exercising and notice that you cannot do as much as usual,talk to your doctor. Follow-up care is a key part of your treatment and safety. Be sure to make and go to all appointments, and call your doctor if you are having problems. It's also a good idea to know your test results and keep alist of the medicines you take. Where can you learn more? Go to https://guerda.[email or web address] org and sign in to your [employer] account. Enter K835 in the [facility] box to learn more about \"Learning About Diabetes and Exercise. \"     If you do not have an account, please click on the \"Sign Up Now\" link. Current as of: July 28, 2021               Content Version: 13.2  © 2006-2022 Healthwise, [employer]. Care instructions adapted under license by [facility] ([facility]). If you have questions about a medical condition or this instruction, always ask your healthcare professional. [address] any warranty or liability for your use of this information. Patient Education        Cawthorne Exercises for Vertigo: Care Instructions  Your Care Instructions  Simple exercises can help you regain your balance when you have vertigo. If you have Ménière's disease, benign paroxysmal positional vertigo (BPPV), or anotherinner ear problem, you may have vertigo off and on. Do these exercises first thing in the morning and before you go to bed. You might get dizzy when you first start them. If this happens, try to do them for at least 5 minutes. Do a group of exercises at a time, starting at the top of the list. It may take several weeks before you can do all the exercises withoutfeeling dizzy. Follow-up care is a key part of your treatment and safety. Be sure to make and go to all appointments, and call your doctor if you are having problems. It's also a good idea to know your test results and keep alist of the medicines you take. How can you care for yourself at home? Exercise 1  While sitting on the side of the bed and holding your head still:   Look up as far as you can.  Look down as far as you can.  Look from side to side as far as you can.  Stretch your arm straight out in front of you. Focus on your index finger. Continue to focus on your finger while you bring it to your nose. Exercise 2  While sitting on the side of the bed:   Bring your head as far back as you can.  Bring your head forward to touch your chin to your chest.   Turn your head from side to side.  Do these exercises first with your eyes open.  Then try with your eyes closed. Exercise 3  While sitting on the side of the bed:   Shrug your shoulders straight upward, then relax them.  Bend over and try to touch the ground with your fingers. Then go back to a sitting position.  Toss a small ball from one hand to the other. Throw the ball higher than your eyes so you have to look up. Exercise 4  While standing (with someone close by if you feel uncomfortable):   Repeat Exercise 1.   Repeat Exercise 2.   Pass a ball between your legs and above your head.  Sit down and then stand up. Repeat. Turn around in a [place] a different way each time you stand.  With someone close by to help you, try the above exercises with your eyes closed. Exercise 5  In a room that is cleared of obstacles:   Walk to a corner of the room, turn to your right, and walk back to the starting point. Now, repeat and turn left.  Walk up and down a slope. Now try stairs.  While holding on to someone's arm, try these exercises with your eyes closed. When should you call for help? Watch closely for changes in your health, and be sure to contact your doctor if:     You do not get better as expected. Where can you learn more? Go to https://Sk[employer] AutomotivepeWazzle Entertainmenteb.Blacklane. org and sign in to your [employer] account. Enter E332 in the [facility] box to learn more about \"Cawthorne Exercises for Vertigo: Care Instructions. \"     If you do not have an account, please click on the \"Sign Up Now\" link. Current as of: September 8, 2021               Content Version: 13.2  © 2006-2022 Healthwise, Incorporated. Care instructions adapted under license by [facility] ([facility]). If you have questions about a medical condition or this instruction, always ask your healthcare professional. [place] [address] any warranty or liability for your use of this information. Patient Education        Learning About How to Make a Home Safe  Making your home safe:  Overview  You can help protect the person in your care by making the home safe. Here aresome general tips for how to lower the chance of getting injured in the home.  Pad sharp corners on furniture and counter tops.  Keep objects that are used often within easy reach.  Install handrails around the toilet and in the shower. Use a tub mat to prevent slipping.  Use a shower chair or bath bench when the person bathes.  Provide good lighting inside and outside the home. Put night-lights in bedrooms, hallways, and bathrooms. Have light at the top and bottom of stairways.  Have a first aid kit. It is also important to be aware of safe temperatures in the home. When helping someone bathe, use the back of your hand to test the water to make sure it's not too hot. Lower the temperature setting in the hot water heater to 120°F or lower to avoid burns. And make sure other liquids (such as coffee, tea, orsoup) are not too hot. How can you protect from fire and carbon monoxide? Home safety alarms are very important. A smoke alarm can detect small amounts of smoke. This can allow time to escape from a fire. And a carbon monoxide alarm can let people know about this deadly gas before it starts to make themsick.  Put smoke alarms:  ? On each level of the home, in the hallway outside sleeping areas, and inside each bedroom. ? In the center of a ceiling, or on a wall 6 to 12 inches from the ceiling. This is where smoke goes first. Avoid places near doors, windows, or air ducts.  Put a carbon monoxide alarm in the hallway outside of the bedrooms in each sleeping area of the house. The alarm should be placed high on the wall. Make sure that the alarm can't be covered up by furniture or drapes.  Test alarms regularly by pressing the test button.  Replace non-lithium batteries in alarms twice a year.  Have a plan for getting out of the home if there is a fire. Practice by having a fire drill.  Keep a fire extinguisher in the kitchen.   What can you do to prevent falls? You can help prevent falls by keeping rooms uncluttered, with clear walkways around furniture. Keep electrical cords off the floor, and remove throw rugs toprevent tripping. If there are steps in the home, make sure they all have handrails, and always use the handrails. Don't leave items on the steps, and be sure to fix any thatare loose, broken, or uneven. How can you increase safety for people with dementia? If you are caring for someone who has dementia, you may need to make some extra changes to create a safe home. People with dementia have a loss of mental skills, such as memory, problem solving, and learning. So things that might nothave been a danger to them before can cause safety problems now. Here are some things to consider:   Don't move furniture around. The person may become confused.  Use locks on doors and cupboards. Lock up knives, scissors, medicines, cleaning supplies, and other dangerous items.  Use hidden switches or controls for the stove, thermostat, water heater, and other appliances.  If your loved one is still cooking, think about whether that is safe. It may be okay with some help, depending on your loved one's condition. But for people who have memory or thinking problems, it's best to avoid any activities that might not be safe.  If the person tends to wander or to try to leave the home, install motion-sensor lights on all doors and windows.  Have emergency numbers in a central area near a phone. Include 911 and numbers for the doctor and family members.  Get medical alert jewelry for the person so you can be contacted if he or she wanders away. If possible, provide a safe place for wandering, such as an enclosed yard or garden. Where can you learn more? Go to https://chpatriciaeb.[email or web address] org and sign in to your [employer] account.  Enter Q156 in the [facility] box to learn more about \"Learning About How to Make a Home Safe.\"     If you do not have an account, please click on the \"Sign Up Now\" link. Current as of: October 18, 2021               Content Version: 13.2  © 4460-4578 Healthwise, Incorporated. Care instructions adapted under license by [facility] ([facility]). If you have questions about a medical condition or this instruction, always ask your healthcare professional. [address] any warranty or liability for your use of this information.

## 2022-05-18 NOTE — PROGRESS NOTES
Medicare Annual Wellness Visit    Destiny Orozco is here for Medicare AWV    Assessment & Plan   Initial Medicare annual wellness visit  Encounter for Medicare annual wellness exam      Recommendations for Preventive Services Due: see orders and patient instructions/AVS.  Recommended screening schedule for the next 5-10 years is provided to the patient in written form: see Patient Instructions/AVS.     Return in 1 year (on 5/18/2023), or if symptoms worsen or fail to improve, for Medicare Annual Wellness Visit in 1 year. Subjective   The following acute and/or chronic problems were also addressed today:      Patient's complete Health Risk Assessment and screening values have been reviewed and are found in Flowsheets. The following problems were reviewed today and where indicated follow up appointments were made and/or referrals ordered.     Positive Risk Factor Screenings with Interventions:    Fall Risk:  Do you feel unsteady or are you worried about falling? : (!) yes  2 or more falls in past year?: (!) yes  Fall with injury in past year?: (!) yes  Timed Up and Go Test > 12 seconds?: no  Fall Risk Interventions:    · Home safety tips provided              General Health and ACP:  General  In general, how would you say your health is?: (!) Poor  In the past 7 days, have you experienced any of the following: New or Increased Pain, New or Increased Fatigue, Loneliness, Social Isolation, Stress or Anger?: (!) Yes  Select all that apply: (!) New or Increased Pain  Do you get the social and emotional support that you need?: Yes  Do you have a Living Will?: (!) No    Advance Directives     Power of  Living Will ACP-Advance Directive ACP-Power of     Not on File Not on File Not on File Not on File      General Health Risk Interventions:  · Pain issues: patient advised to follow-up in this office for further evaluation and treatment within 2 month(s)  · No Living Will: Patient declines ACP discussion/assistance    Health Habits/Nutrition:     Physical Activity: Insufficiently Active    Days of Exercise per Week: 7 days    Minutes of Exercise per Session: 20 min     Have you lost any weight without trying in the past 3 months?: No  Body mass index: (!) 39.47  Have you seen the dentist within the past year?: Yes    Health Habits/Nutrition Interventions:  · Inadequate physical activity:  educational materials provided to promote increased physical activity  · Nutritional issues:  educational materials for healthy, well-balanced diet provided      ADLs:  In the past 7 days, did you need help from others to perform any of the following everyday activities: Eating, dressing, grooming, bathing, toileting, or walking/balance?: (!) Yes  Select all that apply: (!) Toileting,Dressing,Bathing  In the past 7 days, did you need help from others to take care of any of the following: Laundry, housekeeping, banking/finances, shopping, telephone use, food preparation, transportation, or taking medications?: (!) Yes  Select all that apply: (!) Taking [address] Preparation,Laundry    ADL Interventions:  · Patient declines any further evaluation/treatment for this issue          Objective   Vitals:    05/18/22 0903   BP: 120/84   Pulse: 55   Resp: 16   Temp: 97.7 °F (36.5 °C)   TempSrc: Infrared   SpO2: 98%   Weight: 259 lb 9.6 oz (117.8 kg)   Height: 5' 8\" (1.727 m)      Body mass index is 39.47 kg/m². Allergies   Allergen Reactions    Influenza Vaccines Hives     Prior to Visit Medications    Medication Sig Taking?  Authorizing Provider   triamterene-hydroCHLOROthiazide (MAXZIDE-25) 37.5-25 MG per tablet Take 1 tablet by mouth daily Yes ANDREIA Alamo   insulin detemir (LEVEMIR FLEXTOUCH) 100 UNIT/ML injection pen Inject 80 Units into the skin 2 times daily Yes ANDREIA Alamo   insulin aspart (NOVOLOG FLEXPEN) 100 UNIT/ML injection pen INJECT 15 UNITS INTO THE SKIN THREE TIMES A DAY BEFORE MEALS Yes ANDREIA Herrera   gabapentin (NEURONTIN) 600 MG tablet TAKE ONE TABLET BY MOUTH THREE TIMES A DAY Yes ANDREIA Herrera   rosuvastatin (CRESTOR) 40 MG tablet TAKE ONE TABLET BY MOUTH DAILY Yes ANDREIA Herrera   carvedilol (COREG) 12.5 MG tablet TAKE ONE TABLET BY MOUTH TWICE A DAY Yes ANDREIA Herrera   allopurinol (ZYLOPRIM) 300 MG tablet Take 1 tablet by mouth daily Yes ANDREIA Herrera   SITagliptin (JANUVIA) 100 MG tablet TAKE ONE TABLET BY MOUTH DAILY Yes ANDREIA Herrera   fenofibrate micronized (LOFIBRA) 200 MG capsule TAKE ONE CAPSULE BY MOUTH EVERY MORNING BEFORE BREAKFAST Yes ANDREIA Herrera   escitalopram (LEXAPRO) 20 MG tablet TAKE ONE TABLET BY MOUTH DAILY Yes ANDREIA Herrera   bumetanide (BUMEX) 2 MG tablet TAKE ONE TABLET BY MOUTH TWICE A DAY Yes ANDREIA Herrera   polyethylene glycol (GLYCOLAX) 17 GM/SCOOP powder Take 17 g by mouth 2 times daily as needed Yes Historical Provider, MD   ACCU-CHEK COSTA PLUS strip  Yes Historical Provider, MD   pantoprazole (PROTONIX) 40 MG tablet  Yes Historical Provider, MD   Insulin Pen Needle ([employer] PEN NEEDLES 29G) 29G X 12MM MISC Check sugar and use insulin up to 5 times daily Yes ANDREIA Herrera   Lancets MISC Check sugar and use insulin up to 5 times daily. Yes ANDREIA Herrera   B-D UF III MINI PEN NEEDLES 31G X 5 MM MISC USE THREE TIMES A DAY Yes ANDREIA Herrera   aspirin 81 MG chewable tablet Take 81 mg by mouth daily Yes Historical Provider, MD   clonazePAM (KLONOPIN) 0.5 MG tablet Take 1 tablet by mouth 2 times daily as needed for Anxiety for up to 30 days.   ANDREIA Herrera (Including outside providers/suppliers regularly involved in providing care):   Patient Care Team:  ANDREIA Herrera as PCP - General (Family Nurse Practitioner)  ANDREIA Herrera as PCP - [facility] Empaneled Provider     Reviewed and updated this visit:  Tobacco  Allergies  Meds  Problems  Med Hx  Surg Hx  Soc Hx  Fam Hx

## 2022-07-06 ENCOUNTER — OFFICE VISIT (OUTPATIENT)
Dept: FAMILY MEDICINE CLINIC | Age: 62
End: 2022-07-06
Payer: MEDICARE

## 2022-07-06 ENCOUNTER — HOSPITAL ENCOUNTER (OUTPATIENT)
Facility: HOSPITAL | Age: 62
Discharge: HOME | End: 2022-07-06
Payer: MEDICARE

## 2022-07-06 VITALS
WEIGHT: 254.4 LBS | TEMPERATURE: 97.4 F | HEART RATE: 64 BPM | SYSTOLIC BLOOD PRESSURE: 132 MMHG | HEIGHT: 68 IN | RESPIRATION RATE: 18 BRPM | OXYGEN SATURATION: 97 % | BODY MASS INDEX: 38.55 KG/M2 | DIASTOLIC BLOOD PRESSURE: 66 MMHG

## 2022-07-06 DIAGNOSIS — E11.42 DIABETIC POLYNEUROPATHY ASSOCIATED WITH TYPE 2 DIABETES MELLITUS (HCC): ICD-10-CM

## 2022-07-06 DIAGNOSIS — N18.32 STAGE 3B CHRONIC KIDNEY DISEASE (HCC): ICD-10-CM

## 2022-07-06 DIAGNOSIS — G47.33 OSA (OBSTRUCTIVE SLEEP APNEA): ICD-10-CM

## 2022-07-06 DIAGNOSIS — F43.23 SITUATIONAL MIXED ANXIETY AND DEPRESSIVE DISORDER: ICD-10-CM

## 2022-07-06 DIAGNOSIS — I10 HTN, GOAL BELOW 140/80: ICD-10-CM

## 2022-07-06 DIAGNOSIS — Z79.4 TYPE 2 DIABETES MELLITUS WITH OTHER CIRCULATORY COMPLICATION, WITH LONG-TERM CURRENT USE OF INSULIN (HCC): ICD-10-CM

## 2022-07-06 DIAGNOSIS — E78.5 HYPERLIPIDEMIA LDL GOAL <100: ICD-10-CM

## 2022-07-06 DIAGNOSIS — E11.59 TYPE 2 DIABETES MELLITUS WITH OTHER CIRCULATORY COMPLICATION, WITH LONG-TERM CURRENT USE OF INSULIN (HCC): Primary | ICD-10-CM

## 2022-07-06 DIAGNOSIS — E11.59 TYPE 2 DIABETES MELLITUS WITH OTHER CIRCULATORY COMPLICATION, WITH LONG-TERM CURRENT USE OF INSULIN (HCC): ICD-10-CM

## 2022-07-06 DIAGNOSIS — Z79.4 TYPE 2 DIABETES MELLITUS WITH OTHER CIRCULATORY COMPLICATION, WITH LONG-TERM CURRENT USE OF INSULIN (HCC): Primary | ICD-10-CM

## 2022-07-06 LAB
A/G RATIO: 1.4 (ref 0.8–2)
ALBUMIN SERPL-MCNC: 4.1 G/DL (ref 3.4–4.8)
ALP BLD-CCNC: 77 U/L (ref 25–100)
ALT SERPL-CCNC: 34 U/L (ref 4–36)
ANION GAP SERPL CALCULATED.3IONS-SCNC: 9 MMOL/L (ref 3–16)
AST SERPL-CCNC: 38 U/L (ref 8–33)
BILIRUB SERPL-MCNC: 0.3 MG/DL (ref 0.3–1.2)
BUN BLDV-MCNC: 83 MG/DL (ref 6–20)
CALCIUM SERPL-MCNC: 9.8 MG/DL (ref 8.5–10.5)
CHLORIDE BLD-SCNC: 92 MMOL/L (ref 98–107)
CHOLESTEROL, TOTAL: 166 MG/DL (ref 0–200)
CO2: 34 MMOL/L (ref 20–30)
CREAT SERPL-MCNC: 3.8 MG/DL (ref 0.4–1.2)
GFR AFRICAN AMERICAN: 20
GFR NON-AFRICAN AMERICAN: 16
GLOBULIN: 3 G/DL
GLUCOSE BLD-MCNC: 310 MG/DL (ref 74–106)
HBA1C MFR BLD: 9.8 %
HDLC SERPL-MCNC: 34 MG/DL (ref 40–60)
LDL CHOLESTEROL CALCULATED: 88 MG/DL
POTASSIUM SERPL-SCNC: 4.1 MMOL/L (ref 3.4–5.1)
SODIUM BLD-SCNC: 135 MMOL/L (ref 136–145)
TOTAL PROTEIN: 7.1 G/DL (ref 6.4–8.3)
TRIGL SERPL-MCNC: 218 MG/DL (ref 0–249)
VLDLC SERPL CALC-MCNC: 44 MG/DL

## 2022-07-06 PROCEDURE — 3017F COLORECTAL CA SCREEN DOC REV: CPT | Performed by: NURSE PRACTITIONER

## 2022-07-06 PROCEDURE — 3046F HEMOGLOBIN A1C LEVEL >9.0%: CPT | Performed by: NURSE PRACTITIONER

## 2022-07-06 PROCEDURE — 1036F TOBACCO NON-USER: CPT | Performed by: NURSE PRACTITIONER

## 2022-07-06 PROCEDURE — 2022F DILAT RTA XM EVC RTNOPTHY: CPT | Performed by: NURSE PRACTITIONER

## 2022-07-06 PROCEDURE — 83036 HEMOGLOBIN GLYCOSYLATED A1C: CPT

## 2022-07-06 PROCEDURE — G8427 DOCREV CUR MEDS BY ELIG CLIN: HCPCS | Performed by: NURSE PRACTITIONER

## 2022-07-06 PROCEDURE — G8417 CALC BMI ABV UP PARAM F/U: HCPCS | Performed by: NURSE PRACTITIONER

## 2022-07-06 PROCEDURE — 80053 COMPREHEN METABOLIC PANEL: CPT

## 2022-07-06 PROCEDURE — 80061 LIPID PANEL: CPT

## 2022-07-06 PROCEDURE — 99214 OFFICE O/P EST MOD 30 MIN: CPT | Performed by: NURSE PRACTITIONER

## 2022-07-06 RX ORDER — CLONAZEPAM 0.5 MG/1
0.5 TABLET ORAL 2 TIMES DAILY PRN
Qty: 60 TABLET | Refills: 0 | Status: SHIPPED | OUTPATIENT
Start: 2022-07-06 | End: 2022-11-04

## 2022-07-06 RX ORDER — GABAPENTIN 600 MG/1
TABLET ORAL
Qty: 90 TABLET | Refills: 2 | Status: SHIPPED | OUTPATIENT
Start: 2022-07-06 | End: 2022-10-05

## 2022-07-06 RX ORDER — MECLIZINE HYDROCHLORIDE 25 MG/1
25 TABLET ORAL 3 TIMES DAILY PRN
Qty: 90 TABLET | Refills: 0 | Status: SHIPPED | OUTPATIENT
Start: 2022-07-06 | End: 2022-08-05

## 2022-07-06 ASSESSMENT — ENCOUNTER SYMPTOMS
EYES NEGATIVE: 1
GASTROINTESTINAL NEGATIVE: 1
RESPIRATORY NEGATIVE: 1

## 2022-07-06 NOTE — PROGRESS NOTES
Chief Complaint   Patient presents with    Diabetes     reg f/u     Fatigue       Have you seen any other physician or provider since your last visit no    Have you had any other diagnostic tests since your last visit? no    Have you changed or stopped any medications since your last visit? no       Diabetic retinal exam completed this year?  Yes

## 2022-07-06 NOTE — PROGRESS NOTES
SUBJECTIVE:  Lisa Adams is a 62 y.o. male that presents with   Chief Complaint   Patient presents with    Diabetes     reg f/u     Fatigue   . HPI:    This is a pleasant 62year old white male who presents for follow up with diabetes, htn, hyperlipidemia. Reports sugar has been up for the past 2 weeks was 220 this morning. Denies change in habits. Continues to wear his cpap and get good results. He tells me that he is taking all of his medications and tolerating everything well. He has been getting out mowing and working in the yard I did discuss the heat index and the risk that he faces because of medical illnesses so I have asked him not to be out in the heat of the day working in his yard. He verbalizes understanding and intent. Says his sugar was doing really good up until couple weeks ago and that it has been in the 200s for the past 2 weeks but he has not made any changes he does not know what caused it to go up. Also tells me that he has been feeling good the past couple of months. He does use gabapentin for neuropathy he denies misuse or abuse of his medications says that it does help him to continue to function does make the neuropathy more comfortable more acceptable. He also uses Klonopin for anxiety and sleep and says that that is effective for him as well he denies misuse or abuse. He has multiple pre-existing comorbidities that are very relevant specifically is renal function is significantly affected he does see specialty for that as well. Diabetes  He presents for his follow-up diabetic visit. He has type 2 diabetes mellitus. No MedicAlert identification noted. His disease course has been worsening. Hypoglycemia symptoms include nervousness/anxiousness. Associated symptoms include fatigue. There are no hypoglycemic complications. Symptoms are worsening. Diabetic complications include peripheral neuropathy and retinopathy.  Risk factors for coronary artery disease include male sex, hypertension, diabetes mellitus, stress and dyslipidemia. Current diabetic treatment includes insulin injections and oral agent (monotherapy). He is compliant with treatment most of the time. His weight is stable. He is following a diabetic diet. Meal planning includes avoidance of concentrated sweets. He has not had a previous visit with a dietitian. He never (no scheduled exercise but works in the yard) participates in exercise. There is no change () in his home blood glucose trend. An ACE inhibitor/angiotensin II receptor blocker is being taken. Eye exam is current. Hyperlipidemia  This is a chronic problem. The current episode started more than 1 year ago. The problem is uncontrolled. Exacerbating diseases include chronic renal disease. Factors aggravating his hyperlipidemia include beta blockers. Associated symptoms include myalgias. Current antihyperlipidemic treatment includes statins and fibric acid derivatives. Compliance problems: unknown. Risk factors for coronary artery disease include diabetes mellitus, dyslipidemia, family history, hypertension, male sex, obesity, stress and a sedentary lifestyle. Hypertension  This is a chronic problem. The current episode started more than 1 year ago. The problem is unchanged. Associated symptoms include malaise/fatigue. There are no associated agents to hypertension. Risk factors for coronary artery disease include diabetes mellitus, dyslipidemia and family history. Past treatments include ACE inhibitors, diuretics and lifestyle changes. The current treatment provides moderate improvement. There are no compliance problems. Hypertensive end-organ damage includes retinopathy. Identifiable causes of hypertension include chronic renal disease. Fatigue  Associated symptoms include arthralgias, fatigue and myalgias. Review of Systems   Constitutional: Positive for fatigue and malaise/fatigue. HENT: Negative. Eyes: Negative.     Respiratory: Negative. Cardiovascular: Negative. Gastrointestinal: Negative. Endocrine: Negative. Genitourinary: Negative. Musculoskeletal: Positive for arthralgias, gait problem and myalgias. Skin: Negative. Hematological: Negative. Psychiatric/Behavioral: The patient is nervous/anxious. All other systems reviewed and are negative. OBJECTIVE:  /66   Pulse 64   Temp 97.4 °F (36.3 °C) (Infrared)   Resp 18   Ht 5' 8\" (1.727 m)   Wt 254 lb 6.4 oz (115.4 kg)   SpO2 97% Comment: ra  BMI 38.68 kg/m²   Physical Exam  Vitals and nursing note reviewed. Constitutional:       Appearance: Normal appearance. He is obese. HENT:      Head: Normocephalic and atraumatic. Right Ear: Tympanic membrane, ear canal and external ear normal.      Left Ear: Tympanic membrane, ear canal and external ear normal.      Nose: Nose normal.      Mouth/Throat:      Mouth: Mucous membranes are moist.      Pharynx: Oropharynx is clear. Eyes:      Conjunctiva/sclera: Conjunctivae normal.      Pupils: Pupils are equal, round, and reactive to light. Cardiovascular:      Rate and Rhythm: Normal rate and regular rhythm. Pulses: Normal pulses. Heart sounds: Murmur heard. Pulmonary:      Effort: Pulmonary effort is normal.      Breath sounds: Normal breath sounds. Musculoskeletal:      Left shoulder: Tenderness present. Decreased range of motion. Cervical back: Normal range of motion and neck supple. Right hip: Tenderness present. Decreased range of motion. Right knee: Decreased range of motion. Tenderness present. Skin:     General: Skin is warm and dry. Capillary Refill: Capillary refill takes less than 2 seconds. Neurological:      Mental Status: He is alert and oriented to person, place, and time. Psychiatric:         Mood and Affect: Mood normal.         Behavior: Behavior normal.         Thought Content:  Thought content normal.         Judgment: Judgment normal. No results found for requested labs within last 30 days. Hemoglobin A1C (%)   Date Value   04/06/2022 9.0 (H)     Microalbumin, Random Urine (mg/dL)   Date Value   01/05/2022 35.00 (H)     LDL Calculated (mg/dL)   Date Value   04/06/2022 64         Lab Results   Component Value Date/Time    WBC 5.8 01/02/2020 09:19 AM    NEUTROABS 3.7 01/02/2020 09:19 AM    HGB 12.2 01/02/2020 09:19 AM    HCT 37.6 01/02/2020 09:19 AM    MCV 86 01/02/2020 09:19 AM     01/02/2020 09:19 AM       Lab Results   Component Value Date    TSH 1.710 01/02/2020         ASSESSMENT/PLAN:   Diagnosis Orders   1. Type 2 diabetes mellitus with other circulatory complication, with long-term current use of insulin (HCC)  Hemoglobin A1C   2. HTN, goal below 140/80  Comprehensive Metabolic Panel   3. Hyperlipidemia LDL goal <100  Lipid Panel   4. JANINE (obstructive sleep apnea)     5. Situational mixed anxiety and depressive disorder  clonazePAM (KLONOPIN) 0.5 MG tablet   6. Stage 3b chronic kidney disease ([facility] [address]     7. Diabetic polyneuropathy associated with type 2 diabetes mellitus (HCC)  gabapentin (NEURONTIN) 600 MG tablet           Orders Placed This Encounter   Procedures    Hemoglobin A1C     Standing Status:   Future     Standing Expiration Date:   7/6/2023    Lipid Panel     Standing Status:   Future     Standing Expiration Date:   7/6/2023     Order Specific Question:   Is Patient Fasting?/# of Hours     Answer:   6    Comprehensive Metabolic Panel     Standing Status:   Future     Standing Expiration Date:   7/6/2023        Outpatient Encounter Medications as of 7/6/2022   Medication Sig Dispense Refill    clonazePAM (KLONOPIN) 0.5 MG tablet Take 1 tablet by mouth 2 times daily as needed for Anxiety for up to 30 days.  60 tablet 0    gabapentin (NEURONTIN) 600 MG tablet TAKE ONE TABLET BY MOUTH THREE TIMES A DAY 90 tablet 2    meclizine (ANTIVERT) 25 MG tablet Take 1 tablet by mouth 3 times daily as needed for Dizziness 90 tablet 0    triamterene-hydroCHLOROthiazide (MAXZIDE-25) 37.5-25 MG per tablet Take 1 tablet by mouth daily 90 tablet 1    insulin detemir (LEVEMIR FLEXTOUCH) 100 UNIT/ML injection pen Inject 80 Units into the skin 2 times daily 60 mL 5    insulin aspart (NOVOLOG FLEXPEN) 100 UNIT/ML injection pen INJECT 15 UNITS INTO THE SKIN THREE TIMES A DAY BEFORE MEALS 45 mL 5    rosuvastatin (CRESTOR) 40 MG tablet TAKE ONE TABLET BY MOUTH DAILY 90 tablet 3    carvedilol (COREG) 12.5 MG tablet TAKE ONE TABLET BY MOUTH TWICE A  tablet 3    allopurinol (ZYLOPRIM) 300 MG tablet Take 1 tablet by mouth daily 90 tablet 3    SITagliptin (JANUVIA) 100 MG tablet TAKE ONE TABLET BY MOUTH DAILY 90 tablet 3    fenofibrate micronized (LOFIBRA) 200 MG capsule TAKE ONE CAPSULE BY MOUTH EVERY MORNING BEFORE BREAKFAST 90 capsule 3    escitalopram (LEXAPRO) 20 MG tablet TAKE ONE TABLET BY MOUTH DAILY 90 tablet 3    bumetanide (BUMEX) 2 MG tablet TAKE ONE TABLET BY MOUTH TWICE A  tablet 1    polyethylene glycol (GLYCOLAX) 17 GM/SCOOP powder Take 17 g by mouth 2 times daily as needed      ACCU-CHEK COSTA PLUS strip       pantoprazole (PROTONIX) 40 MG tablet       Insulin Pen Needle ([employer] PEN NEEDLES 29G) 29G X 12MM MISC Check sugar and use insulin up to 5 times daily 150 each 5    Lancets MISC Check sugar and use insulin up to 5 times daily. 150 each 5    B-D UF III MINI PEN NEEDLES 31G X 5 MM MISC USE THREE TIMES A  each 7    aspirin 81 MG chewable tablet Take 81 mg by mouth daily      [DISCONTINUED] clonazePAM (KLONOPIN) 0.5 MG tablet Take 1 tablet by mouth 2 times daily as needed for Anxiety for up to 30 days. 60 tablet 0    [DISCONTINUED] gabapentin (NEURONTIN) 600 MG tablet TAKE ONE TABLET BY MOUTH THREE TIMES A DAY 90 tablet 2     No facility-administered encounter medications on file as of 7/6/2022. No follow-ups on file.       PATIENT COUNSELING    Counseling was provided today regardingthe following topics: Healthy eating habits, Regular exercise, substance abuse and healthy sleep habits. Discussed use, benefit, and side effectsof prescribed medications. Barriers to medication compliance addressed. All patient questions answered. Pt voiced understanding. Controlled Substance Monitoring:    Acute and Chronic Pain Monitoring:   RX Monitoring 7/6/2022   Attestation -   Periodic Controlled Substance Monitoring Possible medication side effects, risk of tolerance/dependence & alternative treatments discussed. ;No signs of potential drug abuse or diversion identified.;Obtaining appropriate analgesic effect of treatment.    Chronic Pain > 50 MEDD -   Chronic Pain > 80 MEDD -

## 2022-08-02 ENCOUNTER — OFFICE VISIT (OUTPATIENT)
Dept: FAMILY MEDICINE CLINIC | Age: 62
End: 2022-08-02
Payer: MEDICARE

## 2022-08-02 DIAGNOSIS — U07.1 COVID-19: Primary | ICD-10-CM

## 2022-08-02 DIAGNOSIS — Z11.52 ENCOUNTER FOR SCREENING FOR COVID-19: ICD-10-CM

## 2022-08-02 LAB
Lab: ABNORMAL
QC PASS/FAIL: ABNORMAL
SARS-COV-2 RDRP RESP QL NAA+PROBE: POSITIVE

## 2022-08-02 PROCEDURE — G8417 CALC BMI ABV UP PARAM F/U: HCPCS | Performed by: NURSE PRACTITIONER

## 2022-08-02 PROCEDURE — 87635 SARS-COV-2 COVID-19 AMP PRB: CPT | Performed by: NURSE PRACTITIONER

## 2022-08-02 PROCEDURE — 1036F TOBACCO NON-USER: CPT | Performed by: NURSE PRACTITIONER

## 2022-08-02 PROCEDURE — 3017F COLORECTAL CA SCREEN DOC REV: CPT | Performed by: NURSE PRACTITIONER

## 2022-08-02 PROCEDURE — 99213 OFFICE O/P EST LOW 20 MIN: CPT | Performed by: NURSE PRACTITIONER

## 2022-08-02 PROCEDURE — G8427 DOCREV CUR MEDS BY ELIG CLIN: HCPCS | Performed by: NURSE PRACTITIONER

## 2022-08-02 RX ORDER — AZITHROMYCIN 250 MG/1
250 TABLET, FILM COATED ORAL SEE ADMIN INSTRUCTIONS
Qty: 6 TABLET | Refills: 0 | Status: SHIPPED | OUTPATIENT
Start: 2022-08-02 | End: 2022-08-07

## 2022-08-02 RX ORDER — DEXAMETHASONE 4 MG/1
4 TABLET ORAL 2 TIMES DAILY WITH MEALS
Qty: 20 TABLET | Refills: 0 | Status: SHIPPED | OUTPATIENT
Start: 2022-08-02 | End: 2022-08-12

## 2022-08-02 RX ORDER — ALBUTEROL SULFATE 90 UG/1
2 AEROSOL, METERED RESPIRATORY (INHALATION) 4 TIMES DAILY PRN
Qty: 18 G | Refills: 0 | Status: SHIPPED | OUTPATIENT
Start: 2022-08-02 | End: 2022-10-06 | Stop reason: SDUPTHER

## 2022-08-02 NOTE — PROGRESS NOTES
SUBJECTIVE:  Angelo Garcia is a 62 y.o. y/o male that presents with Chills (X4 days ), Sinus Problem, Cough, Congestion, and Shortness of Breath  . HPI:      Symptoms have been present for 2 day(s). Symptoms are worse since they initially started. Fever? Yes  Runny nose or congestion? Yes   Cough? Yes  Sore throat? Yes  Headache, fatigue, joint pains, muscle aches? Yes  Shortness of breath/Wheezing? Yes  Nausea/Vomiting/Diarrhea? No  Double Sickening? No  Sick contacts? Yes  Known COVID exposures of RFs? Yes  Smoker? No  Preexisting Respiratory conditions? No    Patient has tried nothing with no improvement.       Past Medical History:   Diagnosis Date    Diabetes ([facility] [address]     Epilepsy ([facility] [address]     Fatigue     Gout     Hx of circumcision     Hyperlipidemia 2002    Dr. Terri Lozano    Hypertension     Kidney stones     Neuropathy     Pancreatitis     Staph infection 01/31/2017       Social History     Socioeconomic History    Marital status: Unknown     Spouse name: Not on file    Number of children: Not on file    Years of education: Not on file    Highest education level: Not on file   Occupational History    Not on file   Tobacco Use    Smoking status: Never    Smokeless tobacco: Never   Substance and Sexual Activity    Alcohol use: No    Drug use: No    Sexual activity: Not on file   Other Topics Concern    Not on file   Social History Narrative    Not on file     Social Determinants of Health     Financial Resource Strain: Not on file   Food Insecurity: Not on file   Transportation Needs: Not on file   Physical Activity: Insufficiently Active    Days of Exercise per Week: 7 days    Minutes of Exercise per Session: 20 min   Stress: Not on file   Social Connections: Not on file   Intimate Partner Violence: Not on file   Housing Stability: Not on file       Family History   Problem Relation Age of Onset    Stroke Mother     High Blood Pressure Mother     Heart Disease Mother     Diabetes Father     High Blood Pressure Father            OBJECTIVE:  There were no vitals taken for this visit. General appearance: alert, well appearing, and in no distress, oriented to person, place, and time, acyanotic, in no respiratory distress, dehydrated, anxious, and ill-appearing. ENT exam reveals - ENT exam normal, no neck nodes or sinus tenderness, bilateral TM normal without fluid or infection, neck without nodes, throat normal without erythema or exudate, sinuses nontender, post nasal drip noted, and nasal mucosa pale and congested. CVS exam: normal rate, regular rhythm, normal S1, S2, no murmurs, rubs, clicks or gallops. Chest:clear to auscultation, no wheezes, rales or rhonchi, symmetric air entry, no tachypnea, retractions or cyanosis. Abdominal exam: soft, nontender, nondistended, no masses or organomegaly. Extremities:  No clubbing, cyanosis or edema  Skin exam - normal coloration and turgor, no rashes, no suspicious skin lesions noted. Psych -  Affect appropriate. Thought process is normal without evidence of depression or psychosis. Good insight and appropriae interaction. Cognition and memory appear to be intact. ASSESSMENT & PLAN  Angelia Smart was seen today for chills, sinus problem, cough, congestion and shortness of breath. Diagnoses and all orders for this visit:    COVID-19  -     azithromycin (ZITHROMAX) 250 MG tablet; Take 1 tablet by mouth See Admin Instructions for 5 days 500mg on day 1 followed by 250mg on days 2 - 5    Encounter for screening for COVID-19  -     POCT COVID-19 Rapid, NAAT    Other orders  -     nirmatrelvir/ritonavir (PAXLOVID) 20 x 150 MG & 10 x 100MG; Take 3 tablets (two 150 mg nirmatrelvir and one 100 mg ritonavir tablets) by mouth every 12 hours for 5 days. -     dexamethasone (DECADRON) 4 MG tablet; Take 1 tablet by mouth in the morning and 1 tablet in the evening. Take with meals. Do all this for 10 days.   -     albuterol sulfate HFA (VENTOLIN HFA) 108 (90 Base) MCG/ACT inhaler; Inhale 2 puffs into the lungs 4 times daily as needed for Wheezing      Return if symptoms worsen or fail to improve.     -Patient advised to call immediately or go to ER if any worsening of symptoms  -Patient counseled on conservative care including fluids, rest and OTC meds    Jagdish received counseling on the following healthy behaviors: medication adherence  Reviewed prior labs and health maintenance. Continue current medications, diet and exercise. Discussed use, benefit, and side effects of prescribed medications. Barriers to medication compliance addressed. Patient given educational materials - see patient instructions. All patient questions answered. Patient voiced understanding. I have reviewed this patient's history, habits, and medication list and have updated the chart where appropriate.

## 2022-08-04 RX ORDER — BUMETANIDE 2 MG/1
TABLET ORAL
Qty: 180 TABLET | Refills: 1 | Status: SHIPPED | OUTPATIENT
Start: 2022-08-04

## 2022-10-06 ENCOUNTER — HOSPITAL ENCOUNTER (OUTPATIENT)
Facility: HOSPITAL | Age: 62
Discharge: HOME | End: 2022-10-06
Payer: MEDICARE

## 2022-10-06 ENCOUNTER — OFFICE VISIT (OUTPATIENT)
Dept: FAMILY MEDICINE CLINIC | Age: 62
End: 2022-10-06
Payer: MEDICARE

## 2022-10-06 VITALS
RESPIRATION RATE: 18 BRPM | WEIGHT: 251.4 LBS | BODY MASS INDEX: 38.1 KG/M2 | SYSTOLIC BLOOD PRESSURE: 112 MMHG | TEMPERATURE: 97.4 F | HEART RATE: 65 BPM | DIASTOLIC BLOOD PRESSURE: 72 MMHG | HEIGHT: 68 IN | OXYGEN SATURATION: 98 %

## 2022-10-06 DIAGNOSIS — G47.33 OSA (OBSTRUCTIVE SLEEP APNEA): ICD-10-CM

## 2022-10-06 DIAGNOSIS — E11.42 DIABETIC POLYNEUROPATHY ASSOCIATED WITH TYPE 2 DIABETES MELLITUS (HCC): ICD-10-CM

## 2022-10-06 DIAGNOSIS — Z79.4 TYPE 2 DIABETES MELLITUS WITH OTHER CIRCULATORY COMPLICATION, WITH LONG-TERM CURRENT USE OF INSULIN (HCC): Primary | ICD-10-CM

## 2022-10-06 DIAGNOSIS — F43.23 SITUATIONAL MIXED ANXIETY AND DEPRESSIVE DISORDER: ICD-10-CM

## 2022-10-06 DIAGNOSIS — E78.5 HYPERLIPIDEMIA LDL GOAL <100: ICD-10-CM

## 2022-10-06 DIAGNOSIS — E11.59 TYPE 2 DIABETES MELLITUS WITH OTHER CIRCULATORY COMPLICATION, WITH LONG-TERM CURRENT USE OF INSULIN (HCC): Primary | ICD-10-CM

## 2022-10-06 DIAGNOSIS — N18.32 STAGE 3B CHRONIC KIDNEY DISEASE (HCC): ICD-10-CM

## 2022-10-06 DIAGNOSIS — I10 HTN, GOAL BELOW 140/80: ICD-10-CM

## 2022-10-06 DIAGNOSIS — E11.59 TYPE 2 DIABETES MELLITUS WITH OTHER CIRCULATORY COMPLICATION, WITH LONG-TERM CURRENT USE OF INSULIN (HCC): ICD-10-CM

## 2022-10-06 DIAGNOSIS — Z79.4 TYPE 2 DIABETES MELLITUS WITH OTHER CIRCULATORY COMPLICATION, WITH LONG-TERM CURRENT USE OF INSULIN (HCC): ICD-10-CM

## 2022-10-06 PROCEDURE — 3074F SYST BP LT 130 MM HG: CPT | Performed by: NURSE PRACTITIONER

## 2022-10-06 PROCEDURE — 3046F HEMOGLOBIN A1C LEVEL >9.0%: CPT | Performed by: NURSE PRACTITIONER

## 2022-10-06 PROCEDURE — 80061 LIPID PANEL: CPT

## 2022-10-06 PROCEDURE — 99214 OFFICE O/P EST MOD 30 MIN: CPT | Performed by: NURSE PRACTITIONER

## 2022-10-06 PROCEDURE — 3078F DIAST BP <80 MM HG: CPT | Performed by: NURSE PRACTITIONER

## 2022-10-06 PROCEDURE — 80053 COMPREHEN METABOLIC PANEL: CPT

## 2022-10-06 PROCEDURE — G8484 FLU IMMUNIZE NO ADMIN: HCPCS | Performed by: NURSE PRACTITIONER

## 2022-10-06 PROCEDURE — 3017F COLORECTAL CA SCREEN DOC REV: CPT | Performed by: NURSE PRACTITIONER

## 2022-10-06 PROCEDURE — 2022F DILAT RTA XM EVC RTNOPTHY: CPT | Performed by: NURSE PRACTITIONER

## 2022-10-06 PROCEDURE — G8417 CALC BMI ABV UP PARAM F/U: HCPCS | Performed by: NURSE PRACTITIONER

## 2022-10-06 PROCEDURE — 1036F TOBACCO NON-USER: CPT | Performed by: NURSE PRACTITIONER

## 2022-10-06 PROCEDURE — 83036 HEMOGLOBIN GLYCOSYLATED A1C: CPT

## 2022-10-06 PROCEDURE — G8427 DOCREV CUR MEDS BY ELIG CLIN: HCPCS | Performed by: NURSE PRACTITIONER

## 2022-10-06 RX ORDER — INSULIN ASPART 100 [IU]/ML
INJECTION, SOLUTION INTRAVENOUS; SUBCUTANEOUS
Qty: 45 ML | Refills: 5 | Status: SHIPPED | OUTPATIENT
Start: 2022-10-06

## 2022-10-06 RX ORDER — INSULIN DETEMIR 100 [IU]/ML
80 INJECTION, SOLUTION SUBCUTANEOUS 2 TIMES DAILY
Qty: 60 ML | Refills: 5 | Status: SHIPPED | OUTPATIENT
Start: 2022-10-06

## 2022-10-06 RX ORDER — ALBUTEROL SULFATE 90 UG/1
2 AEROSOL, METERED RESPIRATORY (INHALATION) 4 TIMES DAILY PRN
Qty: 18 G | Refills: 0 | Status: SHIPPED | OUTPATIENT
Start: 2022-10-06

## 2022-10-06 SDOH — ECONOMIC STABILITY: FOOD INSECURITY: WITHIN THE PAST 12 MONTHS, YOU WORRIED THAT YOUR FOOD WOULD RUN OUT BEFORE YOU GOT MONEY TO BUY MORE.: NEVER TRUE

## 2022-10-06 SDOH — ECONOMIC STABILITY: FOOD INSECURITY: WITHIN THE PAST 12 MONTHS, THE FOOD YOU BOUGHT JUST DIDN'T LAST AND YOU DIDN'T HAVE MONEY TO GET MORE.: NEVER TRUE

## 2022-10-06 ASSESSMENT — ENCOUNTER SYMPTOMS
EYES NEGATIVE: 1
RESPIRATORY NEGATIVE: 1
GASTROINTESTINAL NEGATIVE: 1

## 2022-10-06 ASSESSMENT — SOCIAL DETERMINANTS OF HEALTH (SDOH): HOW HARD IS IT FOR YOU TO PAY FOR THE VERY BASICS LIKE FOOD, HOUSING, MEDICAL CARE, AND HEATING?: SOMEWHAT HARD

## 2022-10-06 NOTE — PROGRESS NOTES
Chief Complaint   Patient presents with    Diabetes     Reg f/u        Have you seen any other physician or provider since your last visit no    Have you had any other diagnostic tests since your last visit? no    Have you changed or stopped any medications since your last visit? no

## 2022-10-06 NOTE — PROGRESS NOTES
SUBJECTIVE:  Jason Mims is a 65 y.o. male that presents with   Chief Complaint   Patient presents with    Diabetes     Reg f/u    . HPI:    This is a pleasant 65year old white male who presents for follow up with diabetes, htn, hyperlipidemia anxiety. He tells me that he is taking all of his medications tolerating everything well he denies new problems he does not have a past medical history significant For drug or alcohol abuse denies misuse or abuse of his medication at this time he does use medication for anxiety fairly regularly right now. Sugars been good vital signs of been good he denies chest pain he does not time have some pedal edema none today. Diabetes  He presents for his follow-up diabetic visit. He has type 2 diabetes mellitus. No MedicAlert identification noted. His disease course has been worsening. Hypoglycemia symptoms include nervousness/anxiousness. Associated symptoms include fatigue. There are no hypoglycemic complications. Symptoms are worsening. Diabetic complications include peripheral neuropathy and retinopathy. Risk factors for coronary artery disease include male sex, hypertension, diabetes mellitus, stress and dyslipidemia. Current diabetic treatment includes insulin injections and oral agent (monotherapy). He is compliant with treatment most of the time. His weight is stable. He is following a diabetic diet. Meal planning includes avoidance of concentrated sweets. He has not had a previous visit with a dietitian. He never (no scheduled exercise but works in the yard) participates in exercise. There is no change () in his home blood glucose trend. An ACE inhibitor/angiotensin II receptor blocker is being taken. Eye exam is current. Fatigue  Associated symptoms include arthralgias, fatigue and myalgias. Hyperlipidemia  This is a chronic problem. The current episode started more than 1 year ago. The problem is uncontrolled.  Exacerbating diseases include chronic renal disease. Factors aggravating his hyperlipidemia include beta blockers. Associated symptoms include myalgias. Current antihyperlipidemic treatment includes statins and fibric acid derivatives. Compliance problems: unknown. Risk factors for coronary artery disease include diabetes mellitus, dyslipidemia, family history, hypertension, male sex, obesity, stress and a sedentary lifestyle. Hypertension  This is a chronic problem. The current episode started more than 1 year ago. The problem is unchanged. Associated symptoms include malaise/fatigue. There are no associated agents to hypertension. Risk factors for coronary artery disease include diabetes mellitus, dyslipidemia and family history. Past treatments include ACE inhibitors, diuretics and lifestyle changes. The current treatment provides moderate improvement. There are no compliance problems. Hypertensive end-organ damage includes retinopathy. Identifiable causes of hypertension include chronic renal disease. Review of Systems   Constitutional:  Positive for fatigue and malaise/fatigue. HENT: Negative. Eyes: Negative. Respiratory: Negative. Cardiovascular: Negative. Gastrointestinal: Negative. Endocrine: Negative. Genitourinary: Negative. Musculoskeletal:  Positive for arthralgias, gait problem and myalgias. Skin: Negative. Hematological: Negative. Psychiatric/Behavioral:  The patient is nervous/anxious. All other systems reviewed and are negative. OBJECTIVE:  /72   Pulse 65   Temp 97.4 °F (36.3 °C) (Infrared)   Resp 18   Ht 5' 8\" (1.727 m)   Wt 251 lb 6.4 oz (114 kg)   SpO2 98% Comment: ra  BMI 38.23 kg/m²   Physical Exam  Vitals and nursing note reviewed. Constitutional:       Appearance: Normal appearance. He is obese. HENT:      Head: Normocephalic and atraumatic.       Right Ear: Tympanic membrane, ear canal and external ear normal.      Left Ear: Tympanic membrane, ear canal and external ear normal.      Nose: Nose normal.      Mouth/Throat:      Mouth: Mucous membranes are moist.      Pharynx: Oropharynx is clear. Eyes:      Conjunctiva/sclera: Conjunctivae normal.      Pupils: Pupils are equal, round, and reactive to light. Cardiovascular:      Rate and Rhythm: Normal rate and regular rhythm. Pulses: Normal pulses. Heart sounds: Murmur heard. Pulmonary:      Effort: Pulmonary effort is normal.      Breath sounds: Normal breath sounds. Musculoskeletal:      Left shoulder: Tenderness present. Decreased range of motion. Cervical back: Normal range of motion and neck supple. Right hip: Tenderness present. Decreased range of motion. Right knee: Decreased range of motion. Tenderness present. Skin:     General: Skin is warm and dry. Capillary Refill: Capillary refill takes less than 2 seconds. Neurological:      Mental Status: He is alert and oriented to person, place, and time. Psychiatric:         Mood and Affect: Mood normal.         Behavior: Behavior normal.         Thought Content:  Thought content normal.         Judgment: Judgment normal.     Results in Past 30 Days  Result Component Current Result Ref Range Previous Result Ref Range   Albumin/Globulin Ratio 1.6 (10/6/2022) 0.8 - 2.0 Not in Time Range    Albumin 4.1 (10/6/2022) 3.4 - 4.8 g/dL Not in Time Range    Alkaline Phosphatase 67 (10/6/2022) 25 - 100 U/L Not in Time Range    ALT 27 (10/6/2022) 4 - 36 U/L Not in Time Range    AST 34 (H) (10/6/2022) 8 - 33 U/L Not in Time Range    BUN 77 (H) (10/6/2022) 6 - 20 mg/dL Not in Time Range    Calcium 9.7 (10/6/2022) 8.5 - 10.5 mg/dL Not in Time Range    Chloride 97 (L) (10/6/2022) 98 - 107 mmol/L Not in Time Range    CO2 29 (10/6/2022) 20 - 30 mmol/L Not in Time Range    Creatinine 3.6 (H) (10/6/2022) 0.4 - 1.2 mg/dL Not in Time Range    GFR  21 (L) (10/6/2022) >59 Not in Time Range    GFR Non- 17 (L) (10/6/2022) >59 Not in Time Range    Globulin 2.5 (10/6/2022) Not Established g/dL Not in Time Range    Glucose 235 (H) (10/6/2022) 74 - 106 mg/dL Not in Time Range    Potassium 4.4 (10/6/2022) 3.4 - 5.1 mmol/L Not in Time Range    Sodium 142 (10/6/2022) 136 - 145 mmol/L Not in Time Range    Total Bilirubin <0.2 (L) (10/6/2022) 0.3 - 1.2 mg/dL Not in Time Range    Total Protein 6.6 (10/6/2022) 6.4 - 8.3 g/dL Not in Time Range        Hemoglobin A1C (%)   Date Value   10/06/2022 9.1 (H)     Microalbumin, Random Urine (mg/dL)   Date Value   01/05/2022 35.00 (H)     LDL Calculated (mg/dL)   Date Value   10/06/2022 80         Lab Results   Component Value Date/Time    WBC 5.8 01/02/2020 09:19 AM    NEUTROABS 3.7 01/02/2020 09:19 AM    HGB 12.2 01/02/2020 09:19 AM    HCT 37.6 01/02/2020 09:19 AM    MCV 86 01/02/2020 09:19 AM     01/02/2020 09:19 AM       Lab Results   Component Value Date    TSH 1.710 01/02/2020         ASSESSMENT/PLAN:   Diagnosis Orders   1. Type 2 diabetes mellitus with other circulatory complication, with long-term current use of insulin (HCC)  Hemoglobin A1C    insulin detemir (LEVEMIR FLEXTOUCH) 100 UNIT/ML injection pen    insulin aspart (NOVOLOG FLEXPEN) 100 UNIT/ML injection pen      2. HTN, goal below 140/80  Comprehensive Metabolic Panel      3. Hyperlipidemia LDL goal <100  Lipid Panel      4. JANINE (obstructive sleep apnea)        5. Diabetic polyneuropathy associated with type 2 diabetes mellitus (HCC)        6. Stage 3b chronic kidney disease ([facility] [address]        7.  Situational mixed anxiety and depressive disorder                Orders Placed This Encounter   Procedures    Hemoglobin A1C     Standing Status:   Future     Number of Occurrences:   1     Standing Expiration Date:   10/6/2023    Lipid Panel     Standing Status:   Future     Number of Occurrences:   1     Standing Expiration Date:   10/6/2023     Order Specific Question:   Is Patient Fasting?/# of Hours     Answer:   6    Comprehensive Metabolic Panel daily as needed for Wheezing 18 g 0    clonazePAM (KLONOPIN) 0.5 MG tablet Take 1 tablet by mouth 2 times daily as needed for Anxiety for up to 30 days. 60 tablet 0    gabapentin (NEURONTIN) 600 MG tablet TAKE ONE TABLET BY MOUTH THREE TIMES A DAY 90 tablet 2    [DISCONTINUED] insulin detemir (LEVEMIR FLEXTOUCH) 100 UNIT/ML injection pen Inject 80 Units into the skin 2 times daily 60 mL 5    [DISCONTINUED] insulin aspart (NOVOLOG FLEXPEN) 100 UNIT/ML injection pen INJECT 15 UNITS INTO THE SKIN THREE TIMES A DAY BEFORE MEALS 45 mL 5     No facility-administered encounter medications on file as of 10/6/2022. Return in about 3 months (around 1/6/2023), or if symptoms worsen or fail to improve. PATIENT COUNSELING    Counseling was provided today regardingthe following topics: Healthy eating habits, Regular exercise, substance abuse and healthy sleep habits. Discussed use, benefit, and side effectsof prescribed medications. Barriers to medication compliance addressed. All patient questions answered. Pt voiced understanding. Controlled Substance Monitoring:    Acute and Chronic Pain Monitoring:   RX Monitoring 10/6/2022   Attestation -   Periodic Controlled Substance Monitoring Possible medication side effects, risk of tolerance/dependence & alternative treatments discussed. ;No signs of potential drug abuse or diversion identified.    Chronic Pain > 50 MEDD -   Chronic Pain > 80 MEDD -

## 2022-10-07 LAB
A/G RATIO: 1.6 (ref 0.8–2)
ALBUMIN SERPL-MCNC: 4.1 G/DL (ref 3.4–4.8)
ALP BLD-CCNC: 67 U/L (ref 25–100)
ALT SERPL-CCNC: 27 U/L (ref 4–36)
ANION GAP SERPL CALCULATED.3IONS-SCNC: 16 MMOL/L (ref 3–16)
AST SERPL-CCNC: 34 U/L (ref 8–33)
BILIRUB SERPL-MCNC: <0.2 MG/DL (ref 0.3–1.2)
BUN BLDV-MCNC: 77 MG/DL (ref 6–20)
CALCIUM SERPL-MCNC: 9.7 MG/DL (ref 8.5–10.5)
CHLORIDE BLD-SCNC: 97 MMOL/L (ref 98–107)
CHOLESTEROL, TOTAL: 154 MG/DL (ref 0–200)
CO2: 29 MMOL/L (ref 20–30)
CREAT SERPL-MCNC: 3.6 MG/DL (ref 0.4–1.2)
GFR AFRICAN AMERICAN: 21
GFR NON-AFRICAN AMERICAN: 17
GLOBULIN: 2.5 G/DL
GLUCOSE BLD-MCNC: 235 MG/DL (ref 74–106)
HBA1C MFR BLD: 9.1 %
HDLC SERPL-MCNC: 31 MG/DL (ref 40–60)
LDL CHOLESTEROL CALCULATED: 80 MG/DL
POTASSIUM SERPL-SCNC: 4.4 MMOL/L (ref 3.4–5.1)
SODIUM BLD-SCNC: 142 MMOL/L (ref 136–145)
TOTAL PROTEIN: 6.6 G/DL (ref 6.4–8.3)
TRIGL SERPL-MCNC: 217 MG/DL (ref 0–249)
VLDLC SERPL CALC-MCNC: 43 MG/DL

## 2022-11-03 DIAGNOSIS — F43.23 SITUATIONAL MIXED ANXIETY AND DEPRESSIVE DISORDER: ICD-10-CM

## 2022-11-04 RX ORDER — MECLIZINE HYDROCHLORIDE 25 MG/1
TABLET ORAL
Qty: 90 TABLET | Refills: 0 | OUTPATIENT
Start: 2022-11-04

## 2022-11-04 RX ORDER — CLONAZEPAM 0.5 MG/1
TABLET ORAL
Qty: 60 TABLET | Refills: 0 | Status: SHIPPED | OUTPATIENT
Start: 2022-11-04 | End: 2022-12-04

## 2022-11-04 NOTE — TELEPHONE ENCOUNTER
Patient called, requested refill.        Next Office Visit Date:  Future Appointments   Date Time Provider Lyndsey Lyons   1/5/2023  9:15 AM Cony Miss, APRN [facility] Haze Carbo   5/23/2023  9:00 AM Cony , APRN [facility] Monica BURTON please review via [address]

## 2022-11-10 RX ORDER — MECLIZINE HYDROCHLORIDE 25 MG/1
TABLET ORAL
Qty: 90 TABLET | Refills: 0 | Status: SHIPPED | OUTPATIENT
Start: 2022-11-10

## 2022-12-19 DIAGNOSIS — F43.23 SITUATIONAL MIXED ANXIETY AND DEPRESSIVE DISORDER: ICD-10-CM

## 2022-12-19 DIAGNOSIS — E11.42 DIABETIC POLYNEUROPATHY ASSOCIATED WITH TYPE 2 DIABETES MELLITUS (HCC): ICD-10-CM

## 2022-12-19 RX ORDER — GABAPENTIN 600 MG/1
TABLET ORAL
Qty: 90 TABLET | Refills: 2 | Status: SHIPPED | OUTPATIENT
Start: 2022-12-19 | End: 2023-03-19

## 2022-12-19 RX ORDER — CLONAZEPAM 0.5 MG/1
TABLET ORAL
Qty: 60 TABLET | Refills: 0 | Status: SHIPPED | OUTPATIENT
Start: 2022-12-19 | End: 2023-01-18

## 2022-12-19 NOTE — TELEPHONE ENCOUNTER
Refill request received from pharmacy      Next Office Visit Date:  Future Appointments   Date Time Provider Lyndsey Serena   1/12/2023  9:45 AM ANDREIA Acosta  [facility] Zandra Lea   5/23/2023  9:00 AM ANDREIA Acosta [address] - Please review via [address]

## 2023-01-31 ENCOUNTER — HOSPITAL ENCOUNTER (OUTPATIENT)
Facility: HOSPITAL | Age: 63
Discharge: HOME | End: 2023-01-31
Payer: MEDICARE

## 2023-01-31 ENCOUNTER — OFFICE VISIT (OUTPATIENT)
Dept: FAMILY MEDICINE CLINIC | Age: 63
End: 2023-01-31
Payer: MEDICARE

## 2023-01-31 VITALS
RESPIRATION RATE: 18 BRPM | OXYGEN SATURATION: 96 % | SYSTOLIC BLOOD PRESSURE: 112 MMHG | WEIGHT: 262.8 LBS | TEMPERATURE: 97.2 F | HEART RATE: 62 BPM | BODY MASS INDEX: 39.83 KG/M2 | DIASTOLIC BLOOD PRESSURE: 64 MMHG | HEIGHT: 68 IN

## 2023-01-31 DIAGNOSIS — E11.59 TYPE 2 DIABETES MELLITUS WITH OTHER CIRCULATORY COMPLICATION, WITH LONG-TERM CURRENT USE OF INSULIN (HCC): Primary | ICD-10-CM

## 2023-01-31 DIAGNOSIS — K04.7 DENTAL ABSCESS: ICD-10-CM

## 2023-01-31 DIAGNOSIS — Z79.4 TYPE 2 DIABETES MELLITUS WITH OTHER CIRCULATORY COMPLICATION, WITH LONG-TERM CURRENT USE OF INSULIN (HCC): Primary | ICD-10-CM

## 2023-01-31 DIAGNOSIS — Z79.4 TYPE 2 DIABETES MELLITUS WITH OTHER CIRCULATORY COMPLICATION, WITH LONG-TERM CURRENT USE OF INSULIN (HCC): ICD-10-CM

## 2023-01-31 DIAGNOSIS — N18.32 STAGE 3B CHRONIC KIDNEY DISEASE (HCC): ICD-10-CM

## 2023-01-31 DIAGNOSIS — G47.33 OSA (OBSTRUCTIVE SLEEP APNEA): ICD-10-CM

## 2023-01-31 DIAGNOSIS — I10 HTN, GOAL BELOW 140/80: ICD-10-CM

## 2023-01-31 DIAGNOSIS — E78.5 HYPERLIPIDEMIA LDL GOAL <100: ICD-10-CM

## 2023-01-31 DIAGNOSIS — E11.59 TYPE 2 DIABETES MELLITUS WITH OTHER CIRCULATORY COMPLICATION, WITH LONG-TERM CURRENT USE OF INSULIN (HCC): ICD-10-CM

## 2023-01-31 LAB
A/G RATIO: 1.7 (ref 0.8–2)
ALBUMIN SERPL-MCNC: 4.1 G/DL (ref 3.4–4.8)
ALP BLD-CCNC: 96 U/L (ref 25–100)
ALT SERPL-CCNC: 47 U/L (ref 4–36)
ANION GAP SERPL CALCULATED.3IONS-SCNC: 9 MMOL/L (ref 3–16)
AST SERPL-CCNC: 44 U/L (ref 8–33)
BASOPHILS ABSOLUTE: 0 K/UL (ref 0–0.1)
BASOPHILS RELATIVE PERCENT: 0.5 %
BILIRUB SERPL-MCNC: 0.4 MG/DL (ref 0.3–1.2)
BUN BLDV-MCNC: 47 MG/DL (ref 6–20)
CALCIUM SERPL-MCNC: 10 MG/DL (ref 8.5–10.5)
CHLORIDE BLD-SCNC: 96 MMOL/L (ref 98–107)
CHOLESTEROL, TOTAL: 157 MG/DL (ref 0–200)
CO2: 33 MMOL/L (ref 20–30)
CREAT SERPL-MCNC: 2.5 MG/DL (ref 0.4–1.2)
EOSINOPHILS ABSOLUTE: 0.2 K/UL (ref 0–0.4)
EOSINOPHILS RELATIVE PERCENT: 2 %
GFR SERPL CREATININE-BSD FRML MDRD: 28 ML/MIN/{1.73_M2}
GLOBULIN: 2.4 G/DL
GLUCOSE BLD-MCNC: 284 MG/DL (ref 74–106)
HBA1C MFR BLD: 11.3 %
HCT VFR BLD CALC: 45.4 % (ref 40–54)
HDLC SERPL-MCNC: 29 MG/DL (ref 40–60)
HEMOGLOBIN: 14.3 G/DL (ref 13–18)
IMMATURE GRANULOCYTES #: 0 K/UL
IMMATURE GRANULOCYTES %: 0.2 % (ref 0–5)
LDL CHOLESTEROL CALCULATED: 85 MG/DL
LYMPHOCYTES ABSOLUTE: 1.1 K/UL (ref 1.5–4)
LYMPHOCYTES RELATIVE PERCENT: 12.5 %
MCH RBC QN AUTO: 27.6 PG (ref 27–32)
MCHC RBC AUTO-ENTMCNC: 31.5 G/DL (ref 31–35)
MCV RBC AUTO: 87.6 FL (ref 80–100)
MONOCYTES ABSOLUTE: 0.5 K/UL (ref 0.2–0.8)
MONOCYTES RELATIVE PERCENT: 6.3 %
NEUTROPHILS ABSOLUTE: 6.6 K/UL (ref 2–7.5)
NEUTROPHILS RELATIVE PERCENT: 78.5 %
PDW BLD-RTO: 14.1 % (ref 11–16)
PLATELET # BLD: 210 K/UL (ref 150–400)
PMV BLD AUTO: 11.5 FL (ref 6–10)
POTASSIUM SERPL-SCNC: 4.4 MMOL/L (ref 3.4–5.1)
RBC # BLD: 5.18 M/UL (ref 4.5–6)
SODIUM BLD-SCNC: 138 MMOL/L (ref 136–145)
TOTAL PROTEIN: 6.5 G/DL (ref 6.4–8.3)
TRIGL SERPL-MCNC: 213 MG/DL (ref 0–249)
TSH SERPL DL<=0.05 MIU/L-ACNC: 2.05 UIU/ML (ref 0.27–4.2)
VLDLC SERPL CALC-MCNC: 43 MG/DL
WBC # BLD: 8.4 K/UL (ref 4–11)

## 2023-01-31 PROCEDURE — 99214 OFFICE O/P EST MOD 30 MIN: CPT | Performed by: NURSE PRACTITIONER

## 2023-01-31 PROCEDURE — 80053 COMPREHEN METABOLIC PANEL: CPT

## 2023-01-31 PROCEDURE — 80061 LIPID PANEL: CPT

## 2023-01-31 PROCEDURE — 3074F SYST BP LT 130 MM HG: CPT | Performed by: NURSE PRACTITIONER

## 2023-01-31 PROCEDURE — 85025 COMPLETE CBC W/AUTO DIFF WBC: CPT

## 2023-01-31 PROCEDURE — 84443 ASSAY THYROID STIM HORMONE: CPT

## 2023-01-31 PROCEDURE — 83036 HEMOGLOBIN GLYCOSYLATED A1C: CPT

## 2023-01-31 PROCEDURE — 3078F DIAST BP <80 MM HG: CPT | Performed by: NURSE PRACTITIONER

## 2023-01-31 RX ORDER — AMOXICILLIN 875 MG/1
875 TABLET, COATED ORAL 2 TIMES DAILY
Qty: 20 TABLET | Refills: 0 | Status: SHIPPED | OUTPATIENT
Start: 2023-01-31 | End: 2023-02-10

## 2023-01-31 RX ORDER — HYDROCODONE BITARTRATE AND ACETAMINOPHEN 7.5; 325 MG/1; MG/1
1 TABLET ORAL EVERY 6 HOURS PRN
Qty: 28 TABLET | Refills: 0 | Status: SHIPPED | OUTPATIENT
Start: 2023-01-31 | End: 2023-02-07

## 2023-01-31 ASSESSMENT — ENCOUNTER SYMPTOMS
EYES NEGATIVE: 1
BACK PAIN: 1
RESPIRATORY NEGATIVE: 1
GASTROINTESTINAL NEGATIVE: 1

## 2023-01-31 NOTE — PROGRESS NOTES
SUBJECTIVE:  Tania Knight is a 65 y.o. male that presents with   Chief Complaint   Patient presents with    Diabetes     F/u   . HPI:    This is a pleasant 65year old white male who presents for follow up with diabetes, htn, hyperlipidemia anxiety. He is also having dental abscess and is having a lot of pain. He also had a sore throat last evening. He denies fever or chills. He is tired and feels bad. He is taking all of his meds and tolerating them well. He did call the dentist and they are calling him back.l   I will start him on pcn and give him some pain med. He is miserable today. I will also get labs to verify he is not getting septic. He does not have pmh of drug or alcohol abuse. I will get an appt for him with [facility]. He says he forgets and needs help with it. Diabetes  He presents for his follow-up diabetic visit. He has type 2 diabetes mellitus. No MedicAlert identification noted. His disease course has been worsening. Hypoglycemia symptoms include headaches and nervousness/anxiousness. Associated symptoms include fatigue and weakness. There are no hypoglycemic complications. Symptoms are worsening. Diabetic complications include peripheral neuropathy and retinopathy. Risk factors for coronary artery disease include male sex, hypertension, diabetes mellitus, stress and dyslipidemia. Current diabetic treatment includes insulin injections and oral agent (monotherapy). He is compliant with treatment most of the time. His weight is stable. He is following a diabetic diet. Meal planning includes avoidance of concentrated sweets. He has not had a previous visit with a dietitian. He never (no scheduled exercise but works in the yard) participates in exercise. There is no change () in his home blood glucose trend. An ACE inhibitor/angiotensin II receptor blocker is being taken. Eye exam is current. Hyperlipidemia  This is a chronic problem.  The current episode started more than 1 year ago. The problem is uncontrolled. Exacerbating diseases include chronic renal disease. Factors aggravating his hyperlipidemia include beta blockers. Current antihyperlipidemic treatment includes statins and fibric acid derivatives. Compliance problems: unknown. Risk factors for coronary artery disease include diabetes mellitus, dyslipidemia, family history, hypertension, male sex, obesity, stress and a sedentary lifestyle. Hypertension  This is a chronic problem. The current episode started more than 1 year ago. The problem is unchanged. Associated symptoms include headaches. There are no associated agents to hypertension. Risk factors for coronary artery disease include diabetes mellitus, dyslipidemia and family history. Past treatments include ACE inhibitors, diuretics and lifestyle changes. The current treatment provides moderate improvement. There are no compliance problems. Hypertensive end-organ damage includes retinopathy. Identifiable causes of hypertension include chronic renal disease. Review of Systems   Constitutional:  Positive for activity change and fatigue. HENT:  Positive for dental problem. Eyes: Negative. Respiratory: Negative. Cardiovascular: Negative. Gastrointestinal: Negative. Endocrine: Negative. Genitourinary: Negative. Musculoskeletal:  Positive for arthralgias, back pain and gait problem. Skin: Negative. Neurological:  Positive for weakness and headaches. Hematological: Negative. Psychiatric/Behavioral:  The patient is nervous/anxious. All other systems reviewed and are negative. OBJECTIVE:  /64   Pulse 62   Temp 97.2 °F (36.2 °C) (Infrared)   Resp 18   Ht 5' 8\" (1.727 m)   Wt 262 lb 12.8 oz (119.2 kg)   SpO2 96% Comment: RA  BMI 39.96 kg/m²   Physical Exam  Vitals and nursing note reviewed. Constitutional:       Appearance: Normal appearance. He is obese. HENT:      Head: Normocephalic and atraumatic.       Comments: Dental abscess     Right Ear: Tympanic membrane, ear canal and external ear normal.      Left Ear: Tympanic membrane, ear canal and external ear normal.      Nose: Nose normal.      Mouth/Throat:      Mouth: Mucous membranes are moist.      Pharynx: Oropharynx is clear. Eyes:      Conjunctiva/sclera: Conjunctivae normal.      Pupils: Pupils are equal, round, and reactive to light. Cardiovascular:      Rate and Rhythm: Normal rate and regular rhythm. Pulses: Normal pulses. Heart sounds: Murmur heard. Pulmonary:      Effort: Pulmonary effort is normal.      Breath sounds: Normal breath sounds. Musculoskeletal:      Left shoulder: Tenderness present. Decreased range of motion. Cervical back: Normal range of motion and neck supple. Right hip: Tenderness present. Decreased range of motion. Right knee: Decreased range of motion. Tenderness present. Skin:     General: Skin is warm and dry. Capillary Refill: Capillary refill takes less than 2 seconds. Neurological:      Mental Status: He is alert and oriented to person, place, and time. Psychiatric:         Mood and Affect: Mood normal.         Behavior: Behavior normal.         Thought Content: Thought content normal.         Judgment: Judgment normal.     No results found for requested labs within last 30 days. Hemoglobin A1C (%)   Date Value   10/06/2022 9.1 (H)     Microalbumin, Random Urine (mg/dL)   Date Value   01/05/2022 35.00 (H)     LDL Calculated (mg/dL)   Date Value   10/06/2022 80         Lab Results   Component Value Date/Time    WBC 5.8 01/02/2020 09:19 AM    NEUTROABS 3.7 01/02/2020 09:19 AM    HGB 12.2 01/02/2020 09:19 AM    HCT 37.6 01/02/2020 09:19 AM    MCV 86 01/02/2020 09:19 AM     01/02/2020 09:19 AM       Lab Results   Component Value Date    TSH 1.710 01/02/2020         ASSESSMENT/PLAN:   Diagnosis Orders   1.  Type 2 diabetes mellitus with other circulatory complication, with long-term current use of insulin (HCC)  Hemoglobin A1C      2. HTN, goal below 140/80  Comprehensive Metabolic Panel      3. Hyperlipidemia LDL goal <100  Lipid Panel    TSH      4. JANINE (obstructive sleep apnea)        5. Stage 3b chronic kidney disease [facility])  External Referral To Nephrology      6. Dental abscess  CBC with Auto Differential    HYDROcodone-acetaminophen (NORCO) 7.5-325 MG per tablet              Orders Placed This Encounter   Procedures    Hemoglobin A1C     Standing Status:   Future     Standing Expiration Date:   1/31/2024    Comprehensive Metabolic Panel     Standing Status:   Future     Standing Expiration Date:   1/31/2024    Lipid Panel     Standing Status:   Future     Standing Expiration Date:   1/31/2024     Order Specific Question:   Is Patient Fasting?/# of Hours     Answer:   6    CBC with Auto Differential     Standing Status:   Future     Standing Expiration Date:   1/31/2024    TSH     Standing Status:   Future     Standing Expiration Date:   1/31/2024    External Referral To Nephrology     Referral Priority:   Routine     Referral Type:   Eval and Treat     Referral Reason:   Specialty Services Required     Requested Specialty:   Nephrology     Number of Visits Requested:   1        Outpatient Encounter Medications as of 1/31/2023   Medication Sig Dispense Refill    amoxicillin (AMOXIL) 875 MG tablet Take 1 tablet by mouth 2 times daily for 10 days 20 tablet 0    HYDROcodone-acetaminophen (NORCO) 7.5-325 MG per tablet Take 1 tablet by mouth every 6 hours as needed for Pain for up to 7 days. Intended supply: 7 days.  Take lowest dose possible to manage pain Max Daily Amount: 4 tablets 28 tablet 0    gabapentin (NEURONTIN) 600 MG tablet TAKE ONE TABLET BY MOUTH THREE TIMES A DAY 90 tablet 2    meclizine (ANTIVERT) 25 MG tablet TAKE ONE TABLET BY MOUTH THREE TIMES A DAY AS NEEDED FOR DIZZINESS 90 tablet 0    insulin detemir (LEVEMIR FLEXTOUCH) 100 UNIT/ML injection pen Inject 80 Units into the skin 2 times daily 60 mL 5    albuterol sulfate HFA (VENTOLIN HFA) 108 (90 Base) MCG/ACT inhaler Inhale 2 puffs into the lungs 4 times daily as needed for Wheezing 18 g 0    insulin aspart (NOVOLOG FLEXPEN) 100 UNIT/ML injection pen INJECT 15 UNITS INTO THE SKIN THREE TIMES A DAY BEFORE MEALS 45 mL 5    bumetanide (BUMEX) 2 MG tablet TAKE ONE TABLET BY MOUTH TWICE A  tablet 1    triamterene-hydroCHLOROthiazide (MAXZIDE-25) 37.5-25 MG per tablet Take 1 tablet by mouth daily 90 tablet 1    rosuvastatin (CRESTOR) 40 MG tablet TAKE ONE TABLET BY MOUTH DAILY 90 tablet 3    carvedilol (COREG) 12.5 MG tablet TAKE ONE TABLET BY MOUTH TWICE A  tablet 3    allopurinol (ZYLOPRIM) 300 MG tablet Take 1 tablet by mouth daily 90 tablet 3    fenofibrate micronized (LOFIBRA) 200 MG capsule TAKE ONE CAPSULE BY MOUTH EVERY MORNING BEFORE BREAKFAST 90 capsule 3    escitalopram (LEXAPRO) 20 MG tablet TAKE ONE TABLET BY MOUTH DAILY 90 tablet 3    polyethylene glycol (GLYCOLAX) 17 GM/SCOOP powder Take 17 g by mouth 2 times daily as needed      ACCU-CHEK COSTA PLUS strip       pantoprazole (PROTONIX) 40 MG tablet       Insulin Pen Needle ([employer] PEN NEEDLES 29G) 29G X 12MM MISC Check sugar and use insulin up to 5 times daily 150 each 5    Lancets MISC Check sugar and use insulin up to 5 times daily. 150 each 5    B-D UF III MINI PEN NEEDLES 31G X 5 MM MISC USE THREE TIMES A  each 7    aspirin 81 MG chewable tablet Take 81 mg by mouth daily      clonazePAM (KLONOPIN) 0.5 MG tablet TAKE ONE TABLET BY MOUTH TWICE A DAY AS NEEDED FOR ANXIETY (Patient not taking: Reported on 1/31/2023) 60 tablet 0    SITagliptin (JANUVIA) 100 MG tablet TAKE ONE TABLET BY MOUTH DAILY (Patient not taking: Reported on 1/31/2023) 90 tablet 3     No facility-administered encounter medications on file as of 1/31/2023. Return in about 3 months (around 4/30/2023), or if symptoms worsen or fail to improve.       PATIENT COUNSELING    Counseling was provided today regardingthe following topics: Healthy eating habits, Regular exercise, substance abuse and healthy sleep habits. Discussed use, benefit, and side effectsof prescribed medications. Barriers to medication compliance addressed. All patient questions answered. Pt voiced understanding. Controlled Substance Monitoring:    Acute and Chronic Pain Monitoring:   RX Monitoring 1/31/2023   Attestation -   Periodic Controlled Substance Monitoring Possible medication side effects, risk of tolerance/dependence & alternative treatments discussed. ;No signs of potential drug abuse or diversion identified.    Chronic Pain > 50 MEDD -   Chronic Pain > 80 MEDD -

## 2023-01-31 NOTE — PROGRESS NOTES
Chief Complaint   Patient presents with    Diabetes     F/u       Have you seen any other physician or provider since your last visit no    Have you had any other diagnostic tests since your last visit? no    Have you changed or stopped any medications since your last visit? no

## 2023-02-06 RX ORDER — BUMETANIDE 2 MG/1
TABLET ORAL
Qty: 180 TABLET | Refills: 3 | Status: SHIPPED | OUTPATIENT
Start: 2023-02-06

## 2023-02-06 RX ORDER — FENOFIBRATE 200 MG/1
CAPSULE ORAL
Qty: 90 CAPSULE | Refills: 3 | Status: SHIPPED | OUTPATIENT
Start: 2023-02-06

## 2023-02-06 RX ORDER — ESCITALOPRAM OXALATE 20 MG/1
TABLET ORAL
Qty: 90 TABLET | Refills: 3 | Status: SHIPPED | OUTPATIENT
Start: 2023-02-06

## 2023-02-20 ENCOUNTER — HOSPITAL ENCOUNTER (OUTPATIENT)
Facility: HOSPITAL | Age: 63
Discharge: HOME | End: 2023-02-20
Payer: MEDICARE

## 2023-02-20 ENCOUNTER — OFFICE VISIT (OUTPATIENT)
Dept: FAMILY MEDICINE CLINIC | Age: 63
End: 2023-02-20
Payer: MEDICARE

## 2023-02-20 VITALS — TEMPERATURE: 98.1 F

## 2023-02-20 DIAGNOSIS — R10.84 GENERALIZED ABDOMINAL PAIN: ICD-10-CM

## 2023-02-20 DIAGNOSIS — R11.2 NAUSEA AND VOMITING, UNSPECIFIED VOMITING TYPE: ICD-10-CM

## 2023-02-20 DIAGNOSIS — R11.2 NAUSEA AND VOMITING, UNSPECIFIED VOMITING TYPE: Primary | ICD-10-CM

## 2023-02-20 PROCEDURE — 80053 COMPREHEN METABOLIC PANEL: CPT

## 2023-02-20 PROCEDURE — 36000 PLACE NEEDLE IN VEIN: CPT | Performed by: NURSE PRACTITIONER

## 2023-02-20 PROCEDURE — 99203 OFFICE O/P NEW LOW 30 MIN: CPT | Performed by: NURSE PRACTITIONER

## 2023-02-20 PROCEDURE — 83690 ASSAY OF LIPASE: CPT

## 2023-02-20 PROCEDURE — 36415 COLL VENOUS BLD VENIPUNCTURE: CPT

## 2023-02-20 RX ORDER — SODIUM CHLORIDE 9 MG/ML
INJECTION, SOLUTION INTRAVENOUS ONCE
Status: SHIPPED | OUTPATIENT
Start: 2023-02-20

## 2023-02-20 RX ORDER — ONDANSETRON 4 MG/1
4 TABLET, ORALLY DISINTEGRATING ORAL 3 TIMES DAILY PRN
Qty: 21 TABLET | Refills: 0 | Status: SHIPPED | OUTPATIENT
Start: 2023-02-20

## 2023-02-20 RX ORDER — PROMETHAZINE HYDROCHLORIDE 25 MG/1
25 TABLET ORAL 4 TIMES DAILY PRN
Qty: 20 TABLET | Refills: 0 | Status: SHIPPED | OUTPATIENT
Start: 2023-02-20 | End: 2023-02-27

## 2023-02-20 ASSESSMENT — ENCOUNTER SYMPTOMS
NAUSEA: 1
ABDOMINAL PAIN: 1
VOMITING: 1

## 2023-02-20 NOTE — PROGRESS NOTES
Per Miracle Reid, ANDREIA order 22 G IV inserted Left FA  w/o difficulty. IV patent/positive blood return. IV secured with tape and tegaderm. Patient tolerated procedure well. 1406 1 Liter Normal Saline IV Fluids started   1445 IV Fluids Completed     IV catheter discontinued intact. Site without signs and symptoms of complications. Dressing and pressure applied.

## 2023-02-20 NOTE — PROGRESS NOTES
Joann Rebolledo 65 y.o. presents today for   Chief Complaint   Patient presents with    Abdominal Pain    Nausea & Vomiting     Started x4 days ago     Diarrhea     \"My stools are green\"         HPI:  Joann Rebolledo presents with his wife today who says 4 days ago he began having abdominal pain somewhat generalized but worse towards the bottom. He also has nausea and vomiting that began at the same time with diarrhea and his stools are green. He does have a history of pancreatitis and admits it feels similar. He is unable to move without vomiting he has been drinking Sprite and his wife is given him Pepto and nothing is stopping his symptoms.     Family History   Problem Relation Age of Onset    Stroke Mother     High Blood Pressure Mother     Heart Disease Mother     Diabetes Father     High Blood Pressure Father         Social History     Socioeconomic History    Marital status: Unknown     Spouse name: Not on file    Number of children: Not on file    Years of education: Not on file    Highest education level: Not on file   Occupational History    Not on file   Tobacco Use    Smoking status: Never    Smokeless tobacco: Never   Substance and Sexual Activity    Alcohol use: No    Drug use: No    Sexual activity: Not on file   Other Topics Concern    Not on file   Social History Narrative    Not on file     Social Determinants of Health     Financial Resource Strain: Medium Risk    Difficulty of Paying Living Expenses: Somewhat hard   Food Insecurity: No Food Insecurity    Worried About Running Out of Food in the Last Year: Never true    Ran Out of Food in the Last Year: Never true   Transportation Needs: Not on file   Physical Activity: Insufficiently Active    Days of Exercise per Week: 7 days    Minutes of Exercise per Session: 20 min   Stress: Not on file   Social Connections: Not on file   Intimate Partner Violence: Not on file   Housing Stability: Not on file        Past Surgical History:   Procedure Laterality Date    CHOLECYSTECTOMY  2013    COLONOSCOPY  06/21/2017        COLONOSCOPY N/A 3/10/2022    COLONOSCOPY POLYPECTOMY SNARE/COLD BIOPSY performed by Ju Grimes MD at [facility] ENDOSCOPY    FOOT SURGERY Left 1977    KIDNEY STONE SURGERY          Past Medical History:   Diagnosis Date    Diabetes (HCC)     Epilepsy (HCC)     Fatigue     Gout     Hx of circumcision     Hyperlipidemia 2002    Dr. Ruiz    Hypertension     Kidney stones     Neuropathy     Pancreatitis     Staph infection 01/31/2017        Current Outpatient Medications   Medication Sig Dispense Refill    promethazine (PHENERGAN) 25 MG tablet Take 1 tablet by mouth 4 times daily as needed for Nausea 20 tablet 0    ondansetron (ZOFRAN-ODT) 4 MG disintegrating tablet Take 1 tablet by mouth 3 times daily as needed for Nausea or Vomiting 21 tablet 0    escitalopram (LEXAPRO) 20 MG tablet TAKE ONE TABLET BY MOUTH DAILY 90 tablet 3    bumetanide (BUMEX) 2 MG tablet TAKE ONE TABLET BY MOUTH TWICE A  tablet 3    fenofibrate micronized (LOFIBRA) 200 MG capsule TAKE ONE CAPSULE BY MOUTH EVERY MORNING BEFORE BREAKFAST 90 capsule 3    linagliptin (TRADJENTA) 5 MG tablet Take 1 tablet by mouth daily 90 tablet 1    gabapentin (NEURONTIN) 600 MG tablet TAKE ONE TABLET BY MOUTH THREE TIMES A DAY 90 tablet 2    meclizine (ANTIVERT) 25 MG tablet TAKE ONE TABLET BY MOUTH THREE TIMES A DAY AS NEEDED FOR DIZZINESS 90 tablet 0    insulin detemir (LEVEMIR FLEXTOUCH) 100 UNIT/ML injection pen Inject 80 Units into the skin 2 times daily 60 mL 5    albuterol sulfate HFA (VENTOLIN HFA) 108 (90 Base) MCG/ACT inhaler Inhale 2 puffs into the lungs 4 times daily as needed for Wheezing 18 g 0    insulin aspart (NOVOLOG FLEXPEN) 100 UNIT/ML injection pen INJECT 15 UNITS INTO THE SKIN THREE TIMES A DAY BEFORE MEALS 45 mL 5    triamterene-hydroCHLOROthiazide (MAXZIDE-25) 37.5-25 MG per tablet Take 1 tablet by mouth daily 90 tablet 1    rosuvastatin (CRESTOR) 40 MG  tablet TAKE ONE TABLET BY MOUTH DAILY 90 tablet 3    carvedilol (COREG) 12.5 MG tablet TAKE ONE TABLET BY MOUTH TWICE A  tablet 3    allopurinol (ZYLOPRIM) 300 MG tablet Take 1 tablet by mouth daily 90 tablet 3    SITagliptin (JANUVIA) 100 MG tablet TAKE ONE TABLET BY MOUTH DAILY 90 tablet 3    polyethylene glycol (GLYCOLAX) 17 GM/SCOOP powder Take 17 g by mouth 2 times daily as needed      ACCU-CHEK COSTA PLUS strip       pantoprazole (PROTONIX) 40 MG tablet       Insulin Pen Needle ([employer] PEN NEEDLES 29G) 29G X 12MM MISC Check sugar and use insulin up to 5 times daily 150 each 5    Lancets MISC Check sugar and use insulin up to 5 times daily. 150 each 5    aspirin 81 MG chewable tablet Take 81 mg by mouth daily      clonazePAM (KLONOPIN) 0.5 MG tablet TAKE ONE TABLET BY MOUTH TWICE A DAY AS NEEDED FOR ANXIETY (Patient not taking: Reported on 1/31/2023) 60 tablet 0    B-D UF III MINI PEN NEEDLES 31G X 5 MM MISC USE THREE TIMES A DAY (Patient not taking: Reported on 2/20/2023) 100 each 7     No current facility-administered medications for this visit. Review of Systems   Constitutional:  Positive for fatigue. Gastrointestinal:  Positive for abdominal pain, nausea and vomiting. Neurological:  Positive for weakness. All other systems reviewed and are negative. Temp 98.1 °F (36.7 °C) (Infrared)      Physical Exam  Constitutional:       Appearance: Normal appearance. He is ill-appearing. HENT:      Head: Normocephalic and atraumatic. Right Ear: Tympanic membrane, ear canal and external ear normal.      Left Ear: Tympanic membrane, ear canal and external ear normal.      Nose: Nose normal.      Mouth/Throat:      Mouth: Mucous membranes are moist.      Pharynx: Oropharynx is clear. Eyes:      Extraocular Movements: Extraocular movements intact. Conjunctiva/sclera: Conjunctivae normal.      Pupils: Pupils are equal, round, and reactive to light.    Cardiovascular:      Rate and Rhythm: Normal rate and regular rhythm. Pulses: Normal pulses. Heart sounds: Normal heart sounds. Pulmonary:      Effort: Pulmonary effort is normal.      Breath sounds: Normal breath sounds. Abdominal:      General: Bowel sounds are normal.      Palpations: Abdomen is soft. Comments: Pt as he is very nauseous and has abdominal pain in the left and right upper quadrant pain and lower generalized pain   Musculoskeletal:         General: Normal range of motion. Cervical back: Normal range of motion and neck supple. Skin:     General: Skin is warm and dry. Capillary Refill: Capillary refill takes less than 2 seconds. Neurological:      General: No focal deficit present. Mental Status: He is alert and oriented to person, place, and time. Psychiatric:         Mood and Affect: Mood normal.         Behavior: Behavior normal.        ASSESSMENT/PLAN    1. Nausea and vomiting, unspecified vomiting type  Administered 4 mg Zofran ODT to patient while in clinic. He admits he is feeling somewhat better while receiving the IV fluids. Nahed Raza labs and advised patient upon discharge to take the medication for nausea alternating between Phenergan and Zofran as needed for nausea and vomiting. Will contact him with lab results once received. If his symptoms persist or worsen he should follow-up. He is agreeable to plan of care. - Lipase; Future  - Comprehensive Metabolic Panel; Future  - promethazine (PHENERGAN) 25 MG tablet; Take 1 tablet by mouth 4 times daily as needed for Nausea  Dispense: 20 tablet; Refill: 0  - ondansetron (ZOFRAN-ODT) 4 MG disintegrating tablet; Take 1 tablet by mouth 3 times daily as needed for Nausea or Vomiting  Dispense: 21 tablet; Refill: 0  - 0.9 % sodium chloride infusion    2. Generalized abdominal pain  Administered 4 mg Zofran ODT to patient while in clinic. He admits he is feeling somewhat better while receiving the IV fluids.   Romario labs and advised patient upon discharge to take the medication for nausea alternating between Phenergan and Zofran as needed for nausea and vomiting. Will contact him with lab results once received. If his symptoms persist or worsen he should follow-up. He is agreeable to plan of care. - Lipase; Future  - Comprehensive Metabolic Panel; Future  - promethazine (PHENERGAN) 25 MG tablet; Take 1 tablet by mouth 4 times daily as needed for Nausea  Dispense: 20 tablet; Refill: 0  - ondansetron (ZOFRAN-ODT) 4 MG disintegrating tablet; Take 1 tablet by mouth 3 times daily as needed for Nausea or Vomiting  Dispense: 21 tablet;  Refill: 0             Matthew Arellano, APRN - CNP

## 2023-02-21 LAB
A/G RATIO: 1.6 (ref 0.8–2)
ALBUMIN SERPL-MCNC: 4.4 G/DL (ref 3.4–4.8)
ALP BLD-CCNC: 96 U/L (ref 25–100)
ALT SERPL-CCNC: 36 U/L (ref 4–36)
ANION GAP SERPL CALCULATED.3IONS-SCNC: 13 MMOL/L (ref 3–16)
AST SERPL-CCNC: 39 U/L (ref 8–33)
BILIRUB SERPL-MCNC: 0.4 MG/DL (ref 0.3–1.2)
BUN BLDV-MCNC: 51 MG/DL (ref 6–20)
CALCIUM SERPL-MCNC: 10 MG/DL (ref 8.5–10.5)
CHLORIDE BLD-SCNC: 97 MMOL/L (ref 98–107)
CO2: 27 MMOL/L (ref 20–30)
CREAT SERPL-MCNC: 2.5 MG/DL (ref 0.4–1.2)
GFR SERPL CREATININE-BSD FRML MDRD: 28 ML/MIN/{1.73_M2}
GLOBULIN: 2.8 G/DL
GLUCOSE BLD-MCNC: 228 MG/DL (ref 74–106)
LIPASE: 67 U/L (ref 5.6–51.3)
POTASSIUM SERPL-SCNC: 4.1 MMOL/L (ref 3.4–5.1)
SODIUM BLD-SCNC: 137 MMOL/L (ref 136–145)
TOTAL PROTEIN: 7.2 G/DL (ref 6.4–8.3)

## 2023-03-07 RX ORDER — MECLIZINE HYDROCHLORIDE 25 MG/1
TABLET ORAL
Qty: 90 TABLET | Refills: 0 | Status: SHIPPED | OUTPATIENT
Start: 2023-03-07

## 2023-03-08 DIAGNOSIS — F43.23 SITUATIONAL MIXED ANXIETY AND DEPRESSIVE DISORDER: ICD-10-CM

## 2023-03-08 RX ORDER — CLONAZEPAM 0.5 MG/1
TABLET ORAL
Qty: 60 TABLET | Refills: 0 | Status: SHIPPED | OUTPATIENT
Start: 2023-03-08 | End: 2023-04-07

## 2023-03-08 NOTE — TELEPHONE ENCOUNTER
Refill request received from pharmacy      Next Office Visit Date:  Future Appointments   Date Time Provider Lyndsey Lyons   4/27/2023 10:30 AM Sande Chant, [facility]N [facility]   5/23/2023  9:00 AM ANDREIA Tompkins [address] - Please review via [address]

## 2023-03-09 NOTE — TELEPHONE ENCOUNTER
Patient called, requested refill.       Next Office Visit Date:  Future Appointments   Date Time Provider Department Center   4/27/2023 10:30 AM ANDREIA Castillo   5/23/2023  9:00 AM ANDREIA Castillo please review via PDMP

## 2023-03-10 RX ORDER — CARVEDILOL 12.5 MG/1
TABLET ORAL
Qty: 180 TABLET | Refills: 3 | Status: SHIPPED | OUTPATIENT
Start: 2023-03-10

## 2023-03-10 RX ORDER — ALLOPURINOL 300 MG/1
TABLET ORAL
Qty: 90 TABLET | Refills: 3 | Status: SHIPPED | OUTPATIENT
Start: 2023-03-10

## 2023-03-10 RX ORDER — ROSUVASTATIN CALCIUM 40 MG/1
TABLET, COATED ORAL
Qty: 90 TABLET | Refills: 3 | Status: SHIPPED | OUTPATIENT
Start: 2023-03-10

## 2023-04-05 RX ORDER — MECLIZINE HYDROCHLORIDE 25 MG/1
TABLET ORAL
Qty: 90 TABLET | Refills: 0 | Status: SHIPPED | OUTPATIENT
Start: 2023-04-05

## 2023-05-10 DIAGNOSIS — E11.42 DIABETIC POLYNEUROPATHY ASSOCIATED WITH TYPE 2 DIABETES MELLITUS (HCC): ICD-10-CM

## 2023-05-12 ENCOUNTER — TELEPHONE (OUTPATIENT)
Dept: PHARMACY | Facility: HOSPITAL | Age: 63
End: 2023-05-12

## 2023-05-12 NOTE — TELEPHONE ENCOUNTER
Refill request received from pharmacy      Next Office Visit Date:  Future Appointments   Date Time Provider Lyndsey Lyons   5/17/2023 10:00 AM ANDREIA Chapman [address] [facility]   5/23/2023  9:00 AM ANDREIA Chapman [address] - Please review via [address]

## 2023-05-12 NOTE — TELEPHONE ENCOUNTER
Patient called to let me know he would be needing more Januvia and insulin soon. I reminded patient to call the pharmacy number on his Januvia bottle. This medication is being provided by the company after a representative from [facility] assisted the patient. I also reminded the patient that I have the forms he signed to help get his insulins, but I can't send them in without proof of income. The  requires the SSI benefit award letter. Patient has exhausted available abram funds to assist with copays.

## 2023-05-13 RX ORDER — GABAPENTIN 600 MG/1
TABLET ORAL
Qty: 90 TABLET | Refills: 2 | Status: SHIPPED | OUTPATIENT
Start: 2023-05-13 | End: 2023-08-12

## 2023-05-23 ENCOUNTER — OFFICE VISIT (OUTPATIENT)
Dept: FAMILY MEDICINE CLINIC | Age: 63
End: 2023-05-23
Payer: MEDICARE

## 2023-05-23 ENCOUNTER — TELEPHONE (OUTPATIENT)
Dept: FAMILY MEDICINE CLINIC | Age: 63
End: 2023-05-23

## 2023-05-23 VITALS
BODY MASS INDEX: 39.62 KG/M2 | TEMPERATURE: 98.4 F | HEART RATE: 64 BPM | SYSTOLIC BLOOD PRESSURE: 126 MMHG | RESPIRATION RATE: 20 BRPM | DIASTOLIC BLOOD PRESSURE: 64 MMHG | WEIGHT: 261.4 LBS | HEIGHT: 68 IN | OXYGEN SATURATION: 93 %

## 2023-05-23 DIAGNOSIS — E11.59 TYPE 2 DIABETES MELLITUS WITH OTHER CIRCULATORY COMPLICATION, WITH LONG-TERM CURRENT USE OF INSULIN (HCC): ICD-10-CM

## 2023-05-23 DIAGNOSIS — Z00.00 MEDICARE ANNUAL WELLNESS VISIT, SUBSEQUENT: Primary | ICD-10-CM

## 2023-05-23 DIAGNOSIS — Z79.4 TYPE 2 DIABETES MELLITUS WITH OTHER CIRCULATORY COMPLICATION, WITH LONG-TERM CURRENT USE OF INSULIN (HCC): ICD-10-CM

## 2023-05-23 PROCEDURE — G0439 PPPS, SUBSEQ VISIT: HCPCS | Performed by: NURSE PRACTITIONER

## 2023-05-23 PROCEDURE — 3017F COLORECTAL CA SCREEN DOC REV: CPT | Performed by: NURSE PRACTITIONER

## 2023-05-23 PROCEDURE — 3046F HEMOGLOBIN A1C LEVEL >9.0%: CPT | Performed by: NURSE PRACTITIONER

## 2023-05-23 PROCEDURE — 3078F DIAST BP <80 MM HG: CPT | Performed by: NURSE PRACTITIONER

## 2023-05-23 PROCEDURE — 3074F SYST BP LT 130 MM HG: CPT | Performed by: NURSE PRACTITIONER

## 2023-05-23 RX ORDER — INSULIN ASPART 100 [IU]/ML
INJECTION, SOLUTION INTRAVENOUS; SUBCUTANEOUS
Qty: 10 ADJUSTABLE DOSE PRE-FILLED PEN SYRINGE | Refills: 5 | Status: SHIPPED | OUTPATIENT
Start: 2023-05-23

## 2023-05-23 RX ORDER — INSULIN ASPART 100 [IU]/ML
INJECTION, SOLUTION INTRAVENOUS; SUBCUTANEOUS
Qty: 10 ADJUSTABLE DOSE PRE-FILLED PEN SYRINGE | Refills: 5 | Status: SHIPPED | OUTPATIENT
Start: 2023-05-23 | End: 2023-05-23 | Stop reason: SDUPTHER

## 2023-05-23 SDOH — ECONOMIC STABILITY: INCOME INSECURITY: HOW HARD IS IT FOR YOU TO PAY FOR THE VERY BASICS LIKE FOOD, HOUSING, MEDICAL CARE, AND HEATING?: SOMEWHAT HARD

## 2023-05-23 SDOH — ECONOMIC STABILITY: FOOD INSECURITY: WITHIN THE PAST 12 MONTHS, YOU WORRIED THAT YOUR FOOD WOULD RUN OUT BEFORE YOU GOT MONEY TO BUY MORE.: NEVER TRUE

## 2023-05-23 SDOH — ECONOMIC STABILITY: HOUSING INSECURITY
IN THE LAST 12 MONTHS, WAS THERE A TIME WHEN YOU DID NOT HAVE A STEADY PLACE TO SLEEP OR SLEPT IN A SHELTER (INCLUDING NOW)?: NO

## 2023-05-23 SDOH — ECONOMIC STABILITY: FOOD INSECURITY: WITHIN THE PAST 12 MONTHS, THE FOOD YOU BOUGHT JUST DIDN'T LAST AND YOU DIDN'T HAVE MONEY TO GET MORE.: NEVER TRUE

## 2023-05-23 ASSESSMENT — PATIENT HEALTH QUESTIONNAIRE - PHQ9
SUM OF ALL RESPONSES TO PHQ QUESTIONS 1-9: 7
SUM OF ALL RESPONSES TO PHQ9 QUESTIONS 1 & 2: 3
SUM OF ALL RESPONSES TO PHQ QUESTIONS 1-9: 3
SUM OF ALL RESPONSES TO PHQ QUESTIONS 1-9: 3
1. LITTLE INTEREST OR PLEASURE IN DOING THINGS: 2
2. FEELING DOWN, DEPRESSED OR HOPELESS: 1
4. FEELING TIRED OR HAVING LITTLE ENERGY: 2
8. MOVING OR SPEAKING SO SLOWLY THAT OTHER PEOPLE COULD HAVE NOTICED. OR THE OPPOSITE, BEING SO FIGETY OR RESTLESS THAT YOU HAVE BEEN MOVING AROUND A LOT MORE THAN USUAL: 0
3. TROUBLE FALLING OR STAYING ASLEEP: 0
2. FEELING DOWN, DEPRESSED OR HOPELESS: 1
SUM OF ALL RESPONSES TO PHQ QUESTIONS 1-9: 7
SUM OF ALL RESPONSES TO PHQ9 QUESTIONS 1 & 2: 3
10. IF YOU CHECKED OFF ANY PROBLEMS, HOW DIFFICULT HAVE THESE PROBLEMS MADE IT FOR YOU TO DO YOUR WORK, TAKE CARE OF THINGS AT HOME, OR GET ALONG WITH OTHER PEOPLE: 0
SUM OF ALL RESPONSES TO PHQ QUESTIONS 1-9: 3
9. THOUGHTS THAT YOU WOULD BE BETTER OFF DEAD, OR OF HURTING YOURSELF: 0
SUM OF ALL RESPONSES TO PHQ QUESTIONS 1-9: 3
6. FEELING BAD ABOUT YOURSELF - OR THAT YOU ARE A FAILURE OR HAVE LET YOURSELF OR YOUR FAMILY DOWN: 1
1. LITTLE INTEREST OR PLEASURE IN DOING THINGS: 2
SUM OF ALL RESPONSES TO PHQ9 QUESTIONS 1 & 2: 3
SUM OF ALL RESPONSES TO PHQ QUESTIONS 1-9: 7
1. LITTLE INTEREST OR PLEASURE IN DOING THINGS: 2
SUM OF ALL RESPONSES TO PHQ QUESTIONS 1-9: 3
5. POOR APPETITE OR OVEREATING: 1
SUM OF ALL RESPONSES TO PHQ QUESTIONS 1-9: 3
SUM OF ALL RESPONSES TO PHQ QUESTIONS 1-9: 7
7. TROUBLE CONCENTRATING ON THINGS, SUCH AS READING THE NEWSPAPER OR WATCHING TELEVISION: 0
2. FEELING DOWN, DEPRESSED OR HOPELESS: 1

## 2023-05-23 ASSESSMENT — LIFESTYLE VARIABLES
HOW OFTEN DO YOU HAVE A DRINK CONTAINING ALCOHOL: NEVER
HOW MANY STANDARD DRINKS CONTAINING ALCOHOL DO YOU HAVE ON A TYPICAL DAY: PATIENT DOES NOT DRINK

## 2023-05-23 NOTE — PROGRESS NOTES
Chief Complaint   Patient presents with    Medicare AWV       Have you seen any other physician or provider since your last visit no    Have you had any other diagnostic tests since your last visit? no    Have you changed or stopped any medications since your last visit? no       Diabetic retinal exam completed this year?  Yes

## 2023-05-23 NOTE — PATIENT INSTRUCTIONS
hard situation. Your body can have a physical, emotional, or mental response. A lot of things can cause stress. You may feel stress when you go on a job interview, take a test, or run a race. This kind of short-term stress is normal and even useful. It can help you if you need to work hard or react quickly. Stress also can last a long time. Long-term stress is caused by stressful situations or events. Examples of long-term stress include long-term health problems, ongoing problems at work, and conflicts in your family. Long-term stress can harm your health. Mindfulness is a focus only on things happening in the present moment. It's a process of purposefully paying attention to and being aware of your surroundings, your emotions, your thoughts, and how your body feels. You are aware of these things, but you aren't judging these experiences as \"good\" or \"bad. \" Mindfulness can help you learn to calm your mind and body to help you cope with illness, pain, and stress. How does mindfulness help to relieve stress? Mindfulness can help quiet your mind and relax your body. Studies show that it can help some people sleep better, feel less anxious, and bring their blood pressure down. And it's been shown to help some people live and cope better with certain health problems like heart disease, depression, chronic pain, and cancer. How do you practice mindfulness? To be mindful is to pay attention, to be present, and to be accepting. When you're mindful, you do just one thing and you pay close attention to that one thing. For example, you may sit quietly and notice your emotions or how your food tastes and smells. When you're present, you focus on the things that are happening right now. You let go of your thoughts about the past and the future. When you dwell on the past or the future, you miss moments that can heal and strengthen you.  You may miss moments like hearing a child laugh or seeing a friendly face when you

## 2023-05-23 NOTE — PROGRESS NOTES
Medicare Annual Wellness Visit    Randy Glez is here for Medicare AWV    Assessment & Plan   Medicare annual wellness visit, subsequent  Type 2 diabetes mellitus with other circulatory complication, with long-term current use of insulin (HCC)  -     insulin detemir (LEVEMIR FLEXTOUCH) 100 UNIT/ML injection pen; Inject 80 Units into the skin 2 times daily, Disp-20 Adjustable Dose Pre-filled Pen Syringe, R-5. Normal  -     insulin aspart (NOVOLOG FLEXPEN) 100 UNIT/ML injection pen; INJECT 25 UNITS INTO THE SKIN THREE TIMES A DAY BEFORE MEALSINJECT 15 UNITS INTO THE SKIN THREE TIMES A DAY BEFORE MEALS, Disp-10 Adjustable Dose Pre-filled Pen Syringe, R-5Normal      Recommendations for Preventive Services Due: see orders and patient instructions/AVS.  Recommended screening schedule for the next 5-10 years is provided to the patient in written form: see Patient Instructions/AVS.     Return in 1 year (on 5/23/2024), or if symptoms worsen or fail to improve. Subjective       Patient's complete Health Risk Assessment and screening values have been reviewed and are found in Flowsheets. The following problems were reviewed today and where indicated follow up appointments were made and/or referrals ordered.     Positive Risk Factor Screenings with Interventions:    Fall Risk:  Do you feel unsteady or are you worried about falling? : (!) yes  2 or more falls in past year?: (!) yes  Fall with injury in past year?: no     Interventions:    See AVS for additional education material     Depression:  PHQ-2 Score: 3  PHQ-9 Total Score: 7    Interpretation:   1-4 = minimal  5-9 = mild  10-14 = moderate  15-19 = moderately severe  20-27 = severe  Interventions:  See AVS for additional education material          General HRA Questions:  Select all that apply: (!) New or Increased Pain, Stress, Anger    Pain Interventions:  Patient declined any further interventions or treatment    Stress Interventions:  See AVS for additional

## 2023-07-31 ENCOUNTER — TELEPHONE (OUTPATIENT)
Dept: FAMILY MEDICINE CLINIC | Age: 63
End: 2023-07-31

## 2023-07-31 NOTE — TELEPHONE ENCOUNTER
Patient called and stated that he's been experiencing cough and congestion and is wondering if you can send him in something?

## 2023-08-01 RX ORDER — AZITHROMYCIN 500 MG/1
500 TABLET, FILM COATED ORAL DAILY
Qty: 5 TABLET | Refills: 0 | Status: SHIPPED | OUTPATIENT
Start: 2023-08-01 | End: 2023-08-06

## 2023-08-01 RX ORDER — BROMPHENIRAMINE MALEATE, PSEUDOEPHEDRINE HYDROCHLORIDE, AND DEXTROMETHORPHAN HYDROBROMIDE 2; 30; 10 MG/5ML; MG/5ML; MG/5ML
5 SYRUP ORAL 4 TIMES DAILY PRN
Qty: 200 ML | Refills: 0 | Status: SHIPPED | OUTPATIENT
Start: 2023-08-01

## 2023-08-24 ENCOUNTER — OFFICE VISIT (OUTPATIENT)
Dept: FAMILY MEDICINE CLINIC | Age: 63
End: 2023-08-24
Payer: MEDICARE

## 2023-08-24 VITALS
TEMPERATURE: 99.3 F | HEART RATE: 61 BPM | WEIGHT: 263 LBS | OXYGEN SATURATION: 94 % | DIASTOLIC BLOOD PRESSURE: 62 MMHG | SYSTOLIC BLOOD PRESSURE: 110 MMHG | BODY MASS INDEX: 39.86 KG/M2 | RESPIRATION RATE: 18 BRPM | HEIGHT: 68 IN

## 2023-08-24 DIAGNOSIS — Z79.4 TYPE 2 DIABETES MELLITUS WITH OTHER CIRCULATORY COMPLICATION, WITH LONG-TERM CURRENT USE OF INSULIN (HCC): ICD-10-CM

## 2023-08-24 DIAGNOSIS — K21.9 GASTROESOPHAGEAL REFLUX DISEASE WITHOUT ESOPHAGITIS: ICD-10-CM

## 2023-08-24 DIAGNOSIS — G47.33 OSA (OBSTRUCTIVE SLEEP APNEA): ICD-10-CM

## 2023-08-24 DIAGNOSIS — F41.9 ANXIETY AND DEPRESSION: ICD-10-CM

## 2023-08-24 DIAGNOSIS — I10 HTN, GOAL BELOW 140/80: ICD-10-CM

## 2023-08-24 DIAGNOSIS — F32.A ANXIETY AND DEPRESSION: ICD-10-CM

## 2023-08-24 DIAGNOSIS — E11.42 DIABETIC POLYNEUROPATHY ASSOCIATED WITH TYPE 2 DIABETES MELLITUS (HCC): ICD-10-CM

## 2023-08-24 DIAGNOSIS — F43.23 SITUATIONAL MIXED ANXIETY AND DEPRESSIVE DISORDER: ICD-10-CM

## 2023-08-24 DIAGNOSIS — E78.5 HYPERLIPIDEMIA LDL GOAL <100: ICD-10-CM

## 2023-08-24 DIAGNOSIS — N18.32 STAGE 3B CHRONIC KIDNEY DISEASE (HCC): Primary | ICD-10-CM

## 2023-08-24 DIAGNOSIS — E11.59 TYPE 2 DIABETES MELLITUS WITH OTHER CIRCULATORY COMPLICATION, WITH LONG-TERM CURRENT USE OF INSULIN (HCC): ICD-10-CM

## 2023-08-24 PROCEDURE — 99214 OFFICE O/P EST MOD 30 MIN: CPT | Performed by: NURSE PRACTITIONER

## 2023-08-24 PROCEDURE — 3017F COLORECTAL CA SCREEN DOC REV: CPT | Performed by: NURSE PRACTITIONER

## 2023-08-24 PROCEDURE — 3078F DIAST BP <80 MM HG: CPT | Performed by: NURSE PRACTITIONER

## 2023-08-24 PROCEDURE — 3046F HEMOGLOBIN A1C LEVEL >9.0%: CPT | Performed by: NURSE PRACTITIONER

## 2023-08-24 PROCEDURE — 1036F TOBACCO NON-USER: CPT | Performed by: NURSE PRACTITIONER

## 2023-08-24 PROCEDURE — 3074F SYST BP LT 130 MM HG: CPT | Performed by: NURSE PRACTITIONER

## 2023-08-24 PROCEDURE — 2022F DILAT RTA XM EVC RTNOPTHY: CPT | Performed by: NURSE PRACTITIONER

## 2023-08-24 PROCEDURE — G8427 DOCREV CUR MEDS BY ELIG CLIN: HCPCS | Performed by: NURSE PRACTITIONER

## 2023-08-24 PROCEDURE — G8417 CALC BMI ABV UP PARAM F/U: HCPCS | Performed by: NURSE PRACTITIONER

## 2023-08-24 RX ORDER — BUMETANIDE 2 MG/1
2 TABLET ORAL 2 TIMES DAILY
Qty: 180 TABLET | Refills: 3 | Status: SHIPPED | OUTPATIENT
Start: 2023-08-24

## 2023-08-24 RX ORDER — PANTOPRAZOLE SODIUM 40 MG/1
40 TABLET, DELAYED RELEASE ORAL
Qty: 90 TABLET | Refills: 3 | Status: SHIPPED | OUTPATIENT
Start: 2023-08-24

## 2023-08-24 RX ORDER — GABAPENTIN 600 MG/1
600 TABLET ORAL 3 TIMES DAILY
Qty: 90 TABLET | Refills: 2 | Status: SHIPPED | OUTPATIENT
Start: 2023-08-24 | End: 2023-11-22

## 2023-08-24 RX ORDER — PIOGLITAZONEHYDROCHLORIDE 30 MG/1
30 TABLET ORAL DAILY
Qty: 90 TABLET | Refills: 3 | Status: SHIPPED | OUTPATIENT
Start: 2023-08-24

## 2023-08-24 RX ORDER — ESCITALOPRAM OXALATE 20 MG/1
20 TABLET ORAL DAILY
Qty: 90 TABLET | Refills: 3 | Status: SHIPPED | OUTPATIENT
Start: 2023-08-24

## 2023-08-24 RX ORDER — FENOFIBRATE 200 MG/1
200 CAPSULE ORAL
Qty: 90 CAPSULE | Refills: 3 | Status: SHIPPED | OUTPATIENT
Start: 2023-08-24

## 2023-08-24 RX ORDER — CLONAZEPAM 0.5 MG/1
0.5 TABLET ORAL 2 TIMES DAILY PRN
Qty: 60 TABLET | Refills: 0 | Status: SHIPPED | OUTPATIENT
Start: 2023-08-24 | End: 2023-09-23

## 2023-08-24 RX ORDER — TRIAMTERENE AND HYDROCHLOROTHIAZIDE 37.5; 25 MG/1; MG/1
1 TABLET ORAL DAILY
Qty: 90 TABLET | Refills: 1 | Status: SHIPPED | OUTPATIENT
Start: 2023-08-24

## 2023-08-24 NOTE — PROGRESS NOTES
Chief Complaint   Patient presents with    Discuss Labs       Have you seen any other physician or provider since your last visit yes - ED- [address]- [facility]    Have you had any other diagnostic tests since your last visit?  yes - see discharge    Have you changed or stopped any medications since your last visit? no

## 2023-08-27 ASSESSMENT — ENCOUNTER SYMPTOMS
BACK PAIN: 1
RESPIRATORY NEGATIVE: 1
EYES NEGATIVE: 1
GASTROINTESTINAL NEGATIVE: 1

## 2023-09-26 RX ORDER — MECLIZINE HYDROCHLORIDE 25 MG/1
TABLET ORAL
Qty: 90 TABLET | OUTPATIENT
Start: 2023-09-26

## 2023-10-02 ENCOUNTER — HOSPITAL ENCOUNTER (OUTPATIENT)
Facility: HOSPITAL | Age: 63
Discharge: HOME | End: 2023-10-02
Payer: MEDICARE

## 2023-10-02 ENCOUNTER — OFFICE VISIT (OUTPATIENT)
Dept: FAMILY MEDICINE CLINIC | Age: 63
End: 2023-10-02
Payer: MEDICARE

## 2023-10-02 VITALS
BODY MASS INDEX: 39.42 KG/M2 | SYSTOLIC BLOOD PRESSURE: 132 MMHG | TEMPERATURE: 98.4 F | RESPIRATION RATE: 20 BRPM | HEIGHT: 68 IN | WEIGHT: 260.1 LBS | OXYGEN SATURATION: 94 % | DIASTOLIC BLOOD PRESSURE: 80 MMHG | HEART RATE: 73 BPM

## 2023-10-02 DIAGNOSIS — Z79.4 TYPE 2 DIABETES MELLITUS WITH OTHER CIRCULATORY COMPLICATION, WITH LONG-TERM CURRENT USE OF INSULIN (HCC): Primary | ICD-10-CM

## 2023-10-02 DIAGNOSIS — Z12.5 ENCOUNTER FOR SCREENING FOR MALIGNANT NEOPLASM OF PROSTATE: ICD-10-CM

## 2023-10-02 DIAGNOSIS — E78.5 HYPERLIPIDEMIA LDL GOAL <100: ICD-10-CM

## 2023-10-02 DIAGNOSIS — Z79.4 TYPE 2 DIABETES MELLITUS WITH OTHER CIRCULATORY COMPLICATION, WITH LONG-TERM CURRENT USE OF INSULIN (HCC): ICD-10-CM

## 2023-10-02 DIAGNOSIS — G47.33 OSA (OBSTRUCTIVE SLEEP APNEA): ICD-10-CM

## 2023-10-02 DIAGNOSIS — E11.59 TYPE 2 DIABETES MELLITUS WITH OTHER CIRCULATORY COMPLICATION, WITH LONG-TERM CURRENT USE OF INSULIN (HCC): Primary | ICD-10-CM

## 2023-10-02 DIAGNOSIS — E11.59 TYPE 2 DIABETES MELLITUS WITH OTHER CIRCULATORY COMPLICATION, WITH LONG-TERM CURRENT USE OF INSULIN (HCC): ICD-10-CM

## 2023-10-02 DIAGNOSIS — F43.23 SITUATIONAL MIXED ANXIETY AND DEPRESSIVE DISORDER: ICD-10-CM

## 2023-10-02 DIAGNOSIS — I10 HTN, GOAL BELOW 140/80: ICD-10-CM

## 2023-10-02 DIAGNOSIS — N18.32 STAGE 3B CHRONIC KIDNEY DISEASE (HCC): ICD-10-CM

## 2023-10-02 PROCEDURE — 84443 ASSAY THYROID STIM HORMONE: CPT

## 2023-10-02 PROCEDURE — 3078F DIAST BP <80 MM HG: CPT | Performed by: NURSE PRACTITIONER

## 2023-10-02 PROCEDURE — 36415 COLL VENOUS BLD VENIPUNCTURE: CPT

## 2023-10-02 PROCEDURE — 80061 LIPID PANEL: CPT

## 2023-10-02 PROCEDURE — G8417 CALC BMI ABV UP PARAM F/U: HCPCS | Performed by: NURSE PRACTITIONER

## 2023-10-02 PROCEDURE — 83036 HEMOGLOBIN GLYCOSYLATED A1C: CPT

## 2023-10-02 PROCEDURE — G8484 FLU IMMUNIZE NO ADMIN: HCPCS | Performed by: NURSE PRACTITIONER

## 2023-10-02 PROCEDURE — 99214 OFFICE O/P EST MOD 30 MIN: CPT | Performed by: NURSE PRACTITIONER

## 2023-10-02 PROCEDURE — G8427 DOCREV CUR MEDS BY ELIG CLIN: HCPCS | Performed by: NURSE PRACTITIONER

## 2023-10-02 PROCEDURE — 3046F HEMOGLOBIN A1C LEVEL >9.0%: CPT | Performed by: NURSE PRACTITIONER

## 2023-10-02 PROCEDURE — 2022F DILAT RTA XM EVC RTNOPTHY: CPT | Performed by: NURSE PRACTITIONER

## 2023-10-02 PROCEDURE — 84153 ASSAY OF PSA TOTAL: CPT

## 2023-10-02 PROCEDURE — 1036F TOBACCO NON-USER: CPT | Performed by: NURSE PRACTITIONER

## 2023-10-02 PROCEDURE — 3017F COLORECTAL CA SCREEN DOC REV: CPT | Performed by: NURSE PRACTITIONER

## 2023-10-02 PROCEDURE — 80053 COMPREHEN METABOLIC PANEL: CPT

## 2023-10-02 PROCEDURE — 3074F SYST BP LT 130 MM HG: CPT | Performed by: NURSE PRACTITIONER

## 2023-10-02 RX ORDER — INSULIN DETEMIR 100 [IU]/ML
INJECTION, SOLUTION SUBCUTANEOUS
Qty: 45 ML | Refills: 0 | OUTPATIENT
Start: 2023-10-02

## 2023-10-02 RX ORDER — INSULIN ASPART 100 [IU]/ML
INJECTION, SOLUTION INTRAVENOUS; SUBCUTANEOUS
Qty: 15 ML | Refills: 0 | OUTPATIENT
Start: 2023-10-02

## 2023-10-02 RX ORDER — CLONAZEPAM 0.5 MG/1
0.5 TABLET ORAL 2 TIMES DAILY PRN
Qty: 60 TABLET | Refills: 0 | Status: SHIPPED | OUTPATIENT
Start: 2023-10-02 | End: 2023-11-01

## 2023-10-02 ASSESSMENT — ENCOUNTER SYMPTOMS
GASTROINTESTINAL NEGATIVE: 1
EYES NEGATIVE: 1
RESPIRATORY NEGATIVE: 1
BACK PAIN: 1

## 2023-10-02 NOTE — TELEPHONE ENCOUNTER
Refill request received from pharmacy      Next Office Visit Date:  Future Appointments   Date Time Provider [address]   10/2/2023  3:30 PM ANDREIA Oquendo - Please review via [address]

## 2023-10-02 NOTE — PROGRESS NOTES
Chief Complaint   Patient presents with    Diabetes     Regular f/u       Have you seen any other physician or provider since your last visit no    Have you had any other diagnostic tests since your last visit? no    Have you changed or stopped any medications since your last visit? no

## 2023-10-02 NOTE — PROGRESS NOTES
SUBJECTIVE:  Sohpia Hastings is a 59 y.o. male that presents with   Chief Complaint   Patient presents with    Diabetes     Regular f/u   . HPI:    This is a pleasant 59year old white male who presents for follow up with diabetes, htn, hyperlipidemia anxiety. He tells me that he is taking all of his medicines tolerating everything well he does get gabapentin. he does not have a past medical history of drug or alcohol abuse does use his medications as prescribed with good effect. He says his sugar continues to be a little high lately. 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 Diabetes  He presents for his follow-up diabetic visit. He has type 2 diabetes mellitus. No MedicAlert identification noted. His disease course has been worsening. Hypoglycemia symptoms include nervousness/anxiousness. Associated symptoms include weakness. There are no hypoglycemic complications. Symptoms are worsening. Diabetic complications include peripheral neuropathy and retinopathy. Risk factors for coronary artery disease include male sex, hypertension, diabetes mellitus, stress and dyslipidemia. Current diabetic treatment includes insulin injections and oral agent (monotherapy). He is compliant with treatment most of the time. His weight is stable. He is following a diabetic diet. Meal planning includes avoidance of concentrated sweets. He has not had a previous visit with a dietitian. He never (no scheduled exercise but works in the yard) participates in exercise. There is no change () in his home blood glucose trend. An ACE inhibitor/angiotensin II receptor blocker is being taken. Eye exam is current. Hyperlipidemia  This is a chronic problem. The current episode started more than 1 year ago. The problem is uncontrolled. Exacerbating diseases include chronic renal disease. Factors aggravating his hyperlipidemia include beta blockers.  Current antihyperlipidemic treatment includes statins and

## 2023-10-03 LAB
ALBUMIN SERPL-MCNC: 4.3 G/DL (ref 3.4–4.8)
ALBUMIN/GLOB SERPL: 1.5 {RATIO} (ref 0.8–2)
ALP SERPL-CCNC: 67 U/L (ref 25–100)
ALT SERPL-CCNC: 24 U/L (ref 4–36)
ANION GAP SERPL CALCULATED.3IONS-SCNC: 10 MMOL/L (ref 3–16)
AST SERPL-CCNC: 34 U/L (ref 8–33)
BILIRUB SERPL-MCNC: 0.3 MG/DL (ref 0.3–1.2)
BUN SERPL-MCNC: 76 MG/DL (ref 6–20)
CALCIUM SERPL-MCNC: 9.7 MG/DL (ref 8.5–10.5)
CHLORIDE SERPL-SCNC: 100 MMOL/L (ref 98–107)
CHOLEST SERPL-MCNC: 157 MG/DL (ref 0–200)
CO2 SERPL-SCNC: 32 MMOL/L (ref 20–30)
CREAT SERPL-MCNC: 3.2 MG/DL (ref 0.4–1.2)
GFR SERPLBLD CREATININE-BSD FMLA CKD-EPI: 21 ML/MIN/{1.73_M2}
GLOBULIN SER CALC-MCNC: 2.8 G/DL
GLUCOSE SERPL-MCNC: 132 MG/DL (ref 74–106)
HBA1C MFR BLD: 10.1 %
HDLC SERPL-MCNC: 38 MG/DL (ref 40–60)
LDLC SERPL CALC-MCNC: 83 MG/DL
POTASSIUM SERPL-SCNC: 4.6 MMOL/L (ref 3.4–5.1)
PROT SERPL-MCNC: 7.1 G/DL (ref 6.4–8.3)
PSA SERPL DL<=0.01 NG/ML-MCNC: 1.5 NG/ML (ref 0–4)
SODIUM SERPL-SCNC: 142 MMOL/L (ref 136–145)
TRIGL SERPL-MCNC: 178 MG/DL (ref 0–249)
TSH SERPL DL<=0.005 MIU/L-ACNC: 2.71 UIU/ML (ref 0.27–4.2)
VLDLC SERPL CALC-MCNC: 36 MG/DL

## 2023-10-09 DIAGNOSIS — E11.59 TYPE 2 DIABETES MELLITUS WITH OTHER CIRCULATORY COMPLICATION, WITH LONG-TERM CURRENT USE OF INSULIN (HCC): ICD-10-CM

## 2023-10-09 DIAGNOSIS — Z79.4 TYPE 2 DIABETES MELLITUS WITH OTHER CIRCULATORY COMPLICATION, WITH LONG-TERM CURRENT USE OF INSULIN (HCC): ICD-10-CM

## 2023-10-09 RX ORDER — INSULIN ASPART 100 [IU]/ML
INJECTION, SOLUTION INTRAVENOUS; SUBCUTANEOUS
Qty: 10 ADJUSTABLE DOSE PRE-FILLED PEN SYRINGE | Refills: 5 | Status: SHIPPED | OUTPATIENT
Start: 2023-10-09

## 2023-10-09 ASSESSMENT — PATIENT HEALTH QUESTIONNAIRE - PHQ9
SUM OF ALL RESPONSES TO PHQ QUESTIONS 1-9: 0
1. LITTLE INTEREST OR PLEASURE IN DOING THINGS: 0
SUM OF ALL RESPONSES TO PHQ QUESTIONS 1-9: 0
2. FEELING DOWN, DEPRESSED OR HOPELESS: 0
SUM OF ALL RESPONSES TO PHQ QUESTIONS 1-9: 0
SUM OF ALL RESPONSES TO PHQ QUESTIONS 1-9: 0
SUM OF ALL RESPONSES TO PHQ9 QUESTIONS 1 & 2: 0

## 2023-11-21 ENCOUNTER — TELEPHONE (OUTPATIENT)
Dept: FAMILY MEDICINE CLINIC | Age: 63
End: 2023-11-21

## 2023-11-21 NOTE — TELEPHONE ENCOUNTER
Patient called about his labs results from labs that were drawn on 10/2/23. Can you take a look at them and let me know what to tell patient.

## 2023-11-21 NOTE — TELEPHONE ENCOUNTER
----- Message from Carolynn Potter sent at 11/20/2023  9:13 AM EST -----  Subject: Results Request    QUESTIONS  Results: A1C; Ordered by:   Date Performed:   ---------------------------------------------------------------------------  --------------  CALL BACK INFO    [phone]; OK to leave message on voicemail  ---------------------------------------------------------------------------  --------------

## 2023-12-07 ENCOUNTER — TELEPHONE (OUTPATIENT)
Dept: FAMILY MEDICINE CLINIC | Age: 63
End: 2023-12-07

## 2023-12-07 NOTE — TELEPHONE ENCOUNTER
Patient called and stated that after 1/1/24 they are discontinuing Levemir and he will not be able to get it. Do you want to go ahead and change it or do you want to wait until his appointment on 1/2/24?    He was also wondering about his labs that he had done in October?

## 2023-12-13 RX ORDER — INSULIN GLARGINE 100 [IU]/ML
80 INJECTION, SOLUTION SUBCUTANEOUS NIGHTLY
Qty: 18 ADJUSTABLE DOSE PRE-FILLED PEN SYRINGE | Refills: 5 | Status: SHIPPED | OUTPATIENT
Start: 2023-12-13

## 2024-01-01 ENCOUNTER — TELEPHONE (OUTPATIENT)
Dept: CARDIOLOGY | Facility: HOSPITAL | Age: 64
End: 2024-01-01
Payer: MEDICARE

## 2024-01-02 ENCOUNTER — HOSPITAL ENCOUNTER (OUTPATIENT)
Facility: HOSPITAL | Age: 64
Discharge: HOME | End: 2024-01-02
Payer: MEDICARE

## 2024-01-02 ENCOUNTER — TELEPHONE (OUTPATIENT)
Dept: FAMILY MEDICINE CLINIC | Age: 64
End: 2024-01-02

## 2024-01-02 ENCOUNTER — OFFICE VISIT (OUTPATIENT)
Dept: FAMILY MEDICINE CLINIC | Age: 64
End: 2024-01-02
Payer: MEDICARE

## 2024-01-02 VITALS
HEIGHT: 68 IN | TEMPERATURE: 97.9 F | OXYGEN SATURATION: 94 % | HEART RATE: 62 BPM | WEIGHT: 268.2 LBS | SYSTOLIC BLOOD PRESSURE: 124 MMHG | BODY MASS INDEX: 40.65 KG/M2 | DIASTOLIC BLOOD PRESSURE: 80 MMHG | RESPIRATION RATE: 18 BRPM

## 2024-01-02 DIAGNOSIS — Z79.4 TYPE 2 DIABETES MELLITUS WITH OTHER CIRCULATORY COMPLICATION, WITH LONG-TERM CURRENT USE OF INSULIN (HCC): Primary | ICD-10-CM

## 2024-01-02 DIAGNOSIS — I10 HTN, GOAL BELOW 140/80: ICD-10-CM

## 2024-01-02 DIAGNOSIS — Z79.4 TYPE 2 DIABETES MELLITUS WITH OTHER CIRCULATORY COMPLICATION, WITH LONG-TERM CURRENT USE OF INSULIN (HCC): ICD-10-CM

## 2024-01-02 DIAGNOSIS — N18.32 STAGE 3B CHRONIC KIDNEY DISEASE (HCC): ICD-10-CM

## 2024-01-02 DIAGNOSIS — E11.59 TYPE 2 DIABETES MELLITUS WITH OTHER CIRCULATORY COMPLICATION, WITH LONG-TERM CURRENT USE OF INSULIN (HCC): Primary | ICD-10-CM

## 2024-01-02 DIAGNOSIS — E11.42 DIABETIC POLYNEUROPATHY ASSOCIATED WITH TYPE 2 DIABETES MELLITUS (HCC): ICD-10-CM

## 2024-01-02 DIAGNOSIS — E11.59 TYPE 2 DIABETES MELLITUS WITH OTHER CIRCULATORY COMPLICATION, WITH LONG-TERM CURRENT USE OF INSULIN (HCC): ICD-10-CM

## 2024-01-02 DIAGNOSIS — F43.23 SITUATIONAL MIXED ANXIETY AND DEPRESSIVE DISORDER: ICD-10-CM

## 2024-01-02 DIAGNOSIS — G47.33 OSA (OBSTRUCTIVE SLEEP APNEA): ICD-10-CM

## 2024-01-02 DIAGNOSIS — E78.5 HYPERLIPIDEMIA LDL GOAL <100: ICD-10-CM

## 2024-01-02 LAB
ALBUMIN SERPL-MCNC: 4.2 G/DL (ref 3.4–4.8)
ALBUMIN/GLOB SERPL: 1.8 {RATIO} (ref 0.8–2)
ALP SERPL-CCNC: 71 U/L (ref 25–100)
ALT SERPL-CCNC: 22 U/L (ref 4–36)
ANION GAP SERPL CALCULATED.3IONS-SCNC: 12 MMOL/L (ref 3–16)
AST SERPL-CCNC: 36 U/L (ref 8–33)
BILIRUB SERPL-MCNC: 0.3 MG/DL (ref 0.3–1.2)
BUN SERPL-MCNC: 81 MG/DL (ref 6–20)
CALCIUM SERPL-MCNC: 9.5 MG/DL (ref 8.5–10.5)
CHLORIDE SERPL-SCNC: 94 MMOL/L (ref 98–107)
CHOLEST SERPL-MCNC: 166 MG/DL (ref 0–200)
CO2 SERPL-SCNC: 31 MMOL/L (ref 20–30)
CREAT SERPL-MCNC: 3.6 MG/DL (ref 0.4–1.2)
GFR SERPLBLD CREATININE-BSD FMLA CKD-EPI: 18 ML/MIN/{1.73_M2}
GLOBULIN SER CALC-MCNC: 2.4 G/DL
GLUCOSE SERPL-MCNC: 194 MG/DL (ref 74–106)
HBA1C MFR BLD: 7.8 %
HDLC SERPL-MCNC: 36 MG/DL (ref 40–60)
LDLC SERPL CALC-MCNC: 76 MG/DL
POTASSIUM SERPL-SCNC: 4.2 MMOL/L (ref 3.4–5.1)
PROT SERPL-MCNC: 6.6 G/DL (ref 6.4–8.3)
SODIUM SERPL-SCNC: 137 MMOL/L (ref 136–145)
TRIGL SERPL-MCNC: 269 MG/DL (ref 0–249)
VLDLC SERPL CALC-MCNC: 54 MG/DL

## 2024-01-02 PROCEDURE — 3079F DIAST BP 80-89 MM HG: CPT | Performed by: NURSE PRACTITIONER

## 2024-01-02 PROCEDURE — 3074F SYST BP LT 130 MM HG: CPT | Performed by: NURSE PRACTITIONER

## 2024-01-02 PROCEDURE — 80053 COMPREHEN METABOLIC PANEL: CPT

## 2024-01-02 PROCEDURE — 36415 COLL VENOUS BLD VENIPUNCTURE: CPT

## 2024-01-02 PROCEDURE — 83036 HEMOGLOBIN GLYCOSYLATED A1C: CPT

## 2024-01-02 PROCEDURE — 80061 LIPID PANEL: CPT

## 2024-01-02 PROCEDURE — 3051F HG A1C>EQUAL 7.0%<8.0%: CPT | Performed by: NURSE PRACTITIONER

## 2024-01-02 PROCEDURE — 99214 OFFICE O/P EST MOD 30 MIN: CPT | Performed by: NURSE PRACTITIONER

## 2024-01-02 RX ORDER — CLONAZEPAM 0.5 MG/1
0.5 TABLET ORAL 2 TIMES DAILY PRN
Qty: 60 TABLET | Refills: 0 | Status: SHIPPED | OUTPATIENT
Start: 2024-01-02 | End: 2024-02-01

## 2024-01-02 RX ORDER — GABAPENTIN 600 MG/1
600 TABLET ORAL 3 TIMES DAILY
Qty: 90 TABLET | Refills: 2 | Status: SHIPPED | OUTPATIENT
Start: 2024-01-02 | End: 2024-04-01

## 2024-01-02 ASSESSMENT — PATIENT HEALTH QUESTIONNAIRE - PHQ9
3. TROUBLE FALLING OR STAYING ASLEEP: 0
SUM OF ALL RESPONSES TO PHQ QUESTIONS 1-9: 0
8. MOVING OR SPEAKING SO SLOWLY THAT OTHER PEOPLE COULD HAVE NOTICED. OR THE OPPOSITE, BEING SO FIGETY OR RESTLESS THAT YOU HAVE BEEN MOVING AROUND A LOT MORE THAN USUAL: 0
SUM OF ALL RESPONSES TO PHQ QUESTIONS 1-9: 0
7. TROUBLE CONCENTRATING ON THINGS, SUCH AS READING THE NEWSPAPER OR WATCHING TELEVISION: 0
10. IF YOU CHECKED OFF ANY PROBLEMS, HOW DIFFICULT HAVE THESE PROBLEMS MADE IT FOR YOU TO DO YOUR WORK, TAKE CARE OF THINGS AT HOME, OR GET ALONG WITH OTHER PEOPLE: 0
SUM OF ALL RESPONSES TO PHQ QUESTIONS 1-9: 0
2. FEELING DOWN, DEPRESSED OR HOPELESS: 0
9. THOUGHTS THAT YOU WOULD BE BETTER OFF DEAD, OR OF HURTING YOURSELF: 0
6. FEELING BAD ABOUT YOURSELF - OR THAT YOU ARE A FAILURE OR HAVE LET YOURSELF OR YOUR FAMILY DOWN: 0
5. POOR APPETITE OR OVEREATING: 0
1. LITTLE INTEREST OR PLEASURE IN DOING THINGS: 0
SUM OF ALL RESPONSES TO PHQ QUESTIONS 1-9: 0
4. FEELING TIRED OR HAVING LITTLE ENERGY: 0
SUM OF ALL RESPONSES TO PHQ9 QUESTIONS 1 & 2: 0

## 2024-01-02 NOTE — TELEPHONE ENCOUNTER
[facility] called with a critical BUN of 81 on patient. Aisha Muller notified.   Patrick Truong stated unable to get out of chair due to SOB worsening. Started Entresto after being off Lisinopril for 36 hours.     /76   HR 92/min     appt 7/29/20    advise

## 2024-01-03 ASSESSMENT — ENCOUNTER SYMPTOMS
BACK PAIN: 1
RESPIRATORY NEGATIVE: 1
GASTROINTESTINAL NEGATIVE: 1
EYES NEGATIVE: 1

## 2024-01-03 NOTE — PROGRESS NOTES
Chief Complaint   Patient presents with    Diabetes     Regular f/u       Have you seen any other physician or provider since your last visit no    Have you had any other diagnostic tests since your last visit? no    Have you changed or stopped any medications since your last visit? no              Pre-Visit chart review completed. All lab and imaging orders reviewed for outstanding orders and none found. Referrals reviewed and none outstanding. All Health Maintenance requirements reviewed and noted for any that are due at upcoming visit. Any hospital admission documentation, ER documentation or consult notes scanned to chart since last visit have been reviewed and noted for provider to review during upcoming visit.     
Expiration Date:   1/1/2025    Lipid Panel     Standing Status:   Future     Number of Occurrences:   1     Standing Expiration Date:   1/1/2025     Order Specific Question:   Is Patient Fasting?/# of Hours     Answer:   6    Comprehensive Metabolic Panel     Standing Status:   Future     Number of Occurrences:   1     Standing Expiration Date:   1/1/2025        Outpatient Encounter Medications as of 1/2/2024   Medication Sig Dispense Refill    gabapentin (NEURONTIN) 600 MG tablet Take 1 tablet by mouth 3 times daily for 90 days. Max Daily Amount: 1,800 mg 90 tablet 2    clonazePAM (KLONOPIN) 0.5 MG tablet Take 1 tablet by mouth 2 times daily as needed for Anxiety for up to 30 days. 60 tablet 0    insulin glargine (LANTUS SOLOSTAR) 100 UNIT/ML injection pen Inject 80 Units into the skin nightly 18 Adjustable Dose Pre-filled Pen Syringe 5    insulin aspart (NOVOLOG FLEXPEN) 100 UNIT/ML injection pen INJECT 25 UNITS INTO THE SKIN THREE TIMES A DAY BEFORE MEALS 10 Adjustable Dose Pre-filled Pen Syringe 5    escitalopram (LEXAPRO) 20 MG tablet Take 1 tablet by mouth daily 90 tablet 3    bumetanide (BUMEX) 2 MG tablet Take 1 tablet by mouth 2 times daily 180 tablet 3    fenofibrate micronized (LOFIBRA) 200 MG capsule Take 1 capsule by mouth every morning (before breakfast) 90 capsule 3    triamterene-hydroCHLOROthiazide (MAXZIDE-25) 37.5-25 MG per tablet Take 1 tablet by mouth daily 90 tablet 1    pantoprazole (PROTONIX) 40 MG tablet Take 1 tablet by mouth Daily with supper 90 tablet 3    pioglitazone (ACTOS) 30 MG tablet Take 1 tablet by mouth daily 90 tablet 3    rosuvastatin (CRESTOR) 40 MG tablet TAKE ONE TABLET BY MOUTH DAILY 90 tablet 3    carvedilol (COREG) 12.5 MG tablet TAKE ONE TABLET BY MOUTH TWICE A  tablet 3    allopurinol (ZYLOPRIM) 300 MG tablet TAKE ONE TABLET BY MOUTH DAILY 90 tablet 3    linagliptin (TRADJENTA) 5 MG tablet Take 1 tablet by mouth daily 90 tablet 1    polyethylene glycol (GLYCOLAX)

## 2024-01-19 RX ORDER — MONTELUKAST SODIUM 4 MG/1
1 TABLET, CHEWABLE ORAL 2 TIMES DAILY
Qty: 60 TABLET | Refills: 3 | Status: SHIPPED | OUTPATIENT
Start: 2024-01-19

## 2024-02-06 DIAGNOSIS — F43.23 SITUATIONAL MIXED ANXIETY AND DEPRESSIVE DISORDER: ICD-10-CM

## 2024-02-06 RX ORDER — CLONAZEPAM 0.5 MG/1
0.5 TABLET ORAL 2 TIMES DAILY
Qty: 60 TABLET | Refills: 0 | Status: SHIPPED | OUTPATIENT
Start: 2024-02-06 | End: 2024-03-07

## 2024-02-06 NOTE — TELEPHONE ENCOUNTER
Refill request received from pharmacy      Next Office Visit Date:  Future Appointments   Date Time Provider Department Center   4/2/2024  8:30 AM Sheila Muller APRN [facility] - Please review via PDMP        Last Office Visit:    1/2/2024

## 2024-02-15 RX ORDER — INSULIN GLARGINE 100 [IU]/ML
80 INJECTION, SOLUTION SUBCUTANEOUS NIGHTLY
Qty: 18 ADJUSTABLE DOSE PRE-FILLED PEN SYRINGE | Refills: 11 | Status: SHIPPED | OUTPATIENT
Start: 2024-02-15

## 2024-02-19 ENCOUNTER — HOSPITAL ENCOUNTER (OUTPATIENT)
Facility: HOSPITAL | Age: 64
Discharge: HOME | End: 2024-02-19
Payer: MEDICARE

## 2024-02-19 LAB
25(OH)D3 SERPL-MCNC: 9.3 NG/ML (ref 32–100)
ALBUMIN SERPL-MCNC: 3.9 G/DL (ref 3.4–4.8)
ANION GAP SERPL CALCULATED.3IONS-SCNC: 14 MMOL/L (ref 3–16)
BACTERIA URNS QL MICRO: ABNORMAL /HPF
BASOPHILS # BLD: 0.1 K/UL (ref 0–0.1)
BASOPHILS NFR BLD: 0.6 %
BILIRUB UR QL STRIP.AUTO: NEGATIVE
BUN SERPL-MCNC: 68 MG/DL (ref 6–20)
CALCIUM SERPL-MCNC: 9.6 MG/DL (ref 8.5–10.5)
CHLORIDE SERPL-SCNC: 89 MMOL/L (ref 98–107)
CK SERPL-CCNC: 538 U/L (ref 26–174)
CLARITY UR: CLEAR
CO2 SERPL-SCNC: 33 MMOL/L (ref 20–30)
COLOR UR: YELLOW
CREAT SERPL-MCNC: 3.5 MG/DL (ref 0.4–1.2)
CREAT UR-MCNC: 49.8 MG/DL (ref 1.5–300)
EOSINOPHIL # BLD: 0.3 K/UL (ref 0–0.4)
EOSINOPHIL NFR BLD: 2.4 %
ERYTHROCYTE [DISTWIDTH] IN BLOOD BY AUTOMATED COUNT: 15.9 % (ref 11–16)
GFR SERPLBLD CREATININE-BSD FMLA CKD-EPI: 19 ML/MIN/{1.73_M2}
GLUCOSE SERPL-MCNC: 214 MG/DL (ref 74–106)
GLUCOSE UR STRIP.AUTO-MCNC: 100 MG/DL
HCT VFR BLD AUTO: 40.3 % (ref 40–54)
HGB BLD-MCNC: 12.5 G/DL (ref 13–18)
HGB UR QL STRIP.AUTO: ABNORMAL
IMM GRANULOCYTES # BLD: 0.1 K/UL
IMM GRANULOCYTES NFR BLD: 0.5 % (ref 0–5)
KETONES UR STRIP.AUTO-MCNC: NEGATIVE MG/DL
LEUKOCYTE ESTERASE UR QL STRIP.AUTO: NEGATIVE
LYMPHOCYTES # BLD: 2.5 K/UL (ref 1.5–4)
LYMPHOCYTES NFR BLD: 22.7 %
MCH RBC QN AUTO: 27.5 PG (ref 27–32)
MCHC RBC AUTO-ENTMCNC: 31 G/DL (ref 31–35)
MCV RBC AUTO: 88.6 FL (ref 80–100)
MONOCYTES # BLD: 0.5 K/UL (ref 0.2–0.8)
MONOCYTES NFR BLD: 4.2 %
NEUTROPHILS # BLD: 7.6 K/UL (ref 2–7.5)
NEUTS SEG NFR BLD: 69.6 %
NITRITE UR QL STRIP.AUTO: NEGATIVE
PH UR STRIP.AUTO: 6.5 [PH] (ref 5–8)
PHOSPHATE SERPL-MCNC: 3.3 MG/DL (ref 2.5–4.5)
PLATELET # BLD AUTO: 310 K/UL (ref 150–400)
PMV BLD AUTO: 11.3 FL (ref 6–10)
POTASSIUM SERPL-SCNC: 4.2 MMOL/L (ref 3.4–5.1)
PROT UR STRIP.AUTO-MCNC: 100 MG/DL
PTH-INTACT SERPL-MCNC: 156.6 PG/ML (ref 14–72)
RBC # BLD AUTO: 4.55 M/UL (ref 4.5–6)
RBC #/AREA URNS HPF: ABNORMAL /HPF (ref 0–4)
SODIUM SERPL-SCNC: 136 MMOL/L (ref 136–145)
SP GR UR STRIP.AUTO: 1.01 (ref 1–1.03)
UA DIPSTICK W REFLEX MICRO PNL UR: YES
URATE SERPL-MCNC: 2.8 MG/DL (ref 3.7–8.6)
URN SPEC COLLECT METH UR: ABNORMAL
UROBILINOGEN UR STRIP-ACNC: 0.2 E.U./DL
WBC # BLD AUTO: 10.9 K/UL (ref 4–11)
WBC #/AREA URNS HPF: ABNORMAL /HPF (ref 0–5)

## 2024-02-19 PROCEDURE — 84550 ASSAY OF BLOOD/URIC ACID: CPT

## 2024-02-19 PROCEDURE — 82550 ASSAY OF CK (CPK): CPT

## 2024-02-19 PROCEDURE — 85025 COMPLETE CBC W/AUTO DIFF WBC: CPT

## 2024-02-19 PROCEDURE — 80069 RENAL FUNCTION PANEL: CPT

## 2024-02-19 PROCEDURE — 36415 COLL VENOUS BLD VENIPUNCTURE: CPT

## 2024-02-19 PROCEDURE — 81001 URINALYSIS AUTO W/SCOPE: CPT

## 2024-02-19 PROCEDURE — 84156 ASSAY OF PROTEIN URINE: CPT

## 2024-02-19 PROCEDURE — 82570 ASSAY OF URINE CREATININE: CPT

## 2024-02-19 PROCEDURE — 82306 VITAMIN D 25 HYDROXY: CPT

## 2024-02-19 PROCEDURE — 83970 ASSAY OF PARATHORMONE: CPT

## 2024-02-19 RX ORDER — PENICILLIN V POTASSIUM 500 MG/1
500 TABLET ORAL 4 TIMES DAILY
Qty: 40 TABLET | Refills: 0 | Status: SHIPPED | OUTPATIENT
Start: 2024-02-19 | End: 2024-02-29

## 2024-02-20 ENCOUNTER — TELEPHONE (OUTPATIENT)
Dept: FAMILY MEDICINE CLINIC | Age: 64
End: 2024-02-20

## 2024-02-20 LAB — PROT UR-MCNC: 41 MG/DL

## 2024-02-20 NOTE — TELEPHONE ENCOUNTER
Dipak Leonard,    I talked with Ray Yesterday about his insulin doses and wanted to reach out to you.  He stated that he takes Lantus 80 units twice daily.  However, on his profile the lantus is listed at 80 units daily.  Additionally, he stated that he takes novolog 20 units tid.  On his profile the novolog is listed at 25 units tid.    Wanted to bring these differing doses to your attention since they differed from the doses on his profile.    Thank you!  Abelino Aden

## 2024-02-23 DIAGNOSIS — I10 HTN, GOAL BELOW 140/80: ICD-10-CM

## 2024-02-26 RX ORDER — TRIAMTERENE AND HYDROCHLOROTHIAZIDE 37.5; 25 MG/1; MG/1
1 TABLET ORAL DAILY
Qty: 90 TABLET | Refills: 1 | Status: SHIPPED | OUTPATIENT
Start: 2024-02-26

## 2024-03-12 ENCOUNTER — OFFICE VISIT (OUTPATIENT)
Dept: FAMILY MEDICINE CLINIC | Age: 64
End: 2024-03-12
Payer: MEDICARE

## 2024-03-12 VITALS
RESPIRATION RATE: 18 BRPM | HEART RATE: 79 BPM | BODY MASS INDEX: 43.29 KG/M2 | OXYGEN SATURATION: 93 % | SYSTOLIC BLOOD PRESSURE: 130 MMHG | WEIGHT: 285.6 LBS | HEIGHT: 68 IN | TEMPERATURE: 99 F | DIASTOLIC BLOOD PRESSURE: 72 MMHG

## 2024-03-12 DIAGNOSIS — E11.59 TYPE 2 DIABETES MELLITUS WITH OTHER CIRCULATORY COMPLICATION, WITH LONG-TERM CURRENT USE OF INSULIN (HCC): ICD-10-CM

## 2024-03-12 DIAGNOSIS — R09.81 NASAL CONGESTION: ICD-10-CM

## 2024-03-12 DIAGNOSIS — Z79.4 TYPE 2 DIABETES MELLITUS WITH OTHER CIRCULATORY COMPLICATION, WITH LONG-TERM CURRENT USE OF INSULIN (HCC): ICD-10-CM

## 2024-03-12 DIAGNOSIS — R05.1 ACUTE COUGH: Primary | ICD-10-CM

## 2024-03-12 LAB
INFLUENZA A ANTIBODY: NEGATIVE
INFLUENZA B ANTIBODY: NEGATIVE
Lab: NORMAL
QC PASS/FAIL: NORMAL
S PYO AG THROAT QL: NORMAL
SARS-COV-2 RDRP RESP QL NAA+PROBE: NEGATIVE

## 2024-03-12 PROCEDURE — 87804 INFLUENZA ASSAY W/OPTIC: CPT

## 2024-03-12 PROCEDURE — 3078F DIAST BP <80 MM HG: CPT

## 2024-03-12 PROCEDURE — 99214 OFFICE O/P EST MOD 30 MIN: CPT

## 2024-03-12 PROCEDURE — 3051F HG A1C>EQUAL 7.0%<8.0%: CPT

## 2024-03-12 PROCEDURE — 87635 SARS-COV-2 COVID-19 AMP PRB: CPT

## 2024-03-12 PROCEDURE — 3075F SYST BP GE 130 - 139MM HG: CPT

## 2024-03-12 PROCEDURE — 87880 STREP A ASSAY W/OPTIC: CPT

## 2024-03-12 RX ORDER — PEN NEEDLE, DIABETIC 29 GAUGE
NEEDLE, DISPOSABLE MISCELLANEOUS
Qty: 150 EACH | Refills: 5 | Status: SHIPPED | OUTPATIENT
Start: 2024-03-12

## 2024-03-12 RX ORDER — BENZONATATE 200 MG/1
200 CAPSULE ORAL 3 TIMES DAILY PRN
Qty: 30 CAPSULE | Refills: 0 | Status: SHIPPED | OUTPATIENT
Start: 2024-03-12 | End: 2024-03-22

## 2024-03-12 RX ORDER — FLUTICASONE PROPIONATE 50 MCG
2 SPRAY, SUSPENSION (ML) NASAL DAILY
Qty: 48 G | Refills: 1 | Status: SHIPPED | OUTPATIENT
Start: 2024-03-12

## 2024-03-12 ASSESSMENT — ENCOUNTER SYMPTOMS
GASTROINTESTINAL NEGATIVE: 1
RHINORRHEA: 1
EYES NEGATIVE: 1
ALLERGIC/IMMUNOLOGIC NEGATIVE: 1
SORE THROAT: 1
SINUS PRESSURE: 1
COUGH: 1

## 2024-03-12 NOTE — PATIENT INSTRUCTIONS
We are committed to providing you with the best care possible.   In order to help us achieve these goals please remember to bring all medications, herbal products, and over the counter supplements with you to each visit.     If your provider has ordered testing for you, please be sure to follow up with our office if you have not received results within 7 days after the testing took place.     *If you receive a survey after visiting one of our offices, please take time to share your experience concerning your physician office visit. These surveys are confidential and no health information about you is shared.  We are eager to improve for you and we are counting on your feedback to help make that happen.   Keep a list of your medicines with you. List all of the prescription medicines, nonprescription medicines, supplements, natural remedies, and vitamins that you take. Tell your healthcare providers who treat you about all of the products you are taking. Your provider can provide you with a form to keep track of them. Just ask.  Follow the directions that come with your medicine, including information about food or alcohol. Make sure you know how and when to take your medicine. Do not take more or less than you are supposed to take.  Keep all medicines out of the reach of children.  Store medicines according to the directions on the label.  Monitor yourself. Learn to know how your body reacts to your new medicine and keep track of how it makes you feel before attempting (If your provider has allowed you to do so) to drive or go to work.   Seek emergency medical attention if you think you have used too much of this medicine. An overdose of any prescription medicine can be fatal. Overdose symptoms may include extreme drowsiness, muscle weakness, confusion, cold and clammy skin, pinpoint pupils, shallow breathing, slow heart rate, fainting, or coma.  Don't share prescription medicines with others, even when they seem to  inserted over wire.

## 2024-03-12 NOTE — PROGRESS NOTES
Patient here for sick visit.  
ANDREIA Walls CNP   Stopped at 02/20/23 1445       No follow-ups on file.      PATIENT COUNSELING    Counseling was provided today regardingthe following topics: Healthy eating habits, Regular exercise, substance abuse and healthy sleep habits.    Discussed use, benefit, and side effectsof prescribed medications.  Barriers to medication compliance addressed.  All patient questions answered.  Pt voiced understanding.     Electronically signed by ANDREIA Galeana NP on 3/12/24 at 9:33 AM EDT

## 2024-03-18 RX ORDER — ALLOPURINOL 300 MG/1
TABLET ORAL
Qty: 90 TABLET | Refills: 3 | Status: SHIPPED | OUTPATIENT
Start: 2024-03-18

## 2024-03-18 RX ORDER — ROSUVASTATIN CALCIUM 40 MG/1
TABLET, COATED ORAL
Qty: 90 TABLET | Refills: 3 | Status: SHIPPED | OUTPATIENT
Start: 2024-03-18

## 2024-03-18 RX ORDER — CARVEDILOL 12.5 MG/1
TABLET ORAL
Qty: 180 TABLET | Refills: 3 | Status: SHIPPED | OUTPATIENT
Start: 2024-03-18

## 2024-03-18 NOTE — TELEPHONE ENCOUNTER
Refill request received from pharmacy      Next Office Visit Date:  Future Appointments   Date Time Provider Department Center   4/2/2024  8:30 AM Sheila Muller APRN [facility] - Please review via PDMP        Last Office Visit:    3/12/2024

## 2024-03-21 ENCOUNTER — TELEPHONE (OUTPATIENT)
Dept: PHARMACY | Facility: HOSPITAL | Age: 64
End: 2024-03-21

## 2024-03-21 NOTE — TELEPHONE ENCOUNTER
Pt called for prescription assistance with his new medication Rayaldee from Dr. Mckeon in [facility].  Per pt the Rayaldee is not covered with his insurance.  Called Dr. Mckeon's office and asked that they assist the pt with  program application.  Office sated that they would call pt and get application started.    Abelino Aden, AdeliaD

## 2024-04-02 ENCOUNTER — HOSPITAL ENCOUNTER (OUTPATIENT)
Facility: HOSPITAL | Age: 64
Discharge: HOME | End: 2024-04-02
Payer: MEDICARE

## 2024-04-02 ENCOUNTER — OFFICE VISIT (OUTPATIENT)
Dept: FAMILY MEDICINE CLINIC | Age: 64
End: 2024-04-02
Payer: MEDICARE

## 2024-04-02 VITALS
RESPIRATION RATE: 20 BRPM | OXYGEN SATURATION: 96 % | DIASTOLIC BLOOD PRESSURE: 80 MMHG | SYSTOLIC BLOOD PRESSURE: 132 MMHG | WEIGHT: 287.8 LBS | BODY MASS INDEX: 43.62 KG/M2 | TEMPERATURE: 98 F | HEIGHT: 68 IN | HEART RATE: 63 BPM

## 2024-04-02 DIAGNOSIS — J44.1 ACUTE EXACERBATION OF CHRONIC OBSTRUCTIVE PULMONARY DISEASE (COPD) (HCC): Primary | ICD-10-CM

## 2024-04-02 DIAGNOSIS — E11.59 TYPE 2 DIABETES MELLITUS WITH OTHER CIRCULATORY COMPLICATION, WITH LONG-TERM CURRENT USE OF INSULIN (HCC): ICD-10-CM

## 2024-04-02 DIAGNOSIS — J44.9 CHRONIC OBSTRUCTIVE PULMONARY DISEASE, UNSPECIFIED COPD TYPE (HCC): ICD-10-CM

## 2024-04-02 DIAGNOSIS — N18.32 STAGE 3B CHRONIC KIDNEY DISEASE (HCC): ICD-10-CM

## 2024-04-02 DIAGNOSIS — T45.2X5A: ICD-10-CM

## 2024-04-02 DIAGNOSIS — I10 HTN, GOAL BELOW 140/80: ICD-10-CM

## 2024-04-02 DIAGNOSIS — E55.9 VITAMIN D DEFICIENCY: ICD-10-CM

## 2024-04-02 DIAGNOSIS — E78.5 HYPERLIPIDEMIA LDL GOAL <100: ICD-10-CM

## 2024-04-02 DIAGNOSIS — R53.83 OTHER FATIGUE: ICD-10-CM

## 2024-04-02 DIAGNOSIS — Z79.4 TYPE 2 DIABETES MELLITUS WITH OTHER CIRCULATORY COMPLICATION, WITH LONG-TERM CURRENT USE OF INSULIN (HCC): ICD-10-CM

## 2024-04-02 LAB
25(OH)D3 SERPL-MCNC: 8.7 NG/ML (ref 32–100)
ALBUMIN SERPL-MCNC: 4 G/DL (ref 3.4–4.8)
ALBUMIN/GLOB SERPL: 2 {RATIO} (ref 0.8–2)
ALP SERPL-CCNC: 102 U/L (ref 25–100)
ALT SERPL-CCNC: 19 U/L (ref 4–36)
ANION GAP SERPL CALCULATED.3IONS-SCNC: 6 MMOL/L (ref 3–16)
AST SERPL-CCNC: 26 U/L (ref 8–33)
BASOPHILS # BLD: 0 K/UL (ref 0–0.1)
BASOPHILS NFR BLD: 0.6 %
BILIRUB SERPL-MCNC: 0.4 MG/DL (ref 0.3–1.2)
BUN SERPL-MCNC: 46 MG/DL (ref 6–20)
CALCIUM SERPL-MCNC: 8.8 MG/DL (ref 8.5–10.5)
CHLORIDE SERPL-SCNC: 108 MMOL/L (ref 98–107)
CHOLEST SERPL-MCNC: 130 MG/DL (ref 0–200)
CO2 SERPL-SCNC: 28 MMOL/L (ref 20–30)
CREAT SERPL-MCNC: 2.5 MG/DL (ref 0.4–1.2)
EOSINOPHIL # BLD: 0.3 K/UL (ref 0–0.4)
EOSINOPHIL NFR BLD: 4 %
ERYTHROCYTE [DISTWIDTH] IN BLOOD BY AUTOMATED COUNT: 16.5 % (ref 11–16)
GFR SERPLBLD CREATININE-BSD FMLA CKD-EPI: 28 ML/MIN/{1.73_M2}
GLOBULIN SER CALC-MCNC: 2 G/DL
GLUCOSE SERPL-MCNC: 111 MG/DL (ref 74–106)
HBA1C MFR BLD: 6.1 %
HCT VFR BLD AUTO: 37.6 % (ref 40–54)
HDLC SERPL-MCNC: 49 MG/DL (ref 40–60)
HGB BLD-MCNC: 11.1 G/DL (ref 13–18)
IMM GRANULOCYTES # BLD: 0 K/UL
IMM GRANULOCYTES NFR BLD: 0.3 % (ref 0–5)
LDLC SERPL CALC-MCNC: 65 MG/DL
LYMPHOCYTES # BLD: 1.7 K/UL (ref 1.5–4)
LYMPHOCYTES NFR BLD: 24.6 %
MCH RBC QN AUTO: 27.3 PG (ref 27–32)
MCHC RBC AUTO-ENTMCNC: 29.5 G/DL (ref 31–35)
MCV RBC AUTO: 92.4 FL (ref 80–100)
MONOCYTES # BLD: 0.4 K/UL (ref 0.2–0.8)
MONOCYTES NFR BLD: 5.6 %
NEUTROPHILS # BLD: 4.6 K/UL (ref 2–7.5)
NEUTS SEG NFR BLD: 64.9 %
PLATELET # BLD AUTO: 179 K/UL (ref 150–400)
PMV BLD AUTO: 11.3 FL (ref 6–10)
POTASSIUM SERPL-SCNC: 5.9 MMOL/L (ref 3.4–5.1)
PROT SERPL-MCNC: 6 G/DL (ref 6.4–8.3)
RBC # BLD AUTO: 4.07 M/UL (ref 4.5–6)
SODIUM SERPL-SCNC: 142 MMOL/L (ref 136–145)
TRIGL SERPL-MCNC: 79 MG/DL (ref 0–249)
VIT B12 SERPL-MCNC: 482 PG/ML (ref 211–911)
VLDLC SERPL CALC-MCNC: 16 MG/DL
WBC # BLD AUTO: 7 K/UL (ref 4–11)

## 2024-04-02 PROCEDURE — 3075F SYST BP GE 130 - 139MM HG: CPT | Performed by: NURSE PRACTITIONER

## 2024-04-02 PROCEDURE — 80053 COMPREHEN METABOLIC PANEL: CPT

## 2024-04-02 PROCEDURE — 3044F HG A1C LEVEL LT 7.0%: CPT | Performed by: NURSE PRACTITIONER

## 2024-04-02 PROCEDURE — 82607 VITAMIN B-12: CPT

## 2024-04-02 PROCEDURE — 3079F DIAST BP 80-89 MM HG: CPT | Performed by: NURSE PRACTITIONER

## 2024-04-02 PROCEDURE — 82306 VITAMIN D 25 HYDROXY: CPT

## 2024-04-02 PROCEDURE — 83036 HEMOGLOBIN GLYCOSYLATED A1C: CPT

## 2024-04-02 PROCEDURE — 99214 OFFICE O/P EST MOD 30 MIN: CPT | Performed by: NURSE PRACTITIONER

## 2024-04-02 PROCEDURE — 85025 COMPLETE CBC W/AUTO DIFF WBC: CPT

## 2024-04-02 PROCEDURE — 80061 LIPID PANEL: CPT

## 2024-04-02 RX ORDER — PREDNISONE 10 MG/1
10 TABLET ORAL 2 TIMES DAILY
Qty: 10 TABLET | Refills: 0 | Status: SHIPPED | OUTPATIENT
Start: 2024-04-02 | End: 2024-04-07

## 2024-04-02 RX ORDER — AZITHROMYCIN 500 MG/1
500 TABLET, FILM COATED ORAL DAILY
Qty: 5 TABLET | Refills: 0 | Status: SHIPPED | OUTPATIENT
Start: 2024-04-02 | End: 2024-04-07

## 2024-04-02 NOTE — PROGRESS NOTES
SUBJECTIVE:  Jagdish Dunaway is a 63 y.o. male that presents with   Chief Complaint   Patient presents with    Diabetes     Regular f/u    Shortness of Breath     X 2 weeks, no other symptoms   .    HPI:    This is a pleasant 63 year old white male who presents for follow up with diabetes, htn, hyperlipidemia anxiety chronic kidney disease.  Today he tells me that he has been short of breath for several days he thinks maybe about 2 weeks 10 days something like that.  He is not sure about what they did at [facility] he thinks they cut his lisinopril in half but he is not taking lisinopril so nga will call the office to get notes from them to try and determine exactly what has been going on with him they are.  He does better whenever his wife involved she does work I think as a CMA and just there is much better keeping up with things he has some issues with his brain and memory and this is very possibly secondary to the renal failure as well as uncontrolled diabetes today he does have dyspnea with exertion he is up 2 pounds I am not really impressed with that.  I did try to get the information we will get some labs on him and then determine what we need to do next    Diabetes  He presents for his follow-up diabetic visit. He has type 2 diabetes mellitus. No MedicAlert identification noted. His disease course has been worsening. Hypoglycemia symptoms include nervousness/anxiousness. Associated symptoms include weakness. There are no hypoglycemic complications. Symptoms are worsening. Diabetic complications include peripheral neuropathy and retinopathy. Risk factors for coronary artery disease include male sex, hypertension, diabetes mellitus, stress and dyslipidemia. Current diabetic treatment includes insulin injections and oral agent (monotherapy). He is compliant with treatment most of the time. His weight is stable. He is following a diabetic diet. Meal planning includes avoidance of concentrated sweets.

## 2024-04-02 NOTE — PROGRESS NOTES
Chief Complaint   Patient presents with    Diabetes     Regular f/u    Shortness of Breath     X 2 weeks, no other symptoms       Have you seen any other physician or provider since your last visit no    Have you had any other diagnostic tests since your last visit? no    Have you changed or stopped any medications since your last visit? no              Pre-Visit chart review completed. All lab and imaging orders reviewed for outstanding orders and none found. Referrals reviewed and none outstanding. All Health Maintenance requirements reviewed and noted for any that are due at upcoming visit. Any hospital admission documentation, ER documentation or consult notes scanned to chart since last visit have been reviewed and noted for provider to review during upcoming visit.

## 2024-04-04 RX ORDER — ERGOCALCIFEROL 1.25 MG/1
50000 CAPSULE ORAL WEEKLY
Qty: 12 CAPSULE | Refills: 1 | Status: SHIPPED | OUTPATIENT
Start: 2024-04-04

## 2024-04-04 RX ORDER — SODIUM POLYSTYRENE SULFONATE 15 G/60ML
30 SUSPENSION ORAL; RECTAL ONCE
Qty: 120 ML | Refills: 0 | Status: SHIPPED | OUTPATIENT
Start: 2024-04-04 | End: 2024-04-04

## 2024-04-11 ENCOUNTER — HOSPITAL ENCOUNTER (INPATIENT)
Facility: HOSPITAL | Age: 64
LOS: 6 days | Discharge: HOME | End: 2024-04-17
Attending: EMERGENCY MEDICINE | Admitting: INTERNAL MEDICINE
Payer: MEDICARE

## 2024-04-11 ENCOUNTER — APPOINTMENT (OUTPATIENT)
Dept: CARDIOLOGY | Facility: HOSPITAL | Age: 64
End: 2024-04-11
Payer: MEDICARE

## 2024-04-11 ENCOUNTER — APPOINTMENT (OUTPATIENT)
Dept: GENERAL RADIOLOGY | Facility: HOSPITAL | Age: 64
End: 2024-04-11
Payer: MEDICARE

## 2024-04-11 DIAGNOSIS — I50.9 ACUTE ON CHRONIC CONGESTIVE HEART FAILURE, UNSPECIFIED HEART FAILURE TYPE: ICD-10-CM

## 2024-04-11 DIAGNOSIS — R60.0 BILATERAL LOWER EXTREMITY EDEMA: Primary | ICD-10-CM

## 2024-04-11 DIAGNOSIS — G47.33 OSA ON CPAP: ICD-10-CM

## 2024-04-11 PROBLEM — I50.33 ACUTE ON CHRONIC DIASTOLIC CHF (CONGESTIVE HEART FAILURE): Status: ACTIVE | Noted: 2024-04-11

## 2024-04-11 PROBLEM — I50.33 ACUTE ON CHRONIC DIASTOLIC CONGESTIVE HEART FAILURE: Status: ACTIVE | Noted: 2020-02-28

## 2024-04-11 LAB
ALBUMIN SERPL-MCNC: 3.6 G/DL (ref 3.5–5.2)
ALBUMIN/GLOB SERPL: 1.2 G/DL
ALP SERPL-CCNC: 112 U/L (ref 39–117)
ALT SERPL W P-5'-P-CCNC: 28 U/L (ref 1–41)
ANION GAP SERPL CALCULATED.3IONS-SCNC: 5 MMOL/L (ref 5–15)
AST SERPL-CCNC: 24 U/L (ref 1–40)
BACTERIA UR QL AUTO: ABNORMAL /HPF
BASOPHILS # BLD AUTO: 0.04 10*3/MM3 (ref 0–0.2)
BASOPHILS NFR BLD AUTO: 0.5 % (ref 0–1.5)
BILIRUB SERPL-MCNC: 0.5 MG/DL (ref 0–1.2)
BILIRUB UR QL STRIP: NEGATIVE
BUN SERPL-MCNC: 64 MG/DL (ref 8–23)
BUN/CREAT SERPL: 29.8 (ref 7–25)
CALCIUM SPEC-SCNC: 9.3 MG/DL (ref 8.6–10.5)
CHLORIDE SERPL-SCNC: 111 MMOL/L (ref 98–107)
CLARITY UR: CLEAR
CO2 SERPL-SCNC: 29 MMOL/L (ref 22–29)
COLOR UR: YELLOW
CREAT SERPL-MCNC: 2.15 MG/DL (ref 0.76–1.27)
DEPRECATED RDW RBC AUTO: 56.1 FL (ref 37–54)
EGFRCR SERPLBLD CKD-EPI 2021: 34 ML/MIN/1.73
EOSINOPHIL # BLD AUTO: 0.23 10*3/MM3 (ref 0–0.4)
EOSINOPHIL NFR BLD AUTO: 2.8 % (ref 0.3–6.2)
ERYTHROCYTE [DISTWIDTH] IN BLOOD BY AUTOMATED COUNT: 16.5 % (ref 12.3–15.4)
FLUAV SUBTYP SPEC NAA+PROBE: NOT DETECTED
FLUBV RNA ISLT QL NAA+PROBE: NOT DETECTED
GEN 5 2HR TROPONIN T REFLEX: 72 NG/L
GLOBULIN UR ELPH-MCNC: 3 GM/DL
GLUCOSE BLDC GLUCOMTR-MCNC: 142 MG/DL (ref 70–130)
GLUCOSE BLDC GLUCOMTR-MCNC: 78 MG/DL (ref 70–130)
GLUCOSE BLDC GLUCOMTR-MCNC: 83 MG/DL (ref 70–130)
GLUCOSE SERPL-MCNC: 105 MG/DL (ref 65–99)
GLUCOSE UR STRIP-MCNC: NEGATIVE MG/DL
HBA1C MFR BLD HPLC: 6 %
HBA1C MFR BLD: 6 % (ref 4.8–5.6)
HCT VFR BLD AUTO: 39.4 % (ref 37.5–51)
HGB BLD-MCNC: 11.6 G/DL (ref 13–17.7)
HGB UR QL STRIP.AUTO: ABNORMAL
HOLD SPECIMEN: NORMAL
HYALINE CASTS UR QL AUTO: ABNORMAL /LPF
IMM GRANULOCYTES # BLD AUTO: 0.05 10*3/MM3 (ref 0–0.05)
IMM GRANULOCYTES NFR BLD AUTO: 0.6 % (ref 0–0.5)
KETONES UR QL STRIP: NEGATIVE
LEUKOCYTE ESTERASE UR QL STRIP.AUTO: NEGATIVE
LIPASE SERPL-CCNC: 63 U/L (ref 13–60)
LYMPHOCYTES # BLD AUTO: 1.51 10*3/MM3 (ref 0.7–3.1)
LYMPHOCYTES NFR BLD AUTO: 18.2 % (ref 19.6–45.3)
MAGNESIUM SERPL-MCNC: 2.4 MG/DL (ref 1.6–2.4)
MCH RBC QN AUTO: 27.8 PG (ref 26.6–33)
MCHC RBC AUTO-ENTMCNC: 29.4 G/DL (ref 31.5–35.7)
MCV RBC AUTO: 94.3 FL (ref 79–97)
MONOCYTES # BLD AUTO: 0.53 10*3/MM3 (ref 0.1–0.9)
MONOCYTES NFR BLD AUTO: 6.4 % (ref 5–12)
MRSA DNA SPEC QL NAA+PROBE: NEGATIVE
NEUTROPHILS NFR BLD AUTO: 5.95 10*3/MM3 (ref 1.7–7)
NEUTROPHILS NFR BLD AUTO: 71.5 % (ref 42.7–76)
NITRITE UR QL STRIP: NEGATIVE
NRBC BLD AUTO-RTO: 0 /100 WBC (ref 0–0.2)
NT-PROBNP SERPL-MCNC: 1707 PG/ML (ref 0–900)
PH UR STRIP.AUTO: 5.5 [PH] (ref 5–8)
PLATELET # BLD AUTO: 198 10*3/MM3 (ref 140–450)
PMV BLD AUTO: 11.6 FL (ref 6–12)
POTASSIUM SERPL-SCNC: 4.8 MMOL/L (ref 3.5–5.2)
PROCALCITONIN SERPL-MCNC: 0.12 NG/ML (ref 0–0.25)
PROT ?TM UR-MCNC: 170.1 MG/DL
PROT SERPL-MCNC: 6.6 G/DL (ref 6–8.5)
PROT UR QL STRIP: ABNORMAL
QT INTERVAL: 428 MS
QTC INTERVAL: 448 MS
RBC # BLD AUTO: 4.18 10*6/MM3 (ref 4.14–5.8)
RBC # UR STRIP: ABNORMAL /HPF
REF LAB TEST METHOD: ABNORMAL
SARS-COV-2 RNA RESP QL NAA+PROBE: NOT DETECTED
SODIUM SERPL-SCNC: 145 MMOL/L (ref 136–145)
SODIUM UR-SCNC: 92 MMOL/L
SP GR UR STRIP: 1.02 (ref 1–1.03)
SQUAMOUS #/AREA URNS HPF: ABNORMAL /HPF
TROPONIN T DELTA: -8 NG/L
TROPONIN T SERPL HS-MCNC: 80 NG/L
TROPONIN T SERPL HS-MCNC: 82 NG/L
TSH SERPL DL<=0.05 MIU/L-ACNC: 0.86 UIU/ML (ref 0.27–4.2)
UROBILINOGEN UR QL STRIP: ABNORMAL
VANCOMYCIN SERPL-MCNC: 38.5 MCG/ML (ref 5–40)
WBC # UR STRIP: ABNORMAL /HPF
WBC NRBC COR # BLD AUTO: 8.31 10*3/MM3 (ref 3.4–10.8)
WHOLE BLOOD HOLD COAG: NORMAL
WHOLE BLOOD HOLD SPECIMEN: NORMAL

## 2024-04-11 PROCEDURE — 80202 ASSAY OF VANCOMYCIN: CPT

## 2024-04-11 PROCEDURE — 99223 1ST HOSP IP/OBS HIGH 75: CPT | Performed by: HOSPITALIST

## 2024-04-11 PROCEDURE — 82570 ASSAY OF URINE CREATININE: CPT | Performed by: INTERNAL MEDICINE

## 2024-04-11 PROCEDURE — 80053 COMPREHEN METABOLIC PANEL: CPT | Performed by: EMERGENCY MEDICINE

## 2024-04-11 PROCEDURE — 81001 URINALYSIS AUTO W/SCOPE: CPT

## 2024-04-11 PROCEDURE — 25010000002 BUMETANIDE PER 0.5 MG

## 2024-04-11 PROCEDURE — 87641 MR-STAPH DNA AMP PROBE: CPT | Performed by: HOSPITALIST

## 2024-04-11 PROCEDURE — 99285 EMERGENCY DEPT VISIT HI MDM: CPT

## 2024-04-11 PROCEDURE — 84443 ASSAY THYROID STIM HORMONE: CPT | Performed by: HOSPITALIST

## 2024-04-11 PROCEDURE — 25010000002 BUMETANIDE PER 0.5 MG: Performed by: INTERNAL MEDICINE

## 2024-04-11 PROCEDURE — 84484 ASSAY OF TROPONIN QUANT: CPT | Performed by: NURSE PRACTITIONER

## 2024-04-11 PROCEDURE — 83690 ASSAY OF LIPASE: CPT

## 2024-04-11 PROCEDURE — 25810000003 SODIUM CHLORIDE 0.9 % SOLUTION

## 2024-04-11 PROCEDURE — 84540 ASSAY OF URINE/UREA-N: CPT | Performed by: INTERNAL MEDICINE

## 2024-04-11 PROCEDURE — 84145 PROCALCITONIN (PCT): CPT | Performed by: HOSPITALIST

## 2024-04-11 PROCEDURE — 85025 COMPLETE CBC W/AUTO DIFF WBC: CPT | Performed by: EMERGENCY MEDICINE

## 2024-04-11 PROCEDURE — 25010000002 CEFTRIAXONE PER 250 MG: Performed by: HOSPITALIST

## 2024-04-11 PROCEDURE — 93306 TTE W/DOPPLER COMPLETE: CPT | Performed by: INTERNAL MEDICINE

## 2024-04-11 PROCEDURE — 84300 ASSAY OF URINE SODIUM: CPT | Performed by: INTERNAL MEDICINE

## 2024-04-11 PROCEDURE — 87636 SARSCOV2 & INF A&B AMP PRB: CPT

## 2024-04-11 PROCEDURE — 87040 BLOOD CULTURE FOR BACTERIA: CPT | Performed by: HOSPITALIST

## 2024-04-11 PROCEDURE — 25010000002 VANCOMYCIN 10 G RECONSTITUTED SOLUTION

## 2024-04-11 PROCEDURE — 93306 TTE W/DOPPLER COMPLETE: CPT

## 2024-04-11 PROCEDURE — 82948 REAGENT STRIP/BLOOD GLUCOSE: CPT

## 2024-04-11 PROCEDURE — 83735 ASSAY OF MAGNESIUM: CPT | Performed by: HOSPITALIST

## 2024-04-11 PROCEDURE — 25010000002 SULFUR HEXAFLUORIDE MICROSPH 60.7-25 MG RECONSTITUTED SUSPENSION: Performed by: HOSPITALIST

## 2024-04-11 PROCEDURE — 84156 ASSAY OF PROTEIN URINE: CPT | Performed by: INTERNAL MEDICINE

## 2024-04-11 PROCEDURE — 71045 X-RAY EXAM CHEST 1 VIEW: CPT

## 2024-04-11 PROCEDURE — 93005 ELECTROCARDIOGRAM TRACING: CPT | Performed by: EMERGENCY MEDICINE

## 2024-04-11 PROCEDURE — 99222 1ST HOSP IP/OBS MODERATE 55: CPT | Performed by: NURSE PRACTITIONER

## 2024-04-11 PROCEDURE — 83036 HEMOGLOBIN GLYCOSYLATED A1C: CPT | Performed by: HOSPITALIST

## 2024-04-11 PROCEDURE — 93005 ELECTROCARDIOGRAM TRACING: CPT

## 2024-04-11 PROCEDURE — 84484 ASSAY OF TROPONIN QUANT: CPT | Performed by: EMERGENCY MEDICINE

## 2024-04-11 PROCEDURE — 36415 COLL VENOUS BLD VENIPUNCTURE: CPT

## 2024-04-11 PROCEDURE — 99223 1ST HOSP IP/OBS HIGH 75: CPT | Performed by: NURSE PRACTITIONER

## 2024-04-11 PROCEDURE — 83880 ASSAY OF NATRIURETIC PEPTIDE: CPT | Performed by: EMERGENCY MEDICINE

## 2024-04-11 RX ORDER — PANTOPRAZOLE SODIUM 40 MG/1
40 TABLET, DELAYED RELEASE ORAL DAILY
COMMUNITY

## 2024-04-11 RX ORDER — BISACODYL 5 MG/1
5 TABLET, DELAYED RELEASE ORAL DAILY PRN
Status: DISCONTINUED | OUTPATIENT
Start: 2024-04-11 | End: 2024-04-17 | Stop reason: HOSPADM

## 2024-04-11 RX ORDER — ASPIRIN 81 MG/1
81 TABLET, CHEWABLE ORAL DAILY
Status: DISCONTINUED | OUTPATIENT
Start: 2024-04-11 | End: 2024-04-17 | Stop reason: HOSPADM

## 2024-04-11 RX ORDER — POLYETHYLENE GLYCOL 3350 17 G/17G
17 POWDER, FOR SOLUTION ORAL DAILY
Status: DISCONTINUED | OUTPATIENT
Start: 2024-04-11 | End: 2024-04-17 | Stop reason: HOSPADM

## 2024-04-11 RX ORDER — TRIAMTERENE AND HYDROCHLOROTHIAZIDE 37.5; 25 MG/1; MG/1
1 TABLET ORAL DAILY
COMMUNITY
End: 2024-04-17 | Stop reason: HOSPADM

## 2024-04-11 RX ORDER — BUMETANIDE 0.25 MG/ML
2 INJECTION INTRAMUSCULAR; INTRAVENOUS EVERY 12 HOURS
Qty: 16 ML | Refills: 0 | Status: COMPLETED | OUTPATIENT
Start: 2024-04-11 | End: 2024-04-12

## 2024-04-11 RX ORDER — PIOGLITAZONEHYDROCHLORIDE 30 MG/1
30 TABLET ORAL DAILY
COMMUNITY

## 2024-04-11 RX ORDER — IBUPROFEN 600 MG/1
1 TABLET ORAL
Status: DISCONTINUED | OUTPATIENT
Start: 2024-04-11 | End: 2024-04-17 | Stop reason: HOSPADM

## 2024-04-11 RX ORDER — CARVEDILOL 12.5 MG/1
12.5 TABLET ORAL 2 TIMES DAILY WITH MEALS
Status: DISCONTINUED | OUTPATIENT
Start: 2024-04-11 | End: 2024-04-17 | Stop reason: HOSPADM

## 2024-04-11 RX ORDER — GABAPENTIN 300 MG/1
600 CAPSULE ORAL EVERY 8 HOURS SCHEDULED
Status: DISCONTINUED | OUTPATIENT
Start: 2024-04-11 | End: 2024-04-17 | Stop reason: HOSPADM

## 2024-04-11 RX ORDER — SODIUM CHLORIDE 0.9 % (FLUSH) 0.9 %
10 SYRINGE (ML) INJECTION AS NEEDED
Status: DISCONTINUED | OUTPATIENT
Start: 2024-04-11 | End: 2024-04-17 | Stop reason: HOSPADM

## 2024-04-11 RX ORDER — FLUTICASONE PROPIONATE 50 MCG
2 SPRAY, SUSPENSION (ML) NASAL DAILY
COMMUNITY

## 2024-04-11 RX ORDER — ROSUVASTATIN CALCIUM 20 MG/1
40 TABLET, COATED ORAL NIGHTLY
Status: DISCONTINUED | OUTPATIENT
Start: 2024-04-11 | End: 2024-04-17 | Stop reason: HOSPADM

## 2024-04-11 RX ORDER — INSULIN LISPRO 100 [IU]/ML
2-7 INJECTION, SOLUTION INTRAVENOUS; SUBCUTANEOUS
Status: DISCONTINUED | OUTPATIENT
Start: 2024-04-11 | End: 2024-04-17 | Stop reason: HOSPADM

## 2024-04-11 RX ORDER — ESCITALOPRAM OXALATE 20 MG/1
20 TABLET ORAL DAILY
COMMUNITY

## 2024-04-11 RX ORDER — NICOTINE POLACRILEX 4 MG
15 LOZENGE BUCCAL
Status: DISCONTINUED | OUTPATIENT
Start: 2024-04-11 | End: 2024-04-17 | Stop reason: HOSPADM

## 2024-04-11 RX ORDER — CLONAZEPAM 0.5 MG/1
0.5 TABLET ORAL 2 TIMES DAILY PRN
COMMUNITY

## 2024-04-11 RX ORDER — ALLOPURINOL 100 MG/1
100 TABLET ORAL DAILY
Status: DISCONTINUED | OUTPATIENT
Start: 2024-04-11 | End: 2024-04-17 | Stop reason: HOSPADM

## 2024-04-11 RX ORDER — BUMETANIDE 0.25 MG/ML
2 INJECTION INTRAMUSCULAR; INTRAVENOUS ONCE
Status: COMPLETED | OUTPATIENT
Start: 2024-04-11 | End: 2024-04-11

## 2024-04-11 RX ORDER — DEXTROSE MONOHYDRATE 25 G/50ML
25 INJECTION, SOLUTION INTRAVENOUS
Status: DISCONTINUED | OUTPATIENT
Start: 2024-04-11 | End: 2024-04-17 | Stop reason: HOSPADM

## 2024-04-11 RX ADMIN — ASPIRIN 81 MG: 81 TABLET, CHEWABLE ORAL at 18:13

## 2024-04-11 RX ADMIN — SULFUR HEXAFLUORIDE 3 ML: KIT at 17:55

## 2024-04-11 RX ADMIN — BUMETANIDE 2 MG: 0.25 INJECTION INTRAMUSCULAR; INTRAVENOUS at 12:17

## 2024-04-11 RX ADMIN — ROSUVASTATIN 40 MG: 20 TABLET, FILM COATED ORAL at 21:26

## 2024-04-11 RX ADMIN — GABAPENTIN 600 MG: 300 CAPSULE ORAL at 21:26

## 2024-04-11 RX ADMIN — VANCOMYCIN HYDROCHLORIDE 2750 MG: 10 INJECTION, POWDER, LYOPHILIZED, FOR SOLUTION INTRAVENOUS at 16:36

## 2024-04-11 RX ADMIN — CEFTRIAXONE 2000 MG: 2 INJECTION, POWDER, FOR SOLUTION INTRAMUSCULAR; INTRAVENOUS at 15:50

## 2024-04-11 RX ADMIN — BUMETANIDE 2 MG: 0.25 INJECTION INTRAMUSCULAR; INTRAVENOUS at 18:11

## 2024-04-11 RX ADMIN — CARVEDILOL 12.5 MG: 12.5 TABLET, FILM COATED ORAL at 18:12

## 2024-04-11 RX ADMIN — POLYETHYLENE GLYCOL 3350 17 G: 17 POWDER, FOR SOLUTION ORAL at 18:13

## 2024-04-11 RX ADMIN — GABAPENTIN 600 MG: 300 CAPSULE ORAL at 18:13

## 2024-04-11 RX ADMIN — Medication 10 ML: at 21:26

## 2024-04-11 RX ADMIN — ALLOPURINOL 100 MG: 100 TABLET ORAL at 18:12

## 2024-04-11 RX ADMIN — Medication 10 ML: at 15:52

## 2024-04-11 NOTE — ED PROVIDER NOTES
Subjective   History of Present Illness patient is a 63-year-old male presenting to the hospital emergency department complaining of shortness of breath, dyspnea increased with exertion, fatigue, difficulty laying flat due to shortness of breath, lower extremity swelling, and kidney issues.  He reports that he has chronic kidney disease and was recently evaluated by [facility], has follow-up scheduled with his nephrologist, but denies any history of COPD or knowledge of having congestive heart failure.    Review of Systems   Constitutional:  Positive for activity change and fatigue.   HENT: Negative.     Respiratory:  Positive for shortness of breath.    Cardiovascular:  Positive for leg swelling.   Gastrointestinal: Negative.    Genitourinary: Negative.    Musculoskeletal: Negative.    Skin: Negative.    Neurological:  Positive for weakness.       Past Medical History:   Diagnosis Date   • Aortic valve disorder    • Arthritis    • CHF (congestive heart failure)    • Chronic kidney disease    • Diabetes mellitus    • Gout    • Hiatal hernia    • Hyperlipidemia    • Hypertension    • Kidney stones     history   • MI, old    • Peripheral neuropathy    • Wears glasses        No Known Allergies    Past Surgical History:   Procedure Laterality Date   • AORTIC VALVE REPAIR/REPLACEMENT N/A 3/16/2020    Procedure: TRANSCATHETER AORTIC VALVE REPLACEMENT, SHANKAR;  Surgeon: Irvin Jeffers MD;  Location: [facility] include 234 beds at the [facility] OR ;  Service: Cardiothoracic;  Laterality: N/A;  fluoro 10 min 30 sec  dose 1136 mGY   contrast 65 ml   • AORTIC VALVE REPAIR/REPLACEMENT N/A 3/16/2020    Procedure: Transfemoral Transcatheter Aortic Valve Replacement;  Surgeon: Cayla Bella MD;  Location: Counts include 234 beds at the [facility] OR ;  Service: Cardiovascular;  Laterality: N/A;   • CHOLECYSTECTOMY     • CIRCUMCISION     • COLONOSCOPY  2 years ago   • KIDNEY STONE SURGERY     • OTHER SURGICAL HISTORY      Gallstone removal surgery       Family  History   Problem Relation Age of Onset   • Stroke Mother    • Arrhythmia Mother    • Hypertension Mother    • Diabetes Father    • Hypertension Father    • Heart attack Father    • Hypertension Brother    • No Known Problems Maternal Grandmother    • No Known Problems Maternal Grandfather    • No Known Problems Paternal Grandmother    • No Known Problems Paternal Grandfather        Social History     Socioeconomic History   • Marital status: Single   Tobacco Use   • Smoking status: Never   • Smokeless tobacco: Never   Substance and Sexual Activity   • Alcohol use: Never   • Drug use: Never   • Sexual activity: Defer           Objective   Physical Exam  Constitutional:       General: He is in acute distress.      Appearance: He is well-developed. He is obese. He is ill-appearing.   HENT:      Head: Normocephalic and atraumatic.   Eyes:      Extraocular Movements: Extraocular movements intact.      Pupils: Pupils are equal, round, and reactive to light.   Cardiovascular:      Rate and Rhythm: Normal rate and regular rhythm.   Pulmonary:      Effort: Tachypnea and respiratory distress present.      Breath sounds: Examination of the right-middle field reveals decreased breath sounds. Examination of the right-lower field reveals decreased breath sounds. Examination of the left-lower field reveals decreased breath sounds. No wheezing or rhonchi.   Chest:      Chest wall: No tenderness.   Abdominal:      General: Bowel sounds are normal.      Palpations: Abdomen is soft.      Tenderness: There is no abdominal tenderness.   Musculoskeletal:      Cervical back: Normal range of motion and neck supple.      Right lower leg: Edema present.      Left lower leg: Edema present.   Skin:     General: Skin is warm and dry.      Capillary Refill: Capillary refill takes 2 to 3 seconds.   Neurological:      General: No focal deficit present.      Mental Status: He is alert and oriented to person, place, and time.      Motor: Weakness  present.         Procedures           ED Course  ED Course as of 04/12/24 2248   Thu Apr 11, 2024   1000 Vital signs on arrival, hypertensive, hypoxic [JH]   1000 Patient initially evaluated in ED room 10, accompanied by his wife [JH]   1025 Plain film chest, and wet read consistent with pulmonary edema and CHF exacerbation. [JH]   1038 Noted patient CBC without significant acute abnormality [JH]   1105 Concepcion from orients on troponin result Elevation at 80, that is consistent with cardiopulmonary overload from CHF exacerbation.  However, cardiology consultation will be initiated. [JH]   1132 Reached out to the hospitalist Dr. Rutledge regarding admission for the patient. [JH]   1132 I have reached out to the cardiology contact for consultation    [JH]   1200 Hospitalist has accepted patient for admission, cardiology team has also confirmed they will consult in the ED.  Also consulted with Dr. Burgess, for confirmation of diuresis IV dose and 2 mg of bumetanide has been ordered. [JH]      ED Course User Index  [JH] Ronald Haas A, APRN                                             Medical Decision Making  Given the patient's presenting condition, vital signs, reported history, examination, differential diagnosis includes CHF exacerbation, COPD exacerbation, cannot exclude pulmonary effusion, pneumonia upper respiratory viral infection, acute coronary syndrome, acute on chronic renal failure, electrolyte derangement.  Patient will have serum screening labs, urinalysis complaint, imaging of chest with consideration for further imaging, twelve-lead cardiac EKG, cardiac telemetry monitoring, respiratory viral swab, and oxygen administration via adjunct as indicated for saturations at or above 92%.  Patient and wife agreeable plan as explained.  Anticipating hospitalization and will consult with the internal medicine specialist.    Amount and/or Complexity of Data Reviewed  Labs: ordered.  Radiology: ordered.  ECG/medicine  tests: ordered.    Risk  Prescription drug management.        Final diagnoses:   Acute on chronic congestive heart failure, unspecified heart failure type       ED Disposition  ED Disposition       ED Disposition   Decision to Admit    Condition   --    Comment   Level of Care: Telemetry [5]   Diagnosis: Acute on chronic diastolic CHF (congestive heart failure) [2639881]                 No follow-up provider specified.       Medication List      No changes were made to your prescriptions during this visit.            Ronald Haas, APRN  04/12/24 7001

## 2024-04-11 NOTE — PROGRESS NOTES
"Pharmacy Consult-Vancomycin Dosing  Juan Alberto Tejeda is a  63 y.o. male receiving vancomycin therapy.     Indication: SSTI  Consulting Provider: Hospitalist  ID Consult: No    Goal AUC: 400 - 600 mg/L*hr    Current Antimicrobial Therapy  Anti-Infectives (From admission, onward)      Ordered     Dose/Rate Route Frequency Start Stop    04/11/24 1319  vancomycin 2750 mg/500 mL 0.9% NS IVPB ([facility])        Ordering Provider: Lake Raphael RPH    2,750 mg  over 165 Minutes Intravenous Once 04/11/24 1415      04/11/24 1218  cefTRIAXone (ROCEPHIN) 2,000 mg in sodium chloride 0.9 % 100 mL MBP        Ordering Provider: Campbell Campos MD    2,000 mg  200 mL/hr over 30 Minutes Intravenous Every 24 Hours 04/11/24 1234 04/16/24 1300    04/11/24 1218  Pharmacy to dose vancomycin        Ordering Provider: Campbell Campos MD     Does not apply Continuous PRN 04/11/24 1218 04/16/24 1217            Allergies  Allergies as of 04/11/2024    (No Known Allergies)       Labs    Results from last 7 days   Lab Units 04/11/24  1008   BUN mg/dL 64*   CREATININE mg/dL 2.15*       Results from last 7 days   Lab Units 04/11/24  1008   WBC 10*3/mm3 8.31       Evaluation of Dosing     Last Dose Received in the ED/Outside Facility: None  Is Patient on Dialysis or Renal Replacement: No    Ht - 170.2 cm (67\")  Wt - 127 kg (280 lb)    Estimated Creatinine Clearance: 45.6 mL/min (A) (by C-G formula based on SCr of 2.15 mg/dL (H)).    No intake/output data recorded.    Microbiology and Radiology  Microbiology Results (last 10 days)       Procedure Component Value - Date/Time    COVID-19 and FLU A/B PCR, 1 HR TAT - Swab, Nasopharynx [[id number]]  (Normal) Collected: 04/11/24 1039    Lab Status: Final result Specimen: Swab from Nasopharynx Updated: 04/11/24 1122     COVID19 Not Detected     Influenza A PCR Not Detected     Influenza B PCR Not Detected    Narrative:      Fact sheet for providers: https://www.fda.gov/media/550422/download    Fact sheet for " patients: https://www.fda.gov/media/574277/download    Test performed by PCR.            Reported Vancomycin Levels                         InsightRX AUC Calculation:    Current AUC:   -- mg/L*hr    Predicted Steady State AUC on Current Dose: -- mg/L*hr  _________________________________    Predicted Steady State AUC on New Dose:   -- mg/L*hr    Assessment/Plan:  1. Pharmacy to dose vancomycin for SSTI.   2. Patient to receive a loading dose of vancomycin 2750 mg IV x 1 (~21.7 mg/kg).  3. Will then start on a maintenance dose of vancomycin 1000 mg IV q24h (~ 11 mg/kg AdjBW).  4. Due to patient's obese nature, will obtain two vancomycin levels to better understand their PK parameters before the second overall dose.   5. First vancomycin level scheduled for 4/11 1-4 hours after the loading dose, then second vancomycin level will be scheduled prior to the 2nd dose.  6. Monitor renal function, cultures and sensitivities, and clinical status, and adjust regimen as necessary.  Pharmacy will continue to follow.    Lake Raphael, Victoria  4/11/2024  14:03 EDT

## 2024-04-11 NOTE — NURSING NOTE
"Reason for Wound, Ostomy and Continence (WOC) Nursing Consultation: \"L anterior tibia\"    Patient resting in bed.  Family/support person not present.     Wounds noted by WOC:LLE    Wound Assessment  Wound Type: Full thickness injuries to LLE. Pt states leg became more swollen, blisters ruptured.  Location: [facility] [place]  Wound Bed: pink, red, and yellow  Periwound Skin: Edematous, hyperpigmentation, red, pink, warm.   Drainage Characteristics/Odor: serous  Drainage Amount: moderate  Pain: No   Care provided: Cleansed with wipes, multilayer xeroform applied, secured with silicone foam dressing.   Notes: Pt states that his legs have been swollen, the left with blisters that have ruptured. BLE swelling and hemosiderin to both legs. LLE warm, red, cellulitic. PT Wound Care consulted for compression wrap evaluation.   Wound Image:       Recommendation(s) for management of wound:   -Cleanse with NS, apply multilayer xeroform, silicone foam dressing to secure daily until PT Wound Care wraps.   -PT Wound Care consulted for compression wrap eval.   -Practice pressure injury prevention protocol.                     Pressure Injury Prevention Protocol (initiate for Wali Score of 18 or less):   *Keep skin dry, turn q 2 hr, keep heels elevated and offloaded with offloading heel boots.    *Apply z-guard to bottom and bony prominences daily and as needed with incontinence episodes.  *Follow C.A.R.E protocol if medical devices (Bipap, sarmiento, Ng tube, etc) are being used.  *Reduce layers under patient (one sheet as drawsheet and two incontinence pads) to allow waffle or LISSETT to improve microclimate   *Clean skin gently with no-rinse PH-balanced cleanser and soft, disposable cloth (barrier wipes-blue pack).   *Raise knee-gatch before elevating HOB to reduce shearing      WOC Team will sign off.  Please re-consult if the wound(s) worsens.   "

## 2024-04-11 NOTE — CONSULTS
[facility]  Renal Consult Note    Juan Alberto Tejeda  1960  [id number]    Date of Admit:  4/11/2024    Date of Consult: 4/11/2024    Requesting Provider: Dr. Campos    Evaluating Physician: Pradip Ortega MD    Chief Complaint: Increased edema    Reason for Consultation: Elevated creatinine    History of present illness:    Patient is a 63 y.o.  Yr old male with a history of CKD stage IV followed by [facility].  Patient presents with increased edema.  Patient has a history of TAVR in the past.  On admission patient was hypoxic on room air.  Patient treated with diuretics and admitted for further evaluation.  Creatinine was noted to be 2.2.  Patient has underlying history of CKD stage IV in the past.  Nephrology consulted to assist with renal failure management in light of need for diuresis.  Patient reports breathing stable.     Past Medical History  Past Medical History:   Diagnosis Date    Aortic valve disorder     Arthritis     CHF (congestive heart failure)     Chronic kidney disease     Diabetes mellitus     Gout     Hiatal hernia     Hyperlipidemia     Hypertension     Kidney stones     history    MI, old     Peripheral neuropathy     Wears glasses        Past Surgical History:   Procedure Laterality Date    AORTIC VALVE REPAIR/REPLACEMENT N/A 3/16/2020    Procedure: TRANSCATHETER AORTIC VALVE REPLACEMENT, SHANKAR;  Surgeon: Irvin Jeffers MD;  Location: [place] [address];  Service: Cardiothoracic;  Laterality: N/A;  fluoro 10 min 30 sec  dose 1136 mGY   contrast 65 ml    AORTIC VALVE REPAIR/REPLACEMENT N/A 3/16/2020    Procedure: Transfemoral Transcatheter Aortic Valve Replacement;  Surgeon: Cayla Bella MD;  Location: [place] [address];  Service: Cardiovascular;  Laterality: N/A;    CHOLECYSTECTOMY      CIRCUMCISION      COLONOSCOPY  2 years ago    KIDNEY STONE SURGERY      OTHER SURGICAL HISTORY      Gallstone removal surgery       Allergies:  No Known  "Allergies    Medication:   See electronic record    Soc Hx:   Social History     Socioeconomic History    Marital status: Single   Tobacco Use    Smoking status: Never    Smokeless tobacco: Never   Substance and Sexual Activity    Alcohol use: Never    Drug use: Never    Sexual activity: Defer       Fam Hx:  No congenital renal disease      Review of Systems:  Full review of systems reviewed and are as above  In HPI or per admitting H&P,otherwise negative for acute complaints    Physical Exam:   Vital Signs   Blood pressure 167/81, pulse 65, temperature 97.8 °F (36.6 °C), temperature source Oral, resp. rate 18, height 170.2 cm (67\"), weight 127 kg (280 lb), SpO2 95%.  No intake/output data recorded.    GENERAL: WD obese WM NAD  NEURO: Awake and alert, oriented. No focal deficit  PSYCHIATRIC: NMA. Cooperative with PE  EYE: PE, no icterus, no conjunctivitis  ENT: ommm, dentition intact,  Hearing intact  NECK:  No JVD discernable,  Trachea midline  CV: + Edema, RRR  LUNGS:  Quiet,  Nonlabored resp.  Symmetrical expansion  ABDOMEN: Nondistended, soft nontender.  : No Chi, no palp bladder  SKIN: Warm and dry without rash    Laboratory Data  Results from last 7 days   Lab Units 04/11/24  1008   HEMOGLOBIN g/dL 11.6*   HEMATOCRIT % 39.4     Results from last 7 days   Lab Units 04/11/24  1219 04/11/24  1008   SODIUM mmol/L  --  145   POTASSIUM mmol/L  --  4.8   CHLORIDE mmol/L  --  111*   CO2 mmol/L  --  29.0   BUN mg/dL  --  64*   CREATININE mg/dL  --  2.15*   CALCIUM mg/dL  --  9.3   MAGNESIUM mg/dL 2.4  --    ALBUMIN g/dL  --  3.6     Results from last 7 days   Lab Units 04/11/24  1102   COLOR UA  Yellow   CLARITY UA  Clear   PH, URINE  5.5   SPECIFIC GRAVITY, URINE  1.016   GLUCOSE UA  Negative   KETONES UA  Negative   BILIRUBIN UA  Negative   PROTEIN UA  100 mg/dL (2+)*   BLOOD UA  Moderate (2+)*   LEUKOCYTES UA  Negative   NITRITE UA  Negative     Results from last 7 days   Lab Units 04/11/24  1008   ALK PHOS U/L " 112   BILIRUBIN mg/dL 0.5   ALT (SGPT) U/L 28   AST (SGOT) U/L 24         Estimated Creatinine Clearance: 45.6 mL/min (A) (by C-G formula based on SCr of 2.15 mg/dL (H)).  Lab Results   Component Value Date    PROTEINUR 100 (A) 02/19/2024    PROTEINUR 41.00 (H) 02/19/2024       A/P:      CKD 4: Follows with [facility].  Last seen by us 2/24.  Creatinine was 3.5 at that time.  Has been referred to transplant in the past.  Creatinine here below baseline at 2.2.  Possibly dilutional with volume overload.  Patient was supposed to see us back in June, however his appointment was changed to 4/24/24 for sooner evaluation.  For now would continue diuresis.  Continue supportive care.  Strict I's and O's.  Monitor renal function closely.  No indication for emergent dialysis at this time.  Would expect creatinine to trend back up towards previous baseline with further volume reduction.    HTN: Blood pressure elevated.  Continue current medications.  Monitor with volume reduction.  Cover with as needed medications    Volume: Patient with significant edema.  Albumin borderline at 3.6.  Check  protein creatinine ratio though doubt patient nephrotic.  Only on Maxide at home.  Previously patient was on other diuretics but reports they were stopped due to rising creatinine without edema.  Patient reports over the past month and a half he developed edema which has has continued to increase.  Reports baseline weight around 265.weight may be 280 here.  Will ensure standing weight.  Continue diuretics for now.  Monitor strict I's and O's.  Limit fluids to 40 ounces a day.check daily standing weight.  Recommend establishing dry weight for possible sliding scale diuretics once patient stabilized.      HFpEF: Per report last echocardiogram 5/2020 showed a EF of 65%.  Patient has a TAVR valve in place.  Awaiting repeat echocardiogram.  Cardiology following.    Thank you consulting us on Juan Alberto Tejeda who is of high  risk and complexity.  We will follow along closely    Pradip Ortega MD  4/11/2024  14:38 EDT

## 2024-04-11 NOTE — Clinical Note
Level of Care: Telemetry [5]   Diagnosis: Acute on chronic diastolic CHF (congestive heart failure) [[id number]]   Certification: I Certify That Inpatient Hospital Services Are Medically Necessary For Greater Than 2 Midnights

## 2024-04-11 NOTE — ED NOTES
Juan Alberto Tejeda    Nursing Report ED to Floor:  Mental status: alert and oriented, fatigued   Ambulatory status: up with assistance, has used wheelchair to go longer distances  Oxygen Therapy:  2L NC  Cardiac Rhythm: NSR  Admitted from: ED  Safety Concerns:  FALL RISK   Social Issues: NONE, FAMILY AT BEDSIDE   ED Room #:  10    ED Nurse Phone Extension - [phone] or may call [phone].      HPI:   Chief Complaint   Patient presents with    Shortness of Breath       Past Medical History:  Past Medical History:   Diagnosis Date    Aortic valve disorder     Arthritis     CHF (congestive heart failure)     Chronic kidney disease     Diabetes mellitus     Gout     Hiatal hernia     Hyperlipidemia     Hypertension     Kidney stones     history    MI, old     Peripheral neuropathy     Wears glasses         Past Surgical History:  Past Surgical History:   Procedure Laterality Date    AORTIC VALVE REPAIR/REPLACEMENT N/A 3/16/2020    Procedure: TRANSCATHETER AORTIC VALVE REPLACEMENT, SHANKAR;  Surgeon: Irvin Jeffers MD;  Location: [facility] OR ;  Service: Cardiothoracic;  Laterality: N/A;  fluoro 10 min 30 sec  dose 1136 mGY   contrast 65 ml    AORTIC VALVE REPAIR/REPLACEMENT N/A 3/16/2020    Procedure: Transfemoral Transcatheter Aortic Valve Replacement;  Surgeon: Cayla Bella MD;  Location: [facility] OR ;  Service: Cardiovascular;  Laterality: N/A;    CHOLECYSTECTOMY      CIRCUMCISION      COLONOSCOPY  2 years ago    KIDNEY STONE SURGERY      OTHER SURGICAL HISTORY      Gallstone removal surgery        Admitting Doctor:   Campbell Campos MD    Consulting Provider(s):  Consults       No orders found from 3/13/2024 to 4/12/2024.             Admitting Diagnosis:   The encounter diagnosis was Acute on chronic congestive heart failure, unspecified heart failure type.    Most Recent Vitals:   Vitals:    04/11/24 0940 04/11/24 1000 04/11/24 1006 04/11/24 1159   BP: (!) 193/94 173/83  (!) 181/96   BP Location: Left  "arm      Patient Position: Sitting      Pulse: 67 68  65   Resp: 24      Temp: 97.9 °F (36.6 °C)      TempSrc: Oral      SpO2: (!) 86%  94% 94%   Weight: 127 kg (280 lb)      Height: 170.2 cm (67\")          Active LDAs/IV Access:   Lines, Drains & Airways       Active LDAs       None                    Labs (abnormal labs have a star):   Labs Reviewed   COMPREHENSIVE METABOLIC PANEL - Abnormal; Notable for the following components:       Result Value    Glucose 105 (*)     BUN 64 (*)     Creatinine 2.15 (*)     Chloride 111 (*)     BUN/Creatinine Ratio 29.8 (*)     eGFR 34.0 (*)     All other components within normal limits    Narrative:     GFR Normal >60  Chronic Kidney Disease <60  Kidney Failure <15     BNP (IN-HOUSE) - Abnormal; Notable for the following components:    proBNP 1,707.0 (*)     All other components within normal limits    Narrative:     This assay is used as an aid in the diagnosis of individuals suspected of having heart failure. It can be used as an aid in the diagnosis of acute decompensated heart failure (ADHF) in patients presenting with signs and symptoms of ADHF to the emergency department (ED). In addition, NT-proBNP of <300 pg/mL indicates ADHF is not likely.    Age Range Result Interpretation  NT-proBNP Concentration (pg/mL:      <50             Positive            >450                   Gray                 300-450                    Negative             <300    50-75           Positive            >900                  Gray                300-900                  Negative            <300      >75             Positive            >1800                  Gray                300-1800                  Negative            <300   SINGLE HS TROPONIN T - Abnormal; Notable for the following components:    HS Troponin T 80 (*)     All other components within normal limits    Narrative:     High Sensitive Troponin T Reference Range:  <14.0 ng/L- Negative Female for AMI  <22.0 ng/L- Negative Male for " AMI  >=14 - Abnormal Female indicating possible myocardial injury.  >=22 - Abnormal Male indicating possible myocardial injury.   Clinicians would have to utilize clinical acumen, EKG, Troponin, and serial changes to determine if it is an Acute Myocardial Infarction or myocardial injury due to an underlying chronic condition.        CBC WITH AUTO DIFFERENTIAL - Abnormal; Notable for the following components:    Hemoglobin 11.6 (*)     MCHC 29.4 (*)     RDW 16.5 (*)     RDW-SD 56.1 (*)     Lymphocyte % 18.2 (*)     Immature Grans % 0.6 (*)     All other components within normal limits   URINALYSIS W/ MICROSCOPIC IF INDICATED (NO CULTURE) - Abnormal; Notable for the following components:    Blood, UA Moderate (2+) (*)     Protein,  mg/dL (2+) (*)     All other components within normal limits   LIPASE - Abnormal; Notable for the following components:    Lipase 63 (*)     All other components within normal limits   URINALYSIS, MICROSCOPIC ONLY - Abnormal; Notable for the following components:    RBC, UA 3-5 (*)     All other components within normal limits   COVID-19 AND FLU A/B, NP SWAB IN TRANSPORT MEDIA 1 HR TAT - Normal    Narrative:     Fact sheet for providers: https://www.fda.gov/media/022144/download    Fact sheet for patients: https://www.fda.gov/media/731571/download    Test performed by PCR.   RAINBOW DRAW    Narrative:     The following orders were created for panel order [place] Draw.  Procedure                               Abnormality         Status                     ---------                               -----------         ------                     Green Top (Gel)[[id number]]                                  Final result               Lavender Top[[id number]]                                     Final result               Gold Top - SST[[id number]]                                   Final result               Gray Top[[id number]]                                         In process                  Light Blue Top[[id number]]                                   Final result                 Please view results for these tests on the individual orders.   HIGH SENSITIVITIY TROPONIN T 2HR   CBC AND DIFFERENTIAL    Narrative:     The following orders were created for panel order CBC & Differential.  Procedure                               Abnormality         Status                     ---------                               -----------         ------                     CBC Auto Differential[[id number]]        Abnormal            Final result                 Please view results for these tests on the individual orders.   GREEN TOP   LAVENDER TOP   GOLD TOP - SST   LIGHT BLUE TOP   GRAY TOP       Meds Given in ED:   Medications   sodium chloride 0.9 % flush 10 mL (has no administration in time range)   bumetanide (BUMEX) injection 2 mg (has no administration in time range)           Last NIH score:                                                          Dysphagia screening results:        Shepherd Coma Scale:  No data recorded     CIWA:        Restraint Type:            Isolation Status:  No active isolations

## 2024-04-11 NOTE — CONSULTS
Inpatient Cardiology Consult  Consult performed by: Neela Olmos APRN  Consult ordered by: Campbell Campos MD            [facility]  Cardiovascular Consultation Note    Reason for consult: CHF     Patient is a 63-year-old gentleman with a history of chronic diastolic heart failure, valvular heart disease status post TAVR in 2020, nonobstructive coronary artery disease and chronic kidney disease who we are being asked to consult for acute CHF.  The patient presented to [facility] emergency room earlier today with complaints of shortness of breath and increased swelling in his legs with a weight gain of 6 pounds.  proBNP was elevated over thousand and chest x-ray showed acute CHF.  His first high-sensitivity troponin was also elevated at 80.  The patient was evaluated 1 month ago at  for transplant..  He was presented with the option of transplant or hemodialysis and he chose hemodialysis.  He has been following Dr. Cobos but could not get into see him until June.  He has not seen cardiology in the outpatient setting since his TAVR in 2020.  He has a left leg wound that has been an ongoing issue for some time.  He reports having chest tightness and fullness with shortness of breath at times.  He is unable to lie flat and has to sleep with 2 pillows    Cardiac risk factors: CAD, male gender, hypertension, hyperlipidemia, type 2 diabetes mellitus    Past medical and surgical history, social and family history reviewed in EMR.    REVIEW OF SYSTEMS:   H&P ROS reviewed and pertinent CV ROS as noted in HPI.         Vital Sign Min/Max for last 24 hours  Temp  Min: 97.9 °F (36.6 °C)  Max: 97.9 °F (36.6 °C)   BP  Min: 173/83  Max: 193/94   Pulse  Min: 67  Max: 68   Resp  Min: 24  Max: 24   SpO2  Min: 86 %  Max: 94 %   No data recorded    No intake or output data in the 24 hours ending 04/11/24 1155        Constitutional:       General: Awake.      Appearance:  Morbidly obese.   Pulmonary:      Effort: Tachypnea present.      Breath sounds: Decreased breath sounds present.   Cardiovascular:      Normal rate. Regular rhythm.      Murmurs: There is no murmur.   Pulses:     Intact distal pulses.   Edema:     Thigh: bilateral 2+ edema of the thigh.     Pretibial: bilateral 2+ edema of the pretibial area.     Ankle: bilateral 2+ edema of the ankle.     Feet: bilateral 2+ edema of the feet.  Abdominal:      General: There is distension.   Skin:     General: Skin is warm and dry.   Neurological:      Mental Status: Alert and oriented to person, place and time.   Psychiatric:         Behavior: Behavior is cooperative.          [id number] normal sinus rhythm with occasional PVCs.    Lab Review:   Labs reviewed in the electronic medical record.  Pertinent findings include:  Lab Results   Component Value Date    GLUCOSE 105 (H) 2024    BUN 64 (H) 2024    CREATININE 2.15 (H) 2024    EGFR 34.0 (L) 2024    BCR 29.8 (H) 2024    K 4.8 2024    CO2 29.0 2024    CALCIUM 9.3 2024    ALBUMIN 3.6 2024    BILITOT 0.5 2024    AST 24 2024    ALT 28 2024     Lab Results   Component Value Date    WBC 8.31 2024    HGB 11.6 (L) 2024    HCT 39.4 2024    MCV 94.3 2024     2024     Lab Results   Component Value Date    CHLPL 130 2024    TRIG 79 2024    HDL 49 2024    LDL 65 2024                    Acute on chronic diastolic heart failure  proBNP elevated 1707 and chest x-ray mild pulmonary edema consistent with CHF  Takes Bumex 1 mg twice daily at home  Treated with 2 mg IV Bumex x 1 in ED  Last echo 2020 LVEF 65% with 26 mm Fried GAURANG 3 TAVR valve well-seated with no perivalvular leak      Elevated troponin  Likely type II NSTEMI from acute CHF/cardiorenal    Coronary artery disease  Cath in 2019 showed moderate nonobstructive CAD  High-sensitivity troponin  elevated this admission at 80 and EKG normal sinus rhythm but no acute ischemia  Currently denies chest pain but has had chest tightness over the past 2 months.    Valvular heart disease status post TAVR  Status post TAVR 2020  Last echo 2020 showed well-seated TAVR valve with mean gradient of 13 mmHg without perivalvular leak      Stage III chronic kidney disease  Met with  transplant team 1 month ago.  Patient has elected not to have transplant when given option between transplant hemodialysis.  Follows Dr. Cobos  Current creatinine 2.15      Type 2 diabetes mellitus  Recent hemoglobin A1c 7.8  No SGL 2 inhibitor due to CKD      Hypertension  Hypertension not well-controlled.  Elevated on admission 193/94 then 173/83  Takes carvedilol 12.5 mg twice daily at home.  Has not had today's dose        Hyperlipidemia  Recent lipids total cholesterol 130, triglycerides 79, HDL 49, LDL 65  Takes Crestor 40 mg daily at home       Continue to trend troponins but likely type II NSTEMI from acute CHF due to volume overload from CKD/cardiorenal syndrome  Obtain echocardiogram for assessment of TAVR valve  Start aspirin 81 mg daily for known CAD  Start Crestor 40 mg daily  Start carvedilol 12.5 mg twice daily  Defer diuretic management to nephrology        RADHA Pena

## 2024-04-11 NOTE — PAYOR COMM NOTE
"Juan Alberto Tejeda (63 y.o. Male)       Date of Birth   1960    Social Security Number       Address   [address] [place] [address]    Home Phone   [phone]    MRN   [id number]       [facility]    Marital Status   Single                            Admission Date   24    Admission Type   Emergency    Admitting Provider   Campbell Campos MD    Attending Provider   Campbell Campos MD    Department, Room/Bed   [facility] 3E, S343/1       Discharge Date       Discharge Disposition       Discharge Destination                                 Attending Provider: Campbell Campos MD    Allergies: No Known Allergies    Isolation: None   Infection: None   Code Status: Prior    Ht: 170.2 cm (67\")   Wt: 127 kg (280 lb)    Admission Cmt: None   Principal Problem: Acute on chronic diastolic CHF (congestive heart failure) [I50.33]                   Active Insurance as of 2024       Primary Coverage       Payor Plan Insurance Group Employer/Plan Group    ANTHEM MEDICARE REPLACEMENT ANTHEM MEDICARE ADVANTAGE [id number]       Payor Plan Address Payor Plan Phone Number Payor Plan Fax Number Effective Dates    [address] [phone]  2024 - None Entered    [facility] [id number]         Subscriber Name Subscriber Birth Date Member ID       JUAN ALBERTO TEJEDA 1960 [id number]                     Emergency Contacts        (Rel.) Home Phone Work Phone Mobile Phone    YUMIKO TEJEDA (Significant Other) -- -- [phone]    GEORGE TEJEDA (Daughter) -- -- [phone]                 History & Physical        Campbell Campos MD at 24 [address] [place] [address] Medicine Services  HISTORY AND PHYSICAL    Patient Name: Juan Alberto Tejeda  : 1960  MRN: [id number]  Primary Care Physician: Megan Martell APRN  Date of admission: 2024      Subjective  Subjective     Chief Complaint: Dyspnea    HPI:  Juan Alberto Tejeda is a " 63 y.o. male with history of TAVR, CKD III, DM, HTN, gout, HL, with progressive dyspnea x 1-2 months.  Notes OLIVIA for weeks, now with dyspnea at rest. Notes PND/orthopnea. Notes marked increases in LE edema/abdominal distention. Normally, per wife, his LE edema improves with elevation, but not lately. Does not dizziness. Notes palpitations. Notes intermittent central/substernal chest pain with exertion, lasting 10-15 minutes. Recently treated with antibiotics for URI/PNA. Notes chills. No f/c. No n/v/diarrhea. No change in bowel/bladder habits. No blood in urine/stool.    Review of Systems   Constitutional:  Positive for activity change and fatigue.   HENT: Negative.     Respiratory:  Positive for chest tightness and shortness of breath.    Cardiovascular:  Positive for chest pain, palpitations and leg swelling.   Gastrointestinal:  Positive for abdominal distention.   Neurological:  Positive for weakness.         Personal History     Past Medical History:   Diagnosis Date    Aortic valve disorder     Arthritis     CHF (congestive heart failure)     Chronic kidney disease     Diabetes mellitus     Gout     Hiatal hernia     Hyperlipidemia     Hypertension     Kidney stones     history    MI, old     Peripheral neuropathy     Wears glasses            Past Surgical History:   Procedure Laterality Date    AORTIC VALVE REPAIR/REPLACEMENT N/A 3/16/2020    Procedure: TRANSCATHETER AORTIC VALVE REPLACEMENT, SHANKAR;  Surgeon: Irvin Jeffers MD;  Location: [place] [address];  Service: Cardiothoracic;  Laterality: N/A;  fluoro 10 min 30 sec  dose 1136 mGY   contrast 65 ml    AORTIC VALVE REPAIR/REPLACEMENT N/A 3/16/2020    Procedure: Transfemoral Transcatheter Aortic Valve Replacement;  Surgeon: Cayla Bella MD;  Location: [place] [address];  Service: Cardiovascular;  Laterality: N/A;    CHOLECYSTECTOMY      CIRCUMCISION      COLONOSCOPY  2 years ago    KIDNEY STONE SURGERY      OTHER SURGICAL HISTORY       Gallstone removal surgery       Family History: family history includes Arrhythmia in his mother; Diabetes in his father; Heart attack in his father; Hypertension in his brother, father, and mother; No Known Problems in his maternal grandfather, maternal grandmother, paternal grandfather, and paternal grandmother; Stroke in his mother.     Social History:  reports that he has never smoked. He has never used smokeless tobacco. He reports that he does not drink alcohol and does not use drugs.  Social History     Social History Narrative    Caffeine coffee, william 8, 3 servings a day       Medications:  Available home medication information reviewed.  SITagliptin, allopurinol, aspirin, bisacodyl, bumetanide, carvedilol, fenofibrate micronized, gabapentin, insulin aspart, insulin detemir, polyethylene glycol, and rosuvastatin    No Known Allergies    Objective  Objective     Vital Signs:   Temp:  [97.9 °F (36.6 °C)] 97.9 °F (36.6 °C)  Heart Rate:  [65-68] 65  Resp:  [24] 24  BP: (173-193)/(83-96) 181/96       Physical Exam   NAD, alert and oriented  OP clear, MMM  Neck supple  No LAD  RRR  Decreased at bases, rales  +BS, mod distended, soft  BARROW  Marked B LE edema, to hips, pitting, notable marked erythema of LLE at anterior tibia with large shallow ulceration and some blisters, weeping, L second with ecchymosis, secondary to trauma but toe warm      Result Review:  I have personally reviewed the results from the time of this admission to 4/11/2024 12:19 EDT and agree with these findings:  []  Laboratory list / accordion  []  Microbiology  []  Radiology  []  EKG/Telemetry   []  Cardiology/Vascular   []  Pathology  []  Old records  []  Other:  Most notable findings include: EKG with PVCs, WBC 8, proBNP, troponin 80, creatinine 2.15~GFR 34, CXR with pulmonary edema      LAB RESULTS:      Lab 04/11/24  1008   WBC 8.31   HEMOGLOBIN 11.6*   HEMATOCRIT 39.4   PLATELETS 198   NEUTROS ABS 5.95   IMMATURE GRANS (ABS) 0.05   LYMPHS  ABS 1.51   MONOS ABS 0.53   EOS ABS 0.23   MCV 94.3         Lab 04/11/24  1008   SODIUM 145   POTASSIUM 4.8   CHLORIDE 111*   CO2 29.0   ANION GAP 5.0   BUN 64*   CREATININE 2.15*   EGFR 34.0*   GLUCOSE 105*   CALCIUM 9.3         Lab 04/11/24  1008   TOTAL PROTEIN 6.6   ALBUMIN 3.6   GLOBULIN 3.0   ALT (SGPT) 28   AST (SGOT) 24   BILIRUBIN 0.5   ALK PHOS 112   LIPASE 63*         Lab 04/11/24  1008   PROBNP 1,707.0*   HSTROP T 80*                 UA          2/19/2024    09:08 4/11/2024    11:02   Urinalysis   Squamous Epithelial Cells, UA  0-2    Specific Gravity, UA 1.015     1.016    Ketones, UA  Negative    Blood, UA SMALL     Moderate (2+)    Leukocytes, UA Negative     Negative    Nitrite, UA Negative     Negative    RBC, UA 3-4     3-5    WBC, UA  0-2    Bacteria, UA Rare     None Seen       Details          This result is from an external source.               Microbiology Results (last 10 days)       Procedure Component Value - Date/Time    COVID-19 and FLU A/B PCR, 1 HR TAT - Swab, Nasopharynx [[id number]]  (Normal) Collected: 04/11/24 1039    Lab Status: Final result Specimen: Swab from Nasopharynx Updated: 04/11/24 1122     COVID19 Not Detected     Influenza A PCR Not Detected     Influenza B PCR Not Detected    Narrative:      Fact sheet for providers: https://www.fda.gov/media/060910/download    Fact sheet for patients: https://www.fda.gov/media/764200/download    Test performed by PCR.            XR Chest 1 View    Result Date: 4/11/2024  XR CHEST 1 VW Date of Exam: 4/11/2024 9:47 AM EDT Indication: SOA triage protocol Comparison: 4/20/2020 Findings: There is mild cardiomegaly. There is new pulmonary vascular congestion with mild perihilar pulmonary edema. There are no definite effusions. Exam is somewhat limited by obesity.     Impression: Impression: Findings are most compatible with CHF with mild pulmonary edema. Electronically Signed: Jeni Linda MD  4/11/2024 10:16 AM EDT  Workstation ID:  [id number]     Results for orders placed during the hospital encounter of 05/19/20    Adult Transthoracic Echo Complete W/ Cont if Necessary Per Protocol    Interpretation Summary  · Left ventricular systolic function is normal. Estimated EF = 65%.  · Left ventricular wall thickness is consistent with mild concentric hypertrophy.  · Left atrial cavity size is moderately dilated.  · A 26 mm Fried Lin 3 TAVR valve is well-seated and exhibits a mean gradient of 13 mmHg. There is no paravalvular leak present.      Assessment & Plan  Assessment & Plan       Acute on chronic diastolic CHF (congestive heart failure)    CKD (chronic kidney disease) stage 3, GFR 30-59 ml/min    Hyperlipidemia LDL goal <70    Essential hypertension    Type 2 diabetes mellitus with kidney complication, with long-term current use of insulin    Nonrheumatic aortic valve stenosis/status post TAVR    Acute on chronic diastolic congestive heart failure    Coronary artery disease involving native coronary artery of native heart with angina pectoris      Dyspnea  Acute respiratory failure with hypoxia, 86% on RA in ED  Suspected A/C DHF  Hx of TAVR  Chest pain  -given bumex in ED  -check ECHO  -consulted cardiology for further management    B LE edema  LLE redness  LLE wounds  -rocephin/vancomycin  -check MRSA PCR  -wound care  -duplex of B LE    CKD III  -consult NAL given history of progressive renal failure, and need for diuresis for CHF    DM  -check A1C  -SSI    Hx of HTN  -monitor, continue BB, titrate medications per cardiology    Hx of mild CAD, per catheterization 2020    HL  -statin      DVT prophylaxis:  No DVT prophylaxis order currently exists.          CODE STATUS:    There are no questions and answers to display.       Expected Discharge   Expected discharge date/ time has not been documented.     Campbell Campos MD  04/11/24      Electronically signed by Campbell Campos MD at 04/11/24 2891          Consult Notes (all)         Neela Olmos APRN at 04/11/24 1155        Consult Orders    1. Inpatient Cardiology Consult [[id number]] ordered by Campbell Campos MD                 Inpatient Cardiology Consult  Consult performed by: Neela Olmos APRN  Consult ordered by: Campbell Campos MD            [facility]  Cardiovascular Consultation Note    Reason for consult: CHF  Subjective   Patient is a 63-year-old gentleman with a history of chronic diastolic heart failure, valvular heart disease status post TAVR in 2020, nonobstructive coronary artery disease and chronic kidney disease who we are being asked to consult for acute CHF.  The patient presented to [facility] emergency room earlier today with complaints of shortness of breath and increased swelling in his legs with a weight gain of 6 pounds.  proBNP was elevated over thousand and chest x-ray showed acute CHF.  His first high-sensitivity troponin was also elevated at 80.  The patient was evaluated 1 month ago at  for transplant..  He was presented with the option of transplant or hemodialysis and he chose hemodialysis.  He has been following Dr. Cobos but could not get into see him until June.  He has not seen cardiology in the outpatient setting since his TAVR in 2020.  He has a left leg wound that has been an ongoing issue for some time.  He reports having chest tightness and fullness with shortness of breath at times.  He is unable to lie flat and has to sleep with 2 pillows    Cardiac risk factors: CAD, male gender, hypertension, hyperlipidemia, type 2 diabetes mellitus    Past medical and surgical history, social and family history reviewed in EMR.    REVIEW OF SYSTEMS:   H&P ROS reviewed and pertinent CV ROS as noted in HPI.    Objective     Vital Sign Min/Max for last 24 hours  Temp  Min: 97.9 °F (36.6 °C)  Max: 97.9 °F (36.6 °C)   BP  Min: 173/83  Max: 193/94   Pulse  Min: 67  Max: 68   Resp  Min: 24  Max:  24   SpO2  Min: 86 %  Max: 94 %   No data recorded    No intake or output data in the 24 hours ending 24 1155        Constitutional:       General: Awake.      Appearance: Morbidly obese.   Pulmonary:      Effort: Tachypnea present.      Breath sounds: Decreased breath sounds present.   Cardiovascular:      Normal rate. Regular rhythm.      Murmurs: There is no murmur.   Pulses:     Intact distal pulses.   Edema:     Thigh: bilateral 2+ edema of the thigh.     Pretibial: bilateral 2+ edema of the pretibial area.     Ankle: bilateral 2+ edema of the ankle.     Feet: bilateral 2+ edema of the feet.  Abdominal:      General: There is distension.   Skin:     General: Skin is warm and dry.   Neurological:      Mental Status: Alert and oriented to person, place and time.   Psychiatric:         Behavior: Behavior is cooperative.          EK2024 normal sinus rhythm with occasional PVCs.    Lab Review:   Labs reviewed in the electronic medical record.  Pertinent findings include:  Lab Results   Component Value Date    GLUCOSE 105 (H) 2024    BUN 64 (H) 2024    CREATININE 2.15 (H) 2024    EGFR 34.0 (L) 2024    BCR 29.8 (H) 2024    K 4.8 2024    CO2 29.0 2024    CALCIUM 9.3 2024    ALBUMIN 3.6 2024    BILITOT 0.5 2024    AST 24 2024    ALT 28 2024     Lab Results   Component Value Date    WBC 8.31 2024    HGB 11.6 (L) 2024    HCT 39.4 2024    MCV 94.3 2024     2024     Lab Results   Component Value Date    CHLPL 130 2024    TRIG 79 2024    HDL 49 2024    LDL 65 2024             Assessment       Acute on chronic diastolic heart failure  proBNP elevated 1707 and chest x-ray mild pulmonary edema consistent with CHF  Takes Bumex 1 mg twice daily at home  Treated with 2 mg IV Bumex x 1 in ED  Last echo 2020 LVEF 65% with 26 mm Fried GAURANG 3 TAVR valve well-seated with no  perivalvular leak      Elevated troponin  Likely type II NSTEMI from acute CHF/cardiorenal    Coronary artery disease  Cath in 2019 showed moderate nonobstructive CAD  High-sensitivity troponin elevated this admission at 80 and EKG normal sinus rhythm but no acute ischemia  Currently denies chest pain but has had chest tightness over the past 2 months.    Valvular heart disease status post TAVR  Status post TAVR 2020  Last echo 2020 showed well-seated TAVR valve with mean gradient of 13 mmHg without perivalvular leak      Stage III chronic kidney disease  Met with [facility] transplant team 1 month ago.  Patient has elected not to have transplant when given option between transplant hemodialysis.  Follows Dr. Cobos  Current creatinine 2.15      Type 2 diabetes mellitus  Recent hemoglobin A1c 7.8  No SGL 2 inhibitor due to CKD      Hypertension  Hypertension not well-controlled.  Elevated on admission 193/94 then 173/83  Takes carvedilol 12.5 mg twice daily at home.  Has not had today's dose        Hyperlipidemia  Recent lipids total cholesterol 130, triglycerides 79, HDL 49, LDL 65  Takes Crestor 40 mg daily at home    Plan   Continue to trend troponins but likely type II NSTEMI from acute CHF due to volume overload from CKD/cardiorenal syndrome  Obtain echocardiogram for assessment of TAVR valve  Start aspirin 81 mg daily for known CAD  Start Crestor 40 mg daily  Start carvedilol 12.5 mg twice daily  Defer diuretic management to nephrology        RADHA Pena      Electronically signed by Neela Olmos APRN at 04/11/24 5694

## 2024-04-11 NOTE — H&P
[facility] Medicine Services  HISTORY AND PHYSICAL    Patient Name: Juan Alberto Tejeda  : 1960  MRN: [id number]  Primary Care Physician: Megan Martell APRN  Date of admission: 2024      Subjective   Subjective     Chief Complaint: Dyspnea    HPI:  Juan Alberto Tejeda is a 63 y.o. male with history of TAVR, CKD III, DM, HTN, gout, HL, with progressive dyspnea x 1-2 months.  Notes OLIVIA for weeks, now with dyspnea at rest. Notes PND/orthopnea. Notes marked increases in LE edema/abdominal distention. Normally, per wife, his LE edema improves with elevation, but not lately. Does not dizziness. Notes palpitations. Notes intermittent central/substernal chest pain with exertion, lasting 10-15 minutes. Recently treated with antibiotics for URI/PNA. Notes chills. No f/c. No n/v/diarrhea. No change in bowel/bladder habits. No blood in urine/stool.    Review of Systems   Constitutional:  Positive for activity change and fatigue.   HENT: Negative.     Respiratory:  Positive for chest tightness and shortness of breath.    Cardiovascular:  Positive for chest pain, palpitations and leg swelling.   Gastrointestinal:  Positive for abdominal distention.   Neurological:  Positive for weakness.         Personal History     Past Medical History:   Diagnosis Date    Aortic valve disorder     Arthritis     CHF (congestive heart failure)     Chronic kidney disease     Diabetes mellitus     Gout     Hiatal hernia     Hyperlipidemia     Hypertension     Kidney stones     history    MI, old     Peripheral neuropathy     Wears glasses            Past Surgical History:   Procedure Laterality Date    AORTIC VALVE REPAIR/REPLACEMENT N/A 3/16/2020    Procedure: TRANSCATHETER AORTIC VALVE REPLACEMENT, SHANKAR;  Surgeon: Irvin Jeffers MD;  Location: [place] [address];  Service: Cardiothoracic;  Laterality: N/A;  fluoro 10 min 30 sec  dose 1136 mGY   contrast 65 ml    AORTIC VALVE REPAIR/REPLACEMENT N/A  3/16/2020    Procedure: Transfemoral Transcatheter Aortic Valve Replacement;  Surgeon: Cayla Bella MD;  Location: [facility] OR ;  Service: Cardiovascular;  Laterality: N/A;    CHOLECYSTECTOMY      CIRCUMCISION      COLONOSCOPY  2 years ago    KIDNEY STONE SURGERY      OTHER SURGICAL HISTORY      Gallstone removal surgery       Family History: family history includes Arrhythmia in his mother; Diabetes in his father; Heart attack in his father; Hypertension in his brother, father, and mother; No Known Problems in his maternal grandfather, maternal grandmother, paternal grandfather, and paternal grandmother; Stroke in his mother.     Social History:  reports that he has never smoked. He has never used smokeless tobacco. He reports that he does not drink alcohol and does not use drugs.  Social History     Social History Narrative    Caffeine coffee, william 8, 3 servings a day       Medications:  Available home medication information reviewed.  SITagliptin, allopurinol, aspirin, bisacodyl, bumetanide, carvedilol, fenofibrate micronized, gabapentin, insulin aspart, insulin detemir, polyethylene glycol, and rosuvastatin    No Known Allergies    Objective   Objective     Vital Signs:   Temp:  [97.9 °F (36.6 °C)] 97.9 °F (36.6 °C)  Heart Rate:  [65-68] 65  Resp:  [24] 24  BP: (173-193)/(83-96) 181/96       Physical Exam   NAD, alert and oriented  OP clear, MMM  Neck supple  No LAD  RRR  Decreased at bases, rales  +BS, mod distended, soft  BARROW  Marked B LE edema, to hips, pitting, notable marked erythema of LLE at anterior tibia with large shallow ulceration and some blisters, weeping, L second with ecchymosis, secondary to trauma but toe warm      Result Review:  I have personally reviewed the results from the time of this admission to 4/11/2024 12:19 EDT and agree with these findings:  []  Laboratory list / accordion  []  Microbiology  []  Radiology  []  EKG/Telemetry   []  Cardiology/Vascular   []   Pathology  []  Old records  []  Other:  Most notable findings include: EKG with PVCs, WBC 8, proBNP, troponin 80, creatinine 2.15~GFR 34, CXR with pulmonary edema      LAB RESULTS:      Lab 04/11/24  1008   WBC 8.31   HEMOGLOBIN 11.6*   HEMATOCRIT 39.4   PLATELETS 198   NEUTROS ABS 5.95   IMMATURE GRANS (ABS) 0.05   LYMPHS ABS 1.51   MONOS ABS 0.53   EOS ABS 0.23   MCV 94.3         Lab 04/11/24  1008   SODIUM 145   POTASSIUM 4.8   CHLORIDE 111*   CO2 29.0   ANION GAP 5.0   BUN 64*   CREATININE 2.15*   EGFR 34.0*   GLUCOSE 105*   CALCIUM 9.3         Lab 04/11/24  1008   TOTAL PROTEIN 6.6   ALBUMIN 3.6   GLOBULIN 3.0   ALT (SGPT) 28   AST (SGOT) 24   BILIRUBIN 0.5   ALK PHOS 112   LIPASE 63*         Lab 04/11/24  1008   PROBNP 1,707.0*   HSTROP T 80*                 UA          2/19/2024    09:08 4/11/2024    11:02   Urinalysis   Squamous Epithelial Cells, UA  0-2    Specific Gravity, UA 1.015     1.016    Ketones, UA  Negative    Blood, UA SMALL     Moderate (2+)    Leukocytes, UA Negative     Negative    Nitrite, UA Negative     Negative    RBC, UA 3-4     3-5    WBC, UA  0-2    Bacteria, UA Rare     None Seen       Details          This result is from an external source.               Microbiology Results (last 10 days)       Procedure Component Value - Date/Time    COVID-19 and FLU A/B PCR, 1 HR TAT - Swab, Nasopharynx [[id number]]  (Normal) Collected: 04/11/24 1039    Lab Status: Final result Specimen: Swab from Nasopharynx Updated: 04/11/24 1122     COVID19 Not Detected     Influenza A PCR Not Detected     Influenza B PCR Not Detected    Narrative:      Fact sheet for providers: https://www.fda.gov/media/297626/download    Fact sheet for patients: https://www.fda.gov/media/734927/download    Test performed by PCR.            XR Chest 1 View    Result Date: 4/11/2024  XR CHEST 1 VW Date of Exam: 4/11/2024 9:47 AM EDT Indication: SOA triage protocol Comparison: 4/20/2020 Findings: There is mild cardiomegaly. There  is new pulmonary vascular congestion with mild perihilar pulmonary edema. There are no definite effusions. Exam is somewhat limited by obesity.     Impression: Impression: Findings are most compatible with CHF with mild pulmonary edema. Electronically Signed: Jeni Linda MD  4/11/2024 10:16 AM EDT  Workstation ID: [id number]     Results for orders placed during the hospital encounter of 05/19/20    Adult Transthoracic Echo Complete W/ Cont if Necessary Per Protocol    Interpretation Summary  · Left ventricular systolic function is normal. Estimated EF = 65%.  · Left ventricular wall thickness is consistent with mild concentric hypertrophy.  · Left atrial cavity size is moderately dilated.  · A 26 mm Fried Lin 3 TAVR valve is well-seated and exhibits a mean gradient of 13 mmHg. There is no paravalvular leak present.      Assessment & Plan   Assessment & Plan       Acute on chronic diastolic CHF (congestive heart failure)    CKD (chronic kidney disease) stage 3, GFR 30-59 ml/min    Hyperlipidemia LDL goal <70    Essential hypertension    Type 2 diabetes mellitus with kidney complication, with long-term current use of insulin    Nonrheumatic aortic valve stenosis/status post TAVR    Acute on chronic diastolic congestive heart failure    Coronary artery disease involving native coronary artery of native heart with angina pectoris      Dyspnea  Acute respiratory failure with hypoxia, 86% on RA in ED  Suspected A/C DHF  Hx of TAVR  Chest pain  -given bumex in ED  -check ECHO  -consulted cardiology for further management    B LE edema  LLE redness  LLE wounds  -rocephin/vancomycin  -check MRSA PCR  -wound care  -duplex of B LE    CKD III  -consult NAL given history of progressive renal failure, and need for diuresis for CHF    DM  -check A1C  -SSI    Hx of HTN  -monitor, continue BB, titrate medications per cardiology    Hx of mild CAD, per catheterization 2020    HL  -statin      DVT prophylaxis:  No DVT  prophylaxis order currently exists.          CODE STATUS:    There are no questions and answers to display.       Expected Discharge   Expected discharge date/ time has not been documented.     Campbell Campos MD  04/11/24     Detail Level: Zone Add High Risk Medication Management Associated Diagnosis?: No

## 2024-04-12 ENCOUNTER — APPOINTMENT (OUTPATIENT)
Dept: CARDIOLOGY | Facility: HOSPITAL | Age: 64
End: 2024-04-12
Payer: MEDICARE

## 2024-04-12 ENCOUNTER — APPOINTMENT (OUTPATIENT)
Dept: GENERAL RADIOLOGY | Facility: HOSPITAL | Age: 64
End: 2024-04-12
Payer: MEDICARE

## 2024-04-12 LAB
ALBUMIN SERPL-MCNC: 3.3 G/DL (ref 3.5–5.2)
ALBUMIN/GLOB SERPL: 1.3 G/DL
ALP SERPL-CCNC: 101 U/L (ref 39–117)
ALT SERPL W P-5'-P-CCNC: 21 U/L (ref 1–41)
ANION GAP SERPL CALCULATED.3IONS-SCNC: 5 MMOL/L (ref 5–15)
ASCENDING AORTA: 4 CM
AST SERPL-CCNC: 19 U/L (ref 1–40)
BH CV ECHO MEAS - AO MAX PG: 34.7 MMHG
BH CV ECHO MEAS - AO MEAN PG: 20.3 MMHG
BH CV ECHO MEAS - AO V2 MAX: 294.6 CM/SEC
BH CV ECHO MEAS - AO V2 VTI: 71.6 CM
BH CV ECHO MEAS - AVA(I,D): 1.7 CM2
BH CV ECHO MEAS - EDV(CUBED): 101.5 ML
BH CV ECHO MEAS - EDV(MOD-SP2): 144 ML
BH CV ECHO MEAS - EDV(MOD-SP4): 172 ML
BH CV ECHO MEAS - EF(MOD-BP): 65.1 %
BH CV ECHO MEAS - EF(MOD-SP2): 66.9 %
BH CV ECHO MEAS - EF(MOD-SP4): 61.9 %
BH CV ECHO MEAS - ESV(CUBED): 28.6 ML
BH CV ECHO MEAS - ESV(MOD-SP2): 47.7 ML
BH CV ECHO MEAS - ESV(MOD-SP4): 65.6 ML
BH CV ECHO MEAS - FS: 34.5 %
BH CV ECHO MEAS - IVS/LVPW: 1.01 CM
BH CV ECHO MEAS - IVSD: 1.09 CM
BH CV ECHO MEAS - LA DIMENSION: 5 CM
BH CV ECHO MEAS - LAT PEAK E' VEL: 5.3 CM/SEC
BH CV ECHO MEAS - LV MASS(C)D: 182.8 GRAMS
BH CV ECHO MEAS - LV MAX PG: 8.5 MMHG
BH CV ECHO MEAS - LV MEAN PG: 4.4 MMHG
BH CV ECHO MEAS - LV V1 MAX: 145.3 CM/SEC
BH CV ECHO MEAS - LV V1 VTI: 34.5 CM
BH CV ECHO MEAS - LVIDD: 4.7 CM
BH CV ECHO MEAS - LVIDS: 3.1 CM
BH CV ECHO MEAS - LVOT AREA: 3.1 CM2
BH CV ECHO MEAS - LVOT DIAM: 1.98 CM
BH CV ECHO MEAS - LVPWD: 1.09 CM
BH CV ECHO MEAS - MED PEAK E' VEL: 4.4 CM/SEC
BH CV ECHO MEAS - MV A MAX VEL: 169 CM/SEC
BH CV ECHO MEAS - MV DEC SLOPE: 703.1 CM/SEC2
BH CV ECHO MEAS - MV DEC TIME: 0.3 SEC
BH CV ECHO MEAS - MV E MAX VEL: 170 CM/SEC
BH CV ECHO MEAS - MV E/A: 1.01
BH CV ECHO MEAS - MV MAX PG: 17.6 MMHG
BH CV ECHO MEAS - MV MEAN PG: 6.9 MMHG
BH CV ECHO MEAS - MV P1/2T: 97.5 MSEC
BH CV ECHO MEAS - MV V2 VTI: 68.7 CM
BH CV ECHO MEAS - MVA(P1/2T): 2.26 CM2
BH CV ECHO MEAS - MVA(VTI): 1.54 CM2
BH CV ECHO MEAS - PA ACC TIME: 0.11 SEC
BH CV ECHO MEAS - SV(LVOT): 105.8 ML
BH CV ECHO MEAS - SV(MOD-SP2): 96.3 ML
BH CV ECHO MEAS - SV(MOD-SP4): 106.4 ML
BH CV ECHO MEASUREMENTS AVERAGE E/E' RATIO: 35.05
BH CV LOWER VASCULAR LEFT COMMON FEMORAL COMPRESS: NORMAL
BH CV LOWER VASCULAR LEFT COMMON FEMORAL PHASIC: NORMAL
BH CV LOWER VASCULAR LEFT COMMON FEMORAL SPONT: NORMAL
BH CV LOWER VASCULAR LEFT DISTAL FEMORAL COMPRESS: NORMAL
BH CV LOWER VASCULAR LEFT DISTAL FEMORAL PHASIC: NORMAL
BH CV LOWER VASCULAR LEFT DISTAL FEMORAL SPONT: NORMAL
BH CV LOWER VASCULAR LEFT GASTRONEMIUS COMPRESS: NORMAL
BH CV LOWER VASCULAR LEFT GREATER SAPH AK COMPRESS: NORMAL
BH CV LOWER VASCULAR LEFT GREATER SAPH BK COMPRESS: NORMAL
BH CV LOWER VASCULAR LEFT LESSER SAPH COMPRESS: NORMAL
BH CV LOWER VASCULAR LEFT MID FEMORAL COMPRESS: NORMAL
BH CV LOWER VASCULAR LEFT MID FEMORAL PHASIC: NORMAL
BH CV LOWER VASCULAR LEFT MID FEMORAL SPONT: NORMAL
BH CV LOWER VASCULAR LEFT PERONEAL AUGMENT: NORMAL
BH CV LOWER VASCULAR LEFT PERONEAL COMPRESS: NORMAL
BH CV LOWER VASCULAR LEFT POPLITEAL COMPRESS: NORMAL
BH CV LOWER VASCULAR LEFT POPLITEAL PHASIC: NORMAL
BH CV LOWER VASCULAR LEFT POPLITEAL SPONT: NORMAL
BH CV LOWER VASCULAR LEFT POSTERIOR TIBIAL AUGMENT: NORMAL
BH CV LOWER VASCULAR LEFT POSTERIOR TIBIAL COMPRESS: NORMAL
BH CV LOWER VASCULAR LEFT PROFUNDA FEMORAL PHASIC: NORMAL
BH CV LOWER VASCULAR LEFT PROFUNDA FEMORAL SPONT: NORMAL
BH CV LOWER VASCULAR LEFT PROXIMAL FEMORAL COMPRESS: NORMAL
BH CV LOWER VASCULAR LEFT PROXIMAL FEMORAL PHASIC: NORMAL
BH CV LOWER VASCULAR LEFT PROXIMAL FEMORAL SPONT: NORMAL
BH CV LOWER VASCULAR LEFT SAPHENOFEMORAL JUNCTION COMPRESS: NORMAL
BH CV LOWER VASCULAR LEFT SAPHENOFEMORAL JUNCTION PHASIC: NORMAL
BH CV LOWER VASCULAR LEFT SAPHENOFEMORAL JUNCTION SPONT: NORMAL
BH CV LOWER VASCULAR RIGHT COMMON FEMORAL COMPRESS: NORMAL
BH CV LOWER VASCULAR RIGHT COMMON FEMORAL PHASIC: NORMAL
BH CV LOWER VASCULAR RIGHT COMMON FEMORAL SPONT: NORMAL
BH CV LOWER VASCULAR RIGHT DISTAL FEMORAL COMPRESS: NORMAL
BH CV LOWER VASCULAR RIGHT DISTAL FEMORAL PHASIC: NORMAL
BH CV LOWER VASCULAR RIGHT DISTAL FEMORAL SPONT: NORMAL
BH CV LOWER VASCULAR RIGHT GASTRONEMIUS COMPRESS: NORMAL
BH CV LOWER VASCULAR RIGHT GREATER SAPH AK COMPRESS: NORMAL
BH CV LOWER VASCULAR RIGHT GREATER SAPH BK COMPRESS: NORMAL
BH CV LOWER VASCULAR RIGHT LESSER SAPH COMPRESS: NORMAL
BH CV LOWER VASCULAR RIGHT MID FEMORAL COMPRESS: NORMAL
BH CV LOWER VASCULAR RIGHT MID FEMORAL PHASIC: NORMAL
BH CV LOWER VASCULAR RIGHT MID FEMORAL SPONT: NORMAL
BH CV LOWER VASCULAR RIGHT PERONEAL AUGMENT: NORMAL
BH CV LOWER VASCULAR RIGHT PERONEAL COMPRESS: NORMAL
BH CV LOWER VASCULAR RIGHT POPLITEAL COMPRESS: NORMAL
BH CV LOWER VASCULAR RIGHT POPLITEAL PHASIC: NORMAL
BH CV LOWER VASCULAR RIGHT POPLITEAL SPONT: NORMAL
BH CV LOWER VASCULAR RIGHT POSTERIOR TIBIAL AUGMENT: NORMAL
BH CV LOWER VASCULAR RIGHT POSTERIOR TIBIAL COMPRESS: NORMAL
BH CV LOWER VASCULAR RIGHT PROFUNDA FEMORAL COMPRESS: NORMAL
BH CV LOWER VASCULAR RIGHT PROFUNDA FEMORAL PHASIC: NORMAL
BH CV LOWER VASCULAR RIGHT PROFUNDA FEMORAL SPONT: NORMAL
BH CV LOWER VASCULAR RIGHT PROXIMAL FEMORAL COMPRESS: NORMAL
BH CV LOWER VASCULAR RIGHT PROXIMAL FEMORAL PHASIC: NORMAL
BH CV LOWER VASCULAR RIGHT PROXIMAL FEMORAL SPONT: NORMAL
BH CV LOWER VASCULAR RIGHT SAPHENOFEMORAL JUNCTION COMPRESS: NORMAL
BH CV LOWER VASCULAR RIGHT SAPHENOFEMORAL JUNCTION PHASIC: NORMAL
BH CV LOWER VASCULAR RIGHT SAPHENOFEMORAL JUNCTION SPONT: NORMAL
BH CV VAS BP RIGHT ARM: NORMAL MMHG
BH CV XLRA - RV BASE: 3.9 CM
BH CV XLRA - RV LENGTH: 7.6 CM
BH CV XLRA - RV MID: 3 CM
BH CV XLRA - TDI S': 12.1 CM/SEC
BILIRUB SERPL-MCNC: 0.4 MG/DL (ref 0–1.2)
BUN SERPL-MCNC: 59 MG/DL (ref 8–23)
BUN/CREAT SERPL: 29.6 (ref 7–25)
CALCIUM SPEC-SCNC: 8.8 MG/DL (ref 8.6–10.5)
CHLORIDE SERPL-SCNC: 108 MMOL/L (ref 98–107)
CO2 SERPL-SCNC: 30 MMOL/L (ref 22–29)
CREAT SERPL-MCNC: 1.99 MG/DL (ref 0.76–1.27)
CREAT UR-MCNC: 70.2 MG/DL
DEPRECATED RDW RBC AUTO: 54 FL (ref 37–54)
EGFRCR SERPLBLD CKD-EPI 2021: 37.3 ML/MIN/1.73
ERYTHROCYTE [DISTWIDTH] IN BLOOD BY AUTOMATED COUNT: 16.2 % (ref 12.3–15.4)
GLOBULIN UR ELPH-MCNC: 2.5 GM/DL
GLUCOSE BLDC GLUCOMTR-MCNC: 150 MG/DL (ref 70–130)
GLUCOSE BLDC GLUCOMTR-MCNC: 151 MG/DL (ref 70–130)
GLUCOSE BLDC GLUCOMTR-MCNC: 153 MG/DL (ref 70–130)
GLUCOSE BLDC GLUCOMTR-MCNC: 88 MG/DL (ref 70–130)
GLUCOSE SERPL-MCNC: 87 MG/DL (ref 65–99)
HCT VFR BLD AUTO: 35.9 % (ref 37.5–51)
HGB BLD-MCNC: 10.8 G/DL (ref 13–17.7)
LEFT ATRIUM VOLUME INDEX: 46.8 ML/M2
LV EF 2D ECHO EST: 60 %
MCH RBC QN AUTO: 27.3 PG (ref 26.6–33)
MCHC RBC AUTO-ENTMCNC: 30.1 G/DL (ref 31.5–35.7)
MCV RBC AUTO: 90.7 FL (ref 79–97)
PLATELET # BLD AUTO: 157 10*3/MM3 (ref 140–450)
PMV BLD AUTO: 10.3 FL (ref 6–12)
POTASSIUM SERPL-SCNC: 4.2 MMOL/L (ref 3.5–5.2)
PROT SERPL-MCNC: 5.8 G/DL (ref 6–8.5)
RBC # BLD AUTO: 3.96 10*6/MM3 (ref 4.14–5.8)
SODIUM SERPL-SCNC: 143 MMOL/L (ref 136–145)
TROPONIN T SERPL HS-MCNC: 88 NG/L
TROPONIN T SERPL HS-MCNC: 89 NG/L
UUN 24H UR-MCNC: 730 MG/DL
VANCOMYCIN SERPL-MCNC: 23.6 MCG/ML (ref 5–40)
WBC NRBC COR # BLD AUTO: 8.47 10*3/MM3 (ref 3.4–10.8)

## 2024-04-12 PROCEDURE — 93010 ELECTROCARDIOGRAM REPORT: CPT | Performed by: INTERNAL MEDICINE

## 2024-04-12 PROCEDURE — 71045 X-RAY EXAM CHEST 1 VIEW: CPT

## 2024-04-12 PROCEDURE — 84484 ASSAY OF TROPONIN QUANT: CPT | Performed by: NURSE PRACTITIONER

## 2024-04-12 PROCEDURE — 93005 ELECTROCARDIOGRAM TRACING: CPT | Performed by: NURSE PRACTITIONER

## 2024-04-12 PROCEDURE — 85027 COMPLETE CBC AUTOMATED: CPT | Performed by: HOSPITALIST

## 2024-04-12 PROCEDURE — 80053 COMPREHEN METABOLIC PANEL: CPT | Performed by: HOSPITALIST

## 2024-04-12 PROCEDURE — 25010000002 BUMETANIDE PER 0.5 MG: Performed by: INTERNAL MEDICINE

## 2024-04-12 PROCEDURE — 25010000002 BUMETANIDE PER 0.5 MG: Performed by: STUDENT IN AN ORGANIZED HEALTH CARE EDUCATION/TRAINING PROGRAM

## 2024-04-12 PROCEDURE — 82948 REAGENT STRIP/BLOOD GLUCOSE: CPT

## 2024-04-12 PROCEDURE — 63710000001 INSULIN LISPRO (HUMAN) PER 5 UNITS: Performed by: HOSPITALIST

## 2024-04-12 PROCEDURE — 93970 EXTREMITY STUDY: CPT

## 2024-04-12 PROCEDURE — 25010000002 HEPARIN (PORCINE) PER 1000 UNITS: Performed by: STUDENT IN AN ORGANIZED HEALTH CARE EDUCATION/TRAINING PROGRAM

## 2024-04-12 PROCEDURE — 25010000002 CEFTRIAXONE PER 250 MG: Performed by: HOSPITALIST

## 2024-04-12 PROCEDURE — 99232 SBSQ HOSP IP/OBS MODERATE 35: CPT | Performed by: NURSE PRACTITIONER

## 2024-04-12 PROCEDURE — 80202 ASSAY OF VANCOMYCIN: CPT

## 2024-04-12 PROCEDURE — 99232 SBSQ HOSP IP/OBS MODERATE 35: CPT | Performed by: STUDENT IN AN ORGANIZED HEALTH CARE EDUCATION/TRAINING PROGRAM

## 2024-04-12 PROCEDURE — 93970 EXTREMITY STUDY: CPT | Performed by: INTERNAL MEDICINE

## 2024-04-12 RX ORDER — BUMETANIDE 0.25 MG/ML
1 INJECTION INTRAMUSCULAR; INTRAVENOUS ONCE
Status: DISCONTINUED | OUTPATIENT
Start: 2024-04-12 | End: 2024-04-12

## 2024-04-12 RX ORDER — BUMETANIDE 0.25 MG/ML
2 INJECTION INTRAMUSCULAR; INTRAVENOUS ONCE
Status: COMPLETED | OUTPATIENT
Start: 2024-04-12 | End: 2024-04-12

## 2024-04-12 RX ORDER — ACETAMINOPHEN 325 MG/1
650 TABLET ORAL EVERY 6 HOURS PRN
Status: DISCONTINUED | OUTPATIENT
Start: 2024-04-12 | End: 2024-04-17 | Stop reason: HOSPADM

## 2024-04-12 RX ORDER — L.ACID,PARA/B.BIFIDUM/S.THERM 8B CELL
1 CAPSULE ORAL DAILY
Status: DISCONTINUED | OUTPATIENT
Start: 2024-04-12 | End: 2024-04-17 | Stop reason: HOSPADM

## 2024-04-12 RX ORDER — HEPARIN SODIUM 5000 [USP'U]/ML
5000 INJECTION, SOLUTION INTRAVENOUS; SUBCUTANEOUS EVERY 8 HOURS SCHEDULED
Status: DISCONTINUED | OUTPATIENT
Start: 2024-04-12 | End: 2024-04-17 | Stop reason: HOSPADM

## 2024-04-12 RX ADMIN — HEPARIN SODIUM 5000 UNITS: 5000 INJECTION INTRAVENOUS; SUBCUTANEOUS at 14:06

## 2024-04-12 RX ADMIN — ALLOPURINOL 100 MG: 100 TABLET ORAL at 08:38

## 2024-04-12 RX ADMIN — INSULIN LISPRO 2 UNITS: 100 INJECTION, SOLUTION INTRAVENOUS; SUBCUTANEOUS at 11:52

## 2024-04-12 RX ADMIN — Medication 1 CAPSULE: at 11:52

## 2024-04-12 RX ADMIN — GABAPENTIN 600 MG: 300 CAPSULE ORAL at 21:34

## 2024-04-12 RX ADMIN — BUMETANIDE 2 MG: 0.25 INJECTION INTRAMUSCULAR; INTRAVENOUS at 05:34

## 2024-04-12 RX ADMIN — ROSUVASTATIN 40 MG: 20 TABLET, FILM COATED ORAL at 21:35

## 2024-04-12 RX ADMIN — CARVEDILOL 12.5 MG: 12.5 TABLET, FILM COATED ORAL at 08:38

## 2024-04-12 RX ADMIN — GABAPENTIN 600 MG: 300 CAPSULE ORAL at 05:34

## 2024-04-12 RX ADMIN — POLYETHYLENE GLYCOL 3350 17 G: 17 POWDER, FOR SOLUTION ORAL at 08:38

## 2024-04-12 RX ADMIN — BUMETANIDE 2 MG: 0.25 INJECTION INTRAMUSCULAR; INTRAVENOUS at 16:20

## 2024-04-12 RX ADMIN — CARVEDILOL 12.5 MG: 12.5 TABLET, FILM COATED ORAL at 17:44

## 2024-04-12 RX ADMIN — HEPARIN SODIUM 5000 UNITS: 5000 INJECTION INTRAVENOUS; SUBCUTANEOUS at 21:35

## 2024-04-12 RX ADMIN — CEFTRIAXONE 2000 MG: 2 INJECTION, POWDER, FOR SOLUTION INTRAMUSCULAR; INTRAVENOUS at 11:52

## 2024-04-12 RX ADMIN — GABAPENTIN 600 MG: 300 CAPSULE ORAL at 14:05

## 2024-04-12 RX ADMIN — ASPIRIN 81 MG: 81 TABLET, CHEWABLE ORAL at 08:38

## 2024-04-12 RX ADMIN — INSULIN LISPRO 2 UNITS: 100 INJECTION, SOLUTION INTRAVENOUS; SUBCUTANEOUS at 17:44

## 2024-04-12 NOTE — CASE MANAGEMENT/SOCIAL WORK
Discharge Planning Assessment  [facility]     Patient Name: Juan Alberto Tejeda  MRN: [id number]  Today's Date: 4/12/2024    Admit Date: 4/11/2024    Plan: Home   Discharge Needs Assessment       Row Name 04/12/24 1545       Living Environment    People in Home significant other    Name(s) of People in Home Carmencita Tejeda  Significant Other  [phone]    Primary Care Provided by self    Provides Primary Care For no one, unable/limited ability to care for self    Family Caregiver if Needed significant other    Family Caregiver Names Carmencita Tejeda  Significant Other  [phone]    Quality of Family Relationships helpful;involved;supportive    Able to Return to Prior Arrangements yes       Transition Planning    Patient/Family Anticipates Transition to home    Patient/Family Anticipated Services at Transition     Transportation Anticipated family or friend will provide       Discharge Needs Assessment    Equipment Currently Used at Home walker, rolling                   Discharge Plan       Row Name 04/12/24 1546       Plan    Plan Home    Patient/Family in Agreement with Plan yes    Plan Comments Spoke with Mr. Tejeda in his room, to initiate discharge planning. He lives in [facility] and his ex-wife stays with him at times, when she's not working. He verified he has [employer] Medicare Replacement insurance. He has prescription coverage and uses [facility] in [place], [place] to get his scripts filled. His PCP is Megan Martell. Prior to admission, he was independent with most ADL's and required some assistance with bathing and dressing. He uses a rolling walker at times to assist with mobility. No other DME. He is not current with home health. His plan is to return home at discharge. Family will transport. Await therapy recommendations to determine proper discharge placement. CM will continue to follow and assist with discharge needs as appropriate.    Final Discharge Disposition Code 30 - still a patient                   Continued Care and Services - Admitted Since 4/11/2024    No active coordination exists for this encounter.       Expected Discharge Date and Time       Expected Discharge Date Expected Discharge Time    Apr 14, 2024            Demographic Summary       Row Name 04/12/24 1545       General Information    Admission Type inpatient    Arrived From emergency department    Referral Source admission list    Reason for Consult discharge planning    Preferred Language English       Contact Information    Permission Granted to Share Info With     Contact Information Obtained for                    Functional Status       Row Name 04/12/24 1545       Functional Status    Usual Activity Tolerance moderate    Current Activity Tolerance moderate       Functional Status, IADL    Medications independent    Meal Preparation assistive person    Housekeeping assistive person    Laundry assistive person    Shopping assistive person                   Psychosocial    No documentation.                  Abuse/Neglect    No documentation.                  Legal    No documentation.                  Substance Abuse    No documentation.                  Patient Forms    No documentation.                     Venessa Schultz RN

## 2024-04-12 NOTE — PROGRESS NOTES
Cardiology Progress Note      Reason for visit:    CHF/cardiorenal syndrome    IDENTIFICATION: 63-year-old gentleman who resides in [place], [place]    Active Hospital Problems    Diagnosis  POA    **Acute on chronic diastolic CHF (congestive heart failure) [I50.33]  Yes    Type 2 diabetes mellitus with kidney complication, with long-term current use of insulin [E11.29, Z79.4]  Not Applicable     Priority: High    CKD (chronic kidney disease) stage 3, GFR 30-59 ml/min [N18.30]  Yes     Priority: High    Coronary artery disease involving native coronary artery of native heart with angina pectoris [I25.119]  Yes     Priority: Medium     Cardiac catheterization at [facility] (12/2019): Moderate nonobstructive CAD.      Essential hypertension [I10]  Yes     Priority: Medium    Acute on chronic diastolic congestive heart failure [I50.33]  Yes     Priority: Medium     Echo (2/29/2020): LVEF 65%.  Aortic valve is bicuspid and severely calcified.  Severe aortic stenosis (mean gradient 51 mmHg). Mild MR and MS  Echo (5/20/2020): LVEF = 65%.  Mild LVH. A 26 mm Fried Lin 3 TAVR valve is well-seated and exhibits a mean gradient of 13 mmHg. There is no paravalvular leak present.      Nonrheumatic aortic valve stenosis/status post TAVR [I35.0]  Yes     Priority: Medium     Echo (2/29/2020): LVEF 65%.  Aortic valve is bicuspid and severely calcified.  Severe aortic stenosis (mean gradient 51 mmHg). Mild MR and MS  Status post TAVR 3/16/2020  Echo (5/20/2020): LVEF = 65%.  Mild LVH. A 26 mm Fried Lin 3 TAVR valve is well-seated and exhibits a mean gradient of 13 mmHg. There is no paravalvular leak present.      Hyperlipidemia LDL goal <70 [E78.5]  Yes     Priority: Low     High intensity statin therapy indicated given the presence of CAD              Patient is lying in bed having a venous duplex of his legs.  He reports his breathing has greatly improved since he received IV diuretics yesterday.  He denies any chest pain.            Vital Sign Min/Max for last 24 hours  Temp  Min: 97.8 °F (36.6 °C)  Max: 98.8 °F (37.1 °C)   BP  Min: 147/75  Max: 193/94   Pulse  Min: 61  Max: 71   Resp  Min: 16  Max: 24   SpO2  Min: 86 %  Max: 96 %   Flow (L/min)  Min: 2  Max: 2      Intake/Output Summary (Last 24 hours) at 2024 0824  Last data filed at 2024 0454  Gross per 24 hour   Intake 1200 ml   Output 6045 ml   Net -4845 ml           Physical Exam  Constitutional:       General: He is awake.   Cardiovascular:      Rate and Rhythm: Normal rate and regular rhythm.      Heart sounds: Murmur heard.   Pulmonary:      Effort: Pulmonary effort is normal.      Breath sounds: Decreased breath sounds present.   Musculoskeletal:      Right lower le+ Pitting Edema present.      Left lower le+ Pitting Edema present.   Skin:     General: Skin is warm and dry.   Neurological:      Mental Status: He is alert.   Psychiatric:         Behavior: Behavior is cooperative.         Tele: Normal sinus rhythm    Results Review (reviewed the patient's recent labs in the electronic medical record):     EKG (): Normal sinus rhythm no acute ischemia    CXR (2024): Small bilateral pleural effusion    ECHO (2024): LVEF 60%.  TAVR valve present mean gradient 20 mmHg.  Mild mitral stenosis with mean gradient 7 mmHg.  Mild MR    Results from last 7 days   Lab Units 24  1219 24  1008   SODIUM mmol/L 143  --  145   POTASSIUM mmol/L 4.2  --  4.8   CHLORIDE mmol/L 108*  --  111*   BUN mg/dL 59*  --  64*   CREATININE mg/dL 1.99*  --  2.15*   MAGNESIUM mg/dL  --  2.4  --      Results from last 7 days   Lab Units 24  0023 24  2047   HSTROP T ng/L 88* 89* 82*     Results from last 7 days   Lab Units 24  0447 24  1008   WBC 10*3/mm3 8.47 8.31   HEMOGLOBIN g/dL 10.8* 11.6*   HEMATOCRIT % 35.9* 39.4   PLATELETS 10*3/mm3 157 198       Lab Results   Component Value Date    HGBA1C 6.00 (H) 2024        Lab Results   Component Value Date    CHLPL 130 04/02/2024    TRIG 79 04/02/2024    HDL 49 04/02/2024    LDL 65 04/02/2024              Acute on chronic diastolic heart failure/cardiorenal syndrome  proBNP elevated 1707 and chest x-ray mild pulmonary edema consistent with CHF  Takes Bumex 1 mg twice daily at home  Treated with 2 mg IV Bumex x 1 in ED  Last echo 5/2020 LVEF 65% with 26 mm Fried GAURANG 3 TAVR valve well-seated with no perivalvular leak  Echo this admission shows normal LVEF 60% with TAVR valve well-seated.  IV Bumex 2 mg twice daily received on 4/11 and 4/12  Carvedilol 12.5 mg twice daily  5845 mL diuresed since admission        Elevated troponin/type II NSTEMI from cardiorenal syndrome  Troponin elevation flat and not consistent with ACS.  EKGs no acute ischemic changes  Echo this admission shows normal LVEF with no wall motion abnormality and TAVR valve well-seated     Coronary artery disease  Cath in 2019 showed moderate nonobstructive CAD  High-sensitivity troponin elevated this admission at 80 and EKG normal sinus rhythm but no acute ischemia  Currently denies chest pain but has had chest tightness over the past 2 months.  Aspirin 81 mg daily     Valvular heart disease status post TAVR  Status post TAVR 2020  Last echo 2020 showed well-seated TAVR valve with mean gradient of 13 mmHg without perivalvular leak  Echo this admission shows well-seated TAVR with mean gradient 20 mmHg.  Not contributing to current issues        Stage III chronic kidney disease  Met with [facility] transplant team 1 month ago.  Patient has elected not to have transplant when given option between transplant hemodialysis.  Follows Dr. Cobos  Current creatinine 1.99  Nephrology following        Type 2 diabetes mellitus  Recent hemoglobin A1c 7.8  No SGL 2 inhibitor due to CKD        Hypertension  Elevated on admission 193/94 then 173/83 but had not had home dose of carvedilol  Carvedilol 12.5 mg twice daily  Current BP  147/75           Hyperlipidemia  Recent lipids total cholesterol 130, triglycerides 79, HDL 49, LDL 65  Crestor 40 mg daily                   Repeat IV Bumex this morning and this afternoon.  Will reassess tomorrow.  Nephrology assisting with diuretic management  Monitor creatinine closely  Strict I's and O's and daily weights  Low-sodium diet  No ACE/ARB/Entresto/spironolactone due to CKD    Electronically signed by RADHA Castaneda, 04/12/24, 8:24 AM EDT.

## 2024-04-12 NOTE — PLAN OF CARE
"Goal Outcome Evaluation:  Plan of Care Reviewed With: patient, spouse        Progress: improving  Outcome Evaluation: Pt states resp is \"easier than it was earlier\". SpO2>=90% on O2@2L/nc. Still bibasilar crackles, no cough. Still edematous. Diuresing well. NSR on tele. VSS. PT Wound care to see tomorrow for compression wraps BLE, see LLE wound documentation. Spouse remains in room w/ pt.                               "

## 2024-04-12 NOTE — PROGRESS NOTES
[facility] Medicine Services  PROGRESS NOTE    Patient Name: Juan Alberto Tejeda  : 1960  MRN: [id number]    Date of Admission: 2024  Primary Care Physician: Megan Martell APRN    Subjective   Subjective     CC:  Follow-up dyspnea    HPI:  Net -4.8 L. Afebrile. On 2L.  Reports his breathing has improved since admission.  Normally not on oxygen at home.  Minimal cough.  No chest pain.  No nausea or vomiting.  Had a bowel movement today per patient report.      Objective   Objective     Vital Signs:   Temp:  [97.8 °F (36.6 °C)-98.8 °F (37.1 °C)] 98.8 °F (37.1 °C)  Heart Rate:  [61-71] 62  Resp:  [16-18] 18  BP: (147-191)/(75-96) 147/75  Flow (L/min):  [2] 2     Physical Exam:  Constitutional: No acute distress, awake, alert, obese  HENT: NCAT, mucous membranes moist  Respiratory: Clear to auscultation bilaterally, respiratory effort normal   Cardiovascular: RRR  Gastrointestinal: Positive bowel sounds, soft, nontender, nondistended  Musculoskeletal: 2+ bilateral ankle edema  Psychiatric: Appropriate affect, cooperative  Neurologic: PERRL, symmetric facies, moves all extremities well, speech clear  Skin: No rashes      Results Reviewed:  LAB RESULTS:      Lab 24  1219 24  1008   WBC 8.47  --  8.31   HEMOGLOBIN 10.8*  --  11.6*   HEMATOCRIT 35.9*  --  39.4   PLATELETS 157  --  198   NEUTROS ABS  --   --  5.95   IMMATURE GRANS (ABS)  --   --  0.05   LYMPHS ABS  --   --  1.51   MONOS ABS  --   --  0.53   EOS ABS  --   --  0.23   MCV 90.7  --  94.3   PROCALCITONIN  --  0.12  --          Lab 24  1219 24  1008   SODIUM 143  --  145   POTASSIUM 4.2  --  4.8   CHLORIDE 108*  --  111*   CO2 30.0*  --  29.0   ANION GAP 5.0  --  5.0   BUN 59*  --  64*   CREATININE 1.99*  --  2.15*   EGFR 37.3*  --  34.0*   GLUCOSE 87  --  105*   CALCIUM 8.8  --  9.3   MAGNESIUM  --  2.4  --    HEMOGLOBIN A1C  --   --  6.00*   TSH  --  0.865  --           Lab 04/12/24  0447 04/11/24  1008   TOTAL PROTEIN 5.8* 6.6   ALBUMIN 3.3* 3.6   GLOBULIN 2.5 3.0   ALT (SGPT) 21 28   AST (SGOT) 19 24   BILIRUBIN 0.4 0.5   ALK PHOS 101 112   LIPASE  --  63*         Lab 04/12/24  0447 04/12/24  0023 04/11/24  2047 04/11/24  1219 04/11/24  1008   PROBNP  --   --   --   --  1,707.0*   HSTROP T 88* 89* 82* 72* 80*                 Brief Urine Lab Results  (Last result in the past 365 days)        Color   Clarity   Blood   Leuk Est   Nitrite   Protein   CREAT   Urine HCG        04/11/24 1537             70.2                 Microbiology Results Abnormal       Procedure Component Value - Date/Time    MRSA Screen, PCR (Inpatient) - Swab, Nares [[id number]]  (Normal) Collected: 04/11/24 1544    Lab Status: Final result Specimen: Swab from Nares Updated: 04/11/24 1808     MRSA PCR Negative    Narrative:      The negative predictive value of this diagnostic test is high and should only be used to consider de-escalating anti-MRSA therapy. A positive result may indicate colonization with MRSA and must be correlated clinically.  MRSA Negative    COVID-19 and FLU A/B PCR, 1 HR TAT - Swab, Nasopharynx [[id number]]  (Normal) Collected: 04/11/24 1039    Lab Status: Final result Specimen: Swab from Nasopharynx Updated: 04/11/24 1122     COVID19 Not Detected     Influenza A PCR Not Detected     Influenza B PCR Not Detected    Narrative:      Fact sheet for providers: https://www.fda.gov/media/360321/download    Fact sheet for patients: https://www.fda.gov/media/153322/download    Test performed by PCR.            XR Chest 1 View    Result Date: 4/12/2024  XR CHEST 1 VW Date of Exam: 4/12/2024 4:29 AM EDT Indication: DYSPNEA, ON EXERTION dyspnea Comparison:  4/11/2024 Findings: Cardiac size is similar to prior exam. Right greater than left bibasilar opacities. Similar mild interstitial and perihilar opacities. No substantial pneumothorax. Small bilateral pleural effusions. No evidence of acute osseous  abnormalities. Visualized upper abdomen is unremarkable.     Impression: Impression: Similar to mildly increased right and left bibasilar opacities and reflect atelectasis or infection. Similar mild interstitial and perihilar opacities suggestive of edema. Small bilateral pleural effusions. Electronically Signed: Isaiah Maddox MD  4/12/2024 8:01 AM EDT  Workstation ID: [id number]    Adult Transthoracic Echo Complete W/ Cont if Necessary Per Protocol    Result Date: 4/12/2024    Left ventricular systolic function is normal. Estimated left ventricular EF = 60%   The left atrial cavity is mild to moderately dilated.   There is a well-seated 26 mm TAVR valve present.  Mean gradient is 20 mmHg, BERNA 1.7 cm².   Calcified mitral valve with mild mitral stenosis.  Mean gradient 7 mmHg, MVA 2.3 cm².   Mild mitral regurgitation.     XR Chest 1 View    Result Date: 4/11/2024  XR CHEST 1 VW Date of Exam: 4/11/2024 9:47 AM EDT Indication: SOA triage protocol Comparison: 4/20/2020 Findings: There is mild cardiomegaly. There is new pulmonary vascular congestion with mild perihilar pulmonary edema. There are no definite effusions. Exam is somewhat limited by obesity.     Impression: Impression: Findings are most compatible with CHF with mild pulmonary edema. Electronically Signed: Jeni Linda MD  4/11/2024 10:16 AM EDT  Workstation ID: [id number]     Results for orders placed during the hospital encounter of 04/11/24    Adult Transthoracic Echo Complete W/ Cont if Necessary Per Protocol    Interpretation Summary    Left ventricular systolic function is normal. Estimated left ventricular EF = 60%    The left atrial cavity is mild to moderately dilated.    There is a well-seated 26 mm TAVR valve present.  Mean gradient is 20 mmHg, BERNA 1.7 cm².    Calcified mitral valve with mild mitral stenosis.  Mean gradient 7 mmHg, MVA 2.3 cm².    Mild mitral regurgitation.      Current medications:  Scheduled Meds:allopurinol, 100 mg, Oral,  Daily  aspirin, 81 mg, Oral, Daily  bumetanide, 2 mg, Intravenous, Once  carvedilol, 12.5 mg, Oral, BID With Meals  cefTRIAXone, 2,000 mg, Intravenous, Q24H  gabapentin, 600 mg, Oral, Q8H  heparin (porcine), 5,000 Units, Subcutaneous, Q8H  insulin lispro, 2-7 Units, Subcutaneous, 4x Daily AC & at Bedtime  lactobacillus acidophilus, 1 capsule, Oral, Daily  pharmacy consult - MTM, , Does not apply, Daily  polyethylene glycol, 17 g, Oral, Daily  rosuvastatin, 40 mg, Oral, Nightly      Continuous Infusions:     PRN Meds:.  acetaminophen    bisacodyl    dextrose    dextrose    glucagon (human recombinant)    sodium chloride    Assessment & Plan   Assessment & Plan     Active Hospital Problems    Diagnosis  POA    **Acute on chronic diastolic CHF (congestive heart failure) [I50.33]  Yes    Coronary artery disease involving native coronary artery of native heart with angina pectoris [I25.119]  Yes    Type 2 diabetes mellitus with kidney complication, with long-term current use of insulin [E11.29, Z79.4]  Not Applicable    Hyperlipidemia LDL goal <70 [E78.5]  Yes    Essential hypertension [I10]  Yes    Acute on chronic diastolic congestive heart failure [I50.33]  Yes    Nonrheumatic aortic valve stenosis/status post TAVR [I35.0]  Yes    CKD (chronic kidney disease) stage 3, GFR 30-59 ml/min [N18.30]  Yes      Resolved Hospital Problems   No resolved problems to display.        Brief Hospital Course to date:  Juan Alberto Tejeda is a 63 y.o. male with history of TAVR, CAD, HFpEF, CKD stage IV, DM2, HTN, HLD, gout, who presented for evaluation of progressive dyspnea x 1 to 2 months.    This patient's problems and plans were partially entered by my partner and updated as appropriate by me 04/12/24.    Dyspnea  Acute respiratory failure with hypoxia, 86% on RA in ED  A/C DHF  Hx of TAVR  Chest pain, resolved  Elevated troponin, suspected type II MI due to above  -Additional dose 2 mg IV Bumex today and reassess in the  morning  -ECHO 4/12: LVEF 60%, well-seated TAVR with mean gradient 20, mild mitral stenosis  -consulted cardiology; discussed with cards NP this AM  -strict I&O, daily weights  -Troponins relatively flat, no ST segment changes on EKG  -Coreg 12.5 mg twice daily, Crestor 40 mg daily, aspirin 81 mg daily     B LE edema  LLE redness  LLE wounds  -rocephin  -MRSA PCR --> stopped vanc  -wound care  -duplex of B LE pending result     CKD IV  -consult NAL given history of progressive renal failure, and need for diuresis for CHF     DM  -A1C 6%  -SSI     Hx of HTN  -monitor, continue BB, titrate medications per cardiology     Hx of mild CAD, per catheterization 2020     HLD  -statin    Expected Discharge Location and Transportation: home  Expected Discharge   Expected Discharge Date: 4/14/2024; Expected Discharge Time:      DVT prophylaxis:  Medical DVT prophylaxis orders are present.         AM-PAC 6 Clicks Score (PT): 20 (04/12/24 0800)    CODE STATUS:   Code Status and Medical Interventions:   Ordered at: 04/12/24 1028     Medical Intervention Limits:    NO intubation (DNI)     Level Of Support Discussed With:    Patient     Code Status (Patient has no pulse and is not breathing):    No CPR (Do Not Attempt to Resuscitate)     Medical Interventions (Patient has pulse or is breathing):    Limited Support       Sanjuana Gandhi MD  04/12/24

## 2024-04-12 NOTE — PROGRESS NOTES
Nutrition Services    Patient Name:  Juan Alberto Tejeda  YOB: 1960  MRN: [id number]  Admit Date:  4/11/2024    Patient screened for possible wound. Chart reviewed. Per WOC note, no PI noted. No other nutrition risks identified at this time. RDN will follow as able for LOS. Available via consult.    Electronically signed by:  Danna Cotton RD  04/12/24 09:46 EDT

## 2024-04-12 NOTE — PROGRESS NOTES
" LOS: 1 day   Patient Care Team:  Megan Maretll APRN as PCP - General (Family Medicine)    Chief Complaint: increase edema.     Subjective     History of Present Illness    Subjective    History taken from: patient    Objective     Vital Sign Min/Max for last 24 hours  Temp  Min: 97.9 °F (36.6 °C)  Max: 98.8 °F (37.1 °C)   BP  Min: 147/75  Max: 172/87   Pulse  Min: 61  Max: 65   Resp  Min: 16  Max: 18   SpO2  Min: 91 %  Max: 95 %   Flow (L/min)  Min: 2  Max: 2   Weight  Min: 126 kg (277 lb 6.4 oz)  Max: 126 kg (277 lb 6.4 oz)     Flowsheet Rows      Flowsheet Row First Filed Value   Admission Height 170.2 cm (67\") Documented at 04/11/2024 0940   Admission Weight 127 kg (280 lb) Documented at 04/11/2024 0940            No intake/output data recorded.  I/O last 3 completed shifts:  In: 1800 [P.O.:1800]  Out: 8445 [Urine:8445]    Objective:  Vital signs: (most recent): Blood pressure 151/78, pulse 65, temperature 98 °F (36.7 °C), temperature source Oral, resp. rate 18, height 170.2 cm (67.01\"), weight 126 kg (277 lb 6.4 oz), SpO2 94%.                Results Review:     I reviewed the patient's new clinical results.    WBC WBC   Date Value Ref Range Status   04/12/2024 8.47 3.40 - 10.80 10*3/mm3 Final   04/11/2024 8.31 3.40 - 10.80 10*3/mm3 Final      HGB Hemoglobin   Date Value Ref Range Status   04/12/2024 10.8 (L) 13.0 - 17.7 g/dL Final   04/11/2024 11.6 (L) 13.0 - 17.7 g/dL Final      HCT Hematocrit   Date Value Ref Range Status   04/12/2024 35.9 (L) 37.5 - 51.0 % Final   04/11/2024 39.4 37.5 - 51.0 % Final      Platlets No results found for: \"LABPLAT\"   MCV MCV   Date Value Ref Range Status   04/12/2024 90.7 79.0 - 97.0 fL Final   04/11/2024 94.3 79.0 - 97.0 fL Final          Sodium Sodium   Date Value Ref Range Status   04/12/2024 143 136 - 145 mmol/L Final   04/11/2024 145 136 - 145 mmol/L Final      Potassium Potassium   Date Value Ref Range Status   04/12/2024 4.2 3.5 - 5.2 mmol/L Final   04/11/2024 4.8 " "3.5 - 5.2 mmol/L Final     Comment:     Slight hemolysis detected by analyzer. Result may be falsely elevated.      Chloride Chloride   Date Value Ref Range Status   04/12/2024 108 (H) 98 - 107 mmol/L Final   04/11/2024 111 (H) 98 - 107 mmol/L Final      CO2 CO2   Date Value Ref Range Status   04/12/2024 30.0 (H) 22.0 - 29.0 mmol/L Final   04/11/2024 29.0 22.0 - 29.0 mmol/L Final      BUN BUN   Date Value Ref Range Status   04/12/2024 59 (H) 8 - 23 mg/dL Final   04/11/2024 64 (H) 8 - 23 mg/dL Final      Creatinine Creatinine   Date Value Ref Range Status   04/12/2024 1.99 (H) 0.76 - 1.27 mg/dL Final   04/11/2024 2.15 (H) 0.76 - 1.27 mg/dL Final      Calcium Calcium   Date Value Ref Range Status   04/12/2024 8.8 8.6 - 10.5 mg/dL Final   04/11/2024 9.3 8.6 - 10.5 mg/dL Final      PO4 No results found for: \"CAPO4\"   Albumin Albumin   Date Value Ref Range Status   04/12/2024 3.3 (L) 3.5 - 5.2 g/dL Final   04/11/2024 3.6 3.5 - 5.2 g/dL Final      Magnesium Magnesium   Date Value Ref Range Status   04/11/2024 2.4 1.6 - 2.4 mg/dL Final      Uric Acid No results found for: \"URICACID\"     Medication Review: Yes    Assessment & Plan       Acute on chronic diastolic CHF (congestive heart failure)    CKD (chronic kidney disease) stage 3, GFR 30-59 ml/min    Hyperlipidemia LDL goal <70    Essential hypertension    Type 2 diabetes mellitus with kidney complication, with long-term current use of insulin    Nonrheumatic aortic valve stenosis/status post TAVR    Acute on chronic diastolic congestive heart failure    Coronary artery disease involving native coronary artery of native heart with angina pectoris      Assessment & Plan    CKD 4: Follows with [facility].  Last seen by us 2/24.  Creatinine was 3.5 at that time.  Has been referred to transplant in the past.  Creatinine here below baseline at 2.2.  Possibly dilutional with volume overload.  Patient was supposed to see us back in June, however his " appointment was changed to 4/24/24 for sooner evaluation.  For now would continue diuresis.  Continue supportive care.  Strict I's and O's.  Monitor renal function closely.  No indication for emergent dialysis at this time.  Would expect creatinine to trend back up towards previous baseline with further volume reduction.     HTN: Blood pressure elevated.  Continue current medications.  Monitor with volume reduction.  Cover with as needed medications     Volume: Patient with significant edema.  Albumin borderline at 3.6.  Check  protein creatinine ratio though doubt patient nephrotic.  Only on Maxide at home.  Previously patient was on other diuretics but reports they were stopped due to rising creatinine without edema.  Patient reports over the past month and a half he developed edema which has has continued to increase.  Reports baseline weight around 265.weight may be 280 here.  Will ensure standing weight.  Continue diuretics for now.  Monitor strict I's and O's.  Limit fluids to 40 ounces a day.check daily standing weight.  Recommend establishing dry weight for possible sliding scale diuretics once patient stabilized.       HFpEF: Per report last echocardiogram 5/2020 showed a EF of 65%.  Patient has a TAVR valve in place.  Awaiting repeat echocardiogram.  Cardiology following.     Thank you consulting us on Juan Alberto Tejeda who is of high risk and complexity.  We will follow along closely    Bertram Brewer MD  04/12/24  19:09 EDT

## 2024-04-13 LAB
ANION GAP SERPL CALCULATED.3IONS-SCNC: 6 MMOL/L (ref 5–15)
BUN SERPL-MCNC: 57 MG/DL (ref 8–23)
BUN/CREAT SERPL: 26.6 (ref 7–25)
CALCIUM SPEC-SCNC: 8.7 MG/DL (ref 8.6–10.5)
CHLORIDE SERPL-SCNC: 103 MMOL/L (ref 98–107)
CO2 SERPL-SCNC: 32 MMOL/L (ref 22–29)
CREAT SERPL-MCNC: 2.14 MG/DL (ref 0.76–1.27)
DEPRECATED RDW RBC AUTO: 52.7 FL (ref 37–54)
EGFRCR SERPLBLD CKD-EPI 2021: 34.2 ML/MIN/1.73
ERYTHROCYTE [DISTWIDTH] IN BLOOD BY AUTOMATED COUNT: 16 % (ref 12.3–15.4)
GLUCOSE BLDC GLUCOMTR-MCNC: 118 MG/DL (ref 70–130)
GLUCOSE BLDC GLUCOMTR-MCNC: 165 MG/DL (ref 70–130)
GLUCOSE BLDC GLUCOMTR-MCNC: 179 MG/DL (ref 70–130)
GLUCOSE BLDC GLUCOMTR-MCNC: 206 MG/DL (ref 70–130)
GLUCOSE SERPL-MCNC: 106 MG/DL (ref 65–99)
HCT VFR BLD AUTO: 36.9 % (ref 37.5–51)
HGB BLD-MCNC: 11 G/DL (ref 13–17.7)
MAGNESIUM SERPL-MCNC: 1.8 MG/DL (ref 1.6–2.4)
MCH RBC QN AUTO: 27 PG (ref 26.6–33)
MCHC RBC AUTO-ENTMCNC: 29.8 G/DL (ref 31.5–35.7)
MCV RBC AUTO: 90.7 FL (ref 79–97)
PLATELET # BLD AUTO: 173 10*3/MM3 (ref 140–450)
PMV BLD AUTO: 11.8 FL (ref 6–12)
POTASSIUM SERPL-SCNC: 4.2 MMOL/L (ref 3.5–5.2)
RBC # BLD AUTO: 4.07 10*6/MM3 (ref 4.14–5.8)
SODIUM SERPL-SCNC: 141 MMOL/L (ref 136–145)
WBC NRBC COR # BLD AUTO: 9.01 10*3/MM3 (ref 3.4–10.8)

## 2024-04-13 PROCEDURE — 80048 BASIC METABOLIC PNL TOTAL CA: CPT | Performed by: STUDENT IN AN ORGANIZED HEALTH CARE EDUCATION/TRAINING PROGRAM

## 2024-04-13 PROCEDURE — 25010000002 CEFTRIAXONE PER 250 MG: Performed by: HOSPITALIST

## 2024-04-13 PROCEDURE — 94799 UNLISTED PULMONARY SVC/PX: CPT

## 2024-04-13 PROCEDURE — 99232 SBSQ HOSP IP/OBS MODERATE 35: CPT | Performed by: STUDENT IN AN ORGANIZED HEALTH CARE EDUCATION/TRAINING PROGRAM

## 2024-04-13 PROCEDURE — 82948 REAGENT STRIP/BLOOD GLUCOSE: CPT

## 2024-04-13 PROCEDURE — 63710000001 INSULIN LISPRO (HUMAN) PER 5 UNITS: Performed by: HOSPITALIST

## 2024-04-13 PROCEDURE — 5A09357 ASSISTANCE WITH RESPIRATORY VENTILATION, LESS THAN 24 CONSECUTIVE HOURS, CONTINUOUS POSITIVE AIRWAY PRESSURE: ICD-10-PCS | Performed by: STUDENT IN AN ORGANIZED HEALTH CARE EDUCATION/TRAINING PROGRAM

## 2024-04-13 PROCEDURE — 83735 ASSAY OF MAGNESIUM: CPT | Performed by: STUDENT IN AN ORGANIZED HEALTH CARE EDUCATION/TRAINING PROGRAM

## 2024-04-13 PROCEDURE — 97161 PT EVAL LOW COMPLEX 20 MIN: CPT

## 2024-04-13 PROCEDURE — 97530 THERAPEUTIC ACTIVITIES: CPT

## 2024-04-13 PROCEDURE — 25010000002 HEPARIN (PORCINE) PER 1000 UNITS: Performed by: STUDENT IN AN ORGANIZED HEALTH CARE EDUCATION/TRAINING PROGRAM

## 2024-04-13 PROCEDURE — 25010000002 BUMETANIDE PER 0.5 MG: Performed by: STUDENT IN AN ORGANIZED HEALTH CARE EDUCATION/TRAINING PROGRAM

## 2024-04-13 PROCEDURE — 94660 CPAP INITIATION&MGMT: CPT

## 2024-04-13 PROCEDURE — 85027 COMPLETE CBC AUTOMATED: CPT | Performed by: STUDENT IN AN ORGANIZED HEALTH CARE EDUCATION/TRAINING PROGRAM

## 2024-04-13 PROCEDURE — 99232 SBSQ HOSP IP/OBS MODERATE 35: CPT

## 2024-04-13 RX ORDER — BUMETANIDE 0.25 MG/ML
2 INJECTION INTRAMUSCULAR; INTRAVENOUS DAILY
Status: DISCONTINUED | OUTPATIENT
Start: 2024-04-14 | End: 2024-04-14

## 2024-04-13 RX ORDER — BUMETANIDE 0.25 MG/ML
2 INJECTION INTRAMUSCULAR; INTRAVENOUS ONCE
Status: COMPLETED | OUTPATIENT
Start: 2024-04-13 | End: 2024-04-13

## 2024-04-13 RX ADMIN — CARVEDILOL 12.5 MG: 12.5 TABLET, FILM COATED ORAL at 08:17

## 2024-04-13 RX ADMIN — INSULIN LISPRO 2 UNITS: 100 INJECTION, SOLUTION INTRAVENOUS; SUBCUTANEOUS at 17:24

## 2024-04-13 RX ADMIN — HEPARIN SODIUM 5000 UNITS: 5000 INJECTION INTRAVENOUS; SUBCUTANEOUS at 21:46

## 2024-04-13 RX ADMIN — GABAPENTIN 600 MG: 300 CAPSULE ORAL at 06:12

## 2024-04-13 RX ADMIN — INSULIN LISPRO 2 UNITS: 100 INJECTION, SOLUTION INTRAVENOUS; SUBCUTANEOUS at 11:27

## 2024-04-13 RX ADMIN — CEFTRIAXONE 2000 MG: 2 INJECTION, POWDER, FOR SOLUTION INTRAMUSCULAR; INTRAVENOUS at 11:46

## 2024-04-13 RX ADMIN — ALLOPURINOL 100 MG: 100 TABLET ORAL at 08:17

## 2024-04-13 RX ADMIN — Medication 1 CAPSULE: at 08:17

## 2024-04-13 RX ADMIN — BUMETANIDE 2 MG: 0.25 INJECTION INTRAMUSCULAR; INTRAVENOUS at 08:42

## 2024-04-13 RX ADMIN — HEPARIN SODIUM 5000 UNITS: 5000 INJECTION INTRAVENOUS; SUBCUTANEOUS at 06:12

## 2024-04-13 RX ADMIN — GABAPENTIN 600 MG: 300 CAPSULE ORAL at 14:12

## 2024-04-13 RX ADMIN — ASPIRIN 81 MG: 81 TABLET, CHEWABLE ORAL at 08:17

## 2024-04-13 RX ADMIN — CARVEDILOL 12.5 MG: 12.5 TABLET, FILM COATED ORAL at 17:24

## 2024-04-13 RX ADMIN — Medication 10 ML: at 08:17

## 2024-04-13 RX ADMIN — INSULIN LISPRO 3 UNITS: 100 INJECTION, SOLUTION INTRAVENOUS; SUBCUTANEOUS at 21:50

## 2024-04-13 RX ADMIN — GABAPENTIN 600 MG: 300 CAPSULE ORAL at 21:46

## 2024-04-13 RX ADMIN — HEPARIN SODIUM 5000 UNITS: 5000 INJECTION INTRAVENOUS; SUBCUTANEOUS at 14:12

## 2024-04-13 RX ADMIN — ROSUVASTATIN 40 MG: 20 TABLET, FILM COATED ORAL at 21:46

## 2024-04-13 NOTE — PROGRESS NOTES
" LOS: 2 days   Patient Care Team:  Megan Martell APRN as PCP - General (Family Medicine)    Chief Complaint: increase edema.     Subjective     Shortness of Breath        Subjective:  Symptoms:  He reports shortness of breath.        History taken from: patient    Objective     Vital Sign Min/Max for last 24 hours  Temp  Min: 97.8 °F (36.6 °C)  Max: 99.2 °F (37.3 °C)   BP  Min: 121/71  Max: 176/85   Pulse  Min: 58  Max: 74   Resp  Min: 18  Max: 18   SpO2  Min: 64 %  Max: 98 %   Flow (L/min)  Min: 2  Max: 2   No data recorded     Flowsheet Rows      Flowsheet Row First Filed Value   Admission Height 170.2 cm (67\") Documented at 04/11/2024 0940   Admission Weight 127 kg (280 lb) Documented at 04/11/2024 0940            I/O this shift:  In: 1400 [P.O.:1400]  Out: 600 [Urine:600]  I/O last 3 completed shifts:  In: 840 [P.O.:840]  Out: 8775 [Urine:8775]    Objective:  Vital signs: (most recent): Blood pressure 121/71, pulse 61, temperature 97.8 °F (36.6 °C), temperature source Oral, resp. rate 18, height 170.2 cm (67.01\"), weight 126 kg (277 lb 6.4 oz), SpO2 94%.                Results Review:     I reviewed the patient's new clinical results.    WBC WBC   Date Value Ref Range Status   04/13/2024 9.01 3.40 - 10.80 10*3/mm3 Final   04/12/2024 8.47 3.40 - 10.80 10*3/mm3 Final   04/11/2024 8.31 3.40 - 10.80 10*3/mm3 Final      HGB Hemoglobin   Date Value Ref Range Status   04/13/2024 11.0 (L) 13.0 - 17.7 g/dL Final   04/12/2024 10.8 (L) 13.0 - 17.7 g/dL Final   04/11/2024 11.6 (L) 13.0 - 17.7 g/dL Final      HCT Hematocrit   Date Value Ref Range Status   04/13/2024 36.9 (L) 37.5 - 51.0 % Final   04/12/2024 35.9 (L) 37.5 - 51.0 % Final   04/11/2024 39.4 37.5 - 51.0 % Final      Platlets No results found for: \"LABPLAT\"   MCV MCV   Date Value Ref Range Status   04/13/2024 90.7 79.0 - 97.0 fL Final   04/12/2024 90.7 79.0 - 97.0 fL Final   04/11/2024 94.3 79.0 - 97.0 fL Final          Sodium Sodium   Date Value Ref Range " "Status   04/13/2024 141 136 - 145 mmol/L Final   04/12/2024 143 136 - 145 mmol/L Final   04/11/2024 145 136 - 145 mmol/L Final      Potassium Potassium   Date Value Ref Range Status   04/13/2024 4.2 3.5 - 5.2 mmol/L Final   04/12/2024 4.2 3.5 - 5.2 mmol/L Final   04/11/2024 4.8 3.5 - 5.2 mmol/L Final     Comment:     Slight hemolysis detected by analyzer. Result may be falsely elevated.      Chloride Chloride   Date Value Ref Range Status   04/13/2024 103 98 - 107 mmol/L Final   04/12/2024 108 (H) 98 - 107 mmol/L Final   04/11/2024 111 (H) 98 - 107 mmol/L Final      CO2 CO2   Date Value Ref Range Status   04/13/2024 32.0 (H) 22.0 - 29.0 mmol/L Final   04/12/2024 30.0 (H) 22.0 - 29.0 mmol/L Final   04/11/2024 29.0 22.0 - 29.0 mmol/L Final      BUN BUN   Date Value Ref Range Status   04/13/2024 57 (H) 8 - 23 mg/dL Final   04/12/2024 59 (H) 8 - 23 mg/dL Final   04/11/2024 64 (H) 8 - 23 mg/dL Final      Creatinine Creatinine   Date Value Ref Range Status   04/13/2024 2.14 (H) 0.76 - 1.27 mg/dL Final   04/12/2024 1.99 (H) 0.76 - 1.27 mg/dL Final   04/11/2024 2.15 (H) 0.76 - 1.27 mg/dL Final      Calcium Calcium   Date Value Ref Range Status   04/13/2024 8.7 8.6 - 10.5 mg/dL Final   04/12/2024 8.8 8.6 - 10.5 mg/dL Final   04/11/2024 9.3 8.6 - 10.5 mg/dL Final      PO4 No results found for: \"CAPO4\"   Albumin Albumin   Date Value Ref Range Status   04/12/2024 3.3 (L) 3.5 - 5.2 g/dL Final   04/11/2024 3.6 3.5 - 5.2 g/dL Final      Magnesium Magnesium   Date Value Ref Range Status   04/13/2024 1.8 1.6 - 2.4 mg/dL Final   04/11/2024 2.4 1.6 - 2.4 mg/dL Final      Uric Acid No results found for: \"URICACID\"     Medication Review: Yes    Assessment & Plan       Acute on chronic diastolic CHF (congestive heart failure)    CKD (chronic kidney disease) stage 3, GFR 30-59 ml/min    Hyperlipidemia LDL goal <70    Essential hypertension    Type 2 diabetes mellitus with kidney complication, with long-term current use of insulin    " Nonrheumatic aortic valve stenosis/status post TAVR    Acute on chronic diastolic congestive heart failure    Coronary artery disease involving native coronary artery of native heart with angina pectoris      Assessment & Plan    CKD 4: Follows with [facility].  Last seen by us 2/24.  Creatinine was 3.5 at that time.  Has been referred to transplant in the past.  Renal function close to baseline.  For now would continue diuresis.  Continue supportive care.  Strict I's and O's.  Monitor renal function closely.  No indication for emergent dialysis at this time.  Would expect creatinine to trend back up towards previous baseline with further volume reduction.     HTN: Blood pressure elevated.  Continue current medications.  Monitor with volume reduction.  Cover with as needed medications     Volume:.  Only on Maxide at home.  Previously patient was on other diuretics but reports they were stopped due to rising creatinine without edema.  Patient reports over the past month and a half he developed edema which has has continued to increase.  Reports baseline weight around 265.weight may be 280 here.       HFpEF: Per report last echocardiogram 5/2020 showed a EF of 65%.  Patient has a TAVR valve in place.  Cardiology following.     Thank you consulting us on Juan Alberto Tejeda who is of high risk and complexity.  We will follow along closely    Bertram Brewer MD  04/13/24  18:02 EDT

## 2024-04-13 NOTE — PLAN OF CARE
Goal Outcome Evaluation:  Plan of Care Reviewed With: patient        Progress: no change  Outcome Evaluation: Pt presents with decreased functional mobility and decreased activity tolerance. Pt ambulated 250ft with CGA, unsupported. Recommend continued skilled IP PT interventions. Recommend D/C home with assist when medically appropriate.      Anticipated Discharge Disposition (PT): home with assist

## 2024-04-13 NOTE — THERAPY EVALUATION
Patient Name: Juan Alberto Tejeda  : 1960    MRN: [id number]                              Today's Date: 2024       Admit Date: 2024    Visit Dx:     ICD-10-CM ICD-9-CM   1. Acute on chronic congestive heart failure, unspecified heart failure type  I50.9 428.0     Patient Active Problem List   Diagnosis    CKD (chronic kidney disease) stage 3, GFR 30-59 ml/min    Hyperlipidemia LDL goal <70    Essential hypertension    Type 2 diabetes mellitus with kidney complication, with long-term current use of insulin    Normocytic anemia    Nonrheumatic aortic valve stenosis/status post TAVR    Acute on chronic diastolic congestive heart failure    Coronary artery disease involving native coronary artery of native heart with angina pectoris    Nocturnal hypoxia    Peripheral neuropathy    JASWINDER on CPAP    Acute on chronic diastolic CHF (congestive heart failure)     Past Medical History:   Diagnosis Date    Aortic valve disorder     Arthritis     CHF (congestive heart failure)     Chronic kidney disease     Diabetes mellitus     Gout     Hiatal hernia     Hyperlipidemia     Hypertension     Kidney stones     history    MI, old     Peripheral neuropathy     Wears glasses      Past Surgical History:   Procedure Laterality Date    AORTIC VALVE REPAIR/REPLACEMENT N/A 3/16/2020    Procedure: TRANSCATHETER AORTIC VALVE REPLACEMENT, SHANKAR;  Surgeon: Irvin Jeffers MD;  Location:  [facility];  Service: Cardiothoracic;  Laterality: N/A;  fluoro 10 min 30 sec  dose 1136 mGY   contrast 65 ml    AORTIC VALVE REPAIR/REPLACEMENT N/A 3/16/2020    Procedure: Transfemoral Transcatheter Aortic Valve Replacement;  Surgeon: Cayla Bella MD;  Location:  [facility];  Service: Cardiovascular;  Laterality: N/A;    CHOLECYSTECTOMY      CIRCUMCISION      COLONOSCOPY  2 years ago    KIDNEY STONE SURGERY      OTHER SURGICAL HISTORY      Gallstone removal surgery      General Information       Row Name 24 1119           Physical Therapy Time and Intention    Document Type evaluation  -AE     Mode of Treatment physical therapy  -AE       Row Name 04/13/24 1119          General Information    Patient Profile Reviewed yes  -AE     Prior Level of Function independent:;all household mobility;gait;transfer;bed mobility;ADL's;dressing;bathing  Uses crutches or RW for mobility occasionally.  -AE     Existing Precautions/Restrictions fall;other (see comments);oxygen therapy device and L/min  BLE edema  -AE     Barriers to Rehab medically complex  -AE       Row Name 04/13/24 1119          Living Environment    People in Home significant other;other (see comments)  ex-wife stays with patient at night  -AE       Row Name 04/13/24 1119          Home Main Entrance    Number of Stairs, Main Entrance three  -AE     Stair Railings, Main Entrance railings on both sides of stairs  -AE       Row Name 04/13/24 1119          Stairs Within Home, Primary    Number of Stairs, Within Home, Primary none  -AE     Stair Railings, Within Home, Primary none  -AE       Row Name 04/13/24 1119          Cognition    Orientation Status (Cognition) oriented x 3  -AE       Row Name 04/13/24 1119          Safety Issues, Functional Mobility    Safety Issues Affecting Function (Mobility) awareness of need for assistance;insight into deficits/self-awareness;safety precaution awareness;sequencing abilities  -AE     Impairments Affecting Function (Mobility) balance;endurance/activity tolerance;shortness of breath;strength;sensation/sensory awareness  -AE               User Key  (r) = Recorded By, (t) = Taken By, (c) = Cosigned By      Initials Name Provider Type    AE Valentino Pedersen PT Physical Therapist                   Mobility       Row Name 04/13/24 1120          Bed Mobility    Bed Mobility supine-sit  -AE     Supine-Sit [place] (Bed Mobility) contact guard  -AE     Assistive Device (Bed Mobility) bed rails;head of bed elevated  -AE     Comment, (Bed  Mobility) VCs for hand placement and sequencing. Pt holds breath while completing bed mobility, requires cues to breath.  -AE       Row Name 04/13/24 1120          Transfers    Comment, (Transfers) VCs for hand placement and sequencing. No overt LOB noted with mobility.  -AE       Row Name 04/13/24 1120          Sit-Stand Transfer    Sit-Stand [place] (Transfers) contact guard;1 person assist  -AE       Row Name 04/13/24 1120          Gait/Stairs (Locomotion)    [place] Level (Gait) contact guard;1 person assist  -AE     Distance in Feet (Gait) 250  -AE     Deviations/Abnormal Patterns (Gait) bilateral deviations;lauryn decreased;gait speed decreased;stride length decreased;base of support, wide  -AE     Comment, (Gait/Stairs) Pt demo wide KERRY but no overt LOB noted. Pt does become SOA with activity but overall does not impede ambulation. Further distance limited by SOA.  -AE               User Key  (r) = Recorded By, (t) = Taken By, (c) = Cosigned By      Initials Name Provider Type    AE Valentino Pedersen PT Physical Therapist                   Obj/Interventions       Row Name 04/13/24 1136          Range of Motion Comprehensive    General Range of Motion bilateral lower extremity ROM WFL  -AE       Row Name 04/13/24 1136          Strength Comprehensive (MMT)    General Manual Muscle Testing (MMT) Assessment lower extremity strength deficits identified  -AE     Comment, General Manual Muscle Testing (MMT) Assessment BLE grossly 4/5  -AE       Row Name 04/13/24 1136          Balance    Balance Assessment sitting static balance;sitting dynamic balance;sit to stand dynamic balance;standing static balance;standing dynamic balance  -AE     Static Sitting Balance standby assist  -AE     Dynamic Sitting Balance contact guard  -AE     Position, Sitting Balance unsupported  -AE     Sit to Stand Dynamic Balance contact guard;1-person assist  -AE     Static Standing Balance contact guard  -AE     Dynamic Standing  Balance contact guard  -AE     Position/Device Used, Standing Balance unsupported  -AE       Row Name 04/13/24 1136          Sensory Assessment (Somatosensory)    Sensory Assessment (Somatosensory) bilateral LE  -AE     Bilateral LE Sensory Assessment general sensation;impaired  distal to mid shin  -AE               User Key  (r) = Recorded By, (t) = Taken By, (c) = Cosigned By      Initials Name Provider Type    AE Nuvia, Valentino, PT Physical Therapist                   Goals/Plan       Row Name 04/13/24 1146          Bed Mobility Goal 1 (PT)    Activity/Assistive Device (Bed Mobility Goal 1, PT) sit to supine/supine to sit  -AE     [place] Level/Cues Needed (Bed Mobility Goal 1, PT) modified independence  -AE     Time Frame (Bed Mobility Goal 1, PT) long term goal (LTG);10 days  -AE     Progress/Outcomes (Bed Mobility Goal 1, PT) new goal  -AE       Row Name 04/13/24 1146          Transfer Goal 1 (PT)    Activity/Assistive Device (Transfer Goal 1, PT) sit-to-stand/stand-to-sit;bed-to-chair/chair-to-bed  -AE     [place] Level/Cues Needed (Transfer Goal 1, PT) standby assist  -AE     Time Frame (Transfer Goal 1, PT) long term goal (LTG);10 days  -AE     Progress/Outcome (Transfer Goal 1, PT) new goal  -AE       Row Name 04/13/24 1146          Gait Training Goal 1 (PT)    Activity/Assistive Device (Gait Training Goal 1, PT) gait (walking locomotion)  -AE     [place] Level (Gait Training Goal 1, PT) standby assist  -AE     Distance (Gait Training Goal 1, PT) 500ft  -AE     Time Frame (Gait Training Goal 1, PT) long term goal (LTG);10 days  -AE     Progress/Outcome (Gait Training Goal 1, PT) new goal  -AE       Row Name 04/13/24 1146          Stairs Goal 1 (PT)    Activity/Assistive Device (Stairs Goal 1, PT) ascending stairs;descending stairs;step-to-step  -AE     [place] Level/Cues Needed (Stairs Goal 1, PT) standby assist  -AE     Number of Stairs (Stairs Goal 1, PT) 3  -AE     Time Frame  (Stairs Goal 1, PT) long term goal (LTG);10 days  -AE     Progress/Outcome (Stairs Goal 1, PT) new goal  -AE       [facility] Name 04/13/24 1146          Therapy Assessment/Plan (PT)    Planned Therapy Interventions (PT) balance training;bed mobility training;gait training;home exercise program;patient/family education;postural re-education;ROM (range of motion);stair training;strengthening;transfer training  -AE               User Key  (r) = Recorded By, (t) = Taken By, (c) = Cosigned By      Initials Name Provider Type    AE Valentino Pedersen, PT Physical Therapist                   Clinical Impression       Row Name 04/13/24 1142          Pain    Pretreatment Pain Rating 0/10 - no pain  -AE     Posttreatment Pain Rating 0/10 - no pain  -AE       Row Name 04/13/24 1142          Plan of Care Review    Plan of Care Reviewed With patient  -AE     Progress no change  -AE     Outcome Evaluation Pt presents with decreased functional mobility and decreased activity tolerance. Pt ambulated 250ft with CGA, unsupported. Recommend continued skilled IP PT interventions. Recommend D/C home with assist when medically appropriate.  -AE       Row Name 04/13/24 1142          Therapy Assessment/Plan (PT)    Rehab Potential (PT) good, to achieve stated therapy goals  -AE     Criteria for Skilled Interventions Met (PT) yes  -AE     Therapy Frequency (PT) daily  -AE       Row Name 04/13/24 1142          Vital Signs    Pre Systolic BP Rehab 176  -AE     Pre Treatment Diastolic BP 85  -AE     Pretreatment Heart Rate (beats/min) 65  -AE     Posttreatment Heart Rate (beats/min) 64  -AE     Pre SpO2 (%) 97  -AE     O2 Delivery Pre Treatment nasal cannula  -AE     O2 Delivery Intra Treatment nasal cannula  -AE     Post SpO2 (%) 94  -AE     O2 Delivery Post Treatment nasal cannula  -AE     Pre Patient Position Supine  -AE     Intra Patient Position Standing  -AE     Post Patient Position Sitting  -AE       Row Name 04/13/24 1142          Positioning  and Restraints    Pre-Treatment Position in bed  -AE     Post Treatment Position chair  -AE     In Chair notified nsg;reclined;call light within reach;encouraged to call for assist;exit alarm on;waffle cushion;legs elevated  -AE               User Key  (r) = Recorded By, (t) = Taken By, (c) = Cosigned By      Initials Name Provider Type    AE Valentino Pedersen, PT Physical Therapist                   Outcome Measures       Row Name 04/13/24 1148 04/13/24 0820       How much help from another person do you currently need...    Turning from your back to your side while in flat bed without using bedrails? 3  -AE 4  -VL    Moving from lying on back to sitting on the side of a flat bed without bedrails? 3  -AE 4  -VL    Moving to and from a bed to a chair (including a wheelchair)? 3  -AE 3  -VL    Standing up from a chair using your arms (e.g., wheelchair, bedside chair)? 3  -AE 3  -VL    Climbing 3-5 steps with a railing? 3  -AE 3  -VL    To walk in hospital room? 3  -AE 3  -VL    AM-PAC 6 Clicks Score (PT) 18  -AE 20  -VL    Highest Level of Mobility Goal 6 --> Walk 10 steps or more  -AE 6 --> Walk 10 steps or more  -VL      Row Name 04/13/24 1148          Functional Assessment    Outcome Measure Options AM-PAC 6 Clicks Basic Mobility (PT)  -AE               User Key  (r) = Recorded By, (t) = Taken By, (c) = Cosigned By      Initials Name Provider Type    VL Velma Moscoso RN Registered Nurse    Valentino Garcia, PT Physical Therapist                                 Physical Therapy Education       Title: PT OT SLP Therapies (In Progress)       Topic: Physical Therapy (In Progress)       Point: Mobility training (In Progress)       Learning Progress Summary             Patient Acceptance, E, NR by AE at 4/13/2024 0839                         Point: Home exercise program (Not Started)       Learner Progress:  Not documented in this visit.              Point: Body mechanics (In Progress)       Learning Progress Summary              Patient Acceptance, E, NR by AE at 4/13/2024 0839                         Point: Precautions (In Progress)       Learning Progress Summary             Patient Acceptance, E, NR by AE at 4/13/2024 0839                                         User Key       Initials Effective Dates Name Provider Type Discipline    AE 09/21/21 -  Valentino Pedersen PT Physical Therapist PT                  PT Recommendation and Plan  Planned Therapy Interventions (PT): balance training, bed mobility training, gait training, home exercise program, patient/family education, postural re-education, ROM (range of motion), stair training, strengthening, transfer training  Plan of Care Reviewed With: patient  Progress: no change  Outcome Evaluation: Pt presents with decreased functional mobility and decreased activity tolerance. Pt ambulated 250ft with CGA, unsupported. Recommend continued skilled IP PT interventions. Recommend D/C home with assist when medically appropriate.     Time Calculation:   PT Evaluation Complexity  History, PT Evaluation Complexity: 1-2 personal factors and/or comorbidities  Examination of Body Systems (PT Eval Complexity): total of 3 or more elements  Clinical Presentation (PT Evaluation Complexity): stable  Clinical Decision Making (PT Evaluation Complexity): low complexity  Overall Complexity (PT Evaluation Complexity): low complexity     PT Charges       Row Name 04/13/24 1149             Time Calculation    Start Time 0839  -AE      PT Received On 04/13/24  -AE      PT Goal Re-Cert Due Date 04/23/24  -AE         Timed Charges    85681 - PT Therapeutic Activity Minutes 9  -AE         Untimed Charges    PT Eval/Re-eval Minutes 48  -AE         Total Minutes    Timed Charges Total Minutes 9  -AE      Untimed Charges Total Minutes 48  -AE       Total Minutes 57  -AE                User Key  (r) = Recorded By, (t) = Taken By, (c) = Cosigned By      Initials Name Provider Type    AE Valentino Pedersen PT  Physical Therapist                  Therapy Charges for Today       Code Description Service Date Service Provider Modifiers Qty    50915333116 HC PT THERAPEUTIC ACT EA 15 MIN 4/13/2024 Valentino Pedersen, PT GP 1    69092012693 HC PT EVAL LOW COMPLEXITY 4 4/13/2024 Valentino Pedersen, PT GP 1            PT G-Codes  Outcome Measure Options: AM-PAC 6 Clicks Basic Mobility (PT)  AM-PAC 6 Clicks Score (PT): 18  PT Discharge Summary  Anticipated Discharge Disposition (PT): home with assist    Valentino Pedersen PT  4/13/2024

## 2024-04-13 NOTE — PROGRESS NOTES
Psychiatric Medicine Services  PROGRESS NOTE    Patient Name: Juan Alberto Tejeda  : 1960  MRN: [id number]    Date of Admission: 2024  Primary Care Physician: Megan Martell APRN    Subjective   Subjective     CC:  Follow-up dyspnea    HPI:  Net -4.7L.  Had a bowel movement.  Afebrile.  On 2 L nasal cannula.  Reports his breathing is improved significantly.  Denied any shortness of breath.  Was able to get up and walk to bedside chair.  No chest pain.  No nausea or vomiting.  Per nursing, patient desaturates to 40 to 50% while sleeping and quickly recovers upon awakening.      Objective   Objective     Vital Signs:   Temp:  [97.9 °F (36.6 °C)-99.2 °F (37.3 °C)] 98.6 °F (37 °C)  Heart Rate:  [58-74] 74  Resp:  [18] 18  BP: (151-176)/(78-85) 176/85  Flow (L/min):  [2] 2     Physical Exam:  Constitutional: No acute distress, awake, alert, obese  HENT: NCAT, mucous membranes moist  Respiratory: Clear to auscultation bilaterally, respiratory effort normal   Cardiovascular: RRR  Gastrointestinal: Positive bowel sounds, soft, nontender, nondistended  Musculoskeletal: 2+ bilateral ankle edema  Psychiatric: Appropriate affect, cooperative  Neurologic: PERRL, symmetric facies, moves all extremities well, speech clear  Skin: lower extremities covered in bandages      Results Reviewed:  LAB RESULTS:      Lab 24  1219 24  1008   WBC 9.01 8.47  --  8.31   HEMOGLOBIN 11.0* 10.8*  --  11.6*   HEMATOCRIT 36.9* 35.9*  --  39.4   PLATELETS 173 157  --  198   NEUTROS ABS  --   --   --  5.95   IMMATURE GRANS (ABS)  --   --   --  0.05   LYMPHS ABS  --   --   --  1.51   MONOS ABS  --   --   --  0.53   EOS ABS  --   --   --  0.23   MCV 90.7 90.7  --  94.3   PROCALCITONIN  --   --  0.12  --          Lab 24  0447 24  1219 24  1008   SODIUM 141 143  --  145   POTASSIUM 4.2 4.2  --  4.8   CHLORIDE 103 108*  --  111*   CO2 32.0*  30.0*  --  29.0   ANION GAP 6.0 5.0  --  5.0   BUN 57* 59*  --  64*   CREATININE 2.14* 1.99*  --  2.15*   EGFR 34.2* 37.3*  --  34.0*   GLUCOSE 106* 87  --  105*   CALCIUM 8.7 8.8  --  9.3   MAGNESIUM 1.8  --  2.4  --    HEMOGLOBIN A1C  --   --   --  6.00*   TSH  --   --  0.865  --          Lab 04/12/24  0447 04/11/24  1008   TOTAL PROTEIN 5.8* 6.6   ALBUMIN 3.3* 3.6   GLOBULIN 2.5 3.0   ALT (SGPT) 21 28   AST (SGOT) 19 24   BILIRUBIN 0.4 0.5   ALK PHOS 101 112   LIPASE  --  63*         Lab 04/12/24  0447 04/12/24  0023 04/11/24  2047 04/11/24  1219 04/11/24  1008   PROBNP  --   --   --   --  1,707.0*   HSTROP T 88* 89* 82* 72* 80*                 Brief Urine Lab Results  (Last result in the past 365 days)        Color   Clarity   Blood   Leuk Est   Nitrite   Protein   CREAT   Urine HCG        04/11/24 1537             70.2                 Microbiology Results Abnormal       Procedure Component Value - Date/Time    Blood Culture - Blood, Hand, Right [[id number]]  (Normal) Collected: 04/11/24 1514    Lab Status: Preliminary result Specimen: Blood from Hand, Right Updated: 04/12/24 1615     Blood Culture No growth at 24 hours    Narrative:      Less than seven (7) mL's of blood was collected.  Insufficient quantity may yield false negative results.    Blood Culture - Blood, Arm, Right [[id number]]  (Normal) Collected: 04/11/24 1515    Lab Status: Preliminary result Specimen: Blood from Arm, Right Updated: 04/12/24 1615     Blood Culture No growth at 24 hours    Narrative:      Less than seven (7) mL's of blood was collected.  Insufficient quantity may yield false negative results.    MRSA Screen, PCR (Inpatient) - Swab, Nares [[id number]]  (Normal) Collected: 04/11/24 1544    Lab Status: Final result Specimen: Swab from Nares Updated: 04/11/24 1808     MRSA PCR Negative    Narrative:      The negative predictive value of this diagnostic test is high and should only be used to consider de-escalating anti-MRSA therapy. A  positive result may indicate colonization with MRSA and must be correlated clinically.  MRSA Negative    COVID-19 and FLU A/B PCR, 1 HR TAT - Swab, Nasopharynx [[id number]]  (Normal) Collected: 04/11/24 1039    Lab Status: Final result Specimen: Swab from Nasopharynx Updated: 04/11/24 1122     COVID19 Not Detected     Influenza A PCR Not Detected     Influenza B PCR Not Detected    Narrative:      Fact sheet for providers: https://www.fda.gov/media/849454/download    Fact sheet for patients: https://www.fda.gov/media/836253/download    Test performed by PCR.            Duplex Venous Lower Extremity - Bilateral CAR    Result Date: 4/12/2024    Normal bilateral lower extremity venous duplex scan.     XR Chest 1 View    Result Date: 4/12/2024  XR CHEST 1 VW Date of Exam: 4/12/2024 4:29 AM EDT Indication: DYSPNEA, ON EXERTION dyspnea Comparison:  4/11/2024 Findings: Cardiac size is similar to prior exam. Right greater than left bibasilar opacities. Similar mild interstitial and perihilar opacities. No substantial pneumothorax. Small bilateral pleural effusions. No evidence of acute osseous abnormalities. Visualized upper abdomen is unremarkable.     Impression: Impression: Similar to mildly increased right and left bibasilar opacities and reflect atelectasis or infection. Similar mild interstitial and perihilar opacities suggestive of edema. Small bilateral pleural effusions. Electronically Signed: Isaiah Maddox MD  4/12/2024 8:01 AM EDT  Workstation ID: [id number]    Adult Transthoracic Echo Complete W/ Cont if Necessary Per Protocol    Result Date: 4/12/2024    Left ventricular systolic function is normal. Estimated left ventricular EF = 60%   The left atrial cavity is mild to moderately dilated.   There is a well-seated 26 mm TAVR valve present.  Mean gradient is 20 mmHg, BERNA 1.7 cm².   Calcified mitral valve with mild mitral stenosis.  Mean gradient 7 mmHg, MVA 2.3 cm².   Mild mitral regurgitation.     XR Chest 1  View    Result Date: 4/11/2024  XR CHEST 1 VW Date of Exam: 4/11/2024 9:47 AM EDT Indication: SOA triage protocol Comparison: 4/20/2020 Findings: There is mild cardiomegaly. There is new pulmonary vascular congestion with mild perihilar pulmonary edema. There are no definite effusions. Exam is somewhat limited by obesity.     Impression: Impression: Findings are most compatible with CHF with mild pulmonary edema. Electronically Signed: Jeni Linda MD  4/11/2024 10:16 AM EDT  Workstation ID: [id number]     Results for orders placed during the hospital encounter of 04/11/24    Adult Transthoracic Echo Complete W/ Cont if Necessary Per Protocol    Interpretation Summary    Left ventricular systolic function is normal. Estimated left ventricular EF = 60%    The left atrial cavity is mild to moderately dilated.    There is a well-seated 26 mm TAVR valve present.  Mean gradient is 20 mmHg, BERNA 1.7 cm².    Calcified mitral valve with mild mitral stenosis.  Mean gradient 7 mmHg, MVA 2.3 cm².    Mild mitral regurgitation.      Current medications:  Scheduled Meds:allopurinol, 100 mg, Oral, Daily  aspirin, 81 mg, Oral, Daily  carvedilol, 12.5 mg, Oral, BID With Meals  cefTRIAXone, 2,000 mg, Intravenous, Q24H  gabapentin, 600 mg, Oral, Q8H  heparin (porcine), 5,000 Units, Subcutaneous, Q8H  insulin lispro, 2-7 Units, Subcutaneous, 4x Daily AC & at Bedtime  lactobacillus acidophilus, 1 capsule, Oral, Daily  pharmacy consult - MTM, , Does not apply, Daily  polyethylene glycol, 17 g, Oral, Daily  rosuvastatin, 40 mg, Oral, Nightly      Continuous Infusions:     PRN Meds:.  acetaminophen    bisacodyl    dextrose    dextrose    glucagon (human recombinant)    sodium chloride    Assessment & Plan   Assessment & Plan     Active Hospital Problems    Diagnosis  POA    **Acute on chronic diastolic CHF (congestive heart failure) [I50.33]  Yes    Coronary artery disease involving native coronary artery of native heart with angina  pectoris [I25.119]  Yes    Type 2 diabetes mellitus with kidney complication, with long-term current use of insulin [E11.29, Z79.4]  Not Applicable    Hyperlipidemia LDL goal <70 [E78.5]  Yes    Essential hypertension [I10]  Yes    Acute on chronic diastolic congestive heart failure [I50.33]  Yes    Nonrheumatic aortic valve stenosis/status post TAVR [I35.0]  Yes    CKD (chronic kidney disease) stage 3, GFR 30-59 ml/min [N18.30]  Yes      Resolved Hospital Problems   No resolved problems to display.        Brief Hospital Course to date:  Juan Alberto Tejeda is a 63 y.o. male with history of TAVR, CAD, HFpEF, CKD stage IV, DM2, HTN, HLD, gout, who presented for evaluation of progressive dyspnea x 1 to 2 months.    This patient's problems and plans were partially entered by my partner and updated as appropriate by me 04/13/24.    Dyspnea  Acute respiratory failure with hypoxia, 86% on RA in ED  A/C DHF  Hx of TAVR  Chest pain, resolved  Elevated troponin, suspected type II MI due to above  -Additional dose 2 mg IV Bumex this AM   -ECHO 4/12: LVEF 60%, well-seated TAVR with mean gradient 20, mild mitral stenosis  -consulted cardiology  -strict I&O, daily weights  -Troponins relatively flat, no ST segment changes on EKG  -Coreg 12.5 mg twice daily, Crestor 40 mg daily, aspirin 81 mg daily     B LE edema  LLE redness  LLE wounds  -rocephin  -MRSA PCR --> stopped vanc  -wound care  -duplex of B LE negative for DVT    JASWINDER  -Previously on [facility] outpatient, but no longer has this at home  -CPAP nightly     CKD IV  -consult NAL given history of progressive renal failure, and need for diuresis for CHF     DM  -A1C 6%  -SSI     Hx of HTN  -monitor, continue BB, titrate medications per cardiology     Hx of mild CAD, per catheterization 2020     HLD  -statin    Expected Discharge Location and Transportation: home  Expected Discharge   Expected Discharge Date: 4/14/2024; Expected Discharge Time:      DVT prophylaxis:  Medical DVT  prophylaxis orders are present.         AM-PAC 6 Clicks Score (PT): 20 (04/12/24 0800)    CODE STATUS:   Code Status and Medical Interventions:   Ordered at: 04/12/24 1028     Medical Intervention Limits:    NO intubation (DNI)     Level Of Support Discussed With:    Patient     Code Status (Patient has no pulse and is not breathing):    No CPR (Do Not Attempt to Resuscitate)     Medical Interventions (Patient has pulse or is breathing):    Limited Support       Sanjuana Gadnhi MD  04/13/24

## 2024-04-13 NOTE — PROGRESS NOTES
[facility] at [facility]  PROGRESS NOTE    Date of Admission: 4/11/2024  Date of Service: 04/13/24    Primary Care Physician: Megan Martell APRN    Reason for visit:    CHF/cardiorenal syndrome    IDENTIFICATION: 63-year-old gentleman who resides in [place], [place]           Active Hospital Problems     Diagnosis   POA    **Acute on chronic diastolic CHF (congestive heart failure) [I50.33]   Yes    Type 2 diabetes mellitus with kidney complication, with long-term current use of insulin [E11.29, Z79.4]   Not Applicable       Priority: High    CKD (chronic kidney disease) stage 3, GFR 30-59 ml/min [N18.30]   Yes       Priority: High    Coronary artery disease involving native coronary artery of native heart with angina pectoris [I25.119]   Yes       Priority: Medium       Cardiac [facility] at [facility] (12/2019): Moderate nonobstructive CAD.       Essential hypertension [I10]   Yes       Priority: Medium    Acute on chronic diastolic congestive heart failure [I50.33]   Yes       Priority: Medium       Echo (2/29/2020): LVEF 65%.  Aortic valve is bicuspid and severely calcified.  Severe aortic stenosis (mean gradient 51 mmHg). Mild MR and MS  Echo (5/20/2020): LVEF = 65%.  Mild LVH. A 26 mm Fried Lin 3 TAVR valve is well-seated and exhibits a mean gradient of 13 mmHg. There is no paravalvular leak present.       Nonrheumatic aortic valve stenosis/status post TAVR [I35.0]   Yes       Priority: Medium       Echo (2/29/2020): LVEF 65%.  Aortic valve is bicuspid and severely calcified.  Severe aortic stenosis (mean gradient 51 mmHg). Mild MR and MS  Status post TAVR 3/16/2020  Echo (5/20/2020): LVEF = 65%.  Mild LVH. A 26 mm Fried Lin 3 TAVR valve is well-seated and exhibits a mean gradient of 13 mmHg. There is no paravalvular leak present.       Hyperlipidemia LDL goal <70 [E78.5]   Yes       Priority: Low       High intensity statin therapy indicated given the presence of CAD  "    Subjective      Continues to diurese well.  No chest pain.  Shortness of breath is improving.  Believes that his edema has gone down some.  Normal sinus rhythm on telemetry.      Objective   Vitals: /85 (BP Location: Left arm, Patient Position: Lying)   Pulse 67   Temp 98.6 °F (37 °C) (Oral)   Resp 18   Ht 170.2 cm (67.01\")   Wt 126 kg (277 lb 6.4 oz)   SpO2 94%   BMI 43.44 kg/m²     Physical Exam:  GENERAL: in no acute distress.   HEENT:  no jugular venous distention  HEART: Regular rhythm, normal rate, systolic murmur present  LUNGS: Decreased breath sounds.  No wheezing or rhonchi.  EXTREMITIES: 2+ pitting bilateral lower extremity edema    Results:  Results from last 7 days   Lab Units 04/13/24  0445 04/12/24 0447 04/11/24  1008   WBC 10*3/mm3 9.01 8.47 8.31   HEMOGLOBIN g/dL 11.0* 10.8* 11.6*   HEMATOCRIT % 36.9* 35.9* 39.4   PLATELETS 10*3/mm3 173 157 198     Results from last 7 days   Lab Units 04/13/24  0445 04/12/24  0447 04/11/24  1008   SODIUM mmol/L 141 143 145   POTASSIUM mmol/L 4.2 4.2 4.8   CHLORIDE mmol/L 103 108* 111*   CO2 mmol/L 32.0* 30.0* 29.0   BUN mg/dL 57* 59* 64*   CREATININE mg/dL 2.14* 1.99* 2.15*   GLUCOSE mg/dL 106* 87 105*      Lab Results   Component Value Date    TRIG 79 04/02/2024    HDL 49 04/02/2024    LDL 65 04/02/2024    AST 19 04/12/2024    ALT 21 04/12/2024     Results from last 7 days   Lab Units 04/11/24  1008   HEMOGLOBIN A1C % 6.00*         Results from last 7 days   Lab Units 04/11/24  1219   TSH uIU/mL 0.865             Results from last 7 days   Lab Units 04/12/24  0447 04/12/24  0023 04/11/24 2047   HSTROP T ng/L 88* 89* 82*     Results from last 7 days   Lab Units 04/11/24  1008   PROBNP pg/mL 1,707.0*         Intake/Output Summary (Last 24 hours) at 4/13/2024 1014  Last data filed at 4/13/2024 0600  Gross per 24 hour   Intake 600 ml   Output 3500 ml   Net -2900 ml       I personally reviewed the patient's EKG/Telemetry data    Results review  Tele: " Normal sinus rhythm     Results Review (reviewed the patient's recent labs in the electronic medical record):      EKG (/12/2024): Normal sinus rhythm no acute ischemia     CXR (4/12/2024): Small bilateral pleural effusion     ECHO (4/12/2024): LVEF 60%.  TAVR valve present mean gradient 20 mmHg.  Mild mitral stenosis with mean gradient 7 mmHg.  Mild MR    Current Medications:  allopurinol, 100 mg, Oral, Daily  aspirin, 81 mg, Oral, Daily  carvedilol, 12.5 mg, Oral, BID With Meals  cefTRIAXone, 2,000 mg, Intravenous, Q24H  gabapentin, 600 mg, Oral, Q8H  heparin (porcine), 5,000 Units, Subcutaneous, Q8H  insulin lispro, 2-7 Units, Subcutaneous, 4x Daily AC & at Bedtime  lactobacillus acidophilus, 1 capsule, Oral, Daily  pharmacy consult - MTM, , Does not apply, Daily  polyethylene glycol, 17 g, Oral, Daily  rosuvastatin, 40 mg, Oral, Nightly           Assessment:   Acute on chronic diastolic heart failure/cardiorenal syndrome  proBNP elevated 1707 and chest x-ray mild pulmonary edema consistent with CHF  Takes Bumex 1 mg twice daily at home  Treated with 2 mg IV Bumex x 1 in ED  Last echo 5/2020 LVEF 65% with 26 mm Fried GAURANG 3 TAVR valve well-seated with no perivalvular leak  Echo this admission shows normal LVEF 60% with TAVR valve well-seated.  IV Bumex 2 mg twice daily received on 4/11 and 4/12  Carvedilol 12.5 mg twice daily  Continues with significant urine output, 4700 mL out in the last 24 hours        Elevated troponin/type II NSTEMI from cardiorenal syndrome  Troponin elevation flat and not consistent with ACS.  EKGs no acute ischemic changes  Echo this admission shows normal LVEF with no wall motion abnormality and TAVR valve well-seated     Coronary artery disease  Cath in 2019 showed moderate nonobstructive CAD  High-sensitivity troponin elevated this admission at 80 and EKG normal sinus rhythm but no acute ischemia  Currently denies chest pain but has had chest tightness over the past 2  months.  Aspirin 81 mg daily     Valvular heart disease status post TAVR  Status post TAVR 2020  Last echo 2020 showed well-seated TAVR valve with mean gradient of 13 mmHg without perivalvular leak  Echo this admission shows well-seated TAVR with mean gradient 20 mmHg.  Not contributing to current issues     Stage III chronic kidney disease  Met with  transplant team 1 month ago.  Patient has elected not to have transplant when given option between transplant hemodialysis.  Follows Dr. Cobos  Current creatinine 2.14 today  Nephrology following as an inpatient        Type 2 diabetes mellitus  Recent hemoglobin A1c 7.8  No SGL 2 inhibitor due to CKD        Hypertension  Elevated on admission 193/94 then 173/83 but had not had home dose of carvedilol  Carvedilol 12.5 mg twice daily  Current /75        Hyperlipidemia  Recent lipids total cholesterol 130, triglycerides 79, HDL 49, LDL 65  Crestor 40 mg daily    Plan:   Repeat IV Bumex today.  Strict I's and O's.  Monitor renal function.  Nephrology assisting with diuretic management.  Fluid restriction and low-sodium diet.  No ACE/ARB/Entresto/Aldactone due to renal dysfunction.  Can consider addition of hydralazine for better blood pressure control if necessary.  Alternative is amlodipine but this may increase lower extremity edema.      Juju Yang PA-C

## 2024-04-14 LAB
ANION GAP SERPL CALCULATED.3IONS-SCNC: 5 MMOL/L (ref 5–15)
BUN SERPL-MCNC: 55 MG/DL (ref 8–23)
BUN/CREAT SERPL: 28.5 (ref 7–25)
CALCIUM SPEC-SCNC: 8.8 MG/DL (ref 8.6–10.5)
CHLORIDE SERPL-SCNC: 100 MMOL/L (ref 98–107)
CO2 SERPL-SCNC: 34 MMOL/L (ref 22–29)
CREAT SERPL-MCNC: 1.93 MG/DL (ref 0.76–1.27)
EGFRCR SERPLBLD CKD-EPI 2021: 38.7 ML/MIN/1.73
GLUCOSE BLDC GLUCOMTR-MCNC: 123 MG/DL (ref 70–130)
GLUCOSE BLDC GLUCOMTR-MCNC: 139 MG/DL (ref 70–130)
GLUCOSE BLDC GLUCOMTR-MCNC: 200 MG/DL (ref 70–130)
GLUCOSE BLDC GLUCOMTR-MCNC: 203 MG/DL (ref 70–130)
GLUCOSE SERPL-MCNC: 120 MG/DL (ref 65–99)
MAGNESIUM SERPL-MCNC: 1.8 MG/DL (ref 1.6–2.4)
POTASSIUM SERPL-SCNC: 4.1 MMOL/L (ref 3.5–5.2)
SODIUM SERPL-SCNC: 139 MMOL/L (ref 136–145)

## 2024-04-14 PROCEDURE — 25010000002 BUMETANIDE PER 0.5 MG: Performed by: INTERNAL MEDICINE

## 2024-04-14 PROCEDURE — 80048 BASIC METABOLIC PNL TOTAL CA: CPT | Performed by: STUDENT IN AN ORGANIZED HEALTH CARE EDUCATION/TRAINING PROGRAM

## 2024-04-14 PROCEDURE — 25010000002 HEPARIN (PORCINE) PER 1000 UNITS: Performed by: STUDENT IN AN ORGANIZED HEALTH CARE EDUCATION/TRAINING PROGRAM

## 2024-04-14 PROCEDURE — 97598 DBRDMT OPN WND ADDL 20CM/<: CPT

## 2024-04-14 PROCEDURE — 63710000001 INSULIN LISPRO (HUMAN) PER 5 UNITS: Performed by: HOSPITALIST

## 2024-04-14 PROCEDURE — 97164 PT RE-EVAL EST PLAN CARE: CPT

## 2024-04-14 PROCEDURE — 25010000002 CEFTRIAXONE PER 250 MG: Performed by: HOSPITALIST

## 2024-04-14 PROCEDURE — 99232 SBSQ HOSP IP/OBS MODERATE 35: CPT | Performed by: STUDENT IN AN ORGANIZED HEALTH CARE EDUCATION/TRAINING PROGRAM

## 2024-04-14 PROCEDURE — 29581 APPL MULTLAYER CMPRN SYS LEG: CPT

## 2024-04-14 PROCEDURE — 83735 ASSAY OF MAGNESIUM: CPT | Performed by: STUDENT IN AN ORGANIZED HEALTH CARE EDUCATION/TRAINING PROGRAM

## 2024-04-14 PROCEDURE — 99232 SBSQ HOSP IP/OBS MODERATE 35: CPT

## 2024-04-14 PROCEDURE — 82948 REAGENT STRIP/BLOOD GLUCOSE: CPT

## 2024-04-14 PROCEDURE — 97597 DBRDMT OPN WND 1ST 20 CM/<: CPT

## 2024-04-14 RX ORDER — HYDRALAZINE HYDROCHLORIDE 25 MG/1
25 TABLET, FILM COATED ORAL EVERY 8 HOURS SCHEDULED
Status: DISCONTINUED | OUTPATIENT
Start: 2024-04-14 | End: 2024-04-17 | Stop reason: HOSPADM

## 2024-04-14 RX ORDER — BUMETANIDE 0.25 MG/ML
2 INJECTION INTRAMUSCULAR; INTRAVENOUS EVERY 12 HOURS
Status: DISCONTINUED | OUTPATIENT
Start: 2024-04-14 | End: 2024-04-16

## 2024-04-14 RX ADMIN — HYDRALAZINE HYDROCHLORIDE 25 MG: 25 TABLET ORAL at 08:33

## 2024-04-14 RX ADMIN — HEPARIN SODIUM 5000 UNITS: 5000 INJECTION INTRAVENOUS; SUBCUTANEOUS at 06:29

## 2024-04-14 RX ADMIN — HYDRALAZINE HYDROCHLORIDE 25 MG: 25 TABLET ORAL at 21:04

## 2024-04-14 RX ADMIN — INSULIN LISPRO 3 UNITS: 100 INJECTION, SOLUTION INTRAVENOUS; SUBCUTANEOUS at 11:53

## 2024-04-14 RX ADMIN — CARVEDILOL 12.5 MG: 12.5 TABLET, FILM COATED ORAL at 08:33

## 2024-04-14 RX ADMIN — CARVEDILOL 12.5 MG: 12.5 TABLET, FILM COATED ORAL at 17:11

## 2024-04-14 RX ADMIN — INSULIN LISPRO 3 UNITS: 100 INJECTION, SOLUTION INTRAVENOUS; SUBCUTANEOUS at 17:11

## 2024-04-14 RX ADMIN — HEPARIN SODIUM 5000 UNITS: 5000 INJECTION INTRAVENOUS; SUBCUTANEOUS at 13:33

## 2024-04-14 RX ADMIN — ASPIRIN 81 MG: 81 TABLET, CHEWABLE ORAL at 08:33

## 2024-04-14 RX ADMIN — GABAPENTIN 600 MG: 300 CAPSULE ORAL at 06:29

## 2024-04-14 RX ADMIN — ALLOPURINOL 100 MG: 100 TABLET ORAL at 08:33

## 2024-04-14 RX ADMIN — Medication 10 ML: at 08:34

## 2024-04-14 RX ADMIN — CEFTRIAXONE 2000 MG: 2 INJECTION, POWDER, FOR SOLUTION INTRAMUSCULAR; INTRAVENOUS at 11:53

## 2024-04-14 RX ADMIN — GABAPENTIN 600 MG: 300 CAPSULE ORAL at 13:33

## 2024-04-14 RX ADMIN — Medication 10 ML: at 21:04

## 2024-04-14 RX ADMIN — Medication 1 CAPSULE: at 08:33

## 2024-04-14 RX ADMIN — GABAPENTIN 600 MG: 300 CAPSULE ORAL at 21:03

## 2024-04-14 RX ADMIN — HYDRALAZINE HYDROCHLORIDE 25 MG: 25 TABLET ORAL at 13:34

## 2024-04-14 RX ADMIN — HEPARIN SODIUM 5000 UNITS: 5000 INJECTION INTRAVENOUS; SUBCUTANEOUS at 21:04

## 2024-04-14 RX ADMIN — ROSUVASTATIN 40 MG: 20 TABLET, FILM COATED ORAL at 21:04

## 2024-04-14 RX ADMIN — BUMETANIDE 2 MG: 0.25 INJECTION INTRAMUSCULAR; INTRAVENOUS at 21:03

## 2024-04-14 RX ADMIN — BUMETANIDE 2 MG: 0.25 INJECTION, SOLUTION INTRAMUSCULAR; INTRAVENOUS at 08:33

## 2024-04-14 RX ADMIN — POLYETHYLENE GLYCOL 3350 17 G: 17 POWDER, FOR SOLUTION ORAL at 08:34

## 2024-04-14 NOTE — PROGRESS NOTES
" LOS: 3 days   Patient Care Team:  Megan Martell, RADHA as PCP - General (Family Medicine)    Chief Complaint: increase edema.     Subjective     Shortness of Breath      Stable renal function. UOP slowed down 1.56 liter in last 24hr. LE edema persistent. Stranding weight 271    Subjective:  Symptoms:  He reports shortness of breath.        History taken from: patient    Objective     Vital Sign Min/Max for last 24 hours  Temp  Min: 98.1 °F (36.7 °C)  Max: 99.1 °F (37.3 °C)   BP  Min: 109/69  Max: 158/84   Pulse  Min: 59  Max: 72   Resp  Min: 16  Max: 18   SpO2  Min: 90 %  Max: 93 %   Flow (L/min)  Min: 2  Max: 2   Weight  Min: 123 kg (271 lb 9.6 oz)  Max: 123 kg (271 lb 9.6 oz)     Flowsheet Rows      Flowsheet Row First Filed Value   Admission Height 170.2 cm (67\") Documented at 04/11/2024 0940   Admission Weight 127 kg (280 lb) Documented at 04/11/2024 0940            I/O this shift:  In: 220 [P.O.:120; IV Piggyback:100]  Out: 1000 [Urine:1000]  I/O last 3 completed shifts:  In: 1400 [P.O.:1400]  Out: 4500 [Urine:4500]    Objective:  Vital signs: (most recent): Blood pressure 124/70, pulse 65, temperature 98.1 °F (36.7 °C), temperature source Oral, resp. rate 18, height 170.2 cm (67.01\"), weight 123 kg (271 lb 9.6 oz), SpO2 93%.                Results Review:     I reviewed the patient's new clinical results.    WBC WBC   Date Value Ref Range Status   04/13/2024 9.01 3.40 - 10.80 10*3/mm3 Final   04/12/2024 8.47 3.40 - 10.80 10*3/mm3 Final      HGB Hemoglobin   Date Value Ref Range Status   04/13/2024 11.0 (L) 13.0 - 17.7 g/dL Final   04/12/2024 10.8 (L) 13.0 - 17.7 g/dL Final      HCT Hematocrit   Date Value Ref Range Status   04/13/2024 36.9 (L) 37.5 - 51.0 % Final   04/12/2024 35.9 (L) 37.5 - 51.0 % Final      Platlets No results found for: \"LABPLAT\"   MCV MCV   Date Value Ref Range Status   04/13/2024 90.7 79.0 - 97.0 fL Final   04/12/2024 90.7 79.0 - 97.0 fL Final          Sodium Sodium   Date Value Ref " "Range Status   04/14/2024 139 136 - 145 mmol/L Final   04/13/2024 141 136 - 145 mmol/L Final   04/12/2024 143 136 - 145 mmol/L Final      Potassium Potassium   Date Value Ref Range Status   04/14/2024 4.1 3.5 - 5.2 mmol/L Final   04/13/2024 4.2 3.5 - 5.2 mmol/L Final   04/12/2024 4.2 3.5 - 5.2 mmol/L Final      Chloride Chloride   Date Value Ref Range Status   04/14/2024 100 98 - 107 mmol/L Final   04/13/2024 103 98 - 107 mmol/L Final   04/12/2024 108 (H) 98 - 107 mmol/L Final      CO2 CO2   Date Value Ref Range Status   04/14/2024 34.0 (H) 22.0 - 29.0 mmol/L Final   04/13/2024 32.0 (H) 22.0 - 29.0 mmol/L Final   04/12/2024 30.0 (H) 22.0 - 29.0 mmol/L Final      BUN BUN   Date Value Ref Range Status   04/14/2024 55 (H) 8 - 23 mg/dL Final   04/13/2024 57 (H) 8 - 23 mg/dL Final   04/12/2024 59 (H) 8 - 23 mg/dL Final      Creatinine Creatinine   Date Value Ref Range Status   04/14/2024 1.93 (H) 0.76 - 1.27 mg/dL Final   04/13/2024 2.14 (H) 0.76 - 1.27 mg/dL Final   04/12/2024 1.99 (H) 0.76 - 1.27 mg/dL Final      Calcium Calcium   Date Value Ref Range Status   04/14/2024 8.8 8.6 - 10.5 mg/dL Final   04/13/2024 8.7 8.6 - 10.5 mg/dL Final   04/12/2024 8.8 8.6 - 10.5 mg/dL Final      PO4 No results found for: \"CAPO4\"   Albumin Albumin   Date Value Ref Range Status   04/12/2024 3.3 (L) 3.5 - 5.2 g/dL Final      Magnesium Magnesium   Date Value Ref Range Status   04/14/2024 1.8 1.6 - 2.4 mg/dL Final   04/13/2024 1.8 1.6 - 2.4 mg/dL Final      Uric Acid No results found for: \"URICACID\"     Medication Review: Yes    Assessment & Plan       Acute on chronic diastolic CHF (congestive heart failure)    CKD (chronic kidney disease) stage 3, GFR 30-59 ml/min    Hyperlipidemia LDL goal <70    Essential hypertension    Type 2 diabetes mellitus with kidney complication, with long-term current use of insulin    Nonrheumatic aortic valve stenosis/status post TAVR    Acute on chronic diastolic congestive heart failure    Coronary artery " disease involving native coronary artery of native heart with angina pectoris      Assessment & Plan    CKD 4: Follows with [facility].  Last seen by us 2/24.  Creatinine was 3.5 at that time.  Has been referred to transplant in the past.  Renal function close to baseline.  For now would continue diuresis. Increase bumex 2 mg BID. Strict I/o     HTN: Blood pressure elevated.  Continue current medications.  Monitor with volume reduction.  Cover with as needed medications     Volume:  Need further optimization with aggressive diuretics. Last standing weight 271.      HFpEF: Per report last echocardiogram 5/2020 showed a EF of 65%.  Patient has a TAVR valve in place.  Cardiology following.     Thank you consulting us on Juan Alberto Tejeda who is of high risk and complexity.  We will follow along closely    Bertram Brewer MD  04/14/24  15:30 EDT

## 2024-04-14 NOTE — THERAPY EVALUATION
Acute Care - Wound/Debridement Initial Evaluation   D[facility]     Patient Name: Juan Alberto Tejeda  : 1960  MRN: [id number]  Today's Date: 2024                Admit Date: 2024    Visit Dx:    ICD-10-CM ICD-9-CM   1. Bilateral lower extremity edema  R60.0 782.3   2. Acute on chronic congestive heart failure, unspecified heart failure type  I50.9 428.0   3. JASWINDER on CPAP  G47.33 327.23       Patient Active Problem List   Diagnosis    CKD (chronic kidney disease) stage 3, GFR 30-59 ml/min    Hyperlipidemia LDL goal <70    Essential hypertension    Type 2 diabetes mellitus with kidney complication, with long-term current use of insulin    Normocytic anemia    Nonrheumatic aortic valve stenosis/status post TAVR    Acute on chronic diastolic congestive heart failure    Coronary artery disease involving native coronary artery of native heart with angina pectoris    Nocturnal hypoxia    Peripheral neuropathy    JASWINDER on CPAP    Acute on chronic diastolic CHF (congestive heart failure)        Past Medical History:   Diagnosis Date    Aortic valve disorder     Arthritis     CHF (congestive heart failure)     Chronic kidney disease     Diabetes mellitus     Gout     Hiatal hernia     Hyperlipidemia     Hypertension     Kidney stones     history    MI, old     Peripheral neuropathy     Wears glasses         Past Surgical History:   Procedure Laterality Date    AORTIC VALVE REPAIR/REPLACEMENT N/A 3/16/2020    Procedure: TRANSCATHETER AORTIC VALVE REPLACEMENT, SHANKAR;  Surgeon: Irvin Jeffers MD;  Location: [facility] OR ;  Service: Cardiothoracic;  Laterality: N/A;  fluoro 10 min 30 sec  dose 1136 mGY   contrast 65 ml    AORTIC VALVE REPAIR/REPLACEMENT N/A 3/16/2020    Procedure: Transfemoral Transcatheter Aortic Valve Replacement;  Surgeon: Cayla Bella MD;  Location: [facility] OR 15;  Service: Cardiovascular;  Laterality: N/A;    CHOLECYSTECTOMY      CIRCUMCISION      COLONOSCOPY  2 years ago     KIDNEY STONE SURGERY      OTHER SURGICAL HISTORY      Gallstone removal surgery           Wound 04/11/24 1305 Left lower leg Venous Ulcer (Active)   Wound Image    04/14/24 1015   Dressing Appearance intact;moist drainage 04/14/24 1015   Closure None 04/14/24 0800   Base clean;granulating;yellow;slough 04/14/24 1015   Periwound intact;blanchable;redness;swelling 04/14/24 1015   Periwound Temperature warm 04/14/24 1015   Periwound Skin Turgor soft 04/14/24 1015   Edges irregular;open 04/14/24 1015   Wound Length (cm) 13 cm 04/14/24 1015   Wound Width (cm) 10 cm 04/14/24 1015   Wound Depth (cm) 0.1 cm 04/14/24 1015   Wound Surface Area (cm^2) 130 cm^2 04/14/24 1015   Wound Volume (cm^3) 13 cm^3 04/14/24 1015   Drainage Characteristics/Odor serosanguineous;serous 04/14/24 1015   Drainage Amount moderate 04/14/24 1015   Care, Wound cleansed with;wound cleanser;debrided 04/14/24 1015   Dressing Care dressing applied;silver impregnated;foam;multi-layer wrap 04/14/24 1015   Periwound Care cleansed with pH balanced cleanser;dry periwound area maintained 04/14/24 1015      Lymphedema       Row Name 04/14/24 1015             Lymphedema Edema Assessment    Pitting Edema Severe  -         Skin Changes/Observations    Location/Assessment Lower Extremity  -      Lower Extremity Conditions right:;intact;bilateral:;shiny;hairless;inflamed  -      Lower Extremity Color/Pigment left:;erythema;bilateral:;hyperpigmented;blanchable  -         Lymphedema Sensation    Lymphedema Sensation Comments peripheral neuropathy  -         Lymphedema Pulses/Capillary Refill    Lymphedema Pulses/Capillary Refill lower extremity pulses;capillary refill  -      Dorsalis Pedis Pulse right:;left:;+2 normal  -      Posterior Tibialis Pulse right:;left:;+1 diminished  -      Capillary Refill lower extremity capillary refill  -      Lower Extremity Capillary Refill right:;left:;less than 3 seconds  -         Lymphedema Measurements     Measurement Type(s) Quick Girth  -      Quick Girth Areas Lower extremities  -JM         LLE Quick Girth (cm)    Mid foot 25.5 cm  -JM      Smallest ankle 26.5 cm  -JM      Largest calf 47 cm  -JM         RLE Quick Girth (cm)    Mid foot 26 cm  -JM      Smallest ankle 27.5 cm  -JM      Largest calf 47 cm  -JM      RLE Quick Girth Total 100.5  -JM         Compression/Skin Care    Compression/Skin Care skin care;wrapping location;bandaging  -      Skin Care washed/dried;lotion applied  -      Wrapping Location lower extremity  -JM      Wrapping Location LE bilateral:;foot to knee  -      Wrapping Comments LLE dressings secured with cast padding, BLE size 5&6 compressogrips doubled/overlapping for gradient compression  -      Bandage Layers cotton elastic stocking- double layer (comment size)  -                User Key  (r) = Recorded By, (t) = Taken By, (c) = Cosigned By      Initials Name Provider Type    Gabriela Sprague, PT Physical Therapist                    WOUND DEBRIDEMENT  Total area of Debridement: ~32cmsq  Debridement Site 1  Location- Site 1: LLE wounds  Selective Debridement- Site 1: Wound Surface >20cmsq  Instruments- Site 1: tweezers  Excised Tissue Description- Site 1: maximum, slough, other (comment) (biofilm)  Bleeding- Site 1: none               PT Assessment (Last 12 Hours)       PT Evaluation and Treatment       Row Name 04/14/24 1015          Physical Therapy Time and Intention    Subjective Information complains of;swelling  -     Document Type wound care;evaluation  -     Patient Effort good  -     Symptoms Noted During/After Treatment none  -       Row Name 04/14/24 1015          General Information    Patient Profile Reviewed yes  -     Patient Observations alert;cooperative;agree to therapy  -     Existing Precautions/Restrictions fall;oxygen therapy device and L/min;other (see comments)  BLE edema  -     Risks Reviewed patient:;increased  discomfort;increased drainage  -     Benefits Reviewed patient:;improve skin integrity;decrease pain;improve function  -     Barriers to Rehab medically complex;previous functional deficit  -       Row Name 04/14/24 1015          Pain    Pretreatment Pain Rating 0/10 - no pain  -     Posttreatment Pain Rating 0/10 - no pain  -       Row Name 04/14/24 1015          Cognition    Orientation Status (Cognition) oriented x 3  -       Row Name 04/14/24 1015          Wound 04/11/24 1305 Left lower leg Venous Ulcer    Wound - Properties Group Placement Date: 04/11/24  -EM Placement Time: 1305  -EM Side: Left  -EM Orientation: lower  -EM Location: leg  -EM Primary Wound Type: Venous ulcer  -EM    Wound Image Images linked: 2  -JM     Dressing Appearance intact;moist drainage  -     Base clean;granulating;yellow;slough  -     Periwound intact;blanchable;redness;swelling  -     Periwound Temperature warm  -     Periwound Skin Turgor soft  -     Edges irregular;open  -     Wound Length (cm) 13 cm  area with several shallow ulcerations  -     Wound Width (cm) 10 cm  -     Wound Depth (cm) 0.1 cm  -     Wound Surface Area (cm^2) 130 cm^2  -JM     Wound Volume (cm^3) 13 cm^3  -JM     Drainage Characteristics/Odor serosanguineous;serous  -     Drainage Amount moderate  -     Care, Wound cleansed with;wound cleanser;debrided  -     Dressing Care dressing applied;silver impregnated;foam;multi-layer wrap  mepilex ag, optilock, cast padding, MLW  -     Periwound Care cleansed with pH balanced cleanser;dry periwound area maintained  -     Retired Wound - Properties Group Placement Date: 04/11/24  -EM Placement Time: 1305  -EM Side: Left  -EM Orientation: lower  -EM Location: leg  -EM Primary Wound Type: Venous ulcer  -EM    Retired Wound - Properties Group Date first assessed: 04/11/24  -EM Time first assessed: 1305  -EM Side: Left  -EM Location: leg  -EM Primary Wound Type: Venous ulcer  -EM       [facility] Name 04/14/24 1015          Coping    Observed Emotional State calm;cooperative  -JM       Row Name 04/14/24 1015          Plan of Care Review    Plan of Care Reviewed With patient  -     Outcome Evaluation Pt presenting with severe BLE pitting edema with open ulcerations to anterior LLE.  LLE wounds shallow and granulating after debridement of slough/biofilm layer.  PT applied ag foam dressing to LLE with absorptive layers and BLE compression using size 5&6 compressogrips in doubled and overlapping layers for gradient compression.  PT will change wraps/dressings every 2-3 days.  -[facility] Name 04/14/24 1015          Positioning and Restraints    Pre-Treatment Position sitting in chair/recliner  -     Post Treatment Position chair  -     In Chair notified nsg;reclined;call light within reach;encouraged to call for assist;legs elevated  -[facility] Name 04/14/24 1015          Therapy Assessment/Plan (PT)    Criteria for Skilled Interventions Met (PT) yes;skilled treatment is necessary  -JM       Row Name 04/14/24 1015          PT Evaluation Complexity    History, PT Evaluation Complexity 1-2 personal factors and/or comorbidities  -     Examination of Body Systems (PT Eval Complexity) total of 3 or more elements  -     Clinical Presentation (PT Evaluation Complexity) evolving  -     Clinical Decision Making (PT Evaluation Complexity) moderate complexity  -     Overall Complexity (PT Evaluation Complexity) moderate complexity  -JM       Row Name 04/14/24 1015          Physical Therapy Goals    Wound Care Goal Selection (PT) wound care, PT goal 1  -JM       Row Name 04/14/24 1015          Wound Care Goal 1 (PT)    Wound Care Goal 1 (PT) Reduce LLE wound dimensions by 20% as evidence of wound healing.  -     Time Frame (Wound Care Goal 1, PT) long term goal (LTG);10 days  -               User Key  (r) = Recorded By, (t) = Taken By, (c) = Cosigned By      Initials Name Provider Type    DONALD  Gabriela Churchill, PT Physical Therapist    Andria Martell, RN Registered Nurse                  Physical Therapy Education       Title: PT OT SLP Therapies (In Progress)       Topic: Physical Therapy (In Progress)       Point: Mobility training (In Progress)       Learning Progress Summary             Patient Acceptance, E, NR by AE at 4/13/2024 0839                         Point: Home exercise program (Not Started)       Learner Progress:  Not documented in this visit.              Point: Body mechanics (In Progress)       Learning Progress Summary             Patient Acceptance, E, NR by AE at 4/13/2024 0839                         Point: Precautions (In Progress)       Learning Progress Summary             Patient Acceptance, E, NR by AE at 4/13/2024 0839                                         User Key       Initials Effective Dates Name Provider Type Discipline    AE 09/21/21 -  Valentino Pedersen, PT Physical Therapist PT                    Recommendation and Plan  Anticipated Discharge Disposition (PT): home with assist  Planned Therapy Interventions (PT): wound care  Therapy Frequency (PT): daily  Plan of Care Reviewed With: patient           Outcome Evaluation: Pt presenting with severe BLE pitting edema with open ulcerations to anterior LLE.  LLE wounds shallow and granulating after debridement of slough/biofilm layer.  PT applied ag foam dressing to LLE with absorptive layers and BLE compression using size 5&6 compressogrips in doubled and overlapping layers for gradient compression.  PT will change wraps/dressings every 2-3 days.  Plan of Care Reviewed With: patient            Time Calculation   PT Charges       Row Name 04/14/24 1155             Time Calculation    Start Time 1015  -JM      PT Goal Re-Cert Due Date 04/23/24  -JM         Untimed Charges    PT Eval/Re-eval Minutes 30  -JM      Wound Care 31942 Selective debridement;94821 Add't debridement ea 20 cm;82082 Multilayer comp below knee   -JM      05990-Qajrfjaqfo comp below knee 15  -JM      15021-Yxtkzacip debridement 10  -JM      97598-Add't debridement ea 20 cm 10  -JM         Total Minutes    Untimed Charges Total Minutes 65  -JM       Total Minutes 65  -JM                User Key  (r) = Recorded By, (t) = Taken By, (c) = Cosigned By      Initials Name Provider Type    Gabriela Sprague, PT Physical Therapist                      Therapy Charges for Today       Code Description Service Date Service Provider Modifiers Qty    07458982154 HC LIYA DEBRIDE OPEN WOUND UP TO 20CM 4/14/2024 Gabriela Churchill, PT GP 1    63058655326 HC PT MULTI LAYER COMP SYS BELOW KNEE 4/14/2024 Gabriela Churchill, PT GP 1    28554613492 HC PT LIYA DEBRIDE OPEN WOUND EA ADD 20CM 4/14/2024 Gabriela Churchill, PT GP 1    31359526797 HC PT RE-EVAL ESTABLISHED PLAN 2 4/14/2024 Gabriela Churchill, PT GP 1              PT G-Codes  Outcome Measure Options: AM-PAC 6 Clicks Basic Mobility (PT)  AM-PAC 6 Clicks Score (PT): 19       Gabriela Churchill, PT  4/14/2024

## 2024-04-14 NOTE — PLAN OF CARE
Problem: Fall Injury Risk  Goal: Absence of Fall and Fall-Related Injury  Outcome: Ongoing, Progressing  Intervention: Identify and Manage Contributors  Recent Flowsheet Documentation  Taken 4/14/2024 1000 by Diane España RN  Medication Review/Management: medications reviewed  Taken 4/14/2024 0800 by Diane España RN  Medication Review/Management: medications reviewed  Self-Care Promotion: independence encouraged  Intervention: Promote Injury-Free Environment  Recent Flowsheet Documentation  Taken 4/14/2024 1400 by Diane España RN  Safety Promotion/Fall Prevention: safety round/check completed  Taken 4/14/2024 1200 by Diane España RN  Safety Promotion/Fall Prevention: activity supervised  Taken 4/14/2024 1000 by Diane España RN  Safety Promotion/Fall Prevention: safety round/check completed  Taken 4/14/2024 0800 by Diane España RN  Safety Promotion/Fall Prevention:   activity supervised   safety round/check completed     Problem: Skin Injury Risk Increased  Goal: Skin Health and Integrity  Outcome: Ongoing, Progressing  Intervention: Promote and Optimize Oral Intake  Recent Flowsheet Documentation  Taken 4/14/2024 0800 by Diane España RN  Oral Nutrition Promotion: rest periods promoted  Intervention: Optimize Skin Protection  Recent Flowsheet Documentation  Taken 4/14/2024 1400 by Diane España RN  Head of Bed (HOB) Positioning: HOB elevated  Taken 4/14/2024 1200 by Diane España RN  Head of Bed (HOB) Positioning: HOB elevated  Taken 4/14/2024 1000 by Diane España RN  Pressure Reduction Techniques: frequent weight shift encouraged  Head of Bed (HOB) Positioning: HOB elevated  Pressure Reduction Devices: heel offloading device utilized  Skin Protection: adhesive use limited  Taken 4/14/2024 0800 by Diane España RN  Pressure Reduction Techniques: frequent weight shift encouraged  Head of Bed (HOB) Positioning: HOB elevated  Pressure Reduction Devices: heel offloading device  utilized  Skin Protection: adhesive use limited   Goal Outcome Evaluation:

## 2024-04-14 NOTE — PROGRESS NOTES
[facility] Medicine Services  PROGRESS NOTE    Patient Name: Juan Alberto Tejeda  : 1960  MRN: [id number]    Date of Admission: 2024  Primary Care Physician: Megan Martell APRN    Subjective   Subjective     CC:  Follow-up dyspnea    HPI:  Net -200mL.  Denies shortness of breath or dyspnea on exertion.  Had a bowel movement yesterday.  No PND.  Used CPAP last night and slept better with it.     Objective   Objective     Vital Signs:   Temp:  [97.8 °F (36.6 °C)-99.1 °F (37.3 °C)] 98.9 °F (37.2 °C)  Heart Rate:  [59-74] 67  Resp:  [16-18] 16  BP: (109-176)/(69-86) 158/84  Flow (L/min):  [2] 2     Physical Exam:  Constitutional: No acute distress, awake, alert, obese, sitting up in bedside chair  HENT: NCAT, mucous membranes moist  Respiratory: Clear to auscultation bilaterally, respiratory effort normal   Cardiovascular: RRR  Gastrointestinal: Positive bowel sounds, soft, nontender, nondistended  Musculoskeletal: 2+ bilateral ankle edema  Psychiatric: Appropriate affect, cooperative  Neurologic: PERRL, symmetric facies, moves all extremities well, speech clear  Skin: lower extremities covered in bandages      Results Reviewed:  LAB RESULTS:      Lab 24  1008   WBC 9.01 8.47  --  8.31   HEMOGLOBIN 11.0* 10.8*  --  11.6*   HEMATOCRIT 36.9* 35.9*  --  39.4   PLATELETS 173 157  --  198   NEUTROS ABS  --   --   --  5.95   IMMATURE GRANS (ABS)  --   --   --  0.05   LYMPHS ABS  --   --   --  1.51   MONOS ABS  --   --   --  0.53   EOS ABS  --   --   --  0.23   MCV 90.7 90.7  --  94.3   PROCALCITONIN  --   --  0.12  --          Lab 24  12124  1008   SODIUM 141 143  --  145   POTASSIUM 4.2 4.2  --  4.8   CHLORIDE 103 108*  --  111*   CO2 32.0* 30.0*  --  29.0   ANION GAP 6.0 5.0  --  5.0   BUN 57* 59*  --  64*   CREATININE 2.14* 1.99*  --  2.15*   EGFR 34.2* 37.3*  --  34.0*   GLUCOSE 106* 87   --  105*   CALCIUM 8.7 8.8  --  9.3   MAGNESIUM 1.8  --  2.4  --    HEMOGLOBIN A1C  --   --   --  6.00*   TSH  --   --  0.865  --          Lab 04/12/24 0447 04/11/24  1008   TOTAL PROTEIN 5.8* 6.6   ALBUMIN 3.3* 3.6   GLOBULIN 2.5 3.0   ALT (SGPT) 21 28   AST (SGOT) 19 24   BILIRUBIN 0.4 0.5   ALK PHOS 101 112   LIPASE  --  63*         Lab 04/12/24  0447 04/12/24  0023 04/11/24  2047 04/11/24  1219 04/11/24  1008   PROBNP  --   --   --   --  1,707.0*   HSTROP T 88* 89* 82* 72* 80*                 Brief Urine Lab Results  (Last result in the past 365 days)        Color   Clarity   Blood   Leuk Est   Nitrite   Protein   CREAT   Urine HCG        04/11/24 1537             70.2                 Microbiology Results Abnormal       Procedure Component Value - Date/Time    Blood Culture - Blood, Hand, Right [[id number]]  (Normal) Collected: 04/11/24 1514    Lab Status: Preliminary result Specimen: Blood from Hand, Right Updated: 04/13/24 1616     Blood Culture No growth at 2 days    Narrative:      Less than seven (7) mL's of blood was collected.  Insufficient quantity may yield false negative results.    Blood Culture - Blood, Arm, Right [[id number]]  (Normal) Collected: 04/11/24 1515    Lab Status: Preliminary result Specimen: Blood from Arm, Right Updated: 04/13/24 1616     Blood Culture No growth at 2 days    Narrative:      Less than seven (7) mL's of blood was collected.  Insufficient quantity may yield false negative results.    MRSA Screen, PCR (Inpatient) - Swab, Nares [[id number]]  (Normal) Collected: 04/11/24 1544    Lab Status: Final result Specimen: Swab from Nares Updated: 04/11/24 1808     MRSA PCR Negative    Narrative:      The negative predictive value of this diagnostic test is high and should only be used to consider de-escalating anti-MRSA therapy. A positive result may indicate colonization with MRSA and must be correlated clinically.  MRSA Negative    COVID-19 and FLU A/B PCR, 1 HR TAT - Swab,  Nasopharynx [[id number]]  (Normal) Collected: 04/11/24 1039    Lab Status: Final result Specimen: Swab from Nasopharynx Updated: 04/11/24 1122     COVID19 Not Detected     Influenza A PCR Not Detected     Influenza B PCR Not Detected    Narrative:      Fact sheet for providers: https://www.fda.gov/media/346557/download    Fact sheet for patients: https://www.fda.gov/media/462106/download    Test performed by PCR.            Duplex Venous Lower Extremity - Bilateral CAR    Result Date: 4/12/2024    Normal bilateral lower extremity venous duplex scan.      Results for orders placed during the hospital encounter of 04/11/24    Adult Transthoracic Echo Complete W/ Cont if Necessary Per Protocol    Interpretation Summary    Left ventricular systolic function is normal. Estimated left ventricular EF = 60%    The left atrial cavity is mild to moderately dilated.    There is a well-seated 26 mm TAVR valve present.  Mean gradient is 20 mmHg, BERNA 1.7 cm².    Calcified mitral valve with mild mitral stenosis.  Mean gradient 7 mmHg, MVA 2.3 cm².    Mild mitral regurgitation.      Current medications:  Scheduled Meds:allopurinol, 100 mg, Oral, Daily  aspirin, 81 mg, Oral, Daily  bumetanide, 2 mg, Intravenous, Daily  carvedilol, 12.5 mg, Oral, BID With Meals  cefTRIAXone, 2,000 mg, Intravenous, Q24H  gabapentin, 600 mg, Oral, Q8H  heparin (porcine), 5,000 Units, Subcutaneous, Q8H  hydrALAZINE, 25 mg, Oral, Q8H  insulin lispro, 2-7 Units, Subcutaneous, 4x Daily AC & at Bedtime  lactobacillus acidophilus, 1 capsule, Oral, Daily  pharmacy consult - MTM, , Does not apply, Daily  polyethylene glycol, 17 g, Oral, Daily  rosuvastatin, 40 mg, Oral, Nightly      Continuous Infusions:     PRN Meds:.  acetaminophen    bisacodyl    dextrose    dextrose    glucagon (human recombinant)    sodium chloride    Assessment & Plan   Assessment & Plan     Active Hospital Problems    Diagnosis  POA    **Acute on chronic diastolic CHF (congestive heart  failure) [I50.33]  Yes    Coronary artery disease involving native coronary artery of native heart with angina pectoris [I25.119]  Yes    Type 2 diabetes mellitus with kidney complication, with long-term current use of insulin [E11.29, Z79.4]  Not Applicable    Hyperlipidemia LDL goal <70 [E78.5]  Yes    Essential hypertension [I10]  Yes    Acute on chronic diastolic congestive heart failure [I50.33]  Yes    Nonrheumatic aortic valve stenosis/status post TAVR [I35.0]  Yes    CKD (chronic kidney disease) stage 3, GFR 30-59 ml/min [N18.30]  Yes      Resolved Hospital Problems   No resolved problems to display.        Brief Hospital Course to date:  Juan Alberto Tejeda is a 63 y.o. male with history of TAVR, CAD, HFpEF, CKD stage IV, DM2, HTN, HLD, gout, who presented for evaluation of progressive dyspnea x 1 to 2 months.    This patient's problems and plans were partially entered by my partner and updated as appropriate by me 04/14/24.    Acute respiratory failure with hypoxia, 86% on RA in ED  A/C DHF  Hx of TAVR  Chest pain, resolved  Elevated troponin, suspected type II MI due to above  -Bumex 2mg IV daily  -ECHO 4/12: LVEF 60%, well-seated TAVR with mean gradient 20, mild mitral stenosis  -consulted cardiology  -strict I&O, daily weights, fluid restriction to 1.2L  -Troponins relatively flat, no ST segment changes on EKG  -Coreg 12.5 mg twice daily, Crestor 40 mg daily, aspirin 81 mg daily     B LE edema  LLE redness  LLE wounds  -rocephin x5 days  -MRSA PCR --> stopped vanc  -wound care  -duplex of B LE negative for DVT    JASWINDER  -Previously on [facility], but no longer has this at home  -CPAP nightly  -Sleep medicine referral     CKD IV  -consulted NAL given history of progressive renal failure, and need for diuresis for CHF     DM  -A1C 6%  -SSI     Hx of HTN  -monitor, continue BB, hydralazine added this admission, titrate medications per cardiology     Hx of mild CAD, per catheterization 2020      HLD  -statin    Expected Discharge Location and Transportation: home  Expected Discharge   Expected Discharge Date: 4/16/2024; Expected Discharge Time:      DVT prophylaxis:  Medical DVT prophylaxis orders are present.         AM-PAC 6 Clicks Score (PT): 18 (04/13/24 1148)    CODE STATUS:   Code Status and Medical Interventions:   Ordered at: 04/12/24 1028     Medical Intervention Limits:    NO intubation (DNI)     Level Of Support Discussed With:    Patient     Code Status (Patient has no pulse and is not breathing):    No CPR (Do Not Attempt to Resuscitate)     Medical Interventions (Patient has pulse or is breathing):    Limited Support       Sanjuana Gandhi MD  04/14/24

## 2024-04-14 NOTE — PLAN OF CARE
Goal Outcome Evaluation:  Plan of Care Reviewed With: patient           Outcome Evaluation: Pt presenting with severe BLE pitting edema with open ulcerations to anterior LLE.  LLE wounds shallow and granulating after debridement of slough/biofilm layer.  PT applied ag foam dressing to LLE with absorptive layers and BLE compression using size 5&6 compressogrips in doubled and overlapping layers for gradient compression.  PT will change wraps/dressings every 2-3 days.

## 2024-04-14 NOTE — PROGRESS NOTES
[facility] at [facility]  PROGRESS NOTE    Date of Admission: 4/11/2024  Date of Service: 04/14/24    Primary Care Physician: Megan Martell APRN    Reason for visit:    CHF/cardiorenal syndrome    IDENTIFICATION: 63-year-old gentleman who resides in [place], [place]           Active Hospital Problems     Diagnosis   POA    **Acute on chronic diastolic CHF (congestive heart failure) [I50.33]   Yes    Type 2 diabetes mellitus with kidney complication, with long-term current use of insulin [E11.29, Z79.4]   Not Applicable       Priority: High    CKD (chronic kidney disease) stage 3, GFR 30-59 ml/min [N18.30]   Yes       Priority: High    Coronary artery disease involving native coronary artery of native heart with angina pectoris [I25.119]   Yes       Priority: Medium       Cardiac catheterization at [facility] (12/2019): Moderate nonobstructive CAD.       Essential hypertension [I10]   Yes       Priority: Medium    Acute on chronic diastolic congestive heart failure [I50.33]   Yes       Priority: Medium       Echo (2/29/2020): LVEF 65%.  Aortic valve is bicuspid and severely calcified.  Severe aortic stenosis (mean gradient 51 mmHg). Mild MR and MS  Echo (5/20/2020): LVEF = 65%.  Mild LVH. A 26 mm Fried Lin 3 TAVR valve is well-seated and exhibits a mean gradient of 13 mmHg. There is no paravalvular leak present.       Nonrheumatic aortic valve stenosis/status post TAVR [I35.0]   Yes       Priority: Medium       Echo (2/29/2020): LVEF 65%.  Aortic valve is bicuspid and severely calcified.  Severe aortic stenosis (mean gradient 51 mmHg). Mild MR and MS  Status post TAVR 3/16/2020  Echo (5/20/2020): LVEF = 65%.  Mild LVH. A 26 mm Fried Lin 3 TAVR valve is well-seated and exhibits a mean gradient of 13 mmHg. There is no paravalvular leak present.       Hyperlipidemia LDL goal <70 [E78.5]   Yes       Priority: Low       High intensity statin therapy indicated given the presence of CAD  "    Subjective      Continues to diurese well.  No chest pain.  Shortness of breath is improving.  Main time of shortness of breath is at night.  Believes that his edema has gone down some.  Normal sinus rhythm on telemetry.      Objective   Vitals: /85   Pulse 72   Temp 98.3 °F (36.8 °C) (Oral)   Resp 16   Ht 170.2 cm (67.01\")   Wt 123 kg (271 lb 9.6 oz)   SpO2 92%   BMI 42.53 kg/m²     Physical Exam:  GENERAL: in no acute distress.   HEENT:  no jugular venous distention  HEART: Regular rhythm, normal rate, systolic murmur present  LUNGS: Decreased breath sounds.  No wheezing or rhonchi.  EXTREMITIES: 2+ pitting bilateral lower extremity edema    Results:  Results from last 7 days   Lab Units 04/13/24  0445 04/12/24  0447 04/11/24  1008   WBC 10*3/mm3 9.01 8.47 8.31   HEMOGLOBIN g/dL 11.0* 10.8* 11.6*   HEMATOCRIT % 36.9* 35.9* 39.4   PLATELETS 10*3/mm3 173 157 198     Results from last 7 days   Lab Units 04/14/24  0637 04/13/24  0445 04/12/24 0447   SODIUM mmol/L 139 141 143   POTASSIUM mmol/L 4.1 4.2 4.2   CHLORIDE mmol/L 100 103 108*   CO2 mmol/L 34.0* 32.0* 30.0*   BUN mg/dL 55* 57* 59*   CREATININE mg/dL 1.93* 2.14* 1.99*   GLUCOSE mg/dL 120* 106* 87      Lab Results   Component Value Date    TRIG 79 04/02/2024    HDL 49 04/02/2024    LDL 65 04/02/2024    AST 19 04/12/2024    ALT 21 04/12/2024     Results from last 7 days   Lab Units 04/11/24  1008   HEMOGLOBIN A1C % 6.00*         Results from last 7 days   Lab Units 04/11/24  1219   TSH uIU/mL 0.865             Results from last 7 days   Lab Units 04/12/24  0447 04/12/24  0023 04/11/24 2047   HSTROP T ng/L 88* 89* 82*     Results from last 7 days   Lab Units 04/11/24  1008   PROBNP pg/mL 1,707.0*         Intake/Output Summary (Last 24 hours) at 4/14/2024 0932  Last data filed at 4/14/2024 0900  Gross per 24 hour   Intake 1520 ml   Output 1600 ml   Net -80 ml       I personally reviewed the patient's EKG/Telemetry data    Results review  Tele: " Normal sinus rhythm     Results Review (reviewed the patient's recent labs in the electronic medical record):      EKG (/12/2024): Normal sinus rhythm no acute ischemia     CXR (4/12/2024): Small bilateral pleural effusion     ECHO (4/12/2024): LVEF 60%.  TAVR valve present mean gradient 20 mmHg.  Mild mitral stenosis with mean gradient 7 mmHg.  Mild MR    Current Medications:  allopurinol, 100 mg, Oral, Daily  aspirin, 81 mg, Oral, Daily  bumetanide, 2 mg, Intravenous, Daily  carvedilol, 12.5 mg, Oral, BID With Meals  cefTRIAXone, 2,000 mg, Intravenous, Q24H  gabapentin, 600 mg, Oral, Q8H  heparin (porcine), 5,000 Units, Subcutaneous, Q8H  hydrALAZINE, 25 mg, Oral, Q8H  insulin lispro, 2-7 Units, Subcutaneous, 4x Daily AC & at Bedtime  lactobacillus acidophilus, 1 capsule, Oral, Daily  pharmacy consult - MTM, , Does not apply, Daily  polyethylene glycol, 17 g, Oral, Daily  rosuvastatin, 40 mg, Oral, Nightly           Assessment:   Acute on chronic diastolic heart failure/cardiorenal syndrome  proBNP elevated 1707 and chest x-ray mild pulmonary edema consistent with CHF  Takes Bumex 1 mg twice daily at home  Treated with 2 mg IV Bumex x 1 in ED  Last echo 5/2020 LVEF 65% with 26 mm Fried GAURANG 3 TAVR valve well-seated with no perivalvular leak  Echo this admission shows normal LVEF 60% with TAVR valve well-seated.  IV Bumex 2 mg twice daily received on 4/11 and 4/12  Carvedilol 12.5 mg twice daily  Continues with significant urine output        Elevated troponin/type II NSTEMI from cardiorenal syndrome  Troponin elevation flat and not consistent with ACS.  EKGs no acute ischemic changes  Echo this admission shows normal LVEF with no wall motion abnormality and TAVR valve well-seated     Coronary artery disease  Cath in 2019 showed moderate nonobstructive CAD  High-sensitivity troponin elevated this admission at 80 and EKG normal sinus rhythm but no acute ischemia  Currently denies chest pain but has had chest  tightness over the past 2 months.  Aspirin 81 mg daily     Valvular heart disease status post TAVR  Status post TAVR 2020  Last echo 2020 showed well-seated TAVR valve with mean gradient of 13 mmHg without perivalvular leak  Echo this admission shows well-seated TAVR with mean gradient 20 mmHg.  Not contributing to current issues     Stage III chronic kidney disease  Met with  transplant team 1 month ago.  Patient has elected not to have transplant when given option between transplant hemodialysis.  Follows Dr. Cobos  Current creatinine 1.93  Nephrology following as an inpatient        Type 2 diabetes mellitus  Recent hemoglobin A1c 7.8  No SGL 2 inhibitor due to CKD        Hypertension  Elevated on admission 193/94 then 173/83 but had not had home dose of carvedilol  Carvedilol 12.5 mg twice daily  Current /75        Hyperlipidemia  Recent lipids total cholesterol 130, triglycerides 79, HDL 49, LDL 65  Crestor 40 mg daily    Plan:   Continue IV Bumex today.  Strict I's and O's.  Monitor renal function.  Nephrology assisting with diuretic management.  Fluid restriction and low-sodium diet.  No ACE/ARB/Entresto/Aldactone due to renal dysfunction.  Continue hydralazine 25 mg 3 times daily for blood pressure control.  May uptitrate as needed.  Continue Coreg 12.5 mg twice daily for heart rate control and GDMT for heart failure.      Juju Yang PA-C

## 2024-04-15 LAB
ANION GAP SERPL CALCULATED.3IONS-SCNC: 9 MMOL/L (ref 5–15)
BUN SERPL-MCNC: 53 MG/DL (ref 8–23)
BUN/CREAT SERPL: 25.2 (ref 7–25)
CALCIUM SPEC-SCNC: 8.5 MG/DL (ref 8.6–10.5)
CHLORIDE SERPL-SCNC: 97 MMOL/L (ref 98–107)
CO2 SERPL-SCNC: 34 MMOL/L (ref 22–29)
CREAT SERPL-MCNC: 2.1 MG/DL (ref 0.76–1.27)
EGFRCR SERPLBLD CKD-EPI 2021: 34.9 ML/MIN/1.73
GLUCOSE BLDC GLUCOMTR-MCNC: 108 MG/DL (ref 70–130)
GLUCOSE BLDC GLUCOMTR-MCNC: 150 MG/DL (ref 70–130)
GLUCOSE BLDC GLUCOMTR-MCNC: 183 MG/DL (ref 70–130)
GLUCOSE BLDC GLUCOMTR-MCNC: 190 MG/DL (ref 70–130)
GLUCOSE SERPL-MCNC: 120 MG/DL (ref 65–99)
MAGNESIUM SERPL-MCNC: 1.9 MG/DL (ref 1.6–2.4)
POTASSIUM SERPL-SCNC: 4 MMOL/L (ref 3.5–5.2)
SODIUM SERPL-SCNC: 140 MMOL/L (ref 136–145)

## 2024-04-15 PROCEDURE — 82948 REAGENT STRIP/BLOOD GLUCOSE: CPT

## 2024-04-15 PROCEDURE — 94660 CPAP INITIATION&MGMT: CPT

## 2024-04-15 PROCEDURE — 99232 SBSQ HOSP IP/OBS MODERATE 35: CPT | Performed by: NURSE PRACTITIONER

## 2024-04-15 PROCEDURE — 83735 ASSAY OF MAGNESIUM: CPT | Performed by: STUDENT IN AN ORGANIZED HEALTH CARE EDUCATION/TRAINING PROGRAM

## 2024-04-15 PROCEDURE — 99232 SBSQ HOSP IP/OBS MODERATE 35: CPT | Performed by: STUDENT IN AN ORGANIZED HEALTH CARE EDUCATION/TRAINING PROGRAM

## 2024-04-15 PROCEDURE — 63710000001 INSULIN DETEMIR PER 5 UNITS: Performed by: STUDENT IN AN ORGANIZED HEALTH CARE EDUCATION/TRAINING PROGRAM

## 2024-04-15 PROCEDURE — 25010000002 CEFTRIAXONE PER 250 MG: Performed by: HOSPITALIST

## 2024-04-15 PROCEDURE — 63710000001 INSULIN LISPRO (HUMAN) PER 5 UNITS: Performed by: HOSPITALIST

## 2024-04-15 PROCEDURE — 94799 UNLISTED PULMONARY SVC/PX: CPT

## 2024-04-15 PROCEDURE — 80048 BASIC METABOLIC PNL TOTAL CA: CPT | Performed by: STUDENT IN AN ORGANIZED HEALTH CARE EDUCATION/TRAINING PROGRAM

## 2024-04-15 PROCEDURE — 25010000002 BUMETANIDE PER 0.5 MG: Performed by: INTERNAL MEDICINE

## 2024-04-15 PROCEDURE — 25010000002 HEPARIN (PORCINE) PER 1000 UNITS: Performed by: STUDENT IN AN ORGANIZED HEALTH CARE EDUCATION/TRAINING PROGRAM

## 2024-04-15 RX ADMIN — HEPARIN SODIUM 5000 UNITS: 5000 INJECTION INTRAVENOUS; SUBCUTANEOUS at 13:54

## 2024-04-15 RX ADMIN — ASPIRIN 81 MG: 81 TABLET, CHEWABLE ORAL at 08:19

## 2024-04-15 RX ADMIN — INSULIN LISPRO 2 UNITS: 100 INJECTION, SOLUTION INTRAVENOUS; SUBCUTANEOUS at 21:10

## 2024-04-15 RX ADMIN — POLYETHYLENE GLYCOL 3350 17 G: 17 POWDER, FOR SOLUTION ORAL at 08:19

## 2024-04-15 RX ADMIN — INSULIN LISPRO 2 UNITS: 100 INJECTION, SOLUTION INTRAVENOUS; SUBCUTANEOUS at 11:59

## 2024-04-15 RX ADMIN — Medication 1 CAPSULE: at 08:19

## 2024-04-15 RX ADMIN — BUMETANIDE 2 MG: 0.25 INJECTION INTRAMUSCULAR; INTRAVENOUS at 08:19

## 2024-04-15 RX ADMIN — HYDRALAZINE HYDROCHLORIDE 25 MG: 25 TABLET ORAL at 13:54

## 2024-04-15 RX ADMIN — GABAPENTIN 600 MG: 300 CAPSULE ORAL at 13:54

## 2024-04-15 RX ADMIN — HEPARIN SODIUM 5000 UNITS: 5000 INJECTION INTRAVENOUS; SUBCUTANEOUS at 21:09

## 2024-04-15 RX ADMIN — CEFTRIAXONE 2000 MG: 2 INJECTION, POWDER, FOR SOLUTION INTRAMUSCULAR; INTRAVENOUS at 13:54

## 2024-04-15 RX ADMIN — HEPARIN SODIUM 5000 UNITS: 5000 INJECTION INTRAVENOUS; SUBCUTANEOUS at 06:34

## 2024-04-15 RX ADMIN — GABAPENTIN 600 MG: 300 CAPSULE ORAL at 21:09

## 2024-04-15 RX ADMIN — ALLOPURINOL 100 MG: 100 TABLET ORAL at 08:19

## 2024-04-15 RX ADMIN — GABAPENTIN 600 MG: 300 CAPSULE ORAL at 06:33

## 2024-04-15 RX ADMIN — HYDRALAZINE HYDROCHLORIDE 25 MG: 25 TABLET ORAL at 06:33

## 2024-04-15 RX ADMIN — BUMETANIDE 2 MG: 0.25 INJECTION INTRAMUSCULAR; INTRAVENOUS at 21:09

## 2024-04-15 RX ADMIN — CARVEDILOL 12.5 MG: 12.5 TABLET, FILM COATED ORAL at 08:19

## 2024-04-15 RX ADMIN — INSULIN DETEMIR 5 UNITS: 100 INJECTION, SOLUTION SUBCUTANEOUS at 21:09

## 2024-04-15 RX ADMIN — ROSUVASTATIN 40 MG: 20 TABLET, FILM COATED ORAL at 21:09

## 2024-04-15 RX ADMIN — CARVEDILOL 12.5 MG: 12.5 TABLET, FILM COATED ORAL at 18:24

## 2024-04-15 NOTE — PROGRESS NOTES
"   LOS: 4 days    Patient Care Team:  Megan Martell, RADHA as PCP - General (Family Medicine)    Subjective     No new events.  Feeling better.      Objective     Vital Signs:  Blood pressure 137/68, pulse 65, temperature 98.9 °F (37.2 °C), temperature source Oral, resp. rate 20, height 170.2 cm (67.01\"), weight 123 kg (270 lb 3.2 oz), SpO2 95%.      Intake/Output Summary (Last 24 hours) at 4/15/2024 1138  Last data filed at 4/15/2024 0500  Gross per 24 hour   Intake 100 ml   Output 1325 ml   Net -1225 ml        04/14 0701 - 04/15 0700  In: 220 [P.O.:120]  Out: 2125 [Urine:2125]    Physical Exam:      GENERAL: WD obese WM NAD  NEURO: Awake and alert, oriented. No focal deficit  PSYCHIATRIC: NMA. Cooperative with PE  EYE: PE, no icterus, no conjunctivitis  ENT: ommm, dentition intact,  Hearing intact  NECK:  No JVD discernable,  Trachea midline  CV: + Edema, RRR  LUNGS:  Quiet,  Nonlabored resp.  Symmetrical expansion  ABDOMEN: Nondistended, soft nontender.  : No Chi, no palp bladder  SKIN: Warm and dry without rash      Labs:  Results from last 7 days   Lab Units 04/13/24  0445 04/12/24  0447 04/11/24  1008   WBC 10*3/mm3 9.01 8.47 8.31   HEMOGLOBIN g/dL 11.0* 10.8* 11.6*   PLATELETS 10*3/mm3 173 157 198     Results from last 7 days   Lab Units 04/15/24  0456 04/14/24  0637 04/13/24  0445 04/12/24  0447 04/11/24  1219 04/11/24  1008   SODIUM mmol/L 140 139 141 143  --  145   POTASSIUM mmol/L 4.0 4.1 4.2 4.2  --  4.8   CHLORIDE mmol/L 97* 100 103 108*  --  111*   CO2 mmol/L 34.0* 34.0* 32.0* 30.0*  --  29.0   BUN mg/dL 53* 55* 57* 59*  --  64*   CREATININE mg/dL 2.10* 1.93* 2.14* 1.99*  --  2.15*   CALCIUM mg/dL 8.5* 8.8 8.7 8.8  --  9.3   MAGNESIUM mg/dL 1.9 1.8 1.8  --  2.4  --    ALBUMIN g/dL  --   --   --  3.3*  --  3.6     Results from last 7 days   Lab Units 04/12/24  0447   ALK PHOS U/L 101   BILIRUBIN mg/dL 0.4   ALT (SGPT) U/L 21   AST (SGOT) U/L 19                   Estimated Creatinine Clearance: " 45.9 mL/min (A) (by C-G formula based on SCr of 2.1 mg/dL (H)).         A/P:    CKD 4: Creatinine repeat caroline below baseline at 2.1.  Follows with [facility].  Last seen by us 2/24.  Creatinine was 3.5 at that time.  For now would continue diuresis.  Continue supportive care.  Strict I's and O's.  Monitor renal function closely.  No indication for emergent dialysis at this time.    .  HTN: Blood pressure stable.  Continue current medications.  Monitor with volume reduction.  Cover with as needed medications     Volume: Patient with significant edema.  Albumin borderline at 3.6. .  Only on Maxide at home.  Previously patient was on other diuretics but reports they were stopped due to rising creatinine without edema.  Patient reports over the past month and a half he developed edema which has has continued to increase.  Reports baseline weight around 265.   standing weight initially 277.  Down to 270 today.  Continue diuretics for now.  Monitor strict I's and O's.  Limit fluids to 40 ounces a day.  check daily standing weight.  Recommend establishing dry weight for possible sliding scale diuretics once patient stabilized.       HFpEF: Per report last echocardiogram 5/2020 showed a EF of 65%.  Patient has a TAVR valve in place.  Evaluated by cardiology.  Not contributing to current situation.     High risk and complexity patient.    Pradip Ortega MD  04/15/24  11:38 EDT

## 2024-04-15 NOTE — PROGRESS NOTES
[facility] Medicine Services  PROGRESS NOTE    Patient Name: Juan Alberto Tejeda  : 1960  MRN: [id number]    Date of Admission: 2024  Primary Care Physician: Megan Martell APRN    Subjective   Subjective     CC:  Follow-up dyspnea    HPI:  Net -1.9L. Had 2 BM. Afebrile.  No shortness of breath.  Minimal cough.  No chest pain.  No nausea or vomiting.    Objective   Objective     Vital Signs:   Temp:  [98.1 °F (36.7 °C)-99.2 °F (37.3 °C)] 99.2 °F (37.3 °C)  Heart Rate:  [64-72] 65  Resp:  [18] 18  BP: (124-148)/(70-85) 130/84  Flow (L/min):  [2] 2     Physical Exam:  Constitutional: No acute distress, awake, alert, obese  HENT: NCAT, mucous membranes moist  Respiratory: Clear to auscultation bilaterally, respiratory effort normal   Cardiovascular: RRR  Gastrointestinal: Normoactive bowel sounds, soft, nontender, nondistended  Musculoskeletal: 2+ bilateral ankle edema  Psychiatric: Appropriate affect, cooperative  Neurologic: PERRL, symmetric facies, moves all extremities well, speech clear  Skin: lower extremities covered in bandages      Results Reviewed:  LAB RESULTS:      Lab 24  1008   WBC 9.01 8.47  --  8.31   HEMOGLOBIN 11.0* 10.8*  --  11.6*   HEMATOCRIT 36.9* 35.9*  --  39.4   PLATELETS 173 157  --  198   NEUTROS ABS  --   --   --  5.95   IMMATURE GRANS (ABS)  --   --   --  0.05   LYMPHS ABS  --   --   --  1.51   MONOS ABS  --   --   --  0.53   EOS ABS  --   --   --  0.23   MCV 90.7 90.7  --  94.3   PROCALCITONIN  --   --  0.12  --          Lab 04/15/24  0456 24  0637 24  0445 24  12124  1008   SODIUM 140 139 141 143  --  145   POTASSIUM 4.0 4.1 4.2 4.2  --  4.8   CHLORIDE 97* 100 103 108*  --  111*   CO2 34.0* 34.0* 32.0* 30.0*  --  29.0   ANION GAP 9.0 5.0 6.0 5.0  --  5.0   BUN 53* 55* 57* 59*  --  64*   CREATININE 2.10* 1.93* 2.14* 1.99*  --  2.15*   EGFR 34.9* 38.7* 34.2*  37.3*  --  34.0*   GLUCOSE 120* 120* 106* 87  --  105*   CALCIUM 8.5* 8.8 8.7 8.8  --  9.3   MAGNESIUM 1.9 1.8 1.8  --  2.4  --    HEMOGLOBIN A1C  --   --   --   --   --  6.00*   TSH  --   --   --   --  0.865  --          Lab 04/12/24 0447 04/11/24  1008   TOTAL PROTEIN 5.8* 6.6   ALBUMIN 3.3* 3.6   GLOBULIN 2.5 3.0   ALT (SGPT) 21 28   AST (SGOT) 19 24   BILIRUBIN 0.4 0.5   ALK PHOS 101 112   LIPASE  --  63*         Lab 04/12/24 0447 04/12/24  0023 04/11/24  2047 04/11/24  1219 04/11/24  1008   PROBNP  --   --   --   --  1,707.0*   HSTROP T 88* 89* 82* 72* 80*                 Brief Urine Lab Results  (Last result in the past 365 days)        Color   Clarity   Blood   Leuk Est   Nitrite   Protein   CREAT   Urine HCG        04/11/24 1537             70.2                 Microbiology Results Abnormal       Procedure Component Value - Date/Time    Blood Culture - Blood, Hand, Right [[id number]]  (Normal) Collected: 04/11/24 1514    Lab Status: Preliminary result Specimen: Blood from Hand, Right Updated: 04/14/24 1616     Blood Culture No growth at 3 days    Narrative:      Less than seven (7) mL's of blood was collected.  Insufficient quantity may yield false negative results.    Blood Culture - Blood, Arm, Right [[id number]]  (Normal) Collected: 04/11/24 1515    Lab Status: Preliminary result Specimen: Blood from Arm, Right Updated: 04/14/24 1616     Blood Culture No growth at 3 days    Narrative:      Less than seven (7) mL's of blood was collected.  Insufficient quantity may yield false negative results.    MRSA Screen, PCR (Inpatient) - Swab, Nares [[id number]]  (Normal) Collected: 04/11/24 1544    Lab Status: Final result Specimen: Swab from Nares Updated: 04/11/24 1808     MRSA PCR Negative    Narrative:      The negative predictive value of this diagnostic test is high and should only be used to consider de-escalating anti-MRSA therapy. A positive result may indicate colonization with MRSA and must be correlated  clinically.  MRSA Negative    COVID-19 and FLU A/B PCR, 1 HR TAT - Swab, Nasopharynx [[id number]]  (Normal) Collected: 04/11/24 1039    Lab Status: Final result Specimen: Swab from Nasopharynx Updated: 04/11/24 1122     COVID19 Not Detected     Influenza A PCR Not Detected     Influenza B PCR Not Detected    Narrative:      Fact sheet for providers: https://www.fda.gov/media/398764/download    Fact sheet for patients: https://www.fda.gov/media/530288/download    Test performed by PCR.            No radiology results from the last 24 hrs    Results for orders placed during the hospital encounter of 04/11/24    Adult Transthoracic Echo Complete W/ Cont if Necessary Per Protocol    Interpretation Summary    Left ventricular systolic function is normal. Estimated left ventricular EF = 60%    The left atrial cavity is mild to moderately dilated.    There is a well-seated 26 mm TAVR valve present.  Mean gradient is 20 mmHg, BERNA 1.7 cm².    Calcified mitral valve with mild mitral stenosis.  Mean gradient 7 mmHg, MVA 2.3 cm².    Mild mitral regurgitation.      Current medications:  Scheduled Meds:allopurinol, 100 mg, Oral, Daily  aspirin, 81 mg, Oral, Daily  bumetanide, 2 mg, Intravenous, Q12H  carvedilol, 12.5 mg, Oral, BID With Meals  cefTRIAXone, 2,000 mg, Intravenous, Q24H  gabapentin, 600 mg, Oral, Q8H  heparin (porcine), 5,000 Units, Subcutaneous, Q8H  hydrALAZINE, 25 mg, Oral, Q8H  insulin lispro, 2-7 Units, Subcutaneous, 4x Daily AC & at Bedtime  lactobacillus acidophilus, 1 capsule, Oral, Daily  pharmacy consult - MTM, , Does not apply, Daily  polyethylene glycol, 17 g, Oral, Daily  rosuvastatin, 40 mg, Oral, Nightly      Continuous Infusions:     PRN Meds:.  acetaminophen    bisacodyl    dextrose    dextrose    glucagon (human recombinant)    sodium chloride    Assessment & Plan   Assessment & Plan     Active Hospital Problems    Diagnosis  POA    **Acute on chronic diastolic CHF (congestive heart failure) [I50.33]   Yes    Coronary artery disease involving native coronary artery of native heart with angina pectoris [I25.119]  Yes    Type 2 diabetes mellitus with kidney complication, with long-term current use of insulin [E11.29, Z79.4]  Not Applicable    Hyperlipidemia LDL goal <70 [E78.5]  Yes    Essential hypertension [I10]  Yes    Acute on chronic diastolic congestive heart failure [I50.33]  Yes    Nonrheumatic aortic valve stenosis/status post TAVR [I35.0]  Yes    CKD (chronic kidney disease) stage 3, GFR 30-59 ml/min [N18.30]  Yes      Resolved Hospital Problems   No resolved problems to display.        Brief Hospital Course to date:  Juan Alberto Tejeda is a 63 y.o. male with history of TAVR, CAD, HFpEF, CKD stage IV, DM2, HTN, HLD, gout, who presented for evaluation of progressive dyspnea x 1 to 2 months.    This patient's problems and plans were partially entered by my partner and updated as appropriate by me 04/15/24.    Acute respiratory failure with hypoxia, 86% on RA in ED  A/C DHF  Hx of TAVR  Chest pain, resolved  Elevated troponin, suspected type II MI due to above  -Bumex 2mg IV BID  -Daily standing weights; dry weight around 265 pounds  -ECHO 4/12: LVEF 60%, well-seated TAVR with mean gradient 20, mild mitral stenosis  -consulted cardiology  -strict I&O, daily weights, fluid restriction to 1.2L  -Troponins relatively flat, no ST segment changes on EKG  -Coreg 12.5 mg twice daily, Crestor 40 mg daily, aspirin 81 mg daily     B LE edema  LLE redness  LLE wounds  -rocephin x5 days  -MRSA PCR --> stopped vanc  -wound care  -duplex of B LE negative for DVT    JASWINDER  -Previously on [facility] outpatient, but no longer has this at home  -CPAP nightly  -Sleep medicine referral     CKD IV  -consulted NAL given history of progressive renal failure, and need for diuresis for CHF     DM  -A1C 6%  -SSI     Hx of HTN  -monitor, continue BB, hydralazine added this admission and now held due to borderline blood pressure, titrate  medications per cardiology     Hx of mild CAD, per catheterization 2020     HLD  -statin    Expected Discharge Location and Transportation: home  Expected Discharge   Expected Discharge Date: 4/16/2024; Expected Discharge Time:      DVT prophylaxis:  Medical DVT prophylaxis orders are present.         AM-PAC 6 Clicks Score (PT): 19 (04/14/24 0800)    CODE STATUS:   Code Status and Medical Interventions:   Ordered at: 04/12/24 1028     Medical Intervention Limits:    NO intubation (DNI)     Level Of Support Discussed With:    Patient     Code Status (Patient has no pulse and is not breathing):    No CPR (Do Not Attempt to Resuscitate)     Medical Interventions (Patient has pulse or is breathing):    Limited Support       Sanjuana Gandhi MD  04/15/24

## 2024-04-15 NOTE — PROGRESS NOTES
Cardiology Progress Note      Reason for visit:    Acute on chronic diastolic heart failure /cardiorenal syndrome    IDENTIFICATION: 63-year-old gentleman who resides in [place], [place]    Active Hospital Problems    Diagnosis  POA    **Acute on chronic diastolic CHF (congestive heart failure) [I50.33]  Yes    Type 2 diabetes mellitus with kidney complication, with long-term current use of insulin [E11.29, Z79.4]  Not Applicable     Priority: High    CKD (chronic kidney disease) stage 3, GFR 30-59 ml/min [N18.30]  Yes     Priority: High    Coronary artery disease involving native coronary artery of native heart with angina pectoris [I25.119]  Yes     Priority: Medium     Cardiac catheterization at [facility] (12/2019): Moderate nonobstructive CAD.      Essential hypertension [I10]  Yes     Priority: Medium    Acute on chronic diastolic congestive heart failure [I50.33]  Yes     Priority: Medium     Echo (2/29/2020): LVEF 65%.  Aortic valve is bicuspid and severely calcified.  Severe aortic stenosis (mean gradient 51 mmHg). Mild MR and MS  Echo (5/20/2020): LVEF = 65%.  Mild LVH. A 26 mm Fried Lin 3 TAVR valve is well-seated and exhibits a mean gradient of 13 mmHg. There is no paravalvular leak present.      Nonrheumatic aortic valve stenosis/status post TAVR [I35.0]  Yes     Priority: Medium     Echo (2/29/2020): LVEF 65%.  Aortic valve is bicuspid and severely calcified.  Severe aortic stenosis (mean gradient 51 mmHg). Mild MR and MS  Status post TAVR 3/16/2020  Echo (5/20/2020): LVEF = 65%.  Mild LVH. A 26 mm Fried Lin 3 TAVR valve is well-seated and exhibits a mean gradient of 13 mmHg. There is no paravalvular leak present.      Hyperlipidemia LDL goal <70 [E78.5]  Yes     Priority: Low     High intensity statin therapy indicated given the presence of CAD              Patient sitting up in the chair without complaints.  He feels much better than when he was admitted.  Over 11,000 mL of been diuresed since  admission.  He denies any chest pain.           Vital Sign Min/Max for last 24 hours  Temp  Min: 98.1 °F (36.7 °C)  Max: 99.2 °F (37.3 °C)   BP  Min: 124/70  Max: 148/85   Pulse  Min: 64  Max: 72   Resp  Min: 18  Max: 18   SpO2  Min: 91 %  Max: 93 %   Flow (L/min)  Min: 2  Max: 2      Intake/Output Summary (Last 24 hours) at 4/15/2024 0754  Last data filed at 4/15/2024 0500  Gross per 24 hour   Intake 220 ml   Output 2125 ml   Net -1905 ml           Physical Exam  Constitutional:       General: He is awake.   Neck:      Vascular: No JVD.   Cardiovascular:      Rate and Rhythm: Normal rate and regular rhythm.      Heart sounds: Murmur heard.      Comments: Trace lower extremity edema  Pulmonary:      Effort: Pulmonary effort is normal.      Breath sounds: Normal breath sounds.   Skin:     General: Skin is warm and dry.   Neurological:      Mental Status: He is alert and oriented to person, place, and time.   Psychiatric:         Behavior: Behavior is cooperative.         Tele: Normal sinus rhythm    Results Review (reviewed the patient's recent labs in the electronic medical record):     EKG (/12/2024): Sinus rhythm    CXR (4/12/2024): Similar mild interstitial and perihilar opacities suggestive of edema.  Small bilateral pleural effusion    ECHO (4/12/2024): LVEF 60%.  Well-seated 26 mm TAVR valve with mean gradient 20 mmHg.  Mild mitral stenosis with mean gradient 7 mmHg    Results from last 7 days   Lab Units 04/15/24  0456 04/14/24  0637 04/13/24  0445 04/12/24  0447 04/11/24  1219 04/11/24  1008   SODIUM mmol/L 140 139 141 143  --  145   POTASSIUM mmol/L 4.0 4.1 4.2 4.2  --  4.8   CHLORIDE mmol/L 97* 100 103 108*  --  111*   BUN mg/dL 53* 55* 57* 59*  --  64*   CREATININE mg/dL 2.10* 1.93* 2.14* 1.99*  --  2.15*   MAGNESIUM mg/dL 1.9 1.8 1.8  --    < >  --     < > = values in this interval not displayed.     Results from last 7 days   Lab Units 04/12/24  0447 04/12/24  0023 04/11/24 2047   HSTROP T ng/L 88* 89*  82*     Results from last 7 days   Lab Units 04/13/24  0445 04/12/24  0447 04/11/24  1008   WBC 10*3/mm3 9.01 8.47 8.31   HEMOGLOBIN g/dL 11.0* 10.8* 11.6*   HEMATOCRIT % 36.9* 35.9* 39.4   PLATELETS 10*3/mm3 173 157 198       Lab Results   Component Value Date    HGBA1C 6.00 (H) 04/11/2024       Lab Results   Component Value Date    CHLPL 130 04/02/2024    TRIG 79 04/02/2024    HDL 49 04/02/2024    LDL 65 04/02/2024              Acute on chronic diastolic heart failure/cardiorenal syndrome  proBNP elevated 1707 and chest x-ray mild pulmonary edema consistent with CHF  Takes Bumex 1 mg twice daily at home  Treated with 2 mg IV Bumex x 1 in ED  Last echo 5/2020 LVEF 65% with 26 mm Fried GAURANG 3 TAVR valve well-seated with no perivalvular leak  Echo this admission shows normal LVEF 60% with TAVR valve well-seated.  IV Bumex 2 mg twice daily  Carvedilol 12.5 mg twice daily  Continues with significant urine output 11,650 mL diurese since admission        Elevated troponin/type II NSTEMI from cardiorenal syndrome  Troponin elevation flat and not consistent with ACS.  EKGs no acute ischemic changes  Echo this admission shows normal LVEF with no wall motion abnormality and TAVR valve well-seated  Defer any ischemic evaluation due to lack of symptoms and scenario not consistent with ACS.     Coronary artery disease  Cath in 2019 showed moderate nonobstructive CAD  High-sensitivity troponin elevated this admission at 80 and EKG normal sinus rhythm but no acute ischemia  Currently denies chest pain but has had chest tightness over the past 2 months.  Aspirin 81 mg daily  Defer ischemic evaluation as symptoms have improved with diuretics     Valvular heart disease status post TAVR  Status post TAVR 2020  Last echo 2020 showed well-seated TAVR valve with mean gradient of 13 mmHg without perivalvular leak  Echo this admission shows well-seated TAVR with mean gradient 20 mmHg.  Not contributing to current issues     Stage III  chronic kidney disease  Met with  transplant team 1 month ago.  Patient has elected not to have transplant when given option between transplant hemodialysis.  Follows Dr. Cobos  Current creatinine stable at 2.1  Nephrology following as an inpatient        Type 2 diabetes mellitus  Recent hemoglobin A1c 7.8  No SGL 2 inhibitor due to CKD        Hypertension  Elevated on admission 193/94 then 173/83 but had not had home dose of carvedilol  Carvedilol 12.5 mg twice daily  Hydralazine 25 mg every 8 hour  Current BP 99/84        Hyperlipidemia  Recent lipids total cholesterol 130, triglycerides 79, HDL 49, LDL 65  Crestor 40 mg daily             Diuretic management per nephrology.  Will need p.o. at discharge  Low-sodium diet and fluid restriction  No ACE/ARB/Entresto/spironolactone due to CKD    Electronically signed by RADHA Castaneda, 04/15/24, 7:54 AM EDT.

## 2024-04-15 NOTE — CASE MANAGEMENT/SOCIAL WORK
Continued Stay Note  [facility]     Patient Name: Juan Alberto Tejeda  MRN: [id number]  Today's Date: 4/15/2024    Admit Date: 4/11/2024    Plan: Home   Discharge Plan       Row Name 04/15/24 3947       Plan    Plan Home    Patient/Family in Agreement with Plan yes    Plan Comments Discussed in MDR. Mr. Tejeda is not being dischargd today. Continues to receive IV Bumex and labs are elevated. CM will continue to follow.                   Discharge Codes    No documentation.                 Expected Discharge Date and Time       Expected Discharge Date Expected Discharge Time    Apr 16, 2024               Venessa Schultz RN

## 2024-04-16 PROBLEM — I50.33 ACUTE ON CHRONIC HEART FAILURE WITH PRESERVED EJECTION FRACTION (HFPEF): Status: ACTIVE | Noted: 2024-04-16

## 2024-04-16 LAB
ANION GAP SERPL CALCULATED.3IONS-SCNC: 10 MMOL/L (ref 5–15)
BACTERIA SPEC AEROBE CULT: NORMAL
BACTERIA SPEC AEROBE CULT: NORMAL
BUN SERPL-MCNC: 58 MG/DL (ref 8–23)
BUN/CREAT SERPL: 23.5 (ref 7–25)
CALCIUM SPEC-SCNC: 8.8 MG/DL (ref 8.6–10.5)
CHLORIDE SERPL-SCNC: 98 MMOL/L (ref 98–107)
CO2 SERPL-SCNC: 33 MMOL/L (ref 22–29)
CREAT SERPL-MCNC: 2.47 MG/DL (ref 0.76–1.27)
DEPRECATED RDW RBC AUTO: 52.4 FL (ref 37–54)
EGFRCR SERPLBLD CKD-EPI 2021: 28.8 ML/MIN/1.73
ERYTHROCYTE [DISTWIDTH] IN BLOOD BY AUTOMATED COUNT: 15.7 % (ref 12.3–15.4)
GLUCOSE BLDC GLUCOMTR-MCNC: 146 MG/DL (ref 70–130)
GLUCOSE BLDC GLUCOMTR-MCNC: 198 MG/DL (ref 70–130)
GLUCOSE BLDC GLUCOMTR-MCNC: 226 MG/DL (ref 70–130)
GLUCOSE BLDC GLUCOMTR-MCNC: 272 MG/DL (ref 70–130)
GLUCOSE SERPL-MCNC: 151 MG/DL (ref 65–99)
HBV SURFACE AG SERPL QL IA: NORMAL
HCT VFR BLD AUTO: 38.3 % (ref 37.5–51)
HCV AB SER DONR QL: NORMAL
HGB BLD-MCNC: 11.6 G/DL (ref 13–17.7)
HIV 1+2 AB+HIV1P24 AG SERPLBLD IA.RAPID: NORMAL
HIV 1+2 AB+HIV1P24 AG SERPLBLD IA.RAPID: NORMAL
MAGNESIUM SERPL-MCNC: 1.9 MG/DL (ref 1.6–2.4)
MCH RBC QN AUTO: 27.4 PG (ref 26.6–33)
MCHC RBC AUTO-ENTMCNC: 30.3 G/DL (ref 31.5–35.7)
MCV RBC AUTO: 90.3 FL (ref 79–97)
PLATELET # BLD AUTO: 175 10*3/MM3 (ref 140–450)
PMV BLD AUTO: 11.9 FL (ref 6–12)
POTASSIUM SERPL-SCNC: 4.3 MMOL/L (ref 3.5–5.2)
QT INTERVAL: 464 MS
QTC INTERVAL: 467 MS
RBC # BLD AUTO: 4.24 10*6/MM3 (ref 4.14–5.8)
SODIUM SERPL-SCNC: 141 MMOL/L (ref 136–145)
WBC NRBC COR # BLD AUTO: 9.32 10*3/MM3 (ref 3.4–10.8)

## 2024-04-16 PROCEDURE — 80048 BASIC METABOLIC PNL TOTAL CA: CPT | Performed by: STUDENT IN AN ORGANIZED HEALTH CARE EDUCATION/TRAINING PROGRAM

## 2024-04-16 PROCEDURE — 87340 HEPATITIS B SURFACE AG IA: CPT | Performed by: INTERNAL MEDICINE

## 2024-04-16 PROCEDURE — 25010000002 HEPARIN (PORCINE) PER 1000 UNITS: Performed by: STUDENT IN AN ORGANIZED HEALTH CARE EDUCATION/TRAINING PROGRAM

## 2024-04-16 PROCEDURE — 85027 COMPLETE CBC AUTOMATED: CPT | Performed by: STUDENT IN AN ORGANIZED HEALTH CARE EDUCATION/TRAINING PROGRAM

## 2024-04-16 PROCEDURE — 63710000001 INSULIN LISPRO (HUMAN) PER 5 UNITS: Performed by: HOSPITALIST

## 2024-04-16 PROCEDURE — 97110 THERAPEUTIC EXERCISES: CPT

## 2024-04-16 PROCEDURE — 99232 SBSQ HOSP IP/OBS MODERATE 35: CPT | Performed by: NURSE PRACTITIONER

## 2024-04-16 PROCEDURE — 94799 UNLISTED PULMONARY SVC/PX: CPT

## 2024-04-16 PROCEDURE — 86803 HEPATITIS C AB TEST: CPT | Performed by: INTERNAL MEDICINE

## 2024-04-16 PROCEDURE — 99232 SBSQ HOSP IP/OBS MODERATE 35: CPT | Performed by: INTERNAL MEDICINE

## 2024-04-16 PROCEDURE — 97530 THERAPEUTIC ACTIVITIES: CPT

## 2024-04-16 PROCEDURE — 63710000001 INSULIN GLARGINE PER 5 UNITS: Performed by: INTERNAL MEDICINE

## 2024-04-16 PROCEDURE — G0432 EIA HIV-1/HIV-2 SCREEN: HCPCS | Performed by: INTERNAL MEDICINE

## 2024-04-16 PROCEDURE — 82948 REAGENT STRIP/BLOOD GLUCOSE: CPT

## 2024-04-16 PROCEDURE — 83735 ASSAY OF MAGNESIUM: CPT | Performed by: STUDENT IN AN ORGANIZED HEALTH CARE EDUCATION/TRAINING PROGRAM

## 2024-04-16 PROCEDURE — 87899 AGENT NOS ASSAY W/OPTIC: CPT | Performed by: INTERNAL MEDICINE

## 2024-04-16 RX ORDER — BUMETANIDE 2 MG/1
2 TABLET ORAL
Status: DISCONTINUED | OUTPATIENT
Start: 2024-04-16 | End: 2024-04-16

## 2024-04-16 RX ORDER — BUMETANIDE 2 MG/1
2 TABLET ORAL DAILY
Status: DISCONTINUED | OUTPATIENT
Start: 2024-04-17 | End: 2024-04-17 | Stop reason: HOSPADM

## 2024-04-16 RX ORDER — BUMETANIDE 2 MG/1
2 TABLET ORAL DAILY
Status: DISCONTINUED | OUTPATIENT
Start: 2024-04-16 | End: 2024-04-16

## 2024-04-16 RX ADMIN — HEPARIN SODIUM 5000 UNITS: 5000 INJECTION INTRAVENOUS; SUBCUTANEOUS at 21:15

## 2024-04-16 RX ADMIN — INSULIN LISPRO 3 UNITS: 100 INJECTION, SOLUTION INTRAVENOUS; SUBCUTANEOUS at 18:26

## 2024-04-16 RX ADMIN — ROSUVASTATIN 40 MG: 20 TABLET, FILM COATED ORAL at 21:15

## 2024-04-16 RX ADMIN — HEPARIN SODIUM 5000 UNITS: 5000 INJECTION INTRAVENOUS; SUBCUTANEOUS at 14:10

## 2024-04-16 RX ADMIN — INSULIN LISPRO 4 UNITS: 100 INJECTION, SOLUTION INTRAVENOUS; SUBCUTANEOUS at 21:15

## 2024-04-16 RX ADMIN — Medication 1 CAPSULE: at 09:48

## 2024-04-16 RX ADMIN — CARVEDILOL 12.5 MG: 12.5 TABLET, FILM COATED ORAL at 18:27

## 2024-04-16 RX ADMIN — POLYETHYLENE GLYCOL 3350 17 G: 17 POWDER, FOR SOLUTION ORAL at 09:47

## 2024-04-16 RX ADMIN — GABAPENTIN 600 MG: 300 CAPSULE ORAL at 14:10

## 2024-04-16 RX ADMIN — CARVEDILOL 12.5 MG: 12.5 TABLET, FILM COATED ORAL at 09:47

## 2024-04-16 RX ADMIN — HEPARIN SODIUM 5000 UNITS: 5000 INJECTION INTRAVENOUS; SUBCUTANEOUS at 05:41

## 2024-04-16 RX ADMIN — GABAPENTIN 600 MG: 300 CAPSULE ORAL at 21:15

## 2024-04-16 RX ADMIN — ALLOPURINOL 100 MG: 100 TABLET ORAL at 09:47

## 2024-04-16 RX ADMIN — Medication 10 ML: at 09:48

## 2024-04-16 RX ADMIN — INSULIN GLARGINE 5 UNITS: 100 INJECTION, SOLUTION SUBCUTANEOUS at 21:15

## 2024-04-16 RX ADMIN — GABAPENTIN 600 MG: 300 CAPSULE ORAL at 05:41

## 2024-04-16 RX ADMIN — INSULIN LISPRO 2 UNITS: 100 INJECTION, SOLUTION INTRAVENOUS; SUBCUTANEOUS at 12:30

## 2024-04-16 RX ADMIN — ASPIRIN 81 MG: 81 TABLET, CHEWABLE ORAL at 09:47

## 2024-04-16 NOTE — PROGRESS NOTES
Cardiology Progress Note      Reason for visit:    Acute on chronic diastolic heart failure/cardiorenal syndrome    IDENTIFICATION: 63-year-old gentleman who resides in [place], [place]    Active Hospital Problems    Diagnosis  POA    **Acute on chronic diastolic CHF (congestive heart failure) [I50.33]  Yes    Type 2 diabetes mellitus with kidney complication, with long-term current use of insulin [E11.29, Z79.4]  Not Applicable     Priority: High    CKD (chronic kidney disease) stage 3, GFR 30-59 ml/min [N18.30]  Yes     Priority: High    Coronary artery disease involving native coronary artery of native heart with angina pectoris [I25.119]  Yes     Priority: Medium     Cardiac catheterization at [facility] (12/2019): Moderate nonobstructive CAD.      Essential hypertension [I10]  Yes     Priority: Medium    Acute on chronic diastolic congestive heart failure [I50.33]  Yes     Priority: Medium     Echo (2/29/2020): LVEF 65%.  Aortic valve is bicuspid and severely calcified.  Severe aortic stenosis (mean gradient 51 mmHg). Mild MR and MS  Echo (5/20/2020): LVEF = 65%.  Mild LVH. A 26 mm Fried Lin 3 TAVR valve is well-seated and exhibits a mean gradient of 13 mmHg. There is no paravalvular leak present.      Nonrheumatic aortic valve stenosis/status post TAVR [I35.0]  Yes     Priority: Medium     Echo (2/29/2020): LVEF 65%.  Aortic valve is bicuspid and severely calcified.  Severe aortic stenosis (mean gradient 51 mmHg). Mild MR and MS  Status post TAVR 3/16/2020  Echo (5/20/2020): LVEF = 65%.  Mild LVH. A 26 mm Fried Lin 3 TAVR valve is well-seated and exhibits a mean gradient of 13 mmHg. There is no paravalvular leak present.      Hyperlipidemia LDL goal <70 [E78.5]  Yes     Priority: Low     High intensity statin therapy indicated given the presence of CAD              Creatinine trended up this morning.  Patient continues to diurese well with IV Bumex.  He denies any chest pain.  He thinks he is about back to  his baseline.  His creatinine increased this morning.           Vital Sign Min/Max for last 24 hours  Temp  Min: 98.3 °F (36.8 °C)  Max: 99.2 °F (37.3 °C)   BP  Min: 99/84  Max: 159/75   Pulse  Min: 59  Max: 68   Resp  Min: 16  Max: 20   SpO2  Min: 80 %  Max: 98 %   Flow (L/min)  Min: 2  Max: 2      Intake/Output Summary (Last 24 hours) at 2024 0750  Last data filed at 2024 0100  Gross per 24 hour   Intake 1100 ml   Output 2045 ml   Net -945 ml           Physical Exam  Constitutional:       General: He is awake.   Cardiovascular:      Rate and Rhythm: Normal rate and regular rhythm.      Heart sounds: Murmur heard.      Comments: Trace lower extremity edema  Pulmonary:      Effort: Pulmonary effort is normal.      Breath sounds: Normal breath sounds.   Musculoskeletal:      Right lower le+ Pitting      Left lower le+ Pitting   Skin:     General: Skin is warm and dry.   Neurological:      Mental Status: He is alert and oriented to person, place, and time.   Psychiatric:         Behavior: Behavior is cooperative.         Tele: Normal sinus rhythm     Results Review (reviewed the patient's recent labs in the electronic medical record):      EKG (): Sinus rhythm     CXR (2024): Similar mild interstitial and perihilar opacities suggestive of edema.  Small bilateral pleural effusion     ECHO (2024): LVEF 60%.  Well-seated 26 mm TAVR valve with mean gradient 20 mmHg.  Mild mitral stenosis with mean gradient 7 mmHg    Results from last 7 days   Lab Units 24  0417 04/15/24  0456 24  0637 24  0445 24  0447 24  1219 24  1008   SODIUM mmol/L 141 140 139 141 143  --  145   POTASSIUM mmol/L 4.3 4.0 4.1 4.2 4.2  --  4.8   CHLORIDE mmol/L 98 97* 100 103 108*  --  111*   BUN mg/dL 58* 53* 55* 57* 59*  --  64*   CREATININE mg/dL 2.47* 2.10* 1.93* 2.14* 1.99*  --  2.15*   MAGNESIUM mg/dL 1.9 1.9 1.8 1.8  --    < >  --     < > = values in this interval not  displayed.     Results from last 7 days   Lab Units 04/12/24  0447 04/12/24  0023 04/11/24 2047   HSTROP T ng/L 88* 89* 82*     Results from last 7 days   Lab Units 04/16/24  0417 04/13/24  0445 04/12/24 0447   WBC 10*3/mm3 9.32 9.01 8.47   HEMOGLOBIN g/dL 11.6* 11.0* 10.8*   HEMATOCRIT % 38.3 36.9* 35.9*   PLATELETS 10*3/mm3 175 173 157       Lab Results   Component Value Date    HGBA1C 6.00 (H) 04/11/2024       Lab Results   Component Value Date    CHLPL 130 04/02/2024    TRIG 79 04/02/2024    HDL 49 04/02/2024    LDL 65 04/02/2024              Acute on chronic diastolic heart failure/cardiorenal syndrome  proBNP elevated 1707 and chest x-ray mild pulmonary edema consistent with CHF  Takes Bumex 1 mg twice daily at home  Treated with 2 mg IV Bumex x 1 in ED  Last echo 5/2020 LVEF 65% with 26 mm Fried GAURANG 3 TAVR valve well-seated with no perivalvular leak  Echo this admission shows normal LVEF 60% with TAVR valve well-seated.  IV Bumex 2 mg twice daily  Carvedilol 12.5 mg twice daily  Continues with significant urine output 12,595 mL diuresed since admission        Elevated troponin/type II NSTEMI from cardiorenal syndrome  Troponin elevation flat and not consistent with ACS.  EKGs no acute ischemic changes  Echo this admission shows normal LVEF with no wall motion abnormality and TAVR valve well-seated  Defer any ischemic evaluation due to lack of symptoms and scenario not consistent with ACS.     Coronary artery disease  Cath in 2019 showed moderate nonobstructive CAD  High-sensitivity troponin elevated this admission at 80 and EKG normal sinus rhythm but no acute ischemia  Currently denies chest pain but has had chest tightness over the past 2 months.  Aspirin 81 mg daily  Defer ischemic evaluation as symptoms have improved with diuretics     Valvular heart disease status post TAVR  Status post TAVR 2020  Last echo 2020 showed well-seated TAVR valve with mean gradient of 13 mmHg without perivalvular  leak  Echo this admission shows well-seated TAVR with mean gradient 20 mmHg.  Not contributing to current issues     Stage III chronic kidney disease  Met with [facility] transplant team 1 month ago.  Patient has elected not to have transplant when given option between transplant hemodialysis.  Follows Dr. Cobos  Creatinine trended up overnight to 2.47  Nephrology following as an inpatient        Type 2 diabetes mellitus  Recent hemoglobin A1c 7.8  No SGL 2 inhibitor due to CKD        Hypertension  Elevated on admission 193/94 then 173/83 but had not had home dose of carvedilol  Carvedilol 12.5 mg twice daily  Hydralazine 25 mg every 8 hour on hold due to low blood pressures  Current /64        Hyperlipidemia  Recent lipids total cholesterol 130, triglycerides 79, HDL 49, LDL 65  Crestor 40 mg daily                Hold a.m. dose of IV Bumex until nephrology evaluates.  Given renal involvement Will defer diuretic management to nephrology.  However, would recommend patient have a p.o. diuretic at discharge  No ischemic evaluation needed as symptoms have improved with diuretics and he denies chest pain.  Scenario is consistent with type II NSTEMI from cardiorenal syndrome  Patient can follow-up with heart failure clinic in 5 days after discharge and with cardiology in 4 weeks  Please call cardiology with any further questions.    Electronically signed by RADHA Castaneda, 04/16/24, 7:50 AM EDT.

## 2024-04-16 NOTE — PROGRESS NOTES
"   LOS: 5 days    Patient Care Team:  Megan Martell APRN as PCP - General (Family Medicine)    Subjective     Continues to improve.  Edema improving.  Still with good urine output.  Denies any chest pain or shortness of breath.    Objective     Vital Signs:  Blood pressure 146/72, pulse 61, temperature 98.1 °F (36.7 °C), temperature source Oral, resp. rate 16, height 170.2 cm (67.01\"), weight 123 kg (270 lb 3.2 oz), SpO2 94%.      Intake/Output Summary (Last 24 hours) at 4/16/2024 1320  Last data filed at 4/16/2024 1015  Gross per 24 hour   Intake 1338 ml   Output 2275 ml   Net -937 ml        04/15 0701 - 04/16 0700  In: 1100 [P.O.:1100]  Out: 2045 [Urine:2045]    Physical Exam:      GENERAL: WD obese WM NAD  NEURO: Awake and alert, oriented. No focal deficit  PSYCHIATRIC: NMA. Cooperative with PE  EYE: PE, no icterus, no conjunctivitis  ENT: ommm, dentition intact,  Hearing intact  NECK:  No JVD discernable,  Trachea midline  CV: + Edema, RRR  LUNGS:  Quiet,  Nonlabored resp.  Symmetrical expansion  ABDOMEN: Nondistended, soft nontender.  : No Chi, no palp bladder  SKIN: Warm and dry without rash      Labs:  Results from last 7 days   Lab Units 04/16/24  0417 04/13/24  0445 04/12/24  0447   WBC 10*3/mm3 9.32 9.01 8.47   HEMOGLOBIN g/dL 11.6* 11.0* 10.8*   PLATELETS 10*3/mm3 175 173 157     Results from last 7 days   Lab Units 04/16/24  0417 04/15/24  0456 04/14/24  0637 04/13/24  0445 04/12/24  0447 04/11/24  1219 04/11/24  1008   SODIUM mmol/L 141 140 139 141 143  --  145   POTASSIUM mmol/L 4.3 4.0 4.1 4.2 4.2  --  4.8   CHLORIDE mmol/L 98 97* 100 103 108*  --  111*   CO2 mmol/L 33.0* 34.0* 34.0* 32.0* 30.0*  --  29.0   BUN mg/dL 58* 53* 55* 57* 59*  --  64*   CREATININE mg/dL 2.47* 2.10* 1.93* 2.14* 1.99*  --  2.15*   CALCIUM mg/dL 8.8 8.5* 8.8 8.7 8.8  --  9.3   MAGNESIUM mg/dL 1.9 1.9 1.8 1.8  --    < >  --    ALBUMIN g/dL  --   --   --   --  3.3*  --  3.6    < > = values in this interval not " displayed.     Results from last 7 days   Lab Units 04/12/24  0447   ALK PHOS U/L 101   BILIRUBIN mg/dL 0.4   ALT (SGPT) U/L 21   AST (SGOT) U/L 19                   Estimated Creatinine Clearance: 39 mL/min (A) (by C-G formula based on SCr of 2.47 mg/dL (H)).         A/P:    CKD 4: Creatinine up to 2.5.  Previous baseline around 3.5 when last seen by us 2/24.  Expect creatinine to rise with volume reduction due to hemoconcentration.    For now would continue diuresis.  Continue supportive care.  Strict I's and O's.  Monitor renal function closely.  No indication for emergent dialysis at this time.    .  HTN: Blood pressure stable.  Continue current medications.  Monitor with volume reduction.  Cover with as needed medications     Volume: Patient with significant edema.  Albumin borderline at 3.6. .  Only on Maxide at home.  Previously patient was on other diuretics but reports they were stopped due to rising creatinine without edema.  Patient reports over the past month and a half he developed edema which has has continued to increase.  Reports baseline weight around 265.   standing weight initially 277.  Down to 270 today.  Continue diuretics for now.  Monitor strict I's and O's.  Limit fluids to 40 ounces a day.  check daily standing weight.  Recommend establishing dry weight for possible sliding scale diuretics once patient stabilized.       HFpEF: Per report last echocardiogram 5/2020 showed a EF of 65%.  Patient has a TAVR valve in place.  Evaluated by cardiology.  Not contributing to current situation.     High risk and complexity patient.    Pradip Ortega MD  04/16/24  13:20 EDT

## 2024-04-16 NOTE — PROGRESS NOTES
[facility] Services  PROGRESS NOTE    Patient Name: Juan Alberto Tejeda  : 1960  MRN: [id number]    Date of Admission: 2024  Primary Care Physician: Megan Martell APRN    Subjective   Subjective     CC:  Follow-up dyspnea    HPI:  Patient resting in bed. Says he is feeling a lot better. Swelling is better. Compression socks are in place.    Objective   Objective     Vital Signs:   Temp:  [97.9 °F (36.6 °C)-99.2 °F (37.3 °C)] 98.1 °F (36.7 °C)  Heart Rate:  [57-71] 61  Resp:  [16-20] 16  BP: (127-159)/(41-75) 146/72  Flow (L/min):  [2] 2     Physical Exam:  Constitutional: No acute distress, awake, alert  HENT: NCAT, mucous membranes moist  Respiratory: Clear to auscultation bilaterally, respiratory effort normal on O2  Cardiovascular: RRR, no murmurs, rubs, or gallops  Gastrointestinal: soft, nontender, nondistended  Musculoskeletal: compression wraps are in place, mild edema in ankles  Psychiatric: Appropriate affect, cooperative  Neurologic: Oriented x 3, speech clear, no focal deficits  Skin: No rashes        Results Reviewed:  LAB RESULTS:      Lab 245 24  1219 24  1008   WBC 9.32 9.01 8.47  --  8.31   HEMOGLOBIN 11.6* 11.0* 10.8*  --  11.6*   HEMATOCRIT 38.3 36.9* 35.9*  --  39.4   PLATELETS 175 173 157  --  198   NEUTROS ABS  --   --   --   --  5.95   IMMATURE GRANS (ABS)  --   --   --   --  0.05   LYMPHS ABS  --   --   --   --  1.51   MONOS ABS  --   --   --   --  0.53   EOS ABS  --   --   --   --  0.23   MCV 90.3 90.7 90.7  --  94.3   PROCALCITONIN  --   --   --  0.12  --          Lab 247 04/15/24  0456 24  0637 24  0445 24  0447 24  1219 24  1008   SODIUM 141 140 139 141 143  --  145   POTASSIUM 4.3 4.0 4.1 4.2 4.2  --  4.8   CHLORIDE 98 97* 100 103 108*  --  111*   CO2 33.0* 34.0* 34.0* 32.0* 30.0*  --  29.0   ANION GAP 10.0 9.0 5.0 6.0 5.0  --  5.0   BUN 58* 53*  55* 57* 59*  --  64*   CREATININE 2.47* 2.10* 1.93* 2.14* 1.99*  --  2.15*   EGFR 28.8* 34.9* 38.7* 34.2* 37.3*  --  34.0*   GLUCOSE 151* 120* 120* 106* 87  --  105*   CALCIUM 8.8 8.5* 8.8 8.7 8.8  --  9.3   MAGNESIUM 1.9 1.9 1.8 1.8  --  2.4  --    HEMOGLOBIN A1C  --   --   --   --   --   --  6.00*   TSH  --   --   --   --   --  0.865  --          Lab 04/12/24  0447 04/11/24  1008   TOTAL PROTEIN 5.8* 6.6   ALBUMIN 3.3* 3.6   GLOBULIN 2.5 3.0   ALT (SGPT) 21 28   AST (SGOT) 19 24   BILIRUBIN 0.4 0.5   ALK PHOS 101 112   LIPASE  --  63*         Lab 04/12/24 0447 04/12/24  0023 04/11/24  2047 04/11/24  1219 04/11/24  1008   PROBNP  --   --   --   --  1,707.0*   HSTROP T 88* 89* 82* 72* 80*                 Brief Urine Lab Results  (Last result in the past 365 days)        Color   Clarity   Blood   Leuk Est   Nitrite   Protein   CREAT   Urine HCG        04/11/24 1537             70.2                 Microbiology Results Abnormal       Procedure Component Value - Date/Time    Blood Culture - Blood, Hand, Right [[id number]]  (Normal) Collected: 04/11/24 1514    Lab Status: Preliminary result Specimen: Blood from Hand, Right Updated: 04/15/24 1615     Blood Culture No growth at 4 days    Narrative:      Less than seven (7) mL's of blood was collected.  Insufficient quantity may yield false negative results.    Blood Culture - Blood, Arm, Right [[id number]]  (Normal) Collected: 04/11/24 1515    Lab Status: Preliminary result Specimen: Blood from Arm, Right Updated: 04/15/24 1615     Blood Culture No growth at 4 days    Narrative:      Less than seven (7) mL's of blood was collected.  Insufficient quantity may yield false negative results.    MRSA Screen, PCR (Inpatient) - Swab, Nares [[id number]]  (Normal) Collected: 04/11/24 1544    Lab Status: Final result Specimen: Swab from Nares Updated: 04/11/24 8824     MRSA PCR Negative    Narrative:      The negative predictive value of this diagnostic test is high and should only  be used to consider de-escalating anti-MRSA therapy. A positive result may indicate colonization with MRSA and must be correlated clinically.  MRSA Negative    COVID-19 and FLU A/B PCR, 1 HR TAT - Swab, Nasopharynx [[id number]]  (Normal) Collected: 04/11/24 1039    Lab Status: Final result Specimen: Swab from Nasopharynx Updated: 04/11/24 1122     COVID19 Not Detected     Influenza A PCR Not Detected     Influenza B PCR Not Detected    Narrative:      Fact sheet for providers: https://www.fda.gov/media/108269/download    Fact sheet for patients: https://www.fda.gov/media/652542/download    Test performed by PCR.            No radiology results from the last 24 hrs    Results for orders placed during the hospital encounter of 04/11/24    Adult Transthoracic Echo Complete W/ Cont if Necessary Per Protocol    Interpretation Summary    Left ventricular systolic function is normal. Estimated left ventricular EF = 60%    The left atrial cavity is mild to moderately dilated.    There is a well-seated 26 mm TAVR valve present.  Mean gradient is 20 mmHg, BERNA 1.7 cm².    Calcified mitral valve with mild mitral stenosis.  Mean gradient 7 mmHg, MVA 2.3 cm².    Mild mitral regurgitation.      Current medications:  Scheduled Meds:allopurinol, 100 mg, Oral, Daily  aspirin, 81 mg, Oral, Daily  [Held by provider] bumetanide, 2 mg, Intravenous, Q12H  carvedilol, 12.5 mg, Oral, BID With Meals  cefTRIAXone, 2,000 mg, Intravenous, Q24H  gabapentin, 600 mg, Oral, Q8H  heparin (porcine), 5,000 Units, Subcutaneous, Q8H  [Held by provider] hydrALAZINE, 25 mg, Oral, Q8H  insulin glargine, 5 Units, Subcutaneous, Nightly  insulin lispro, 2-7 Units, Subcutaneous, 4x Daily AC & at Bedtime  lactobacillus acidophilus, 1 capsule, Oral, Daily  pharmacy consult - MTM, , Does not apply, Daily  polyethylene glycol, 17 g, Oral, Daily  rosuvastatin, 40 mg, Oral, Nightly      Continuous Infusions:     PRN Meds:.  acetaminophen    bisacodyl    dextrose     dextrose    glucagon (human recombinant)    sodium chloride    Assessment & Plan   Assessment & Plan     Active Hospital Problems    Diagnosis  POA    **Acute on chronic diastolic CHF (congestive heart failure) [I50.33]  Yes    Acute on chronic heart failure with preserved ejection fraction (HFpEF) [I50.33]  Yes    Coronary artery disease involving native coronary artery of native heart with angina pectoris [I25.119]  Yes    Type 2 diabetes mellitus with kidney complication, with long-term current use of insulin [E11.29, Z79.4]  Not Applicable    Hyperlipidemia LDL goal <70 [E78.5]  Yes    Essential hypertension [I10]  Yes    Acute on chronic diastolic congestive heart failure [I50.33]  Yes    Nonrheumatic aortic valve stenosis/status post TAVR [I35.0]  Yes    CKD (chronic kidney disease) stage 3, GFR 30-59 ml/min [N18.30]  Yes      Resolved Hospital Problems   No resolved problems to display.        Brief Hospital Course to date:  Juan Alberto Tejeda is a 63 y.o. male with history of TAVR, CAD, HFpEF, CKD stage IV, DM2, HTN, HLD, gout, who presented for evaluation of progressive dyspnea x 1 to 2 months.    This patient's problems and plans were partially entered by my partner and updated as appropriate by me 04/16/24.    Acute respiratory failure with hypoxia, 86% on RA in ED  HFpEF Exacerbation  Hx of TAVR  Chest pain, resolved  Type II NSTEMI  -Bumex 2mg IV BID, hold further dosing this morning due to worsening renal function, Nephrology to address diuretics going forward  -Daily standing weights  -ECHO 4/12: LVEF 60%, well-seated TAVR with mean gradient 20, mild mitral stenosis  -Cardiology evaluated, now signed off, f/u with heart and valve in 5 days, f/u with Cardiology in 4 weeks  -Coreg 12.5 mg twice daily, Crestor 40 mg daily, aspirin 81 mg daily     B LE edema  LLE redness  LLE wounds  -rocephin x5 days  -MRSA PCR --> stopped vanc  -wound care  -duplex of B LE negative for DVT    JASWINDER  -Previously on [facility]  outpatient, but no longer has this at home  -CPAP nightly  -Sleep medicine referral     CKD IV  -consulted NAL given history of progressive renal failure, and need for diuresis for CHF  - Dr. Ortega following     DM  -A1C 6%  -SSI     Hx of HTN  -monitor, continue BB, hydralazine added this admission and now held due to borderline blood pressure    Hx of mild CAD, per catheterization 2020     HLD  -statin    Expected Discharge Location and Transportation: Home  Expected Discharge   Expected Discharge Date: 4/16/2024; Expected Discharge Time:      DVT prophylaxis:  Medical DVT prophylaxis orders are present.         AM-PAC 6 Clicks Score (PT): 22 (04/16/24 1024)    CODE STATUS:   Code Status and Medical Interventions:   Ordered at: 04/12/24 1028     Medical Intervention Limits:    NO intubation (DNI)     Level Of Support Discussed With:    Patient     Code Status (Patient has no pulse and is not breathing):    No CPR (Do Not Attempt to Resuscitate)     Medical Interventions (Patient has pulse or is breathing):    Limited Support       Emely Agrawal,   04/16/24

## 2024-04-16 NOTE — PLAN OF CARE
Goal Outcome Evaluation:  Plan of Care Reviewed With: (P) patient        Progress: (P) improving  Outcome Evaluation: (P) VSS. O2 sats maintained with 2L NC. Intake within ordered fluid restriction limits. Tolerated up to chair & ambulated with PT. Fall and skin precautions maintained. Discharge plan home when medically ready.

## 2024-04-16 NOTE — THERAPY TREATMENT NOTE
Patient Name: Juan Alberto Tejeda  : 1960    MRN: [id number]                              Today's Date: 2024       Admit Date: 2024    Visit Dx:     ICD-10-CM ICD-9-CM   1. Bilateral lower extremity edema  R60.0 782.3   2. Acute on chronic congestive heart failure, unspecified heart failure type  I50.9 428.0   3. JASWINDRE on CPAP  G47.33 327.23     Patient Active Problem List   Diagnosis    CKD (chronic kidney disease) stage 3, GFR 30-59 ml/min    Hyperlipidemia LDL goal <70    Essential hypertension    Type 2 diabetes mellitus with kidney complication, with long-term current use of insulin    Normocytic anemia    Nonrheumatic aortic valve stenosis/status post TAVR    Acute on chronic diastolic congestive heart failure    Coronary artery disease involving native coronary artery of native heart with angina pectoris    Nocturnal hypoxia    Peripheral neuropathy    JASWINDER on CPAP    Acute on chronic diastolic CHF (congestive heart failure)    Acute on chronic heart failure with preserved ejection fraction (HFpEF)     Past Medical History:   Diagnosis Date    Aortic valve disorder     Arthritis     CHF (congestive heart failure)     Chronic kidney disease     Diabetes mellitus     Gout     Hiatal hernia     Hyperlipidemia     Hypertension     Kidney stones     history    MI, old     Peripheral neuropathy     Wears glasses      Past Surgical History:   Procedure Laterality Date    AORTIC VALVE REPAIR/REPLACEMENT N/A 3/16/2020    Procedure: TRANSCATHETER AORTIC VALVE REPLACEMENT, SHANKAR;  Surgeon: Irvin Jeffers MD;  Location: [facility] OR ;  Service: Cardiothoracic;  Laterality: N/A;  fluoro 10 min 30 sec  dose 1136 mGY   contrast 65 ml    AORTIC VALVE REPAIR/REPLACEMENT N/A 3/16/2020    Procedure: Transfemoral Transcatheter Aortic Valve Replacement;  Surgeon: Cayla Bella MD;  Location: [facility] OR 15;  Service: Cardiovascular;  Laterality: N/A;    CHOLECYSTECTOMY      CIRCUMCISION       COLONOSCOPY  2 years ago    KIDNEY STONE SURGERY      OTHER SURGICAL HISTORY      Gallstone removal surgery      General Information       Row Name 04/16/24 1021          Physical Therapy Time and Intention    Document Type therapy note (daily note)  -KR     Mode of Treatment physical therapy;individual therapy  -KR       Row Name 04/16/24 1021          General Information    Patient Profile Reviewed yes  -KR     Existing Precautions/Restrictions fall;oxygen therapy device and L/min;other (see comments)  BLE edema  -KR     Barriers to Rehab medically complex;previous functional deficit  -KR       Row Name 04/16/24 1021          Cognition    Orientation Status (Cognition) oriented x 3  -KR       Row Name 04/16/24 1021          Safety Issues, Functional Mobility    Impairments Affecting Function (Mobility) balance;endurance/activity tolerance;shortness of breath;strength;sensation/sensory awareness  -KR               User Key  (r) = Recorded By, (t) = Taken By, (c) = Cosigned By      Initials Name Provider Type    KR Maria T Raines, PT Physical Therapist                   Mobility       Row Name 04/16/24 1022          Bed Mobility    Bed Mobility supine-sit  -KR     Supine-Sit Valencia (Bed Mobility) standby assist  -KR     Assistive Device (Bed Mobility) bed rails;head of bed elevated  -KR       Row Name 04/16/24 1022          Sit-Stand Transfer    Sit-Stand Valencia (Transfers) standby assist  -KR     Comment, (Sit-Stand Transfer) 2x from bed  -KR       Row Name 04/16/24 1022          Gait/Stairs (Locomotion)    Valencia Level (Gait) standby assist  -KR     Distance in Feet (Gait) 120  120 + 120  -KR     Deviations/Abnormal Patterns (Gait) bilateral deviations;lauryn decreased;gait speed decreased;stride length decreased;base of support, wide  -KR     Valencia Level (Stairs) contact guard  -KR     Handrail Location (Stairs) right side (descending);right side (ascending);left side (ascending)  -KR      Number of Steps (Stairs) 10  -KR     Ascending Technique (Stairs) step-to-step  -KR     Descending Technique (Stairs) step-to-step  -KR     Comment, (Gait/Stairs) pt performed ambulation with eyes closed, 10' retroambulation, 5' B lateral stepping with CGA unsupported. Standing rest break d/t dyspnea. O2 following return to room 92% on 2 L.  -KR               User Key  (r) = Recorded By, (t) = Taken By, (c) = Cosigned By      Initials Name Provider Type    Maria T Castellanos PT Physical Therapist                   Obj/Interventions       Row Name 04/16/24 1023          Motor Skills    Therapeutic Exercise hip  -KR       Row Name 04/16/24 1023          Hip (Therapeutic Exercise)    Hip (Therapeutic Exercise) strengthening exercise  -KR     Hip Strengthening (Therapeutic Exercise) other (see comments);10 repetitions;bilateral;marching while standing  5x STS with arms across chest  -KR       Row Name 04/16/24 1023          Balance    Balance Assessment sitting static balance;sitting dynamic balance;standing static balance;standing dynamic balance  -KR     Static Sitting Balance independent  -KR     Dynamic Sitting Balance independent  -KR     Position, Sitting Balance unsupported;sitting in chair;sitting edge of bed  -KR     Static Standing Balance independent  -KR     Dynamic Standing Balance standby assist  -KR     Position/Device Used, Standing Balance unsupported  -KR     Balance Interventions sitting;standing;sit to stand;static;dynamic  -KR               User Key  (r) = Recorded By, (t) = Taken By, (c) = Cosigned By      Initials Name Provider Type    Maria T Castellanos PT Physical Therapist                   Goals/Plan    No documentation.                  Clinical Impression       Row Name 04/16/24 1023          Pain    Pretreatment Pain Rating 0/10 - no pain  -KR     Posttreatment Pain Rating 0/10 - no pain  -KR       Row Name 04/16/24 1023          Plan of Care Review    Plan of Care Reviewed With  patient  -KR     Progress improving  -KR     Outcome Evaluation Pt required less assist for mobility tasks and able to ambulate and perform stair navigation with no instability. Pt will continue to benefit from PT to address high level balance and endurance deficits.  -KR       Row Name 04/16/24 1023          Vital Signs    Pre Patient Position Supine  -KR     Intra Patient Position Standing  -KR     Post Patient Position Sitting  -KR       Row Name 04/16/24 1023          Positioning and Restraints    Pre-Treatment Position in bed  -KR     Post Treatment Position chair  -KR     In Chair notified nsg;reclined;call light within reach;encouraged to call for assist;exit alarm on;waffle cushion;legs elevated  -KR               User Key  (r) = Recorded By, (t) = Taken By, (c) = Cosigned By      Initials Name Provider Type    Maria T Castellanos PT Physical Therapist                   Outcome Measures       Row Name 04/16/24 1024 04/16/24 0830       How much help from another person do you currently need...    Turning from your back to your side while in flat bed without using bedrails? 4  -KR 3 (P)   -JG    Moving from lying on back to sitting on the side of a flat bed without bedrails? 4  -KR 3 (P)   -JG    Moving to and from a bed to a chair (including a wheelchair)? 4  -KR 3 (P)   -JG    Standing up from a chair using your arms (e.g., wheelchair, bedside chair)? 4  -KR 3 (P)   -JG    Climbing 3-5 steps with a railing? 3  -KR 3 (P)   -JG    To walk in hospital room? 3  -KR 3 (P)   -JG    AM-PAC 6 Clicks Score (PT) 22  -KR 18 (P)   -JG    Highest Level of Mobility Goal 7 --> Walk 25 feet or more  -KR 6 --> Walk 10 steps or more (P)   -JG              User Key  (r) = Recorded By, (t) = Taken By, (c) = Cosigned By      Initials Name Provider Type    Maria T Castellanos PT Physical Therapist    Altagracia Glasgow RN Extern Registered Nurse                                 Physical Therapy Education       Title: PT OT SLP  Therapies (In Progress)       Topic: Physical Therapy (In Progress)       Point: Mobility training (Done)       Learning Progress Summary             Patient Acceptance, E, VU by KR at 4/16/2024 1025    Acceptance, E, NR by AE at 4/13/2024 0839                         Point: Home exercise program (Not Started)       Learner Progress:  Not documented in this visit.              Point: Body mechanics (Done)       Learning Progress Summary             Patient Acceptance, E, VU by KR at 4/16/2024 1025    Acceptance, E, NR by AE at 4/13/2024 0839                         Point: Precautions (Done)       Learning Progress Summary             Patient Acceptance, E, VU by KR at 4/16/2024 1025    Acceptance, E, NR by AE at 4/13/2024 0839                                         User Key       Initials Effective Dates Name Provider Type Discipline    AE 09/21/21 -  Valentino Pedersen, PT Physical Therapist PT    OSMAN 12/30/22 -  Maria T Raines PT Physical Therapist PT                  PT Recommendation and Plan     Plan of Care Reviewed With: patient  Progress: improving  Outcome Evaluation: Pt required less assist for mobility tasks and able to ambulate and perform stair navigation with no instability. Pt will continue to benefit from PT to address high level balance and endurance deficits.     Time Calculation:         PT Charges       Row Name 04/16/24 1025             Time Calculation    Start Time 0948  -KR      PT Received On 04/16/24  -KR         Timed Charges    78762 - PT Therapeutic Exercise Minutes 15  -KR      78293 - PT Therapeutic Activity Minutes 15  -KR         Total Minutes    Timed Charges Total Minutes 30  -KR       Total Minutes 30  -KR                User Key  (r) = Recorded By, (t) = Taken By, (c) = Cosigned By      Initials Name Provider Type    Maria T Castellanos PT Physical Therapist                  Therapy Charges for Today       Code Description Service Date Service Provider Modifiers Qty     10189586484  PT THER PROC EA 15 MIN 4/16/2024 Maria T Raines, PT GP 1    [id number] HC PT THERAPEUTIC ACT EA 15 MIN 4/16/2024 Maria T Raines, PT GP 1            PT G-Codes  Outcome Measure Options: AM-PAC 6 Clicks Basic Mobility (PT)  AM-PAC 6 Clicks Score (PT): 22       Maria T Raines, PT  4/16/2024

## 2024-04-17 ENCOUNTER — READMISSION MANAGEMENT (OUTPATIENT)
Dept: CALL CENTER | Facility: HOSPITAL | Age: 64
End: 2024-04-17
Payer: MEDICARE

## 2024-04-17 VITALS
OXYGEN SATURATION: 88 % | BODY MASS INDEX: 42.14 KG/M2 | DIASTOLIC BLOOD PRESSURE: 63 MMHG | HEIGHT: 67 IN | HEART RATE: 64 BPM | WEIGHT: 268.5 LBS | RESPIRATION RATE: 18 BRPM | SYSTOLIC BLOOD PRESSURE: 138 MMHG | TEMPERATURE: 98.7 F

## 2024-04-17 LAB
ANION GAP SERPL CALCULATED.3IONS-SCNC: 8 MMOL/L (ref 5–15)
BUN SERPL-MCNC: 52 MG/DL (ref 8–23)
BUN/CREAT SERPL: 25.5 (ref 7–25)
CALCIUM SPEC-SCNC: 8.8 MG/DL (ref 8.6–10.5)
CHLORIDE SERPL-SCNC: 103 MMOL/L (ref 98–107)
CO2 SERPL-SCNC: 32 MMOL/L (ref 22–29)
CREAT SERPL-MCNC: 2.04 MG/DL (ref 0.76–1.27)
EGFRCR SERPLBLD CKD-EPI 2021: 36.2 ML/MIN/1.73
GLUCOSE BLDC GLUCOMTR-MCNC: 182 MG/DL (ref 70–130)
GLUCOSE BLDC GLUCOMTR-MCNC: 220 MG/DL (ref 70–130)
GLUCOSE SERPL-MCNC: 169 MG/DL (ref 65–99)
POTASSIUM SERPL-SCNC: 4.2 MMOL/L (ref 3.5–5.2)
SODIUM SERPL-SCNC: 143 MMOL/L (ref 136–145)

## 2024-04-17 PROCEDURE — 94660 CPAP INITIATION&MGMT: CPT

## 2024-04-17 PROCEDURE — 82948 REAGENT STRIP/BLOOD GLUCOSE: CPT

## 2024-04-17 PROCEDURE — 63710000001 INSULIN LISPRO (HUMAN) PER 5 UNITS: Performed by: HOSPITALIST

## 2024-04-17 PROCEDURE — 25010000002 HEPARIN (PORCINE) PER 1000 UNITS: Performed by: STUDENT IN AN ORGANIZED HEALTH CARE EDUCATION/TRAINING PROGRAM

## 2024-04-17 PROCEDURE — 29581 APPL MULTLAYER CMPRN SYS LEG: CPT

## 2024-04-17 PROCEDURE — 80048 BASIC METABOLIC PNL TOTAL CA: CPT | Performed by: INTERNAL MEDICINE

## 2024-04-17 PROCEDURE — 99239 HOSP IP/OBS DSCHRG MGMT >30: CPT | Performed by: INTERNAL MEDICINE

## 2024-04-17 RX ORDER — BUMETANIDE 2 MG/1
2 TABLET ORAL DAILY
Qty: 30 TABLET | Refills: 0 | Status: SHIPPED | OUTPATIENT
Start: 2024-04-18

## 2024-04-17 RX ADMIN — CARVEDILOL 12.5 MG: 12.5 TABLET, FILM COATED ORAL at 08:51

## 2024-04-17 RX ADMIN — INSULIN LISPRO 2 UNITS: 100 INJECTION, SOLUTION INTRAVENOUS; SUBCUTANEOUS at 08:52

## 2024-04-17 RX ADMIN — ASPIRIN 81 MG: 81 TABLET, CHEWABLE ORAL at 08:51

## 2024-04-17 RX ADMIN — INSULIN LISPRO 3 UNITS: 100 INJECTION, SOLUTION INTRAVENOUS; SUBCUTANEOUS at 13:24

## 2024-04-17 RX ADMIN — HEPARIN SODIUM 5000 UNITS: 5000 INJECTION INTRAVENOUS; SUBCUTANEOUS at 05:53

## 2024-04-17 RX ADMIN — POLYETHYLENE GLYCOL 3350 17 G: 17 POWDER, FOR SOLUTION ORAL at 08:52

## 2024-04-17 RX ADMIN — ALLOPURINOL 100 MG: 100 TABLET ORAL at 08:51

## 2024-04-17 RX ADMIN — Medication 1 CAPSULE: at 08:51

## 2024-04-17 RX ADMIN — GABAPENTIN 600 MG: 300 CAPSULE ORAL at 13:24

## 2024-04-17 RX ADMIN — BUMETANIDE 2 MG: 2 TABLET ORAL at 08:51

## 2024-04-17 RX ADMIN — GABAPENTIN 600 MG: 300 CAPSULE ORAL at 05:53

## 2024-04-17 NOTE — THERAPY WOUND CARE TREATMENT
Acute Care - Wound/Debridement Treatment Note  [facility]     Patient Name: Juan Alberto Tejeda  : 1960  MRN: [id number]  Today's Date: 2024                Admit Date: 2024    Visit Dx:    ICD-10-CM ICD-9-CM   1. Bilateral lower extremity edema  R60.0 782.3   2. Acute on chronic congestive heart failure, unspecified heart failure type  I50.9 428.0   3. JASWINDER on CPAP  G47.33 327.23       Patient Active Problem List   Diagnosis    CKD (chronic kidney disease) stage 3, GFR 30-59 ml/min    Hyperlipidemia LDL goal <70    Essential hypertension    Type 2 diabetes mellitus with kidney complication, with long-term current use of insulin    Normocytic anemia    Nonrheumatic aortic valve stenosis/status post TAVR    Acute on chronic diastolic congestive heart failure    Coronary artery disease involving native coronary artery of native heart with angina pectoris    Nocturnal hypoxia    Peripheral neuropathy    JASWINDER on CPAP    Acute on chronic diastolic CHF (congestive heart failure)    Acute on chronic heart failure with preserved ejection fraction (HFpEF)        Past Medical History:   Diagnosis Date    Aortic valve disorder     Arthritis     CHF (congestive heart failure)     Chronic kidney disease     Diabetes mellitus     Gout     Hiatal hernia     Hyperlipidemia     Hypertension     Kidney stones     history    MI, old     Peripheral neuropathy     Wears glasses         Past Surgical History:   Procedure Laterality Date    AORTIC VALVE REPAIR/REPLACEMENT N/A 3/16/2020    Procedure: TRANSCATHETER AORTIC VALVE REPLACEMENT, SHANKAR;  Surgeon: Irvin Jeffers MD;  Location: [place] [address];  Service: Cardiothoracic;  Laterality: N/A;  fluoro 10 min 30 sec  dose 1136 mGY   contrast 65 ml    AORTIC VALVE REPAIR/REPLACEMENT N/A 3/16/2020    Procedure: Transfemoral Transcatheter Aortic Valve Replacement;  Surgeon: Cayla Bella MD;  Location: [place] [address];  Service: Cardiovascular;  Laterality:  N/A;    CHOLECYSTECTOMY      CIRCUMCISION      COLONOSCOPY  2 years ago    KIDNEY STONE SURGERY      OTHER SURGICAL HISTORY      Gallstone removal surgery           Wound 04/11/24 1305 Left lower leg Venous Ulcer (Active)   Dressing Appearance intact;dry 04/17/24 1015   Closure None 04/17/24 0800   Base moist;pink;epithelialization 04/17/24 1015   Periwound redness;intact 04/17/24 1015   Periwound Temperature warm 04/17/24 1015   Periwound Skin Turgor soft 04/17/24 1015   Edges irregular;open 04/17/24 1015   Drainage Characteristics/Odor serosanguineous 04/17/24 1015   Drainage Amount scant 04/17/24 1015   Care, Wound cleansed with;soap and water 04/17/24 1015   Dressing Care foam;low-adherent 04/17/24 1015   Periwound Care cleansed with pH balanced cleanser 04/17/24 1015      Lymphedema       Row Name 04/17/24 1015             Lymphedema Edema Assessment    Ptting Edema Category By severity  -      Pitting Edema Mild  -         Compression/Skin Care    Compression/Skin Care skin care;wrapping location;bandaging  -      Skin Care washed/dried;lotion applied  -      Wrapping Location lower extremity  -      Wrapping Location LE bilateral:;foot to knee  -      Wrapping Comments size 5 compressogrip doubled and overlapping for gradient compression.  -                User Key  (r) = Recorded By, (t) = Taken By, (c) = Cosigned By      Initials Name Provider Type     Clinton Kim, PT Physical Therapist                    WOUND DEBRIDEMENT                     PT Assessment (Last 12 Hours)       PT Evaluation and Treatment       Row Name 04/17/24 1015          Physical Therapy Time and Intention    Subjective Information complains of;weakness;fatigue  -     Document Type wound care;therapy note (daily note)  -     Mode of Treatment individual therapy;physical therapy  -       Row Name 04/17/24 1015          Pain    Pretreatment Pain Rating 0/10 - no pain  -     Posttreatment Pain Rating 0/10 -  no pain  -       Row Name 04/17/24 1015          Wound 04/11/24 1305 Left lower leg Venous Ulcer    Wound - Properties Group Placement Date: 04/11/24  -EM Placement Time: 1305  -EM Side: Left  -EM Orientation: lower  -EM Location: leg  -EM Primary Wound Type: Venous ulcer  -EM    Dressing Appearance intact;dry  -MF     Base moist;pink;epithelialization  -MF     Periwound redness;intact  -MF     Periwound Temperature warm  -MF     Periwound Skin Turgor soft  -MF     Edges irregular;open  -MF     Drainage Characteristics/Odor serosanguineous  -MF     Drainage Amount scant  -MF     Care, Wound cleansed with;soap and water  -MF     Dressing Care foam;low-adherent  mepilex Ag foam with cast padding to secure  -MF     Periwound Care cleansed with pH balanced cleanser  -MF     Retired Wound - Properties Group Placement Date: 04/11/24  -EM Placement Time: 1305  -EM Side: Left  -EM Orientation: lower  -EM Location: leg  -EM Primary Wound Type: Venous ulcer  -EM    Retired Wound - Properties Group Date first assessed: 04/11/24  -EM Time first assessed: 1305  -EM Side: Left  -EM Location: leg  -EM Primary Wound Type: Venous ulcer  -EM      Row Name 04/17/24 1015          Coping    Observed Emotional State calm;cooperative  -     Verbalized Emotional State acceptance  -     Trust Relationship/Rapport care explained  -       Row Name 04/17/24 1015          Plan of Care Review    Plan of Care Reviewed With patient  -     Outcome Evaluation BLE edema responding well to light compression with MLW. exudate from LLE wound decreased with moderate reepithelialization noted.  PT educated pt and family on home dressing changes and family able to verb understanding.  -       Row Name 04/17/24 1015          Positioning and Restraints    Pre-Treatment Position in bed  -     Post Treatment Position bed  -     In Bed supine;call light within reach  -               User Key  (r) = Recorded By, (t) = Taken By, (c) = Cosigned  By      Initials Name Provider Type    MF Clinton Kim, PT Physical Therapist    Andria Martell, RN Registered Nurse                  Physical Therapy Education       Title: PT OT SLP Therapies (In Progress)       Topic: Physical Therapy (In Progress)       Point: Mobility training (Done)       Learning Progress Summary             Patient Acceptance, E, VU by KR at 4/16/2024 1025    Acceptance, E, NR by AE at 4/13/2024 0839                         Point: Home exercise program (Not Started)       Learner Progress:  Not documented in this visit.              Point: Body mechanics (Done)       Learning Progress Summary             Patient Acceptance, E, VU by KR at 4/16/2024 1025    Acceptance, E, NR by AE at 4/13/2024 0839                         Point: Precautions (Done)       Learning Progress Summary             Patient Acceptance, E, VU by KR at 4/16/2024 1025    Acceptance, E, NR by AE at 4/13/2024 0839                                         User Key       Initials Effective Dates Name Provider Type Discipline    AE 09/21/21 -  Valentino Pedersen, PT Physical Therapist PT    KR 12/30/22 -  Maria T Raines, PT Physical Therapist PT                    Recommendation and Plan  Anticipated Discharge Disposition (PT): home with assist  Planned Therapy Interventions (PT): wound care  Therapy Frequency (PT): daily  Plan of Care Reviewed With: patient           Outcome Evaluation: BLE edema responding well to light compression with MLW. exudate from LLE wound decreased with moderate reepithelialization noted.  PT educated pt and family on home dressing changes and family able to verb understanding.  Plan of Care Reviewed With: patient            Time Calculation   PT Charges       Row Name 04/17/24 1015             Time Calculation    Start Time 1015  -MF      PT Goal Re-Cert Due Date 04/23/24  -MF         Untimed Charges    88114-Ngljkrygml comp below knee 25  -MF         Total Minutes    Untimed Charges  Total Minutes 25  -MF       Total Minutes 25  -MF                User Key  (r) = Recorded By, (t) = Taken By, (c) = Cosigned By      Initials Name Provider Type    Clinton Correa, PT Physical Therapist                            PT G-Codes  Outcome Measure Options: AM-PAC 6 Clicks Basic Mobility (PT)  AM-PAC 6 Clicks Score (PT): 22       Clinton Kim, PT  4/17/2024

## 2024-04-17 NOTE — DISCHARGE SUMMARY
[facility] Medicine Services  DISCHARGE SUMMARY    Patient Name: Juan Alberto Tejeda  : 1960  MRN: [id number]    Date of Admission: 2024  9:44 AM  Date of Discharge:  2024  Primary Care Physician: Megan Martell APRN    Consults       Date and Time Order Name Status Description    2024 12:15 PM Inpatient Nephrology Consult Completed     2024 12:15 PM Inpatient Cardiology Consult Completed             Hospital Course     Presenting Problem: Acute on Chronic HFpEF  Active Hospital Problems    Diagnosis  POA    **Acute on chronic diastolic CHF (congestive heart failure) [I50.33]  Yes    Acute on chronic heart failure with preserved ejection fraction (HFpEF) [I50.33]  Yes    Coronary artery disease involving native coronary artery of native heart with angina pectoris [I25.119]  Yes    Type 2 diabetes mellitus with kidney complication, with long-term current use of insulin [E11.29, Z79.4]  Not Applicable    Hyperlipidemia LDL goal <70 [E78.5]  Yes    Essential hypertension [I10]  Yes    Acute on chronic diastolic congestive heart failure [I50.33]  Yes    Nonrheumatic aortic valve stenosis/status post TAVR [I35.0]  Yes    CKD (chronic kidney disease) stage 3, GFR 30-59 ml/min [N18.30]  Yes      Resolved Hospital Problems   No resolved problems to display.          Hospital Course:  Juan Alberto Tejeda is a 63 y.o. male with history of TAVR, CAD, HFpEF, CKD stage IV, DM2, HTN, HLD, gout, who presented for evaluation of progressive dyspnea x 1 to 2 months.    Acute respiratory failure with hypoxia -resolved, on baseline O2  HFpEF Exacerbation  Hx of TAVR  Chest pain, resolved  Type II NSTEMI  -continue bumex daily at discharge, discussed daily standing weights, monitoring for increased edema or shortness of breath. Plan close f/u with heart and valve clinic.  -ECHO : LVEF 60%, well-seated TAVR with mean gradient 20, mild mitral stenosis  -Cardiology evaluated, f/u with  Cardiology in 4 weeks  -Coreg 12.5 mg twice daily, Crestor 40 mg daily, aspirin 81 mg daily     B LE edema  LLE redness w/wounds  -s/p rocephin x5 days  -wound care following  -duplex of Biltateral LE negative for DVT     JASWINDER - currently untreated  -Previously on trilogy and CPAP outpatient, but no longer has this at home, per his wife he never had formal sleep study. Ambulatory referral to sleep medicine placed, also encourage them to pursue sleep study through PCP.     CKD IV  -nephrology following, creatinine currently at [facility], keep already scheduled f/u with [facility]     DM  -A1C 6%, continue home regimen     Hx of HTN  -resume home regimen, d/c HCTZ     Hx of mild CAD, per catheterization 2020     HLD  -statin    Discharge Follow Up Recommendations for outpatient labs/diagnostics:  - [facility] in 5 days  - [facility] in 4 weeks  - [facility], keep already scheduled appointment on 4/24/24  - PCP in 1-2 weeks, highly recommend outpatient sleep study    Day of Discharge     HPI:   Patient sitting up in bed. Feeling better, breathing at baseline on 2L. Swelling improved. Wife at bedside.    Review of Systems  Gen- No fevers, chills  CV- No chest pain, palpitations  Resp- No cough, dyspnea  GI- No N/V/D, abd pain    Vital Signs:   Temp:  [98 °F (36.7 °C)-99 °F (37.2 °C)] 98.7 °F (37.1 °C)  Heart Rate:  [57-72] 64  Resp:  [16-22] 18  BP: (119-140)/(56-68) 138/63  Flow (L/min):  [2] 2      Physical Exam:  Constitutional: No acute distress, awake, alert  HENT: NCAT, mucous membranes moist  Respiratory: Clear to auscultation bilaterally, respiratory effort normal   Cardiovascular: RRR, no murmurs, rubs, or gallops  Gastrointestinal: soft, nontender, nondistended  Musculoskeletal: BLE edema has improved, compression wraps in place  Psychiatric: Appropriate affect, cooperative  Neurologic: Oriented x 3, speech clear, no focal deficits  Skin: No rashes      Pertinent  and/or Most Recent Results      LAB RESULTS:      Lab 04/16/24  0417 04/13/24 0445 04/12/24 0447 04/11/24 1219 04/11/24  1008   WBC 9.32 9.01 8.47  --  8.31   HEMOGLOBIN 11.6* 11.0* 10.8*  --  11.6*   HEMATOCRIT 38.3 36.9* 35.9*  --  39.4   PLATELETS 175 173 157  --  198   NEUTROS ABS  --   --   --   --  5.95   IMMATURE GRANS (ABS)  --   --   --   --  0.05   LYMPHS ABS  --   --   --   --  1.51   MONOS ABS  --   --   --   --  0.53   EOS ABS  --   --   --   --  0.23   MCV 90.3 90.7 90.7  --  94.3   PROCALCITONIN  --   --   --  0.12  --          Lab 04/17/24  0757 04/16/24  0417 04/15/24  0456 04/14/24  0637 04/13/24 0445 04/12/24 0447 04/11/24 1219 04/11/24  1008   SODIUM 143 141 140 139 141   < >  --  145   POTASSIUM 4.2 4.3 4.0 4.1 4.2   < >  --  4.8   CHLORIDE 103 98 97* 100 103   < >  --  111*   CO2 32.0* 33.0* 34.0* 34.0* 32.0*   < >  --  29.0   ANION GAP 8.0 10.0 9.0 5.0 6.0   < >  --  5.0   BUN 52* 58* 53* 55* 57*   < >  --  64*   CREATININE 2.04* 2.47* 2.10* 1.93* 2.14*   < >  --  2.15*   EGFR 36.2* 28.8* 34.9* 38.7* 34.2*   < >  --  34.0*   GLUCOSE 169* 151* 120* 120* 106*   < >  --  105*   CALCIUM 8.8 8.8 8.5* 8.8 8.7   < >  --  9.3   MAGNESIUM  --  1.9 1.9 1.8 1.8  --  2.4  --    HEMOGLOBIN A1C  --   --   --   --   --   --   --  6.00*   TSH  --   --   --   --   --   --  0.865  --     < > = values in this interval not displayed.         Lab 04/12/24  0447 04/11/24  1008   TOTAL PROTEIN 5.8* 6.6   ALBUMIN 3.3* 3.6   GLOBULIN 2.5 3.0   ALT (SGPT) 21 28   AST (SGOT) 19 24   BILIRUBIN 0.4 0.5   ALK PHOS 101 112   LIPASE  --  63*         Lab 04/12/24 0447 04/12/24  0023 04/11/24  2047 04/11/24  1219 04/11/24  1008   PROBNP  --   --   --   --  1,707.0*   HSTROP T 88* 89* 82* 72* 80*                 Brief Urine Lab Results  (Last result in the past 365 days)        Color   Clarity   Blood   Leuk Est   Nitrite   Protein   CREAT   Urine HCG        04/11/24 1537             70.2               Microbiology Results (last 10 days)        Procedure Component Value - Date/Time    MRSA Screen, PCR (Inpatient) - Swab, Nares [[id number]]  (Normal) Collected: 04/11/24 1544    Lab Status: Final result Specimen: Swab from Nares Updated: 04/11/24 1808     MRSA PCR Negative    Narrative:      The negative predictive value of this diagnostic test is high and should only be used to consider de-escalating anti-MRSA therapy. A positive result may indicate colonization with MRSA and must be correlated clinically.  MRSA Negative    Blood Culture - Blood, Arm, Right [[id number]]  (Normal) Collected: 04/11/24 1515    Lab Status: Final result Specimen: Blood from Arm, Right Updated: 04/16/24 1615     Blood Culture No growth at 5 days    Narrative:      Less than seven (7) mL's of blood was collected.  Insufficient quantity may yield false negative results.    Blood Culture - Blood, Hand, Right [[id number]]  (Normal) Collected: 04/11/24 1514    Lab Status: Final result Specimen: Blood from Hand, Right Updated: 04/16/24 1615     Blood Culture No growth at 5 days    Narrative:      Less than seven (7) mL's of blood was collected.  Insufficient quantity may yield false negative results.    COVID-19 and FLU A/B PCR, 1 HR TAT - Swab, Nasopharynx [[id number]]  (Normal) Collected: 04/11/24 1039    Lab Status: Final result Specimen: Swab from Nasopharynx Updated: 04/11/24 1122     COVID19 Not Detected     Influenza A PCR Not Detected     Influenza B PCR Not Detected    Narrative:      Fact sheet for providers: https://www.fda.gov/media/108467/download    Fact sheet for patients: https://www.fda.gov/media/444555/download    Test performed by PCR.            Duplex Venous Lower Extremity - Bilateral CAR    Result Date: 4/12/2024    Normal bilateral lower extremity venous duplex scan.     XR Chest 1 View    Result Date: 4/12/2024  XR CHEST 1 VW Date of Exam: 4/12/2024 4:29 AM EDT Indication: DYSPNEA, ON EXERTION dyspnea Comparison:  4/11/2024 Findings: Cardiac size is similar to  prior exam. Right greater than left bibasilar opacities. Similar mild interstitial and perihilar opacities. No substantial pneumothorax. Small bilateral pleural effusions. No evidence of acute osseous abnormalities. Visualized upper abdomen is unremarkable.     Impression: Similar to mildly increased right and left bibasilar opacities and reflect atelectasis or infection. Similar mild interstitial and perihilar opacities suggestive of edema. Small bilateral pleural effusions. Electronically Signed: Isaiah Maddox MD  4/12/2024 8:01 AM EDT  Workstation ID: [id number]    Adult Transthoracic Echo Complete W/ Cont if Necessary Per Protocol    Result Date: 4/12/2024    Left ventricular systolic function is normal. Estimated left ventricular EF = 60%   The left atrial cavity is mild to moderately dilated.   There is a well-seated 26 mm TAVR valve present.  Mean gradient is 20 mmHg, BERNA 1.7 cm².   Calcified mitral valve with mild mitral stenosis.  Mean gradient 7 mmHg, MVA 2.3 cm².   Mild mitral regurgitation.     XR Chest 1 View    Result Date: 4/11/2024  XR CHEST 1 VW Date of Exam: 4/11/2024 9:47 AM EDT Indication: SOA triage protocol Comparison: 4/20/2020 Findings: There is mild cardiomegaly. There is new pulmonary vascular congestion with mild perihilar pulmonary edema. There are no definite effusions. Exam is somewhat limited by obesity.     Impression: Findings are most compatible with CHF with mild pulmonary edema. Electronically Signed: Jeni Linda MD  4/11/2024 10:16 AM EDT  Workstation ID: [id number]     Results for orders placed during the hospital encounter of 04/11/24    Duplex Venous Lower Extremity - Bilateral CAR    Interpretation Summary    Normal bilateral lower extremity venous duplex scan.      Results for orders placed during the hospital encounter of 04/11/24    Duplex Venous Lower Extremity - Bilateral CAR    Interpretation Summary    Normal bilateral lower extremity venous duplex  scan.      Results for orders placed during the hospital encounter of 04/11/24    Adult Transthoracic Echo Complete W/ Cont if Necessary Per Protocol    Interpretation Summary    Left ventricular systolic function is normal. Estimated left ventricular EF = 60%    The left atrial cavity is mild to moderately dilated.    There is a well-seated 26 mm TAVR valve present.  Mean gradient is 20 mmHg, BERNA 1.7 cm².    Calcified mitral valve with mild mitral stenosis.  Mean gradient 7 mmHg, MVA 2.3 cm².    Mild mitral regurgitation.      Plan for Follow-up of Pending Labs/Results:     Discharge Details        Discharge Medications        New Medications        Instructions Start Date   bumetanide 2 MG tablet  Commonly known as: BUMEX   2 mg, Oral, Daily   Start Date: April 18, 2024            Continue These Medications        Instructions Start Date   allopurinol 300 MG tablet  Commonly known as: ZYLOPRIM   300 mg, Oral, Daily      aspirin 81 MG chewable tablet   81 mg, Oral, Daily      carvedilol 12.5 MG tablet  Commonly known as: COREG   12.5 mg, Oral, 2 Times Daily With Meals      clonazePAM 0.5 MG tablet  Commonly known as: KlonoPIN   0.5 mg, Oral, 2 Times Daily PRN      escitalopram 20 MG tablet  Commonly known as: LEXAPRO   20 mg, Oral, Daily      fluticasone 50 MCG/ACT nasal spray  Commonly known as: FLONASE   2 sprays, Nasal, Daily      gabapentin 600 MG tablet  Commonly known as: NEURONTIN   600 mg, Oral, 3 Times Daily      insulin aspart 100 UNIT/ML solution pen-injector sc pen  Commonly known as: novoLOG FLEXPEN   Sliding scale TID with meals. 150-200: 2 units 201-249: 4 units 250-300: 6 units 301-350: 8 units      insulin detemir 100 UNIT/ML injection  Commonly known as: LEVEMIR   If blood sugar consistently above 200, start using 10 units nightly      pantoprazole 40 MG EC tablet  Commonly known as: PROTONIX   40 mg, Oral, Daily      pioglitazone 30 MG tablet  Commonly known as: ACTOS   30 mg, Oral, Daily       polyethylene glycol 17 g packet  Commonly known as: MIRALAX   17 g, Oral, 2 Times Daily PRN      rosuvastatin 40 MG tablet  Commonly known as: CRESTOR   40 mg, Oral, Nightly             Stop These Medications      triamterene-hydrochlorothiazide 37.5-25 MG per tablet  Commonly known as: MAXZIDE-25              No Known Allergies      Discharge Disposition:  Home    Diet:  Hospital:  Diet Order   Procedures    Diet: Diabetic, Fluid Restriction (240 mL/tray); Consistent Carbohydrate; Other (Specify mL/day) (1200mL/day); Fluid Consistency: Thin (IDDSI 0)            Activity:  - as tolerated    Restrictions or Other Recommendations:  - none       CODE STATUS:    Code Status and Medical Interventions:   Ordered at: 04/12/24 1028     Medical Intervention Limits:    NO intubation (DNI)     Level Of Support Discussed With:    Patient     Code Status (Patient has no pulse and is not breathing):    No CPR (Do Not Attempt to Resuscitate)     Medical Interventions (Patient has pulse or is breathing):    Limited Support       No future appointments.    Additional Instructions for the Follow-ups that You Need to Schedule       Ambulatory Referral to Sleep Medicine   As directed      Follow-up needed: Yes                      Emely Agrawal DO  04/17/24      Time Spent on Discharge:  I spent  45  minutes on this discharge activity which included: face-to-face encounter with the patient, reviewing the data in the system, coordination of the care with the nursing staff as well as consultants, documentation, and entering orders.

## 2024-04-17 NOTE — PLAN OF CARE
Goal Outcome Evaluation:  Plan of Care Reviewed With: patient           Outcome Evaluation: BLE edema responding well to light compression with MLW. exudate from LLE wound decreased with moderate reepithelialization noted.  PT educated pt and family on home dressing changes and family able to verb understanding.

## 2024-04-17 NOTE — PROGRESS NOTES
"   LOS: 6 days    Patient Care Team:  Megan Martell APRN as PCP - General (Family Medicine)    Subjective     No new events, no added distress.      Objective     Vital Signs:  Blood pressure 138/63, pulse 64, temperature 98.7 °F (37.1 °C), temperature source Oral, resp. rate 18, height 170.2 cm (67.01\"), weight 122 kg (268 lb 8 oz), SpO2 (!) 88%.      Intake/Output Summary (Last 24 hours) at 4/17/2024 1558  Last data filed at 4/17/2024 0430  Gross per 24 hour   Intake 728 ml   Output --   Net 728 ml        04/16 0701 - 04/17 0700  In: 1206 [P.O.:1206]  Out: 580 [Urine:580]    Physical Exam:  General Appearance: Alert, oriented, no obvious distress.  Eyes: PER, EOMI.  Neck: Supple no JVD.  Lungs: Clear auscultation, no rales rhonchi's, equal chest movement, nonlabored.  Heart: No gallop, murmur, rub, RRR.  Abdomen: Soft, nontender, positive bowel sounds, no organomegaly.  Extremities: Trace pretibial edema, no cyanosis.  Neuro: No focal deficit, moving all extremities, alert oriented X 3      Labs:  Results from last 7 days   Lab Units 04/16/24  0417 04/13/24  0445 04/12/24  0447   WBC 10*3/mm3 9.32 9.01 8.47   HEMOGLOBIN g/dL 11.6* 11.0* 10.8*   PLATELETS 10*3/mm3 175 173 157     Results from last 7 days   Lab Units 04/17/24  0757 04/16/24  0417 04/15/24  0456 04/14/24  0637 04/13/24  0445 04/12/24  0447 04/11/24  1219 04/11/24  1008   SODIUM mmol/L 143 141 140 139 141 143  --  145   POTASSIUM mmol/L 4.2 4.3 4.0 4.1 4.2 4.2  --  4.8   CHLORIDE mmol/L 103 98 97* 100 103 108*  --  111*   CO2 mmol/L 32.0* 33.0* 34.0* 34.0* 32.0* 30.0*  --  29.0   BUN mg/dL 52* 58* 53* 55* 57* 59*  --  64*   CREATININE mg/dL 2.04* 2.47* 2.10* 1.93* 2.14* 1.99*  --  2.15*   CALCIUM mg/dL 8.8 8.8 8.5* 8.8 8.7 8.8  --  9.3   MAGNESIUM mg/dL  --  1.9 1.9 1.8 1.8  --    < >  --    ALBUMIN g/dL  --   --   --   --   --  3.3*  --  3.6    < > = values in this interval not displayed.     Results from last 7 days   Lab Units 04/12/24  0447 "   ALK PHOS U/L 101   BILIRUBIN mg/dL 0.4   ALT (SGPT) U/L 21   AST (SGOT) U/L 19     Estimated Creatinine Clearance: 47 mL/min (A) (by C-G formula based on SCr of 2.04 mg/dL (H)).    Assessment plan:    1.  CKD stage IV: Creatinine 2.04 follows with [facility] office creatinine was 3.5.  For now would continue diuresis.  Closely monitor renal function.  No indication for emergent dialysis at this time.    .  2.  Hypertension: BP stable.  Continue current medications.  Monitor with volume reduction.  Cover with as needed medications     3.  Volume: Patient with significant edema.  Albumin borderline at 3.6. .  Only on Maxide at home.  Previously patient was on other diuretics but reports they were stopped due to rising creatinine without edema.  Patient reports over the past month and a half he developed edema which has has continued to increase.  Reports baseline weight around 265.   standing weight initially 277.  Down to 270 today.  Continue with the diuretics at this time.     4.  HFpEF: Per report last echocardiogram 5/2020 showed a EF of 65%.  Patient has a TAVR valve in place.  Evaluated by cardiology.  Not contributing to current situation.     Okay to discharge return to office with Dr. Cobos appointment in [place].    Abiodun Sanchez MD  04/17/24  15:58 EDT

## 2024-04-17 NOTE — CASE MANAGEMENT/SOCIAL WORK
Case Management Discharge Note      Final Note: Mr. Tejeda is being discharged home today, 4/17/24. Notifed Tanmay that he is needing a portable tank delivered to his room for discharge. Christiano Foster will deliver to his room, prior to discharge. No further discharge needs noted.         Selected Continued Care - Admitted Since 4/11/2024       Destination    No services have been selected for the patient.                Durable Medical Equipment    No services have been selected for the patient.                Dialysis/Infusion    No services have been selected for the patient.                Home Medical Care    No services have been selected for the patient.                Therapy    No services have been selected for the patient.                Community Resources    No services have been selected for the patient.                Community & DME    No services have been selected for the patient.                         Final Discharge Disposition Code: 01 - home or self-care

## 2024-04-18 ENCOUNTER — OFFICE VISIT (OUTPATIENT)
Dept: FAMILY MEDICINE CLINIC | Age: 64
End: 2024-04-18

## 2024-04-18 ENCOUNTER — HOSPITAL ENCOUNTER (OUTPATIENT)
Facility: HOSPITAL | Age: 64
Discharge: HOME | End: 2024-04-18
Payer: MEDICARE

## 2024-04-18 VITALS
OXYGEN SATURATION: 92 % | SYSTOLIC BLOOD PRESSURE: 126 MMHG | BODY MASS INDEX: 41.22 KG/M2 | DIASTOLIC BLOOD PRESSURE: 80 MMHG | TEMPERATURE: 98.2 F | RESPIRATION RATE: 18 BRPM | HEART RATE: 58 BPM | HEIGHT: 68 IN | WEIGHT: 272 LBS

## 2024-04-18 DIAGNOSIS — Z79.4 TYPE 2 DIABETES MELLITUS WITH OTHER CIRCULATORY COMPLICATION, WITH LONG-TERM CURRENT USE OF INSULIN (HCC): ICD-10-CM

## 2024-04-18 DIAGNOSIS — E11.59 TYPE 2 DIABETES MELLITUS WITH OTHER CIRCULATORY COMPLICATION, WITH LONG-TERM CURRENT USE OF INSULIN (HCC): ICD-10-CM

## 2024-04-18 DIAGNOSIS — F43.23 SITUATIONAL MIXED ANXIETY AND DEPRESSIVE DISORDER: ICD-10-CM

## 2024-04-18 DIAGNOSIS — J44.9 OSA AND COPD OVERLAP SYNDROME (HCC): ICD-10-CM

## 2024-04-18 DIAGNOSIS — Z09 HOSPITAL DISCHARGE FOLLOW-UP: ICD-10-CM

## 2024-04-18 DIAGNOSIS — E11.42 DIABETIC POLYNEUROPATHY ASSOCIATED WITH TYPE 2 DIABETES MELLITUS (HCC): ICD-10-CM

## 2024-04-18 DIAGNOSIS — J96.02 ACUTE RESPIRATORY FAILURE WITH HYPOXIA AND HYPERCAPNIA (HCC): Primary | ICD-10-CM

## 2024-04-18 DIAGNOSIS — N18.32 STAGE 3B CHRONIC KIDNEY DISEASE (HCC): ICD-10-CM

## 2024-04-18 DIAGNOSIS — Z91.89 AT RISK FOR HYPOGLYCEMIA: ICD-10-CM

## 2024-04-18 DIAGNOSIS — J96.01 ACUTE RESPIRATORY FAILURE WITH HYPOXIA AND HYPERCAPNIA (HCC): Primary | ICD-10-CM

## 2024-04-18 DIAGNOSIS — G47.33 OSA AND COPD OVERLAP SYNDROME (HCC): ICD-10-CM

## 2024-04-18 LAB
ALBUMIN SERPL-MCNC: 3.7 G/DL (ref 3.4–4.8)
ANION GAP SERPL CALCULATED.3IONS-SCNC: 10 MMOL/L (ref 3–16)
BUN SERPL-MCNC: 47 MG/DL (ref 6–20)
CALCIUM SERPL-MCNC: 9.1 MG/DL (ref 8.5–10.5)
CHLORIDE SERPL-SCNC: 100 MMOL/L (ref 98–107)
CK SERPL-CCNC: 207 U/L (ref 26–174)
CO2 SERPL-SCNC: 33 MMOL/L (ref 20–30)
CREAT SERPL-MCNC: 2.2 MG/DL (ref 0.4–1.2)
GFR SERPLBLD CREATININE-BSD FMLA CKD-EPI: 33 ML/MIN/{1.73_M2}
GLUCOSE SERPL-MCNC: 172 MG/DL (ref 74–106)
PHOSPHATE SERPL-MCNC: 3.5 MG/DL (ref 2.5–4.5)
POTASSIUM SERPL-SCNC: 4.3 MMOL/L (ref 3.4–5.1)
SODIUM SERPL-SCNC: 143 MMOL/L (ref 136–145)

## 2024-04-18 PROCEDURE — 36415 COLL VENOUS BLD VENIPUNCTURE: CPT

## 2024-04-18 PROCEDURE — 80069 RENAL FUNCTION PANEL: CPT

## 2024-04-18 PROCEDURE — 83970 ASSAY OF PARATHORMONE: CPT

## 2024-04-18 PROCEDURE — 82550 ASSAY OF CK (CPK): CPT

## 2024-04-18 RX ORDER — PROCHLORPERAZINE 25 MG/1
SUPPOSITORY RECTAL
Qty: 3 EACH | Refills: 11 | Status: SHIPPED | OUTPATIENT
Start: 2024-04-18

## 2024-04-18 RX ORDER — CLONAZEPAM 0.5 MG/1
0.5 TABLET ORAL 2 TIMES DAILY
Qty: 60 TABLET | Refills: 0 | Status: SHIPPED | OUTPATIENT
Start: 2024-04-18 | End: 2024-05-18

## 2024-04-18 RX ORDER — GABAPENTIN 600 MG/1
600 TABLET ORAL 3 TIMES DAILY
Qty: 90 TABLET | Refills: 2 | Status: SHIPPED | OUTPATIENT
Start: 2024-04-18 | End: 2024-07-17

## 2024-04-18 NOTE — OUTREACH NOTE
Prep Survey      Flowsheet Row Responses   [facility] patient discharged from? [place]   Is LACE score < 7 ? No   Eligibility Readm Mgmt   Discharge diagnosis *Acute on chronic diastolic CHF (congestive heart failure)   Does the patient have one of the following disease processes/diagnoses(primary or secondary)? CHF   Does the patient have Home health ordered? No   Is there a DME ordered? Yes   What DME was ordered? [employer]   portable tank   Prep survey completed? Yes            JAX PIERSON - Registered Nurse

## 2024-04-18 NOTE — PROGRESS NOTES
Chief Complaint   Patient presents with    Follow-Up from Hospital     F/u copd flare       Have you seen any other physician or provider since your last visit yes hosp    Have you had any other diagnostic tests since your last visit? yes - echo    Have you changed or stopped any medications since your last visit? yes - see list

## 2024-04-19 LAB — PTH-INTACT SERPL-MCNC: 81.9 PG/ML (ref 14–72)

## 2024-04-22 ENCOUNTER — READMISSION MANAGEMENT (OUTPATIENT)
Dept: CALL CENTER | Facility: HOSPITAL | Age: 64
End: 2024-04-22
Payer: MEDICARE

## 2024-04-22 NOTE — OUTREACH NOTE
CHF Week 1 Survey      Flowsheet Row Responses   [facility] patient discharged from? [place]   Does the patient have one of the following disease processes/diagnoses(primary or secondary)? CHF   CHF Week 1 attempt successful? No   Call start time 1421   Unsuccessful attempts Attempt 1  [spoke with daughter, she has very little information on patient.]   Discharge diagnosis *Acute on chronic diastolic CHF (congestive heart failure)   Meds reviewed with patient/caregiver? Yes   Does the patient have all medications ordered at discharge? Yes   Is the patient taking all medications as directed (includes completed medication regime)? Yes   Does the patient have a primary care provider?  Yes            AARON EASTON - Registered Nurse

## 2024-04-26 ENCOUNTER — READMISSION MANAGEMENT (OUTPATIENT)
Dept: CALL CENTER | Facility: HOSPITAL | Age: 64
End: 2024-04-26
Payer: MEDICARE

## 2024-04-26 NOTE — OUTREACH NOTE
CHF Week 1 Survey      Flowsheet Row Responses   [facility] patient discharged from? [place]   Does the patient have one of the following disease processes/diagnoses(primary or secondary)? CHF   CHF Week 1 attempt successful? Yes   Call start time 1121   Call end time 1135   Discharge diagnosis *Acute on chronic diastolic CHF (congestive heart failure)   Meds reviewed with patient/caregiver? Yes   Is the patient having any side effects they believe may be caused by any medication additions or changes? No   Does the patient have all medications ordered at discharge? Yes   Is the patient taking all medications as directed (includes completed medication regime)? Yes   Does the patient have a primary care provider?  Yes   Does the patient have an appointment with their PCP within 7 days of discharge? Yes   Has the patient kept scheduled appointments due by today? Yes   What DME was ordered? [employer]   portable tank   DME comments Pt reports that he wears night time oxygen   Pulse Ox monitoring None   Psychosocial issues? No   Comments Pt reports that he is back to baseline, he mowed three acres yesterday.  Pt also reports that he received good news yesterday from Renal dept, pt's renal fxn has improved, he does not need a port or HD at this time the MD removed him from HD list, the pt reports.   Did the patient receive a copy of their discharge instructions? Yes   Nursing interventions Reviewed instructions with patient   What is the patient's perception of their health status since discharge? Returned to baseline/stable   Nursing interventions Nurse provided patient education   Is the patient able to teach back signs and symptoms of worsening condition? (i.e. weight gain, shortness of air, etc.) Yes   Is the patient/caregiver able to teach back the hierarchy of who to call/visit for symptoms/problems? PCP, Specialist, Home health nurse, Urgent Care, ED, 911 Yes   CHF Nursing Interventions Education provided on  various zones   CHF Zone this Call Green Zone   Green Zone Patient reports doing well, No new or worsening shortness of breath, Physical activity level is normal for you, Weight check stable   Green Zone Interventions Daily weight check, Meds as directed, Low sodium diet, Follow up visits planned    CHF Week 1 call completed? Yes   Revoked No further contact(revokes)-requires comment   Call end time [phone]            Lenore VICTORIA - Registered Nurse

## 2024-05-02 ENCOUNTER — HOSPITAL ENCOUNTER (OUTPATIENT)
Facility: HOSPITAL | Age: 64
Discharge: HOME | End: 2024-05-02
Payer: MEDICARE

## 2024-05-02 ENCOUNTER — OFFICE VISIT (OUTPATIENT)
Dept: FAMILY MEDICINE CLINIC | Age: 64
End: 2024-05-02
Payer: MEDICARE

## 2024-05-02 VITALS
HEART RATE: 62 BPM | RESPIRATION RATE: 18 BRPM | DIASTOLIC BLOOD PRESSURE: 62 MMHG | BODY MASS INDEX: 41.34 KG/M2 | SYSTOLIC BLOOD PRESSURE: 102 MMHG | TEMPERATURE: 97.7 F | HEIGHT: 68 IN | OXYGEN SATURATION: 92 % | WEIGHT: 272.8 LBS

## 2024-05-02 DIAGNOSIS — E78.5 HYPERLIPIDEMIA LDL GOAL <100: ICD-10-CM

## 2024-05-02 DIAGNOSIS — E11.42 DIABETIC POLYNEUROPATHY ASSOCIATED WITH TYPE 2 DIABETES MELLITUS (HCC): ICD-10-CM

## 2024-05-02 DIAGNOSIS — Z79.4 TYPE 2 DIABETES MELLITUS WITH OTHER CIRCULATORY COMPLICATION, WITH LONG-TERM CURRENT USE OF INSULIN (HCC): Primary | ICD-10-CM

## 2024-05-02 DIAGNOSIS — E55.9 VITAMIN D DEFICIENCY: ICD-10-CM

## 2024-05-02 DIAGNOSIS — E11.59 TYPE 2 DIABETES MELLITUS WITH OTHER CIRCULATORY COMPLICATION, WITH LONG-TERM CURRENT USE OF INSULIN (HCC): Primary | ICD-10-CM

## 2024-05-02 DIAGNOSIS — N18.32 STAGE 3B CHRONIC KIDNEY DISEASE (HCC): ICD-10-CM

## 2024-05-02 DIAGNOSIS — I10 HTN, GOAL BELOW 140/80: ICD-10-CM

## 2024-05-02 LAB
ANION GAP SERPL CALCULATED.3IONS-SCNC: 10 MMOL/L (ref 3–16)
BUN SERPL-MCNC: 60 MG/DL (ref 6–20)
CALCIUM SERPL-MCNC: 9.2 MG/DL (ref 8.5–10.5)
CHLORIDE SERPL-SCNC: 103 MMOL/L (ref 98–107)
CO2 SERPL-SCNC: 32 MMOL/L (ref 20–30)
CREAT SERPL-MCNC: 2.3 MG/DL (ref 0.4–1.2)
GFR SERPLBLD CREATININE-BSD FMLA CKD-EPI: 31 ML/MIN/{1.73_M2}
GLUCOSE SERPL-MCNC: 110 MG/DL (ref 74–106)
POTASSIUM SERPL-SCNC: 4.7 MMOL/L (ref 3.4–5.1)
SODIUM SERPL-SCNC: 145 MMOL/L (ref 136–145)

## 2024-05-02 PROCEDURE — 3044F HG A1C LEVEL LT 7.0%: CPT | Performed by: NURSE PRACTITIONER

## 2024-05-02 PROCEDURE — 99214 OFFICE O/P EST MOD 30 MIN: CPT | Performed by: NURSE PRACTITIONER

## 2024-05-02 PROCEDURE — 3074F SYST BP LT 130 MM HG: CPT | Performed by: NURSE PRACTITIONER

## 2024-05-02 PROCEDURE — 3078F DIAST BP <80 MM HG: CPT | Performed by: NURSE PRACTITIONER

## 2024-05-02 PROCEDURE — 80048 BASIC METABOLIC PNL TOTAL CA: CPT

## 2024-05-02 PROCEDURE — 36415 COLL VENOUS BLD VENIPUNCTURE: CPT

## 2024-05-02 NOTE — PROGRESS NOTES
Chief Complaint   Patient presents with    Diabetes     Regular f/u       Have you seen any other physician or provider since your last visit yes - hosp chf    Have you had any other diagnostic tests since your last visit? yes - doppler    Have you changed or stopped any medications since your last visit? yes          Pre-Visit chart review completed. All lab and imaging orders reviewed for outstanding orders and none found. Referrals reviewed and none outstanding. All Health Maintenance requirements reviewed and noted for any that are due at upcoming visit. Any hospital admission documentation, ER documentation or consult notes scanned to chart since last visit have been reviewed and noted for provider to review during upcoming visit.     
MG (37933 UT) CAPS capsule Take 1 capsule by mouth once a week 12 capsule 1    Budeson-Glycopyrrol-Formoterol 160-9-4.8 MCG/ACT AERO Inhale 2 puffs into the lungs in the morning and at bedtime 3 each 3    rosuvastatin (CRESTOR) 40 MG tablet TAKE ONE TABLET BY MOUTH DAILY 90 tablet 3    allopurinol (ZYLOPRIM) 300 MG tablet TAKE ONE TABLET BY MOUTH DAILY 90 tablet 3    carvedilol (COREG) 12.5 MG tablet TAKE ONE TABLET BY MOUTH TWICE A  tablet 3    fluticasone (FLONASE) 50 MCG/ACT nasal spray 2 sprays by Each Nostril route daily 48 g 1    Insulin Pen Needle (SpecifiedByOGER PEN NEEDLES 29G) 29G X 12MM MISC Check sugar and use insulin up to 5 times daily 150 each 5    triamterene-hydroCHLOROthiazide (MAXZIDE-25) 37.5-25 MG per tablet TAKE 1 TABLET BY MOUTH DAILY 90 tablet 1    insulin glargine (LANTUS SOLOSTAR) 100 UNIT/ML injection pen Inject 80 Units into the skin nightly 18 Adjustable Dose Pre-filled Pen Syringe 11    insulin aspart (NOVOLOG FLEXPEN) 100 UNIT/ML injection pen INJECT 25 UNITS INTO THE SKIN THREE TIMES A DAY BEFORE MEALS 10 Adjustable Dose Pre-filled Pen Syringe 5    escitalopram (LEXAPRO) 20 MG tablet Take 1 tablet by mouth daily 90 tablet 3    bumetanide (BUMEX) 2 MG tablet Take 1 tablet by mouth 2 times daily (Patient taking differently: Take 1 tablet by mouth daily) 180 tablet 3    pantoprazole (PROTONIX) 40 MG tablet Take 1 tablet by mouth Daily with supper 90 tablet 3    pioglitazone (ACTOS) 30 MG tablet Take 1 tablet by mouth daily 90 tablet 3    linagliptin (TRADJENTA) 5 MG tablet Take 1 tablet by mouth daily 90 tablet 1    polyethylene glycol (GLYCOLAX) 17 GM/SCOOP powder Take 17 g by mouth 2 times daily as needed      ACCU-CHEK COSTA PLUS strip       Lancets MISC Check sugar and use insulin up to 5 times daily. 150 each 5    aspirin 81 MG chewable tablet Take 1 tablet by mouth daily      sodium polystyrene (SPS) 15 GM/60ML suspension Take 120 mLs by mouth once for 1 dose 120 mL 0

## 2024-05-10 PROBLEM — I50.32 CHRONIC DIASTOLIC CONGESTIVE HEART FAILURE: Status: ACTIVE | Noted: 2020-02-28

## 2024-05-10 PROBLEM — I50.33 ACUTE ON CHRONIC HEART FAILURE WITH PRESERVED EJECTION FRACTION (HFPEF): Status: RESOLVED | Noted: 2024-04-16 | Resolved: 2024-05-10

## 2024-05-10 PROBLEM — I50.33 ACUTE ON CHRONIC DIASTOLIC CHF (CONGESTIVE HEART FAILURE): Status: RESOLVED | Noted: 2024-04-11 | Resolved: 2024-05-10

## 2024-05-20 ENCOUNTER — TELEPHONE (OUTPATIENT)
Dept: FAMILY MEDICINE CLINIC | Age: 64
End: 2024-05-20

## 2024-05-20 NOTE — TELEPHONE ENCOUNTER
Patients insurance called and stated that the patient is having some trouble walking and was requesting a walker. Can you put the DME note in patients last office visit so we can try to get him one? She stated that he had a fall over the weekend due to mobility.

## 2024-05-24 RX ORDER — MONTELUKAST SODIUM 4 MG/1
1 TABLET, CHEWABLE ORAL 2 TIMES DAILY
Qty: 180 TABLET | OUTPATIENT
Start: 2024-05-24

## 2024-05-24 NOTE — TELEPHONE ENCOUNTER
Refill request received from pharmacy      Next Office Visit Date:  Future Appointments   Date Time Provider Department Center   6/3/2024  9:30 AM Sheila Muller APRN [facility] - Please review via PDMP        Last Office Visit:    5/2/2024

## 2024-06-12 ENCOUNTER — TELEPHONE (OUTPATIENT)
Dept: FAMILY MEDICINE CLINIC | Age: 64
End: 2024-06-12

## 2024-06-12 DIAGNOSIS — F43.23 SITUATIONAL MIXED ANXIETY AND DEPRESSIVE DISORDER: ICD-10-CM

## 2024-06-12 RX ORDER — CLONAZEPAM 0.5 MG/1
0.5 TABLET ORAL 2 TIMES DAILY
Qty: 60 TABLET | Refills: 0 | Status: SHIPPED | OUTPATIENT
Start: 2024-06-12 | End: 2024-07-12

## 2024-06-12 NOTE — TELEPHONE ENCOUNTER
Refill request received from pharmacy      Next Office Visit Date:  Future Appointments   Date Time Provider Department Center   6/18/2024  5:00 PM Sheila Muller APRN [facility] - Please review via PDMP        Last Office Visit:    5/2/2024

## 2024-06-14 NOTE — TELEPHONE ENCOUNTER
His lab results are unchanged.  We just did a repeat bmp to look at kidney failure and it is still failing.  I have spoken with Jagdish regarding these results shortly after we did them .

## 2024-07-16 DIAGNOSIS — F43.23 SITUATIONAL MIXED ANXIETY AND DEPRESSIVE DISORDER: ICD-10-CM

## 2024-07-16 NOTE — TELEPHONE ENCOUNTER
Refill request received from pharmacy      Next Office Visit Date:  No future appointments.    JOSEPH[facility] - Please review via PDMP        Last Office Visit:    5/2/2024

## 2024-07-18 RX ORDER — CLONAZEPAM 0.5 MG/1
0.5 TABLET ORAL 2 TIMES DAILY
Qty: 60 TABLET | Refills: 0 | Status: SHIPPED | OUTPATIENT
Start: 2024-07-18 | End: 2024-08-17

## 2024-08-05 DIAGNOSIS — K21.9 GASTROESOPHAGEAL REFLUX DISEASE WITHOUT ESOPHAGITIS: ICD-10-CM

## 2024-08-05 RX ORDER — PANTOPRAZOLE SODIUM 40 MG/1
40 TABLET, DELAYED RELEASE ORAL
Qty: 90 TABLET | Refills: 3 | OUTPATIENT
Start: 2024-08-05

## 2024-08-12 DIAGNOSIS — K21.9 GASTROESOPHAGEAL REFLUX DISEASE WITHOUT ESOPHAGITIS: ICD-10-CM

## 2024-08-13 RX ORDER — PANTOPRAZOLE SODIUM 40 MG/1
40 TABLET, DELAYED RELEASE ORAL
Qty: 90 TABLET | Refills: 3 | OUTPATIENT
Start: 2024-08-13

## 2024-08-15 ENCOUNTER — OFFICE VISIT (OUTPATIENT)
Dept: FAMILY MEDICINE CLINIC | Age: 64
End: 2024-08-15
Payer: MEDICARE

## 2024-08-15 ENCOUNTER — HOSPITAL ENCOUNTER (OUTPATIENT)
Facility: HOSPITAL | Age: 64
Discharge: HOME | End: 2024-08-15

## 2024-08-15 VITALS
TEMPERATURE: 98.1 F | OXYGEN SATURATION: 96 % | HEIGHT: 68 IN | BODY MASS INDEX: 40.19 KG/M2 | SYSTOLIC BLOOD PRESSURE: 118 MMHG | WEIGHT: 265.2 LBS | DIASTOLIC BLOOD PRESSURE: 60 MMHG | HEART RATE: 57 BPM | RESPIRATION RATE: 18 BRPM

## 2024-08-15 DIAGNOSIS — M25.50 POLYARTHRALGIA: ICD-10-CM

## 2024-08-15 DIAGNOSIS — F32.A ANXIETY AND DEPRESSION: ICD-10-CM

## 2024-08-15 DIAGNOSIS — R42 DIZZINESS: ICD-10-CM

## 2024-08-15 DIAGNOSIS — E11.42 DIABETIC POLYNEUROPATHY ASSOCIATED WITH TYPE 2 DIABETES MELLITUS (HCC): ICD-10-CM

## 2024-08-15 DIAGNOSIS — F41.9 ANXIETY AND DEPRESSION: ICD-10-CM

## 2024-08-15 DIAGNOSIS — K21.9 GASTROESOPHAGEAL REFLUX DISEASE WITHOUT ESOPHAGITIS: ICD-10-CM

## 2024-08-15 DIAGNOSIS — E11.59 TYPE 2 DIABETES MELLITUS WITH OTHER CIRCULATORY COMPLICATION, WITH LONG-TERM CURRENT USE OF INSULIN (HCC): ICD-10-CM

## 2024-08-15 DIAGNOSIS — Z12.5 SCREENING FOR PROSTATE CANCER: ICD-10-CM

## 2024-08-15 DIAGNOSIS — H65.91 RIGHT NON-SUPPURATIVE OTITIS MEDIA: ICD-10-CM

## 2024-08-15 DIAGNOSIS — Z79.4 TYPE 2 DIABETES MELLITUS WITH OTHER CIRCULATORY COMPLICATION, WITH LONG-TERM CURRENT USE OF INSULIN (HCC): ICD-10-CM

## 2024-08-15 DIAGNOSIS — F43.23 SITUATIONAL MIXED ANXIETY AND DEPRESSIVE DISORDER: ICD-10-CM

## 2024-08-15 DIAGNOSIS — E11.42 DIABETIC POLYNEUROPATHY ASSOCIATED WITH TYPE 2 DIABETES MELLITUS (HCC): Primary | ICD-10-CM

## 2024-08-15 DIAGNOSIS — E78.2 MIXED HYPERLIPIDEMIA: ICD-10-CM

## 2024-08-15 PROCEDURE — G8417 CALC BMI ABV UP PARAM F/U: HCPCS | Performed by: NURSE PRACTITIONER

## 2024-08-15 PROCEDURE — 3051F HG A1C>EQUAL 7.0%<8.0%: CPT | Performed by: NURSE PRACTITIONER

## 2024-08-15 PROCEDURE — 81002 URINALYSIS NONAUTO W/O SCOPE: CPT | Performed by: NURSE PRACTITIONER

## 2024-08-15 PROCEDURE — 1036F TOBACCO NON-USER: CPT | Performed by: NURSE PRACTITIONER

## 2024-08-15 PROCEDURE — 2022F DILAT RTA XM EVC RTNOPTHY: CPT | Performed by: NURSE PRACTITIONER

## 2024-08-15 PROCEDURE — 3017F COLORECTAL CA SCREEN DOC REV: CPT | Performed by: NURSE PRACTITIONER

## 2024-08-15 PROCEDURE — 3078F DIAST BP <80 MM HG: CPT | Performed by: NURSE PRACTITIONER

## 2024-08-15 PROCEDURE — 3074F SYST BP LT 130 MM HG: CPT | Performed by: NURSE PRACTITIONER

## 2024-08-15 PROCEDURE — G8427 DOCREV CUR MEDS BY ELIG CLIN: HCPCS | Performed by: NURSE PRACTITIONER

## 2024-08-15 PROCEDURE — 99214 OFFICE O/P EST MOD 30 MIN: CPT | Performed by: NURSE PRACTITIONER

## 2024-08-15 RX ORDER — AMOXICILLIN AND CLAVULANATE POTASSIUM 875; 125 MG/1; MG/1
1 TABLET, FILM COATED ORAL 2 TIMES DAILY
Qty: 20 TABLET | Refills: 0 | Status: SHIPPED | OUTPATIENT
Start: 2024-08-15 | End: 2024-08-25

## 2024-08-15 RX ORDER — MECLIZINE HCL 12.5 MG/1
12.5 TABLET ORAL 3 TIMES DAILY PRN
Qty: 30 TABLET | Refills: 0 | Status: SHIPPED | OUTPATIENT
Start: 2024-08-15 | End: 2024-08-25

## 2024-08-15 RX ORDER — CLONAZEPAM 0.5 MG/1
0.5 TABLET ORAL 2 TIMES DAILY
Qty: 60 TABLET | Refills: 0 | Status: SHIPPED | OUTPATIENT
Start: 2024-08-15 | End: 2024-09-14

## 2024-08-15 RX ORDER — PANTOPRAZOLE SODIUM 40 MG/1
40 TABLET, DELAYED RELEASE ORAL
Qty: 90 TABLET | Refills: 3 | Status: SHIPPED | OUTPATIENT
Start: 2024-08-15

## 2024-08-15 RX ORDER — GABAPENTIN 600 MG/1
600 TABLET ORAL 3 TIMES DAILY
Qty: 90 TABLET | Refills: 2 | Status: SHIPPED | OUTPATIENT
Start: 2024-08-15 | End: 2024-11-13

## 2024-08-15 RX ORDER — INSULIN GLARGINE 100 [IU]/ML
80 INJECTION, SOLUTION SUBCUTANEOUS NIGHTLY
Qty: 18 ADJUSTABLE DOSE PRE-FILLED PEN SYRINGE | Refills: 11 | Status: SHIPPED | OUTPATIENT
Start: 2024-08-15

## 2024-08-15 RX ORDER — INSULIN ASPART 100 [IU]/ML
INJECTION, SOLUTION INTRAVENOUS; SUBCUTANEOUS
Qty: 10 ADJUSTABLE DOSE PRE-FILLED PEN SYRINGE | Refills: 5 | Status: SHIPPED | OUTPATIENT
Start: 2024-08-15

## 2024-08-15 RX ORDER — PIOGLITAZONEHYDROCHLORIDE 30 MG/1
30 TABLET ORAL DAILY
Qty: 90 TABLET | Refills: 3 | Status: SHIPPED | OUTPATIENT
Start: 2024-08-15

## 2024-08-15 RX ORDER — ESCITALOPRAM OXALATE 20 MG/1
20 TABLET ORAL DAILY
Qty: 90 TABLET | Refills: 3 | Status: SHIPPED | OUTPATIENT
Start: 2024-08-15

## 2024-08-15 NOTE — PROGRESS NOTES
Chief Complaint   Patient presents with    Diabetes     Regular f/u    Dizziness     X 2 weeks       Have you seen any other physician or provider since your last visit no    Have you had any other diagnostic tests since your last visit? no    Have you changed or stopped any medications since your last visit? no

## 2024-08-16 LAB
25(OH)D3 SERPL-MCNC: 16 NG/ML (ref 32–100)
ALBUMIN SERPL-MCNC: 3.8 G/DL (ref 3.4–4.8)
ALBUMIN/GLOB SERPL: 1.2 {RATIO} (ref 0.8–2)
ALP SERPL-CCNC: 120 U/L (ref 25–100)
ALT SERPL-CCNC: 22 U/L (ref 4–36)
ANION GAP SERPL CALCULATED.3IONS-SCNC: 16 MMOL/L (ref 3–16)
AST SERPL-CCNC: 27 U/L (ref 8–33)
BASOPHILS # BLD: 0.1 K/UL (ref 0–0.1)
BASOPHILS NFR BLD: 0.4 %
BILIRUB SERPL-MCNC: 0.6 MG/DL (ref 0.3–1.2)
BUN SERPL-MCNC: 103 MG/DL (ref 6–20)
CALCIUM SERPL-MCNC: 9.8 MG/DL (ref 8.5–10.5)
CHLORIDE SERPL-SCNC: 87 MMOL/L (ref 98–107)
CHOLEST SERPL-MCNC: 151 MG/DL (ref 0–200)
CO2 SERPL-SCNC: 33 MMOL/L (ref 20–30)
CREAT SERPL-MCNC: 2.9 MG/DL (ref 0.4–1.2)
EOSINOPHIL # BLD: 0.2 K/UL (ref 0–0.4)
EOSINOPHIL NFR BLD: 1.8 %
ERYTHROCYTE [DISTWIDTH] IN BLOOD BY AUTOMATED COUNT: 17.1 % (ref 11–16)
GFR SERPLBLD CREATININE-BSD FMLA CKD-EPI: 24 ML/MIN/{1.73_M2}
GLOBULIN SER CALC-MCNC: 3.1 G/DL
GLUCOSE SERPL-MCNC: 173 MG/DL (ref 74–106)
HBA1C MFR BLD: 7.6 %
HCT VFR BLD AUTO: 40.6 % (ref 40–54)
HDLC SERPL-MCNC: 48 MG/DL (ref 40–60)
HGB BLD-MCNC: 12.1 G/DL (ref 13–18)
IMM GRANULOCYTES # BLD: 0.1 K/UL
IMM GRANULOCYTES NFR BLD: 0.5 % (ref 0–5)
LDLC SERPL CALC-MCNC: 80 MG/DL
LYMPHOCYTES # BLD: 2.2 K/UL (ref 1.5–4)
LYMPHOCYTES NFR BLD: 19.1 %
MCH RBC QN AUTO: 25.7 PG (ref 27–32)
MCHC RBC AUTO-ENTMCNC: 29.8 G/DL (ref 31–35)
MCV RBC AUTO: 86.2 FL (ref 80–100)
MONOCYTES # BLD: 0.7 K/UL (ref 0.2–0.8)
MONOCYTES NFR BLD: 5.9 %
NEUTROPHILS # BLD: 8.2 K/UL (ref 2–7.5)
NEUTS SEG NFR BLD: 72.3 %
PLATELET # BLD AUTO: 182 K/UL (ref 150–400)
PMV BLD AUTO: 12.9 FL (ref 6–10)
POTASSIUM SERPL-SCNC: 4.2 MMOL/L (ref 3.4–5.1)
PROT SERPL-MCNC: 6.9 G/DL (ref 6.4–8.3)
PSA SERPL DL<=0.01 NG/ML-MCNC: 2.39 NG/ML (ref 0–4)
RBC # BLD AUTO: 4.71 M/UL (ref 4.5–6)
SODIUM SERPL-SCNC: 136 MMOL/L (ref 136–145)
TRIGL SERPL-MCNC: 117 MG/DL (ref 0–249)
TSH SERPL DL<=0.005 MIU/L-ACNC: 2.24 UIU/ML (ref 0.27–4.2)
URATE SERPL-MCNC: 2.7 MG/DL (ref 3.7–8.6)
VIT B12 SERPL-MCNC: 891 PG/ML (ref 211–911)
VLDLC SERPL CALC-MCNC: 23 MG/DL
WBC # BLD AUTO: 11.3 K/UL (ref 4–11)

## 2024-08-22 ENCOUNTER — OFFICE VISIT (OUTPATIENT)
Dept: FAMILY MEDICINE CLINIC | Age: 64
End: 2024-08-22
Payer: MEDICARE

## 2024-08-22 VITALS
HEIGHT: 68 IN | TEMPERATURE: 98.1 F | OXYGEN SATURATION: 98 % | RESPIRATION RATE: 18 BRPM | SYSTOLIC BLOOD PRESSURE: 110 MMHG | HEART RATE: 56 BPM | DIASTOLIC BLOOD PRESSURE: 60 MMHG | WEIGHT: 266.4 LBS | BODY MASS INDEX: 40.38 KG/M2

## 2024-08-22 DIAGNOSIS — U07.1 COVID: Primary | ICD-10-CM

## 2024-08-22 DIAGNOSIS — J98.8 CONGESTION OF UPPER AIRWAY: ICD-10-CM

## 2024-08-22 PROCEDURE — 87635 SARS-COV-2 COVID-19 AMP PRB: CPT

## 2024-08-22 RX ORDER — PREDNISONE 10 MG/1
10 TABLET ORAL DAILY
Qty: 10 TABLET | Refills: 0 | Status: SHIPPED | OUTPATIENT
Start: 2024-08-22 | End: 2024-09-01

## 2024-08-22 RX ORDER — ALBUTEROL SULFATE 90 UG/1
2 AEROSOL, METERED RESPIRATORY (INHALATION) 4 TIMES DAILY PRN
Qty: 18 G | Refills: 0 | Status: SHIPPED | OUTPATIENT
Start: 2024-08-22

## 2024-08-22 SDOH — ECONOMIC STABILITY: INCOME INSECURITY: HOW HARD IS IT FOR YOU TO PAY FOR THE VERY BASICS LIKE FOOD, HOUSING, MEDICAL CARE, AND HEATING?: NOT HARD AT ALL

## 2024-08-22 SDOH — ECONOMIC STABILITY: FOOD INSECURITY: WITHIN THE PAST 12 MONTHS, YOU WORRIED THAT YOUR FOOD WOULD RUN OUT BEFORE YOU GOT MONEY TO BUY MORE.: NEVER TRUE

## 2024-08-22 SDOH — ECONOMIC STABILITY: FOOD INSECURITY: WITHIN THE PAST 12 MONTHS, THE FOOD YOU BOUGHT JUST DIDN'T LAST AND YOU DIDN'T HAVE MONEY TO GET MORE.: NEVER TRUE

## 2024-08-22 ASSESSMENT — ENCOUNTER SYMPTOMS
GASTROINTESTINAL NEGATIVE: 1
EYES NEGATIVE: 1
SORE THROAT: 1
COUGH: 1
ALLERGIC/IMMUNOLOGIC NEGATIVE: 1

## 2024-08-22 NOTE — PROGRESS NOTES
[address] [place]  [place] [address]  Dept: [phone]  Loc: [phone]     SUBJECTIVE:  Jagdish Dunaway is a 63 y.o. male that presents with   Chief Complaint   Patient presents with    Head Congestion    Cough    Headache    Pharyngitis    Chills   .    HPI:    Jagdish is in office with sinus congestion, cough and headache. He also endorses a sore throat. He said his symptoms started 2 days ago. He denies any fever. He thinks he was exposed to Covid at [facility]. POCT is positive for Covid. I have encouraged him to drink plenty of fluids and rest. I also told him to keep a close eye on his blood sugars due to steroids. He verbalized understanding.     Cough  This is a new problem. The current episode started in the past 7 days. The problem has been unchanged. The cough is Non-productive. Associated symptoms include headaches, myalgias, nasal congestion and a sore throat. Nothing aggravates the symptoms. He has tried nothing for the symptoms.   Headache  Headache pattern:  Headache sometimes there, sometimes not at all  Pharyngitis  This is a new problem. The current episode started in the past 7 days. The problem occurs constantly. The problem has been unchanged. Associated symptoms include congestion, coughing, headaches, myalgias and a sore throat. Nothing aggravates the symptoms. He has tried nothing for the symptoms.       Review of Systems   Constitutional: Negative.    HENT:  Positive for congestion and sore throat.    Eyes: Negative.    Respiratory:  Positive for cough.    Cardiovascular: Negative.    Gastrointestinal: Negative.    Endocrine: Negative.    Genitourinary: Negative.    Musculoskeletal:  Positive for myalgias.   Skin: Negative.    Allergic/Immunologic: Negative.    Neurological:  Positive for headaches.   Hematological: Negative.    Psychiatric/Behavioral: Negative.          OBJECTIVE:  /60   Pulse 56   Temp 98.1 °F (36.7

## 2024-09-06 DIAGNOSIS — E78.5 HYPERLIPIDEMIA LDL GOAL <100: ICD-10-CM

## 2024-09-06 RX ORDER — FENOFIBRATE 200 MG/1
200 CAPSULE ORAL
Qty: 90 CAPSULE | Refills: 3 | OUTPATIENT
Start: 2024-09-06

## 2024-09-11 ENCOUNTER — OFFICE VISIT (OUTPATIENT)
Dept: FAMILY MEDICINE CLINIC | Age: 64
End: 2024-09-11
Payer: MEDICARE

## 2024-09-11 VITALS
BODY MASS INDEX: 40.77 KG/M2 | TEMPERATURE: 98.1 F | HEART RATE: 67 BPM | SYSTOLIC BLOOD PRESSURE: 124 MMHG | DIASTOLIC BLOOD PRESSURE: 68 MMHG | OXYGEN SATURATION: 93 % | RESPIRATION RATE: 18 BRPM | WEIGHT: 269 LBS | HEIGHT: 68 IN

## 2024-09-11 DIAGNOSIS — F43.23 SITUATIONAL MIXED ANXIETY AND DEPRESSIVE DISORDER: ICD-10-CM

## 2024-09-11 DIAGNOSIS — E78.5 HYPERLIPIDEMIA LDL GOAL <100: ICD-10-CM

## 2024-09-11 DIAGNOSIS — E11.42 DIABETIC POLYNEUROPATHY ASSOCIATED WITH TYPE 2 DIABETES MELLITUS (HCC): Primary | ICD-10-CM

## 2024-09-11 DIAGNOSIS — K02.9 DENTAL CARIES: ICD-10-CM

## 2024-09-11 PROCEDURE — G8427 DOCREV CUR MEDS BY ELIG CLIN: HCPCS

## 2024-09-11 PROCEDURE — 3074F SYST BP LT 130 MM HG: CPT

## 2024-09-11 PROCEDURE — 99213 OFFICE O/P EST LOW 20 MIN: CPT

## 2024-09-11 PROCEDURE — 1036F TOBACCO NON-USER: CPT

## 2024-09-11 PROCEDURE — 2022F DILAT RTA XM EVC RTNOPTHY: CPT

## 2024-09-11 PROCEDURE — 3078F DIAST BP <80 MM HG: CPT

## 2024-09-11 PROCEDURE — 3017F COLORECTAL CA SCREEN DOC REV: CPT

## 2024-09-11 PROCEDURE — 3051F HG A1C>EQUAL 7.0%<8.0%: CPT

## 2024-09-11 PROCEDURE — G8417 CALC BMI ABV UP PARAM F/U: HCPCS

## 2024-09-11 RX ORDER — AMOXICILLIN 500 MG/1
500 CAPSULE ORAL 2 TIMES DAILY
Qty: 20 CAPSULE | Refills: 0 | Status: SHIPPED | OUTPATIENT
Start: 2024-09-11 | End: 2024-09-21

## 2024-09-11 RX ORDER — CLONAZEPAM 0.5 MG/1
0.5 TABLET ORAL 2 TIMES DAILY
Qty: 60 TABLET | Refills: 0 | Status: CANCELLED | OUTPATIENT
Start: 2024-09-11 | End: 2024-10-11

## 2024-09-11 ASSESSMENT — ENCOUNTER SYMPTOMS
EYES NEGATIVE: 1
ALLERGIC/IMMUNOLOGIC NEGATIVE: 1
RESPIRATORY NEGATIVE: 1
GASTROINTESTINAL NEGATIVE: 1

## 2024-09-12 DIAGNOSIS — Z79.4 TYPE 2 DIABETES MELLITUS WITH OTHER CIRCULATORY COMPLICATION, WITH LONG-TERM CURRENT USE OF INSULIN (HCC): ICD-10-CM

## 2024-09-12 DIAGNOSIS — E11.59 TYPE 2 DIABETES MELLITUS WITH OTHER CIRCULATORY COMPLICATION, WITH LONG-TERM CURRENT USE OF INSULIN (HCC): ICD-10-CM

## 2024-09-12 RX ORDER — PIOGLITAZONEHYDROCHLORIDE 30 MG/1
30 TABLET ORAL DAILY
Qty: 90 TABLET | Refills: 3 | OUTPATIENT
Start: 2024-09-12

## 2024-09-12 RX ORDER — CLONAZEPAM 0.5 MG/1
0.5 TABLET ORAL 2 TIMES DAILY
Qty: 60 TABLET | Refills: 0 | Status: SHIPPED | OUTPATIENT
Start: 2024-09-12 | End: 2024-10-12

## 2024-10-01 ENCOUNTER — CARE COORDINATION (OUTPATIENT)
Dept: PRIMARY CARE CLINIC | Age: 64
End: 2024-10-01

## 2024-10-01 NOTE — CARE COORDINATION
Care Transitions Initial Follow Up Call    Call within 2 business days of discharge: Yes    Patient Current Location:  Home: [address] [place] [id number]    Care Transition Nurse contacted the patient by telephone to perform post hospital discharge assessment. Verified name and  with patient as identifiers. Provided introduction to self, and explanation of the Care Transition Nurse role.     Patient: Jagdish Dunaway Patient : 1960   MRN: [id number]  Reason for Admission: respiratory failure, PNA   Discharge Date: 3/10/22 RARS: No data recorded    Last Discharge Facility       Date Complaint Diagnosis Description Type Department Provider    3/10/22   Admission (Discharged) Ju Real MD            Was this an external facility discharge? Yes, 24  Discharge Facility: [facility]    Challenges to be reviewed by the provider   Additional needs identified to be addressed with provider: Yes  medications-changes               Method of communication with provider: face to face.    Jagdish tells me that he went to hospital with respiratory distress.  He was told he has PNA.  I have requested records from [facility] but not available at this time.  Jagdish states that he had prn oxygen prior but is now wearing it continuous at 3 liters.  He reports his oxygen level dropping quickly into the 80s if he removes it.  He does remain on antibiotics and reports other changes but cannot speak to what at this point.  He denies any issues eating or drinking.  Denies any needs at this time.  We discussed his HFU appointment.     Care Transition Nurse reviewed discharge instructions with patient who verbalized understanding. The patient was given an opportunity to ask questions and does not have any further questions or concerns at this time. Were discharge instructions available to patient? Yes. Reviewed appropriate site of care based on symptoms and resources available to patient

## 2024-10-03 ENCOUNTER — HOSPITAL ENCOUNTER (OUTPATIENT)
Facility: HOSPITAL | Age: 64
Discharge: HOME | End: 2024-10-03
Payer: MEDICARE

## 2024-10-03 ENCOUNTER — OFFICE VISIT (OUTPATIENT)
Dept: FAMILY MEDICINE CLINIC | Age: 64
End: 2024-10-03

## 2024-10-03 VITALS
BODY MASS INDEX: 43.47 KG/M2 | TEMPERATURE: 97.7 F | OXYGEN SATURATION: 94 % | HEART RATE: 70 BPM | WEIGHT: 286.8 LBS | RESPIRATION RATE: 18 BRPM | DIASTOLIC BLOOD PRESSURE: 70 MMHG | HEIGHT: 68 IN | SYSTOLIC BLOOD PRESSURE: 132 MMHG

## 2024-10-03 DIAGNOSIS — E78.5 HYPERLIPIDEMIA LDL GOAL <100: ICD-10-CM

## 2024-10-03 DIAGNOSIS — Z09 HOSPITAL DISCHARGE FOLLOW-UP: ICD-10-CM

## 2024-10-03 DIAGNOSIS — I50.31 ACUTE DIASTOLIC (CONGESTIVE) HEART FAILURE (HCC): ICD-10-CM

## 2024-10-03 DIAGNOSIS — J96.02 ACUTE RESPIRATORY FAILURE WITH HYPOXIA AND HYPERCAPNIA: ICD-10-CM

## 2024-10-03 DIAGNOSIS — Z79.4 TYPE 2 DIABETES MELLITUS WITH OTHER CIRCULATORY COMPLICATION, WITH LONG-TERM CURRENT USE OF INSULIN (HCC): Primary | ICD-10-CM

## 2024-10-03 DIAGNOSIS — Z79.4 TYPE 2 DIABETES MELLITUS WITH OTHER CIRCULATORY COMPLICATION, WITH LONG-TERM CURRENT USE OF INSULIN (HCC): ICD-10-CM

## 2024-10-03 DIAGNOSIS — J96.01 ACUTE RESPIRATORY FAILURE WITH HYPOXIA AND HYPERCAPNIA: ICD-10-CM

## 2024-10-03 DIAGNOSIS — F43.23 SITUATIONAL MIXED ANXIETY AND DEPRESSIVE DISORDER: ICD-10-CM

## 2024-10-03 DIAGNOSIS — E11.59 TYPE 2 DIABETES MELLITUS WITH OTHER CIRCULATORY COMPLICATION, WITH LONG-TERM CURRENT USE OF INSULIN (HCC): Primary | ICD-10-CM

## 2024-10-03 DIAGNOSIS — E11.59 TYPE 2 DIABETES MELLITUS WITH OTHER CIRCULATORY COMPLICATION, WITH LONG-TERM CURRENT USE OF INSULIN (HCC): ICD-10-CM

## 2024-10-03 PROCEDURE — 83880 ASSAY OF NATRIURETIC PEPTIDE: CPT

## 2024-10-03 PROCEDURE — 36415 COLL VENOUS BLD VENIPUNCTURE: CPT

## 2024-10-03 PROCEDURE — 80061 LIPID PANEL: CPT

## 2024-10-03 PROCEDURE — 80053 COMPREHEN METABOLIC PANEL: CPT

## 2024-10-03 PROCEDURE — 85025 COMPLETE CBC W/AUTO DIFF WBC: CPT

## 2024-10-03 RX ORDER — FENOFIBRATE 200 MG/1
200 CAPSULE ORAL
COMMUNITY

## 2024-10-03 RX ORDER — CLONAZEPAM 0.5 MG/1
0.5 TABLET ORAL 2 TIMES DAILY
Qty: 60 TABLET | Refills: 0 | Status: CANCELLED | OUTPATIENT
Start: 2024-10-03 | End: 2024-11-02

## 2024-10-03 RX ORDER — APIXABAN 5 MG/1
TABLET, FILM COATED ORAL
COMMUNITY
Start: 2024-09-28

## 2024-10-03 RX ORDER — CLONAZEPAM 0.5 MG/1
0.5 TABLET ORAL 2 TIMES DAILY
Qty: 60 TABLET | Refills: 0 | Status: SHIPPED | OUTPATIENT
Start: 2024-10-03 | End: 2024-11-02

## 2024-10-03 RX ORDER — AZITHROMYCIN 250 MG/1
TABLET, FILM COATED ORAL
COMMUNITY
Start: 2024-09-28

## 2024-10-03 RX ORDER — IPRATROPIUM BROMIDE AND ALBUTEROL 20; 100 UG/1; UG/1
SPRAY, METERED RESPIRATORY (INHALATION)
COMMUNITY
Start: 2024-09-28

## 2024-10-03 RX ORDER — BUMETANIDE 1 MG/1
2 TABLET ORAL DAILY
COMMUNITY

## 2024-10-03 NOTE — TELEPHONE ENCOUNTER
Requested Prescriptions     Pending Prescriptions Disp Refills    clonazePAM (KLONOPIN) 0.5 MG tablet 60 tablet 0     Sig: Take 1 tablet by mouth 2 times daily for 30 days. Max Daily Amount: 1 mg

## 2024-10-04 LAB
ALBUMIN SERPL-MCNC: 3.1 G/DL (ref 3.4–4.8)
ALBUMIN/GLOB SERPL: 1.2 {RATIO} (ref 0.8–2)
ALP SERPL-CCNC: 112 U/L (ref 25–100)
ALT SERPL-CCNC: 15 U/L (ref 4–36)
ANION GAP SERPL CALCULATED.3IONS-SCNC: 11 MMOL/L (ref 3–16)
AST SERPL-CCNC: 22 U/L (ref 8–33)
BASOPHILS # BLD: 0.1 K/UL (ref 0–0.1)
BASOPHILS NFR BLD: 1.1 %
BILIRUB SERPL-MCNC: 0.3 MG/DL (ref 0.3–1.2)
BUN SERPL-MCNC: 43 MG/DL (ref 6–20)
CALCIUM SERPL-MCNC: 9 MG/DL (ref 8.5–10.5)
CHLORIDE SERPL-SCNC: 99 MMOL/L (ref 98–107)
CHOLEST SERPL-MCNC: 182 MG/DL (ref 0–200)
CO2 SERPL-SCNC: 33 MMOL/L (ref 20–30)
CREAT SERPL-MCNC: 2 MG/DL (ref 0.4–1.2)
EOSINOPHIL # BLD: 0.3 K/UL (ref 0–0.4)
EOSINOPHIL NFR BLD: 2.7 %
ERYTHROCYTE [DISTWIDTH] IN BLOOD BY AUTOMATED COUNT: 17.7 % (ref 11–16)
GFR SERPLBLD CREATININE-BSD FMLA CKD-EPI: 37 ML/MIN/{1.73_M2}
GLOBULIN SER CALC-MCNC: 2.6 G/DL
GLUCOSE SERPL-MCNC: 95 MG/DL (ref 74–106)
HCT VFR BLD AUTO: 34.8 % (ref 40–54)
HDLC SERPL-MCNC: 34 MG/DL (ref 40–60)
HGB BLD-MCNC: 10.1 G/DL (ref 13–18)
IMM GRANULOCYTES # BLD: 0.1 K/UL
IMM GRANULOCYTES NFR BLD: 0.7 % (ref 0–5)
LDLC SERPL CALC-MCNC: 121 MG/DL
LYMPHOCYTES # BLD: 1.4 K/UL (ref 1.5–4)
LYMPHOCYTES NFR BLD: 13.1 %
MCH RBC QN AUTO: 26 PG (ref 27–32)
MCHC RBC AUTO-ENTMCNC: 29 G/DL (ref 31–35)
MCV RBC AUTO: 89.5 FL (ref 80–100)
MONOCYTES # BLD: 0.6 K/UL (ref 0.2–0.8)
MONOCYTES NFR BLD: 5.8 %
NEUTROPHILS # BLD: 8.3 K/UL (ref 2–7.5)
NEUTS SEG NFR BLD: 76.6 %
NT-PROBNP SERPL-MCNC: 1484 PG/ML (ref 0–1800)
PLATELET # BLD AUTO: 330 K/UL (ref 150–400)
PMV BLD AUTO: 11.3 FL (ref 6–10)
POTASSIUM SERPL-SCNC: 4.1 MMOL/L (ref 3.4–5.1)
PROT SERPL-MCNC: 5.7 G/DL (ref 6.4–8.3)
RBC # BLD AUTO: 3.89 M/UL (ref 4.5–6)
SODIUM SERPL-SCNC: 143 MMOL/L (ref 136–145)
TRIGL SERPL-MCNC: 137 MG/DL (ref 0–249)
VLDLC SERPL CALC-MCNC: 27 MG/DL
WBC # BLD AUTO: 10.8 K/UL (ref 4–11)

## 2024-10-04 ASSESSMENT — ENCOUNTER SYMPTOMS
GASTROINTESTINAL NEGATIVE: 1
SHORTNESS OF BREATH: 1
EYES NEGATIVE: 1
ALLERGIC/IMMUNOLOGIC NEGATIVE: 1

## 2024-10-04 NOTE — PROGRESS NOTES
Chief Complaint   Patient presents with    Follow-Up from Hospital     Patient went to [facility] and admitted from 9/23-9/28/24 for shortness of breath.     Other     Patient significant other states that he had been prescribed medication for dizziness but it ran out.        Have you seen any other physician or provider since your last visit yes - [facility] from 9/23-9/28    Have you had any other diagnostic tests since your last visit? yes - labs, covid/flu swab, ECHO, urinalysis, chest xray, US thorax, US bilateral leg,     Have you changed or stopped any medications since your last visit? yes - see med list             
Post-Discharge Transitional Care Follow Up    Jagdish Dunaway   YOB: 1960    Date of Office Visit:  10/3/2024  Date of Hospital Admission: 9/24/2024  Date of Hospital Discharge: 9/28/2024    Care management risk score Rising risk (score 2-5) and Complex Care (Scores >=6): No Risk Score On File     Non face to face  following discharge, date last encounter closed (first attempt may have been earlier): 10/01/2024     Call initiated 2 business days of discharge: Yes    ASSESSMENT/PLAN:   Type 2 diabetes mellitus with other circulatory complication, with long-term current use of insulin (HCC)  -     Comprehensive Metabolic Panel; Future  Situational mixed anxiety and depressive disorder  Hyperlipidemia LDL goal <100  -     LIPID PANEL; Future  Acute respiratory failure with hypoxia and hypercapnia  -     CBC with Auto Differential; Future  -     Brain Natriuretic Peptide; Future  Acute diastolic (congestive) heart failure (HCC)  -     Brain Natriuretic Peptide; Future  Hospital discharge follow-up  -     [place] DISCHARGE MEDS RECONCILED W/ CURRENT OUTPATIENT MED LIST      Medical Decision Making: low complexity  Return in 2 weeks (on 10/17/2024).    On this date 10/3/2024 I have spent 30 minutes reviewing previous notes, test results and face to face with the patient discussing the diagnosis and importance of compliance with the treatment plan as well as documenting on the day of the visit.       Subjective:   Pt was admitted to [facility] on  9/24 with shortness of breath. He said for the past two to three he had been having a hard time breathing and was starting to have BLE edema. In the ER he also tested positive covid. He was discharaged 9/28/2024 with a diagnosis of acute on chronic diastolic heart failure.         Inpatient course: Discharge summary reviewed- see chart.    Interval history/Current status: In office today he appears well. He says he feels better and denies any 
nasal spray 2 sprays by Each Nostril route daily 48 g 1    Insulin Pen Needle ([employer] PEN NEEDLES 29G) 29G X 12MM MISC Check sugar and use insulin up to 5 times daily 150 each 5    ACCU-CHEK COSTA PLUS strip       Lancets MISC Check sugar and use insulin up to 5 times daily. 150 each 5    [DISCONTINUED] triamterene-hydroCHLOROthiazide (MAXZIDE-25) 37.5-25 MG per tablet Take 1 tablet by mouth daily (Patient not taking: Reported on 10/3/2024) 90 tablet 1    [DISCONTINUED] vitamin D (ERGOCALCIFEROL) 1.25 MG (95102 UT) CAPS capsule Take 1 capsule by mouth once a week (Patient not taking: Reported on 10/3/2024) 12 capsule 1    [DISCONTINUED] polyethylene glycol (GLYCOLAX) 17 GM/SCOOP powder Take 17 g by mouth 2 times daily as needed (Patient not taking: Reported on 10/3/2024)      [DISCONTINUED] aspirin 81 MG chewable tablet Take 1 tablet by mouth daily (Patient not taking: Reported on 10/3/2024)       Facility-Administered Encounter Medications as of 10/3/2024   Medication Dose Route Frequency Provider Last Rate Last Admin    0.9 % sodium chloride infusion   IntraVENous Once Barbi Walls APRN - CNP   Stopped at 02/20/23 7809       No follow-ups on file.      PATIENT COUNSELING    Counseling was provided today regardingthe following topics: Healthy eating habits, Regular exercise, substance abuse and healthy sleep habits.    Discussed use, benefit, and side effectsof prescribed medications.  Barriers to medication compliance addressed.  All patient questions answered.  Pt voiced understanding.     Electronically signed by ANDREIA Galeana NP on 10/3/24 at 2:22 PM EDT

## 2024-10-10 ENCOUNTER — CARE COORDINATION (OUTPATIENT)
Dept: PRIMARY CARE CLINIC | Age: 64
End: 2024-10-10

## 2024-10-10 NOTE — CARE COORDINATION
Shirley Hawk, RN  Manager Care Coordination  [phone]     I called Jagdish for fu and left a message with contact information and reminder of appointment on Monday.

## 2024-10-16 ENCOUNTER — OFFICE VISIT (OUTPATIENT)
Dept: FAMILY MEDICINE CLINIC | Age: 64
End: 2024-10-16
Payer: MEDICARE

## 2024-10-16 ENCOUNTER — HOSPITAL ENCOUNTER (OUTPATIENT)
Facility: HOSPITAL | Age: 64
Discharge: HOME | End: 2024-10-16
Payer: MEDICARE

## 2024-10-16 VITALS
WEIGHT: 296.6 LBS | SYSTOLIC BLOOD PRESSURE: 122 MMHG | DIASTOLIC BLOOD PRESSURE: 80 MMHG | RESPIRATION RATE: 20 BRPM | TEMPERATURE: 98.3 F | HEART RATE: 71 BPM | BODY MASS INDEX: 44.95 KG/M2 | HEIGHT: 68 IN | OXYGEN SATURATION: 96 %

## 2024-10-16 DIAGNOSIS — J96.01 ACUTE RESPIRATORY FAILURE WITH HYPOXIA AND HYPERCAPNIA: Primary | ICD-10-CM

## 2024-10-16 DIAGNOSIS — R06.01 ORTHOPNEA: ICD-10-CM

## 2024-10-16 DIAGNOSIS — J96.02 ACUTE RESPIRATORY FAILURE WITH HYPOXIA AND HYPERCAPNIA: Primary | ICD-10-CM

## 2024-10-16 DIAGNOSIS — N18.4 STAGE 4 CHRONIC KIDNEY DISEASE (HCC): ICD-10-CM

## 2024-10-16 DIAGNOSIS — E66.01 MORBID OBESITY WITH BMI OF 45.0-49.9, ADULT: ICD-10-CM

## 2024-10-16 DIAGNOSIS — I50.9 CONGESTIVE HEART FAILURE, UNSPECIFIED HF CHRONICITY, UNSPECIFIED HEART FAILURE TYPE (HCC): ICD-10-CM

## 2024-10-16 DIAGNOSIS — J96.01 ACUTE RESPIRATORY FAILURE WITH HYPOXIA AND HYPERCAPNIA: ICD-10-CM

## 2024-10-16 DIAGNOSIS — J96.02 ACUTE RESPIRATORY FAILURE WITH HYPOXIA AND HYPERCAPNIA: ICD-10-CM

## 2024-10-16 DIAGNOSIS — E11.59 TYPE 2 DIABETES MELLITUS WITH OTHER CIRCULATORY COMPLICATION, WITH LONG-TERM CURRENT USE OF INSULIN (HCC): ICD-10-CM

## 2024-10-16 DIAGNOSIS — Z79.4 TYPE 2 DIABETES MELLITUS WITH OTHER CIRCULATORY COMPLICATION, WITH LONG-TERM CURRENT USE OF INSULIN (HCC): ICD-10-CM

## 2024-10-16 PROCEDURE — G8484 FLU IMMUNIZE NO ADMIN: HCPCS | Performed by: NURSE PRACTITIONER

## 2024-10-16 PROCEDURE — 1036F TOBACCO NON-USER: CPT | Performed by: NURSE PRACTITIONER

## 2024-10-16 PROCEDURE — G8427 DOCREV CUR MEDS BY ELIG CLIN: HCPCS | Performed by: NURSE PRACTITIONER

## 2024-10-16 PROCEDURE — 99214 OFFICE O/P EST MOD 30 MIN: CPT | Performed by: NURSE PRACTITIONER

## 2024-10-16 PROCEDURE — 3017F COLORECTAL CA SCREEN DOC REV: CPT | Performed by: NURSE PRACTITIONER

## 2024-10-16 PROCEDURE — G8417 CALC BMI ABV UP PARAM F/U: HCPCS | Performed by: NURSE PRACTITIONER

## 2024-10-16 PROCEDURE — 2022F DILAT RTA XM EVC RTNOPTHY: CPT | Performed by: NURSE PRACTITIONER

## 2024-10-16 PROCEDURE — 80053 COMPREHEN METABOLIC PANEL: CPT

## 2024-10-16 PROCEDURE — 83880 ASSAY OF NATRIURETIC PEPTIDE: CPT

## 2024-10-16 PROCEDURE — 3079F DIAST BP 80-89 MM HG: CPT | Performed by: NURSE PRACTITIONER

## 2024-10-16 PROCEDURE — 3051F HG A1C>EQUAL 7.0%<8.0%: CPT | Performed by: NURSE PRACTITIONER

## 2024-10-16 PROCEDURE — 3074F SYST BP LT 130 MM HG: CPT | Performed by: NURSE PRACTITIONER

## 2024-10-16 PROCEDURE — 36415 COLL VENOUS BLD VENIPUNCTURE: CPT

## 2024-10-16 NOTE — PROGRESS NOTES
\"Have you been to the ER, urgent care clinic since your last visit?  Hospitalized since your last visit?\"    NO    “Have you seen or consulted any other health care providers outside our system since your last visit?”    NO      “Have you had a diabetic eye exam?”    NO     Date of last diabetic eye exam: 1/25/2022

## 2024-10-17 LAB
ALBUMIN SERPL-MCNC: 3.4 G/DL (ref 3.4–4.8)
ALBUMIN/GLOB SERPL: 1.2 {RATIO} (ref 0.8–2)
ALP SERPL-CCNC: 110 U/L (ref 25–100)
ALT SERPL-CCNC: 11 U/L (ref 4–36)
ANION GAP SERPL CALCULATED.3IONS-SCNC: 10 MMOL/L (ref 3–16)
AST SERPL-CCNC: 18 U/L (ref 8–33)
BILIRUB SERPL-MCNC: 0.5 MG/DL (ref 0.3–1.2)
BUN SERPL-MCNC: 47 MG/DL (ref 6–20)
CALCIUM SERPL-MCNC: 9.1 MG/DL (ref 8.5–10.5)
CHLORIDE SERPL-SCNC: 101 MMOL/L (ref 98–107)
CO2 SERPL-SCNC: 34 MMOL/L (ref 20–30)
CREAT SERPL-MCNC: 2.6 MG/DL (ref 0.4–1.2)
GFR SERPLBLD CREATININE-BSD FMLA CKD-EPI: 27 ML/MIN/{1.73_M2}
GLOBULIN SER CALC-MCNC: 2.8 G/DL
GLUCOSE SERPL-MCNC: 128 MG/DL (ref 74–106)
NT-PROBNP SERPL-MCNC: 3043 PG/ML (ref 0–1800)
POTASSIUM SERPL-SCNC: 4 MMOL/L (ref 3.4–5.1)
PROT SERPL-MCNC: 6.2 G/DL (ref 6.4–8.3)
SODIUM SERPL-SCNC: 145 MMOL/L (ref 136–145)

## 2024-10-29 RX ORDER — APIXABAN 5 MG/1
5 TABLET, FILM COATED ORAL DAILY
Qty: 90 TABLET | Refills: 0 | Status: SHIPPED | OUTPATIENT
Start: 2024-10-29 | End: 2025-01-27

## 2024-10-29 NOTE — TELEPHONE ENCOUNTER
Refill request received from patient      Next Office Visit Date:  Future Appointments   Date Time Provider Department Center   11/11/2024  2:15 PM Sheila Muller APRN [facility]       JOSEPHINE - Please review via PDMP        Last Office Visit:    10/16/2024

## 2024-10-30 ENCOUNTER — HOSPITAL ENCOUNTER (INPATIENT)
Facility: HOSPITAL | Age: 64
LOS: 5 days | Discharge: HOME HEALTH | DRG: 291 | End: 2024-11-04
Attending: EMERGENCY MEDICINE | Admitting: INTERNAL MEDICINE
Payer: MEDICARE

## 2024-10-30 ENCOUNTER — APPOINTMENT (OUTPATIENT)
Dept: GENERAL RADIOLOGY | Facility: HOSPITAL | Age: 64
DRG: 291 | End: 2024-10-30
Payer: MEDICARE

## 2024-10-30 ENCOUNTER — APPOINTMENT (OUTPATIENT)
Dept: CT IMAGING | Facility: HOSPITAL | Age: 64
DRG: 291 | End: 2024-10-30
Payer: MEDICARE

## 2024-10-30 DIAGNOSIS — B34.8 RHINOVIRUS INFECTION: ICD-10-CM

## 2024-10-30 DIAGNOSIS — J96.01 ACUTE RESPIRATORY FAILURE WITH HYPOXIA: ICD-10-CM

## 2024-10-30 DIAGNOSIS — N18.9 CHRONIC KIDNEY DISEASE, UNSPECIFIED CKD STAGE: ICD-10-CM

## 2024-10-30 DIAGNOSIS — J96.21 ACUTE ON CHRONIC RESPIRATORY FAILURE WITH HYPOXIA: Primary | ICD-10-CM

## 2024-10-30 DIAGNOSIS — I50.9 ACUTE ON CHRONIC CONGESTIVE HEART FAILURE, UNSPECIFIED HEART FAILURE TYPE: ICD-10-CM

## 2024-10-30 DIAGNOSIS — I48.0 PAROXYSMAL ATRIAL FIBRILLATION: ICD-10-CM

## 2024-10-30 DIAGNOSIS — I50.31 ACUTE DIASTOLIC CHF (CONGESTIVE HEART FAILURE): ICD-10-CM

## 2024-10-30 LAB
ALBUMIN SERPL-MCNC: 3.6 G/DL (ref 3.5–5.2)
ALBUMIN/GLOB SERPL: 1.1 G/DL
ALP SERPL-CCNC: 110 U/L (ref 39–117)
ALT SERPL W P-5'-P-CCNC: 11 U/L (ref 1–41)
ANION GAP SERPL CALCULATED.3IONS-SCNC: 7 MMOL/L (ref 5–15)
ARTERIAL PATENCY WRIST A: POSITIVE
ARTERIAL PATENCY WRIST A: POSITIVE
AST SERPL-CCNC: 27 U/L (ref 1–40)
ATMOSPHERIC PRESS: ABNORMAL MM[HG]
ATMOSPHERIC PRESS: ABNORMAL MM[HG]
B PARAPERT DNA SPEC QL NAA+PROBE: NOT DETECTED
B PERT DNA SPEC QL NAA+PROBE: NOT DETECTED
BASE EXCESS BLDA CALC-SCNC: 12.2 MMOL/L (ref 0–2)
BASE EXCESS BLDA CALC-SCNC: 13.3 MMOL/L (ref 0–2)
BASOPHILS # BLD AUTO: 0.03 10*3/MM3 (ref 0–0.2)
BASOPHILS NFR BLD AUTO: 0.3 % (ref 0–1.5)
BDY SITE: ABNORMAL
BDY SITE: ABNORMAL
BILIRUB SERPL-MCNC: 0.7 MG/DL (ref 0–1.2)
BODY TEMPERATURE: 37
BODY TEMPERATURE: 37
BUN SERPL-MCNC: 33 MG/DL (ref 8–23)
BUN/CREAT SERPL: 15.4 (ref 7–25)
C PNEUM DNA NPH QL NAA+NON-PROBE: NOT DETECTED
CALCIUM SPEC-SCNC: 8.9 MG/DL (ref 8.6–10.5)
CHLORIDE SERPL-SCNC: 100 MMOL/L (ref 98–107)
CO2 BLDA-SCNC: 42 MMOL/L (ref 22–33)
CO2 BLDA-SCNC: 44.2 MMOL/L (ref 22–33)
CO2 SERPL-SCNC: 37 MMOL/L (ref 22–29)
COHGB MFR BLD: 1 % (ref 0–2)
COHGB MFR BLD: 2.1 % (ref 0–2)
CREAT SERPL-MCNC: 2.14 MG/DL (ref 0.76–1.27)
D DIMER PPP FEU-MCNC: 2.05 MCGFEU/ML (ref 0–0.63)
DEPRECATED RDW RBC AUTO: 58.1 FL (ref 37–54)
EGFRCR SERPLBLD CKD-EPI 2021: 33.9 ML/MIN/1.73
EOSINOPHIL # BLD AUTO: 0.29 10*3/MM3 (ref 0–0.4)
EOSINOPHIL NFR BLD AUTO: 2.7 % (ref 0.3–6.2)
EPAP: 0
EPAP: 0
ERYTHROCYTE [DISTWIDTH] IN BLOOD BY AUTOMATED COUNT: 17.2 % (ref 12.3–15.4)
FLUAV SUBTYP SPEC NAA+PROBE: NOT DETECTED
FLUBV RNA ISLT QL NAA+PROBE: NOT DETECTED
GEN 5 2HR TROPONIN T REFLEX: 93 NG/L
GLOBULIN UR ELPH-MCNC: 3.2 GM/DL
GLUCOSE BLDC GLUCOMTR-MCNC: 164 MG/DL (ref 70–130)
GLUCOSE SERPL-MCNC: 131 MG/DL (ref 65–99)
HADV DNA SPEC NAA+PROBE: NOT DETECTED
HCO3 BLDA-SCNC: 40.1 MMOL/L (ref 20–26)
HCO3 BLDA-SCNC: 41.6 MMOL/L (ref 20–26)
HCOV 229E RNA SPEC QL NAA+PROBE: NOT DETECTED
HCOV HKU1 RNA SPEC QL NAA+PROBE: NOT DETECTED
HCOV NL63 RNA SPEC QL NAA+PROBE: NOT DETECTED
HCOV OC43 RNA SPEC QL NAA+PROBE: NOT DETECTED
HCT VFR BLD AUTO: 38.3 % (ref 37.5–51)
HCT VFR BLD CALC: 31.5 % (ref 38–51)
HCT VFR BLD CALC: 33.6 % (ref 38–51)
HGB BLD-MCNC: 11 G/DL (ref 13–17.7)
HGB BLDA-MCNC: 10.3 G/DL (ref 13.5–17.5)
HGB BLDA-MCNC: 11 G/DL (ref 13.5–17.5)
HMPV RNA NPH QL NAA+NON-PROBE: NOT DETECTED
HOLD SPECIMEN: NORMAL
HPIV1 RNA ISLT QL NAA+PROBE: NOT DETECTED
HPIV2 RNA SPEC QL NAA+PROBE: NOT DETECTED
HPIV3 RNA NPH QL NAA+PROBE: NOT DETECTED
HPIV4 P GENE NPH QL NAA+PROBE: NOT DETECTED
IMM GRANULOCYTES # BLD AUTO: 0.09 10*3/MM3 (ref 0–0.05)
IMM GRANULOCYTES NFR BLD AUTO: 0.8 % (ref 0–0.5)
INHALED O2 CONCENTRATION: 100 %
INHALED O2 CONCENTRATION: 40 %
IPAP: 0
IPAP: 0
LYMPHOCYTES # BLD AUTO: 0.81 10*3/MM3 (ref 0.7–3.1)
LYMPHOCYTES NFR BLD AUTO: 7.6 % (ref 19.6–45.3)
Lab: ABNORMAL
M PNEUMO IGG SER IA-ACNC: NOT DETECTED
MCH RBC QN AUTO: 26.4 PG (ref 26.6–33)
MCHC RBC AUTO-ENTMCNC: 28.7 G/DL (ref 31.5–35.7)
MCV RBC AUTO: 92.1 FL (ref 79–97)
METHGB BLD QL: 0.5 % (ref 0–1.5)
METHGB BLD QL: 0.6 % (ref 0–1.5)
MODALITY: ABNORMAL
MODALITY: ABNORMAL
MONOCYTES # BLD AUTO: 0.55 10*3/MM3 (ref 0.1–0.9)
MONOCYTES NFR BLD AUTO: 5.2 % (ref 5–12)
NEUTROPHILS NFR BLD AUTO: 8.84 10*3/MM3 (ref 1.7–7)
NEUTROPHILS NFR BLD AUTO: 83.4 % (ref 42.7–76)
NOTIFIED BY: ABNORMAL
NOTIFIED WHO: ABNORMAL
NRBC BLD AUTO-RTO: 0 /100 WBC (ref 0–0.2)
NT-PROBNP SERPL-MCNC: 2457 PG/ML (ref 0–900)
OXYHGB MFR BLDV: 63.9 % (ref 94–99)
OXYHGB MFR BLDV: 94.8 % (ref 94–99)
PAW @ PEAK INSP FLOW SETTING VENT: 0 CMH2O
PAW @ PEAK INSP FLOW SETTING VENT: 0 CMH2O
PCO2 BLDA: 63.4 MM HG (ref 35–45)
PCO2 BLDA: 84.8 MM HG (ref 35–45)
PCO2 TEMP ADJ BLD: 63.4 MM HG (ref 35–48)
PCO2 TEMP ADJ BLD: 84.8 MM HG (ref 35–48)
PH BLDA: 7.3 PH UNITS (ref 7.35–7.45)
PH BLDA: 7.41 PH UNITS (ref 7.35–7.45)
PH, TEMP CORRECTED: 7.3 PH UNITS
PH, TEMP CORRECTED: 7.41 PH UNITS
PLATELET # BLD AUTO: 211 10*3/MM3 (ref 140–450)
PMV BLD AUTO: 11.5 FL (ref 6–12)
PO2 BLDA: 39.5 MM HG (ref 83–108)
PO2 BLDA: 92.9 MM HG (ref 83–108)
PO2 TEMP ADJ BLD: 39.5 MM HG (ref 83–108)
PO2 TEMP ADJ BLD: 92.9 MM HG (ref 83–108)
POTASSIUM SERPL-SCNC: 3.9 MMOL/L (ref 3.5–5.2)
PROT SERPL-MCNC: 6.8 G/DL (ref 6–8.5)
RBC # BLD AUTO: 4.16 10*6/MM3 (ref 4.14–5.8)
RHINOVIRUS RNA SPEC NAA+PROBE: DETECTED
RSV RNA NPH QL NAA+NON-PROBE: NOT DETECTED
SARS-COV-2 RNA NPH QL NAA+NON-PROBE: NOT DETECTED
SODIUM SERPL-SCNC: 144 MMOL/L (ref 136–145)
TOTAL RATE: 0 BREATHS/MINUTE
TOTAL RATE: 0 BREATHS/MINUTE
TROPONIN T DELTA: -11 NG/L
TROPONIN T SERPL HS-MCNC: 104 NG/L
VENTILATOR MODE: ABNORMAL
WBC NRBC COR # BLD AUTO: 10.61 10*3/MM3 (ref 3.4–10.8)
WHOLE BLOOD HOLD COAG: NORMAL
WHOLE BLOOD HOLD SPECIMEN: NORMAL

## 2024-10-30 PROCEDURE — 85025 COMPLETE CBC W/AUTO DIFF WBC: CPT | Performed by: EMERGENCY MEDICINE

## 2024-10-30 PROCEDURE — 93010 ELECTROCARDIOGRAM REPORT: CPT | Performed by: INTERNAL MEDICINE

## 2024-10-30 PROCEDURE — 25010000002 BUMETANIDE PER 0.5 MG: Performed by: INTERNAL MEDICINE

## 2024-10-30 PROCEDURE — 80053 COMPREHEN METABOLIC PANEL: CPT | Performed by: EMERGENCY MEDICINE

## 2024-10-30 PROCEDURE — 63710000001 INSULIN LISPRO (HUMAN) PER 5 UNITS: Performed by: INTERNAL MEDICINE

## 2024-10-30 PROCEDURE — 99291 CRITICAL CARE FIRST HOUR: CPT

## 2024-10-30 PROCEDURE — 36415 COLL VENOUS BLD VENIPUNCTURE: CPT

## 2024-10-30 PROCEDURE — 0202U NFCT DS 22 TRGT SARS-COV-2: CPT | Performed by: EMERGENCY MEDICINE

## 2024-10-30 PROCEDURE — 83050 HGB METHEMOGLOBIN QUAN: CPT

## 2024-10-30 PROCEDURE — 94799 UNLISTED PULMONARY SVC/PX: CPT

## 2024-10-30 PROCEDURE — 25010000002 BUMETANIDE PER 0.5 MG: Performed by: EMERGENCY MEDICINE

## 2024-10-30 PROCEDURE — 94660 CPAP INITIATION&MGMT: CPT

## 2024-10-30 PROCEDURE — 84484 ASSAY OF TROPONIN QUANT: CPT | Performed by: EMERGENCY MEDICINE

## 2024-10-30 PROCEDURE — 71045 X-RAY EXAM CHEST 1 VIEW: CPT

## 2024-10-30 PROCEDURE — 25510000001 IOPAMIDOL PER 1 ML: Performed by: EMERGENCY MEDICINE

## 2024-10-30 PROCEDURE — 82948 REAGENT STRIP/BLOOD GLUCOSE: CPT

## 2024-10-30 PROCEDURE — 83880 ASSAY OF NATRIURETIC PEPTIDE: CPT | Performed by: EMERGENCY MEDICINE

## 2024-10-30 PROCEDURE — 99223 1ST HOSP IP/OBS HIGH 75: CPT | Performed by: INTERNAL MEDICINE

## 2024-10-30 PROCEDURE — 85379 FIBRIN DEGRADATION QUANT: CPT | Performed by: EMERGENCY MEDICINE

## 2024-10-30 PROCEDURE — 36600 WITHDRAWAL OF ARTERIAL BLOOD: CPT

## 2024-10-30 PROCEDURE — 82805 BLOOD GASES W/O2 SATURATION: CPT

## 2024-10-30 PROCEDURE — 82375 ASSAY CARBOXYHB QUANT: CPT

## 2024-10-30 PROCEDURE — 71275 CT ANGIOGRAPHY CHEST: CPT

## 2024-10-30 PROCEDURE — 93005 ELECTROCARDIOGRAM TRACING: CPT | Performed by: EMERGENCY MEDICINE

## 2024-10-30 RX ORDER — IOPAMIDOL 755 MG/ML
100 INJECTION, SOLUTION INTRAVASCULAR
Status: COMPLETED | OUTPATIENT
Start: 2024-10-30 | End: 2024-10-30

## 2024-10-30 RX ORDER — POLYETHYLENE GLYCOL 3350 17 G/17G
17 POWDER, FOR SOLUTION ORAL DAILY PRN
Status: DISCONTINUED | OUTPATIENT
Start: 2024-10-30 | End: 2024-11-04 | Stop reason: HOSPADM

## 2024-10-30 RX ORDER — SODIUM CHLORIDE 0.9 % (FLUSH) 0.9 %
10 SYRINGE (ML) INJECTION AS NEEDED
Status: DISCONTINUED | OUTPATIENT
Start: 2024-10-30 | End: 2024-11-04 | Stop reason: HOSPADM

## 2024-10-30 RX ORDER — GABAPENTIN 300 MG/1
600 CAPSULE ORAL EVERY 8 HOURS SCHEDULED
Status: DISCONTINUED | OUTPATIENT
Start: 2024-10-30 | End: 2024-11-04 | Stop reason: HOSPADM

## 2024-10-30 RX ORDER — DEXTROSE MONOHYDRATE 25 G/50ML
25 INJECTION, SOLUTION INTRAVENOUS
Status: DISCONTINUED | OUTPATIENT
Start: 2024-10-30 | End: 2024-11-04 | Stop reason: HOSPADM

## 2024-10-30 RX ORDER — BENZONATATE 100 MG/1
100 CAPSULE ORAL 3 TIMES DAILY PRN
COMMUNITY

## 2024-10-30 RX ORDER — ROSUVASTATIN CALCIUM 20 MG/1
20 TABLET, COATED ORAL NIGHTLY
Status: DISCONTINUED | OUTPATIENT
Start: 2024-10-30 | End: 2024-10-31

## 2024-10-30 RX ORDER — SODIUM CHLORIDE 0.9 % (FLUSH) 0.9 %
10 SYRINGE (ML) INJECTION AS NEEDED
Status: DISCONTINUED | OUTPATIENT
Start: 2024-10-30 | End: 2024-11-04

## 2024-10-30 RX ORDER — BUMETANIDE 0.25 MG/ML
2 INJECTION, SOLUTION INTRAMUSCULAR; INTRAVENOUS ONCE
Status: COMPLETED | OUTPATIENT
Start: 2024-10-30 | End: 2024-10-30

## 2024-10-30 RX ORDER — BENZONATATE 100 MG/1
100 CAPSULE ORAL 3 TIMES DAILY PRN
Status: DISCONTINUED | OUTPATIENT
Start: 2024-10-30 | End: 2024-11-04 | Stop reason: HOSPADM

## 2024-10-30 RX ORDER — PANTOPRAZOLE SODIUM 40 MG/1
40 TABLET, DELAYED RELEASE ORAL DAILY
Status: DISCONTINUED | OUTPATIENT
Start: 2024-10-31 | End: 2024-11-04 | Stop reason: HOSPADM

## 2024-10-30 RX ORDER — FLUTICASONE PROPIONATE 50 MCG
2 SPRAY, SUSPENSION (ML) NASAL DAILY
Status: DISCONTINUED | OUTPATIENT
Start: 2024-10-31 | End: 2024-11-04 | Stop reason: HOSPADM

## 2024-10-30 RX ORDER — NITROGLYCERIN 0.4 MG/1
0.4 TABLET SUBLINGUAL
Status: DISCONTINUED | OUTPATIENT
Start: 2024-10-30 | End: 2024-11-04 | Stop reason: HOSPADM

## 2024-10-30 RX ORDER — IPRATROPIUM BROMIDE AND ALBUTEROL SULFATE 2.5; .5 MG/3ML; MG/3ML
3 SOLUTION RESPIRATORY (INHALATION)
Status: DISCONTINUED | OUTPATIENT
Start: 2024-10-31 | End: 2024-11-04 | Stop reason: HOSPADM

## 2024-10-30 RX ORDER — CARVEDILOL 12.5 MG/1
12.5 TABLET ORAL 2 TIMES DAILY WITH MEALS
Status: DISCONTINUED | OUTPATIENT
Start: 2024-10-30 | End: 2024-11-04

## 2024-10-30 RX ORDER — NICOTINE POLACRILEX 4 MG
15 LOZENGE BUCCAL
Status: DISCONTINUED | OUTPATIENT
Start: 2024-10-30 | End: 2024-11-04 | Stop reason: HOSPADM

## 2024-10-30 RX ORDER — ESCITALOPRAM OXALATE 20 MG/1
20 TABLET ORAL DAILY
Status: DISCONTINUED | OUTPATIENT
Start: 2024-10-31 | End: 2024-11-04 | Stop reason: HOSPADM

## 2024-10-30 RX ORDER — POLYETHYLENE GLYCOL 3350 17 G/17G
17 POWDER, FOR SOLUTION ORAL 2 TIMES DAILY PRN
Status: DISCONTINUED | OUTPATIENT
Start: 2024-10-30 | End: 2024-11-04 | Stop reason: HOSPADM

## 2024-10-30 RX ORDER — BUMETANIDE 0.25 MG/ML
2.5 INJECTION, SOLUTION INTRAMUSCULAR; INTRAVENOUS 2 TIMES DAILY
Status: DISCONTINUED | OUTPATIENT
Start: 2024-10-30 | End: 2024-11-03

## 2024-10-30 RX ORDER — ACETAMINOPHEN 325 MG/1
650 TABLET ORAL EVERY 4 HOURS PRN
Status: DISCONTINUED | OUTPATIENT
Start: 2024-10-30 | End: 2024-11-04 | Stop reason: HOSPADM

## 2024-10-30 RX ORDER — SODIUM CHLORIDE 0.9 % (FLUSH) 0.9 %
10 SYRINGE (ML) INJECTION EVERY 12 HOURS SCHEDULED
Status: DISCONTINUED | OUTPATIENT
Start: 2024-10-30 | End: 2024-11-04 | Stop reason: HOSPADM

## 2024-10-30 RX ORDER — ACETAMINOPHEN 650 MG/1
650 SUPPOSITORY RECTAL EVERY 4 HOURS PRN
Status: DISCONTINUED | OUTPATIENT
Start: 2024-10-30 | End: 2024-11-04 | Stop reason: HOSPADM

## 2024-10-30 RX ORDER — IBUPROFEN 600 MG/1
1 TABLET ORAL
Status: DISCONTINUED | OUTPATIENT
Start: 2024-10-30 | End: 2024-11-04 | Stop reason: HOSPADM

## 2024-10-30 RX ORDER — CLONAZEPAM 0.5 MG/1
0.5 TABLET ORAL 2 TIMES DAILY PRN
Status: DISCONTINUED | OUTPATIENT
Start: 2024-10-30 | End: 2024-11-04 | Stop reason: HOSPADM

## 2024-10-30 RX ORDER — AMOXICILLIN 250 MG
2 CAPSULE ORAL 2 TIMES DAILY PRN
Status: DISCONTINUED | OUTPATIENT
Start: 2024-10-30 | End: 2024-11-04 | Stop reason: HOSPADM

## 2024-10-30 RX ORDER — BISACODYL 10 MG
10 SUPPOSITORY, RECTAL RECTAL DAILY PRN
Status: DISCONTINUED | OUTPATIENT
Start: 2024-10-30 | End: 2024-11-04 | Stop reason: HOSPADM

## 2024-10-30 RX ORDER — BISACODYL 5 MG/1
5 TABLET, DELAYED RELEASE ORAL DAILY PRN
Status: DISCONTINUED | OUTPATIENT
Start: 2024-10-30 | End: 2024-11-04 | Stop reason: HOSPADM

## 2024-10-30 RX ORDER — INSULIN LISPRO 100 [IU]/ML
2-9 INJECTION, SOLUTION INTRAVENOUS; SUBCUTANEOUS
Status: DISCONTINUED | OUTPATIENT
Start: 2024-10-30 | End: 2024-11-04 | Stop reason: HOSPADM

## 2024-10-30 RX ORDER — ACETAMINOPHEN 160 MG/5ML
650 SOLUTION ORAL EVERY 4 HOURS PRN
Status: DISCONTINUED | OUTPATIENT
Start: 2024-10-30 | End: 2024-11-04 | Stop reason: HOSPADM

## 2024-10-30 RX ADMIN — INSULIN LISPRO 2 UNITS: 100 INJECTION, SOLUTION INTRAVENOUS; SUBCUTANEOUS at 21:55

## 2024-10-30 RX ADMIN — CARVEDILOL 12.5 MG: 12.5 TABLET, FILM COATED ORAL at 21:54

## 2024-10-30 RX ADMIN — IOPAMIDOL 85 ML: 755 INJECTION, SOLUTION INTRAVENOUS at 13:28

## 2024-10-30 RX ADMIN — BUMETANIDE 2.5 MG: 0.25 INJECTION INTRAMUSCULAR; INTRAVENOUS at 21:54

## 2024-10-30 RX ADMIN — APIXABAN 5 MG: 5 TABLET, FILM COATED ORAL at 21:54

## 2024-10-30 RX ADMIN — BUMETANIDE 2 MG: 0.25 INJECTION INTRAMUSCULAR; INTRAVENOUS at 12:50

## 2024-10-30 RX ADMIN — Medication 10 ML: at 21:55

## 2024-10-30 RX ADMIN — GABAPENTIN 600 MG: 300 CAPSULE ORAL at 21:54

## 2024-10-30 RX ADMIN — ROSUVASTATIN 20 MG: 20 TABLET, FILM COATED ORAL at 21:54

## 2024-10-30 NOTE — ED NOTES
Juan Alberto Tejeda    Nursing Report ED to Floor:  Mental status: alert and oriented  Ambulatory status: nonambulatory at this time due to BIPAP  Oxygen Therapy:  BIPAP 40%  Cardiac Rhythm: Normal sinus rhythm  Admitted from: ED  Safety Concerns:  None noted  Social Issues: None noted  ED Room #:  10    ED Nurse Phone Extension - [phone] or may call [phone].      HPI:   Chief Complaint   Patient presents with    Shortness of Breath       Past Medical History:  Past Medical History:   Diagnosis Date    Aortic valve disorder     Arthritis     CHF (congestive heart failure)     Chronic kidney disease     Diabetes mellitus     Gout     Hiatal hernia     Hyperlipidemia     Hypertension     Kidney stones     history    MI, old     Peripheral neuropathy     Wears glasses         Past Surgical History:  Past Surgical History:   Procedure Laterality Date    AORTIC VALVE REPAIR/REPLACEMENT N/A 3/16/2020    Procedure: TRANSCATHETER AORTIC VALVE REPLACEMENT, SHANKAR;  Surgeon: Irvin Jeffers MD;  Location:  [facility] 15;  Service: Cardiothoracic;  Laterality: N/A;  fluoro 10 min 30 sec  dose 1136 mGY   contrast 65 ml    AORTIC VALVE REPAIR/REPLACEMENT N/A 3/16/2020    Procedure: Transfemoral Transcatheter Aortic Valve Replacement;  Surgeon: Cayla Bella MD;  Location:  [facility] 15;  Service: Cardiovascular;  Laterality: N/A;    CHOLECYSTECTOMY      CIRCUMCISION      COLONOSCOPY  2 years ago    KIDNEY STONE SURGERY      OTHER SURGICAL HISTORY      Gallstone removal surgery        Admitting Doctor:   No admitting provider for patient encounter.    Consulting Provider(s):  Consults       No orders found from 10/1/2024 to 10/31/2024.             Admitting Diagnosis:       Most Recent Vitals:   Vitals:    10/30/24 1200 10/30/24 1209 10/30/24 1259 10/30/24 1304   BP:   155/86    BP Location:       Patient Position:       Pulse:  66 65 66   Resp:       Temp: 98.3 °F (36.8 °C)      TempSrc: Axillary      SpO2:  98% 97%  98%   Weight:       Height:           Active LDAs/IV Access:   Lines, Drains & Airways       Active LDAs       Name Placement date Placement time Site Days    Peripheral IV 10/30/24 1140 Right Antecubital 10/30/24  1140  Antecubital  less than 1                    Labs (abnormal labs have a star):   Labs Reviewed   RESPIRATORY PANEL PCR W/ COVID-19 (SARS-COV-2), NP SWAB IN UTM/VTP, 2 HR TAT - Abnormal; Notable for the following components:       Result Value    Human Rhinovirus/Enterovirus Detected (*)     All other components within normal limits    Narrative:     In the setting of a positive respiratory panel with a viral infection PLUS a negative procalcitonin without other underlying concern for bacterial infection, consider observing off antibiotics or discontinuation of antibiotics and continue supportive care. If the respiratory panel is positive for atypical bacterial infection (Bordetella pertussis, Chlamydophila pneumoniae, or Mycoplasma pneumoniae), consider antibiotic de-escalation to target atypical bacterial infection.   COMPREHENSIVE METABOLIC PANEL - Abnormal; Notable for the following components:    Glucose 131 (*)     BUN 33 (*)     Creatinine 2.14 (*)     CO2 37.0 (*)     eGFR 33.9 (*)     All other components within normal limits    Narrative:     GFR Normal >60  Chronic Kidney Disease <60  Kidney Failure <15     BNP (IN-HOUSE) - Abnormal; Notable for the following components:    proBNP 2,457.0 (*)     All other components within normal limits    Narrative:     This assay is used as an aid in the diagnosis of individuals suspected of having heart failure. It can be used as an aid in the diagnosis of acute decompensated heart failure (ADHF) in patients presenting with signs and symptoms of ADHF to the emergency department (ED). In addition, NT-proBNP of <300 pg/mL indicates ADHF is not likely.    Age Range Result Interpretation  NT-proBNP Concentration (pg/mL:      <50             Positive             >450                   Gray                 300-450                    Negative             <300    50-75           Positive            >900                  Gray                300-900                  Negative            <300      >75             Positive            >1800                  Gray                300-1800                  Negative            <300   SINGLE HS TROPONIN T - Abnormal; Notable for the following components:    HS Troponin T 104 (*)     All other components within normal limits    Narrative:     High Sensitive Troponin T Reference Range:  <14.0 ng/L- Negative Female for AMI  <22.0 ng/L- Negative Male for AMI  >=14 - Abnormal Female indicating possible myocardial injury.  >=22 - Abnormal Male indicating possible myocardial injury.   Clinicians would have to utilize clinical acumen, EKG, Troponin, and serial changes to determine if it is an Acute Myocardial Infarction or myocardial injury due to an underlying chronic condition.        CBC WITH AUTO DIFFERENTIAL - Abnormal; Notable for the following components:    Hemoglobin 11.0 (*)     MCH 26.4 (*)     MCHC 28.7 (*)     RDW 17.2 (*)     RDW-SD 58.1 (*)     Neutrophil % 83.4 (*)     Lymphocyte % 7.6 (*)     Immature Grans % 0.8 (*)     Neutrophils, Absolute 8.84 (*)     Immature Grans, Absolute 0.09 (*)     All other components within normal limits   D-DIMER, QUANTITATIVE - Abnormal; Notable for the following components:    D-Dimer, Quantitative 2.05 (*)     All other components within normal limits    Narrative:     According to the assay 's published package insert, a normal (<0.50 MCGFEU/mL) D-dimer result in conjunction with a non-high clinical probability assessment, excludes deep vein thrombosis (DVT) and pulmonary embolism (PE) with high sensitivity.    D-dimer values increase with age and this can make VTE exclusion of an older population difficult. To address this, the [employer], based on best  "available evidence and recent guidelines, recommends that clinicians use age-adjusted D-dimer thresholds in patients greater than 50 years of age with: a) a low probability of PE who do not meet all Pulmonary Embolism Rule Out Criteria, or b) in those with intermediate probability of PE.   The formula for an age-adjusted D-dimer cut-off is \"age/100\".  For example, a 60 year old patient would have an age-adjusted cut-off of 0.60 MCGFEU/mL and an 80 year old 0.80 MCGFEU/mL.   BLOOD GAS, ARTERIAL W/CO-OXIMETRY - Abnormal; Notable for the following components:    pH, Arterial 7.299 (*)     pCO2, Arterial 84.8 (*)     pO2, Arterial 39.5 (*)     HCO3, Arterial 41.6 (*)     Base Excess, Arterial 12.2 (*)     Hemoglobin, Blood Gas 11.0 (*)     Hematocrit, Blood Gas 33.6 (*)     Oxyhemoglobin 63.9 (*)     Carboxyhemoglobin 2.1 (*)     CO2 Content 44.2 (*)     pCO2, Temperature Corrected 84.8 (*)     pO2, Temperature Corrected 39.5 (*)     All other components within normal limits   RAINBOW DRAW    Narrative:     The following orders were created for panel order [place] Draw.  Procedure                               Abnormality         Status                     ---------                               -----------         ------                     Green Top (Gel)[[id number]]                                  Final result               Lavender Top[[id number]]                                     Final result               Gold Top - SST[[id number]]                                   Final result               Reyna Top[[id number]]                                         Final result               Light Blue Top[[id number]]                                   Final result                 Please view results for these tests on the individual orders.   BLOOD GAS, ARTERIAL   HIGH SENSITIVITIY TROPONIN T 2HR   CBC AND DIFFERENTIAL    Narrative:     The following orders were created for panel order CBC & Differential.  Procedure               "                 Abnormality         Status                     ---------                               -----------         ------                     CBC Auto Differential[[id number]]        Abnormal            Final result                 Please view results for these tests on the individual orders.   GREEN TOP   LAVENDER TOP   GOLD TOP - SST   GRAY TOP   LIGHT BLUE TOP       Meds Given in ED:   Medications   sodium chloride 0.9 % flush 10 mL (has no administration in time range)   bumetanide (BUMEX) injection 2 mg (2 mg Intravenous Given 10/30/24 1250)   iopamidol (ISOVUE-370) 76 % injection 100 mL (85 mL Intravenous Given 10/30/24 1328)           Last NIH score:                                                          Dysphagia screening results:        [place] Coma Scale:  No data recorded     CIWA:        Restraint Type:            Isolation Status:  No active isolations

## 2024-10-31 ENCOUNTER — APPOINTMENT (OUTPATIENT)
Dept: CARDIOLOGY | Facility: HOSPITAL | Age: 64
DRG: 291 | End: 2024-10-31
Payer: MEDICARE

## 2024-10-31 PROBLEM — I48.0 PAROXYSMAL ATRIAL FIBRILLATION: Status: ACTIVE | Noted: 2024-10-31

## 2024-10-31 PROBLEM — B34.8 RHINOVIRUS INFECTION: Status: ACTIVE | Noted: 2024-10-31

## 2024-10-31 PROBLEM — I50.33 ACUTE ON CHRONIC DIASTOLIC CHF (CONGESTIVE HEART FAILURE): Status: RESOLVED | Noted: 2024-04-11 | Resolved: 2024-10-31

## 2024-10-31 PROBLEM — N18.4 CKD (CHRONIC KIDNEY DISEASE), STAGE IV: Status: ACTIVE | Noted: 2020-02-28

## 2024-10-31 LAB
ANION GAP SERPL CALCULATED.3IONS-SCNC: 6 MMOL/L (ref 5–15)
BH CV ECHO MEAS - AO MAX PG: 23.9 MMHG
BH CV ECHO MEAS - AO MEAN PG: 14 MMHG
BH CV ECHO MEAS - AO ROOT DIAM: 2.5 CM
BH CV ECHO MEAS - AO V2 MAX: 243.8 CM/SEC
BH CV ECHO MEAS - AO V2 VTI: 59.5 CM
BH CV ECHO MEAS - AVA(I,D): 1.4 CM2
BH CV ECHO MEAS - EDV(CUBED): 140.6 ML
BH CV ECHO MEAS - EDV(MOD-SP2): 124 ML
BH CV ECHO MEAS - EDV(MOD-SP4): 136 ML
BH CV ECHO MEAS - EF(MOD-BP): 60.6 %
BH CV ECHO MEAS - EF(MOD-SP2): 66 %
BH CV ECHO MEAS - EF(MOD-SP4): 57.8 %
BH CV ECHO MEAS - ESV(CUBED): 50.7 ML
BH CV ECHO MEAS - ESV(MOD-SP2): 42.2 ML
BH CV ECHO MEAS - ESV(MOD-SP4): 57.4 ML
BH CV ECHO MEAS - FS: 28.8 %
BH CV ECHO MEAS - IVS/LVPW: 1 CM
BH CV ECHO MEAS - IVSD: 0.9 CM
BH CV ECHO MEAS - LA DIMENSION: 4.5 CM
BH CV ECHO MEAS - LAT PEAK E' VEL: 6.7 CM/SEC
BH CV ECHO MEAS - LV MASS(C)D: 169 GRAMS
BH CV ECHO MEAS - LV MAX PG: 4.6 MMHG
BH CV ECHO MEAS - LV MEAN PG: 2.5 MMHG
BH CV ECHO MEAS - LV V1 MAX: 107 CM/SEC
BH CV ECHO MEAS - LV V1 VTI: 26.5 CM
BH CV ECHO MEAS - LVIDD: 5.2 CM
BH CV ECHO MEAS - LVIDS: 3.7 CM
BH CV ECHO MEAS - LVOT AREA: 3.1 CM2
BH CV ECHO MEAS - LVOT DIAM: 2 CM
BH CV ECHO MEAS - LVPWD: 0.9 CM
BH CV ECHO MEAS - MED PEAK E' VEL: 4.4 CM/SEC
BH CV ECHO MEAS - MR MAX PG: 119.2 MMHG
BH CV ECHO MEAS - MR MAX VEL: 546 CM/SEC
BH CV ECHO MEAS - MR MEAN PG: 94 MMHG
BH CV ECHO MEAS - MR MEAN VEL: 469 CM/SEC
BH CV ECHO MEAS - MR VTI: 236 CM
BH CV ECHO MEAS - MV A MAX VEL: 140 CM/SEC
BH CV ECHO MEAS - MV DEC SLOPE: 576 CM/SEC2
BH CV ECHO MEAS - MV DEC TIME: 0.36 SEC
BH CV ECHO MEAS - MV E MAX VEL: 161 CM/SEC
BH CV ECHO MEAS - MV E/A: 1.15
BH CV ECHO MEAS - MV MAX PG: 15.5 MMHG
BH CV ECHO MEAS - MV MEAN PG: 6 MMHG
BH CV ECHO MEAS - MV P1/2T: 102.2 MSEC
BH CV ECHO MEAS - MV V2 VTI: 71.2 CM
BH CV ECHO MEAS - MVA(P1/2T): 2.15 CM2
BH CV ECHO MEAS - MVA(VTI): 1.17 CM2
BH CV ECHO MEAS - PA ACC TIME: 0.12 SEC
BH CV ECHO MEAS - PA V2 MAX: 114 CM/SEC
BH CV ECHO MEAS - RAP SYSTOLE: 15 MMHG
BH CV ECHO MEAS - RVSP: 35 MMHG
BH CV ECHO MEAS - SV(LVOT): 83.1 ML
BH CV ECHO MEAS - SV(MOD-SP2): 81.8 ML
BH CV ECHO MEAS - SV(MOD-SP4): 78.6 ML
BH CV ECHO MEAS - TAPSE (>1.6): 2.28 CM
BH CV ECHO MEAS - TR MAX PG: 20.3 MMHG
BH CV ECHO MEAS - TR MAX VEL: 225 CM/SEC
BH CV ECHO MEASUREMENTS AVERAGE E/E' RATIO: 29.01
BH CV XLRA - RV BASE: 4 CM
BH CV XLRA - RV LENGTH: 6.4 CM
BH CV XLRA - RV MID: 2.9 CM
BH CV XLRA - TDI S': 12.5 CM/SEC
BUN SERPL-MCNC: 33 MG/DL (ref 8–23)
BUN/CREAT SERPL: 15.3 (ref 7–25)
CALCIUM SPEC-SCNC: 8.5 MG/DL (ref 8.6–10.5)
CHLORIDE SERPL-SCNC: 102 MMOL/L (ref 98–107)
CO2 SERPL-SCNC: 39 MMOL/L (ref 22–29)
CREAT SERPL-MCNC: 2.16 MG/DL (ref 0.76–1.27)
DEPRECATED RDW RBC AUTO: 57.7 FL (ref 37–54)
EGFRCR SERPLBLD CKD-EPI 2021: 33.6 ML/MIN/1.73
ERYTHROCYTE [DISTWIDTH] IN BLOOD BY AUTOMATED COUNT: 17.1 % (ref 12.3–15.4)
GLUCOSE BLDC GLUCOMTR-MCNC: 120 MG/DL (ref 70–130)
GLUCOSE BLDC GLUCOMTR-MCNC: 136 MG/DL (ref 70–130)
GLUCOSE BLDC GLUCOMTR-MCNC: 170 MG/DL (ref 70–130)
GLUCOSE BLDC GLUCOMTR-MCNC: 213 MG/DL (ref 70–130)
GLUCOSE SERPL-MCNC: 124 MG/DL (ref 65–99)
HCT VFR BLD AUTO: 33 % (ref 37.5–51)
HGB BLD-MCNC: 9.5 G/DL (ref 13–17.7)
LEFT ATRIUM VOLUME INDEX: 28.3 ML/M2
LV EF 2D ECHO EST: 60 %
MCH RBC QN AUTO: 26.4 PG (ref 26.6–33)
MCHC RBC AUTO-ENTMCNC: 28.8 G/DL (ref 31.5–35.7)
MCV RBC AUTO: 91.7 FL (ref 79–97)
PLATELET # BLD AUTO: 175 10*3/MM3 (ref 140–450)
PMV BLD AUTO: 10.8 FL (ref 6–12)
POTASSIUM SERPL-SCNC: 3.6 MMOL/L (ref 3.5–5.2)
RBC # BLD AUTO: 3.6 10*6/MM3 (ref 4.14–5.8)
SODIUM SERPL-SCNC: 147 MMOL/L (ref 136–145)
WBC NRBC COR # BLD AUTO: 7.06 10*3/MM3 (ref 3.4–10.8)

## 2024-10-31 PROCEDURE — 94761 N-INVAS EAR/PLS OXIMETRY MLT: CPT

## 2024-10-31 PROCEDURE — 94799 UNLISTED PULMONARY SVC/PX: CPT

## 2024-10-31 PROCEDURE — 25010000002 BUMETANIDE PER 0.5 MG: Performed by: INTERNAL MEDICINE

## 2024-10-31 PROCEDURE — 94664 DEMO&/EVAL PT USE INHALER: CPT

## 2024-10-31 PROCEDURE — 82948 REAGENT STRIP/BLOOD GLUCOSE: CPT

## 2024-10-31 PROCEDURE — 99232 SBSQ HOSP IP/OBS MODERATE 35: CPT | Performed by: STUDENT IN AN ORGANIZED HEALTH CARE EDUCATION/TRAINING PROGRAM

## 2024-10-31 PROCEDURE — 99222 1ST HOSP IP/OBS MODERATE 55: CPT | Performed by: INTERNAL MEDICINE

## 2024-10-31 PROCEDURE — 93306 TTE W/DOPPLER COMPLETE: CPT | Performed by: INTERNAL MEDICINE

## 2024-10-31 PROCEDURE — 94660 CPAP INITIATION&MGMT: CPT

## 2024-10-31 PROCEDURE — 80048 BASIC METABOLIC PNL TOTAL CA: CPT | Performed by: INTERNAL MEDICINE

## 2024-10-31 PROCEDURE — 93306 TTE W/DOPPLER COMPLETE: CPT

## 2024-10-31 PROCEDURE — 94640 AIRWAY INHALATION TREATMENT: CPT

## 2024-10-31 PROCEDURE — 85027 COMPLETE CBC AUTOMATED: CPT | Performed by: INTERNAL MEDICINE

## 2024-10-31 PROCEDURE — 63710000001 INSULIN LISPRO (HUMAN) PER 5 UNITS: Performed by: INTERNAL MEDICINE

## 2024-10-31 RX ORDER — ROSUVASTATIN CALCIUM 20 MG/1
40 TABLET, COATED ORAL NIGHTLY
Status: DISCONTINUED | OUTPATIENT
Start: 2024-10-31 | End: 2024-11-01

## 2024-10-31 RX ADMIN — GABAPENTIN 600 MG: 300 CAPSULE ORAL at 21:20

## 2024-10-31 RX ADMIN — ESCITALOPRAM OXALATE 20 MG: 20 TABLET, FILM COATED ORAL at 09:05

## 2024-10-31 RX ADMIN — IPRATROPIUM BROMIDE AND ALBUTEROL SULFATE 3 ML: 2.5; .5 SOLUTION RESPIRATORY (INHALATION) at 07:29

## 2024-10-31 RX ADMIN — FLUTICASONE PROPIONATE 2 SPRAY: 50 SPRAY, METERED NASAL at 09:06

## 2024-10-31 RX ADMIN — APIXABAN 5 MG: 5 TABLET, FILM COATED ORAL at 09:06

## 2024-10-31 RX ADMIN — CARVEDILOL 12.5 MG: 12.5 TABLET, FILM COATED ORAL at 09:05

## 2024-10-31 RX ADMIN — PANTOPRAZOLE SODIUM 40 MG: 40 TABLET, DELAYED RELEASE ORAL at 09:06

## 2024-10-31 RX ADMIN — INSULIN LISPRO 4 UNITS: 100 INJECTION, SOLUTION INTRAVENOUS; SUBCUTANEOUS at 21:21

## 2024-10-31 RX ADMIN — GABAPENTIN 600 MG: 300 CAPSULE ORAL at 05:13

## 2024-10-31 RX ADMIN — ROSUVASTATIN 40 MG: 20 TABLET, FILM COATED ORAL at 21:21

## 2024-10-31 RX ADMIN — BUMETANIDE 2.5 MG: 0.25 INJECTION INTRAMUSCULAR; INTRAVENOUS at 21:18

## 2024-10-31 RX ADMIN — APIXABAN 5 MG: 5 TABLET, FILM COATED ORAL at 21:17

## 2024-10-31 RX ADMIN — IPRATROPIUM BROMIDE AND ALBUTEROL SULFATE 3 ML: 2.5; .5 SOLUTION RESPIRATORY (INHALATION) at 19:03

## 2024-10-31 RX ADMIN — GABAPENTIN 600 MG: 300 CAPSULE ORAL at 14:52

## 2024-10-31 RX ADMIN — INSULIN LISPRO 2 UNITS: 100 INJECTION, SOLUTION INTRAVENOUS; SUBCUTANEOUS at 17:14

## 2024-10-31 RX ADMIN — Medication 10 ML: at 09:07

## 2024-10-31 RX ADMIN — BUMETANIDE 2.5 MG: 0.25 INJECTION INTRAMUSCULAR; INTRAVENOUS at 12:43

## 2024-10-31 RX ADMIN — CARVEDILOL 12.5 MG: 12.5 TABLET, FILM COATED ORAL at 17:14

## 2024-10-31 RX ADMIN — Medication 10 ML: at 21:22

## 2024-10-31 RX ADMIN — IPRATROPIUM BROMIDE AND ALBUTEROL SULFATE 3 ML: 2.5; .5 SOLUTION RESPIRATORY (INHALATION) at 12:56

## 2024-10-31 NOTE — PROGRESS NOTES
[facility] Medicine Services  PROGRESS NOTE    Patient Name: Juan Alberto Tejeda  : 1960  MRN: [id number]    Date of Admission: 10/30/2024  Primary Care Physician: Megan Martell APRN    Subjective   Subjective     CC:  Dyspnea    HPI:  Patient seen and examined while eating breakfast this morning.  He is on 3 L nasal cannula which is his baseline O2.  He reports that his breathing is feeling much better.    Patient was placed back on BiPAP later in the morning due to shortness of breath and fatigue.      Objective   Objective     Vital Signs:   Temp:  [97.7 °F (36.5 °C)-98.7 °F (37.1 °C)] 97.7 °F (36.5 °C)  Heart Rate:  [51-71] 59  Resp:  [21-28] 21  BP: (126-151)/(78-89) 145/78  Flow (L/min) (Oxygen Therapy):  [5] 5     Physical Exam  Constitutional:       General: He is not in acute distress.  Cardiovascular:      Rate and Rhythm: Normal rate and regular rhythm.      Heart sounds: Normal heart sounds.   Pulmonary:      Effort: Pulmonary effort is normal. No respiratory distress.      Comments: Pulmonary exam limited due to patient's body habitus  Abdominal:      General: There is no distension.      Palpations: Abdomen is soft.      Tenderness: There is no abdominal tenderness.   Musculoskeletal:      Right lower leg: Edema present.      Left lower leg: Edema present.      Comments: Patient with compression stockings on, reports swelling is at baseline   Neurological:      General: No focal deficit present.      Mental Status: He is alert.          Results Reviewed:  LAB RESULTS:      Lab 10/31/24  0708 10/30/24  1407 10/30/24  1136   WBC 7.06  --  10.61   HEMOGLOBIN 9.5*  --  11.0*   HEMATOCRIT 33.0*  --  38.3   PLATELETS 175  --  211   NEUTROS ABS  --   --  8.84*   IMMATURE GRANS (ABS)  --   --  0.09*   LYMPHS ABS  --   --  0.81   MONOS ABS  --   --  0.55   EOS ABS  --   --  0.29   MCV 91.7  --  92.1   D DIMER QUANT  --   --  2.05*   HSTROP T  --  93* 104*         Lab  10/31/24  0708 10/30/24  1136   SODIUM 147* 144   POTASSIUM 3.6 3.9   CHLORIDE 102 100   CO2 39.0* 37.0*   ANION GAP 6.0 7.0   BUN 33* 33*   CREATININE 2.16* 2.14*   EGFR 33.6* 33.9*   GLUCOSE 124* 131*   CALCIUM 8.5* 8.9         Lab 10/30/24  1136   TOTAL PROTEIN 6.8   ALBUMIN 3.6   GLOBULIN 3.2   ALT (SGPT) 11   AST (SGOT) 27   BILIRUBIN 0.7   ALK PHOS 110         Lab 10/30/24  1407 10/30/24  1136   PROBNP  --  2,457.0*   HSTROP T 93* 104*                 Lab 10/30/24  1519 10/30/24  1149   PH, ARTERIAL 7.409 7.299*   PCO2, ARTERIAL 63.4* 84.8*   PO2 ART 92.9 39.5*   FIO2 40 100   HCO3 ART 40.1* 41.6*   BASE EXCESS ART 13.3* 12.2*   CARBOXYHEMOGLOBIN 1.0 2.1*     Brief Urine Lab Results  (Last result in the past 365 days)        Color   Clarity   Blood   Leuk Est   Nitrite   Protein   CREAT   Urine HCG        04/11/24 1537             70.2                 Microbiology Results Abnormal       Procedure Component Value - Date/Time    Respiratory Panel PCR w/COVID-19(SARS-CoV-2) [facility] In-House, NP Swab in UTM/VTM, 2 HR TAT - Swab, Nasopharynx [[id number]]  (Abnormal) Collected: 10/30/24 1137    Lab Status: Final result Specimen: Swab from Nasopharynx Updated: 10/30/24 1254     ADENOVIRUS, PCR Not Detected     Coronavirus 229E Not Detected     Coronavirus HKU1 Not Detected     Coronavirus NL63 Not Detected     Coronavirus OC43 Not Detected     COVID19 Not Detected     Human Metapneumovirus Not Detected     Human Rhinovirus/Enterovirus Detected     Influenza A PCR Not Detected     Influenza B PCR Not Detected     Parainfluenza Virus 1 Not Detected     Parainfluenza Virus 2 Not Detected     Parainfluenza Virus 3 Not Detected     Parainfluenza Virus 4 Not Detected     RSV, PCR Not Detected     Bordetella pertussis pcr Not Detected     Bordetella parapertussis PCR Not Detected     Chlamydophila pneumoniae PCR Not Detected     Mycoplasma pneumo by PCR Not Detected    Narrative:      In the setting of a  positive respiratory panel with a viral infection PLUS a negative procalcitonin without other underlying concern for bacterial infection, consider observing off antibiotics or discontinuation of antibiotics and continue supportive care. If the respiratory panel is positive for atypical bacterial infection (Bordetella pertussis, Chlamydophila pneumoniae, or Mycoplasma pneumoniae), consider antibiotic de-escalation to target atypical bacterial infection.            CT Angiogram Chest    Result Date: 10/30/2024  CT ANGIOGRAM CHEST Date of Exam: 10/30/2024 1:26 PM EDT Indication: sob, elevated dimer. Comparison: Same day chest radiograph, CTA chest 3/3/2020. Technique: CTA of the chest was performed after the uneventful intravenous administration of 85 mL Isovue-370. Reconstructed coronal and sagittal images were also obtained. In addition, a 3-D volume rendered image was created for interpretation. Automated exposure control and iterative reconstruction methods were used. Findings: Pulmonary arteries / cardiovascular: No intraluminal filling defect to suggest pulmonary embolus. No pericardial effusion. Normal caliber thoracic aorta. TAVR. Coronary artery and mitral annulus calcifications. Lymph nodes and mediastinum: Multiple mildly enlarged mediastinal and bilateral hilar lymph nodes, nonspecific. No mediastinal mass. Lungs and airways: Central airways are patent. Bibasilar atelectasis secondary to pleural fluid. Couple small pulmonary nodules are unchanged dating back to 2020 and considered benign (for example, 3 mm nodule in the left upper lobe on axial image 42 and  4 mm nodule in the right upper lobe on axial image 33). Scattered areas of groundglass attenuation with low-grade interlobular septal thickening and aerated portions of the lungs, suggestive of pulmonary edema. Pleura: Moderate bilateral pleural effusions. No pneumothorax.  Bones and soft tissues: No acute or suspicious osseous abnormality. Thoracic  spondylosis. Soft tissues are within normal limits. Upper abdomen: Cholecystectomy. Duodenal diverticulum.     Impression: Impression: No evidence of pulmonary embolism. Findings of pulmonary edema and moderate bilateral pleural effusions with bibasilar atelectasis. Multiple mildly enlarged mediastinal and bilateral hilar lymph nodes, nonspecific, but potentially reactive. Electronically Signed: Golden Schultz MD  10/30/2024 1:51 PM EDT  Workstation ID: [id number]    XR Chest 1 View    Result Date: 10/30/2024  XR CHEST 1 VW Date of Exam: 10/30/2024 11:53 AM EDT Indication: SOA triage protocol Comparison: 4/12/2024 Findings: Heart shadow is borderline enlarged. Vasculature appears cephalized. Patchy perihilar disease in the mid and lower lungs was present on the prior radiograph and earlier 4/11/2024 exam as well,, and slightly different pattern, whether recurrent asymmetric  edema or recurrent bilateral pneumonia. There appear to be small effusions present, favoring edema/volume overload. No pneumothorax or dense lung consolidation is seen.     Impression: Impression: 1. Borderline heart shadow enlargement and pulmonary vascular congestion. 2. Patchy interstitial and airspace disease in the lower lungs similar to 4/12/2024 exam. Appearance is equivocal for bilateral pneumonia versus asymmetric pulmonary interstitial edema. Potentially, both could be present. Electronically Signed: Campbell Swan MD  10/30/2024 12:20 PM EDT  Workstation ID: [id number]     Results for orders placed during the hospital encounter of 04/11/24    Adult Transthoracic Echo Complete W/ Cont if Necessary Per Protocol    Interpretation Summary    Left ventricular systolic function is normal. Estimated left ventricular EF = 60%    The left atrial cavity is mild to moderately dilated.    There is a well-seated 26 mm TAVR valve present.  Mean gradient is 20 mmHg, BERNA 1.7 cm².    Calcified mitral valve with mild mitral stenosis.  Mean gradient 7 mmHg, MVA  2.3 cm².    Mild mitral regurgitation.      Current medications:  Scheduled Meds:apixaban, 5 mg, Oral, BID  bumetanide, 2.5 mg, Intravenous, BID  carvedilol, 12.5 mg, Oral, BID With Meals  escitalopram, 20 mg, Oral, Daily  fluticasone, 2 spray, Nasal, Daily  gabapentin, 600 mg, Oral, Q8H  insulin lispro, 2-9 Units, Subcutaneous, 4x Daily AC & at Bedtime  ipratropium-albuterol, 3 mL, Nebulization, Q6H While Awake - RT  pantoprazole, 40 mg, Oral, Daily  pharmacy consult - MTM, , Does not apply, Q12H  rosuvastatin, 20 mg, Oral, Nightly  sodium chloride, 10 mL, Intravenous, Q12H      Continuous Infusions:Pharmacy Consult,       PRN Meds:.  acetaminophen **OR** acetaminophen **OR** acetaminophen    benzonatate    senna-docusate sodium **AND** polyethylene glycol **AND** bisacodyl **AND** bisacodyl    clonazePAM    dextrose    dextrose    glucagon (human recombinant)    Pharmacy Consult    nitroglycerin    polyethylene glycol    sodium chloride    sodium chloride    Assessment & Plan   Assessment & Plan     Active Hospital Problems    Diagnosis  POA    **Acute respiratory failure with hypoxia [J96.01]  Yes    Rhinovirus infection [B34.8]  Yes    Possible paroxysmal atrial fibrillation [I48.0]  No    JASWINDER on CPAP [G47.33]  Yes    Peripheral neuropathy [G62.9]  Yes    Coronary artery disease involving native coronary artery of native heart with angina pectoris [I25.119]  Yes    CKD (chronic kidney disease), stage III [N18.30]  Yes    Essential hypertension [I10]  Yes    Hyperlipidemia LDL goal <70 [E78.5]  Yes    Nonrheumatic aortic valve stenosis/status post TAVR [I35.0]  Yes    Type 2 diabetes mellitus with kidney complication, with long-term current use of insulin [E11.29, Z79.4]  Not Applicable    Acute on chronic diastolic congestive heart failure [I50.33]  Yes    Elevated troponin level not due to acute coronary syndrome [R79.89]  Yes      Resolved Hospital Problems   No resolved problems to display.        Brief  Hospital Course to date:  Juan Alberto Tejeda is a 64 y.o. male with chronic diastolic CHF, CKD, type 2 diabetes, peripheral neuropathy who presents with progressive dyspnea and cough and lower extremity edema for 1 week.      Acute on chronic diastolic heart failure  Acute hypoxic respiratory failure with hypoxia and hypercapnia  Valvular heart disease status post TAVR  - Continue diuresis with IV Bumex 2.5 twice daily for now, will monitor response closely  - Continue BiPAP, wean as tolerated  - Cardiology consulted  - Repeat echo  - Strict I's and O's, daily weights    Hypertension  CAD  Hyperlipidemia  - Continue home carvedilol  - Continue home statin    History of A-fib  - Continue home Eliquis and carvedilol    Rhinovirus positive  - Supportive care    Type 2 diabetes  Per  Peripheral neuropathy  - Will start patient on sliding scale insulin, adjust as necessary  - Continue home gabapentin    CKD 3  - Will continue to monitor renal function daily with diuresis  - No ACE/ARB/SGLT2      Expected Discharge   Expected Discharge Date: 11/4/2024; Expected Discharge Time:      VTE Prophylaxis:  Pharmacologic VTE prophylaxis orders are present.         AM-PAC 6 Clicks Score (PT): 14 (10/31/24 0800)    CODE STATUS:   Code Status and Medical Interventions: CPR (Attempt to Resuscitate); Full Support   Ordered at: 10/30/24 2122     Level Of Support Discussed With:    Patient     Code Status (Patient has no pulse and is not breathing):    CPR (Attempt to Resuscitate)     Medical Interventions (Patient has pulse or is breathing):    Full Support       Jennifer Cheung MD  10/31/24

## 2024-10-31 NOTE — CONSULTS
Inpatient Cardiology Consult  Consult performed by: Neela Olmos APRN  Consult ordered by: Tracey Griffith MD  Reason for consult: Acute on chronic diastolic heart failure          [facility] at [facility]  Cardiovascular Consultation Note    Active Hospital Problems    Diagnosis  POA    **Acute on chronic diastolic congestive heart failure [I50.33]  Yes     Priority: High     Echo (2/29/2020): LVEF 65%.  Aortic valve is bicuspid and severely calcified.  Severe aortic stenosis (mean gradient 51 mmHg). Mild MR and MS  Echo (5/20/2020): LVEF = 65%.  Mild LVH. A 26 mm Fried Lin 3 TAVR valve is well-seated and exhibits a mean gradient of 13 mmHg. There is no paravalvular leak present.  Echo (4/11/2024): LVEF 60%.  Well-seated 26 mm TAVR valve.  Mean gradient is 20 mmHg.  Moderate mitral stenosis with mean gradient 7 mmHg.  Mild MR  Echo (10/31/2024): LVEF 60%..  TAVR valve well-seated with mean gradient 14 mmHg.  Moderate mitral stenosis with mean gradient 6 mmHg.  Mild MR      Acute respiratory failure with hypoxia [J96.01]  Yes     Priority: Medium    Coronary artery disease involving native coronary artery of native heart with angina pectoris [I25.119]  Yes     Priority: Medium     Cardiac catheterization at [facility] (12/2019): Moderate nonobstructive CAD.      Nonrheumatic aortic valve stenosis/status post TAVR [I35.0]  Yes     Priority: Medium     Echo (2/29/2020): LVEF 65%.  Aortic valve is bicuspid and severely calcified.  Severe aortic stenosis (mean gradient 51 mmHg). Mild MR and MS  Status post TAVR 3/16/2020  Echo (5/20/2020): LVEF = 65%.  Mild LVH. A 26 mm Fried Lin 3 TAVR valve is well-seated and exhibits a mean gradient of 13 mmHg. There is no paravalvular leak present.  Echo (4/11/2024): LVEF 60%.  Well-seated 26 mm TAVR valve.  Mean gradient is 20 mmHg, BERNA 1.7 cm².  Mild mitral stenosis with mean gradient 7 mmHg.  Mild MR      Rhinovirus infection [B34.8]  Yes     "Possible paroxysmal atrial fibrillation [I48.0]  No     Reportedly started on Eliquis for \"irregular heart rhythm\" while hospitalized at [facility]  [id number]      JASWINDER on CPAP [G47.33]  Yes    Peripheral neuropathy [G62.9]  Yes    CKD (chronic kidney disease), stage III [N18.30]  Yes     Met with [facility] transplant team in the past.  patient has elected not to have transplant when given option between transplant hemodialysis.  Follows Dr. Cobos  Creatinine trended up overnight to 2.47  Nephrology following as an inpatient      Essential hypertension [I10]  Yes    Hyperlipidemia LDL goal <70 [E78.5]  Yes     High intensity statin therapy indicated given the presence of CAD      Type 2 diabetes mellitus with kidney complication, with long-term current use of insulin [E11.29, Z79.4]  Not Applicable    Elevated troponin level not due to acute coronary syndrome [R79.89]  Yes     Troponin elevated and flat and EKG no acute ischemia.  In the setting of acute CHF and rhinovirus.  Scenario not consistent with ACS 10/2024              Patient is a 64-year-old gentleman with a history of nonobstructive CAD, aortic valve stenosis status post TAVR, chronic diastolic heart failure, stage III chronic kidney disease, hypertension and hyperlipidemia who we are being asked to consult for acute CHF.  The patient reports for the past 6 -8 weeks he has experienced progressive shortness of breath.  He reports recent hospitalization at [facility] for heart failure.  He uses a BiPAP at bedtime but up until that hospitalization he had never required daytime oxygen.  He has been on home oxygen at 3 L since his discharge.  He reports taking daily Bumex but despite this his breathing has worsened and he has gained 30 pounds.  He reports being recently put on Eliquis for what he thinks was an irregular heart rhythm.  He is currently lying in bed on BiPAP with acceptable O2 saturations.  High-sensitivity troponins elevated and flat at " 104 and 93, proBNP elevated 2457, and CT angiogram of the chest negative for pulmonary embolism but did suggest pulmonary edema.  EKG shows normal sinus rhythm with no acute ischemic changes.  He has also tested positive for rhinovirus.  His last echocardiogram in April showed a normal LVEF and TAVR valve well-seated with no paravalvular leak.    Cardiac risk factors: Known CAD, male gender, hypertension, hyperlipidemia, type 2 diabetes mellitus    Past medical and surgical history, social and family history reviewed in EMR.    REVIEW OF SYSTEMS:   H&P ROS reviewed and pertinent CV ROS as noted in HPI.         Vital Sign Min/Max for last 24 hours  Temp  Min: 97.7 °F (36.5 °C)  Max: 98.7 °F (37.1 °C)   BP  Min: 140/86  Max: 151/82   Pulse  Min: 51  Max: 71   Resp  Min: 21  Max: 28   SpO2  Min: 92 %  Max: 99 %   No data recorded      Intake/Output Summary (Last 24 hours) at 10/31/2024 1732  Last data filed at 10/31/2024 1500  Gross per 24 hour   Intake 240 ml   Output 1800 ml   Net -1560 ml           Constitutional:       Appearance: Healthy appearance. Morbidly obese.   Pulmonary:      Effort: Pulmonary effort is normal.      Breath sounds: Decreased breath sounds present. Wheezing present. Rhonchi present.   Cardiovascular:      Normal rate. Regular rhythm.      Murmurs: There is a systolic murmur.   Edema:     Pretibial: bilateral 1+ edema of the pretibial area.     Ankle: bilateral 1+ edema of the ankle.     Feet: bilateral 1+ edema of the feet.  Skin:     General: Skin is warm and dry.   Neurological:      Mental Status: Alert.   Psychiatric:         Behavior: Behavior is cooperative.          EKG: 10/30/2024 normal sinus rhythm, prolonged /497 MS    CT coronary angiogram (10/30/2024): No pulmonary embolism.  Moderate bilateral pleural effusions    Lab Review:   Labs reviewed in the electronic medical record.  Pertinent findings include:  Lab Results   Component Value Date    GLUCOSE 124 (H) 10/31/2024     BUN 33 (H) 10/31/2024    CREATININE 2.16 (H) 10/31/2024     (H) 10/31/2024    K 3.6 10/31/2024     10/31/2024    CALCIUM 8.5 (L) 10/31/2024    PROTEINTOT 6.8 10/30/2024    ALBUMIN 3.6 10/30/2024    ALT 11 10/30/2024    AST 27 10/30/2024    ALKPHOS 110 10/30/2024    BILITOT 0.7 10/30/2024    GLOB 3.2 10/30/2024    AGRATIO 1.1 10/30/2024    BCR 15.3 10/31/2024    ANIONGAP 6.0 10/31/2024    EGFR 33.6 (L) 10/31/2024     Lab Results   Component Value Date    WBC 7.06 10/31/2024    HGB 9.5 (L) 10/31/2024    HCT 33.0 (L) 10/31/2024    MCV 91.7 10/31/2024     10/31/2024     Lab Results   Component Value Date    CHLPL 130 04/02/2024    TRIG 79 04/02/2024    HDL 49 04/02/2024    LDL 65 04/02/2024            Acute on chronic diastolic heart failure  Pulmonary vascular congestion and elevated proBNP  Improving with IV Bumex  Consider empagliflozin if outpatient nephrologist agrees  No ARB/ARNI due to CKD    Valvular heart disease  Echo shows mildly increased gradient across TAVR valve and moderate mitral stenosis due to mitral annular calcification      Elevated troponin not due to myocardial infarction  No suggestion of ACS  Patient deserves ischemic evaluation after discharge due to recurrent heart failure exacerbations  Recommend pharmacologic nuclear stress on outpatient basis    Chronic kidney disease  Creatinine stable at 2.16  Baseline creatinine appears to be 2.0-2.6    Type 2 diabetes mellitus  Recent hemoglobin A1c 7.6  May consider SGL 2 inhibitor for HFpEF and diabetes if outpatient nephrologist agrees  Patient would be good candidate for GLP agonist therapy given presence of CAD, recurrent HFpEF, morbid obesity, and diabetes    Hyperlipidemia with target LDL <50  Increase rosuvastatin to 40 mg nightly  Add ezetimibe at discharge         Continue IV Bumex  Outpatient nuclear stress test  Consider SGL 2 inhibitor if outpatient nephrologist agrees  Patient would be candidate for GLP agonist therapy  for diabetes, CV disease and weight loss    Electronically signed by Mark Canales IV, MD, 10/31/24, 5:30 PM EDT.

## 2024-10-31 NOTE — H&P
[facility] Medicine Services  HISTORY AND PHYSICAL    Patient Name: Juan Alberto Tejeda  : 1960  MRN: [id number]  Primary Care Physician: Megan Martell APRN  Date of admission: 10/30/2024      Subjective   Subjective     Chief Complaint:  Dyspnea    HPI:  Juan Alberto Tejeda is a 64 y.o. male with chronic diastolic CHF, CKD, DM2 with peripheral neuropathy who presented with progressive dyspnea, cough and lower extremity edema x 1 week.  He has been taking PO bumex 2mg BID at home.  In the ED, he was hypoxemic to the 60s.  Imaging showed pulmonary edema.  Respiratory PCR was positive for rhinovirus.  He was started on IV bumex and placed on BIPAP with significant improvement in his respiratory status and ABG.  He does report a brief episode of chest discomfort in the ED associated with shortness of breath that occurred at rest and lasted seconds.  Admission was requested for further management.      Personal History     Past Medical History:   Diagnosis Date    Aortic valve disorder     Arthritis     CHF (congestive heart failure)     Chronic kidney disease     Diabetes mellitus     Gout     Hiatal hernia     Hyperlipidemia     Hypertension     Kidney stones     history    MI, old     Peripheral neuropathy     Wears glasses            Past Surgical History:   Procedure Laterality Date    AORTIC VALVE REPAIR/REPLACEMENT N/A 3/16/2020    Procedure: TRANSCATHETER AORTIC VALVE REPLACEMENT, SHANKAR;  Surgeon: Irvin Jeffers MD;  Location: [place] [address];  Service: Cardiothoracic;  Laterality: N/A;  fluoro 10 min 30 sec  dose 1136 mGY   contrast 65 ml    AORTIC VALVE REPAIR/REPLACEMENT N/A 3/16/2020    Procedure: Transfemoral Transcatheter Aortic Valve Replacement;  Surgeon: Cayla Bella MD;  Location: [place] [address];  Service: Cardiovascular;  Laterality: N/A;    CHOLECYSTECTOMY      CIRCUMCISION      COLONOSCOPY  2 years ago    KIDNEY STONE SURGERY      OTHER SURGICAL  HISTORY      Gallstone removal surgery       Family History: family history includes Arrhythmia in his mother; Diabetes in his father; Heart attack in his father; Hypertension in his brother, father, and mother; No Known Problems in his maternal grandfather, maternal grandmother, paternal grandfather, and paternal grandmother; Stroke in his mother.     Social History:  reports that he has never smoked. He has never used smokeless tobacco. He reports that he does not drink alcohol and does not use drugs.  Social History     Social History Narrative    Caffeine coffee, william 8, 3 servings a day       Medications:  Available home medication information reviewed.  allopurinol, apixaban, aspirin, benzonatate, bumetanide, carvedilol, clonazePAM, escitalopram, fluticasone, gabapentin, insulin aspart, insulin detemir, ipratropium-albuterol, pantoprazole, pioglitazone, polyethylene glycol, and rosuvastatin    No Known Allergies    Objective   Objective     Vital Signs:   Temp:  [98.3 °F (36.8 °C)] 98.3 °F (36.8 °C)  Heart Rate:  [62-74] 71  Resp:  [28-40] 28  BP: (126-155)/(75-89) 141/89  Flow (L/min) (Oxygen Therapy):  [3-5] 5       Physical Exam   Constitutional: No acute distress, awake, alert, laying in bed on BIPAP  HENT: NCAT, mucous membranes moist  Respiratory: Diminished bilaterally and auscultation limited due to body habitus, respiratory effort normal on BIPAP  Cardiovascular: RRR, no murmurs  Gastrointestinal: Soft, nontender, nondistended  Musculoskeletal: 3+ bilateral ankle edema  Psychiatric: Appropriate affect, cooperative  Neurologic: Speech clear and fluent  Skin: No rashes on exposed surfaces    Result Review:  I have personally reviewed the results from the time of this admission to 10/30/2024 21:26 EDT and agree with these findings:  [x]  Laboratory list / accordion  []  Microbiology  [x]  Radiology  []  EKG/Telemetry   []  Cardiology/Vascular   []  Pathology  []  Old records  []  Other:  Most notable  findings include:       LAB RESULTS:      Lab 10/30/24  1136   WBC 10.61   HEMOGLOBIN 11.0*   HEMATOCRIT 38.3   PLATELETS 211   NEUTROS ABS 8.84*   IMMATURE GRANS (ABS) 0.09*   LYMPHS ABS 0.81   MONOS ABS 0.55   EOS ABS 0.29   MCV 92.1   D DIMER QUANT 2.05*         Lab 10/30/24  1136   SODIUM 144   POTASSIUM 3.9   CHLORIDE 100   CO2 37.0*   ANION GAP 7.0   BUN 33*   CREATININE 2.14*   EGFR 33.9*   GLUCOSE 131*   CALCIUM 8.9         Lab 10/30/24  1136   TOTAL PROTEIN 6.8   ALBUMIN 3.6   GLOBULIN 3.2   ALT (SGPT) 11   AST (SGOT) 27   BILIRUBIN 0.7   ALK PHOS 110         Lab 10/30/24  1407 10/30/24  1136   PROBNP  --  2,457.0*   HSTROP T 93* 104*                 Lab 10/30/24  1519 10/30/24  1149   PH, ARTERIAL 7.409 7.299*   PCO2, ARTERIAL 63.4* 84.8*   PO2 ART 92.9 39.5*   FIO2 40 100   HCO3 ART 40.1* 41.6*   BASE EXCESS ART 13.3* 12.2*   CARBOXYHEMOGLOBIN 1.0 2.1*     UA          2/19/2024    09:08 4/11/2024    11:02   Urinalysis   Squamous Epithelial Cells, UA  0-2    Specific Gravity, UA 1.015     1.016    Ketones, UA  Negative    Blood, UA SMALL     Moderate (2+)    Leukocytes, UA Negative     Negative    Nitrite, UA Negative     Negative    RBC, UA 3-4     3-5    WBC, UA  0-2    Bacteria, UA Rare     None Seen       Details          This result is from an external source.               Microbiology Results (last 10 days)       Procedure Component Value - Date/Time    Respiratory Panel PCR w/COVID-19(SARS-CoV-2) OSCAR/CINDY/ANDRADE/PAD/COR/COLEEN In-House, NP Swab in UTM/VTM, 2 HR TAT - Swab, Nasopharynx [[id number]]  (Abnormal) Collected: 10/30/24 1137    Lab Status: Final result Specimen: Swab from Nasopharynx Updated: 10/30/24 1254     ADENOVIRUS, PCR Not Detected     Coronavirus 229E Not Detected     Coronavirus HKU1 Not Detected     Coronavirus NL63 Not Detected     Coronavirus OC43 Not Detected     COVID19 Not Detected     Human Metapneumovirus Not Detected     Human Rhinovirus/Enterovirus Detected     Influenza A PCR  Not Detected     Influenza B PCR Not Detected     Parainfluenza Virus 1 Not Detected     Parainfluenza Virus 2 Not Detected     Parainfluenza Virus 3 Not Detected     Parainfluenza Virus 4 Not Detected     RSV, PCR Not Detected     Bordetella pertussis pcr Not Detected     Bordetella parapertussis PCR Not Detected     Chlamydophila pneumoniae PCR Not Detected     Mycoplasma pneumo by PCR Not Detected    Narrative:      In the setting of a positive respiratory panel with a viral infection PLUS a negative procalcitonin without other underlying concern for bacterial infection, consider observing off antibiotics or discontinuation of antibiotics and continue supportive care. If the respiratory panel is positive for atypical bacterial infection (Bordetella pertussis, Chlamydophila pneumoniae, or Mycoplasma pneumoniae), consider antibiotic de-escalation to target atypical bacterial infection.            CT Angiogram Chest    Result Date: 10/30/2024  CT ANGIOGRAM CHEST Date of Exam: 10/30/2024 1:26 PM EDT Indication: sob, elevated dimer. Comparison: Same day chest radiograph, CTA chest 3/3/2020. Technique: CTA of the chest was performed after the uneventful intravenous administration of 85 mL Isovue-370. Reconstructed coronal and sagittal images were also obtained. In addition, a 3-D volume rendered image was created for interpretation. Automated exposure control and iterative reconstruction methods were used. Findings: Pulmonary arteries / cardiovascular: No intraluminal filling defect to suggest pulmonary embolus. No pericardial effusion. Normal caliber thoracic aorta. TAVR. Coronary artery and mitral annulus calcifications. Lymph nodes and mediastinum: Multiple mildly enlarged mediastinal and bilateral hilar lymph nodes, nonspecific. No mediastinal mass. Lungs and airways: Central airways are patent. Bibasilar atelectasis secondary to pleural fluid. Couple small pulmonary nodules are unchanged dating back to 2020 and  considered benign (for example, 3 mm nodule in the left upper lobe on axial image 42 and  4 mm nodule in the right upper lobe on axial image 33). Scattered areas of groundglass attenuation with low-grade interlobular septal thickening and aerated portions of the lungs, suggestive of pulmonary edema. Pleura: Moderate bilateral pleural effusions. No pneumothorax.  Bones and soft tissues: No acute or suspicious osseous abnormality. Thoracic spondylosis. Soft tissues are within normal limits. Upper abdomen: Cholecystectomy. Duodenal diverticulum.     Impression: Impression: No evidence of pulmonary embolism. Findings of pulmonary edema and moderate bilateral pleural effusions with bibasilar atelectasis. Multiple mildly enlarged mediastinal and bilateral hilar lymph nodes, nonspecific, but potentially reactive. Electronically Signed: Golden Schultz MD  10/30/2024 1:51 PM EDT  Workstation ID: [id number]    XR Chest 1 View    Result Date: 10/30/2024  XR CHEST 1 VW Date of Exam: 10/30/2024 11:53 AM EDT Indication: SOA triage protocol Comparison: 4/12/2024 Findings: Heart shadow is borderline enlarged. Vasculature appears cephalized. Patchy perihilar disease in the mid and lower lungs was present on the prior radiograph and earlier 4/11/2024 exam as well,, and slightly different pattern, whether recurrent asymmetric  edema or recurrent bilateral pneumonia. There appear to be small effusions present, favoring edema/volume overload. No pneumothorax or dense lung consolidation is seen.     Impression: Impression: 1. Borderline heart shadow enlargement and pulmonary vascular congestion. 2. Patchy interstitial and airspace disease in the lower lungs similar to 4/12/2024 exam. Appearance is equivocal for bilateral pneumonia versus asymmetric pulmonary interstitial edema. Potentially, both could be present. Electronically Signed: Campbell Swan MD  10/30/2024 12:20 PM EDT  Workstation ID: [id number]     Results for orders placed during the  hospital encounter of 04/11/24    Adult Transthoracic Echo Complete W/ Cont if Necessary Per Protocol    Interpretation Summary    Left ventricular systolic function is normal. Estimated left ventricular EF = 60%    The left atrial cavity is mild to moderately dilated.    There is a well-seated 26 mm TAVR valve present.  Mean gradient is 20 mmHg, BERNA 1.7 cm².    Calcified mitral valve with mild mitral stenosis.  Mean gradient 7 mmHg, MVA 2.3 cm².    Mild mitral regurgitation.      Assessment & Plan   Assessment & Plan       CKD (chronic kidney disease) stage 3, GFR 30-59 ml/min    Hyperlipidemia LDL goal <70    Essential hypertension    Type 2 diabetes mellitus with kidney complication, with long-term current use of insulin    Nonrheumatic aortic valve stenosis/status post TAVR    Coronary artery disease involving native coronary artery of native heart with angina pectoris    Peripheral neuropathy    JASWINDER on CPAP    Acute on chronic diastolic CHF (congestive heart failure)    Acute respiratory failure with hypoxia        Acute on chronic diastolic CHF  Acute respiratory failure with hypoxia and hypercapnia  CAD  HTN  History of atrial fibrillation  -Continue BIPAP  -Continue diuresis with IV bumex 2.5mg BID for now.  If he doesn't have adequate diuresis may require bumex drip.  -Consult cardiology in am  -Strict I/O  -No ACE/ARB/SGLT2-i due to renal dysfunction  -Continue carvedilol, Eliquis    Rhinovirus +  -Supportive care    DM2 with peripheral neuropathy  -SSI  -Continue home gabapentin    CKD III  -Monitor renal function closely with diuresis  -BMP in am    HLD  -Continue Crestor    VTE Prophylaxis:  Pharmacologic VTE prophylaxis orders are present.          CODE STATUS:    Code Status and Medical Interventions: CPR (Attempt to Resuscitate); Full Support   Ordered at: 10/30/24 3389     Level Of Support Discussed With:    Patient     Code Status (Patient has no pulse and is not breathing):    CPR (Attempt to  Resuscitate)     Medical Interventions (Patient has pulse or is breathing):    Full Support       Expected Discharge   Expected Discharge Date: 11/4/2024; Expected Discharge Time:      Tracey Griffith MD  10/30/24

## 2024-10-31 NOTE — CASE MANAGEMENT/SOCIAL WORK
Discharge Planning Assessment  [facility]     Patient Name: Juan Alberto Tejeda  MRN: [id number]  Today's Date: 10/31/2024    Admit Date: 10/30/2024    Plan: Home resume [facility]   Discharge Needs Assessment       Row Name 10/31/24 1618       Living Environment    People in Home other (see comments)    Unique Family Situation ex wife stays at night    Current Living Arrangements home    Potentially Unsafe Housing Conditions none    Primary Care Provided by other (see comments)  ex wife    Provides Primary Care For no one    Family Caregiver if Needed other (see comments)  ex wife    Able to Return to Prior Arrangements yes       Resource/Environmental Concerns    Resource/Environmental Concerns home accessibility    Home Accessibility Concerns stairs to enter home    Transportation Concerns none       Transition Planning    Patient/Family Anticipates Transition to home    Patient/Family Anticipated Services at Transition none    Transportation Anticipated family or friend will provide       Discharge Needs Assessment    Readmission Within the Last 30 Days no previous admission in last 30 days    Equipment Currently Used at Home glucometer;rollator;wheelchair;bath bench;pulse ox;bp cuff;lift device;nebulizer;cpap;oxygen    Concerns to be Addressed denies needs/concerns at this time;discharge planning    Anticipated Changes Related to Illness none    Equipment Needed After Discharge none    Discharge Facility/Level of Care Needs home with home health    Patient's Choice of Community Agency(s) [facility]    Current Discharge Risk chronically ill;dependent with mobility/activities of daily living;financial support inadequate    Discharge Coordination/Progress Home resume                    Discharge Plan       Row Name 10/31/24 1620       Plan    Plan Home resume [facility]    Patient/Family in Agreement with Plan yes    Plan Comments Spoke with patient and ex wife at bedside regarding discharge planning.   Patient is current with [facility] and has a Glucometer, Rollator, Wheelchair, Shower Bench, Pulse Oximeter, Blood Pressure machine, CPAP, Nebulizer. Lift Chair and continuous Oxygen at 3L through [employer].  Patient lives alone in a mobile home with seven steps to enter but his ex wife comes to stay with him at night and assist.  Patient has become much more dependent recently and is short of breath with any exertion.  No immediate discharge needs verbalized.  CM following.  Patient plan is to discharge home and resume [facility] pending PT/OT evals.    Final Discharge Disposition Code 06 - home with home health care                  Continued Care and Services - Admitted Since 10/30/2024    No active coordination exists for this encounter.       Expected Discharge Date and Time       Expected Discharge Date Expected Discharge Time    Nov 4, 2024            Demographic Summary       Row Name 10/31/24 1617       General Information    Admission Type inpatient    Arrived From home    Referral Source admission list    Reason for Consult discharge planning    Preferred Language English    General Information Comments RADHA Mccracken       Contact Information    Permission Granted to Share Info With     Contact Information Comments Francoise Tejeda, ex wife  [phone]  Opal Tejeda, daughter  [phone]                   Functional Status       Row Name 10/31/24 1618       Functional Status    Usual Activity Tolerance moderate    Current Activity Tolerance fair       Functional Status, IADL    Medications assistive person    Meal Preparation completely dependent    Housekeeping completely dependent    Laundry completely dependent    Shopping completely dependent       Employment/    Employment/ Comments [employer] Replacement                   Psychosocial    No documentation.                  Abuse/Neglect    No documentation.                  Legal    No  documentation.                  Substance Abuse    No documentation.                  Patient Forms    No documentation.                     Carolin Briones RN

## 2024-10-31 NOTE — ED PROVIDER NOTES
"Subjective   History of Present Illness  64-year-old male presents for evaluation of shortness of breath.  Of note, the patient is accompanied by his wife.  He has a history of CHF and wears 3 L of oxygen at baseline.  His wife notes that he was recently admitted to an outside facility for \"heart failure and a touch of pneumonia.\"  For the past 3 to 4 days the patient has been experiencing progressively worsening dyspnea as well as peripheral edema.  She notes that his symptoms worsened to this morning.  He notes that he has had a mild cough and congestion over the past few days as well.  Given his ongoing symptoms he came here to the ED to be evaluated.      Review of Systems   Respiratory:  Positive for cough and shortness of breath.    Cardiovascular:  Positive for leg swelling.   All other systems reviewed and are negative.      Past Medical History:   Diagnosis Date    Aortic valve disorder     Arthritis     CHF (congestive heart failure)     Chronic kidney disease     Diabetes mellitus     Gout     Hiatal hernia     Hyperlipidemia     Hypertension     Kidney stones     history    MI, old     Peripheral neuropathy     Wears glasses        No Known Allergies    Past Surgical History:   Procedure Laterality Date    AORTIC VALVE REPAIR/REPLACEMENT N/A 3/16/2020    Procedure: TRANSCATHETER AORTIC VALVE REPLACEMENT, SHANKAR;  Surgeon: Irvin Jeffers MD;  Location: [place] [address];  Service: Cardiothoracic;  Laterality: N/A;  fluoro 10 min 30 sec  dose 1136 mGY   contrast 65 ml    AORTIC VALVE REPAIR/REPLACEMENT N/A 3/16/2020    Procedure: Transfemoral Transcatheter Aortic Valve Replacement;  Surgeon: Cayla Bella MD;  Location: [place] [address];  Service: Cardiovascular;  Laterality: N/A;    CHOLECYSTECTOMY      CIRCUMCISION      COLONOSCOPY  2 years ago    KIDNEY STONE SURGERY      OTHER SURGICAL HISTORY      Gallstone removal surgery       Family History   Problem Relation Age of Onset    Stroke " Mother     Arrhythmia Mother     Hypertension Mother     Diabetes Father     Hypertension Father     Heart attack Father     Hypertension Brother     No Known Problems Maternal Grandmother     No Known Problems Maternal Grandfather     No Known Problems Paternal Grandmother     No Known Problems Paternal Grandfather        Social History     Socioeconomic History    Marital status: Single   Tobacco Use    Smoking status: Never    Smokeless tobacco: Never   Vaping Use    Vaping status: Never Used   Substance and Sexual Activity    Alcohol use: Never    Drug use: Never    Sexual activity: Defer           Objective   Physical Exam  Vitals and nursing note reviewed.   Constitutional:       Appearance: He is well-developed.   HENT:      Head: Normocephalic and atraumatic.   Neck:      Vascular: No JVD.   Cardiovascular:      Rate and Rhythm: Normal rate and regular rhythm.      Heart sounds: Normal heart sounds. No murmur heard.     No friction rub. No gallop.   Pulmonary:      Breath sounds: No rales.      Comments: Markedly tachypneic, speaking in short phrases, no audible wheezes noted  Abdominal:      General: Bowel sounds are normal. There is no distension.      Palpations: Abdomen is soft. There is no mass.      Tenderness: There is no abdominal tenderness. There is no guarding.   Musculoskeletal:         General: Normal range of motion.      Cervical back: Normal range of motion.      Right lower leg: Edema present.      Left lower leg: Edema present.      Comments: Symmetrical, 2+ edema noted to both lower legs   Skin:     General: Skin is warm and dry.      Coloration: Skin is not pale.      Findings: No erythema or rash.   Neurological:      Mental Status: He is alert and oriented to person, place, and time.   Psychiatric:         Mood and Affect: Mood normal.         Thought Content: Thought content normal.         Judgment: Judgment normal.         Critical Care    Performed by: Clinton Herrera,  MD  Authorized by: Clinton Herrera MD    Critical care provider statement:     Critical care time (minutes):  39    Critical care was necessary to treat or prevent imminent or life-threatening deterioration of the following conditions:  Respiratory failure and cardiac failure    Critical care was time spent personally by me on the following activities:  Development of treatment plan with patient or surrogate, evaluation of patient's response to treatment, examination of patient, obtaining history from patient or surrogate, ordering and performing treatments and interventions, ordering and review of laboratory studies, ordering and review of radiographic studies, pulse oximetry, re-evaluation of patient's condition and review of old charts             ED Course  ED Course as of 10/30/24 2046   Wed Oct 30, 2024   1907 64-year-old male presents for evaluation of shortness of breath.  Of note, the patient is accompanied by his wife.  He has a history of CHF and wears 3 L of oxygen at baseline.  His wife notes that he was recently admitted to an outside facility.  For the past 3 to 4 days the patient has been experiencing progressively worsening dyspnea and peripheral edema.  She notes that his symptoms worsened to this morning.  He has had a mild cough as well as congestion over the past few days as well.  Given his ongoing symptoms, he was brought to the ED to be evaluated.  On arrival, the patient was hypoxic in triage with oxygen saturations of 69% on his baseline 3 L.  He was markedly tachypneic as well.  He was immediately brought back to a room where I evaluated him.  He appears volume overloaded.  BiPAP initiated.  Broad differential diagnosis.  We will obtain labs and imaging and will reassess following initial interventions. [DD]   1908 Respiratory viral panel positive for human rhinovirus. [DD]   1908 I personally and independently viewed the patient's x-ray images myself, and I am in agreement with the  radiologist's reading for final interpretation, particularly regarding pulmonary edema. [DD]   1909 Low risk Wells but D-dimer elevated. [DD]   1909 I personally and independently reviewed the patient's CT images and findings, and I am in agreement with the radiologist regarding CT interpretation--particularly regarding pulmonary edema and no pulmonary embolism. [DD]   1909 Diuresis initiated. [DD]   1909 Upon reevaluation following BiPAP, the patient looks markedly improved.  His work of breathing is improved.  He is no longer tachypneic.  His repeat ABG is reassuring.  I feel that he is stable for the floor at this point.  I reached out to our hospitalist, Dr. Griffith, and the patient will be admitted under her care for further evaluation and treatment.  He is hemodynamically stable at this time and is aware/agreeable with the plan. [DD]      ED Course User Index  [DD] Clinton Herrera MD                                     Recent Results (from the past 24 hours)   ECG 12 Lead ED Triage Standing Order; SOA    Collection Time: 10/30/24 11:32 AM   Result Value Ref Range    QT Interval 448 ms    QTC Interval 497 ms   Comprehensive Metabolic Panel    Collection Time: 10/30/24 11:36 AM    Specimen: Blood   Result Value Ref Range    Glucose 131 (H) 65 - 99 mg/dL    BUN 33 (H) 8 - 23 mg/dL    Creatinine 2.14 (H) 0.76 - 1.27 mg/dL    Sodium 144 136 - 145 mmol/L    Potassium 3.9 3.5 - 5.2 mmol/L    Chloride 100 98 - 107 mmol/L    CO2 37.0 (H) 22.0 - 29.0 mmol/L    Calcium 8.9 8.6 - 10.5 mg/dL    Total Protein 6.8 6.0 - 8.5 g/dL    Albumin 3.6 3.5 - 5.2 g/dL    ALT (SGPT) 11 1 - 41 U/L    AST (SGOT) 27 1 - 40 U/L    Alkaline Phosphatase 110 39 - 117 U/L    Total Bilirubin 0.7 0.0 - 1.2 mg/dL    Globulin 3.2 gm/dL    A/G Ratio 1.1 g/dL    BUN/Creatinine Ratio 15.4 7.0 - 25.0    Anion Gap 7.0 5.0 - 15.0 mmol/L    eGFR 33.9 (L) >60.0 mL/min/1.73   BNP    Collection Time: 10/30/24 11:36 AM    Specimen: Blood   Result Value  Ref Range    proBNP 2,457.0 (H) 0.0 - 900.0 pg/mL   Single High Sensitivity Troponin T    Collection Time: 10/30/24 11:36 AM    Specimen: Blood   Result Value Ref Range    HS Troponin T 104 (C) <22 ng/L   Green Top (Gel)    Collection Time: 10/30/24 11:36 AM   Result Value Ref Range    Extra Tube Hold for add-ons.    Lavender Top    Collection Time: 10/30/24 11:36 AM   Result Value Ref Range    Extra Tube hold for add-on    Gold Top - SST    Collection Time: 10/30/24 11:36 AM   Result Value Ref Range    Extra Tube Hold for add-ons.    Gray Top    Collection Time: 10/30/24 11:36 AM   Result Value Ref Range    Extra Tube Hold for add-ons.    Light Blue Top    Collection Time: 10/30/24 11:36 AM   Result Value Ref Range    Extra Tube Hold for add-ons.    CBC Auto Differential    Collection Time: 10/30/24 11:36 AM    Specimen: Blood   Result Value Ref Range    WBC 10.61 3.40 - 10.80 10*3/mm3    RBC 4.16 4.14 - 5.80 10*6/mm3    Hemoglobin 11.0 (L) 13.0 - 17.7 g/dL    Hematocrit 38.3 37.5 - 51.0 %    MCV 92.1 79.0 - 97.0 fL    MCH 26.4 (L) 26.6 - 33.0 pg    MCHC 28.7 (L) 31.5 - 35.7 g/dL    RDW 17.2 (H) 12.3 - 15.4 %    RDW-SD 58.1 (H) 37.0 - 54.0 fl    MPV 11.5 6.0 - 12.0 fL    Platelets 211 140 - 450 10*3/mm3    Neutrophil % 83.4 (H) 42.7 - 76.0 %    Lymphocyte % 7.6 (L) 19.6 - 45.3 %    Monocyte % 5.2 5.0 - 12.0 %    Eosinophil % 2.7 0.3 - 6.2 %    Basophil % 0.3 0.0 - 1.5 %    Immature Grans % 0.8 (H) 0.0 - 0.5 %    Neutrophils, Absolute 8.84 (H) 1.70 - 7.00 10*3/mm3    Lymphocytes, Absolute 0.81 0.70 - 3.10 10*3/mm3    Monocytes, Absolute 0.55 0.10 - 0.90 10*3/mm3    Eosinophils, Absolute 0.29 0.00 - 0.40 10*3/mm3    Basophils, Absolute 0.03 0.00 - 0.20 10*3/mm3    Immature Grans, Absolute 0.09 (H) 0.00 - 0.05 10*3/mm3    nRBC 0.0 0.0 - 0.2 /100 WBC   D-dimer, Quantitative    Collection Time: 10/30/24 11:36 AM    Specimen: Blood   Result Value Ref Range    D-Dimer, Quantitative 2.05 (H) 0.00 - 0.63 MCGFEU/mL    Respiratory Panel PCR w/COVID-19(SARS-CoV-2) [facility]Y/[facility]ADE/[facility] In-House, NP Swab in UTM/VTM, 2 HR TAT - Swab, Nasopharynx    Collection Time: 10/30/24 11:37 AM    Specimen: Nasopharynx; Swab   Result Value Ref Range    ADENOVIRUS, PCR Not Detected Not Detected    Coronavirus 229E Not Detected Not Detected    Coronavirus HKU1 Not Detected Not Detected    Coronavirus NL63 Not Detected Not Detected    Coronavirus OC43 Not Detected Not Detected    COVID19 Not Detected Not Detected - Ref. Range    Human Metapneumovirus Not Detected Not Detected    Human Rhinovirus/Enterovirus Detected (A) Not Detected    Influenza A PCR Not Detected Not Detected    Influenza B PCR Not Detected Not Detected    Parainfluenza Virus 1 Not Detected Not Detected    Parainfluenza Virus 2 Not Detected Not Detected    Parainfluenza Virus 3 Not Detected Not Detected    Parainfluenza Virus 4 Not Detected Not Detected    RSV, PCR Not Detected Not Detected    Bordetella pertussis pcr Not Detected Not Detected    Bordetella parapertussis PCR Not Detected Not Detected    Chlamydophila pneumoniae PCR Not Detected Not Detected    Mycoplasma pneumo by PCR Not Detected Not Detected   Blood Gas, Arterial With Co-Ox    Collection Time: 10/30/24 11:49 AM    Specimen: Arterial Blood   Result Value Ref Range    Site Left Radial     Pepe's Test Positive     pH, Arterial 7.299 (L) 7.350 - 7.450 pH units    pCO2, Arterial 84.8 (C) 35.0 - 45.0 mm Hg    pO2, Arterial 39.5 (C) 83.0 - 108.0 mm Hg    HCO3, Arterial 41.6 (H) 20.0 - 26.0 mmol/L    Base Excess, Arterial 12.2 (H) 0.0 - 2.0 mmol/L    Hemoglobin, Blood Gas 11.0 (L) 13.5 - 17.5 g/dL    Hematocrit, Blood Gas 33.6 (L) 38.0 - 51.0 %    Oxyhemoglobin 63.9 (L) 94 - 99 %    Methemoglobin 0.60 0.00 - 1.50 %    Carboxyhemoglobin 2.1 (H) 0 - 2 %    CO2 Content 44.2 (H) 22 - 33 mmol/L    Temperature 37.0     Barometric Pressure for Blood Gas      Modality NRB     FIO2 100 %    Rate 0 Breaths/minute    PIP 0  cmH2O    IPAP 0     EPAP 0     Notified Gwen HODGES MD     Notified By [id number]     Notified Time 10/30/2024 11:54     pH, Temp Corrected 7.299 pH Units    pCO2, Temperature Corrected 84.8 (H) 35 - 48 mm Hg    pO2, Temperature Corrected 39.5 (L) 83 - 108 mm Hg   High Sensitivity Troponin T 2Hr    Collection Time: 10/30/24  2:07 PM    Specimen: Blood   Result Value Ref Range    HS Troponin T 93 (C) <22 ng/L    Troponin T Delta -11 (L) >=-4 - <+4 ng/L   Blood Gas, Arterial With Co-Ox    Collection Time: 10/30/24  3:19 PM    Specimen: Arterial Blood   Result Value Ref Range    Site Left Radial     Pepe's Test Positive     pH, Arterial 7.409 7.350 - 7.450 pH units    pCO2, Arterial 63.4 (H) 35.0 - 45.0 mm Hg    pO2, Arterial 92.9 83.0 - 108.0 mm Hg    HCO3, Arterial 40.1 (H) 20.0 - 26.0 mmol/L    Base Excess, Arterial 13.3 (H) 0.0 - 2.0 mmol/L    Hemoglobin, Blood Gas 10.3 (L) 13.5 - 17.5 g/dL    Hematocrit, Blood Gas 31.5 (L) 38.0 - 51.0 %    Oxyhemoglobin 94.8 94 - 99 %    Methemoglobin 0.50 0.00 - 1.50 %    Carboxyhemoglobin 1.0 0 - 2 %    CO2 Content 42.0 (H) 22 - 33 mmol/L    Temperature 37.0     Barometric Pressure for Blood Gas      Modality BiPap     FIO2 40 %    Ventilator Mode BiPAP     Rate 0 Breaths/minute    PIP 0 cmH2O    IPAP 0     EPAP 0     pH, Temp Corrected 7.409 pH Units    pCO2, Temperature Corrected 63.4 (H) 35 - 48 mm Hg    pO2, Temperature Corrected 92.9 83 - 108 mm Hg     Note: In addition to lab results from this visit, the labs listed above may include labs taken at another facility or during a different encounter within the last 24 hours. Please correlate lab times with ED admission and discharge times for further clarification of the services performed during this visit.    CT Angiogram Chest   Final Result   Impression:      No evidence of pulmonary embolism.      Findings of pulmonary edema and moderate bilateral pleural effusions with bibasilar atelectasis.      Multiple mildly enlarged  mediastinal and bilateral hilar lymph nodes, nonspecific, but potentially reactive.         Electronically Signed: Golden Schultz MD     10/30/2024 1:51 PM EDT     Workstation ID: [id number]      XR Chest 1 View   Final Result   Impression:      1. Borderline heart shadow enlargement and pulmonary vascular congestion.      2. Patchy interstitial and airspace disease in the lower lungs similar to 4/12/2024 exam. Appearance is equivocal for bilateral pneumonia versus asymmetric pulmonary interstitial edema. Potentially, both could be present.         Electronically Signed: Campbell Swan MD     10/30/2024 12:20 PM EDT     Workstation ID: [id number]        Vitals:    10/30/24 1848 10/30/24 1900 10/30/24 1902 10/30/24 1920   BP:  141/89     BP Location:       Patient Position:       Pulse: 62 62 62 71   Resp:       Temp:       TempSrc:       SpO2: 99% 99% 99% 92%   Weight:       Height:         Medications   sodium chloride 0.9 % flush 10 mL (has no administration in time range)   bumetanide (BUMEX) injection 2 mg (2 mg Intravenous Given 10/30/24 1250)   iopamidol (ISOVUE-370) 76 % injection 100 mL (85 mL Intravenous Given 10/30/24 1328)     ECG/EMG Results (last 24 hours)       Procedure Component Value Units Date/Time    ECG 12 Lead ED Triage Standing Order; SOA [[id number]] Collected: 10/30/24 1132     Updated: 10/30/24 1201     QT Interval 448 ms      QTC Interval 497 ms     Narrative:      Test Reason : ED Triage Standing Order~  Blood Pressure :   */*   mmHG  Vent. Rate :  74 BPM     Atrial Rate :  74 BPM     P-R Int : 170 ms          QRS Dur :  98 ms      QT Int : 448 ms       P-R-T Axes :  40  48  50 degrees     QTc Int : 497 ms    Normal sinus rhythm  Prolonged QT  Abnormal ECG  When compared with ECG of 12-APR-2024 05:10,  T wave amplitude has decreased in Lateral leads    Referred By: EDMD           Confirmed By:           ECG 12 Lead ED Triage Standing Order; SOA   Preliminary Result   Test Reason : ED Triage Standing  Order~   Blood Pressure :   */*   mmHG   Vent. Rate :  74 BPM     Atrial Rate :  74 BPM      P-R Int : 170 ms          QRS Dur :  98 ms       QT Int : 448 ms       P-R-T Axes :  40  48  50 degrees      QTc Int : 497 ms      Normal sinus rhythm   Prolonged QT   Abnormal ECG   When compared with ECG of 12-APR-2024 05:10,   T wave amplitude has decreased in Lateral leads      Referred By: EDMD           Confirmed By:                     Medical Decision Making      Final diagnoses:   Acute on chronic respiratory failure with hypoxia   Acute on chronic congestive heart failure, unspecified heart failure type   Rhinovirus infection   Chronic kidney disease, unspecified CKD stage       ED Disposition  ED Disposition       ED Disposition   Decision to Admit    Condition   --    Comment   Level of Care: Telemetry [5]   Diagnosis: Acute respiratory failure with hypoxia [426652]   Certification: I Certify That Inpatient Hospital Services Are Medically Necessary For Greater Than 2 Midnights                 No follow-up provider specified.       Medication List      No changes were made to your prescriptions during this visit.            Clinton Herrera MD  10/30/24 2044

## 2024-10-31 NOTE — PLAN OF CARE
Goal Outcome Evaluation:            1300 mL urine output during shift. Denies SOA or chest pain. Sitting up to edge of bed to use urinal without distress

## 2024-10-31 NOTE — PLAN OF CARE
Goal Outcome Evaluation:      Pt received from ED approx 1945, A&Ox4, NSR, Sats tolerated 5L to transport and admit, wore BiPAP continuous overnight as ordered, tolerated well, no complaints. Pt in droplet precautions. 950 ml UOP for shift. Pt resting in bed with call light in reach.

## 2024-11-01 LAB
ANION GAP SERPL CALCULATED.3IONS-SCNC: 9 MMOL/L (ref 5–15)
BUN SERPL-MCNC: 32 MG/DL (ref 8–23)
BUN/CREAT SERPL: 15.8 (ref 7–25)
CALCIUM SPEC-SCNC: 8.9 MG/DL (ref 8.6–10.5)
CHLORIDE SERPL-SCNC: 98 MMOL/L (ref 98–107)
CO2 SERPL-SCNC: 39 MMOL/L (ref 22–29)
CREAT SERPL-MCNC: 2.02 MG/DL (ref 0.76–1.27)
DEPRECATED RDW RBC AUTO: 56.9 FL (ref 37–54)
EGFRCR SERPLBLD CKD-EPI 2021: 36.4 ML/MIN/1.73
ERYTHROCYTE [DISTWIDTH] IN BLOOD BY AUTOMATED COUNT: 17 % (ref 12.3–15.4)
GLUCOSE BLDC GLUCOMTR-MCNC: 126 MG/DL (ref 70–130)
GLUCOSE BLDC GLUCOMTR-MCNC: 157 MG/DL (ref 70–130)
GLUCOSE BLDC GLUCOMTR-MCNC: 190 MG/DL (ref 70–130)
GLUCOSE BLDC GLUCOMTR-MCNC: 202 MG/DL (ref 70–130)
GLUCOSE SERPL-MCNC: 108 MG/DL (ref 65–99)
HCT VFR BLD AUTO: 38.4 % (ref 37.5–51)
HGB BLD-MCNC: 10.8 G/DL (ref 13–17.7)
MAGNESIUM SERPL-MCNC: 2.2 MG/DL (ref 1.6–2.4)
MCH RBC QN AUTO: 26 PG (ref 26.6–33)
MCHC RBC AUTO-ENTMCNC: 28.1 G/DL (ref 31.5–35.7)
MCV RBC AUTO: 92.5 FL (ref 79–97)
PLATELET # BLD AUTO: 217 10*3/MM3 (ref 140–450)
PMV BLD AUTO: 11.8 FL (ref 6–12)
POTASSIUM SERPL-SCNC: 3.8 MMOL/L (ref 3.5–5.2)
RBC # BLD AUTO: 4.15 10*6/MM3 (ref 4.14–5.8)
SODIUM SERPL-SCNC: 146 MMOL/L (ref 136–145)
WBC NRBC COR # BLD AUTO: 7.75 10*3/MM3 (ref 3.4–10.8)

## 2024-11-01 PROCEDURE — 83735 ASSAY OF MAGNESIUM: CPT | Performed by: STUDENT IN AN ORGANIZED HEALTH CARE EDUCATION/TRAINING PROGRAM

## 2024-11-01 PROCEDURE — 97530 THERAPEUTIC ACTIVITIES: CPT

## 2024-11-01 PROCEDURE — 85027 COMPLETE CBC AUTOMATED: CPT | Performed by: STUDENT IN AN ORGANIZED HEALTH CARE EDUCATION/TRAINING PROGRAM

## 2024-11-01 PROCEDURE — 63710000001 INSULIN LISPRO (HUMAN) PER 5 UNITS: Performed by: INTERNAL MEDICINE

## 2024-11-01 PROCEDURE — 99232 SBSQ HOSP IP/OBS MODERATE 35: CPT | Performed by: STUDENT IN AN ORGANIZED HEALTH CARE EDUCATION/TRAINING PROGRAM

## 2024-11-01 PROCEDURE — 94664 DEMO&/EVAL PT USE INHALER: CPT

## 2024-11-01 PROCEDURE — 82948 REAGENT STRIP/BLOOD GLUCOSE: CPT

## 2024-11-01 PROCEDURE — 80048 BASIC METABOLIC PNL TOTAL CA: CPT | Performed by: STUDENT IN AN ORGANIZED HEALTH CARE EDUCATION/TRAINING PROGRAM

## 2024-11-01 PROCEDURE — 94799 UNLISTED PULMONARY SVC/PX: CPT

## 2024-11-01 PROCEDURE — 97166 OT EVAL MOD COMPLEX 45 MIN: CPT

## 2024-11-01 PROCEDURE — 97162 PT EVAL MOD COMPLEX 30 MIN: CPT

## 2024-11-01 PROCEDURE — 99232 SBSQ HOSP IP/OBS MODERATE 35: CPT | Performed by: NURSE PRACTITIONER

## 2024-11-01 PROCEDURE — 25010000002 BUMETANIDE PER 0.5 MG: Performed by: INTERNAL MEDICINE

## 2024-11-01 RX ORDER — ROSUVASTATIN CALCIUM 20 MG/1
20 TABLET, COATED ORAL DAILY
COMMUNITY

## 2024-11-01 RX ORDER — ROSUVASTATIN CALCIUM 20 MG/1
20 TABLET, COATED ORAL NIGHTLY
Status: DISCONTINUED | OUTPATIENT
Start: 2024-11-01 | End: 2024-11-04 | Stop reason: HOSPADM

## 2024-11-01 RX ORDER — ASPIRIN 81 MG/1
81 TABLET ORAL DAILY
Status: CANCELLED
Start: 2024-11-01

## 2024-11-01 RX ADMIN — PANTOPRAZOLE SODIUM 40 MG: 40 TABLET, DELAYED RELEASE ORAL at 08:22

## 2024-11-01 RX ADMIN — INSULIN LISPRO 4 UNITS: 100 INJECTION, SOLUTION INTRAVENOUS; SUBCUTANEOUS at 17:51

## 2024-11-01 RX ADMIN — CARVEDILOL 12.5 MG: 12.5 TABLET, FILM COATED ORAL at 17:50

## 2024-11-01 RX ADMIN — GABAPENTIN 600 MG: 300 CAPSULE ORAL at 13:54

## 2024-11-01 RX ADMIN — GABAPENTIN 600 MG: 300 CAPSULE ORAL at 05:26

## 2024-11-01 RX ADMIN — APIXABAN 5 MG: 5 TABLET, FILM COATED ORAL at 08:22

## 2024-11-01 RX ADMIN — Medication 10 ML: at 21:28

## 2024-11-01 RX ADMIN — APIXABAN 5 MG: 5 TABLET, FILM COATED ORAL at 21:28

## 2024-11-01 RX ADMIN — Medication 10 ML: at 08:24

## 2024-11-01 RX ADMIN — IPRATROPIUM BROMIDE AND ALBUTEROL SULFATE 3 ML: 2.5; .5 SOLUTION RESPIRATORY (INHALATION) at 07:21

## 2024-11-01 RX ADMIN — CARVEDILOL 12.5 MG: 12.5 TABLET, FILM COATED ORAL at 08:22

## 2024-11-01 RX ADMIN — BUMETANIDE 2.5 MG: 0.25 INJECTION INTRAMUSCULAR; INTRAVENOUS at 08:24

## 2024-11-01 RX ADMIN — BUMETANIDE 2.5 MG: 0.25 INJECTION INTRAMUSCULAR; INTRAVENOUS at 21:28

## 2024-11-01 RX ADMIN — INSULIN LISPRO 2 UNITS: 100 INJECTION, SOLUTION INTRAVENOUS; SUBCUTANEOUS at 21:41

## 2024-11-01 RX ADMIN — GABAPENTIN 600 MG: 300 CAPSULE ORAL at 21:28

## 2024-11-01 RX ADMIN — IPRATROPIUM BROMIDE AND ALBUTEROL SULFATE 3 ML: 2.5; .5 SOLUTION RESPIRATORY (INHALATION) at 12:53

## 2024-11-01 RX ADMIN — ESCITALOPRAM OXALATE 20 MG: 20 TABLET, FILM COATED ORAL at 08:22

## 2024-11-01 RX ADMIN — ROSUVASTATIN 20 MG: 20 TABLET, FILM COATED ORAL at 21:28

## 2024-11-01 RX ADMIN — FLUTICASONE PROPIONATE 2 SPRAY: 50 SPRAY, METERED NASAL at 08:24

## 2024-11-01 RX ADMIN — IPRATROPIUM BROMIDE AND ALBUTEROL SULFATE 3 ML: 2.5; .5 SOLUTION RESPIRATORY (INHALATION) at 19:13

## 2024-11-01 RX ADMIN — INSULIN LISPRO 2 UNITS: 100 INJECTION, SOLUTION INTRAVENOUS; SUBCUTANEOUS at 12:23

## 2024-11-01 NOTE — THERAPY EVALUATION
Patient Name: Juan Alberto Tejeda  : 1960    MRN: [id number]                              Today's Date: 2024       Admit Date: 10/30/2024    Visit Dx:     ICD-10-CM ICD-9-CM   1. Acute on chronic respiratory failure with hypoxia  J96.21 518.84     799.02   2. Acute on chronic congestive heart failure, unspecified heart failure type  I50.9 428.0   3. Rhinovirus infection  B34.8 079.3   4. Chronic kidney disease, unspecified CKD stage  N18.9 585.9     Patient Active Problem List   Diagnosis    CKD (chronic kidney disease), stage III    Hyperlipidemia LDL goal <70    Essential hypertension    Type 2 diabetes mellitus with kidney complication, with long-term current use of insulin    Elevated troponin level not due to acute coronary syndrome    Normocytic anemia    Nonrheumatic aortic valve stenosis/status post TAVR    Acute on chronic diastolic congestive heart failure    Coronary artery disease involving native coronary artery of native heart with angina pectoris    Nocturnal hypoxia    Peripheral neuropathy    JASWINDER on CPAP    Acute respiratory failure with hypoxia    Rhinovirus infection    Possible paroxysmal atrial fibrillation     Past Medical History:   Diagnosis Date    Aortic valve disorder     Arthritis     CHF (congestive heart failure)     Chronic kidney disease     Diabetes mellitus     Gout     Hiatal hernia     Hyperlipidemia     Hypertension     Kidney stones     history    MI, old     Peripheral neuropathy     Wears glasses      Past Surgical History:   Procedure Laterality Date    AORTIC VALVE REPAIR/REPLACEMENT N/A 3/16/2020    Procedure: TRANSCATHETER AORTIC VALVE REPLACEMENT, SHANKAR;  Surgeon: Irvin Jeffers MD;  Location: [place] [address];  Service: Cardiothoracic;  Laterality: N/A;  fluoro 10 min 30 sec  dose 1136 mGY   contrast 65 ml    AORTIC VALVE REPAIR/REPLACEMENT N/A 3/16/2020    Procedure: Transfemoral Transcatheter Aortic Valve Replacement;  Surgeon: Cayla Bella MD;   Location: [facility] OR 15;  Service: Cardiovascular;  Laterality: N/A;    CHOLECYSTECTOMY      CIRCUMCISION      COLONOSCOPY  2 years ago    KIDNEY STONE SURGERY      OTHER SURGICAL HISTORY      Gallstone removal surgery      General Information       Row Name 11/01/24 1712          Physical Therapy Time and Intention    Document Type evaluation  -     Mode of Treatment physical therapy  -       Row Name 11/01/24 1712          General Information    Patient Profile Reviewed yes  -BA     Prior Level of Function independent:;all household mobility;community mobility;gait;transfer;bed mobility;mod assist:;ADL's;dependent:;home management;shopping  Reported he primarily did not use an AD for ambulation; occasionally uses rollator, more so for longer distances. Hx falls, with last fall ~2-3 months ago. Uses 3L O2 at home. Ex-wife provides assist w/ ADLs, home managment, transportation, & shopping.  -     Existing Precautions/Restrictions fall;oxygen therapy device and L/min;other (see comments)  Droplet (Rhinovirus); BUE/LE edema  -     Barriers to Rehab medically complex;previous functional deficit;visual deficit  -       Row Name 11/01/24 1712          Living Environment    People in Home alone;other (see comments)  Reported ex-wife comes over and stays at night.  Also assists during the day as needed.  -       Row Name 11/01/24 1712          Home Main Entrance    Number of Stairs, Main Entrance seven;other (see comments)  4+landing+3  -BA     Stair Railings, Main Entrance railings on both sides of stairs  -       Row Name 11/01/24 1712          Stairs Within Home, Primary    Number of Stairs, Within Home, Primary none  -       Row Name 11/01/24 1712          Cognition    Orientation Status (Cognition) oriented x 3  -[facility] Name 11/01/24 1712          Safety Issues/Impairments Affecting Functional Mobility    Safety Issues Affecting Function (Mobility) awareness of need for assistance;insight  into deficits/self-awareness;judgment;problem-solving;safety precaution awareness;safety precautions follow-through/compliance;sequencing abilities  -     Impairments Affecting Function (Mobility) balance;coordination;endurance/activity tolerance;postural/trunk control;range of motion (ROM);sensation/sensory awareness;shortness of breath;strength;visual/perceptual  -               User Key  (r) = Recorded By, (t) = Taken By, (c) = Cosigned By      Initials Name Provider Type     Mirna Dickson, PT Physical Therapist                   Mobility       Row Name 11/01/24 1718          Bed Mobility    Bed Mobility supine-sit;scooting/bridging  -     Scooting/Bridging [place] (Bed Mobility) contact guard;verbal cues;nonverbal cues (demo/gesture)  -     Supine-Sit [place] (Bed Mobility) contact guard;verbal cues;nonverbal cues (demo/gesture)  -     Assistive Device (Bed Mobility) bed rails;head of bed elevated  -     Comment, (Bed Mobility) Increased time and effort.  VCs/TCs for sequencing, hand placement, and PLB.  Reported dizziness upon sitting EOB; subsided with time.  -       Row Name 11/01/24 1718          Sit-Stand Transfer    Sit-Stand [place] (Transfers) contact guard;verbal cues;nonverbal cues (demo/gesture)  -     Assistive Device (Sit-Stand Transfers) walker, front-wheeled  -     Comment, (Sit-Stand Transfer) VCs/TCs for sequencing and hand placement.  -       Row Name 11/01/24 1718          Gait/Stairs (Locomotion)    [place] Level (Gait) contact guard;verbal cues  -     Assistive Device (Gait) walker, front-wheeled  -     Distance in Feet (Gait) 140  -     Deviations/Abnormal Patterns (Gait) bilateral deviations;lauryn decreased;gait speed decreased;stride length decreased  -     Bilateral Gait Deviations forward flexed posture;heel strike decreased  -     Comment, (Gait/Stairs) Demo'd step through gait pattern with decreased lauryn and step length,  along with mild forward, flexed posture.  SOA/OLIVIA noted with O2 sats decreased to 84% on 3L; recovered to >90% in ~1-2 min with seated rest and focus on PLB.  Also reported mild dizziness throughout.  VCs/TCs for walker management, upright posture, improved stride length, and PLB.  Gait distance limited by fatigue and SOA/OLIVIA.  -               User Key  (r) = Recorded By, (t) = Taken By, (c) = Cosigned By      Initials Name Provider Type     Mirna Dickson, PT Physical Therapist                   Obj/Interventions       Row Name 11/01/24 1721          Range of Motion Comprehensive    General Range of Motion lower extremity range of motion deficits identified  -     Comment, General Range of Motion AROM B hip and knee WFL.  Mild decreased AROM/AAROM B ankle DF to ~neutral.  -       Row Name 11/01/24 1721          Strength Comprehensive (MMT)    General Manual Muscle Testing (MMT) Assessment lower extremity strength deficits identified  -     Comment, General Manual Muscle Testing (MMT) Assessment Grossly B hip 3+/5, B knee and ankle 4-/5.  -       Row Name 11/01/24 1721          Motor Skills    Motor Skills coordination;functional endurance  -     Coordination gross motor deficit;bilateral;lower extremity;minimal impairment  -     Functional Endurance Decreased functional endurance.  Fatigues with activity with noted SOA/OLIVIA and decreased O2 sats on 3L.  Able to recover with rest and PLB.  -       Row Name 11/01/24 1721          Balance    Balance Assessment sitting static balance;sitting dynamic balance;standing static balance;standing dynamic balance  -     Static Sitting Balance standby assist  -     Dynamic Sitting Balance contact guard  -     Position, Sitting Balance unsupported;sitting edge of bed;sitting in chair  -     Static Standing Balance standby assist  -     Dynamic Standing Balance contact guard;verbal cues  -     Position/Device Used, Standing Balance  supported;walker, front-wheeled  -     Comment, Balance Intermittent mild instability with standing and ambulation activity with FWW; no overt LOB noted.  Rec use of FWW for mobility at this time for improved balance, support, and energy conservation.  -       Row Name 11/01/24 1721          Sensory Assessment (Somatosensory)    Bilateral LE Sensory Assessment light touch awareness;impaired;other (see comments)  Reported N/T in B feet.  -               User Key  (r) = Recorded By, (t) = Taken By, (c) = Cosigned By      Initials Name Provider Type    Mirna Yost, PT Physical Therapist                   Goals/Plan       Row Name 11/01/24 1727          Bed Mobility Goal 1 (PT)    Activity/Assistive Device (Bed Mobility Goal 1, PT) sit to supine/supine to sit  -BA     [place] Level/Cues Needed (Bed Mobility Goal 1, PT) standby assist  -BA     Time Frame (Bed Mobility Goal 1, PT) short term goal (STG);5 days  -       Row Name 11/01/24 1727          Transfer Goal 1 (PT)    Activity/Assistive Device (Transfer Goal 1, PT) sit-to-stand/stand-to-sit;bed-to-chair/chair-to-bed;walker, rolling  -     [place] Level/Cues Needed (Transfer Goal 1, PT) supervision required  -BA     Time Frame (Transfer Goal 1, PT) long term goal (LTG);10 days  -       Row Name 11/01/24 1727          Gait Training Goal 1 (PT)    Activity/Assistive Device (Gait Training Goal 1, PT) gait (walking locomotion);assistive device use;walker, rolling;improve balance and speed;increase endurance/gait distance;increase energy conservation  -BA     [place] Level (Gait Training Goal 1, PT) standby assist  -BA     Distance (Gait Training Goal 1, PT) 350  -BA     Time Frame (Gait Training Goal 1, PT) long term goal (LTG);10 days  -       Row Name 11/01/24 1727          Stairs Goal 1 (PT)    Activity/Assistive Device (Stairs Goal 1, PT) ascending stairs;descending stairs;using handrail, left;using handrail, right  -      [place] Level/Cues Needed (Stairs Goal 1, PT) contact guard required  -BA     Number of Stairs (Stairs Goal 1, PT) 7  -BA     Time Frame (Stairs Goal 1, PT) long term goal (LTG);10 days  -       Row Name 11/01/24 1727          Therapy Assessment/Plan (PT)    Planned Therapy Interventions (PT) balance training;bed mobility training;gait training;home exercise program;motor coordination training;neuromuscular re-education;patient/family education;postural re-education;ROM (range of motion);stair training;strengthening;stretching;transfer training  -               User Key  (r) = Recorded By, (t) = Taken By, (c) = Cosigned By      Initials Name Provider Type    Mirna Yost, PT Physical Therapist                   Clinical Impression       Row Name 11/01/24 1724          Pain    Pretreatment Pain Rating 0/10 - no pain  -BA     Posttreatment Pain Rating 0/10 - no pain  -       Row Name 11/01/24 1724          Plan of Care Review    Plan of Care Reviewed With patient  -BA     Outcome Evaluation PT initial Eval completed.  Pt presents with generalized weakness, balance deficits, SOA/OLIVIA, decreased functional endurance, and decreased indep with functional mobility compared to baseline.  Ambulated 140ft with CGA and FWW.  O2 sats decreased to 84% on 3L; recovered to >90% in ~1-2 min with seated rest and focus on PLB.  Rec use of FWW for mobility at this time for improved balance, support, and energy conservation.  Pt will benefit from skilled IP PT to improve indep and safety with mobility and promote return to PLOF.  Rec HWA and HHPT upon d/c.  -BA       Row Name 11/01/24 2231          Therapy Assessment/Plan (PT)    Patient/Family Therapy Goals Statement (PT) to return home and to PLOF  -BA     Rehab Potential (PT) good  -BA     Criteria for Skilled Interventions Met (PT) yes;meets criteria;skilled treatment is necessary  -BA     Therapy Frequency (PT) daily  -BA     Predicted Duration of Therapy  Intervention (PT) 10 days  -BA       Row Name 11/01/24 1724          Vital Signs    Pre Systolic BP Rehab 132  -BA     Pre Treatment Diastolic BP 94  -BA     Post Systolic BP Rehab 132  -BA     Post Treatment Diastolic BP 57  -BA     Pretreatment Heart Rate (beats/min) 64  -BA     Posttreatment Heart Rate (beats/min) 65  -BA     Pre SpO2 (%) 93  -BA     O2 Delivery Pre Treatment nasal cannula  3L  -BA     Intra SpO2 (%) 84   -BA     O2 Delivery Intra Treatment nasal cannula  3L  -BA     Post SpO2 (%) 95  -BA     O2 Delivery Post Treatment nasal cannula  3L  -BA     Pre Patient Position Supine  -BA     Intra Patient Position Standing  -BA     Post Patient Position Sitting  -BA       Row Name 11/01/24 1724          Positioning and Restraints    Pre-Treatment Position in bed  -BA     Post Treatment Position chair  -BA     In Chair notified nsg;reclined;call light within reach;encouraged to call for assist;exit alarm on;waffle cushion;legs elevated;heels elevated  -BA               User Key  (r) = Recorded By, (t) = Taken By, (c) = Cosigned By      Initials Name Provider Type    Mirna Yost, PT Physical Therapist                   Outcome Measures       Row Name 11/01/24 1728 11/01/24 0800       How much help from another person do you currently need...    Turning from your back to your side while in flat bed without using bedrails? 3  -BA 3  -JS    Moving from lying on back to sitting on the side of a flat bed without bedrails? 3  -BA 3  -JS    Moving to and from a bed to a chair (including a wheelchair)? 3  -BA 3  -JS    Standing up from a chair using your arms (e.g., wheelchair, bedside chair)? 3  -BA 2  -JS    Climbing 3-5 steps with a railing? 2  -BA 2  -JS    To walk in hospital room? 3  -BA 3  -JS    AM-PAC 6 Clicks Score (PT) 17  -BA 16  -JS    Highest Level of Mobility Goal 5 --> Static standing  -BA 5 --> Static standing  -JS      Row Name 11/01/24 1728 11/01/24 1341       Functional Assessment     Outcome Measure Options AM-PAC 6 Clicks Basic Mobility (PT)  - AM-PAC 6 Clicks Daily Activity (OT)  -MARTY              User Key  (r) = Recorded By, (t) = Taken By, (c) = Cosigned By      Initials Name Provider Type    Juju Rahman, OT Occupational Therapist    Mirna Yost, PT Physical Therapist    Hussein Gaston, RN Registered Nurse                                 Physical Therapy Education       Title: PT OT SLP Therapies (In Progress)       Topic: Physical Therapy (In Progress)       Point: Mobility training (Done)       Learning Progress Summary            Patient Acceptance, E, VU,NR by  at 11/1/2024 1728                      Point: Home exercise program (Not Started)       Learner Progress:  Not documented in this visit.              Point: Body mechanics (Done)       Learning Progress Summary            Patient Acceptance, E, VU,NR by  at 11/1/2024 1728                      Point: Precautions (Done)       Learning Progress Summary            Patient Acceptance, E, VU,NR by  at 11/1/2024 1728                                      User Key       Initials Effective Dates Name Provider Type Discipline     09/21/21 -  Mirna Dickson, PT Physical Therapist PT                  PT Recommendation and Plan  Planned Therapy Interventions (PT): balance training, bed mobility training, gait training, home exercise program, motor coordination training, neuromuscular re-education, patient/family education, postural re-education, ROM (range of motion), stair training, strengthening, stretching, transfer training  Outcome Evaluation: PT initial Eval completed.  Pt presents with generalized weakness, balance deficits, SOA/OLIVIA, decreased functional endurance, and decreased indep with functional mobility compared to baseline.  Ambulated 140ft with CGA and FWW.  O2 sats decreased to 84% on 3L; recovered to >90% in ~1-2 min with seated rest and focus on PLB.  Rec use of FWW for mobility at this time  for improved balance, support, and energy conservation.  Pt will benefit from skilled IP PT to improve indep and safety with mobility and promote return to PLOF.  Rec HWA and HHPT upon d/c.     Time Calculation:   PT Evaluation Complexity  History, PT Evaluation Complexity: 3 or more personal factors and/or comorbidities  Examination of Body Systems (PT Eval Complexity): total of 3 or more elements  Clinical Presentation (PT Evaluation Complexity): evolving  Clinical Decision Making (PT Evaluation Complexity): moderate complexity  Overall Complexity (PT Evaluation Complexity): moderate complexity     PT Charges       Row Name 11/01/24 1729             Time Calculation    Start Time 0959  -BA      PT Received On 11/01/24  -BA      PT Goal Re-Cert Due Date 11/11/24  -BA         Time Calculation- PT    Total Timed Code Minutes- PT 10 minute(s)  -BA         Timed Charges    92856 - PT Therapeutic Activity Minutes 10  -BA         Untimed Charges    PT Eval/Re-eval Minutes 54  -BA         Total Minutes    Timed Charges Total Minutes 10  -BA      Untimed Charges Total Minutes 54  -BA       Total Minutes 64  -BA                User Key  (r) = Recorded By, (t) = Taken By, (c) = Cosigned By      Initials Name Provider Type    Mirna Yost, PT Physical Therapist                  Therapy Charges for Today       Code Description Service Date Service Provider Modifiers Qty    51990143823 HC PT THERAPEUTIC ACT EA 15 MIN 11/1/2024 Mirna Dickson, PT GP 1    34709630529 HC PT EVAL MOD COMPLEXITY 4 11/1/2024 Mirna Dickson, PT GP 1            PT G-Codes  Outcome Measure Options: AM-PAC 6 Clicks Basic Mobility (PT)  AM-PAC 6 Clicks Score (PT): 17  AM-PAC 6 Clicks Score (OT): 15  PT Discharge Summary  Anticipated Discharge Disposition (PT): home with assist, home with home health    Mirna Dickson PT  11/1/2024

## 2024-11-01 NOTE — CASE MANAGEMENT/SOCIAL WORK
Continued Stay Note  SARAH Perales     Patient Name: Juan Alberto Tejeda  MRN: [id number]  Today's Date: 11/1/2024    Admit Date: 10/30/2024    Plan: update   Discharge Plan       Row Name 11/01/24 1335       Plan    Plan update    Patient/Family in Agreement with Plan yes    Plan Comments Spoke with patient  at bedside regarding discharge plan who reports he may be going home this weekend, currenlty on [id number] which is basline for him.  No new discharge needs verbalized.  CM following.  Patient plan is to discharge home and resume [facility] via car with family to transport.    Final Discharge Disposition Code 06 - home with home health care                   Discharge Codes    No documentation.                 Expected Discharge Date and Time       Expected Discharge Date Expected Discharge Time    Nov 4, 2024               Carolin Briones RN

## 2024-11-01 NOTE — PROGRESS NOTES
[facility] Medicine Services  PROGRESS NOTE    Patient Name: Juan Alberto Tejeda  : 1960  MRN: [id number]    Date of Admission: 10/30/2024  Primary Care Physician: Megan Martell APRN    Subjective   Subjective     CC:  Dyspnea    HPI:  Patient seen and examined this morning.  He was on BiPAP for most of the night however this morning he has been transitioned to nasal cannula 3 L.  He reports he is feeling better overall.  He responded well to Bumex yesterday.      Objective   Objective     Vital Signs:   Temp:  [97.8 °F (36.6 °C)-98.6 °F (37 °C)] 97.8 °F (36.6 °C)  Heart Rate:  [52-73] 67  Resp:  [16-20] 18  BP: (132-155)/(76-98) 133/76  Flow (L/min) (Oxygen Therapy):  [2-3] 3     Physical Exam  Constitutional:       General: He is not in acute distress.  Cardiovascular:      Rate and Rhythm: Normal rate and regular rhythm.      Heart sounds: Normal heart sounds.   Pulmonary:      Effort: Pulmonary effort is normal. No respiratory distress.      Comments: Pulmonary exam limited due to patient's body habitus  Abdominal:      General: There is no distension.      Palpations: Abdomen is soft.      Tenderness: There is no abdominal tenderness.   Musculoskeletal:      Right lower leg: Edema present.      Left lower leg: Edema present.      Comments: Patient with compression stockings on, reports swelling is at baseline   Neurological:      General: No focal deficit present.      Mental Status: He is alert.          Results Reviewed:  LAB RESULTS:      Lab 24  0702 10/31/24  0708 10/30/24  1407 10/30/24  1136   WBC 7.75 7.06  --  10.61   HEMOGLOBIN 10.8* 9.5*  --  11.0*   HEMATOCRIT 38.4 33.0*  --  38.3   PLATELETS 217 175  --  211   NEUTROS ABS  --   --   --  8.84*   IMMATURE GRANS (ABS)  --   --   --  0.09*   LYMPHS ABS  --   --   --  0.81   MONOS ABS  --   --   --  0.55   EOS ABS  --   --   --  0.29   MCV 92.5 91.7  --  92.1   D DIMER QUANT  --   --   --  2.05*   HSTROP T  --    --  93* 104*         Lab 11/01/24  0702 10/31/24  0708 10/30/24  1136   SODIUM 146* 147* 144   POTASSIUM 3.8 3.6 3.9   CHLORIDE 98 102 100   CO2 39.0* 39.0* 37.0*   ANION GAP 9.0 6.0 7.0   BUN 32* 33* 33*   CREATININE 2.02* 2.16* 2.14*   EGFR 36.4* 33.6* 33.9*   GLUCOSE 108* 124* 131*   CALCIUM 8.9 8.5* 8.9   MAGNESIUM 2.2  --   --          Lab 10/30/24  1136   TOTAL PROTEIN 6.8   ALBUMIN 3.6   GLOBULIN 3.2   ALT (SGPT) 11   AST (SGOT) 27   BILIRUBIN 0.7   ALK PHOS 110         Lab 10/30/24  1407 10/30/24  1136   PROBNP  --  2,457.0*   HSTROP T 93* 104*                 Lab 10/30/24  1519 10/30/24  1149   PH, ARTERIAL 7.409 7.299*   PCO2, ARTERIAL 63.4* 84.8*   PO2 ART 92.9 39.5*   FIO2 40 100   HCO3 ART 40.1* 41.6*   BASE EXCESS ART 13.3* 12.2*   CARBOXYHEMOGLOBIN 1.0 2.1*     Brief Urine Lab Results  (Last result in the past 365 days)        Color   Clarity   Blood   Leuk Est   Nitrite   Protein   CREAT   Urine HCG        04/11/24 1537             70.2                 Microbiology Results Abnormal       Procedure Component Value - Date/Time    Respiratory Panel PCR w/COVID-19(SARS-CoV-2) [facility] In-House, NP Swab in UTM/VTM, 2 HR TAT - Swab, Nasopharynx [[id number]]  (Abnormal) Collected: 10/30/24 1137    Lab Status: Final result Specimen: Swab from Nasopharynx Updated: 10/30/24 1254     ADENOVIRUS, PCR Not Detected     Coronavirus 229E Not Detected     Coronavirus HKU1 Not Detected     Coronavirus NL63 Not Detected     Coronavirus OC43 Not Detected     COVID19 Not Detected     Human Metapneumovirus Not Detected     Human Rhinovirus/Enterovirus Detected     Influenza A PCR Not Detected     Influenza B PCR Not Detected     Parainfluenza Virus 1 Not Detected     Parainfluenza Virus 2 Not Detected     Parainfluenza Virus 3 Not Detected     Parainfluenza Virus 4 Not Detected     RSV, PCR Not Detected     Bordetella pertussis pcr Not Detected     Bordetella parapertussis PCR Not Detected     Chlamydophila  pneumoniae PCR Not Detected     Mycoplasma pneumo by PCR Not Detected    Narrative:      In the setting of a positive respiratory panel with a viral infection PLUS a negative procalcitonin without other underlying concern for bacterial infection, consider observing off antibiotics or discontinuation of antibiotics and continue supportive care. If the respiratory panel is positive for atypical bacterial infection (Bordetella pertussis, Chlamydophila pneumoniae, or Mycoplasma pneumoniae), consider antibiotic de-escalation to target atypical bacterial infection.            Adult Transthoracic Echo Complete W/ Cont if Necessary Per Protocol    Result Date: 10/31/2024    Left ventricular systolic function is normal. Estimated left ventricular EF = 60%   The left atrial cavity is mildly dilated.   There is a 26 mm Fried GAURANG TAVR valve present.  The valve appears to function normally.  There is a mean gradient 14 mmHg across the valve.   Moderate mitral valve stenosis is present with functional MAC. The mitral valve mean gradient is 6 mmHg.   Estimated right ventricular systolic pressure from tricuspid regurgitation is mildly elevated (35 mmHg).   There is a right pleural effusion.     CT Angiogram Chest    Result Date: 10/30/2024  CT ANGIOGRAM CHEST Date of Exam: 10/30/2024 1:26 PM EDT Indication: sob, elevated dimer. Comparison: Same day chest radiograph, CTA chest 3/3/2020. Technique: CTA of the chest was performed after the uneventful intravenous administration of 85 mL Isovue-370. Reconstructed coronal and sagittal images were also obtained. In addition, a 3-D volume rendered image was created for interpretation. Automated exposure control and iterative reconstruction methods were used. Findings: Pulmonary arteries / cardiovascular: No intraluminal filling defect to suggest pulmonary embolus. No pericardial effusion. Normal caliber thoracic aorta. TAVR. Coronary artery and mitral annulus calcifications. Lymph  nodes and mediastinum: Multiple mildly enlarged mediastinal and bilateral hilar lymph nodes, nonspecific. No mediastinal mass. Lungs and airways: Central airways are patent. Bibasilar atelectasis secondary to pleural fluid. Couple small pulmonary nodules are unchanged dating back to 2020 and considered benign (for example, 3 mm nodule in the left upper lobe on axial image 42 and  4 mm nodule in the right upper lobe on axial image 33). Scattered areas of groundglass attenuation with low-grade interlobular septal thickening and aerated portions of the lungs, suggestive of pulmonary edema. Pleura: Moderate bilateral pleural effusions. No pneumothorax.  Bones and soft tissues: No acute or suspicious osseous abnormality. Thoracic spondylosis. Soft tissues are within normal limits. Upper abdomen: Cholecystectomy. Duodenal diverticulum.     Impression: Impression: No evidence of pulmonary embolism. Findings of pulmonary edema and moderate bilateral pleural effusions with bibasilar atelectasis. Multiple mildly enlarged mediastinal and bilateral hilar lymph nodes, nonspecific, but potentially reactive. Electronically Signed: Golden Schultz MD  10/30/2024 1:51 PM EDT  Workstation ID: [id number]     Results for orders placed during the hospital encounter of 10/30/24    Adult Transthoracic Echo Complete W/ Cont if Necessary Per Protocol    Interpretation Summary    Left ventricular systolic function is normal. Estimated left ventricular EF = 60%    The left atrial cavity is mildly dilated.    There is a 26 mm Fried GAURANG TAVR valve present.  The valve appears to function normally.  There is a mean gradient 14 mmHg across the valve.    Moderate mitral valve stenosis is present with functional MAC. The mitral valve mean gradient is 6 mmHg.    Estimated right ventricular systolic pressure from tricuspid regurgitation is mildly elevated (35 mmHg).    There is a right pleural effusion.      Current medications:  Scheduled  Meds:apixaban, 5 mg, Oral, BID  bumetanide, 2.5 mg, Intravenous, BID  carvedilol, 12.5 mg, Oral, BID With Meals  escitalopram, 20 mg, Oral, Daily  fluticasone, 2 spray, Nasal, Daily  gabapentin, 600 mg, Oral, Q8H  insulin lispro, 2-9 Units, Subcutaneous, 4x Daily AC & at Bedtime  ipratropium-albuterol, 3 mL, Nebulization, Q6H While Awake - RT  pantoprazole, 40 mg, Oral, Daily  pharmacy consult - MTM, , Does not apply, Q12H  rosuvastatin, 20 mg, Oral, Nightly  sodium chloride, 10 mL, Intravenous, Q12H      Continuous Infusions:Pharmacy Consult,       PRN Meds:.  acetaminophen **OR** acetaminophen **OR** acetaminophen    benzonatate    senna-docusate sodium **AND** polyethylene glycol **AND** bisacodyl **AND** bisacodyl    clonazePAM    dextrose    dextrose    glucagon (human recombinant)    Pharmacy Consult    nitroglycerin    polyethylene glycol    sodium chloride    sodium chloride    Assessment & Plan   Assessment & Plan     Active Hospital Problems    Diagnosis  POA    **Acute on chronic diastolic congestive heart failure [I50.33]  Yes    Rhinovirus infection [B34.8]  Yes    Possible paroxysmal atrial fibrillation [I48.0]  No    Acute respiratory failure with hypoxia [J96.01]  Yes    JASWINDER on CPAP [G47.33]  Yes    Peripheral neuropathy [G62.9]  Yes    Coronary artery disease involving native coronary artery of native heart with angina pectoris [I25.119]  Yes    CKD (chronic kidney disease), stage III [N18.30]  Yes    Essential hypertension [I10]  Yes    Hyperlipidemia LDL goal <70 [E78.5]  Yes    Nonrheumatic aortic valve stenosis/status post TAVR [I35.0]  Yes    Type 2 diabetes mellitus with kidney complication, with long-term current use of insulin [E11.29, Z79.4]  Not Applicable    Elevated troponin level not due to acute coronary syndrome [R79.89]  Yes      Resolved Hospital Problems   No resolved problems to display.        Brief Hospital Course to date:  Juan Alberto Tejeda is a 64 y.o. male with chronic  diastolic CHF, CKD, type 2 diabetes, peripheral neuropathy who presents with progressive dyspnea and cough and lower extremity edema for 1 week.      Acute on chronic diastolic heart failure  Acute hypoxic respiratory failure with hypoxia and hypercapnia  Valvular heart disease status post TAVR  - Continue diuresis with IV Bumex 2.5 twice daily, will monitor response closely  - Wean oxygen as tolerated, baseline 3 L nasal cannula  - Cardiology consulted, recommending continued diuresis over the weekend.  Planning for outpatient stress test  - Repeat echo showed no significant change from echo done in April  - Strict I's and O's, daily weights    Hypertension  CAD  Hyperlipidemia  - Continue home carvedilol  - Continue home statin    History of A-fib  - Continue home Eliquis and carvedilol    Rhinovirus positive  - Supportive care    Type 2 diabetes  Peripheral neuropathy  - Will start patient on sliding scale insulin, adjust as necessary  - Continue home gabapentin    CKD 3  - Will continue to monitor renal function daily with diuresis  - No ACE/ARB/SGLT2 as of now, reevaluate need for SGLT2 outpatient      Expected Discharge   Expected Discharge Date: 11/4/2024; Expected Discharge Time:      VTE Prophylaxis:  Pharmacologic VTE prophylaxis orders are present.         AM-PAC 6 Clicks Score (PT): 15 (10/31/24 2000)    CODE STATUS:   Code Status and Medical Interventions: CPR (Attempt to Resuscitate); Full Support   Ordered at: 10/30/24 2124     Level Of Support Discussed With:    Patient     Code Status (Patient has no pulse and is not breathing):    CPR (Attempt to Resuscitate)     Medical Interventions (Patient has pulse or is breathing):    Full Support       Jennifer Cheung MD  11/01/24

## 2024-11-01 NOTE — PLAN OF CARE
Goal Outcome Evaluation:  Plan of Care Reviewed With: patient           Outcome Evaluation: PT initial Eval completed.  Pt presents with generalized weakness, balance deficits, SOA/OLIVIA, decreased functional endurance, and decreased indep with functional mobility compared to baseline.  Ambulated 140ft with CGA and FWW.  O2 sats decreased to 84% on 3L; recovered to >90% in ~1-2 min with seated rest and focus on PLB.  Rec use of FWW for mobility at this time for improved balance, support, and energy conservation.  Pt will benefit from skilled IP PT to improve indep and safety with mobility and promote return to PLOF.  Rec HWA and HHPT upon d/c.    Anticipated Discharge Disposition (PT): home with assist, home with home health

## 2024-11-01 NOTE — THERAPY EVALUATION
Patient Name: Juan Alberto Tejeda  : 1960    MRN: [id number]                              Today's Date: 2024       Admit Date: 10/30/2024    Visit Dx:     ICD-10-CM ICD-9-CM   1. Acute on chronic respiratory failure with hypoxia  J96.21 518.84     799.02   2. Acute on chronic congestive heart failure, unspecified heart failure type  I50.9 428.0   3. Rhinovirus infection  B34.8 079.3   4. Chronic kidney disease, unspecified CKD stage  N18.9 585.9     Patient Active Problem List   Diagnosis    CKD (chronic kidney disease), stage III    Hyperlipidemia LDL goal <70    Essential hypertension    Type 2 diabetes mellitus with kidney complication, with long-term current use of insulin    Elevated troponin level not due to acute coronary syndrome    Normocytic anemia    Nonrheumatic aortic valve stenosis/status post TAVR    Acute on chronic diastolic congestive heart failure    Coronary artery disease involving native coronary artery of native heart with angina pectoris    Nocturnal hypoxia    Peripheral neuropathy    JASWINDER on CPAP    Acute respiratory failure with hypoxia    Rhinovirus infection    Possible paroxysmal atrial fibrillation     Past Medical History:   Diagnosis Date    Aortic valve disorder     Arthritis     CHF (congestive heart failure)     Chronic kidney disease     Diabetes mellitus     Gout     Hiatal hernia     Hyperlipidemia     Hypertension     Kidney stones     history    MI, old     Peripheral neuropathy     Wears glasses      Past Surgical History:   Procedure Laterality Date    AORTIC VALVE REPAIR/REPLACEMENT N/A 3/16/2020    Procedure: TRANSCATHETER AORTIC VALVE REPLACEMENT, SHANKAR;  Surgeon: Irvin Jeffers MD;  Location: [place] [address];  Service: Cardiothoracic;  Laterality: N/A;  fluoro 10 min 30 sec  dose 1136 mGY   contrast 65 ml    AORTIC VALVE REPAIR/REPLACEMENT N/A 3/16/2020    Procedure: Transfemoral Transcatheter Aortic Valve Replacement;  Surgeon: Cayla Bella MD;   [facility]: [facility];  Service: Cardiovascular;  Laterality: N/A;    CHOLECYSTECTOMY      CIRCUMCISION      COLONOSCOPY  2 years ago    KIDNEY STONE SURGERY      OTHER SURGICAL HISTORY      Gallstone removal surgery      General Information       Row Name 11/01/24 1318          OT Time and Intention    Document Type evaluation  -MARTY     Mode of Treatment occupational therapy  -MARTY       Row Name 11/01/24 1318          General Information    Patient Profile Reviewed yes  -MARTY     Prior Level of Function mod assist:;ADL's;independent:;bed mobility;transfer;all household mobility;community mobility;dependent:;home management;shopping  Ambulates household distances without AD; uses rollator for community mobility; reports that he occasionally drives; 3L baseline O2  -MARTY     Existing Precautions/Restrictions fall;oxygen therapy device and L/min;other (see comments)  Droplet (Rhinovirus); UE/LE edema  -MARTY     Barriers to Rehab medically complex;previous functional deficit  -MARTY       Row Name 11/01/24 1318          Occupational Profile    Environmental Supports and Barriers (Occupational Profile) Lives alone in mobile home; ex-wife assists with bathing, LBD, HM, driving, and shopping; pt has TTB in tub shower, rollator, w/c, lift recliner, CPAP; currently receiving HH therapy  -MARTY       Row Name 11/01/24 1318          Living Environment    People in Home alone  -MARTY       Row Name 11/01/24 1318          Stairs Within Home, Primary    Number of Stairs, Within Home, Primary none  -MARTY       Row Name 11/01/24 1318          Cognition    Orientation Status (Cognition) oriented x 4  -MARTY       Row Name 11/01/24 1318          Safety Issues/Impairments Affecting Functional Mobility    Safety Issues Affecting Function (Mobility) insight into deficits/self-awareness;judgment;problem-solving;safety precaution awareness;safety precautions follow-through/compliance;sequencing abilities  -MARTY     Impairments Affecting Function  (Mobility) balance;endurance/activity tolerance;postural/trunk control;shortness of breath;strength;sensation/sensory awareness;visual/perceptual  -     Comment, Safety Issues/Impairments (Mobility) Pt reports increased dizziness during turns while ambulating  -               User Key  (r) = Recorded By, (t) = Taken By, (c) = Cosigned By      Initials Name Provider Type    MARTY Juju Gauthier OT Occupational Therapist                     Mobility/ADL's       Row Name 11/01/24 1325          Bed Mobility    Bed Mobility supine-sit  -MARTY     Supine-Sit Eva (Bed Mobility) contact guard  -     Bed Mobility, Safety Issues impaired trunk control for bed mobility  -     Assistive Device (Bed Mobility) bed rails;head of bed elevated  -     Comment, (Bed Mobility) Increased time and effort needed, HOB elevated  -       Row Name 11/01/24 1325          Transfers    Transfers sit-stand transfer  -     Comment, (Transfers) cues for focused breathing during rest breaks for recovery  -       Row Name 11/01/24 1325          Sit-Stand Transfer    Sit-Stand Eva (Transfers) contact guard;verbal cues  -     Assistive Device (Sit-Stand Transfers) walker, front-wheeled  -       Row Name 11/01/24 1325          Functional Mobility    Functional Mobility- Comment Defer to PT  -       Row Name 11/01/24 1325          Activities of Daily Living    BADL Assessment/Intervention lower body dressing;grooming;toileting  -       Row Name 11/01/24 1325          Lower Body Dressing Assessment/Training    Eva Level (Lower Body Dressing) don;socks;dependent (less than 25% patient effort)  -MARTY     Position (Lower Body Dressing) sitting up in bed  -       Row Name 11/01/24 1325          Grooming Assessment/Training    Eva Level (Grooming) wash face, hands;set up  -MARTY     Position (Grooming) sitting up in bed  -       Row Name 11/01/24 1325          Toileting Assessment/Training    Eva  Level (Toileting) adjust/manage clothing;perform perineal hygiene;minimum assist (75% patient effort)  -               User Key  (r) = Recorded By, (t) = Taken By, (c) = Cosigned By      Initials Name Provider Type    Juju Rahman OT Occupational Therapist                   Obj/Interventions       Row Name 11/01/24 1327          Sensory Assessment (Somatosensory)    Sensory Assessment (Somatosensory) UE sensation intact  -       Row Name 11/01/24 1327          Vision Assessment/Intervention    Visual Impairment/Limitations blurry vision;corrective lenses full-time  -     Vision Assessment Comment Pt reports he is currently receiving eye injections; L eye more impaired than R  -[facility] Name 11/01/24 1327          Range of Motion Comprehensive    Comment, General Range of Motion RUE ROM WFL; L shoulder elevation limited to 85 degrees  -[facility] Name 11/01/24 1327          Strength Comprehensive (MMT)    Comment, General Manual Muscle Testing (MMT) Assessment BUE's grossly 4/5  -[facility] Name 11/01/24 1327          Balance    Balance Assessment sitting static balance;sitting dynamic balance;standing static balance;standing dynamic balance  -     Static Sitting Balance standby assist  -     Dynamic Sitting Balance contact guard  -MARTY     Position, Sitting Balance unsupported;sitting edge of bed  -     Static Standing Balance supervision  -     Dynamic Standing Balance contact guard  -MARTY     Position/Device Used, Standing Balance supported;walker, front-wheeled  -     Balance Interventions standing;dynamic;occupation based/functional task  -     Comment, Balance Gris for dynamic standing tasks, per OT clinical observation  -               User Key  (r) = Recorded By, (t) = Taken By, (c) = Cosigned By      Initials Name Provider Type    Juju Rahman OT Occupational Therapist                   Goals/Plan       Row Name 11/01/24 1340          Transfer Goal 1 (OT)     Activity/Assistive Device (Transfer Goal 1, OT) sit-to-stand/stand-to-sit;bed-to-chair/chair-to-bed;toilet;walker, rolling  -MARTY     [place] Level/Cues Needed (Transfer Goal 1, OT) supervision required  -MARTY     Time Frame (Transfer Goal 1, OT) long term goal (LTG);10 days  -MARTY     Progress/Outcome (Transfer Goal 1, OT) new goal  -       Row Name 11/01/24 1340          Toileting Goal 1 (OT)    Activity/Device (Toileting Goal 1, OT) adjust/manage clothing;perform perineal hygiene;commode;grab bar/safety frame  -MARTY     [place] Level/Cues Needed (Toileting Goal 1, OT) contact guard required  -MARTY     Time Frame (Toileting Goal 1, OT) short term goal (STG);1 week  -MARTY     Progress/Outcome (Toileting Goal 1, OT) new goal  -       Row Name 11/01/24 1340          Therapy Assessment/Plan (OT)    Planned Therapy Interventions (OT) activity tolerance training;BADL retraining;edema control/reduction;functional balance retraining;occupation/activity based interventions;patient/caregiver education/training;ROM/therapeutic exercise;strengthening exercise;transfer/mobility retraining  -MARTY               User Key  (r) = Recorded By, (t) = Taken By, (c) = Cosigned By      Initials Name Provider Type    Juju Rahman, REGI Occupational Therapist                   Clinical Impression       Row Name 11/01/24 6521          Pain Assessment    Pain Side/Orientation generalized  -MARTY     Pain Management Interventions exercise or physical activity utilized  -MARTY     Response to Pain Interventions intervention effective per patient report  -MARTY     Pre/Posttreatment Pain Comment Pt reported feeling better following ambulating in room  -MARTY     Additional Documentation Pain Scale: FACES Pre/Post-Treatment (Group)  -       Row Name 11/01/24 4280          Pain Scale: FACES Pre/Post-Treatment    Pain: FACES Scale, Pretreatment 2-->hurts little bit  -MARTY     Posttreatment Pain Rating 2-->hurts little bit  -       Row Name 11/01/24 8240           Plan of Care Review    Plan of Care Reviewed With patient  -MARTY     Outcome Evaluation OT eval completed. Pt presents with generalized weakness, dyspnea, and LE edema impacting IND with ADL's. Pt CGA for bed mobility and STS transfers using FWW with rest breaks needed throughout activity to recover O2 sats. to >90%. IP OT services warranted. Recommend home with assist and HHOT at discharge.  -MARTY       Row Name 11/01/24 1334          Therapy Assessment/Plan (OT)    Patient/Family Therapy Goal Statement (OT) To return to PLOF  -MARTY     Rehab Potential (OT) good  -MARTY     Criteria for Skilled Therapeutic Interventions Met (OT) yes;meets criteria;skilled treatment is necessary  -MARTY     Therapy Frequency (OT) daily  -MARTY     Predicted Duration of Therapy Intervention (OT) 10 days  -MARTY       Row Name 11/01/24 3000          Therapy Plan Review/Discharge Plan (OT)    Anticipated Discharge Disposition (OT) home with assist;home with home health  -MARTY       Row Name 11/01/24 1332          Vital Signs    Pre Systolic BP Rehab 132  -MARTY     Pre Treatment Diastolic BP 94  -MARTY     Post Systolic BP Rehab 132  -MARTY     Post Treatment Diastolic BP 57  -MARTY     Pretreatment Heart Rate (beats/min) 63  -MARTY     Pre SpO2 (%) 93  -MARTY     O2 Delivery Pre Treatment nasal cannula  -MARTY     Intra SpO2 (%) 84   -MARTY     O2 Delivery Intra Treatment nasal cannula  -MARTY     Post SpO2 (%) 93  -MARTY     O2 Delivery Post Treatment nasal cannula  -MARTY     Pre Patient Position Supine  -MARTY     Intra Patient Position Standing  -MARTY     Post Patient Position Sitting  -MARTY       Row Name 11/01/24 1335          Positioning and Restraints    Pre-Treatment Position in bed  -MARTY     Post Treatment Position chair  -MARTY     In Chair notified nsg;reclined;call light within reach;encouraged to call for assist;exit alarm on;waffle cushion;legs elevated;heels elevated  -MARTY               User Key  (r) = Recorded By, (t) = Taken By, (c) = Cosigned By      Initials Name  Provider Type    Juju Rahman OT Occupational Therapist                   Outcome Measures       Row Name 11/01/24 1341          How much help from another is currently needed...    Putting on and taking off regular lower body clothing? 1  -MARTY     Bathing (including washing, rinsing, and drying) 2  -MARTY     Toileting (which includes using toilet bed pan or urinal) 2  -MARTY     Putting on and taking off regular upper body clothing 3  -MARTY     Taking care of personal grooming (such as brushing teeth) 3  -MARTY     Eating meals 4  -MARTY     AM-PAC 6 Clicks Score (OT) 15  -MARTY       Row Name 11/01/24 0800          How much help from another person do you currently need...    Turning from your back to your side while in flat bed without using bedrails? 3  -JS     Moving from lying on back to sitting on the side of a flat bed without bedrails? 3  -JS     Moving to and from a bed to a chair (including a wheelchair)? 3  -JS     Standing up from a chair using your arms (e.g., wheelchair, bedside chair)? 2  -JS     Climbing 3-5 steps with a railing? 2  -JS     To walk in hospital room? 3  -JS     AM-PAC 6 Clicks Score (PT) 16  -JS       Row Name 11/01/24 1341          Functional Assessment    Outcome Measure Options AM-PAC 6 Clicks Daily Activity (OT)  -               User Key  (r) = Recorded By, (t) = Taken By, (c) = Cosigned By      Initials Name Provider Type    Juju Rahman OT Occupational Therapist    Hussein Gaston, RN Registered Nurse                    Occupational Therapy Education       Title: PT OT SLP Therapies (In Progress)       Topic: Occupational Therapy (In Progress)       Point: ADL training (Done)       Description:   Instruct learner(s) on proper safety adaptation and remediation techniques during self care or transfers.   Instruct in proper use of assistive devices.                  Learning Progress Summary            Patient Acceptance, E, VU by MARTY at 11/1/2024 1342    Comment: OT POC; incentive  spirometer use                      Point: Home exercise program (Not Started)       Description:   Instruct learner(s) on appropriate technique for monitoring, assisting and/or progressing therapeutic exercises/activities.                  Learner Progress:  Not documented in this visit.              Point: Precautions (Done)       Description:   Instruct learner(s) on prescribed precautions during self-care and functional transfers.                  Learning Progress Summary            Patient Acceptance, E, VU by MARTY at 11/1/2024 5942    Comment: OT POC; incentive spirometer use                      Point: Body mechanics (Not Started)       Description:   Instruct learner(s) on proper positioning and spine alignment during self-care, functional mobility activities and/or exercises.                  Learner Progress:  Not documented in this visit.                              User Key       Initials Effective Dates Name Provider Type Discipline    MARTY 06/16/21 -  Juju Gauthier OT Occupational Therapist OT                  OT Recommendation and Plan  Planned Therapy Interventions (OT): activity tolerance training, BADL retraining, edema control/reduction, functional balance retraining, occupation/activity based interventions, patient/caregiver education/training, ROM/therapeutic exercise, strengthening exercise, transfer/mobility retraining  Therapy Frequency (OT): daily  Plan of Care Review  Plan of Care Reviewed With: patient  Outcome Evaluation: OT eval completed. Pt presents with generalized weakness, dyspnea, and LE edema impacting IND with ADL's. Pt CGA for bed mobility and STS transfers using FWW with rest breaks needed throughout activity to recover O2 sats. to >90%. IP OT services warranted. Recommend home with assist and HHOT at discharge.     Time Calculation:   Evaluation Complexity (OT)  Review Occupational Profile/Medical/Therapy History Complexity: expanded/moderate complexity  Assessment,  Occupational Performance/Identification of Deficit Complexity: 3-5 performance deficits  Clinical Decision Making Complexity (OT): detailed assessment/moderate complexity  Overall Complexity of Evaluation (OT): moderate complexity     Time Calculation- OT       Row Name 11/01/24 1005             Time Calculation- OT    OT Start Time 1005  -MARTY      OT Received On 11/01/24  -MARTY      OT Goal Re-Cert Due Date 11/11/24  -MARTY         Untimed Charges    OT Eval/Re-eval Minutes 50  -MARTY         Total Minutes    Untimed Charges Total Minutes 50  -MARTY       Total Minutes 50  -MARTY                User Key  (r) = Recorded By, (t) = Taken By, (c) = Cosigned By      Initials Name Provider Type    Juju Rahman OT Occupational Therapist                  Therapy Charges for Today       Code Description Service Date Service Provider Modifiers Qty    [id number] HC OT EVAL MOD COMPLEXITY 4 11/1/2024 Juju Gauthier OT GO 1                 Juju Gauthier OT  11/1/2024

## 2024-11-01 NOTE — PLAN OF CARE
Goal Outcome Evaluation:  Plan of Care Reviewed With: patient           Outcome Evaluation: OT eval completed. Pt presents with generalized weakness, dyspnea, and LE edema impacting IND with ADL's. Pt CGA for bed mobility and STS transfers using FWW with rest breaks needed throughout activity to recover O2 sats. to >90%. IP OT services warranted. Recommend home with assist and HHOT at discharge.    Anticipated Discharge Disposition (OT): home with assist, home with home health

## 2024-11-01 NOTE — PROGRESS NOTES
"Cardiology Progress Note      Reason for visit:    Acute on chronic diastolic heart failure  Elevated troponin not due to ACS    IDENTIFICATION: 64 gentleman who resides in [place], [place]    Active Hospital Problems    Diagnosis  POA    **Acute on chronic diastolic congestive heart failure [I50.33]  Yes     Priority: High     Echo (2/29/2020): LVEF 65%.  Aortic valve is bicuspid and severely calcified.  Severe aortic stenosis (mean gradient 51 mmHg). Mild MR and MS  Echo (5/20/2020): LVEF = 65%.  Mild LVH. A 26 mm Fried Lin 3 TAVR valve is well-seated and exhibits a mean gradient of 13 mmHg. There is no paravalvular leak present.  Echo (4/11/2024): LVEF 60%.  Well-seated 26 mm TAVR valve.  Mean gradient is 20 mmHg.  Moderate mitral stenosis with mean gradient 7 mmHg.  Mild MR  Echo (10/31/2024): LVEF 60%..  TAVR valve well-seated with mean gradient 14 mmHg.  Moderate mitral stenosis with mean gradient 6 mmHg.  Mild MR      Rhinovirus infection [B34.8]  Yes     Priority: High    Acute respiratory failure with hypoxia [J96.01]  Yes     Priority: High    CKD (chronic kidney disease), stage III [N18.30]  Yes     Priority: High     Met with  transplant team in the past.  patient has elected not to have transplant when given option between transplant hemodialysis.  Follows Dr. Cobos  Creatinine trended up overnight to 2.47  Nephrology following as an inpatient      Type 2 diabetes mellitus with kidney complication, with long-term current use of insulin [E11.29, Z79.4]  Not Applicable     Priority: High    Possible paroxysmal atrial fibrillation [I48.0]  No     Priority: Medium     Reportedly started on Eliquis for \"irregular heart rhythm\" while hospitalized at [facility]  [id number]      Coronary artery disease involving native coronary artery of native heart with angina pectoris [I25.119]  Yes     Priority: Medium     Cardiac catheterization at [facility] (12/2019): Moderate nonobstructive CAD.      " Essential hypertension [I10]  Yes     Priority: Medium    Nonrheumatic aortic valve stenosis/status post TAVR [I35.0]  Yes     Priority: Medium     Echo (2/29/2020): LVEF 65%.  Aortic valve is bicuspid and severely calcified.  Severe aortic stenosis (mean gradient 51 mmHg). Mild MR and MS  Status post TAVR 3/16/2020  Echo (5/20/2020): LVEF = 65%.  Mild LVH. A 26 mm Fried Lin 3 TAVR valve is well-seated and exhibits a mean gradient of 13 mmHg. There is no paravalvular leak present.  Echo (4/11/2024): LVEF 60%.  Well-seated 26 mm TAVR valve.  Mean gradient is 20 mmHg, BERNA 1.7 cm².  Mild mitral stenosis with mean gradient 7 mmHg.  Mild MR      Elevated troponin level not due to acute coronary syndrome [R79.89]  Yes     Priority: Medium     Troponin elevated and flat and EKG no acute ischemia.  In the setting of acute CHF and rhinovirus.  Scenario not consistent with ACS 10/2024      JASWINDER on CPAP [G47.33]  Yes     Priority: Low    Peripheral neuropathy [G62.9]  Yes     Priority: Low    Hyperlipidemia LDL goal <70 [E78.5]  Yes     Priority: Low     High intensity statin therapy indicated given the presence of CAD              Patient is lying flat in bed breathing easy on O2 at 3 L by nasal cannula with O2 saturations 97%.  He reports feeling much better today.  He denies any chest pain.           Vital Sign Min/Max for last 24 hours  Temp  Min: 97.7 °F (36.5 °C)  Max: 98.6 °F (37 °C)   BP  Min: 132/94  Max: 155/93   Pulse  Min: 51  Max: 73   Resp  Min: 16  Max: 21   SpO2  Min: 92 %  Max: 99 %   Flow (L/min) (Oxygen Therapy)  Min: 2  Max: 5      Intake/Output Summary (Last 24 hours) at 11/1/2024 0837  Last data filed at 11/1/2024 0600  Gross per 24 hour   Intake --   Output 2000 ml   Net -2000 ml           Physical Exam  Constitutional:       General: He is awake.   Cardiovascular:      Rate and Rhythm: Normal rate and regular rhythm.   Pulmonary:      Effort: Pulmonary effort is normal.      Breath sounds: Normal  breath sounds.   Skin:     General: Skin is warm and dry.   Neurological:      Mental Status: He is alert and oriented to person, place, and time.   Psychiatric:         Behavior: Behavior is cooperative.         Tele: Normal sinus rhythm    Results Review (reviewed the patient's recent labs in the electronic medical record):     EKG (10/30/2024): Normal sinus rhythm    CT angiogram of the chest (10/30/2024): Pulmonary edema moderate bilateral pleural effusions with bibasilar atelectasis.    ECHO (10/31/2024): LVEF 60%.  26 mm Fried GAURANG TAVR valve present appears to function normally with mean gradient 14 mmHg.  Moderate mitral stenosis with mean gradient 6 mmHg.  RVSP 35 mmHg.  Right pleural effusion    Results from last 7 days   Lab Units 11/01/24  0702 10/31/24  0708 10/30/24  1136   SODIUM mmol/L 146* 147* 144   POTASSIUM mmol/L 3.8 3.6 3.9   CHLORIDE mmol/L 98 102 100   BUN mg/dL 32* 33* 33*   CREATININE mg/dL 2.02* 2.16* 2.14*   MAGNESIUM mg/dL 2.2  --   --      Results from last 7 days   Lab Units 10/30/24  1407 10/30/24  1136   HSTROP T ng/L 93* 104*     Results from last 7 days   Lab Units 11/01/24  0702 10/31/24  0708 10/30/24  1136   WBC 10*3/mm3 7.75 7.06 10.61   HEMOGLOBIN g/dL 10.8* 9.5* 11.0*   HEMATOCRIT % 38.4 33.0* 38.3   PLATELETS 10*3/mm3 217 175 211       Lab Results   Component Value Date    HGBA1C 7.6 (H) 08/15/2024       Lab Results   Component Value Date    CHLPL 130 04/02/2024    TRIG 79 04/02/2024    HDL 49 04/02/2024    LDL 65 04/02/2024              Acute on chronic diastolic heart failure  Pulmonary vascular congestion and elevated proBNP  Bumex 2.5 mg IV twice daily.  Carvedilol 12.5 mg twice daily  Consider empagliflozin if outpatient nephrologist agrees  No ARB/ARNI due to CKD  2710 mL diuresed since admission     Valvular heart disease  Echo shows mildly increased gradient across TAVR valve and moderate mitral stenosis due to mitral annular calcification    Possible atrial  fibrillation   Started on Eliquis while at [facility] 10/2024  Normal sinus rhythm while here      Elevated troponin not due to myocardial infarction  No suggestion of ACS  Patient deserves ischemic evaluation after discharge due to recurrent heart failure exacerbations  Recommend pharmacologic nuclear stress on outpatient basis     Hypertension  Carvedilol 12.5 mg twice daily  Current /94    Chronic kidney disease  Creatinine stable at 2.02  Baseline creatinine appears to be 2.0-2.6     Type 2 diabetes mellitus  Recent hemoglobin A1c 7.6  May consider SGL 2 inhibitor for HFpEF and diabetes if outpatient nephrologist agrees  Patient would be good candidate for GLP agonist therapy given presence of CAD, recurrent HFpEF, morbid obesity, and diabetes     Hyperlipidemia with target LDL <50  Crestor increased to 40 mg daily this admission  Will add Zetia at discharge             Continue to diurese with IV Bumex.  Will likely need to continue to be diuresed over the weekend  At discharge will add Zetia  At outpatient will consider addition of SGL 2 inhibitor if okay with nephrology  Will defer aspirin due to concomitant use of Eliquis  Still of trying to obtain records from [facility]  Will arrange outpatient stress testing at discharge.  Symptoms are improving with diuretic therapy no need for ischemic evaluation this admission    Electronically signed by RADHA Castaneda, 11/01/24, 8:37 AM EDT.

## 2024-11-02 LAB
ANION GAP SERPL CALCULATED.3IONS-SCNC: 7 MMOL/L (ref 5–15)
BUN SERPL-MCNC: 35 MG/DL (ref 8–23)
BUN/CREAT SERPL: 18 (ref 7–25)
CALCIUM SPEC-SCNC: 8.7 MG/DL (ref 8.6–10.5)
CHLORIDE SERPL-SCNC: 100 MMOL/L (ref 98–107)
CO2 SERPL-SCNC: 38 MMOL/L (ref 22–29)
CREAT SERPL-MCNC: 1.94 MG/DL (ref 0.76–1.27)
EGFRCR SERPLBLD CKD-EPI 2021: 38.2 ML/MIN/1.73
GLUCOSE BLDC GLUCOMTR-MCNC: 139 MG/DL (ref 70–130)
GLUCOSE BLDC GLUCOMTR-MCNC: 151 MG/DL (ref 70–130)
GLUCOSE BLDC GLUCOMTR-MCNC: 158 MG/DL (ref 70–130)
GLUCOSE BLDC GLUCOMTR-MCNC: 211 MG/DL (ref 70–130)
GLUCOSE SERPL-MCNC: 107 MG/DL (ref 65–99)
MAGNESIUM SERPL-MCNC: 2 MG/DL (ref 1.6–2.4)
POTASSIUM SERPL-SCNC: 3.6 MMOL/L (ref 3.5–5.2)
SODIUM SERPL-SCNC: 145 MMOL/L (ref 136–145)

## 2024-11-02 PROCEDURE — 99232 SBSQ HOSP IP/OBS MODERATE 35: CPT | Performed by: STUDENT IN AN ORGANIZED HEALTH CARE EDUCATION/TRAINING PROGRAM

## 2024-11-02 PROCEDURE — 25010000002 BUMETANIDE PER 0.5 MG: Performed by: INTERNAL MEDICINE

## 2024-11-02 PROCEDURE — 94664 DEMO&/EVAL PT USE INHALER: CPT

## 2024-11-02 PROCEDURE — 29581 APPL MULTLAYER CMPRN SYS LEG: CPT

## 2024-11-02 PROCEDURE — 63710000001 INSULIN LISPRO (HUMAN) PER 5 UNITS: Performed by: INTERNAL MEDICINE

## 2024-11-02 PROCEDURE — 94799 UNLISTED PULMONARY SVC/PX: CPT

## 2024-11-02 PROCEDURE — 83735 ASSAY OF MAGNESIUM: CPT | Performed by: STUDENT IN AN ORGANIZED HEALTH CARE EDUCATION/TRAINING PROGRAM

## 2024-11-02 PROCEDURE — 82948 REAGENT STRIP/BLOOD GLUCOSE: CPT

## 2024-11-02 PROCEDURE — 99232 SBSQ HOSP IP/OBS MODERATE 35: CPT | Performed by: INTERNAL MEDICINE

## 2024-11-02 PROCEDURE — 80048 BASIC METABOLIC PNL TOTAL CA: CPT | Performed by: STUDENT IN AN ORGANIZED HEALTH CARE EDUCATION/TRAINING PROGRAM

## 2024-11-02 PROCEDURE — 97164 PT RE-EVAL EST PLAN CARE: CPT

## 2024-11-02 PROCEDURE — 94660 CPAP INITIATION&MGMT: CPT

## 2024-11-02 RX ADMIN — GABAPENTIN 600 MG: 300 CAPSULE ORAL at 14:03

## 2024-11-02 RX ADMIN — PANTOPRAZOLE SODIUM 40 MG: 40 TABLET, DELAYED RELEASE ORAL at 09:34

## 2024-11-02 RX ADMIN — CARVEDILOL 12.5 MG: 12.5 TABLET, FILM COATED ORAL at 09:34

## 2024-11-02 RX ADMIN — IPRATROPIUM BROMIDE AND ALBUTEROL SULFATE 3 ML: 2.5; .5 SOLUTION RESPIRATORY (INHALATION) at 20:47

## 2024-11-02 RX ADMIN — GABAPENTIN 600 MG: 300 CAPSULE ORAL at 05:39

## 2024-11-02 RX ADMIN — FLUTICASONE PROPIONATE 2 SPRAY: 50 SPRAY, METERED NASAL at 09:35

## 2024-11-02 RX ADMIN — ESCITALOPRAM OXALATE 20 MG: 20 TABLET, FILM COATED ORAL at 09:34

## 2024-11-02 RX ADMIN — INSULIN LISPRO 4 UNITS: 100 INJECTION, SOLUTION INTRAVENOUS; SUBCUTANEOUS at 17:54

## 2024-11-02 RX ADMIN — CARVEDILOL 12.5 MG: 12.5 TABLET, FILM COATED ORAL at 17:54

## 2024-11-02 RX ADMIN — BUMETANIDE 2.5 MG: 0.25 INJECTION INTRAMUSCULAR; INTRAVENOUS at 21:17

## 2024-11-02 RX ADMIN — IPRATROPIUM BROMIDE AND ALBUTEROL SULFATE 3 ML: 2.5; .5 SOLUTION RESPIRATORY (INHALATION) at 06:48

## 2024-11-02 RX ADMIN — IPRATROPIUM BROMIDE AND ALBUTEROL SULFATE 3 ML: 2.5; .5 SOLUTION RESPIRATORY (INHALATION) at 12:27

## 2024-11-02 RX ADMIN — GABAPENTIN 600 MG: 300 CAPSULE ORAL at 21:17

## 2024-11-02 RX ADMIN — BUMETANIDE 2.5 MG: 0.25 INJECTION INTRAMUSCULAR; INTRAVENOUS at 09:34

## 2024-11-02 RX ADMIN — INSULIN LISPRO 2 UNITS: 100 INJECTION, SOLUTION INTRAVENOUS; SUBCUTANEOUS at 21:17

## 2024-11-02 RX ADMIN — Medication 10 ML: at 09:35

## 2024-11-02 RX ADMIN — INSULIN LISPRO 2 UNITS: 100 INJECTION, SOLUTION INTRAVENOUS; SUBCUTANEOUS at 09:35

## 2024-11-02 RX ADMIN — Medication 10 ML: at 21:18

## 2024-11-02 RX ADMIN — ROSUVASTATIN 20 MG: 20 TABLET, FILM COATED ORAL at 21:17

## 2024-11-02 RX ADMIN — APIXABAN 5 MG: 5 TABLET, FILM COATED ORAL at 09:34

## 2024-11-02 RX ADMIN — APIXABAN 5 MG: 5 TABLET, FILM COATED ORAL at 21:17

## 2024-11-02 NOTE — PROGRESS NOTES
[facility] Medicine Services  PROGRESS NOTE    Patient Name: Juan Alberto Tejeda  : 1960  MRN: [id number]    Date of Admission: 10/30/2024  Primary Care Physician: Megan Martell APRN    Subjective   Subjective     CC:  Dyspnea    HPI:  Patient seen and examined this morning.  His only acute complaint is a small nosebleed that he got last night.  Working on getting humidified oxygen.  Currently on 3 L which is his baseline.      Objective   Objective     Vital Signs:   Temp:  [97.8 °F (36.6 °C)-98.2 °F (36.8 °C)] 98.1 °F (36.7 °C)  Heart Rate:  [54-70] 64  Resp:  [16-20] 20  BP: (138-155)/(72-83) 138/76  Flow (L/min) (Oxygen Therapy):  [3-3.5] 3     Physical Exam  Constitutional:       General: He is not in acute distress.  Cardiovascular:      Rate and Rhythm: Normal rate and regular rhythm.      Heart sounds: Normal heart sounds.   Pulmonary:      Effort: Pulmonary effort is normal. No respiratory distress.      Comments: Pulmonary exam limited due to patient's body habitus  Abdominal:      General: There is no distension.      Palpations: Abdomen is soft.      Tenderness: There is no abdominal tenderness.   Musculoskeletal:      Right lower leg: Edema present.      Left lower leg: Edema present.      Comments: Patient relates his swelling is better today.   Neurological:      General: No focal deficit present.      Mental Status: He is alert.          Results Reviewed:  LAB RESULTS:      Lab 24  0702 10/31/24  0708 10/30/24  1407 10/30/24  1136   WBC 7.75 7.06  --  10.61   HEMOGLOBIN 10.8* 9.5*  --  11.0*   HEMATOCRIT 38.4 33.0*  --  38.3   PLATELETS 217 175  --  211   NEUTROS ABS  --   --   --  8.84*   IMMATURE GRANS (ABS)  --   --   --  0.09*   LYMPHS ABS  --   --   --  0.81   MONOS ABS  --   --   --  0.55   EOS ABS  --   --   --  0.29   MCV 92.5 91.7  --  92.1   D DIMER QUANT  --   --   --  2.05*   HSTROP T  --   --  93* 104*         Lab 24  0714 24  0702  [Breast Self Exam] : breast self exam 10/31/24  0708 10/30/24  1136   SODIUM 145 146* 147* 144   POTASSIUM 3.6 3.8 3.6 3.9   CHLORIDE 100 98 102 100   CO2 38.0* 39.0* 39.0* 37.0*   ANION GAP 7.0 9.0 6.0 7.0   BUN 35* 32* 33* 33*   CREATININE 1.94* 2.02* 2.16* 2.14*   EGFR 38.2* 36.4* 33.6* 33.9*   GLUCOSE 107* 108* 124* 131*   CALCIUM 8.7 8.9 8.5* 8.9   MAGNESIUM 2.0 2.2  --   --          Lab 10/30/24  1136   TOTAL PROTEIN 6.8   ALBUMIN 3.6   GLOBULIN 3.2   ALT (SGPT) 11   AST (SGOT) 27   BILIRUBIN 0.7   ALK PHOS 110         Lab 10/30/24  1407 10/30/24  1136   PROBNP  --  2,457.0*   HSTROP T 93* 104*                 Lab 10/30/24  1519 10/30/24  1149   PH, ARTERIAL 7.409 7.299*   PCO2, ARTERIAL 63.4* 84.8*   PO2 ART 92.9 39.5*   FIO2 40 100   HCO3 ART 40.1* 41.6*   BASE EXCESS ART 13.3* 12.2*   CARBOXYHEMOGLOBIN 1.0 2.1*     Brief Urine Lab Results  (Last result in the past 365 days)        Color   Clarity   Blood   Leuk Est   Nitrite   Protein   CREAT   Urine HCG        04/11/24 1537             70.2                 Microbiology Results Abnormal       Procedure Component Value - Date/Time    Respiratory Panel PCR w/COVID-19(SARS-CoV-2) OSCAR/CINDY/ANDRADE/PAD/COR/COLEEN In-House, NP Swab in UTM/VTM, 2 HR TAT - Swab, Nasopharynx [[id number]]  (Abnormal) Collected: 10/30/24 1137    Lab Status: Final result Specimen: Swab from Nasopharynx Updated: 10/30/24 1254     ADENOVIRUS, PCR Not Detected     Coronavirus 229E Not Detected     Coronavirus HKU1 Not Detected     Coronavirus NL63 Not Detected     Coronavirus OC43 Not Detected     COVID19 Not Detected     Human Metapneumovirus Not Detected     Human Rhinovirus/Enterovirus Detected     Influenza A PCR Not Detected     Influenza B PCR Not Detected     Parainfluenza Virus 1 Not Detected     Parainfluenza Virus 2 Not Detected     Parainfluenza Virus 3 Not Detected     Parainfluenza Virus 4 Not Detected     RSV, PCR Not Detected     Bordetella pertussis pcr Not Detected     Bordetella parapertussis PCR Not Detected      [Contraception/ Emergency Contraception/ Safe Sexual Practices] : contraception, emergency contraception, safe sexual practices Chlamydophila pneumoniae PCR Not Detected     Mycoplasma pneumo by PCR Not Detected    Narrative:      In the setting of a positive respiratory panel with a viral infection PLUS a negative procalcitonin without other underlying concern for bacterial infection, consider observing off antibiotics or discontinuation of antibiotics and continue supportive care. If the respiratory panel is positive for atypical bacterial infection (Bordetella pertussis, Chlamydophila pneumoniae, or Mycoplasma pneumoniae), consider antibiotic de-escalation to target atypical bacterial infection.            Adult Transthoracic Echo Complete W/ Cont if Necessary Per Protocol    Result Date: 10/31/2024    Left ventricular systolic function is normal. Estimated left ventricular EF = 60%   The left atrial cavity is mildly dilated.   There is a 26 mm Fried GAURANG TAVR valve present.  The valve appears to function normally.  There is a mean gradient 14 mmHg across the valve.   Moderate mitral valve stenosis is present with functional MAC. The mitral valve mean gradient is 6 mmHg.   Estimated right ventricular systolic pressure from tricuspid regurgitation is mildly elevated (35 mmHg).   There is a right pleural effusion.      Results for orders placed during the hospital encounter of 10/30/24    Adult Transthoracic Echo Complete W/ Cont if Necessary Per Protocol    Interpretation Summary    Left ventricular systolic function is normal. Estimated left ventricular EF = 60%    The left atrial cavity is mildly dilated.    There is a 26 mm Fried GAURANG TAVR valve present.  The valve appears to function normally.  There is a mean gradient 14 mmHg across the valve.    Moderate mitral valve stenosis is present with functional MAC. The mitral valve mean gradient is 6 mmHg.    Estimated right ventricular systolic pressure from tricuspid regurgitation is mildly elevated (35 mmHg).    There is a right pleural effusion.      Current  medications:  Scheduled Meds:apixaban, 5 mg, Oral, BID  bumetanide, 2.5 mg, Intravenous, BID  carvedilol, 12.5 mg, Oral, BID With Meals  escitalopram, 20 mg, Oral, Daily  fluticasone, 2 spray, Nasal, Daily  gabapentin, 600 mg, Oral, Q8H  insulin lispro, 2-9 Units, Subcutaneous, 4x Daily AC & at Bedtime  ipratropium-albuterol, 3 mL, Nebulization, Q6H While Awake - RT  pantoprazole, 40 mg, Oral, Daily  pharmacy consult - MTM, , Does not apply, Q12H  rosuvastatin, 20 mg, Oral, Nightly  sodium chloride, 10 mL, Intravenous, Q12H      Continuous Infusions:     PRN Meds:.  acetaminophen **OR** acetaminophen **OR** acetaminophen    benzonatate    senna-docusate sodium **AND** polyethylene glycol **AND** bisacodyl **AND** bisacodyl    clonazePAM    dextrose    dextrose    glucagon (human recombinant)    nitroglycerin    polyethylene glycol    sodium chloride    sodium chloride    Assessment & Plan   Assessment & Plan     Active Hospital Problems    Diagnosis  POA    **Acute on chronic diastolic congestive heart failure [I50.33]  Yes    Rhinovirus infection [B34.8]  Yes    Possible paroxysmal atrial fibrillation [I48.0]  No    Acute respiratory failure with hypoxia [J96.01]  Yes    JASWINDER on CPAP [G47.33]  Yes    Peripheral neuropathy [G62.9]  Yes    Coronary artery disease involving native coronary artery of native heart with angina pectoris [I25.119]  Yes    CKD (chronic kidney disease), stage III [N18.30]  Yes    Essential hypertension [I10]  Yes    Hyperlipidemia LDL goal <70 [E78.5]  Yes    Nonrheumatic aortic valve stenosis/status post TAVR [I35.0]  Yes    Type 2 diabetes mellitus with kidney complication, with long-term current use of insulin [E11.29, Z79.4]  Not Applicable    Elevated troponin level not due to acute coronary syndrome [R79.89]  Yes      Resolved Hospital Problems   No resolved problems to display.        Brief Hospital Course to date:  Juan Alberto Tejeda is a 64 y.o. male with chronic diastolic CHF, CKD,  type 2 diabetes, peripheral neuropathy who presents with progressive dyspnea and cough and lower extremity edema for 1 week.      Acute on chronic diastolic heart failure  Acute on chronic hypoxic respiratory failure with hypoxia and hypercapnia  Valvular heart disease status post TAVR  - Continue diuresis with IV Bumex 2.5 twice daily, will monitor response closely  - On baseline 3 L of oxygen  - Cardiology consulted, recommending continued diuresis over the weekend.  Planning for outpatient stress test  - Repeat echo showed no significant change from echo done in April  - Strict I's and O's, daily weights    Hypertension  CAD  Hyperlipidemia  - Continue home carvedilol  - Continue home statin    History of A-fib  - Continue home Eliquis and carvedilol    Rhinovirus positive  - Supportive care    Type 2 diabetes  Peripheral neuropathy  - Will start patient on sliding scale insulin, adjust as necessary  - Continue home gabapentin    CKD 3  - Will continue to monitor renal function daily with diuresis  - No ACE/ARB/SGLT2 as of now, reevaluate need for SGLT2 outpatient      Expected Discharge   Expected Discharge Date: 11/4/2024; Expected Discharge Time:      VTE Prophylaxis:  Pharmacologic VTE prophylaxis orders are present.         AM-PAC 6 Clicks Score (PT): 20 (11/02/24 0800)    CODE STATUS:   Code Status and Medical Interventions: CPR (Attempt to Resuscitate); Full Support   Ordered at: 10/30/24 2123     Level Of Support Discussed With:    Patient     Code Status (Patient has no pulse and is not breathing):    CPR (Attempt to Resuscitate)     Medical Interventions (Patient has pulse or is breathing):    Full Support       Jennifer Cheung MD  11/02/24

## 2024-11-02 NOTE — PLAN OF CARE
Pts VSS on 3L NC. NSR, no reports of pain or SOB. Adequate intake/output. Care ongoing.

## 2024-11-02 NOTE — PLAN OF CARE
Goal Outcome Evaluation:  Plan of Care Reviewed With: patient        Progress: no change  Outcome Evaluation: PT wound care initial evaluation complete. Pt presenting with chronic BLE edema with history of use of compressogrips for edema management. Pt presenting this date with moderate BLE edema, no open wounds or skin breakdown at this time. PT applied BLE compressogrips for light compression to help promote venous return, improve skin integrity, and improve BLE function. PT issued extra compressogrips for home use. PT plans to f/u with [facility] changes in 2-3 days.

## 2024-11-02 NOTE — PLAN OF CARE
Problem: Adult Inpatient Plan of Care  Goal: Plan of Care Review  Outcome: Progressing  Goal: Patient-Specific Goal (Individualized)  Outcome: Progressing  Goal: Absence of Hospital-Acquired Illness or Injury  Outcome: Progressing  Intervention: Identify and Manage Fall Risk  Recent Flowsheet Documentation  Taken 11/2/2024 0000 by Alexa Arnold RN  Safety Promotion/Fall Prevention:   safety round/check completed   fall prevention program maintained  Taken 11/1/2024 2200 by Alexa Arnold RN  Safety Promotion/Fall Prevention:   fall prevention program maintained   nonskid shoes/slippers when out of bed   safety round/check completed  Taken 11/1/2024 2000 by Alexa Arnold RN  Safety Promotion/Fall Prevention:   fall prevention program maintained   nonskid shoes/slippers when out of bed   safety round/check completed  Intervention: Prevent Skin Injury  Recent Flowsheet Documentation  Taken 11/2/2024 0000 by Alexa Arnold RN  Body Position: position changed independently  Taken 11/1/2024 2200 by Alexa Arnold RN  Body Position: position changed independently  Taken 11/1/2024 2000 by Alexa Arnold RN  Body Position: position changed independently  Skin Protection: transparent dressing maintained  Intervention: Prevent Infection  Recent Flowsheet Documentation  Taken 11/2/2024 0000 by Alexa Arnold RN  Infection Prevention:   environmental surveillance performed   equipment surfaces disinfected   hand hygiene promoted   rest/sleep promoted   single patient room provided  Taken 11/1/2024 2200 by Alexa Arnold RN  Infection Prevention:   environmental surveillance performed   equipment surfaces disinfected   hand hygiene promoted   rest/sleep promoted   single patient room provided  Taken 11/1/2024 2000 by Alexa Arnold RN  Infection Prevention:   environmental surveillance performed   equipment surfaces disinfected   hand hygiene promoted   rest/sleep promoted   single patient room  provided  Goal: Optimal Comfort and Wellbeing  Outcome: Progressing  Intervention: Provide Person-Centered Care  Recent Flowsheet Documentation  Taken 11/1/2024 2000 by Alexa Arnold RN  Trust Relationship/Rapport: care explained  Goal: Readiness for Transition of Care  Outcome: Progressing     Problem: Comorbidity Management  Goal: Blood Glucose Level Within Target Range  Outcome: Progressing  Goal: Blood Pressure in Desired Range  Outcome: Progressing     Problem: Skin Injury Risk Increased  Goal: Skin Health and Integrity  Outcome: Progressing  Intervention: Optimize Skin Protection  Recent Flowsheet Documentation  Taken 11/2/2024 0000 by Alexa Arnold RN  Activity Management: activity encouraged  Head of Bed (HOB) Positioning: HOB elevated  Taken 11/1/2024 2200 by Alexa Arnold RN  Activity Management: activity encouraged  Head of Bed (HOB) Positioning: HOB elevated  Taken 11/1/2024 2000 by Alexa Arnold RN  Activity Management: activity encouraged  Pressure Reduction Techniques: frequent weight shift encouraged  Head of Bed (HOB) Positioning: HOB elevated  Pressure Reduction Devices: pressure-redistributing mattress utilized  Skin Protection: transparent dressing maintained     Problem: Heart Failure  Goal: Optimal Coping  Outcome: Progressing  Intervention: Support Psychosocial Response  Recent Flowsheet Documentation  Taken 11/1/2024 2000 by Alexa Arnold RN  Family/Support System Care: self-care encouraged  Goal: Optimal Cardiac Output and Blood Flow  Outcome: Progressing  Goal: Stable Heart Rate and Rhythm  Outcome: Progressing  Goal: Fluid and Electrolyte Balance  Outcome: Progressing  Goal: Optimal Functional Ability  Outcome: Progressing  Intervention: Optimize Functional Ability  Recent Flowsheet Documentation  Taken 11/2/2024 0000 by Alexa Arnold RN  Activity Management: activity encouraged  Taken 11/1/2024 2200 by Alexa Arnold RN  Activity Management: activity  encouraged  Taken 11/1/2024 2000 by Alexa Arnold RN  Activity Management: activity encouraged  Goal: Improved Oral Intake  Outcome: Progressing  Goal: Effective Oxygenation and Ventilation  Outcome: Progressing  Intervention: Promote Airway Secretion Clearance  Recent Flowsheet Documentation  Taken 11/2/2024 0000 by Alexa Arnold RN  Activity Management: activity encouraged  Taken 11/1/2024 2200 by Alexa Arnold RN  Activity Management: activity encouraged  Taken 11/1/2024 2000 by Alexa Arnold RN  Activity Management: activity encouraged  Intervention: Optimize Oxygenation and Ventilation  Recent Flowsheet Documentation  Taken 11/2/2024 0000 by Alexa Arnold RN  Head of Bed (HOB) Positioning: HOB elevated  Taken 11/1/2024 2200 by Alexa Arnold RN  Head of Bed (HOB) Positioning: HOB elevated  Taken 11/1/2024 2000 by Alexa Arnold RN  Head of Bed (HOB) Positioning: HOB elevated  Goal: Effective Breathing Pattern During Sleep  Outcome: Progressing     Problem: Fall Injury Risk  Goal: Absence of Fall and Fall-Related Injury  Outcome: Progressing  Intervention: Promote Injury-Free Environment  Recent Flowsheet Documentation  Taken 11/2/2024 0000 by Alexa Arnold RN  Safety Promotion/Fall Prevention:   safety round/check completed   fall prevention program maintained  Taken 11/1/2024 2200 by Alexa Arnold RN  Safety Promotion/Fall Prevention:   fall prevention program maintained   nonskid shoes/slippers when out of bed   safety round/check completed  Taken 11/1/2024 2000 by Alexa Arnold RN  Safety Promotion/Fall Prevention:   fall prevention program maintained   nonskid shoes/slippers when out of bed   safety round/check completed     Problem: Noninvasive Ventilation Acute  Goal: Effective Unassisted Ventilation and Oxygenation  Outcome: Progressing   Goal Outcome Evaluation:

## 2024-11-02 NOTE — PROGRESS NOTES
"[facility] at [facility]  IP Progress Note      Chief Complaint: Acute on chronic HFpEF/VHD/status post TAVR troponin elevation/hypertension    Subjective   Resting in bed, breathing has improved.  Denies chest pain.  Got out of bed and ambulated in the room yesterday.    Objective     Blood pressure 149/81, pulse 60, temperature 97.9 °F (36.6 °C), temperature source Axillary, resp. rate 16, height 170.2 cm (67.01\"), weight 127 kg (280 lb), SpO2 96%.     Intake/Output Summary (Last 24 hours) at 11/2/2024 0804  Last data filed at 11/2/2024 0151  Gross per 24 hour   Intake --   Output 1850 ml   Net -1850 ml       Physical Exam:  General: No acute distress.   Neck: no JVD.  Chest:No respiratory distress, breath sounds are diminished at bases, clear anteriorly.  Cardiovascular: Normal S1 and S2, distant, 2/6 systolic murmur  Extremities: 1+ edema.    Results Review:     I reviewed the patient's new clinical results.    Results from last 7 days   Lab Units 11/01/24  0702   WBC 10*3/mm3 7.75   HEMOGLOBIN g/dL 10.8*   HEMATOCRIT % 38.4   PLATELETS 10*3/mm3 217     Results from last 7 days   Lab Units 11/02/24  0714 10/31/24  0708 10/30/24  1136   SODIUM mmol/L 145   < > 144   POTASSIUM mmol/L 3.6   < > 3.9   CHLORIDE mmol/L 100   < > 100   CO2 mmol/L 38.0*   < > 37.0*   BUN mg/dL 35*   < > 33*   CREATININE mg/dL 1.94*   < > 2.14*   CALCIUM mg/dL 8.7   < > 8.9   BILIRUBIN mg/dL  --   --  0.7   ALK PHOS U/L  --   --  110   ALT (SGPT) U/L  --   --  11   AST (SGOT) U/L  --   --  27   GLUCOSE mg/dL 107*   < > 131*    < > = values in this interval not displayed.     Results from last 7 days   Lab Units 11/02/24  0714   SODIUM mmol/L 145   POTASSIUM mmol/L 3.6   CHLORIDE mmol/L 100   CO2 mmol/L 38.0*   BUN mg/dL 35*   CREATININE mg/dL 1.94*   GLUCOSE mg/dL 107*   CALCIUM mg/dL 8.7         Lab Results   Component Value Date    CKTOTAL 207 (H) 04/18/2024    TROPONINT 93 (C) 10/30/2024     apixaban, 5 mg, " Oral, BID  bumetanide, 2.5 mg, Intravenous, BID  carvedilol, 12.5 mg, Oral, BID With Meals  escitalopram, 20 mg, Oral, Daily  fluticasone, 2 spray, Nasal, Daily  gabapentin, 600 mg, Oral, Q8H  insulin lispro, 2-9 Units, Subcutaneous, 4x Daily AC & at Bedtime  ipratropium-albuterol, 3 mL, Nebulization, Q6H While Awake - RT  pantoprazole, 40 mg, Oral, Daily  pharmacy consult - MTM, , Does not apply, Q12H  rosuvastatin, 20 mg, Oral, Nightly  sodium chloride, 10 mL, Intravenous, Q12H       Tele: Sinus Rythym with PVC.      Assessment:  Acute on chronic HFpEF, EF 60%, diuresed, improved post diuresis.  Aortic stenosis, status post TAVR, acceptable gradients per echocardiogram earlier in the year.  Moderate mitral stenosis.  Paroxysmal atrial fibrillation.  Elevated troponin without chest pain, probably type II mechanism  Hypertension.  Dyslipidemia.  KANE/CKD, serum creatinine stable  Morbid obesity.    Plan:  Intake/output is negative, serum creatinine is stable, continue IV Bumex for another day.  Closely monitor intake/output and renal function.  Patient has history of probable atrial fibrillation as well as he is at high risk for DVT/PE, continue Eliquis.  Add low-dose aspirin due to troponin elevation.  Continue current statin therapy  Continue carvedilol.  No ACE inhibitor/ARB/Entresto/Jardiance/spironolactone due to kidney disease.  Increase activities as tolerated.    Trupti Paredes MD, FACC, [facility]

## 2024-11-02 NOTE — THERAPY RE-EVALUATION
Acute Care - Wound/Debridement Initial Evaluation  [facility]     Patient Name: Juan Alberto Tejeda  : 1960  MRN: [id number]  Today's Date: 2024                Admit Date: 10/30/2024    Visit Dx:    ICD-10-CM ICD-9-CM   1. Acute on chronic respiratory failure with hypoxia  J96.21 518.84     799.02   2. Acute on chronic congestive heart failure, unspecified heart failure type  I50.9 428.0   3. Rhinovirus infection  B34.8 079.3   4. Chronic kidney disease, unspecified CKD stage  N18.9 585.9       Patient Active Problem List   Diagnosis    CKD (chronic kidney disease), stage III    Hyperlipidemia LDL goal <70    Essential hypertension    Type 2 diabetes mellitus with kidney complication, with long-term current use of insulin    Elevated troponin level not due to acute coronary syndrome    Normocytic anemia    Nonrheumatic aortic valve stenosis/status post TAVR    Acute on chronic diastolic congestive heart failure    Coronary artery disease involving native coronary artery of native heart with angina pectoris    Nocturnal hypoxia    Peripheral neuropathy    JASWINDER on CPAP    Acute respiratory failure with hypoxia    Rhinovirus infection    Possible paroxysmal atrial fibrillation        Past Medical History:   Diagnosis Date    Aortic valve disorder     Arthritis     CHF (congestive heart failure)     Chronic kidney disease     Diabetes mellitus     Gout     Hiatal hernia     Hyperlipidemia     Hypertension     Kidney stones     history    MI, old     Peripheral neuropathy     Wears glasses         Past Surgical History:   Procedure Laterality Date    AORTIC VALVE REPAIR/REPLACEMENT N/A 3/16/2020    Procedure: TRANSCATHETER AORTIC VALVE REPLACEMENT, SHANKAR;  Surgeon: Irvin Jeffers MD;  Location: [place] [address];  Service: Cardiothoracic;  Laterality: N/A;  fluoro 10 min 30 sec  dose 1136 mGY   contrast 65 ml    AORTIC VALVE REPAIR/REPLACEMENT N/A 3/16/2020    Procedure: Transfemoral Transcatheter Aortic  Valve Replacement;  Surgeon: Cayla Bella MD;  Location: [facility] HYBRID OR 15;  Service: Cardiovascular;  Laterality: N/A;    CHOLECYSTECTOMY      CIRCUMCISION      COLONOSCOPY  2 years ago    KIDNEY STONE SURGERY      OTHER SURGICAL HISTORY      Gallstone removal surgery               Lymphedema       Row Name 11/02/24 1400             Lymphedema Edema Assessment    Ptting Edema Category By severity  -      Pitting Edema Moderate  -         Skin Changes/Observations    Location/Assessment Lower Extremity  -      Lower Extremity Conditions bilateral:;intact;clean;dry;scaly;fragile  -      Lower Extremity Color/Pigment bilateral:;hyperpigmented;brawny  -         Lymphedema Pulses/Capillary Refill    Lymphedema Pulses/Capillary Refill lower extremity pulses;capillary refill  -      Dorsalis Pedis Pulse --  ROSETTA through edema  -      Posterior Tibialis Pulse --  ROSETTA through edema  -      Capillary Refill lower extremity capillary refill  -      Lower Extremity Capillary Refill right:;left:;less than 3 seconds  -         Lymphedema Measurements    Measurement Type(s) Quick Girth  -      Quick Girth Areas Lower extremities  -         LLE Quick Girth (cm)    Mid foot 25.6 cm  -      Smallest ankle 27.1 cm  -      Largest calf 45.5 cm  -         RLE Quick Girth (cm)    Mid foot 27 cm  -      Smallest ankle 28.5 cm  -      Largest calf 43.8 cm  -      RLE Quick Girth Total 99.3  -         Compression/Skin Care    Compression/Skin Care skin care;wrapping location  -      Skin Care washed/dried;lotion applied  -      Wrapping Location lower extremity  -      Wrapping Location LE bilateral:;foot to knee  -      Wrapping Comments BLE size 5 compressogrips applied doubled distally for gradient compression.  -                User Key  (r) = Recorded By, (t) = Taken By, (c) = Cosigned By      Initials Name Provider Type     Montrell Kilpatrick, PT Physical Therapist                     WOUND DEBRIDEMENT                     PT Assessment (Last 12 Hours)       PT Evaluation and Treatment       [facility] Name 11/02/24 1213          Physical Therapy Time and Intention    Subjective Information complains of;weakness;fatigue;swelling  -     Document Type evaluation;wound care  -     Mode of Treatment physical therapy;individual therapy  -[facility] Name 11/02/24 1213          General Information    Patient Profile Reviewed yes  -     Patient Observations alert;cooperative;agree to therapy  -     Pertinent History of Current Functional Problem Chronic BLE edema  -     Risks Reviewed patient:;increased discomfort  -     Benefits Reviewed patient:;increase knowledge;improve skin integrity;decrease risk of DVT;improve function  -     Barriers to Rehab medically complex  -       Row Name 11/02/24 1213          Pain    Pretreatment Pain Rating 0/10 - no pain  -     Posttreatment Pain Rating 0/10 - no pain  -[facility] Name 11/02/24 1213          Cognition    Orientation Status (Cognition) oriented x 3  -[facility] Name 11/02/24 1213          Coping    Observed Emotional State calm;cooperative;pleasant  -     Verbalized Emotional State acceptance  -     Trust Relationship/Rapport care explained;questions answered  -[facility] Name 11/02/24 1213          Plan of Care Review    Plan of Care Reviewed With patient  -     Progress no change  -     Outcome Evaluation PT wound care initial evaluation complete. Pt presenting with chronic BLE edema with history of use of compressogrips for edema management. Pt presenting this date with moderate BLE edema, no open wounds or skin breakdown at this time. PT applied BLE compressogrips for light compression to help promote venous return, improve skin integrity, and improve BLE function. PT issued extra compressogrips for home use. PT plans to f/u with MLW changes in 2-3 days.  -       Row Name 11/02/24 1213          Positioning and  Restraints    Pre-Treatment Position sitting in chair/recliner  -     Post Treatment Position chair  -     In Chair reclined;call light within reach;encouraged to call for assist;legs elevated  -[facility] Name 11/02/24 1213          Therapy Assessment/Plan (PT)    Patient/Family Therapy Goals Statement (PT) Reduce BLE edema, return home.  -     PT Diagnosis (PT) Moderate BLE edema  -     Rehab Potential (PT) good  -     Criteria for Skilled Interventions Met (PT) yes;meets criteria;skilled treatment is necessary  -[facility] Name 11/02/24 1213          Therapy Plan Review/Discharge Plan (PT)    Therapy Plan Review (PT) evaluation/treatment results reviewed;care plan/treatment goals reviewed;risks/benefits reviewed;current/potential barriers reviewed;participants voiced agreement with care plan;participants included;patient  -[facility] Name 11/02/24 1213          Physical Therapy Goals    Wound Management Goal Selection (PT) wound management, PT goal 1;wound management, PT goal 2  -[facility] Name 11/02/24 1213          Wound Management Goal 1 (PT)    Wound Management Goal (Wound Goal 1, PT) Demonstrate minimal remaining BLE edema to improve BLE skin integrity and function.  -     Time Frame (Wound Goal 1, PT) short-term goal (STG);3 days  -[facility] Name 11/02/24 1213          Wound Management Goal 2 (PT)    Wound Management Goal (Wound Goal 2, PT) Demonstrate trace/no remaining BLE edema to promote transition to long term edema management option.  -               User Key  (r) = Recorded By, (t) = Taken By, (c) = Cosigned By      Initials Name Provider Type     Montrell Kilpatrick, PT Physical Therapist                  Physical Therapy Education       Title: PT OT SLP Therapies (In Progress)       Topic: Physical Therapy (In Progress)       Point: Mobility training (Done)       Learning Progress Summary            Patient Acceptance, E, VU,NR by BA at 11/1/2024 6577                       Point: Home exercise program (Not Started)       Learner Progress:  Not documented in this visit.              Point: Body mechanics (Done)       Learning Progress Summary            Patient Acceptance, E, VU,NR by  at 11/1/2024 1728                      Point: Precautions (Done)       Learning Progress Summary            Patient Acceptance, E, VU,NR by  at 11/1/2024 1728                                      User Key       Initials Effective Dates Name Provider Type [facility] 09/21/21 -  Mirna Dickson, PT Physical Therapist PT                    Recommendation and Plan  Anticipated Discharge Disposition (PT): home with assist, home with home health  Planned Therapy Interventions (PT): wound care, patient/family education  Therapy Frequency (PT): daily  Plan of Care Reviewed With: patient   Progress: no change       Progress: no change  Outcome Evaluation: PT wound care initial evaluation complete. Pt presenting with chronic BLE edema with history of use of compressogrips for edema management. Pt presenting this date with moderate BLE edema, no open wounds or skin breakdown at this time. PT applied BLE compressogrips for light compression to help promote venous return, improve skin integrity, and improve BLE function. PT issued extra compressogrips for home use. PT plans to f/u with MLW changes in 2-3 days.  Plan of Care Reviewed With: patient            Time Calculation   PT Charges       Row Name 11/02/24 1445             Time Calculation    Start Time 1213  -LH      PT Goal Re-Cert Due Date 11/11/24  -         Untimed Charges    PT Eval/Re-eval Minutes 35  -LH      Wound Care 65449 Multilayer comp below knee  -LH      [id number] comp below knee 20  -LH         Total Minutes    Untimed Charges Total Minutes 55  -LH       Total Minutes 55  -LH                User Key  (r) = Recorded By, (t) = Taken By, (c) = Cosigned By      Initials Name Provider Type     Montrell Kilpatrick, PT Physical  Therapist                      Therapy Charges for Today       Code Description Service Date Service Provider Modifiers Qty    07591916014 HC PT RE-EVAL ESTABLISHED PLAN 2 11/2/2024 Montrell Kilpatrick, PT GP 1    80204806723 HC PT MULTI LAYER COMP SYS BELOW KNEE 11/2/2024 Montrell Kilpatrick, PT GP 1              PT G-Codes  Outcome Measure Options: AM-PAC 6 Clicks Basic Mobility (PT)  AM-PAC 6 Clicks Score (PT): 20  AM-PAC 6 Clicks Score (OT): 15       Montrell Kilpatrick PT  11/2/2024

## 2024-11-03 LAB
ANION GAP SERPL CALCULATED.3IONS-SCNC: 6 MMOL/L (ref 5–15)
BUN SERPL-MCNC: 35 MG/DL (ref 8–23)
BUN/CREAT SERPL: 19.1 (ref 7–25)
CALCIUM SPEC-SCNC: 8.6 MG/DL (ref 8.6–10.5)
CHLORIDE SERPL-SCNC: 93 MMOL/L (ref 98–107)
CO2 SERPL-SCNC: 39 MMOL/L (ref 22–29)
CREAT SERPL-MCNC: 1.83 MG/DL (ref 0.76–1.27)
EGFRCR SERPLBLD CKD-EPI 2021: 41 ML/MIN/1.73
GLUCOSE BLDC GLUCOMTR-MCNC: 104 MG/DL (ref 70–130)
GLUCOSE BLDC GLUCOMTR-MCNC: 119 MG/DL (ref 70–130)
GLUCOSE BLDC GLUCOMTR-MCNC: 126 MG/DL (ref 70–130)
GLUCOSE BLDC GLUCOMTR-MCNC: 273 MG/DL (ref 70–130)
GLUCOSE SERPL-MCNC: 124 MG/DL (ref 65–99)
MAGNESIUM SERPL-MCNC: 2 MG/DL (ref 1.6–2.4)
POTASSIUM SERPL-SCNC: 3.4 MMOL/L (ref 3.5–5.2)
QT INTERVAL: 448 MS
QTC INTERVAL: 497 MS
SODIUM SERPL-SCNC: 138 MMOL/L (ref 136–145)

## 2024-11-03 PROCEDURE — 63710000001 INSULIN LISPRO (HUMAN) PER 5 UNITS: Performed by: INTERNAL MEDICINE

## 2024-11-03 PROCEDURE — 94799 UNLISTED PULMONARY SVC/PX: CPT

## 2024-11-03 PROCEDURE — 83735 ASSAY OF MAGNESIUM: CPT | Performed by: STUDENT IN AN ORGANIZED HEALTH CARE EDUCATION/TRAINING PROGRAM

## 2024-11-03 PROCEDURE — 82948 REAGENT STRIP/BLOOD GLUCOSE: CPT

## 2024-11-03 PROCEDURE — 25010000002 BUMETANIDE PER 0.5 MG

## 2024-11-03 PROCEDURE — 25010000002 BUMETANIDE PER 0.5 MG: Performed by: INTERNAL MEDICINE

## 2024-11-03 PROCEDURE — 99233 SBSQ HOSP IP/OBS HIGH 50: CPT | Performed by: INTERNAL MEDICINE

## 2024-11-03 PROCEDURE — 93010 ELECTROCARDIOGRAM REPORT: CPT | Performed by: INTERNAL MEDICINE

## 2024-11-03 PROCEDURE — 93005 ELECTROCARDIOGRAM TRACING: CPT | Performed by: NURSE PRACTITIONER

## 2024-11-03 PROCEDURE — 99232 SBSQ HOSP IP/OBS MODERATE 35: CPT

## 2024-11-03 PROCEDURE — 80048 BASIC METABOLIC PNL TOTAL CA: CPT | Performed by: STUDENT IN AN ORGANIZED HEALTH CARE EDUCATION/TRAINING PROGRAM

## 2024-11-03 RX ORDER — BUMETANIDE 0.25 MG/ML
2 INJECTION, SOLUTION INTRAMUSCULAR; INTRAVENOUS 2 TIMES DAILY
Status: DISCONTINUED | OUTPATIENT
Start: 2024-11-03 | End: 2024-11-04

## 2024-11-03 RX ORDER — POTASSIUM CHLORIDE 750 MG/1
40 CAPSULE, EXTENDED RELEASE ORAL ONCE
Status: DISCONTINUED | OUTPATIENT
Start: 2024-11-03 | End: 2024-11-03

## 2024-11-03 RX ORDER — POTASSIUM CHLORIDE 750 MG/1
20 CAPSULE, EXTENDED RELEASE ORAL ONCE
Status: COMPLETED | OUTPATIENT
Start: 2024-11-03 | End: 2024-11-03

## 2024-11-03 RX ORDER — OXYMETAZOLINE HYDROCHLORIDE 0.05 G/100ML
2 SPRAY NASAL 2 TIMES DAILY
Status: DISCONTINUED | OUTPATIENT
Start: 2024-11-03 | End: 2024-11-04 | Stop reason: HOSPADM

## 2024-11-03 RX ADMIN — PANTOPRAZOLE SODIUM 40 MG: 40 TABLET, DELAYED RELEASE ORAL at 09:16

## 2024-11-03 RX ADMIN — GABAPENTIN 600 MG: 300 CAPSULE ORAL at 05:56

## 2024-11-03 RX ADMIN — GABAPENTIN 600 MG: 300 CAPSULE ORAL at 13:16

## 2024-11-03 RX ADMIN — ROSUVASTATIN 20 MG: 20 TABLET, FILM COATED ORAL at 21:07

## 2024-11-03 RX ADMIN — POTASSIUM CHLORIDE 20 MEQ: 750 CAPSULE, EXTENDED RELEASE ORAL at 09:20

## 2024-11-03 RX ADMIN — APIXABAN 5 MG: 5 TABLET, FILM COATED ORAL at 09:16

## 2024-11-03 RX ADMIN — BUMETANIDE 2 MG: 0.25 INJECTION INTRAMUSCULAR; INTRAVENOUS at 21:07

## 2024-11-03 RX ADMIN — Medication 10 ML: at 21:08

## 2024-11-03 RX ADMIN — Medication 10 ML: at 09:25

## 2024-11-03 RX ADMIN — GABAPENTIN 600 MG: 300 CAPSULE ORAL at 21:07

## 2024-11-03 RX ADMIN — BUMETANIDE 2.5 MG: 0.25 INJECTION INTRAMUSCULAR; INTRAVENOUS at 09:17

## 2024-11-03 RX ADMIN — FLUTICASONE PROPIONATE 2 SPRAY: 50 SPRAY, METERED NASAL at 09:18

## 2024-11-03 RX ADMIN — ESCITALOPRAM OXALATE 20 MG: 20 TABLET, FILM COATED ORAL at 09:17

## 2024-11-03 RX ADMIN — Medication 2 SPRAY: at 21:07

## 2024-11-03 RX ADMIN — INSULIN LISPRO 6 UNITS: 100 INJECTION, SOLUTION INTRAVENOUS; SUBCUTANEOUS at 13:17

## 2024-11-03 RX ADMIN — Medication 2 SPRAY: at 13:16

## 2024-11-03 RX ADMIN — IPRATROPIUM BROMIDE AND ALBUTEROL SULFATE 3 ML: 2.5; .5 SOLUTION RESPIRATORY (INHALATION) at 19:59

## 2024-11-03 RX ADMIN — IPRATROPIUM BROMIDE AND ALBUTEROL SULFATE 3 ML: 2.5; .5 SOLUTION RESPIRATORY (INHALATION) at 06:50

## 2024-11-03 NOTE — PROGRESS NOTES
[facility] Medicine Services  PROGRESS NOTE    Patient Name: Juan Alberto Tejeda  : 1960  MRN: [id number]    Date of Admission: 10/30/2024  Primary Care Physician: Megan Martell APRN    Subjective   Subjective     CC:  Dyspnea    HPI:  Nose bleed mild over last 2-3 days  No abd pain  Dyspnea improved      Objective   Objective     Vital Signs:   Temp:  [97.5 °F (36.4 °C)-98.6 °F (37 °C)] 98.6 °F (37 °C)  Heart Rate:  [55-64] 64  Resp:  [16-20] 18  BP: (108-162)/(73-80) 108/73  Flow (L/min) (Oxygen Therapy):  [3] 3     Exam  Constitutional:Alert, oriented x 3, nontoxic appearing sitting up in chair; obvious recent nose bleeding w/ blood tinged papertowel; normal work of breathing w/ nasal canula in place  Psych:Normal/appropriate affect  HEENT:NCAT, oropharynx clear, old blood tinged both nares  Neck: neck supple, full range of motion  Neuro: Face symmetric, speech clear, equal , moves all extremities  Cardiac: rrr  Resp: ctab  GI: abd soft, nontender, obese  Skin: No extremity rash  Musculoskeletal/extremities: no cyanosis of extremities; no significant ankle edema             Results Reviewed:  LAB RESULTS:      Lab 24  0702 10/31/24  0708 10/30/24  1407 10/30/24  1136   WBC 7.75 7.06  --  10.61   HEMOGLOBIN 10.8* 9.5*  --  11.0*   HEMATOCRIT 38.4 33.0*  --  38.3   PLATELETS 217 175  --  211   NEUTROS ABS  --   --   --  8.84*   IMMATURE GRANS (ABS)  --   --   --  0.09*   LYMPHS ABS  --   --   --  0.81   MONOS ABS  --   --   --  0.55   EOS ABS  --   --   --  0.29   MCV 92.5 91.7  --  92.1   D DIMER QUANT  --   --   --  2.05*   HSTROP T  --   --  93* 104*         Lab 24  0607 24  0714 24  0702 10/31/24  0708 10/30/24  1136   SODIUM 138 145 146* 147* 144   POTASSIUM 3.4* 3.6 3.8 3.6 3.9   CHLORIDE 93* 100 98 102 100   CO2 39.0* 38.0* 39.0* 39.0* 37.0*   ANION GAP 6.0 7.0 9.0 6.0 7.0   BUN 35* 35* 32* 33* 33*   CREATININE 1.83* 1.94* 2.02* 2.16* 2.14*    EGFR 41.0* 38.2* 36.4* 33.6* 33.9*   GLUCOSE 124* 107* 108* 124* 131*   CALCIUM 8.6 8.7 8.9 8.5* 8.9   MAGNESIUM 2.0 2.0 2.2  --   --          Lab 10/30/24  1136   TOTAL PROTEIN 6.8   ALBUMIN 3.6   GLOBULIN 3.2   ALT (SGPT) 11   AST (SGOT) 27   BILIRUBIN 0.7   ALK PHOS 110         Lab 10/30/24  1407 10/30/24  1136   PROBNP  --  2,457.0*   HSTROP T 93* 104*                 Lab 10/30/24  1519 10/30/24  1149   PH, ARTERIAL 7.409 7.299*   PCO2, ARTERIAL 63.4* 84.8*   PO2 ART 92.9 39.5*   FIO2 40 100   HCO3 ART 40.1* 41.6*   BASE EXCESS ART 13.3* 12.2*   CARBOXYHEMOGLOBIN 1.0 2.1*     Brief Urine Lab Results  (Last result in the past 365 days)        Color   Clarity   Blood   Leuk Est   Nitrite   Protein   CREAT   Urine HCG        04/11/24 1537             70.2                 Microbiology Results Abnormal       Procedure Component Value - Date/Time    Respiratory Panel PCR w/COVID-19(SARS-CoV-2) OSCAR/CINDY/ANDRADE/PAD/COR/COLEEN In-House, NP Swab in UTM/VTM, 2 HR TAT - Swab, Nasopharynx [[id number]]  (Abnormal) Collected: 10/30/24 1137    Lab Status: Final result Specimen: Swab from Nasopharynx Updated: 10/30/24 1254     ADENOVIRUS, PCR Not Detected     Coronavirus 229E Not Detected     Coronavirus HKU1 Not Detected     Coronavirus NL63 Not Detected     Coronavirus OC43 Not Detected     COVID19 Not Detected     Human Metapneumovirus Not Detected     Human Rhinovirus/Enterovirus Detected     Influenza A PCR Not Detected     Influenza B PCR Not Detected     Parainfluenza Virus 1 Not Detected     Parainfluenza Virus 2 Not Detected     Parainfluenza Virus 3 Not Detected     Parainfluenza Virus 4 Not Detected     RSV, PCR Not Detected     Bordetella pertussis pcr Not Detected     Bordetella parapertussis PCR Not Detected     Chlamydophila pneumoniae PCR Not Detected     Mycoplasma pneumo by PCR Not Detected    Narrative:      In the setting of a positive respiratory panel with a viral infection PLUS a negative procalcitonin without  other underlying concern for bacterial infection, consider observing off antibiotics or discontinuation of antibiotics and continue supportive care. If the respiratory panel is positive for atypical bacterial infection (Bordetella pertussis, Chlamydophila pneumoniae, or Mycoplasma pneumoniae), consider antibiotic de-escalation to target atypical bacterial infection.            No radiology results from the last 24 hrs    Results for orders placed during the hospital encounter of 10/30/24    Adult Transthoracic Echo Complete W/ Cont if Necessary Per Protocol    Interpretation Summary    Left ventricular systolic function is normal. Estimated left ventricular EF = 60%    The left atrial cavity is mildly dilated.    There is a 26 mm Fried GAURANG TAVR valve present.  The valve appears to function normally.  There is a mean gradient 14 mmHg across the valve.    Moderate mitral valve stenosis is present with functional MAC. The mitral valve mean gradient is 6 mmHg.    Estimated right ventricular systolic pressure from tricuspid regurgitation is mildly elevated (35 mmHg).    There is a right pleural effusion.      Current medications:  Scheduled Meds:apixaban, 5 mg, Oral, BID  bumetanide, 2 mg, Intravenous, BID  carvedilol, 12.5 mg, Oral, BID With Meals  escitalopram, 20 mg, Oral, Daily  fluticasone, 2 spray, Nasal, Daily  gabapentin, 600 mg, Oral, Q8H  insulin lispro, 2-9 Units, Subcutaneous, 4x Daily AC & at Bedtime  ipratropium-albuterol, 3 mL, Nebulization, Q6H While Awake - RT  pantoprazole, 40 mg, Oral, Daily  pharmacy consult - MTM, , Does not apply, Q12H  rosuvastatin, 20 mg, Oral, Nightly  sodium chloride, 10 mL, Intravenous, Q12H      Continuous Infusions:     PRN Meds:.  acetaminophen **OR** acetaminophen **OR** acetaminophen    benzonatate    senna-docusate sodium **AND** polyethylene glycol **AND** bisacodyl **AND** bisacodyl    clonazePAM    dextrose    dextrose    glucagon (human recombinant)    Magnesium  Low Dose Replacement - Follow Nurse / BPA Driven Protocol    nitroglycerin    polyethylene glycol    sodium chloride    sodium chloride    Assessment & Plan   Assessment & Plan     Active Hospital Problems    Diagnosis  POA    **Acute on chronic diastolic congestive heart failure [I50.33]  Yes    Rhinovirus infection [B34.8]  Yes    Possible paroxysmal atrial fibrillation [I48.0]  No    Acute respiratory failure with hypoxia [J96.01]  Yes    JASWINDER on CPAP [G47.33]  Yes    Peripheral neuropathy [G62.9]  Yes    Coronary artery disease involving native coronary artery of native heart with angina pectoris [I25.119]  Yes    CKD (chronic kidney disease), stage III [N18.30]  Yes    Essential hypertension [I10]  Yes    Hyperlipidemia LDL goal <70 [E78.5]  Yes    Nonrheumatic aortic valve stenosis/status post TAVR [I35.0]  Yes    Type 2 diabetes mellitus with kidney complication, with long-term current use of insulin [E11.29, Z79.4]  Not Applicable    Elevated troponin level not due to acute coronary syndrome [R79.89]  Yes      Resolved Hospital Problems   No resolved problems to display.        Brief Hospital Course to date:  Juan Alberto Tejeda is a 64 y.o. male with chronic diastolic CHF, CKD, type 2 diabetes, peripheral neuropathy who presents with progressive dyspnea and cough and lower extremity edema for 1 week.      Acute on chronic hypoxic respiratory failure with hypoxia and hypercapnia (chronic 3L o2 dependence)  -due to chf exacerbation, rhinovirus resp infection  -required bipap upon admission in ED  -currently weaned to chronic 3Lnc    Acute on chronic HFpEF  Valvular heart disease (status post TAVR for TAVR/Moderate mitral stenosis)  Parox afib  HTN  HL  -echo 10/31/24: LV ef 60%, TAVR valve (aortic) appears functioning normally; moderate mitral stenosis  -Cards following: diuresing w/ iv bumex bid today; plan to transition to po bumex soon; coreg, eliquis 5mg bid once nose bleed improved, asa, statin (no  ace/arb/entresto/jardiance/aldactone due to renal dysfunction)  - On baseline 3 L of oxygen  - Cardiology consulted, recommending continued diuresis over the weekend.  Planning for outpatient stress test    Rhinovirus + resp infection  -supportive care    Epistaxis  -humidified oxygen  -hold eliquis temporarily starting  11/3 evening dose until bleeding imroved  -afrin bid x 3 days    Type 2 diabetes  Peripheral neuropathy  - Will start patient on sliding scale insulin, adjust as necessary  - Continue home gabapentin    CKD 3 (baseline cr ~2.0)  - Will continue to monitor renal function daily with diuresis  -currently baseline cr ~ stable  - No ACE/ARB/SGLT2 as of now, reevaluate need for SGLT2 outpatient      Am labs: cbc,bmp,mag        Expected Discharge   Expected Discharge Date: 11/4/2024; Expected Discharge Time:  home w/ home health soon (when ok w/ cards & nose bleed improved)    VTE Prophylaxis:  Pharmacologic VTE prophylaxis orders are present.         AM-PAC 6 Clicks Score (PT): 20 (11/02/24 2000)    CODE STATUS:   Code Status and Medical Interventions: CPR (Attempt to Resuscitate); Full Support   Ordered at: 10/30/24 2123     Level Of Support Discussed With:    Patient     Code Status (Patient has no pulse and is not breathing):    CPR (Attempt to Resuscitate)     Medical Interventions (Patient has pulse or is breathing):    Full Support       Edilberto Childress MD  11/03/24

## 2024-11-03 NOTE — PLAN OF CARE
Problem: Adult Inpatient Plan of Care  Goal: Plan of Care Review  Outcome: Progressing  Goal: Patient-Specific Goal (Individualized)  Outcome: Progressing  Goal: Absence of Hospital-Acquired Illness or Injury  Outcome: Progressing  Intervention: Identify and Manage Fall Risk  Recent Flowsheet Documentation  Taken 11/3/2024 0405 by Alexa Arnold RN  Safety Promotion/Fall Prevention: fall prevention program maintained  Taken 11/3/2024 0200 by Alexa Arnold RN  Safety Promotion/Fall Prevention:   fall prevention program maintained   safety round/check completed  Taken 11/3/2024 0000 by Alexa Arnold RN  Safety Promotion/Fall Prevention:   fall prevention program maintained   safety round/check completed  Taken 11/2/2024 2200 by Alexa Arnold RN  Safety Promotion/Fall Prevention:   fall prevention program maintained   safety round/check completed  Taken 11/2/2024 2000 by Alexa Arnold RN  Safety Promotion/Fall Prevention:   fall prevention program maintained   safety round/check completed  Intervention: Prevent Skin Injury  Recent Flowsheet Documentation  Taken 11/3/2024 0405 by Alexa Arnold RN  Body Position: position changed independently  Taken 11/3/2024 0200 by Alexa Arnold RN  Body Position: position changed independently  Taken 11/3/2024 0000 by Alexa Arnold RN  Body Position: position changed independently  Taken 11/2/2024 2200 by Alexa Arnold RN  Body Position: position changed independently  Taken 11/2/2024 2000 by Alexa Arnold RN  Body Position:   position changed independently   legs elevated  Skin Protection: transparent dressing maintained  Intervention: Prevent Infection  Recent Flowsheet Documentation  Taken 11/3/2024 0405 by Alexa Arnold RN  Infection Prevention:   environmental surveillance performed   equipment surfaces disinfected   hand hygiene promoted   rest/sleep promoted   single patient room provided  Taken 11/3/2024 0200 by Alexa Arnold  RN  Infection Prevention:   environmental surveillance performed   equipment surfaces disinfected   hand hygiene promoted   rest/sleep promoted   single patient room provided  Taken 11/3/2024 0000 by Alexa Arnold RN  Infection Prevention:   environmental surveillance performed   equipment surfaces disinfected   hand hygiene promoted   rest/sleep promoted   single patient room provided  Taken 11/2/2024 2200 by Alexa Arnold RN  Infection Prevention:   environmental surveillance performed   equipment surfaces disinfected   hand hygiene promoted   rest/sleep promoted   single patient room provided  Taken 11/2/2024 2000 by Alexa Arnold RN  Infection Prevention:   environmental surveillance performed   equipment surfaces disinfected   hand hygiene promoted   rest/sleep promoted   single patient room provided  Goal: Optimal Comfort and Wellbeing  Outcome: Progressing  Intervention: Provide Person-Centered Care  Recent Flowsheet Documentation  Taken 11/2/2024 2000 by Alexa Arnold RN  Trust Relationship/Rapport: care explained  Goal: Readiness for Transition of Care  Outcome: Progressing     Problem: Comorbidity Management  Goal: Blood Glucose Level Within Target Range  Outcome: Progressing  Goal: Blood Pressure in Desired Range  Outcome: Progressing     Problem: Skin Injury Risk Increased  Goal: Skin Health and Integrity  Outcome: Progressing  Intervention: Optimize Skin Protection  Recent Flowsheet Documentation  Taken 11/3/2024 0405 by Alexa Arnold RN  Activity Management: activity minimized  Head of Bed (HOB) Positioning: HOB elevated  Taken 11/3/2024 0200 by Alexa Arnold RN  Activity Management: activity minimized  Head of Bed (HOB) Positioning: HOB elevated  Taken 11/3/2024 0000 by Alexa Arnold RN  Activity Management: activity minimized  Head of Bed (HOB) Positioning: HOB elevated  Taken 11/2/2024 2200 by Alexa Arnold RN  Activity Management: activity encouraged  Head of Bed  ([facility]) Positioning: HOB elevated  Taken 11/2/2024 2000 by Alexa Arnold RN  Activity Management: activity encouraged  Pressure Reduction Techniques:   frequent weight shift encouraged   heels elevated off bed  Head of Bed ([facility]) Positioning: HOB elevated  Pressure Reduction Devices: pressure-redistributing mattress utilized  Skin Protection: transparent dressing maintained     Problem: Heart Failure  Goal: Optimal Coping  Outcome: Progressing  Intervention: Support Psychosocial Response  Recent Flowsheet Documentation  Taken 11/2/2024 2000 by Alexa Arnold RN  Family/Support System Care:   self-care encouraged   support provided  Goal: Optimal Cardiac Output and Blood Flow  Outcome: Progressing  Goal: Stable Heart Rate and Rhythm  Outcome: Progressing  Goal: Fluid and Electrolyte Balance  Outcome: Progressing  Goal: Optimal Functional Ability  Outcome: Progressing  Intervention: Optimize Functional Ability  Recent Flowsheet Documentation  Taken 11/3/2024 0405 by Alexa Arnold RN  Activity Management: activity minimized  Taken 11/3/2024 0200 by Alexa Arnold RN  Activity Management: activity minimized  Taken 11/3/2024 0000 by Alexa Arnold RN  Activity Management: activity minimized  Taken 11/2/2024 2200 by Alexa Arnold RN  Activity Management: activity encouraged  Taken 11/2/2024 2000 by Alexa Arnold RN  Activity Management: activity encouraged  Goal: Improved Oral Intake  Outcome: Progressing  Goal: Effective Oxygenation and Ventilation  Outcome: Progressing  Intervention: Promote Airway Secretion Clearance  Recent Flowsheet Documentation  Taken 11/3/2024 0405 by Alexa Arnold RN  Activity Management: activity minimized  Taken 11/3/2024 0200 by Alexa Arnold RN  Activity Management: activity minimized  Taken 11/3/2024 0000 by Alexa Arnold RN  Activity Management: activity minimized  Taken 11/2/2024 2200 by Alexa Arnold RN  Activity Management: activity encouraged  Taken  11/2/2024 2000 by Alexa Arnold RN  Activity Management: activity encouraged  Intervention: Optimize Oxygenation and Ventilation  Recent Flowsheet Documentation  Taken 11/3/2024 0405 by Alexa Arnold RN  Head of Bed (HOB) Positioning: HOB elevated  Taken 11/3/2024 0200 by Alexa Arnold RN  Head of Bed (HOB) Positioning: HOB elevated  Taken 11/3/2024 0000 by Alexa Arnold RN  Head of Bed (HOB) Positioning: HOB elevated  Taken 11/2/2024 2200 by Alexa Arnold RN  Head of Bed (HOB) Positioning: HOB elevated  Taken 11/2/2024 2000 by Alexa Arnold RN  Head of Bed (HOB) Positioning: HOB elevated  Goal: Effective Breathing Pattern During Sleep  Outcome: Progressing     Problem: Fall Injury Risk  Goal: Absence of Fall and Fall-Related Injury  Outcome: Progressing  Intervention: Promote Injury-Free Environment  Recent Flowsheet Documentation  Taken 11/3/2024 0405 by Alexa Arnold RN  Safety Promotion/Fall Prevention: fall prevention program maintained  Taken 11/3/2024 0200 by Alexa Arnold RN  Safety Promotion/Fall Prevention:   fall prevention program maintained   safety round/check completed  Taken 11/3/2024 0000 by Alexa Arnold RN  Safety Promotion/Fall Prevention:   fall prevention program maintained   safety round/check completed  Taken 11/2/2024 2200 by Alexa Arnold RN  Safety Promotion/Fall Prevention:   fall prevention program maintained   safety round/check completed  Taken 11/2/2024 2000 by Alexa Arnold RN  Safety Promotion/Fall Prevention:   fall prevention program maintained   safety round/check completed     Problem: Noninvasive Ventilation Acute  Goal: Effective Unassisted Ventilation and Oxygenation  Outcome: Progressing   Goal Outcome Evaluation:

## 2024-11-03 NOTE — PROGRESS NOTES
"[facility]  IP Progress Note      Chief Complaint: Acute on chronic HFpEF/VHD/status post TAVR troponin elevation/hypertension    Subjective   No acute events overnight.  Sitting on the edge of the bed in no acute distress.  States he did not get breakfast this morning.  Denies chest pain.  Breathing improved.    Objective     Blood pressure 108/73, pulse 59, temperature 98.6 °F (37 °C), temperature source Oral, resp. rate 18, height 170.2 cm (67.01\"), weight 127 kg (280 lb 10.3 oz), SpO2 95%.     Intake/Output Summary (Last 24 hours) at 11/3/2024 0814  Last data filed at 11/3/2024 0220  Gross per 24 hour   Intake 540 ml   Output 2350 ml   Net -1810 ml       Physical Exam:  General: No acute distress.   Neck: no JVD.  Chest:No respiratory distress, breath sounds are diminished at bases, clear anteriorly.  Cardiovascular: Normal S1 and S2, distant, 2/6 systolic murmur  Extremities: Trace - 1+ edema.    Results Review:     I reviewed the patient's new clinical results.    Results from last 7 days   Lab Units 11/01/24  0702   WBC 10*3/mm3 7.75   HEMOGLOBIN g/dL 10.8*   HEMATOCRIT % 38.4   PLATELETS 10*3/mm3 217     Results from last 7 days   Lab Units 11/03/24  0607 10/31/24  0708 10/30/24  1136   SODIUM mmol/L 138   < > 144   POTASSIUM mmol/L 3.4*   < > 3.9   CHLORIDE mmol/L 93*   < > 100   CO2 mmol/L 39.0*   < > 37.0*   BUN mg/dL 35*   < > 33*   CREATININE mg/dL 1.83*   < > 2.14*   CALCIUM mg/dL 8.6   < > 8.9   BILIRUBIN mg/dL  --   --  0.7   ALK PHOS U/L  --   --  110   ALT (SGPT) U/L  --   --  11   AST (SGOT) U/L  --   --  27   GLUCOSE mg/dL 124*   < > 131*    < > = values in this interval not displayed.     Results from last 7 days   Lab Units 11/03/24  0607   SODIUM mmol/L 138   POTASSIUM mmol/L 3.4*   CHLORIDE mmol/L 93*   CO2 mmol/L 39.0*   BUN mg/dL 35*   CREATININE mg/dL 1.83*   GLUCOSE mg/dL 124*   CALCIUM mg/dL 8.6         Lab Results   Component Value Date    CKTOTAL " 207 (H) 04/18/2024    TROPONINT 93 (C) 10/30/2024     apixaban, 5 mg, Oral, BID  bumetanide, 2.5 mg, Intravenous, BID  carvedilol, 12.5 mg, Oral, BID With Meals  escitalopram, 20 mg, Oral, Daily  fluticasone, 2 spray, Nasal, Daily  gabapentin, 600 mg, Oral, Q8H  insulin lispro, 2-9 Units, Subcutaneous, 4x Daily AC & at Bedtime  ipratropium-albuterol, 3 mL, Nebulization, Q6H While Awake - RT  pantoprazole, 40 mg, Oral, Daily  pharmacy consult - MTM, , Does not apply, Q12H  rosuvastatin, 20 mg, Oral, Nightly  sodium chloride, 10 mL, Intravenous, Q12H       Tele: Sinus Rythym with PVC.      Assessment:  Acute on chronic HFpEF, EF 60%, diuresed, improved post diuresis.  Aortic stenosis, status post TAVR, acceptable gradients per echocardiogram earlier in the year.  Moderate mitral stenosis.  Paroxysmal atrial fibrillation.  Elevated troponin without chest pain, probably type II mechanism  Hypertension.  Dyslipidemia.  KANE/CKD, serum creatinine stable  Morbid obesity.    Plan:  Given 2.5 mg of IV Bumex this morning.  Will go ahead and decrease p.m. dose to 2 mg with plan to transition to p.o. over the next day or 2.  Strict I's and O's.  Monitor renal function.  Continue Eliquis 5 mg twice daily due to probable atrial fibrillation as well as high risk for DVT/PE.  Continue aspirin and statin.  Continue carvedilol.  No ACE inhibitor/ARB/Entresto/Aldactone/Jardiance due to renal dysfunction.  Dr. Canales/RADHA Castaneda to resume care tomorrow.      Juju Yang PA-C

## 2024-11-04 ENCOUNTER — READMISSION MANAGEMENT (OUTPATIENT)
Dept: CALL CENTER | Facility: HOSPITAL | Age: 64
End: 2024-11-04
Payer: MEDICARE

## 2024-11-04 VITALS
HEIGHT: 67 IN | WEIGHT: 275.35 LBS | TEMPERATURE: 97.9 F | SYSTOLIC BLOOD PRESSURE: 132 MMHG | OXYGEN SATURATION: 96 % | RESPIRATION RATE: 16 BRPM | BODY MASS INDEX: 43.22 KG/M2 | HEART RATE: 62 BPM | DIASTOLIC BLOOD PRESSURE: 68 MMHG

## 2024-11-04 PROBLEM — I50.33 ACUTE ON CHRONIC HEART FAILURE WITH PRESERVED EJECTION FRACTION (HFPEF): Status: ACTIVE | Noted: 2024-11-04

## 2024-11-04 LAB
ANION GAP SERPL CALCULATED.3IONS-SCNC: 10 MMOL/L (ref 5–15)
BUN SERPL-MCNC: 39 MG/DL (ref 8–23)
BUN/CREAT SERPL: 21.8 (ref 7–25)
CALCIUM SPEC-SCNC: 8.7 MG/DL (ref 8.6–10.5)
CHLORIDE SERPL-SCNC: 96 MMOL/L (ref 98–107)
CO2 SERPL-SCNC: 38 MMOL/L (ref 22–29)
CREAT SERPL-MCNC: 1.79 MG/DL (ref 0.76–1.27)
DEPRECATED RDW RBC AUTO: 54.9 FL (ref 37–54)
EGFRCR SERPLBLD CKD-EPI 2021: 42.1 ML/MIN/1.73
ERYTHROCYTE [DISTWIDTH] IN BLOOD BY AUTOMATED COUNT: 16.4 % (ref 12.3–15.4)
GLUCOSE BLDC GLUCOMTR-MCNC: 158 MG/DL (ref 70–130)
GLUCOSE BLDC GLUCOMTR-MCNC: 180 MG/DL (ref 70–130)
GLUCOSE SERPL-MCNC: 145 MG/DL (ref 65–99)
HCT VFR BLD AUTO: 33.1 % (ref 37.5–51)
HGB BLD-MCNC: 9.5 G/DL (ref 13–17.7)
MAGNESIUM SERPL-MCNC: 2.2 MG/DL (ref 1.6–2.4)
MCH RBC QN AUTO: 26.4 PG (ref 26.6–33)
MCHC RBC AUTO-ENTMCNC: 28.7 G/DL (ref 31.5–35.7)
MCV RBC AUTO: 91.9 FL (ref 79–97)
PLATELET # BLD AUTO: 169 10*3/MM3 (ref 140–450)
PMV BLD AUTO: 11.8 FL (ref 6–12)
POTASSIUM SERPL-SCNC: 3.6 MMOL/L (ref 3.5–5.2)
RBC # BLD AUTO: 3.6 10*6/MM3 (ref 4.14–5.8)
SODIUM SERPL-SCNC: 144 MMOL/L (ref 136–145)
WBC NRBC COR # BLD AUTO: 6.54 10*3/MM3 (ref 3.4–10.8)

## 2024-11-04 PROCEDURE — 94799 UNLISTED PULMONARY SVC/PX: CPT

## 2024-11-04 PROCEDURE — 99239 HOSP IP/OBS DSCHRG MGMT >30: CPT | Performed by: INTERNAL MEDICINE

## 2024-11-04 PROCEDURE — 85027 COMPLETE CBC AUTOMATED: CPT | Performed by: INTERNAL MEDICINE

## 2024-11-04 PROCEDURE — 83735 ASSAY OF MAGNESIUM: CPT | Performed by: INTERNAL MEDICINE

## 2024-11-04 PROCEDURE — 80048 BASIC METABOLIC PNL TOTAL CA: CPT | Performed by: INTERNAL MEDICINE

## 2024-11-04 PROCEDURE — 94761 N-INVAS EAR/PLS OXIMETRY MLT: CPT

## 2024-11-04 PROCEDURE — 97110 THERAPEUTIC EXERCISES: CPT

## 2024-11-04 PROCEDURE — 94660 CPAP INITIATION&MGMT: CPT

## 2024-11-04 PROCEDURE — 63710000001 INSULIN LISPRO (HUMAN) PER 5 UNITS: Performed by: INTERNAL MEDICINE

## 2024-11-04 PROCEDURE — 99232 SBSQ HOSP IP/OBS MODERATE 35: CPT | Performed by: NURSE PRACTITIONER

## 2024-11-04 PROCEDURE — 94664 DEMO&/EVAL PT USE INHALER: CPT

## 2024-11-04 PROCEDURE — 97116 GAIT TRAINING THERAPY: CPT

## 2024-11-04 PROCEDURE — 25010000002 BUMETANIDE PER 0.5 MG

## 2024-11-04 PROCEDURE — 82948 REAGENT STRIP/BLOOD GLUCOSE: CPT

## 2024-11-04 RX ORDER — BUMETANIDE 2 MG/1
2 TABLET ORAL
Status: DISCONTINUED | OUTPATIENT
Start: 2024-11-04 | End: 2024-11-04 | Stop reason: HOSPADM

## 2024-11-04 RX ORDER — BUMETANIDE 2 MG/1
2 TABLET ORAL DAILY
Status: DISCONTINUED | OUTPATIENT
Start: 2024-11-04 | End: 2024-11-04

## 2024-11-04 RX ORDER — EZETIMIBE 10 MG/1
10 TABLET ORAL DAILY
Qty: 90 TABLET | Refills: 1 | Status: SHIPPED | OUTPATIENT
Start: 2024-11-04

## 2024-11-04 RX ORDER — BUMETANIDE 2 MG/1
2 TABLET ORAL
Status: DISCONTINUED | OUTPATIENT
Start: 2024-11-04 | End: 2024-11-04

## 2024-11-04 RX ORDER — BUMETANIDE 2 MG/1
2 TABLET ORAL 2 TIMES DAILY
Qty: 30 TABLET | Refills: 0 | Status: SHIPPED | OUTPATIENT
Start: 2024-11-04

## 2024-11-04 RX ORDER — CARVEDILOL 12.5 MG/1
25 TABLET ORAL 2 TIMES DAILY WITH MEALS
Status: DISCONTINUED | OUTPATIENT
Start: 2024-11-04 | End: 2024-11-04 | Stop reason: HOSPADM

## 2024-11-04 RX ORDER — CARVEDILOL 25 MG/1
25 TABLET ORAL 2 TIMES DAILY WITH MEALS
Qty: 120 TABLET | Refills: 1 | Status: SHIPPED | OUTPATIENT
Start: 2024-11-04

## 2024-11-04 RX ADMIN — BUMETANIDE 2 MG: 0.25 INJECTION INTRAMUSCULAR; INTRAVENOUS at 08:08

## 2024-11-04 RX ADMIN — Medication 10 ML: at 08:09

## 2024-11-04 RX ADMIN — INSULIN LISPRO 2 UNITS: 100 INJECTION, SOLUTION INTRAVENOUS; SUBCUTANEOUS at 08:17

## 2024-11-04 RX ADMIN — ESCITALOPRAM OXALATE 20 MG: 20 TABLET, FILM COATED ORAL at 08:08

## 2024-11-04 RX ADMIN — IPRATROPIUM BROMIDE AND ALBUTEROL SULFATE 3 ML: 2.5; .5 SOLUTION RESPIRATORY (INHALATION) at 08:41

## 2024-11-04 RX ADMIN — FLUTICASONE PROPIONATE 2 SPRAY: 50 SPRAY, METERED NASAL at 08:08

## 2024-11-04 RX ADMIN — INSULIN LISPRO 2 UNITS: 100 INJECTION, SOLUTION INTRAVENOUS; SUBCUTANEOUS at 12:25

## 2024-11-04 RX ADMIN — GABAPENTIN 600 MG: 300 CAPSULE ORAL at 05:24

## 2024-11-04 RX ADMIN — CARVEDILOL 12.5 MG: 12.5 TABLET, FILM COATED ORAL at 08:08

## 2024-11-04 RX ADMIN — PANTOPRAZOLE SODIUM 40 MG: 40 TABLET, DELAYED RELEASE ORAL at 08:08

## 2024-11-04 NOTE — CASE MANAGEMENT/SOCIAL WORK
Case Management Discharge Note      Final Note: Patient has discharge orders. Called [facility] to advise of discharge. Patient plan is to discharge home today via [facility], Caregiver will transport and bring an Oxygen tank for travel.         Selected Continued Care - Admitted Since 10/30/2024       Destination    No services have been selected for the patient.                Durable Medical Equipment    No services have been selected for the patient.                Dialysis/Infusion    No services have been selected for the patient.                Home Medical Care Coordination complete.      Service Provider Services Address Phone Fax Patient Preferred    [facility] [address] [facility] [address] [place] [address] [phone] --              Therapy    No services have been selected for the patient.                Community Resources    No services have been selected for the patient.                Community & DME    No services have been selected for the patient.                         Final Discharge Disposition Code: 06 - home with home health care

## 2024-11-04 NOTE — PROGRESS NOTES
"Cardiology Progress Note      Reason for visit:    Acute on chronic diastolic heart failure    IDENTIFICATION: 64-year-old gentleman who resides in [place], [place]    Active Hospital Problems    Diagnosis  POA    **Acute on chronic diastolic congestive heart failure [I50.33]  Yes     Priority: High     Echo (2/29/2020): LVEF 65%.  Aortic valve is bicuspid and severely calcified.  Severe aortic stenosis (mean gradient 51 mmHg). Mild MR and MS  Echo (5/20/2020): LVEF = 65%.  Mild LVH. A 26 mm Fried Lin 3 TAVR valve is well-seated and exhibits a mean gradient of 13 mmHg. There is no paravalvular leak present.  Echo (4/11/2024): LVEF 60%.  Well-seated 26 mm TAVR valve.  Mean gradient is 20 mmHg.  Moderate mitral stenosis with mean gradient 7 mmHg.  Mild MR  Echo (10/31/2024): LVEF 60%..  TAVR valve well-seated with mean gradient 14 mmHg.  Moderate mitral stenosis with mean gradient 6 mmHg.  Mild MR      Rhinovirus infection [B34.8]  Yes     Priority: High    Acute respiratory failure with hypoxia [J96.01]  Yes     Priority: High    CKD (chronic kidney disease), stage III [N18.30]  Yes     Priority: High     Met with  transplant team in the past.  patient has elected not to have transplant when given option between transplant hemodialysis.  Follows Dr. Cobos  Creatinine trended up overnight to 2.47  Nephrology following as an inpatient      Type 2 diabetes mellitus with kidney complication, with long-term current use of insulin [E11.29, Z79.4]  Not Applicable     Priority: High    Possible paroxysmal atrial fibrillation [I48.0]  No     Priority: Medium     Reportedly started on Eliquis for \"irregular heart rhythm\" while hospitalized at [facility]  [id number]      Coronary artery disease involving native coronary artery of native heart with angina pectoris [I25.119]  Yes     Priority: Medium     Cardiac catheterization at [facility] (12/2019): Moderate nonobstructive CAD.      Essential hypertension [I10]  " Yes     Priority: Medium    Nonrheumatic aortic valve stenosis/status post TAVR [I35.0]  Yes     Priority: Medium     Echo (2/29/2020): LVEF 65%.  Aortic valve is bicuspid and severely calcified.  Severe aortic stenosis (mean gradient 51 mmHg). Mild MR and MS  Status post TAVR 3/16/2020  Echo (5/20/2020): LVEF = 65%.  Mild LVH. A 26 mm Fried Lin 3 TAVR valve is well-seated and exhibits a mean gradient of 13 mmHg. There is no paravalvular leak present.  Echo (4/11/2024): LVEF 60%.  Well-seated 26 mm TAVR valve.  Mean gradient is 20 mmHg, BERNA 1.7 cm².  Mild mitral stenosis with mean gradient 7 mmHg.  Mild MR      Elevated troponin level not due to acute coronary syndrome [R79.89]  Yes     Priority: Medium     Troponin elevated and flat and EKG no acute ischemia.  In the setting of acute CHF and rhinovirus.  Scenario not consistent with ACS 10/2024      JASWINDER on CPAP [G47.33]  Yes     Priority: Low    Peripheral neuropathy [G62.9]  Yes     Priority: Low    Hyperlipidemia LDL goal <70 [E78.5]  Yes     Priority: Low     High intensity statin therapy indicated given the presence of CAD              Patient is lying flat in bed on O2 at 3 L by nasal cannula with O2 saturations of 98%.  Almost 9000 mL have been diuresed since admission.  He denies any chest pain.  He thinks his breathing is about back to his baseline.  He had a nosebleed that went on for 2 hours yesterday and his Eliquis has been on hold.  His H&H has trended down from 11 and 38.3 to now 9.5 and 33.1.  He has not had any atrial fibrillation on telemetry and has maintained sinus with occasional PVCs           Vital Sign Min/Max for last 24 hours  Temp  Min: 98 °F (36.7 °C)  Max: 98.9 °F (37.2 °C)   BP  Min: 128/88  Max: 166/52   Pulse  Min: 55  Max: 67   Resp  Min: 18  Max: 20   SpO2  Min: 92 %  Max: 99 %   Flow (L/min) (Oxygen Therapy)  Min: 3  Max: 4      Intake/Output Summary (Last 24 hours) at 11/4/2024 0829  Last data filed at 11/4/2024 0500  Gross  per 24 hour   Intake 480 ml   Output 3025 ml   Net -2545 ml           Physical Exam  Constitutional:       General: He is awake.   Cardiovascular:      Rate and Rhythm: Normal rate and regular rhythm. Occasional      Heart sounds: Murmur heard.      Comments: Normal sinus rhythm with occasional PVC    Trace lower extremity  Pulmonary:      Effort: Pulmonary effort is normal.      Breath sounds: Decreased breath sounds present.   Skin:     General: Skin is warm and dry.   Neurological:      Mental Status: He is alert.   Psychiatric:         Behavior: Behavior is cooperative.         Tele: Normal sinus rhythm with occasional PVCs    Results Review (reviewed the patient's recent labs in the electronic medical record):     EKG (11/3/2024): Sinus rhythm with occasional PVCs    CT angiogram of the chest (10/30/2024): No pulmonary embolism.  Moderate bilateral pleural effusions    ECHO (10/31/2024): LVEF 60%.  26 mm Fried GAURANG TAVR valve present functioning normally with mean gradient 14 mmHg.  Moderate mitral stenosis with mean gradient 6 mmHg.  RVSP 35 mmHg.  Right pleural effusion    Results from last 7 days   Lab Units 11/04/24  0536 11/03/24  0607 11/02/24  0714 11/01/24  0702 10/31/24  0708 10/31/24  0708 10/30/24  1136   SODIUM mmol/L 144 138 145 146*  --  147* 144   POTASSIUM mmol/L 3.6 3.4* 3.6 3.8  --  3.6 3.9   CHLORIDE mmol/L 96* 93* 100 98  --  102 100   BUN mg/dL 39* 35* 35* 32*  --  33* 33*   CREATININE mg/dL 1.79* 1.83* 1.94* 2.02*  --  2.16* 2.14*   MAGNESIUM mg/dL 2.2 2.0 2.0 2.2   < >  --   --     < > = values in this interval not displayed.     Results from last 7 days   Lab Units 10/30/24  1407 10/30/24  1136   HSTROP T ng/L 93* 104*     Results from last 7 days   Lab Units 11/04/24  0536 11/01/24  0702 10/31/24  0708   WBC 10*3/mm3 6.54 7.75 7.06   HEMOGLOBIN g/dL 9.5* 10.8* 9.5*   HEMATOCRIT % 33.1* 38.4 33.0*   PLATELETS 10*3/mm3 169 217 175       Lab Results   Component Value Date    HGBA1C 7.6  (H) 08/15/2024       Lab Results   Component Value Date    CHLPL 130 04/02/2024    TRIG 79 04/02/2024    HDL 49 04/02/2024    LDL 65 04/02/2024              Acute on chronic diastolic heart failure  Pulmonary vascular congestion and elevated proBNP  Bumex 2 mg IV twice daily  Carvedilol 12.5 mg twice daily  Consider empagliflozin if outpatient nephrologist agrees  No ARB/ARNI due to CKD  8915 mL diuresed since admission     Valvular heart disease  Echo shows mildly increased gradient across TAVR valve and moderate mitral stenosis due to mitral annular calcification     Possible atrial fibrillation   Started on Eliquis while at [facility] 10/2024  Normal sinus rhythm  while here with occasional PVCs  Eliquis currently on hold due to nosebleed with H&H trending down  Still have not been able to obtain records from [facility]        Elevated troponin not due to myocardial infarction  No suggestion of ACS  Patient deserves ischemic evaluation after discharge due to recurrent heart failure exacerbations  Recommend pharmacologic nuclear stress on outpatient basis     Hypertension  Carvedilol 12.5 mg twice daily  Current /82     Chronic kidney disease  Baseline creatinine appears to be 2.0-2.6  Current creatinine stable 1.79.     Type 2 diabetes mellitus  Recent hemoglobin A1c 7.6  May consider SGL 2 inhibitor for HFpEF and diabetes if outpatient nephrologist agrees  Patient would be good candidate for GLP agonist therapy given presence of CAD, recurrent HFpEF, morbid obesity, and diabetes     Hyperlipidemia with target LDL <50  Crestor increased to 40 mg daily this admission  Will add Zetia at discharge                      Change IV Bumex to 2 mg p.o. twice daily  Increase Coreg 25 mg twice daily   Add Zetia 10 mg daily at discharge  At outpatient appointment will consider addition of SGL 2 inhibitor if okay with nephrology  No ACE/ARB/Entresto/Aldactone due to CKD  Will leave off Eliquis  due to nosebleeds and decreased H&H as no A-fib has been noted throughout his hospital stay.  Will place 28-day monitor at discharge to screen for atrial fibrillation and continue to work on getting records from [facility] for discharge home from our standpoint.  Outpatient stress test.  Patient will be contacted with appointment date and time  Will need follow-up in the heart failure clinic in 3 to 5 days  Follow-up with cardiology in 6 weeks to review results of monitor    Electronically signed by RADHA Castaneda, 11/04/24, 8:33 AM EST.

## 2024-11-04 NOTE — PLAN OF CARE
Goal Outcome Evaluation:  Plan of Care Reviewed With: patient        Progress: improving  Outcome Evaluation: Pt's O2 was lowered to 2L O2, tested stamina with new O2 levels, able to ambulate 2 bouts of 50', SBA with desat to 87% but recovered quickly once seated.

## 2024-11-04 NOTE — PLAN OF CARE
Problem: Adult Inpatient Plan of Care  Goal: Plan of Care Review  Outcome: Progressing  Goal: Patient-Specific Goal (Individualized)  Outcome: Progressing  Goal: Absence of Hospital-Acquired Illness or Injury  Outcome: Progressing  Intervention: Identify and Manage Fall Risk  Recent Flowsheet Documentation  Taken 11/4/2024 0200 by Alexa Arnold RN  Safety Promotion/Fall Prevention:   fall prevention program maintained   safety round/check completed  Taken 11/4/2024 0000 by Alexa Arnold RN  Safety Promotion/Fall Prevention:   safety round/check completed   fall prevention program maintained  Taken 11/3/2024 2200 by Alexa Arnold RN  Safety Promotion/Fall Prevention: fall prevention program maintained  Taken 11/3/2024 2000 by Alexa Arnold RN  Safety Promotion/Fall Prevention:   fall prevention program maintained   safety round/check completed  Intervention: Prevent Skin Injury  Recent Flowsheet Documentation  Taken 11/4/2024 0200 by Alexa Arnold RN  Body Position: position changed independently  Taken 11/4/2024 0000 by Alexa Arnold RN  Body Position: position changed independently  Taken 11/3/2024 2200 by Alexa Arnold RN  Body Position: position changed independently  Taken 11/3/2024 2000 by Alexa Arnold RN  Body Position: position changed independently  Intervention: Prevent Infection  Recent Flowsheet Documentation  Taken 11/4/2024 0200 by Alexa Arnold RN  Infection Prevention:   environmental surveillance performed   equipment surfaces disinfected   hand hygiene promoted   rest/sleep promoted   single patient room provided  Taken 11/3/2024 2200 by Alexa Arnold RN  Infection Prevention:   environmental surveillance performed   equipment surfaces disinfected   hand hygiene promoted   rest/sleep promoted   single patient room provided  Taken 11/3/2024 2000 by Alexa Arnold RN  Infection Prevention:   environmental surveillance performed   equipment surfaces  disinfected   hand hygiene promoted   rest/sleep promoted   single patient room provided  Goal: Optimal Comfort and Wellbeing  Outcome: Progressing  Intervention: Provide Person-Centered Care  Recent Flowsheet Documentation  Taken 11/3/2024 2000 by Alexa Arnold RN  Trust Relationship/Rapport: care explained  Goal: Readiness for Transition of Care  Outcome: Progressing     Problem: Comorbidity Management  Goal: Blood Glucose Level Within Target Range  Outcome: Progressing  Goal: Blood Pressure in Desired Range  Outcome: Progressing     Problem: Skin Injury Risk Increased  Goal: Skin Health and Integrity  Outcome: Progressing  Intervention: Optimize Skin Protection  Recent Flowsheet Documentation  Taken 11/4/2024 0200 by Alexa Arnold RN  Activity Management: activity minimized  Head of Bed (HOB) Positioning: HOB elevated  Taken 11/4/2024 0000 by Alexa Arnold RN  Activity Management: activity minimized  Head of Bed (HOB) Positioning: HOB elevated  Taken 11/3/2024 2200 by Alexa Arnold RN  Activity Management: activity minimized  Head of Bed (HOB) Positioning: HOB elevated  Taken 11/3/2024 2000 by Alexa Arnold RN  Activity Management: activity encouraged  Pressure Reduction Techniques: frequent weight shift encouraged  Head of Bed (HOB) Positioning: HOB elevated  Pressure Reduction Devices: pressure-redistributing mattress utilized     Problem: Heart Failure  Goal: Optimal Coping  Outcome: Progressing  Intervention: Support Psychosocial Response  Recent Flowsheet Documentation  Taken 11/3/2024 2000 by Alexa Arnold RN  Family/Support System Care: self-care encouraged  Goal: Optimal Cardiac Output and Blood Flow  Outcome: Progressing  Goal: Stable Heart Rate and Rhythm  Outcome: Progressing  Goal: Fluid and Electrolyte Balance  Outcome: Progressing  Goal: Optimal Functional Ability  Outcome: Progressing  Intervention: Optimize Functional Ability  Recent Flowsheet Documentation  Taken 11/4/2024  0200 by Alexa Arnold RN  Activity Management: activity minimized  Taken 11/4/2024 0000 by Alexa Arnold RN  Activity Management: activity minimized  Taken 11/3/2024 2200 by Alexa Arnold RN  Activity Management: activity minimized  Taken 11/3/2024 2000 by Alexa Arnold RN  Activity Management: activity encouraged  Goal: Improved Oral Intake  Outcome: Progressing  Goal: Effective Oxygenation and Ventilation  Outcome: Progressing  Intervention: Promote Airway Secretion Clearance  Recent Flowsheet Documentation  Taken 11/4/2024 0200 by Alexa Arnold RN  Activity Management: activity minimized  Taken 11/4/2024 0000 by Alexa Arnold RN  Activity Management: activity minimized  Taken 11/3/2024 2200 by Alexa Arnold RN  Activity Management: activity minimized  Taken 11/3/2024 2000 by Alexa Arnold RN  Activity Management: activity encouraged  Intervention: Optimize Oxygenation and Ventilation  Recent Flowsheet Documentation  Taken 11/4/2024 0200 by Alexa Arnold RN  Head of Bed (HOB) Positioning: HOB elevated  Taken 11/4/2024 0000 by Alexa Arnold RN  Head of Bed (HOB) Positioning: HOB elevated  Taken 11/3/2024 2200 by Alexa Arnold RN  Head of Bed (HOB) Positioning: HOB elevated  Taken 11/3/2024 2000 by Alexa Arnold RN  Head of Bed (HOB) Positioning: HOB elevated  Goal: Effective Breathing Pattern During Sleep  Outcome: Progressing     Problem: Fall Injury Risk  Goal: Absence of Fall and Fall-Related Injury  Outcome: Progressing  Intervention: Promote Injury-Free Environment  Recent Flowsheet Documentation  Taken 11/4/2024 0200 by Alexa Arnold RN  Safety Promotion/Fall Prevention:   fall prevention program maintained   safety round/check completed  Taken 11/4/2024 0000 by Alexa Arnold RN  Safety Promotion/Fall Prevention:   safety round/check completed   fall prevention program maintained  Taken 11/3/2024 2200 by Alexa Arnold RN  Safety Promotion/Fall  Prevention: fall prevention program maintained  Taken 11/3/2024 2000 by Alexa Arnold RN  Safety Promotion/Fall Prevention:   fall prevention program maintained   safety round/check completed     Problem: Noninvasive Ventilation Acute  Goal: Effective Unassisted Ventilation and Oxygenation  Outcome: Progressing   Goal Outcome Evaluation:

## 2024-11-04 NOTE — DISCHARGE SUMMARY
[facility] Medicine Services  DISCHARGE SUMMARY    Patient Name: Juan Alberto Tejeda  : 1960  MRN: [id number]    Date of Admission: 10/30/2024 11:19 AM  Date of Discharge:  2024  Primary Care Physician: Megan Martell, RADHA    Consults       Date and Time Order Name Status Description    10/30/2024  9:25 PM Inpatient Cardiology Consult Completed             Hospital Course     Presenting Problem:     Active Hospital Problems    Diagnosis  POA    **Acute on chronic diastolic congestive heart failure [I50.33]  Yes    Acute on chronic heart failure with preserved ejection fraction (HFpEF) [I50.33]  Yes    Rhinovirus infection [B34.8]  Yes    Possible paroxysmal atrial fibrillation [I48.0]  No    Acute respiratory failure with hypoxia [J96.01]  Yes    JASWINDER on CPAP [G47.33]  Yes    Peripheral neuropathy [G62.9]  Yes    Coronary artery disease involving native coronary artery of native heart with angina pectoris [I25.119]  Yes    CKD (chronic kidney disease), stage III [N18.30]  Yes    Essential hypertension [I10]  Yes    Hyperlipidemia LDL goal <70 [E78.5]  Yes    Nonrheumatic aortic valve stenosis/status post TAVR [I35.0]  Yes    Type 2 diabetes mellitus with kidney complication, with long-term current use of insulin [E11.29, Z79.4]  Not Applicable    Elevated troponin level not due to acute coronary syndrome [R79.89]  Yes      Resolved Hospital Problems   No resolved problems to display.      -------------------------------final diagnoses----------------------  A/c Hypoxic resp failure (home requirement of 3Lnc over past couple of months prior to this admission)  -due to rhinovirus + resp infection & chf exacerbation  -weaned to 2Lnc at time of discharge  A/C HFpEF  Valvular heart dz (previous TAVR for AoS; also w/ moderate MS)  Question of possible Parox afib (in sinus rhythm this admission)  -no arb/arni/aldactone due to renal insufficiency; held eliquis due to epistaxis (and has  been in sinus rhythm this hospitalization)  -cards evaluated: holding eliquis (significant nosebleed), start asa 81mg,  PO bumex, coreg, zetia, no afib throughout this hospital stay; cards placing 38 day monitor prior to discharge to screen for afib (holding eliquis due to significant nosebleed on 11/3/24). To follow up in chf clinic within 5 days & Dr. Canales ([facility]) within 6 weeks  Rhinovirus + resp infection  Epistaxis, resolved  DM2 w/ diabetic neuropathy  -consider outpatient SGL2 inh as outpatient if nephrologist agrees  CKD 3 (baseline cr ~2.0)  -------------------------------------------------------------------------    Hospital Course:  Juan Alberto Tejeda is a 64 y.o. male w/ hx HFpEF,  chronic 3L o2 dependence (for past month or two), ckd 3, dm2. Had been placed on eliquis at outside facility due to concern for possible afib (records were not available for review despite request). Patient presented w/ progressive dyspnea & lower extremity edema. Workup revealed rhinovirus + and chf exacerbation. Was initiated on iv diuretics, cardiology consulted. Patient improved clinically and weaned oxygen successfully to 2Lnc. Patient developed significant nose bleed and eliquis was held. Patient remained in sinus rhythm throughout this hospitalization. Cardiology felt stable for discharge and we have decided to d/c patient home while holding eliquis (since no sign of afib here and had nose bleed night before discharge), initiate asa 81; set up w/ cardiac monitor w/ plans to follow up in H&V clinic within 5 days and Dr. Canales ([facility]) within ~1 month. Patient's nose bleed did stop overnight and hgb >9.       Discharge Follow Up Recommendations for outpatient labs/diagnostics:   Heart & valve clinic within 5 days  Dr. Canales (cardiology) 1 month   Pcp 1 week    Day of Discharge     HPI:   Feeling well, nose bleed resolved overnight ; breathing near baseline now. Wants to go home    Review of Systems  No  f/c    Vital Signs:   Temp:  [98 °F (36.7 °C)-98.9 °F (37.2 °C)] 98.5 °F (36.9 °C)  Heart Rate:  [55-67] 67  Resp:  [18-20] 18  BP: (128-166)/(52-88) 150/82  Flow (L/min) (Oxygen Therapy):  [3-4] 4      Physical Exam:  Constitutional:Alert, oriented x 3, nontoxic appearing sitting up in chair no distress/normal resp effort  Psych:Normal/appropriate affect  HEENT:NCAT, oropharynx clear  Neck: neck supple, full range of motion  Neuro: Face symmetric, speech clear, equal , moves all extremities  Cardiac: rrr  Resp: ctab  GI: abd soft, nontender, obese  Skin: No extremity rash  Musculoskeletal/extremities: no cyanosis of extremities  Pertinent  and/or Most Recent Results     LAB RESULTS:      Lab 11/04/24  0536 11/01/24  0702 10/31/24  0708 10/30/24  1136   WBC 6.54 7.75 7.06 10.61   HEMOGLOBIN 9.5* 10.8* 9.5* 11.0*   HEMATOCRIT 33.1* 38.4 33.0* 38.3   PLATELETS 169 217 175 211   NEUTROS ABS  --   --   --  8.84*   IMMATURE GRANS (ABS)  --   --   --  0.09*   LYMPHS ABS  --   --   --  0.81   MONOS ABS  --   --   --  0.55   EOS ABS  --   --   --  0.29   MCV 91.9 92.5 91.7 92.1   D DIMER QUANT  --   --   --  2.05*         Lab 11/04/24  0536 11/03/24  0607 11/02/24  0714 11/01/24  0702 10/31/24  0708   SODIUM 144 138 145 146* 147*   POTASSIUM 3.6 3.4* 3.6 3.8 3.6   CHLORIDE 96* 93* 100 98 102   CO2 38.0* 39.0* 38.0* 39.0* 39.0*   ANION GAP 10.0 6.0 7.0 9.0 6.0   BUN 39* 35* 35* 32* 33*   CREATININE 1.79* 1.83* 1.94* 2.02* 2.16*   EGFR 42.1* 41.0* 38.2* 36.4* 33.6*   GLUCOSE 145* 124* 107* 108* 124*   CALCIUM 8.7 8.6 8.7 8.9 8.5*   MAGNESIUM 2.2 2.0 2.0 2.2  --          Lab 10/30/24  1136   TOTAL PROTEIN 6.8   ALBUMIN 3.6   GLOBULIN 3.2   ALT (SGPT) 11   AST (SGOT) 27   BILIRUBIN 0.7   ALK PHOS 110         Lab 10/30/24  1407 10/30/24  1136   PROBNP  --  2,457.0*   HSTROP T 93* 104*                 Lab 10/30/24  1519 10/30/24  1149   PH, ARTERIAL 7.409 7.299*   PCO2, ARTERIAL 63.4* 84.8*   PO2 ART 92.9 39.5*   FIO2 40 100    HCO3 ART 40.1* 41.6*   BASE EXCESS ART 13.3* 12.2*   CARBOXYHEMOGLOBIN 1.0 2.1*     Brief Urine Lab Results  (Last result in the past 365 days)        Color   Clarity   Blood   Leuk Est   Nitrite   Protein   CREAT   Urine HCG        04/11/24 1537             70.2               Microbiology Results (last 10 days)       Procedure Component Value - Date/Time    Respiratory Panel PCR w/COVID-19(SARS-CoV-2) [facility] In-House, NP Swab in UTM/VTM, 2 HR TAT - Swab, Nasopharynx [[id number]]  (Abnormal) Collected: 10/30/24 1137    Lab Status: Final result Specimen: Swab from Nasopharynx Updated: 10/30/24 1254     ADENOVIRUS, PCR Not Detected     Coronavirus 229E Not Detected     Coronavirus HKU1 Not Detected     Coronavirus NL63 Not Detected     Coronavirus OC43 Not Detected     COVID19 Not Detected     Human Metapneumovirus Not Detected     Human Rhinovirus/Enterovirus Detected     Influenza A PCR Not Detected     Influenza B PCR Not Detected     Parainfluenza Virus 1 Not Detected     Parainfluenza Virus 2 Not Detected     Parainfluenza Virus 3 Not Detected     Parainfluenza Virus 4 Not Detected     RSV, PCR Not Detected     Bordetella pertussis pcr Not Detected     Bordetella parapertussis PCR Not Detected     Chlamydophila pneumoniae PCR Not Detected     Mycoplasma pneumo by PCR Not Detected    Narrative:      In the setting of a positive respiratory panel with a viral infection PLUS a negative procalcitonin without other underlying concern for bacterial infection, consider observing off antibiotics or discontinuation of antibiotics and continue supportive care. If the respiratory panel is positive for atypical bacterial infection (Bordetella pertussis, Chlamydophila pneumoniae, or Mycoplasma pneumoniae), consider antibiotic de-escalation to target atypical bacterial infection.            Adult Transthoracic Echo Complete W/ Cont if Necessary Per Protocol    Result Date: 10/31/2024    Left ventricular  systolic function is normal. Estimated left ventricular EF = 60%   The left atrial cavity is mildly dilated.   There is a 26 mm Fried GAURANG TAVR valve present.  The valve appears to function normally.  There is a mean gradient 14 mmHg across the valve.   Moderate mitral valve stenosis is present with functional MAC. The mitral valve mean gradient is 6 mmHg.   Estimated right ventricular systolic pressure from tricuspid regurgitation is mildly elevated (35 mmHg).   There is a right pleural effusion.     CT Angiogram Chest    Result Date: 10/30/2024  CT ANGIOGRAM CHEST Date of Exam: 10/30/2024 1:26 PM EDT Indication: sob, elevated dimer. Comparison: Same day chest radiograph, CTA chest 3/3/2020. Technique: CTA of the chest was performed after the uneventful intravenous administration of 85 mL Isovue-370. Reconstructed coronal and sagittal images were also obtained. In addition, a 3-D volume rendered image was created for interpretation. Automated exposure control and iterative reconstruction methods were used. Findings: Pulmonary arteries / cardiovascular: No intraluminal filling defect to suggest pulmonary embolus. No pericardial effusion. Normal caliber thoracic aorta. TAVR. Coronary artery and mitral annulus calcifications. Lymph nodes and mediastinum: Multiple mildly enlarged mediastinal and bilateral hilar lymph nodes, nonspecific. No mediastinal mass. Lungs and airways: Central airways are patent. Bibasilar atelectasis secondary to pleural fluid. Couple small pulmonary nodules are unchanged dating back to 2020 and considered benign (for example, 3 mm nodule in the left upper lobe on axial image 42 and  4 mm nodule in the right upper lobe on axial image 33). Scattered areas of groundglass attenuation with low-grade interlobular septal thickening and aerated portions of the lungs, suggestive of pulmonary edema. Pleura: Moderate bilateral pleural effusions. No pneumothorax.  Bones and soft tissues: No acute or  suspicious osseous abnormality. Thoracic spondylosis. Soft tissues are within normal limits. Upper abdomen: Cholecystectomy. Duodenal diverticulum.     Impression: No evidence of pulmonary embolism. Findings of pulmonary edema and moderate bilateral pleural effusions with bibasilar atelectasis. Multiple mildly enlarged mediastinal and bilateral hilar lymph nodes, nonspecific, but potentially reactive. Electronically Signed: Golden Schultz MD  10/30/2024 1:51 PM EDT  Workstation ID: [id number]    XR Chest 1 View    Result Date: 10/30/2024  XR CHEST 1 VW Date of Exam: 10/30/2024 11:53 AM EDT Indication: SOA triage protocol Comparison: 4/12/2024 Findings: Heart shadow is borderline enlarged. Vasculature appears cephalized. Patchy perihilar disease in the mid and lower lungs was present on the prior radiograph and earlier 4/11/2024 exam as well,, and slightly different pattern, whether recurrent asymmetric  edema or recurrent bilateral pneumonia. There appear to be small effusions present, favoring edema/volume overload. No pneumothorax or dense lung consolidation is seen.     Impression: 1. Borderline heart shadow enlargement and pulmonary vascular congestion. 2. Patchy interstitial and airspace disease in the lower lungs similar to 4/12/2024 exam. Appearance is equivocal for bilateral pneumonia versus asymmetric pulmonary interstitial edema. Potentially, both could be present. Electronically Signed: Campbell Swan MD  10/30/2024 12:20 PM EDT  Workstation ID: [id number]     Results for orders placed during the hospital encounter of 04/11/24    Duplex Venous Lower Extremity - Bilateral CAR    Interpretation Summary    Normal bilateral lower extremity venous duplex scan.      Results for orders placed during the hospital encounter of 04/11/24    Duplex Venous Lower Extremity - Bilateral CAR    Interpretation Summary    Normal bilateral lower extremity venous duplex scan.      Results for orders placed during the hospital  encounter of 10/30/24    Adult Transthoracic Echo Complete W/ Cont if Necessary Per Protocol    Interpretation Summary    Left ventricular systolic function is normal. Estimated left ventricular EF = 60%    The left atrial cavity is mildly dilated.    There is a 26 mm Fried GAURANG TAVR valve present.  The valve appears to function normally.  There is a mean gradient 14 mmHg across the valve.    Moderate mitral valve stenosis is present with functional MAC. The mitral valve mean gradient is 6 mmHg.    Estimated right ventricular systolic pressure from tricuspid regurgitation is mildly elevated (35 mmHg).    There is a right pleural effusion.      Plan for Follow-up of Pending Labs/Results:     Discharge Details        Discharge Medications        New Medications        Instructions Start Date   ezetimibe 10 MG tablet  Commonly known as: ZETIA   10 mg, Oral, Daily      PHARMACY MEDS TO BED CONSULT   Does not apply, Daily             Changes to Medications        Instructions Start Date   bumetanide 2 MG tablet  Commonly known as: BUMEX  What changed: when to take this   2 mg, Oral, 2 Times Daily      carvedilol 25 MG tablet  Commonly known as: COREG  What changed:   medication strength  how much to take   25 mg, Oral, 2 Times Daily With Meals      insulin detemir 100 UNIT/ML injection  Commonly known as: LEVEMIR  What changed: how much to take   If blood sugar consistently above 200, start using 10 units nightly             Continue These Medications        Instructions Start Date   allopurinol 300 MG tablet  Commonly known as: ZYLOPRIM   300 mg, Daily      aspirin 81 MG chewable tablet   81 mg, Oral, Daily      benzonatate 100 MG capsule  Commonly known as: TESSALON   100 mg, Oral, 3 Times Daily PRN      clonazePAM 0.5 MG tablet  Commonly known as: KlonoPIN   0.5 mg, 2 Times Daily PRN      escitalopram 20 MG tablet  Commonly known as: LEXAPRO   20 mg, Daily      fluticasone 50 MCG/ACT nasal spray  Commonly known  as: FLONASE   2 sprays, Daily      gabapentin 600 MG tablet  Commonly known as: NEURONTIN   600 mg, 3 Times Daily      insulin aspart 100 UNIT/ML solution pen-injector sc pen  Commonly known as: novoLOG FLEXPEN   Sliding scale TID with meals. 150-200: 2 units 201-249: 4 units 250-300: 6 units 301-350: 8 units      ipratropium-albuterol  MCG/ACT inhaler  Commonly known as: COMBIVENT RESPIMAT   2-3 puffs, 2 Times Daily      pantoprazole 40 MG EC tablet  Commonly known as: PROTONIX   40 mg, Daily      polyethylene glycol 17 g packet  Commonly known as: MIRALAX   17 g, Oral, 2 Times Daily PRN      rosuvastatin 20 MG tablet  Commonly known as: CRESTOR   20 mg, Oral, Daily             Stop These Medications      apixaban 5 MG tablet tablet  Commonly known as: ELIQUIS     pioglitazone 30 MG tablet  Commonly known as: ACTOS              No Known Allergies      Discharge Disposition:  Home or Self Care    Diet:  Hospital:  Diet Order   Procedures    Diet: Cardiac, Diabetic; Healthy Heart (2-3 Na+); Consistent Carbohydrate; Fluid Consistency: Thin (IDDSI 0)            Activity:           CODE STATUS:    Code Status and Medical Interventions: CPR (Attempt to Resuscitate); Full Support   Ordered at: 10/30/24 2123     Level Of Support Discussed With:    Patient     Code Status (Patient has no pulse and is not breathing):    CPR (Attempt to Resuscitate)     Medical Interventions (Patient has pulse or is breathing):    Full Support       Future Appointments   Date Time Provider Department Center   11/11/2024  2:30 PM Ana Melgar APRN [facility]   12/3/2024 10:20 AM Neela Olmos APRN [facility]       Additional Instructions for the Follow-ups that You Need to Schedule       Discharge Follow-up with PCP   As directed       Currently Documented PCP:    Megan Martell APRN    PCP Phone Number:    [phone]     Follow Up Details: 1 week                      Edilberto Childress  MD  11/04/24      Time Spent on Discharge:  I spent  35  minutes on this discharge activity which included: face-to-face encounter with the patient, reviewing the data in the system, coordination of the care with the nursing staff as well as consultants, documentation, and entering orders.

## 2024-11-05 ENCOUNTER — CARE COORDINATION (OUTPATIENT)
Dept: PRIMARY CARE CLINIC | Age: 64
End: 2024-11-05

## 2024-11-05 NOTE — OUTREACH NOTE
Prep Survey      Flowsheet Row Responses   [facility] facility patient discharged from? [place]   Is LACE score < 7 ? No   Eligibility Readm Mgmt   Discharge diagnosis A/C CHF   Does the patient have one of the following disease processes/diagnoses(primary or secondary)? CHF   Does the patient have Home health ordered? Yes   What is the Home health agency?  [facility]   Is there a DME ordered? No   Prep survey completed? Yes            Suzanna WILKERSON - Registered Nurse

## 2024-11-06 RX ORDER — EZETIMIBE 10 MG/1
10 TABLET ORAL DAILY
COMMUNITY
Start: 2024-11-04

## 2024-11-06 RX ORDER — ASPIRIN 81 MG/1
81 TABLET, CHEWABLE ORAL DAILY
COMMUNITY
End: 2024-11-07

## 2024-11-06 NOTE — CARE COORDINATION
Care Transitions Initial Follow Up Call    Call within 2 business days of discharge: Yes     Patient: Jagdish Dunaway Patient : 1960 MRN: [id number]    Last Discharge Facility       Date Complaint Diagnosis Description Type Department Provider    3/10/22   Admission (Discharged) Ju Real MD            RARS: No data recorded     Spoke with: Attempting HFU call, unsuccessful.  Message left with contact information.     Discharge department/facility: [facility]    Non-face-to-face services provided:  Scheduled appointment with PCP-Renzo  Obtained and reviewed discharge summary and/or continuity of care documents    Follow Up  Future Appointments   Date Time Provider Department Center   2024  4:00 PM Sheila Muller APRN [facility] DEP   2024  2:15 PM Sheila Muller APRN [facility] DEP       Shirley Hawk RN   
questions related to their healthcare.     Advance Care Planning:   Does patient have an Advance Directive: not on file; education provided.    Medication reconciliation was performed with patient, who verbalizes understanding of administration of home medications. Medications reviewed, 1111F entered: no    Was patient discharged with a pulse oximeter? no    Non-face-to-face services provided:  Scheduled appointment with PCP-Renzo  Obtained and reviewed discharge summary and/or continuity of care documents    Offered patient enrollment in the Remote Patient Monitoring (RPM) program for in-home monitoring: Patient is not eligible for RPM program because: na .    Care Transitions 24 Hour Call    Schedule Follow Up Appointment with PCP: Completed  Do you have a copy of your discharge instructions?: Yes  Do you have all of your prescriptions and are they filled?: Yes  Have you been contacted by a [facility] Pharmacist?: No  Have you scheduled your follow up appointment?: Yes  Do you feel like you have everything you need to keep you well at home?: Yes  Care Transitions Interventions         Discussed follow-up appointments. If no appointment was previously scheduled, appointment scheduling offered: Yes.   Is follow up appointment scheduled within 7 days of discharge? Yes.    Follow Up  Future Appointments   Date Time Provider Department Center   11/7/2024  4:00 PM Sheila Muller APRN [facility]   11/11/2024  2:15 PM Sheila Muller APRN [facility]       Care Transition Nurse provided contact information.  Plan for follow-up call in 5-7 days based on severity of symptoms and risk factors.  Plan for next call: self management-daily weight, swelling    Shirley Hawk RN

## 2024-11-07 ENCOUNTER — OFFICE VISIT (OUTPATIENT)
Dept: FAMILY MEDICINE CLINIC | Age: 64
End: 2024-11-07

## 2024-11-07 VITALS
SYSTOLIC BLOOD PRESSURE: 120 MMHG | WEIGHT: 287 LBS | OXYGEN SATURATION: 96 % | HEIGHT: 68 IN | BODY MASS INDEX: 43.5 KG/M2 | DIASTOLIC BLOOD PRESSURE: 54 MMHG | HEART RATE: 60 BPM | RESPIRATION RATE: 20 BRPM | TEMPERATURE: 97.5 F

## 2024-11-07 DIAGNOSIS — J06.9 ACUTE URI: ICD-10-CM

## 2024-11-07 DIAGNOSIS — Z79.4 TYPE 2 DIABETES MELLITUS WITH OTHER CIRCULATORY COMPLICATION, WITH LONG-TERM CURRENT USE OF INSULIN (HCC): ICD-10-CM

## 2024-11-07 DIAGNOSIS — J96.02 ACUTE RESPIRATORY FAILURE WITH HYPOXIA AND HYPERCAPNIA: ICD-10-CM

## 2024-11-07 DIAGNOSIS — N18.4 STAGE 4 CHRONIC KIDNEY DISEASE (HCC): ICD-10-CM

## 2024-11-07 DIAGNOSIS — I50.9 CONGESTIVE HEART FAILURE, UNSPECIFIED HF CHRONICITY, UNSPECIFIED HEART FAILURE TYPE (HCC): Primary | ICD-10-CM

## 2024-11-07 DIAGNOSIS — E11.59 TYPE 2 DIABETES MELLITUS WITH OTHER CIRCULATORY COMPLICATION, WITH LONG-TERM CURRENT USE OF INSULIN (HCC): ICD-10-CM

## 2024-11-07 DIAGNOSIS — Z09 HOSPITAL DISCHARGE FOLLOW-UP: ICD-10-CM

## 2024-11-07 DIAGNOSIS — J96.01 ACUTE RESPIRATORY FAILURE WITH HYPOXIA AND HYPERCAPNIA: ICD-10-CM

## 2024-11-07 LAB — QT INTERVAL: 486 MS

## 2024-11-07 RX ORDER — CEFDINIR 300 MG/1
600 CAPSULE ORAL DAILY
Qty: 20 CAPSULE | Refills: 0 | Status: SHIPPED | OUTPATIENT
Start: 2024-11-07 | End: 2024-11-17

## 2024-11-07 RX ORDER — ASPIRIN 81 MG/1
81 TABLET, CHEWABLE ORAL DAILY
Qty: 30 TABLET | Refills: 11 | Status: SHIPPED | OUTPATIENT
Start: 2024-11-07

## 2024-11-07 RX ORDER — BUMETANIDE 2 MG/1
2 TABLET ORAL DAILY
COMMUNITY
Start: 2024-11-04

## 2024-11-07 RX ORDER — CARVEDILOL 25 MG/1
TABLET ORAL 2 TIMES DAILY
COMMUNITY
Start: 2024-11-04

## 2024-11-07 RX ORDER — GUAIFENESIN 600 MG/1
1200 TABLET, EXTENDED RELEASE ORAL 2 TIMES DAILY
Qty: 40 TABLET | Refills: 0 | Status: SHIPPED | OUTPATIENT
Start: 2024-11-07 | End: 2024-11-17

## 2024-11-07 NOTE — PROGRESS NOTES
\"Have you been to the ER, urgent care clinic since your last visit?  Hospitalized since your last visit?\"    YES - When: approximately 4 days ago.  Where and Why: [facility].    “Have you seen or consulted any other health care providers outside our system since your last visit?”    NO      “Have you had a diabetic eye exam?”    NO     Date of last diabetic eye exam: 1/25/2022            
  allopurinol 300 MG tablet  Commonly known as: ZYLOPRIM  TAKE ONE TABLET BY MOUTH DAILY     aspirin 81 MG chewable tablet  Take 1 tablet by mouth daily     Budeson-Glycopyrrol-Formoterol 160-9-4.8 MCG/ACT Aero  Inhale 2 puffs into the lungs in the morning and at bedtime     * bumetanide 1 MG tablet  Commonly known as: BUMEX     * bumetanide 2 MG tablet  Commonly known as: BUMEX     carvedilol 25 MG tablet  Commonly known as: COREG     clonazePAM 0.5 MG tablet  Commonly known as: KLONOPIN  Take 1 tablet by mouth 2 times daily for 30 days. Max Daily Amount: 1 mg     Combivent Respimat  MCG/ACT Aers inhaler  Generic drug: albuterol-ipratropium     Dexcom G6  Radha  Check sugar continuously for glucose control     Dexcom G6 Sensor Misc  Check sugar continuously for blood sugar control     Dexcom G6 Transmitter Misc  Check sugar continuously for blood sugar control     escitalopram 20 MG tablet  Commonly known as: LEXAPRO  Take 1 tablet by mouth daily     ezetimibe 10 MG tablet  Commonly known as: ZETIA     fluticasone 50 MCG/ACT nasal spray  Commonly known as: FLONASE  2 sprays by Each Nostril route daily     gabapentin 600 MG tablet  Commonly known as: NEURONTIN  Take 1 tablet by mouth 3 times daily for 90 days. Max Daily Amount: 1,800 mg     [employer] Pen [place] 29G 29G X 12MM Misc  Generic drug: Insulin Pen Needle  Check sugar and use insulin up to 5 times daily     Lancets Misc  Check sugar and use insulin up to 5 times daily.     Lantus SoloStar 100 UNIT/ML injection pen  Generic drug: insulin glargine  Inject 80 Units into the skin nightly     linagliptin 5 MG tablet  Commonly known as: Tradjenta  Take 1 tablet by mouth daily     NovoLOG FlexPen 100 UNIT/ML injection pen  Generic drug: insulin aspart  INJECT 25 UNITS INTO THE SKIN THREE TIMES A DAY BEFORE MEALS     pantoprazole 40 MG tablet  Commonly known as: PROTONIX  Take 1 tablet by mouth Daily with supper     rosuvastatin 40 MG tablet  Commonly

## 2024-11-08 ENCOUNTER — READMISSION MANAGEMENT (OUTPATIENT)
Dept: CALL CENTER | Facility: HOSPITAL | Age: 64
End: 2024-11-08
Payer: MEDICARE

## 2024-11-08 NOTE — OUTREACH NOTE
CHF Week 1 Survey      Flowsheet Row Responses   [facility] patient discharged from? [facility]   Does the patient have one of the following disease processes/diagnoses(primary or secondary)? CHF   CHF Week 1 attempt successful? Yes   Call start time 1411   Call end time 1413   Discharge diagnosis A/C CHF   Person spoke with today (if not patient) and relationship patient   Meds reviewed with patient/caregiver? Yes   Is the patient having any side effects they believe may be caused by any medication additions or changes? No   Does the patient have all medications ordered at discharge? Yes   Is the patient taking all medications as directed (includes completed medication regime)? Yes   Does the patient have a primary care provider?  Yes   Does the patient have an appointment with their PCP within 7 days of discharge? Yes   Has the patient kept scheduled appointments due by today? Yes   What is the Home health agency?  [facility]    Has home health visited the patient within 72 hours of discharge? Yes   Psychosocial issues? No   Did the patient receive a copy of their discharge instructions? Yes   Nursing interventions Reviewed instructions with patient   What is the patient's perception of their health status since discharge? Improving   Is the patient able to teach back signs and symptoms of worsening condition? (i.e. weight gain, shortness of air, etc.) Yes   If the patient is a current smoker, are they able to teach back resources for cessation? Not a smoker   Is the patient/caregiver able to teach back the hierarchy of who to call/visit for symptoms/problems? PCP, Specialist, Home health nurse, Urgent Care, ED, 911 Yes   Is the patient able to teach back Heart Failure Zones? Yes   CHF Nursing Interventions Education provided on various zones   CHF Zone this Call Green Zone   Green Zone Patient reports doing well, No new or worsening shortness of breath, No new swelling -  feet, ankles and legs look normal  for you   Green Zone Interventions Meds as directed, Follow up visits planned, Daily weight check    CHF Week 1 call completed? Yes   Revoked No further contact(revokes)-requires comment   Is the patient interested in additional calls from an ambulatory ? No   Would this patient benefit from a Referral to [facility] Social Work? No   Call end time [id number]            Sukhdev BECERRA - Registered Nurse

## 2024-11-08 NOTE — PROGRESS NOTES
"Enter Query Response Below      Query Response: Heart failure due to hypertension and valvular heart disease.             If applicable, please update the problem list.     Patient: Juan Alberto Tejeda        : 1960  Account: [id number]           Admit Date: 10/30/2024        How to Respond to this query:       a. Click New Note     b. Answer query within the yellow box.                c. Update the Problem List, if applicable.      If you have any questions about this query contact me at: [phone]     Dr. Paredes:    64 y.o. male with history of chronic diastolic CHF, hypertension, aortic stenosis, status post TAVR, admitted with acute on chronic diastolic heart failure.  Cardiology notes state \"Echo (10/31/2024): LVEF 60%. TAVR valve well-seated with mean gradient 14 mmHg.  Moderate mitral stenosis with mean gradient 6 mmHg.  Mild MR.\"  Patient given bumex IV during hospital stay.      Please clarify the etiology of the patient’s heart failure:    Heart failure due to hypertension  Heart failure due to valvular disease  Heart failure due to hypertension and valvular disease  Other- specify__________    By submitting this query, we are merely seeking further clarification of documentation to accurately reflect all conditions that you are monitoring, evaluating, treating or that extend the hospitalization or utilize additional resources of care. Please utilize your independent clinical judgment when addressing the question(s) above.     This query and your response, once completed, will be entered into the legal medical record.    Sincerely,  Sejal Dean, MSN, APRN-BC, CCDS   Clinical Documentation Integrity Program   [email or web address]   "

## 2024-11-11 ENCOUNTER — TELEPHONE (OUTPATIENT)
Dept: FAMILY MEDICINE CLINIC | Age: 64
End: 2024-11-11

## 2024-11-11 NOTE — TELEPHONE ENCOUNTER
PT with [facility] called stating they did do a visit with Jagdish,and he is doing well with mobility.Says that he is able to get in and out of car by himself.

## 2024-11-14 ENCOUNTER — TELEPHONE (OUTPATIENT)
Dept: CARDIOLOGY | Facility: HOSPITAL | Age: 64
End: 2024-11-14

## 2024-11-14 ENCOUNTER — OFFICE VISIT (OUTPATIENT)
Dept: CARDIOLOGY | Facility: HOSPITAL | Age: 64
End: 2024-11-14
Payer: MEDICARE

## 2024-11-14 ENCOUNTER — CARE COORDINATION (OUTPATIENT)
Age: 64
End: 2024-11-14

## 2024-11-14 VITALS
OXYGEN SATURATION: 95 % | DIASTOLIC BLOOD PRESSURE: 58 MMHG | SYSTOLIC BLOOD PRESSURE: 113 MMHG | HEART RATE: 53 BPM | WEIGHT: 296.6 LBS | BODY MASS INDEX: 46.55 KG/M2 | HEIGHT: 67 IN

## 2024-11-14 DIAGNOSIS — I38 VHD (VALVULAR HEART DISEASE): ICD-10-CM

## 2024-11-14 DIAGNOSIS — I48.0 PAROXYSMAL ATRIAL FIBRILLATION: ICD-10-CM

## 2024-11-14 DIAGNOSIS — N18.32 STAGE 3B CHRONIC KIDNEY DISEASE: ICD-10-CM

## 2024-11-14 DIAGNOSIS — I50.32 CHRONIC HEART FAILURE WITH PRESERVED EJECTION FRACTION (HFPEF): Primary | ICD-10-CM

## 2024-11-14 LAB
ANION GAP SERPL CALCULATED.3IONS-SCNC: 8 MMOL/L (ref 5–15)
BUN SERPL-MCNC: 57 MG/DL (ref 8–23)
BUN/CREAT SERPL: 21.5 (ref 7–25)
CALCIUM SPEC-SCNC: 8.7 MG/DL (ref 8.6–10.5)
CHLORIDE SERPL-SCNC: 103 MMOL/L (ref 98–107)
CO2 SERPL-SCNC: 32 MMOL/L (ref 22–29)
CREAT SERPL-MCNC: 2.65 MG/DL (ref 0.76–1.27)
EGFRCR SERPLBLD CKD-EPI 2021: 26.3 ML/MIN/1.73
GLUCOSE SERPL-MCNC: 81 MG/DL (ref 65–99)
NT-PROBNP SERPL-MCNC: 1759 PG/ML (ref 0–900)
POTASSIUM SERPL-SCNC: 4.5 MMOL/L (ref 3.5–5.2)
SODIUM SERPL-SCNC: 143 MMOL/L (ref 136–145)

## 2024-11-14 PROCEDURE — 80048 BASIC METABOLIC PNL TOTAL CA: CPT | Performed by: NURSE PRACTITIONER

## 2024-11-14 PROCEDURE — 83880 ASSAY OF NATRIURETIC PEPTIDE: CPT | Performed by: NURSE PRACTITIONER

## 2024-11-14 RX ORDER — PIOGLITAZONEHYDROCHLORIDE 30 MG/1
30 TABLET ORAL DAILY
COMMUNITY

## 2024-11-14 RX ORDER — ALBUTEROL SULFATE 90 UG/1
2 INHALANT RESPIRATORY (INHALATION) EVERY 4 HOURS PRN
COMMUNITY

## 2024-11-14 NOTE — CARE COORDINATION
Shirley Hawk, RN  Manager Care Coordination  [phone]     I called on Ray for fu.  He tells me that he is at the cardiologist office today.  His weight is at 296 which is up 9 pounds from his office visit.  He continues with swelling and breathing is off and on improved.  He should be able to get a plan going forward today.

## 2024-11-14 NOTE — PROGRESS NOTES
[facility]  Heart Failure Clinic    Cardiologist/EP: Dr. Canales  PCP: Megan Martell APRN  Referring: RADHA Pena    Chief Complaint  Congestive Heart Failure    PROBLEM LIST:  HFpEF  Echo (2/29/2020): LVEF 65%.  Aortic valve is bicuspid and severely calcified.  Severe aortic stenosis (mean gradient 51 mmHg). Mild MR and MS  Echo (5/20/2020): LVEF = 65%.  Mild LVH. A 26 mm Fried Lin 3 TAVR valve is well-seated and exhibits a mean gradient of 13 mmHg. There is no paravalvular leak present.  Echo (4/11/2024): LVEF 60%.  Well-seated 26 mm TAVR valve.  Mean gradient is 20 mmHg.  Moderate mitral stenosis with mean gradient 7 mmHg.  Mild MR  Echo (10/31/2024): LVEF 60%..  TAVR valve well-seated with mean gradient 14 mmHg.  Moderate mitral stenosis with mean gradient 6 mmHg.  Mild MR  VHD  Severe aortic stenosis status post TAVR 3/16/2020  Mild to moderate mitral valve stenosis  CKD stage III  Followed by Dr. Cobos.  Met with  transplant team in the past and patient has elected not to have transplant when given option between transplant and hemodialysis  Type 2 diabetes  Nonobstructive CAD  Left heart cath 2019  moderate nonobstructive CAD  Possible paroxysmal atrial fibrillation  Started on Eliquis while hospitalized at [facility] 10/2024, no documented afib. Eliquis later stopped due to nose bleeds and anemia  JASWINDER on CPAP  Hyperlipidemia  Peripheral neuropathy    Subjective    History of Present Illness    Juan Alberto Tejeda is a 64 y.o. male who presents today as a hospital referral for heart failure.    Patient was admitted 10/30 to 11/4 with acute on chronic respiratory failure.  Workup revealed rhinovirus and heart failure exacerbation.  He was IV diuresed and discharged on Bumex 2 mg twice a day.  His carvedilol was increased as well.  No SGLT2 inhibitor was started due to CKD.  Echo showed mildly increased gradient across TAVR valve and moderate mitral valve  "stenosis.  Attempts were made to obtain records from [facility] but unable to obtain.  Therefore send there was no documented A-fib and patient was having worsening anemia and nosebleeds Eliquis was discontinued and he was discharged with a heart monitor.  Plan for outpatient nuclear stress test, which is scheduled 12/18.    Girlfriend present and reports he is in the early stages of dementia and sleeps all the time      Dyspnea: yes, has \"good days and bad days\"  Lower extremity swelling: yes, but better  Abdominal swelling: yes  Home weight: Current weight is 296 in the office. He is up at least 7lbs since he saw his PCP last week. . Weight was 287 yesterday at home.  He says his dry weight is around 268 lbs  Home BP/HRs: not checking  Orthopnea/pillows denies, 2 pills. Wears CPAP   Last hospital stay: 4/11/24 (HF), 10/30/24 (HF)          Objective     Vital Signs:   Vitals:    11/14/24 1444 11/14/24 1445   BP: 140/66 113/58   BP Location: Left arm Left arm   Patient Position: Sitting Standing   Pulse: 53 53   SpO2: 95% 95%   Weight: 135 kg (296 lb 9.6 oz) 135 kg (296 lb 9.6 oz)   Height: 170.2 cm (67\") 170.2 cm (67\")     Body mass index is 46.45 kg/m².  Physical Exam  Vitals reviewed.   Constitutional:       Appearance: Normal appearance.   HENT:      Head: Normocephalic.   Neck:      Vascular: No carotid bruit.   Cardiovascular:      Rate and Rhythm: Normal rate and regular rhythm. Occasional Extrasystoles are present.     Pulses: Normal pulses.      Heart sounds: S1 normal and S2 normal. Murmur heard.      Systolic murmur is present with a grade of 2/6.   Pulmonary:      Effort: Pulmonary effort is normal. No respiratory distress.      Breath sounds: Normal breath sounds.   Chest:      Chest wall: No tenderness.   Abdominal:      General: Abdomen is flat.      Palpations: Abdomen is soft.   Musculoskeletal:      Cervical back: Neck supple.      Right lower leg: 3+ Edema present.      Left lower " leg: 3+ Edema present.   Skin:     General: Skin is warm and dry.   Neurological:      General: No focal deficit present.      Mental Status: He is alert and oriented to person, place, and time. Mental status is at baseline.   Psychiatric:         Mood and Affect: Mood normal.         Behavior: Behavior normal.         Thought Content: Thought content normal.              Data Reviewed:  Lab Results   Component Value Date    GLUCOSE 81 11/14/2024    CALCIUM 8.7 11/14/2024     11/14/2024    K 4.5 11/14/2024    CO2 32.0 (H) 11/14/2024     11/14/2024    BUN 57 (H) 11/14/2024    CREATININE 2.65 (H) 11/14/2024    EGFR 26.3 (L) 11/14/2024    BCR 21.5 11/14/2024    ANIONGAP 8.0 11/14/2024     Lab Results   Component Value Date    WBC 6.54 11/04/2024    HGB 9.5 (L) 11/04/2024    HCT 33.1 (L) 11/04/2024    MCV 91.9 11/04/2024     11/04/2024     Lab Results   Component Value Date    PROBNP 1,759.0 (H) 11/14/2024                   Assessment and Plan   Acute on Chronic HFpEF  - LVEF 60%, NYHA class III ACC/AHA stage: C  - Patient still appears fluid overloaded. IV diuresis today in office. Patient received 80mg lasix today through a butterfly in the right FA over slow IV push. During IV diuresis, vitals were monitored and stable. Please see IV diuresis record for those vitals. Patient voided 325 ml in the office prior to discharge from the office. Butterfly was d/c'd and area was free of erythema, ecchymosis, or drainage.  Patient will receive a follow up call from the HF center in 24 hours to evaluate urinary output and reassess signs and symptoms.   Continue Bumex 2 mg twice a day  -No SGLT2 inhibitor or MRA secondary to renal disease  -He currently is on Actos, would consider finding an alternative agent in the setting of recurrent heart failure exacerbations  - Heart failure education provided today including signs and symptoms, causes of heart failure, medications, daily weights.Reviewed HF Zones with  patient. Reinforced reasons to call and diuretic plan.   - Basic Metabolic Panel  - proBNP    2. Stage 3b chronic kidney disease  -Baseline usually 2.0-2.6  -Patient's girlfriend plans to contact nephrology to get a follow-up    3. VHD (valvular heart disease)  -Echo 10/31/2024 showed mildly increased gradient across TAVR valve and moderate mitral valve stenosis    4. Possible paroxysmal atrial fibrillation  -Obtain EKGs from [facility] and still unable to find atrial fibrillation.  Trying to obtain discharge summary/consult notes  -Currently wearing MCOT          I spent 90 minutes caring for Juan Alberto on this date of service. This time includes time spent by me in the following activities:preparing for the visit, reviewing tests, obtaining and/or reviewing a separately obtained history, performing a medically appropriate examination and/or evaluation , counseling and educating the patient/family/caregiver, ordering medications, tests, or procedures, and documenting information in the medical record    Follow Up {Instructions Charge Capture  Follow-up Communications :23}   Return in about 1 week (around 11/21/2024) for HF, Office follow up.    Patient was given instructions and counseling regarding his condition or for health maintenance advice. Please see specific information pulled into the AVS if appropriate.  Advised to call the [facility] with any questions, concerns, or worsening symptoms.

## 2024-11-15 ENCOUNTER — TELEPHONE (OUTPATIENT)
Dept: CARDIOLOGY | Facility: HOSPITAL | Age: 64
End: 2024-11-15
Payer: MEDICARE

## 2024-11-15 RX ORDER — METOLAZONE 2.5 MG/1
TABLET ORAL
Qty: 3 TABLET | Refills: 0 | Status: SHIPPED | OUTPATIENT
Start: 2024-11-15

## 2024-11-15 NOTE — TELEPHONE ENCOUNTER
Spoke with patients home health nurse Adrián ([phone]), he had stated when he saw patient today his weight is 296 which is the same as it was yesterday, he has had little urination since diuresing yesterday. Patient is still symptomatic of shortness of breath and swelling in his ankles.   Nurse had vitals of 124/78 pulse 57, oxygen 94% on 3 ml, weight 296 lbs.   Please advise

## 2024-11-15 NOTE — TELEPHONE ENCOUNTER
Called patient and spoke to him and his significant other. Patient reports that he feels fine but says he does get dyspneic just talking. Vital signs stable including O2 sats, which were 95. Significant other reports he only urinated 800 cc since last night. Advised to take his second dose of bumex now. I have also sent in metolazone to take ONE time only tomorrow if he is not better. Advised to go to ED if symptoms worsen, especially if urine output continues to be decreased.

## 2024-11-18 NOTE — TELEPHONE ENCOUNTER
----- Message from Toya WILKERSON sent at 11/18/2024 10:22 AM EST -----  Patient contacted and offered 11/19/24 appt and he declined.

## 2024-11-20 ENCOUNTER — TELEPHONE (OUTPATIENT)
Dept: CARDIOLOGY | Facility: CLINIC | Age: 64
End: 2024-11-20
Payer: MEDICARE

## 2024-11-21 ENCOUNTER — CARE COORDINATION (OUTPATIENT)
Age: 64
End: 2024-11-21

## (undated) DEVICE — 3M™ TEGADERM™ CHG DRESSING 25/CARTON 4 CARTONS/CASE 1658: Brand: TEGADERM™

## (undated) DEVICE — SI AVANTI+ 7F STD W/GW  NO OBT: Brand: AVANTI

## (undated) DEVICE — GW SAFARI2 PRESH XSM CRV .035IN 3.2X2.9X275CM

## (undated) DEVICE — PK HEART OPN 10

## (undated) DEVICE — SYR LUERLOK 30CC

## (undated) DEVICE — PINNACLE INTRODUCER SHEATH: Brand: PINNACLE

## (undated) DEVICE — GLV SURG PREMIERPRO MIC LTX PF SZ6.5 BRN

## (undated) DEVICE — Device: Brand: MEDEX

## (undated) DEVICE — BOWL UTIL STRL 32OZ

## (undated) DEVICE — Device

## (undated) DEVICE — SUT SILK 3/0 TIES 18IN A184H

## (undated) DEVICE — GAUZE,SPONGE,4"X4",16PLY,XRAY,STRL,LF: Brand: MEDLINE

## (undated) DEVICE — SOL NS 500ML

## (undated) DEVICE — DRAPE,TOP,102X53,STERILE: Brand: MEDLINE

## (undated) DEVICE — SUT PROLN 7/0 BV1 24IN 4PK M8702

## (undated) DEVICE — ELECTRD DEFIB M/FUNC PROPADZ STRL 2PK

## (undated) DEVICE — CATH PACE PACEL BIPOL 5F110CM

## (undated) DEVICE — CLTH CLENS READYCLEANSE PERI CARE PK/5

## (undated) DEVICE — ELECTRD BLD 1P STD SS 3/32X2.44IN

## (undated) DEVICE — SLV REPOSTNG CATH STRL 60CM

## (undated) DEVICE — SI AVANTI+ 6F STD W/GW  NO OBT: Brand: AVANTI

## (undated) DEVICE — PRESSURE MONITORING SET: Brand: TRUWAVE, VAMP

## (undated) DEVICE — MEDI-VAC NON-CONDUCTIVE SUCTION TUBING: Brand: CARDINAL HEALTH

## (undated) DEVICE — CATH GUIDE BERN 5F 65CM

## (undated) DEVICE — GLIDEX™ COATED HYDROPHILIC GUIDEWIRE: Brand: MAGIC TORQUE™

## (undated) DEVICE — SNARE POLYP SM AD W13MMXL240CM SHTH DIA2.4MM HEX STIFF

## (undated) DEVICE — SUT SILK 4/0 TIES 18IN A183H

## (undated) DEVICE — SHEET, DRAPE, SPLIT, STERILE: Brand: MEDLINE

## (undated) DEVICE — PRESSURE MONITORING SET: Brand: TRUWAVE

## (undated) DEVICE — CVR HNDL LT SURG ACCSSRY BLU STRL

## (undated) DEVICE — GW CERBRL FC PTFE STD/STR .035 260CM

## (undated) DEVICE — CABL PACE ATRIAL PT BLU

## (undated) DEVICE — GW AMPLTZ SUPERSTIFF STR .035IN 180CM

## (undated) DEVICE — SUT PROLN 6/0 C1 D/A 30IN 8706H

## (undated) DEVICE — PK CATH CARD 10

## (undated) DEVICE — ANGIOGRAPHIC CATHETER: Brand: EXPO™

## (undated) DEVICE — SUCTION CANISTER, 2500CC, RIGID: Brand: DEROYAL

## (undated) DEVICE — GW J TP FIX CORE .035 150

## (undated) DEVICE — PERCLOSE PROGLIDE™ SUTURE-MEDIATED CLOSURE SYSTEM: Brand: PERCLOSE PROGLIDE™

## (undated) DEVICE — RADIFOCUS GLIDEWIRE: Brand: GLIDEWIRE

## (undated) DEVICE — SUT MONOCRYL PLS ANTIB UND 3/0  PS1 27IN

## (undated) DEVICE — SUT PROLN 4/0 BB D/A 36IN 8581H

## (undated) DEVICE — KT MANIFOLD CATHLAB CUST

## (undated) DEVICE — ANTIBACTERIAL UNDYED BRAIDED (POLYGLACTIN 910), SYNTHETIC ABSORBABLE SUTURE: Brand: COATED VICRYL

## (undated) DEVICE — SKIN AFFIX SURG ADHESIVE 72/CS 0.55ML: Brand: MEDLINE

## (undated) DEVICE — CATH DIAG EXPO .056 AL1 6F 100CM

## (undated) DEVICE — DRSNG WND GZ PAD BORDERED 4X8IN STRL